# Patient Record
Sex: FEMALE | Race: WHITE | NOT HISPANIC OR LATINO | Employment: OTHER | URBAN - METROPOLITAN AREA
[De-identification: names, ages, dates, MRNs, and addresses within clinical notes are randomized per-mention and may not be internally consistent; named-entity substitution may affect disease eponyms.]

---

## 2017-01-12 DIAGNOSIS — L40.50 PSORIATIC ARTHRITIS: Primary | ICD-10-CM

## 2017-04-12 ENCOUNTER — PATIENT MESSAGE (OUTPATIENT)
Dept: RHEUMATOLOGY | Facility: CLINIC | Age: 47
End: 2017-04-12

## 2017-04-21 RX ORDER — AZITHROMYCIN 250 MG/1
TABLET, FILM COATED ORAL
Qty: 6 TABLET | Refills: 0 | Status: SHIPPED | OUTPATIENT
Start: 2017-04-21 | End: 2017-06-05 | Stop reason: ALTCHOICE

## 2017-04-21 RX ORDER — OLOPATADINE HYDROCHLORIDE 665 UG/1
1 SPRAY NASAL 2 TIMES DAILY
Qty: 30.5 G | Refills: 4 | Status: SHIPPED | OUTPATIENT
Start: 2017-04-21 | End: 2018-01-24 | Stop reason: SDUPTHER

## 2017-04-21 RX ORDER — MONTELUKAST SODIUM 10 MG/1
10 TABLET ORAL NIGHTLY
Qty: 30 TABLET | Refills: 0 | Status: SHIPPED | OUTPATIENT
Start: 2017-04-21 | End: 2017-05-15 | Stop reason: SDUPTHER

## 2017-04-28 ENCOUNTER — TELEPHONE (OUTPATIENT)
Dept: PHARMACY | Facility: CLINIC | Age: 47
End: 2017-04-28

## 2017-04-28 ENCOUNTER — TELEPHONE (OUTPATIENT)
Dept: RHEUMATOLOGY | Facility: CLINIC | Age: 47
End: 2017-04-28

## 2017-04-28 ENCOUNTER — OFFICE VISIT (OUTPATIENT)
Dept: RHEUMATOLOGY | Facility: CLINIC | Age: 47
End: 2017-04-28
Payer: COMMERCIAL

## 2017-04-28 VITALS
HEIGHT: 70 IN | WEIGHT: 186.88 LBS | HEART RATE: 75 BPM | DIASTOLIC BLOOD PRESSURE: 84 MMHG | BODY MASS INDEX: 26.75 KG/M2 | SYSTOLIC BLOOD PRESSURE: 144 MMHG

## 2017-04-28 DIAGNOSIS — E55.9 VITAMIN D DEFICIENCY DISEASE: ICD-10-CM

## 2017-04-28 DIAGNOSIS — I73.00 RAYNAUD'S DISEASE WITHOUT GANGRENE: ICD-10-CM

## 2017-04-28 DIAGNOSIS — F41.9 ANXIETY: ICD-10-CM

## 2017-04-28 DIAGNOSIS — E53.1 VITAMIN B6 DEFICIENCY: ICD-10-CM

## 2017-04-28 DIAGNOSIS — L40.50 PSORIATIC ARTHRITIS: Primary | ICD-10-CM

## 2017-04-28 DIAGNOSIS — F31.9 BIPOLAR AFFECTIVE DISORDER, REMISSION STATUS UNSPECIFIED: ICD-10-CM

## 2017-04-28 PROCEDURE — 99999 PR PBB SHADOW E&M-EST. PATIENT-LVL III: CPT | Mod: PBBFAC,,, | Performed by: INTERNAL MEDICINE

## 2017-04-28 PROCEDURE — 99214 OFFICE O/P EST MOD 30 MIN: CPT | Mod: 25,S$GLB,, | Performed by: INTERNAL MEDICINE

## 2017-04-28 PROCEDURE — 96372 THER/PROPH/DIAG INJ SC/IM: CPT | Mod: S$GLB,,, | Performed by: INTERNAL MEDICINE

## 2017-04-28 PROCEDURE — 1160F RVW MEDS BY RX/DR IN RCRD: CPT | Mod: S$GLB,,, | Performed by: INTERNAL MEDICINE

## 2017-04-28 RX ORDER — CYANOCOBALAMIN 1000 UG/ML
1000 INJECTION, SOLUTION INTRAMUSCULAR; SUBCUTANEOUS
Status: COMPLETED | OUTPATIENT
Start: 2017-04-28 | End: 2017-04-28

## 2017-04-28 RX ORDER — METHYLPREDNISOLONE ACETATE 80 MG/ML
160 INJECTION, SUSPENSION INTRA-ARTICULAR; INTRALESIONAL; INTRAMUSCULAR; SOFT TISSUE
Status: COMPLETED | OUTPATIENT
Start: 2017-04-28 | End: 2017-04-28

## 2017-04-28 RX ORDER — LANOLIN ALCOHOL/MO/W.PET/CERES
100 CREAM (GRAM) TOPICAL DAILY
COMMUNITY
End: 2017-10-26

## 2017-04-28 RX ORDER — BUPRENORPHINE AND NALOXONE 12; 3 MG/1; MG/1
FILM, SOLUBLE BUCCAL; SUBLINGUAL DAILY
COMMUNITY
End: 2017-04-28

## 2017-04-28 RX ORDER — PROMETHAZINE HYDROCHLORIDE AND DEXTROMETHORPHAN HYDROBROMIDE 6.25; 15 MG/5ML; MG/5ML
SYRUP ORAL
COMMUNITY
Start: 2017-04-26 | End: 2017-10-26

## 2017-04-28 RX ORDER — KETOROLAC TROMETHAMINE 30 MG/ML
60 INJECTION, SOLUTION INTRAMUSCULAR; INTRAVENOUS
Status: COMPLETED | OUTPATIENT
Start: 2017-04-28 | End: 2017-04-28

## 2017-04-28 RX ORDER — CHOLECALCIFEROL (VITAMIN D3) 25 MCG
1000 TABLET ORAL DAILY
COMMUNITY
End: 2017-10-26

## 2017-04-28 RX ADMIN — CYANOCOBALAMIN 1000 MCG: 1000 INJECTION, SOLUTION INTRAMUSCULAR; SUBCUTANEOUS at 01:04

## 2017-04-28 RX ADMIN — METHYLPREDNISOLONE ACETATE 160 MG: 80 INJECTION, SUSPENSION INTRA-ARTICULAR; INTRALESIONAL; INTRAMUSCULAR; SOFT TISSUE at 01:04

## 2017-04-28 RX ADMIN — KETOROLAC TROMETHAMINE 60 MG: 30 INJECTION, SOLUTION INTRAMUSCULAR; INTRAVENOUS at 01:04

## 2017-04-28 NOTE — PROGRESS NOTES
Administered 60mg (2cc) of Ketorolac 30mg/ml to left upper outer quadrant of the gluteus cassandra. Patient tolerated well. No acute reaction noted to site. Instructed to report S/S. Patient verbalized understanding.      Administered 160mg (2cc) of 80/ml of Depo Medrol and 1cc CYANOCOBALAMIN 1000mcg to right upper outer quadrant of the gluteus cassandra. Patient tolerated well. No acute reaction noted. Instructed patient to report S/S. Patient verbalized understanding.

## 2017-04-28 NOTE — MR AVS SNAPSHOT
[place] - Rheumatology  [address] [facility] [address]  [facility] [address]  Phone: [phone]  Fax: [phone]                  Boom Blanco   2017 10:30 AM   Office Visit    Description:  Female : 1970   Provider:  Maximino Molina MD   Department:  [place] - Rheumatology           Reason for Visit     Disease Management           Diagnoses this Visit        Comments    Psoriatic arthritis    -  Primary     Anxiety         Vitamin D deficiency disease         Vitamin B6 deficiency         Raynaud's disease without gangrene         Bipolar affective disorder, remission status unspecified                To Do List           Goals (5 Years of Data)     None      Follow-Up and Disposition     Return in about 4 months (around 2017).      [facility]     [facility] -  Assistance  Unless otherwise directed by your provider, please contact [facility], our nurse care line that is available for  assistance.     Registered nurses in the [facility] provide: appointment scheduling, clinical advisement, health education, and other advisory services.  Call: [phone] (toll free)               Medications           Message regarding Medications     Verify the changes and/or additions to your medication regime listed below are the same as discussed with your clinician today.  If any of these changes or additions are incorrect, please notify your healthcare provider.        These medications were administered today        Dose Freq    methylPREDNISolone acetate injection 160 mg 160 mg Clinic/HOD 1 time    Sig: Inject 2 mLs (160 mg total) into the muscle one time.    Class: Normal    Route: Intramuscular    ketorolac injection 60 mg 60 mg Clinic/HOD 1 time    Sig: Inject 2 mLs (60 mg total) into the muscle one time.    Class: Normal    Route: Intramuscular    cyanocobalamin injection 1,000 mcg 1,000 mcg Clinic/HOD 1 time    Sig: Inject 1 mL (1,000  mcg total) into the muscle one time.    Class: Normal    Route: Intramuscular      STOP taking these medications     paroxetine (PAXIL-CR) 12.5 MG 24 hr tablet TAKE ONE TABLET BY MOUTH EVERY MORNING    vit D3-folic acid-B2-B6-B12 2,000-800-0.32 unit-mcg-mg Tab Take by mouth.    buprenorphine-naloxone 12-3 mg Film Place under the tongue once daily.    LINZESS 145 mcg Cap capsule            Verify that the below list of medications is an accurate representation of the medications you are currently taking.  If none reported, the list may be blank. If incorrect, please contact your healthcare provider. Carry this list with you in case of emergency.           Current Medications     alprazolam (XANAX) 1 MG tablet Take 1 mg by mouth 2 (two) times daily.    azithromycin (Z-YANG) 250 MG tablet Take 2 tablets by mouth on day 1; Take 1 tablet by mouth on days 2-5    cariprazine (VRAYLAR) 4.5 mg Cap Take 4.5 mg by mouth once daily.    chlorzoxazone (PARAFON FORTE) 500 mg Tab 1,000 mg every evening.     cyanocobalamin (VITAMIN B-12) 1000 MCG tablet Take 100 mcg by mouth once daily.    gabapentin (NEURONTIN) 300 MG capsule 300 mg once daily.     lactulose (CHRONULAC) 10 gram/15 mL solution     lamotrigine (LAMICTAL) 100 MG tablet TK 2 TS PO QAM    levocetirizine (XYZAL) 5 MG tablet TAKE ONE TABLET BY MOUTH EVERY EVENING    levothyroxine (SYNTHROID) 50 MCG tablet TAKE ONE TABLET BY MOUTH EVERY MORNING    lovastatin (MEVACOR) 20 MG tablet     montelukast (SINGULAIR) 10 mg tablet Take 1 tablet (10 mg total) by mouth every evening.    olopatadine (PATANASE) 0.6 % nasal spray 1 spray by Each Nare route 2 (two) times daily.    oxycodone (ROXICODONE) 15 MG Tab 15 mg every 8 (eight) hours as needed.     promethazine-dextromethorphan (PROMETHAZINE-DM) 6.25-15 mg/5 mL Syrp     temazepam (RESTORIL) 15 mg Cap every evening.     vitamin D 1000 units Tab Take 1,000 Units by mouth once daily.    golimumab (SIMPONI) 50 mg/0.5 mL Syrg Inject 1  "Syringe into the skin every 30 days.    SIMPONI 50 mg/0.5 mL PnIj            Clinical Reference Information           Your Vitals Were     BP Pulse Height Weight BMI    144/84 75 5' 9.5" (1.765 m) 84.8 kg (186 lb 14.4 oz) 27.2 kg/m2      Blood Pressure          Most Recent Value    BP  (!)  144/84      Allergies as of 4/28/2017     Remeron [Mirtazapine]      Immunizations Administered on Date of Encounter - 4/28/2017     None      Orders Placed During Today's Visit     Future Labs/Procedures Expected by Expires    Folate  4/28/2017 6/27/2018    Vitamin B12  4/28/2017 6/27/2018    Vitamin B6  4/28/2017 6/27/2018    Vitamin D  4/28/2017 6/27/2018      Smoking Cessation     If you would like to quit smoking:   You may be eligible for free services if you are a [place] resident and started smoking cigarettes before September 1, 1988.  Call the Smoking Cessation [employer] ([facility]) toll free at [phone] or [phone].   Call 1-800-QUIT-NOW if you do not meet the above criteria.   Contact us via email: [email or web address]   View our website for more information: [email or web address]        Language Assistance Services     ATTENTION: Language assistance services are available, free of charge. Please call [phone].      ATENCIÓN: Si habla español, tiene a maya disposición servicios gratuitos de asistencia lingüística. Llame al [phone].     [facility] Ý: N?u b?n nói Ti?ng Vi?t, có các d?ch v? h? tr? ngôn ng? mi?n phí dành cho b?n. G?i s? [phone].         [facility] complies with applicable Federal civil rights laws and does not discriminate on the basis of race, color, national origin, age, disability, or sex.        "

## 2017-04-28 NOTE — PROGRESS NOTES
Subjective:       Patient ID: Boom Blanco is a 47 y.o. female.    Chief Complaint: Disease Management    HPI Comments: Follow up: psa flares she is off simponi since jan 2017. mtp pain  Weight gain, constipation. Patient complains of arthralgias and myalgias for which has been present for a few years. Pain is located in multiple joints, both shoulder(s), both elbow(s), both wrist(s), both MCP(s): 1st, 2nd, 3rd, 4th and 5th, both PIP(s): 1st, 2nd, 3rd, 4th and 5th, both DIP(s): 1st and 2nd, both hip(s), both knee(s) and both MTP(s): 1st, 2nd, 3rd, 4th and 5th, is described as aching, pulsating, shooting and throbbing, and is constant, moderate .  Associated symptoms include: crepitation, decreased range of motion, edema, effusion, tenderness and warmth.  B ankles swelling R>L  And knees B, vision changes            Associated symptoms include fatigue.         Review of Systems   Constitutional: Positive for activity change and fatigue. Negative for appetite change, chills, diaphoresis and unexpected weight change.   HENT: Negative for congestion, dental problem, ear discharge, ear pain, facial swelling, mouth sores, nosebleeds, postnasal drip, rhinorrhea, sinus pressure, sneezing, sore throat, tinnitus and voice change.    Eyes: Negative for photophobia, pain, discharge, redness and itching.   Respiratory: Negative for apnea, cough, chest tightness, shortness of breath and wheezing.    Cardiovascular: Positive for leg swelling. Negative for chest pain and palpitations.   Gastrointestinal: Negative for abdominal distention, abdominal pain, constipation, diarrhea, nausea and vomiting.   Endocrine: Negative for cold intolerance, heat intolerance, polydipsia and polyuria.   Genitourinary: Negative for decreased urine volume, difficulty urinating, flank pain, frequency, hematuria and urgency.   Musculoskeletal: Positive for arthralgias, neck pain and neck stiffness. Negative for back pain and gait problem.  "  Skin: Negative for pallor, rash and wound.   Allergic/Immunologic: Negative for immunocompromised state.   Neurological: Negative for dizziness, tremors, weakness and numbness.   Hematological: Negative for adenopathy. Does not bruise/bleed easily.   Psychiatric/Behavioral: Negative for sleep disturbance. The patient is not nervous/anxious.          Objective:     BP (!) 144/84  Pulse 75  Ht 5' 9.5" (1.765 m)  Wt 84.8 kg (186 lb 14.4 oz)  BMI 27.2 kg/m2     Physical Exam   Nursing note and vitals reviewed.  Constitutional: She is oriented to person, place, and time. She appears distressed.   HENT:   Head: Normocephalic and atraumatic.   Mouth/Throat: Oropharynx is clear and moist.   Eyes: EOM are normal. Pupils are equal, round, and reactive to light.   Neck: Neck supple. No thyromegaly present.   Cardiovascular: Normal rate, regular rhythm and normal heart sounds.  Exam reveals no gallop and no friction rub.    No murmur heard.  Pulmonary/Chest: She has wheezes. She has no rales. She exhibits no tenderness.   Abdominal: There is tenderness. There is no rebound and no guarding.       Right Side Rheumatological Exam     Examination finds the elbow normal.    The patient is tender to palpation of the shoulder, wrist, knee, 1st PIP, 1st MCP, 2nd PIP, 2nd MCP, 3rd PIP, 3rd MCP, 4th PIP, 4th MCP, 5th PIP and 5th MCP    She has swelling of the 1st PIP, 1st MCP, 2nd PIP, 2nd MCP, 3rd PIP, 3rd MCP, 4th PIP, 4th MCP, 5th PIP and 5th MCP    Shoulder Exam   Tenderness Location: no tenderness    Range of Motion   Active Abduction: abnormal   Adduction: abnormal  Sensation: normal    Knee Exam   Patellofemoral Crepitus: positive  Effusion: negative  Sensation: normal    Hip Exam   Tenderness Location: posterior  Sensation: normal    Elbow/Wrist Exam   Tenderness Location: no tenderness  Sensation: normal    Left Side Rheumatological Exam     Examination finds the elbow normal.    The patient is tender to palpation of the " shoulder, wrist, knee, 1st PIP, 1st MCP, 2nd PIP, 2nd MCP, 3rd PIP, 3rd MCP, 4th PIP, 4th MCP, 5th PIP and 5th MCP.    She has swelling of the 1st PIP, 1st MCP, 2nd PIP, 2nd MCP, 3rd PIP, 3rd MCP, 4th PIP, 4th MCP, 5th PIP and 5th MCP    Shoulder Exam   Tenderness Location: no tenderness    Range of Motion   Active Abduction: abnormal   Sensation: normal    Knee Exam     Patellofemoral Crepitus: positive  Effusion: negative  Sensation: normal    Hip Exam   Tenderness Location: posterior  Sensation: normal    Elbow/Wrist Exam   Sensation: normal      Back/Neck Exam   General Inspection   Gait: normal         Lymphadenopathy:     She has no cervical adenopathy.   Neurological: She is alert and oriented to person, place, and time. Gait normal.   Skin: Rash noted. No erythema. No pallor.      Raised lesion on back, legs arms spares face KP   Psychiatric: Mood and affect normal.   Musculoskeletal: She exhibits edema, tenderness and deformity.   psa changes           Results for orders placed or performed in visit on 01/18/17   Comprehensive metabolic panel   Result Value Ref Range    Sodium 143 136 - 145 mmol/L    Potassium 4.0 3.5 - 5.1 mmol/L    Chloride 104 95 - 110 mmol/L    CO2 30 22 - 31 mmol/L    Glucose 100 70 - 110 mg/dL    BUN, Bld 9 7 - 18 mg/dL    Creatinine 0.74 0.50 - 1.40 mg/dL    Calcium 8.9 8.4 - 10.2 mg/dL    Total Protein 6.7 6.0 - 8.4 g/dL    Albumin 3.7 3.5 - 5.2 g/dL    Total Bilirubin 0.5 0.2 - 1.3 mg/dL    Alkaline Phosphatase 49 38 - 145 U/L    AST 23 14 - 36 U/L    ALT 25 10 - 44 U/L    Anion Gap 9 8 - 16 mmol/L    eGFR if African American >60 >60 mL/min/1.73 m^2    eGFR if non African American >60 >60 mL/min/1.73 m^2   CBC auto differential   Result Value Ref Range    WBC 6.58 3.90 - 12.70 K/uL    RBC 4.48 4.00 - 5.40 M/uL    Hemoglobin 13.5 12.0 - 16.0 g/dL    Hematocrit 42.7 37.0 - 48.5 %    MCV 95 82 - 98 fL    MCH 30.1 27.0 - 31.0 pg    MCHC 31.6 (L) 32.0 - 36.0 %    RDW 13.8 11.5 - 14.5 %     Platelets 171 150 - 350 K/uL    MPV 11.1 9.2 - 12.9 fL    Gran # 3.7 1.8 - 7.7 K/uL    Lymph # 2.2 1.0 - 4.8 K/uL    Mono # 0.5 0.3 - 1.0 K/uL    Eos # 0.1 0.0 - 0.5 K/uL    Baso # 0.06 0.00 - 0.20 K/uL    nRBC 0 0 /100 WBC    Gran% 56.0 38.0 - 73.0 %    Lymph% 33.7 18.0 - 48.0 %    Mono% 7.4 4.0 - 15.0 %    Eosinophil% 2.0 0.0 - 8.0 %    Basophil% 0.9 0.0 - 1.9 %    Differential Method Automated    TSH   Result Value Ref Range    TSH 2.110 0.400 - 4.000 uIU/mL   T4, free   Result Value Ref Range    Free T4 1.27 0.78 - 2.19 ng/dL   T3, free   Result Value Ref Range    T3, Free 3.69 2.77 - 5.27 pg/mL   Vitamin D   Result Value Ref Range    Vit D, 25-Hydroxy 27 (L) 30 - 96 ng/mL   Vitamin B6   Result Value Ref Range    Vitamin B6 19.8 (L) 20.0 - 125.0 nmol/L   LIPID PANEL   Result Value Ref Range    Cholesterol 177 120 - 199 mg/dL    Triglycerides 67 30 - 150 mg/dL    HDL 53 40 - 75 mg/dL    LDL Cholesterol 110.6 63.0 - 159.0 mg/dL    HDL/Chol Ratio 29.9 20.0 - 50.0 %    Total Cholesterol/HDL Ratio 3.3 2.0 - 5.0    Non-HDL Cholesterol 124 mg/dL       Assessment:       Encounter Diagnoses   Name Primary?    Psoriatic arthritis Yes    Anxiety     Vitamin D deficiency disease     Vitamin B6 deficiency     Raynaud's disease without gangrene     Bipolar affective disorder, remission status unspecified      \    Plan:     Boom was seen today for disease management.    Diagnoses and all orders for this visit:    Psoriatic arthritis  -     Vitamin D; Future  -     Folate; Future  -     Vitamin B6; Future  -     Vitamin B12; Future  -     methylPREDNISolone acetate injection 160 mg; Inject 2 mLs (160 mg total) into the muscle one time.  -     ketorolac injection 60 mg; Inject 2 mLs (60 mg total) into the muscle one time.  -     cyanocobalamin injection 1,000 mcg; Inject 1 mL (1,000 mcg total) into the muscle one time.  -     golimumab 100 mg/mL Syrg; Inject 50 mg into the skin every 30 days.    Anxiety  -      Vitamin D; Future  -     Folate; Future  -     Vitamin B6; Future  -     Vitamin B12; Future  -     methylPREDNISolone acetate injection 160 mg; Inject 2 mLs (160 mg total) into the muscle one time.  -     ketorolac injection 60 mg; Inject 2 mLs (60 mg total) into the muscle one time.  -     cyanocobalamin injection 1,000 mcg; Inject 1 mL (1,000 mcg total) into the muscle one time.  -     golimumab 100 mg/mL Syrg; Inject 50 mg into the skin every 30 days.    Vitamin D deficiency disease  -     Vitamin D; Future  -     Folate; Future  -     Vitamin B6; Future  -     Vitamin B12; Future  -     methylPREDNISolone acetate injection 160 mg; Inject 2 mLs (160 mg total) into the muscle one time.  -     ketorolac injection 60 mg; Inject 2 mLs (60 mg total) into the muscle one time.  -     cyanocobalamin injection 1,000 mcg; Inject 1 mL (1,000 mcg total) into the muscle one time.  -     golimumab 100 mg/mL Syrg; Inject 50 mg into the skin every 30 days.    Vitamin B6 deficiency  -     Vitamin D; Future  -     Folate; Future  -     Vitamin B6; Future  -     Vitamin B12; Future  -     methylPREDNISolone acetate injection 160 mg; Inject 2 mLs (160 mg total) into the muscle one time.  -     ketorolac injection 60 mg; Inject 2 mLs (60 mg total) into the muscle one time.  -     cyanocobalamin injection 1,000 mcg; Inject 1 mL (1,000 mcg total) into the muscle one time.  -     golimumab 100 mg/mL Syrg; Inject 50 mg into the skin every 30 days.    Raynaud's disease without gangrene  -     Vitamin D; Future  -     Folate; Future  -     Vitamin B6; Future  -     Vitamin B12; Future  -     methylPREDNISolone acetate injection 160 mg; Inject 2 mLs (160 mg total) into the muscle one time.  -     ketorolac injection 60 mg; Inject 2 mLs (60 mg total) into the muscle one time.  -     cyanocobalamin injection 1,000 mcg; Inject 1 mL (1,000 mcg total) into the muscle one time.  -     golimumab 100 mg/mL Syrg; Inject 50 mg into the skin every 30  days.    Bipolar affective disorder, remission status unspecified  -     Vitamin D; Future  -     Folate; Future  -     Vitamin B6; Future  -     Vitamin B12; Future  -     methylPREDNISolone acetate injection 160 mg; Inject 2 mLs (160 mg total) into the muscle one time.  -     ketorolac injection 60 mg; Inject 2 mLs (60 mg total) into the muscle one time.  -     cyanocobalamin injection 1,000 mcg; Inject 1 mL (1,000 mcg total) into the muscle one time.  -     golimumab 100 mg/mL Syrg; Inject 50 mg into the skin every 30 days.       Due to the severity of her disease state and the quality of her live as well as the inability to control the symptoms of her disease state I recommend applying for long term disability

## 2017-04-28 NOTE — TELEPHONE ENCOUNTER
Spoke to Brant at [facility], clarified that Simponi Autoinjector is 50mg. No further questions.

## 2017-04-28 NOTE — TELEPHONE ENCOUNTER
----- Message from Celena Andrew sent at 4/28/2017  1:59 PM CDT -----  [facility] Simponi for the above patient. There are requesting clarification on dosage, prescription is for 100 mg but directions stated to inject 50mg, contact pharmacy at [phone].  Thanks.

## 2017-05-05 ENCOUNTER — LAB VISIT (OUTPATIENT)
Dept: LAB | Facility: HOSPITAL | Age: 47
End: 2017-05-05
Attending: INTERNAL MEDICINE
Payer: COMMERCIAL

## 2017-05-05 DIAGNOSIS — I73.00 RAYNAUD'S DISEASE WITHOUT GANGRENE: ICD-10-CM

## 2017-05-05 DIAGNOSIS — F31.9 BIPOLAR AFFECTIVE DISORDER, REMISSION STATUS UNSPECIFIED: ICD-10-CM

## 2017-05-05 DIAGNOSIS — E53.1 VITAMIN B6 DEFICIENCY: ICD-10-CM

## 2017-05-05 DIAGNOSIS — L40.50 PSORIATIC ARTHRITIS: ICD-10-CM

## 2017-05-05 DIAGNOSIS — F41.9 ANXIETY: ICD-10-CM

## 2017-05-05 DIAGNOSIS — E55.9 VITAMIN D DEFICIENCY DISEASE: ICD-10-CM

## 2017-05-05 LAB
25(OH)D3+25(OH)D2 SERPL-MCNC: 54 NG/ML
FOLATE SERPL-MCNC: 12.4 NG/ML
VIT B12 SERPL-MCNC: 606 PG/ML

## 2017-05-05 PROCEDURE — 36415 COLL VENOUS BLD VENIPUNCTURE: CPT | Mod: PO

## 2017-05-05 PROCEDURE — 82306 VITAMIN D 25 HYDROXY: CPT

## 2017-05-05 PROCEDURE — 82746 ASSAY OF FOLIC ACID SERUM: CPT

## 2017-05-05 PROCEDURE — 84207 ASSAY OF VITAMIN B-6: CPT

## 2017-05-05 PROCEDURE — 82607 VITAMIN B-12: CPT

## 2017-05-08 ENCOUNTER — TELEPHONE (OUTPATIENT)
Dept: PHARMACY | Facility: CLINIC | Age: 47
End: 2017-05-08

## 2017-05-08 NOTE — TELEPHONE ENCOUNTER
patient contacted to offer consultation and setup shipment. She verified she has not started any new medication. She has not developed any new drug allergies or medical conditions. She scheduled her ship date for 5-9-17 for a 5-10-17 delivery. She verified her address on file is correct and would like her medication to go there. She is new to pharmacy and new to drug and has declined consultation with a clinical pharmacist. She verified understanding of the refill process and has no additional questions at the time_ 5-8-17.

## 2017-05-09 LAB — PYRIDOXAL SERPL-MCNC: 30 UG/L (ref 5–50)

## 2017-05-15 RX ORDER — MONTELUKAST SODIUM 10 MG/1
TABLET ORAL
Qty: 30 TABLET | Refills: 3 | Status: SHIPPED | OUTPATIENT
Start: 2017-05-15 | End: 2017-10-26

## 2017-06-05 ENCOUNTER — TELEPHONE (OUTPATIENT)
Dept: PHARMACY | Facility: CLINIC | Age: 47
End: 2017-06-05

## 2017-06-05 NOTE — TELEPHONE ENCOUNTER
Neel f/u complete. Name/ confirmed. Medication list reviewed and updated. Consultation included: indication; goals of treatment; administration; storage and handling; side effects; how to handle side effects; the importance of compliance; how to handle missed doses; the importance of laboratory monitoring; the importance of keeping all follow up appointments. Patient understands to report any medication changes to [facility] and provider. All questions answered and addressed to patients satisfaction. Patient understands goals of treatment and medication regimen. She reports her pain to be 6/10. She reports some pain in shoulders and hands. She has no questions regarding self injection process and that was deferred. She reports only mild injection site reactions after injecting and thats all. Ms rene was afforded the opportunity to ask questions and had none. F/U in 6mos appropriate.

## 2017-07-06 ENCOUNTER — TELEPHONE (OUTPATIENT)
Dept: PHARMACY | Facility: CLINIC | Age: 47
End: 2017-07-06

## 2017-07-10 ENCOUNTER — PATIENT MESSAGE (OUTPATIENT)
Dept: ADMINISTRATIVE | Facility: OTHER | Age: 47
End: 2017-07-10

## 2017-07-11 NOTE — TELEPHONE ENCOUNTER
patient confirmed need of refill for simponi via portal , did not state dose count and did not state any updates to medical history. patient confirmed shipping address , rx will ship 7/13 to arrive 7/14 with consent.    Katie Hondroulis [facility]- Refill Technician  Phone: [phone]

## 2017-07-12 DIAGNOSIS — I73.00 RAYNAUD'S DISEASE: ICD-10-CM

## 2017-07-12 DIAGNOSIS — G47.00 INSOMNIA: ICD-10-CM

## 2017-07-12 DIAGNOSIS — E03.9 HYPOTHYROIDISM: ICD-10-CM

## 2017-07-12 DIAGNOSIS — L40.50 PSORIATIC ARTHRITIS: ICD-10-CM

## 2017-07-12 DIAGNOSIS — R05.9 COUGH: ICD-10-CM

## 2017-07-12 RX ORDER — LEVOTHYROXINE SODIUM 50 UG/1
TABLET ORAL
Qty: 30 TABLET | Refills: 8 | Status: SHIPPED | OUTPATIENT
Start: 2017-07-12 | End: 2018-07-26 | Stop reason: SDUPTHER

## 2017-08-04 ENCOUNTER — TELEPHONE (OUTPATIENT)
Dept: PHARMACY | Facility: CLINIC | Age: 47
End: 2017-08-04

## 2017-08-07 DIAGNOSIS — G47.00 INSOMNIA: ICD-10-CM

## 2017-08-07 DIAGNOSIS — R05.9 COUGH: ICD-10-CM

## 2017-08-07 DIAGNOSIS — L40.50 PSORIATIC ARTHRITIS: ICD-10-CM

## 2017-08-07 DIAGNOSIS — I73.00 RAYNAUD'S DISEASE: ICD-10-CM

## 2017-08-07 DIAGNOSIS — E03.9 HYPOTHYROIDISM: ICD-10-CM

## 2017-08-07 RX ORDER — LEVOTHYROXINE SODIUM 50 UG/1
TABLET ORAL
Qty: 30 TABLET | Refills: 3 | Status: SHIPPED | OUTPATIENT
Start: 2017-08-07 | End: 2017-10-26 | Stop reason: SDUPTHER

## 2017-08-08 DIAGNOSIS — G47.00 INSOMNIA: ICD-10-CM

## 2017-08-08 DIAGNOSIS — E03.9 HYPOTHYROIDISM: ICD-10-CM

## 2017-08-08 DIAGNOSIS — R05.9 COUGH: ICD-10-CM

## 2017-08-08 DIAGNOSIS — I73.00 RAYNAUD'S DISEASE: ICD-10-CM

## 2017-08-08 DIAGNOSIS — L40.50 PSORIATIC ARTHRITIS: ICD-10-CM

## 2017-08-08 RX ORDER — LEVOTHYROXINE SODIUM 50 UG/1
TABLET ORAL
Qty: 30 TABLET | Refills: 0 | Status: SHIPPED | OUTPATIENT
Start: 2017-08-08 | End: 2017-10-26 | Stop reason: SDUPTHER

## 2017-08-27 ENCOUNTER — PATIENT MESSAGE (OUTPATIENT)
Dept: RHEUMATOLOGY | Facility: CLINIC | Age: 47
End: 2017-08-27

## 2017-08-31 ENCOUNTER — TELEPHONE (OUTPATIENT)
Dept: PHARMACY | Facility: CLINIC | Age: 47
End: 2017-08-31

## 2017-09-06 ENCOUNTER — PATIENT MESSAGE (OUTPATIENT)
Dept: ADMINISTRATIVE | Facility: OTHER | Age: 47
End: 2017-09-06

## 2017-10-17 ENCOUNTER — TELEPHONE (OUTPATIENT)
Dept: PHARMACY | Facility: CLINIC | Age: 47
End: 2017-10-17

## 2017-10-26 ENCOUNTER — OFFICE VISIT (OUTPATIENT)
Dept: RHEUMATOLOGY | Facility: CLINIC | Age: 47
End: 2017-10-26
Payer: COMMERCIAL

## 2017-10-26 ENCOUNTER — LAB VISIT (OUTPATIENT)
Dept: LAB | Facility: HOSPITAL | Age: 47
End: 2017-10-26
Attending: INTERNAL MEDICINE
Payer: COMMERCIAL

## 2017-10-26 VITALS
DIASTOLIC BLOOD PRESSURE: 78 MMHG | WEIGHT: 150.38 LBS | HEART RATE: 80 BPM | SYSTOLIC BLOOD PRESSURE: 123 MMHG | BODY MASS INDEX: 21.89 KG/M2

## 2017-10-26 DIAGNOSIS — I51.9 MYXEDEMA HEART DISEASE: ICD-10-CM

## 2017-10-26 DIAGNOSIS — G89.4 CHRONIC PAIN SYNDROME: ICD-10-CM

## 2017-10-26 DIAGNOSIS — R05.9 COUGH: ICD-10-CM

## 2017-10-26 DIAGNOSIS — E03.9 MYXEDEMA HEART DISEASE: ICD-10-CM

## 2017-10-26 DIAGNOSIS — E55.9 VITAMIN D DEFICIENCY DISEASE: ICD-10-CM

## 2017-10-26 DIAGNOSIS — E53.1 VITAMIN B6 DEFICIENCY: ICD-10-CM

## 2017-10-26 DIAGNOSIS — I73.00 RAYNAUD'S DISEASE WITHOUT GANGRENE: Primary | ICD-10-CM

## 2017-10-26 DIAGNOSIS — L40.50 PSORIATIC ARTHRITIS: ICD-10-CM

## 2017-10-26 DIAGNOSIS — I73.00 RAYNAUD'S DISEASE WITHOUT GANGRENE: ICD-10-CM

## 2017-10-26 DIAGNOSIS — E78.5 HYPERLIPEMIA: Primary | ICD-10-CM

## 2017-10-26 DIAGNOSIS — F31.9 BIPOLAR AFFECTIVE DISORDER, REMISSION STATUS UNSPECIFIED: ICD-10-CM

## 2017-10-26 DIAGNOSIS — F01.53 VASCULAR DEMENTIA WITH DEPRESSED MOOD: ICD-10-CM

## 2017-10-26 DIAGNOSIS — L93.0 LUPUS ERYTHEMATOSUS: ICD-10-CM

## 2017-10-26 DIAGNOSIS — F41.9 ANXIETY: ICD-10-CM

## 2017-10-26 LAB
ALBUMIN SERPL BCP-MCNC: 3.7 G/DL
ALP SERPL-CCNC: 48 U/L
ALT SERPL W/O P-5'-P-CCNC: 10 U/L
ANION GAP SERPL CALC-SCNC: 6 MMOL/L
AST SERPL-CCNC: 16 U/L
BASOPHILS # BLD AUTO: 0.06 K/UL
BASOPHILS NFR BLD: 0.9 %
BILIRUB SERPL-MCNC: 0.3 MG/DL
BUN SERPL-MCNC: 8 MG/DL
CALCIUM SERPL-MCNC: 9.4 MG/DL
CHLORIDE SERPL-SCNC: 105 MMOL/L
CHOLEST SERPL-MCNC: 155 MG/DL
CHOLEST/HDLC SERPL: 3.6 {RATIO}
CO2 SERPL-SCNC: 30 MMOL/L
CREAT SERPL-MCNC: 0.9 MG/DL
CRP SERPL-MCNC: 0.9 MG/L
DIFFERENTIAL METHOD: NORMAL
EOSINOPHIL # BLD AUTO: 0.1 K/UL
EOSINOPHIL NFR BLD: 0.7 %
ERYTHROCYTE [DISTWIDTH] IN BLOOD BY AUTOMATED COUNT: 13.3 %
ERYTHROCYTE [SEDIMENTATION RATE] IN BLOOD BY WESTERGREN METHOD: 3 MM/HR
EST. GFR  (AFRICAN AMERICAN): >60 ML/MIN/1.73 M^2
EST. GFR  (NON AFRICAN AMERICAN): >60 ML/MIN/1.73 M^2
GLUCOSE SERPL-MCNC: 99 MG/DL
HCT VFR BLD AUTO: 43.4 %
HDLC SERPL-MCNC: 43 MG/DL
HDLC SERPL: 27.7 %
HGB BLD-MCNC: 14 G/DL
IMM GRANULOCYTES # BLD AUTO: 0.01 K/UL
IMM GRANULOCYTES NFR BLD AUTO: 0.1 %
LDLC SERPL CALC-MCNC: 91 MG/DL
LYMPHOCYTES # BLD AUTO: 2.5 K/UL
LYMPHOCYTES NFR BLD: 36.6 %
MCH RBC QN AUTO: 29.9 PG
MCHC RBC AUTO-ENTMCNC: 32.3 G/DL
MCV RBC AUTO: 93 FL
MONOCYTES # BLD AUTO: 0.5 K/UL
MONOCYTES NFR BLD: 6.6 %
NEUTROPHILS # BLD AUTO: 3.7 K/UL
NEUTROPHILS NFR BLD: 55.1 %
NONHDLC SERPL-MCNC: 112 MG/DL
NRBC BLD-RTO: 0 /100 WBC
PLATELET # BLD AUTO: 178 K/UL
PMV BLD AUTO: 11.5 FL
POTASSIUM SERPL-SCNC: 4.7 MMOL/L
PROT SERPL-MCNC: 7.1 G/DL
RBC # BLD AUTO: 4.69 M/UL
SODIUM SERPL-SCNC: 141 MMOL/L
T4 FREE SERPL-MCNC: 1.21 NG/DL
TRIGL SERPL-MCNC: 105 MG/DL
TSH SERPL DL<=0.005 MIU/L-ACNC: 1.72 UIU/ML
WBC # BLD AUTO: 6.77 K/UL

## 2017-10-26 PROCEDURE — 99214 OFFICE O/P EST MOD 30 MIN: CPT | Mod: 25,S$GLB,, | Performed by: INTERNAL MEDICINE

## 2017-10-26 PROCEDURE — 85025 COMPLETE CBC W/AUTO DIFF WBC: CPT

## 2017-10-26 PROCEDURE — 36415 COLL VENOUS BLD VENIPUNCTURE: CPT | Mod: PO

## 2017-10-26 PROCEDURE — 99999 PR PBB SHADOW E&M-EST. PATIENT-LVL III: CPT | Mod: PBBFAC,,, | Performed by: INTERNAL MEDICINE

## 2017-10-26 PROCEDURE — 86140 C-REACTIVE PROTEIN: CPT

## 2017-10-26 PROCEDURE — 80053 COMPREHEN METABOLIC PANEL: CPT

## 2017-10-26 PROCEDURE — 84439 ASSAY OF FREE THYROXINE: CPT

## 2017-10-26 PROCEDURE — 80061 LIPID PANEL: CPT

## 2017-10-26 PROCEDURE — 85651 RBC SED RATE NONAUTOMATED: CPT | Mod: PO

## 2017-10-26 PROCEDURE — 84443 ASSAY THYROID STIM HORMONE: CPT

## 2017-10-26 PROCEDURE — 96372 THER/PROPH/DIAG INJ SC/IM: CPT | Mod: S$GLB,,, | Performed by: INTERNAL MEDICINE

## 2017-10-26 RX ORDER — BENZONATATE 200 MG/1
200 CAPSULE ORAL 3 TIMES DAILY PRN
Qty: 45 CAPSULE | Refills: 3 | Status: SHIPPED | OUTPATIENT
Start: 2017-10-26 | End: 2018-01-10 | Stop reason: SDUPTHER

## 2017-10-26 RX ORDER — METHYLPREDNISOLONE ACETATE 80 MG/ML
160 INJECTION, SUSPENSION INTRA-ARTICULAR; INTRALESIONAL; INTRAMUSCULAR; SOFT TISSUE
Status: COMPLETED | OUTPATIENT
Start: 2017-10-26 | End: 2017-10-26

## 2017-10-26 RX ORDER — OXYCODONE AND ACETAMINOPHEN 10; 325 MG/1; MG/1
1 TABLET ORAL 3 TIMES DAILY PRN
COMMUNITY
Start: 2017-10-06 | End: 2022-11-14 | Stop reason: SDUPTHER

## 2017-10-26 RX ORDER — PROMETHAZINE HYDROCHLORIDE AND DEXTROMETHORPHAN HYDROBROMIDE 6.25; 15 MG/5ML; MG/5ML
5 SYRUP ORAL 3 TIMES DAILY
Qty: 225 ML | Refills: 1 | Status: SHIPPED | OUTPATIENT
Start: 2017-10-26 | End: 2017-11-10

## 2017-10-26 RX ORDER — LEVOCETIRIZINE DIHYDROCHLORIDE 5 MG/1
5 TABLET, FILM COATED ORAL NIGHTLY
Qty: 30 TABLET | Refills: 11 | Status: SHIPPED | OUTPATIENT
Start: 2017-10-26 | End: 2018-08-06

## 2017-10-26 RX ORDER — CYANOCOBALAMIN 1000 UG/ML
1000 INJECTION, SOLUTION INTRAMUSCULAR; SUBCUTANEOUS
Status: COMPLETED | OUTPATIENT
Start: 2017-10-26 | End: 2017-10-26

## 2017-10-26 RX ORDER — PYRIDOXINE HCL (VITAMIN B6) 25 MG
25 TABLET ORAL DAILY
Qty: 30 TABLET | Refills: 0 | Status: SHIPPED | OUTPATIENT
Start: 2017-10-26 | End: 2018-08-06

## 2017-10-26 RX ORDER — KETOROLAC TROMETHAMINE 30 MG/ML
60 INJECTION, SOLUTION INTRAMUSCULAR; INTRAVENOUS
Status: COMPLETED | OUTPATIENT
Start: 2017-10-26 | End: 2017-10-26

## 2017-10-26 RX ORDER — BUPROPION HYDROCHLORIDE 150 MG/1
150 TABLET ORAL NIGHTLY
COMMUNITY
Start: 2017-09-29 | End: 2021-02-09

## 2017-10-26 RX ADMIN — METHYLPREDNISOLONE ACETATE 160 MG: 80 INJECTION, SUSPENSION INTRA-ARTICULAR; INTRALESIONAL; INTRAMUSCULAR; SOFT TISSUE at 10:10

## 2017-10-26 RX ADMIN — KETOROLAC TROMETHAMINE 60 MG: 30 INJECTION, SOLUTION INTRAMUSCULAR; INTRAVENOUS at 10:10

## 2017-10-26 RX ADMIN — CYANOCOBALAMIN 1000 MCG: 1000 INJECTION, SOLUTION INTRAMUSCULAR; SUBCUTANEOUS at 10:10

## 2017-10-26 ASSESSMENT — ROUTINE ASSESSMENT OF PATIENT INDEX DATA (RAPID3)
PSYCHOLOGICAL DISTRESS SCORE: 4.4
MDHAQ FUNCTION SCORE: 1.5
WHEN YOU AWAKENED IN THE MORNING OVER THE LAST WEEK, PLEASE INDICATE THE AMOUNT OF TIME IT TAKES UNTIL YOU ARE AS LIMBER AS YOU WILL BE FOR THE DAY: 30 MINUTES AFTER WAKING
TOTAL RAPID3 SCORE: 5.33
PATIENT GLOBAL ASSESSMENT SCORE: 3
AM STIFFNESS SCORE: 1, YES
PAIN SCORE: 8
FATIGUE SCORE: 5

## 2017-10-26 NOTE — PROGRESS NOTES
Administered 1 cc ( 1000 mcg/ml ) of b12 to the right upper outer gluteal. Informed of s/s to report verbalized understanding. No adverse reactions noted.    Lot # [id number]  Expiration apr 19    Administered 2 cc ( 80 mg/ml ) of depomedrol to the right upper outer gluteal. Informed of s/s to report verbalized understanding. No adverse reactions noted.    Lot # [id number]  Expiration 10/2018    Administered 2 cc ( 30 mg/ml ) of toradol to the left upper outer gluteal. Informed of s/s to report verbalized understanding. No adverse reactions noted.    Lot # [id number]  Expiration 05/19

## 2017-10-26 NOTE — PROGRESS NOTES
Subjective:       Patient ID: Boom Blanco is a 47 y.o. female.    Chief Complaint: Follow-up    Follow up: psa on simponi monthly. Patient complains of arthralgias and myalgias for which has been present for a few years. Pain is located in multiple joints, both shoulder(s), both elbow(s), both wrist(s), both MCP(s): 1st, 2nd, 3rd, 4th and 5th, both PIP(s): 1st, 2nd, 3rd, 4th and 5th, both DIP(s): 1st and 2nd, both hip(s), both knee(s) and both MTP(s): 1st, 2nd, 3rd, 4th and 5th, is described as aching, pulsating, shooting and throbbing, and is constant, moderate .  Associated symptoms include: crepitation, decreased range of motion, edema, effusion, tenderness and warmth.  B ankles swelling R>L  And knees B, vision changes      Review of Systems   Constitutional: Positive for activity change. Negative for appetite change, chills, diaphoresis and unexpected weight change.   HENT: Negative for congestion, dental problem, ear discharge, ear pain, facial swelling, mouth sores, nosebleeds, postnasal drip, rhinorrhea, sinus pressure, sneezing, sore throat, tinnitus and voice change.    Eyes: Negative for photophobia, pain, discharge, redness and itching.   Respiratory: Negative for apnea, cough, chest tightness, shortness of breath and wheezing.    Cardiovascular: Positive for leg swelling. Negative for chest pain and palpitations.   Gastrointestinal: Negative for abdominal distention, abdominal pain, constipation, diarrhea, nausea and vomiting.   Endocrine: Negative for cold intolerance, heat intolerance, polydipsia and polyuria.   Genitourinary: Negative for decreased urine volume, difficulty urinating, flank pain, frequency, hematuria and urgency.   Musculoskeletal: Positive for arthralgias, neck pain and neck stiffness. Negative for back pain and gait problem.   Skin: Negative for pallor, rash and wound.   Allergic/Immunologic: Negative for immunocompromised state.   Neurological: Negative for dizziness,  tremors, weakness and numbness.   Hematological: Negative for adenopathy. Does not bruise/bleed easily.   Psychiatric/Behavioral: Negative for sleep disturbance. The patient is not nervous/anxious.          Objective:     /78 (BP Location: Left arm, Patient Position: Sitting, BP Method: Medium (Automatic))   Pulse 80   Wt 68.2 kg (150 lb 5.7 oz)   BMI 21.89 kg/m²      Physical Exam   Nursing note and vitals reviewed.  Constitutional: She is oriented to person, place, and time. She appears distressed.   HENT:   Head: Normocephalic and atraumatic.   Mouth/Throat: Oropharynx is clear and moist.   Eyes: EOM are normal. Pupils are equal, round, and reactive to light.   Neck: Neck supple. No thyromegaly present.   Cardiovascular: Normal rate, regular rhythm and normal heart sounds.  Exam reveals no gallop and no friction rub.    No murmur heard.  Pulmonary/Chest: She has no wheezes. She has no rales. She exhibits no tenderness.   Abdominal: There is tenderness. There is no rebound and no guarding.       Right Side Rheumatological Exam     Examination finds the elbow normal.    The patient is tender to palpation of the shoulder, wrist, knee, 1st PIP, 1st MCP, 2nd PIP, 2nd MCP, 3rd PIP, 3rd MCP, 4th PIP, 4th MCP, 5th PIP and 5th MCP    She has swelling of the 1st PIP, 1st MCP, 2nd PIP, 2nd MCP, 3rd PIP, 3rd MCP, 4th PIP, 4th MCP, 5th PIP and 5th MCP    Shoulder Exam   Tenderness Location: no tenderness    Range of Motion   Active Abduction: abnormal   Adduction: abnormal  Sensation: normal    Knee Exam   Patellofemoral Crepitus: positive  Effusion: negative  Sensation: normal    Hip Exam   Tenderness Location: posterior  Sensation: normal    Elbow/Wrist Exam   Tenderness Location: no tenderness  Sensation: normal    Left Side Rheumatological Exam     Examination finds the elbow normal.    The patient is tender to palpation of the shoulder, wrist, knee, 1st PIP, 1st MCP, 2nd PIP, 2nd MCP, 3rd PIP, 3rd MCP, 4th PIP,  4th MCP, 5th PIP and 5th MCP.    She has swelling of the 1st PIP, 1st MCP, 2nd PIP, 2nd MCP, 3rd PIP, 3rd MCP, 4th PIP, 4th MCP, 5th PIP and 5th MCP    Shoulder Exam   Tenderness Location: no tenderness    Range of Motion   Active Abduction: abnormal   Sensation: normal    Knee Exam     Patellofemoral Crepitus: positive  Effusion: negative  Sensation: normal    Hip Exam   Tenderness Location: posterior  Sensation: normal    Elbow/Wrist Exam   Sensation: normal      Back/Neck Exam   General Inspection   Gait: normal         Lymphadenopathy:     She has no cervical adenopathy.   Neurological: She is alert and oriented to person, place, and time. Gait normal.   Skin: Rash noted. No erythema. No pallor.      Raised lesion on back, legs arms spares face KP   Psychiatric: Mood and affect normal.   Musculoskeletal: She exhibits tenderness and deformity. She exhibits no edema.   psa changes           Results for orders placed or performed in visit on 05/05/17   Vitamin D   Result Value Ref Range    Vit D, 25-Hydroxy 54 30 - 96 ng/mL   Folate   Result Value Ref Range    Folate 12.4 4.0 - 24.0 ng/mL   Vitamin B6   Result Value Ref Range    Vitamin B6 30 5 - 50 ug/L   Vitamin B12   Result Value Ref Range    Vitamin B-12 606 210 - 950 pg/mL       Assessment:       Encounter Diagnoses   Name Primary?    PSA (psoriatic arthritis) Yes    Raynaud's disease without gangrene     Chronic pain syndrome        Plan:     Boom was seen today for follow-up.    Diagnoses and all orders for this visit:    Raynaud's disease without gangrene  -     levocetirizine (XYZAL) 5 MG tablet; Take 1 tablet (5 mg total) by mouth every evening.  -     benzonatate (TESSALON) 200 MG capsule; Take 1 capsule (200 mg total) by mouth 3 (three) times daily as needed for Cough.  -     promethazine-dextromethorphan (PROMETHAZINE-DM) 6.25-15 mg/5 mL Syrp; Take 5 mLs by mouth 3 (three) times daily.  -     cyanocobalamin injection 1,000 mcg; Inject 1 mL (1,000  mcg total) into the muscle one time.  -     ketorolac injection 60 mg; Inject 2 mLs (60 mg total) into the muscle one time.  -     methylPREDNISolone acetate injection 160 mg; Inject 2 mLs (160 mg total) into the muscle one time.  -     Sedimentation rate, manual; Future  -     C-reactive protein; Future    Chronic pain syndrome  -     levocetirizine (XYZAL) 5 MG tablet; Take 1 tablet (5 mg total) by mouth every evening.  -     benzonatate (TESSALON) 200 MG capsule; Take 1 capsule (200 mg total) by mouth 3 (three) times daily as needed for Cough.  -     promethazine-dextromethorphan (PROMETHAZINE-DM) 6.25-15 mg/5 mL Syrp; Take 5 mLs by mouth 3 (three) times daily.  -     cyanocobalamin injection 1,000 mcg; Inject 1 mL (1,000 mcg total) into the muscle one time.  -     ketorolac injection 60 mg; Inject 2 mLs (60 mg total) into the muscle one time.  -     methylPREDNISolone acetate injection 160 mg; Inject 2 mLs (160 mg total) into the muscle one time.  -     Sedimentation rate, manual; Future  -     C-reactive protein; Future    Cough  -     levocetirizine (XYZAL) 5 MG tablet; Take 1 tablet (5 mg total) by mouth every evening.  -     benzonatate (TESSALON) 200 MG capsule; Take 1 capsule (200 mg total) by mouth 3 (three) times daily as needed for Cough.  -     promethazine-dextromethorphan (PROMETHAZINE-DM) 6.25-15 mg/5 mL Syrp; Take 5 mLs by mouth 3 (three) times daily.  -     cyanocobalamin injection 1,000 mcg; Inject 1 mL (1,000 mcg total) into the muscle one time.  -     ketorolac injection 60 mg; Inject 2 mLs (60 mg total) into the muscle one time.  -     methylPREDNISolone acetate injection 160 mg; Inject 2 mLs (160 mg total) into the muscle one time.  -     Sedimentation rate, manual; Future  -     C-reactive protein; Future    Psoriatic arthritis    Anxiety  -     cyanocobalamin injection 1,000 mcg; Inject 1 mL (1,000 mcg total) into the muscle one time.  -     ketorolac injection 60 mg; Inject 2 mLs (60 mg  total) into the muscle one time.  -     methylPREDNISolone acetate injection 160 mg; Inject 2 mLs (160 mg total) into the muscle one time.    Vitamin D deficiency disease    Vitamin B6 deficiency    Bipolar affective disorder, remission status unspecified    Other orders  -     pyridoxine, vitamin B6, (B-6) 25 MG Tab; Take 1 tablet (25 mg total) by mouth once daily.         Due to the severity of her disease state and the quality of her live as well as the inability to control the symptoms of her disease state I recommend applying for long term disability

## 2017-10-27 ENCOUNTER — PATIENT MESSAGE (OUTPATIENT)
Dept: RHEUMATOLOGY | Facility: CLINIC | Age: 47
End: 2017-10-27

## 2017-11-05 ENCOUNTER — PATIENT MESSAGE (OUTPATIENT)
Dept: RHEUMATOLOGY | Facility: CLINIC | Age: 47
End: 2017-11-05

## 2017-11-07 ENCOUNTER — PATIENT MESSAGE (OUTPATIENT)
Dept: RHEUMATOLOGY | Facility: CLINIC | Age: 47
End: 2017-11-07

## 2017-11-13 ENCOUNTER — TELEPHONE (OUTPATIENT)
Dept: PHARMACY | Facility: CLINIC | Age: 47
End: 2017-11-13

## 2017-11-22 DIAGNOSIS — I73.00 RAYNAUD'S DISEASE WITHOUT GANGRENE: ICD-10-CM

## 2017-11-22 DIAGNOSIS — G89.4 CHRONIC PAIN SYNDROME: ICD-10-CM

## 2017-11-22 DIAGNOSIS — R05.9 COUGH: ICD-10-CM

## 2017-11-23 RX ORDER — PROMETHAZINE HYDROCHLORIDE AND DEXTROMETHORPHAN HYDROBROMIDE 6.25; 15 MG/5ML; MG/5ML
SYRUP ORAL
Qty: 225 ML | Refills: 1 | OUTPATIENT
Start: 2017-11-23

## 2017-11-27 DIAGNOSIS — E03.9 HYPOTHYROIDISM: ICD-10-CM

## 2017-11-27 DIAGNOSIS — I73.00 RAYNAUD'S DISEASE: ICD-10-CM

## 2017-11-27 DIAGNOSIS — R05.9 COUGH: ICD-10-CM

## 2017-11-27 DIAGNOSIS — L40.50 PSORIATIC ARTHRITIS: ICD-10-CM

## 2017-11-27 DIAGNOSIS — G47.00 INSOMNIA: ICD-10-CM

## 2017-11-27 RX ORDER — LEVOTHYROXINE SODIUM 50 UG/1
TABLET ORAL
Qty: 30 TABLET | Refills: 3 | Status: SHIPPED | OUTPATIENT
Start: 2017-11-27 | End: 2018-07-26 | Stop reason: SDUPTHER

## 2017-11-29 DIAGNOSIS — G47.00 INSOMNIA: ICD-10-CM

## 2017-11-29 DIAGNOSIS — I73.00 RAYNAUD'S DISEASE: ICD-10-CM

## 2017-11-29 DIAGNOSIS — R05.9 COUGH: ICD-10-CM

## 2017-11-29 DIAGNOSIS — E03.9 HYPOTHYROIDISM: ICD-10-CM

## 2017-11-29 DIAGNOSIS — L40.50 PSORIATIC ARTHRITIS: ICD-10-CM

## 2017-11-30 RX ORDER — LEVOTHYROXINE SODIUM 50 UG/1
TABLET ORAL
Qty: 30 TABLET | Refills: 3 | Status: SHIPPED | OUTPATIENT
Start: 2017-11-30 | End: 2018-07-26 | Stop reason: SDUPTHER

## 2017-12-08 ENCOUNTER — TELEPHONE (OUTPATIENT)
Dept: PHARMACY | Facility: CLINIC | Age: 47
End: 2017-12-08

## 2018-01-05 ENCOUNTER — TELEPHONE (OUTPATIENT)
Dept: PHARMACY | Facility: CLINIC | Age: 48
End: 2018-01-05

## 2018-01-10 DIAGNOSIS — G89.4 CHRONIC PAIN SYNDROME: ICD-10-CM

## 2018-01-10 DIAGNOSIS — R05.9 COUGH: ICD-10-CM

## 2018-01-10 DIAGNOSIS — I73.00 RAYNAUD'S DISEASE WITHOUT GANGRENE: ICD-10-CM

## 2018-01-12 RX ORDER — BENZONATATE 200 MG/1
CAPSULE ORAL
Qty: 45 CAPSULE | Refills: 3 | Status: SHIPPED | OUTPATIENT
Start: 2018-01-12 | End: 2018-08-06

## 2018-01-15 ENCOUNTER — TELEPHONE (OUTPATIENT)
Dept: RHEUMATOLOGY | Facility: CLINIC | Age: 48
End: 2018-01-15

## 2018-01-15 ENCOUNTER — PATIENT MESSAGE (OUTPATIENT)
Dept: RHEUMATOLOGY | Facility: CLINIC | Age: 48
End: 2018-01-15

## 2018-01-15 NOTE — TELEPHONE ENCOUNTER
----- Message from Nancy Cottrell sent at 1/15/2018 11:20 AM CST -----  Contact: Elias with [employer]   Gladice with [employer] [facility]  States the prior authoration for samponie injection was denied for patient

## 2018-01-18 ENCOUNTER — PATIENT MESSAGE (OUTPATIENT)
Dept: RHEUMATOLOGY | Facility: CLINIC | Age: 48
End: 2018-01-18

## 2018-01-18 DIAGNOSIS — Z91.89 COMPLIANCE WITH MEDICATION REGIMEN: Primary | ICD-10-CM

## 2018-01-19 ENCOUNTER — PATIENT MESSAGE (OUTPATIENT)
Dept: RHEUMATOLOGY | Facility: CLINIC | Age: 48
End: 2018-01-19

## 2018-01-24 ENCOUNTER — TELEPHONE (OUTPATIENT)
Dept: RHEUMATOLOGY | Facility: CLINIC | Age: 48
End: 2018-01-24

## 2018-01-24 NOTE — TELEPHONE ENCOUNTER
Notified Dr. Molina. She advises pt will always test positive. She has already been through necessary treatments. Spoke to pt, she advises she had treatment approx 2 years ago with [facility] and will have them fax over what treatment received and length of time. Pt also had CXR 1/21/16 which was clear.

## 2018-01-24 NOTE — TELEPHONE ENCOUNTER
----- Message from Nikole Alba sent at 1/24/2018  1:46 PM CST -----  Contact: Hortensia from [facility] [phone]  Call placed to pod Hortensia Called from [facility] to give a critical results

## 2018-01-24 NOTE — TELEPHONE ENCOUNTER
Spoke with Carlos at [facility] advises Critical lab on pt for her TB Gold. TB Gold positive, TB antigen 3.38, Tb NIL 0.08, TB Mitogen 9.03, TB antigen - NIL 3.30. Advised nurse will notify physician.

## 2018-01-25 RX ORDER — FLUTICASONE PROPIONATE 50 MCG
SPRAY, SUSPENSION (ML) NASAL
Qty: 16 G | Refills: 4 | Status: SHIPPED | OUTPATIENT
Start: 2018-01-25 | End: 2019-03-04

## 2018-01-30 ENCOUNTER — PATIENT MESSAGE (OUTPATIENT)
Dept: RHEUMATOLOGY | Facility: CLINIC | Age: 48
End: 2018-01-30

## 2018-02-02 ENCOUNTER — TELEPHONE (OUTPATIENT)
Dept: RHEUMATOLOGY | Facility: CLINIC | Age: 48
End: 2018-02-02

## 2018-02-02 NOTE — TELEPHONE ENCOUNTER
----- Message from Jaycee Mathews sent at 2/2/2018 12:27 PM CST -----  Contact: [facility] representative - Chanda [phone]  The representative states the form doesn't have a code for the procedure.Thanks!

## 2018-02-07 ENCOUNTER — TELEPHONE (OUTPATIENT)
Dept: PHARMACY | Facility: CLINIC | Age: 48
End: 2018-02-07

## 2018-02-12 ENCOUNTER — PATIENT MESSAGE (OUTPATIENT)
Dept: RHEUMATOLOGY | Facility: CLINIC | Age: 48
End: 2018-02-12

## 2018-03-01 ENCOUNTER — TELEPHONE (OUTPATIENT)
Dept: PHARMACY | Facility: CLINIC | Age: 48
End: 2018-03-01

## 2018-03-15 ENCOUNTER — TELEPHONE (OUTPATIENT)
Dept: PHARMACY | Facility: CLINIC | Age: 48
End: 2018-03-15

## 2018-03-20 ENCOUNTER — PATIENT MESSAGE (OUTPATIENT)
Dept: RHEUMATOLOGY | Facility: CLINIC | Age: 48
End: 2018-03-20

## 2018-03-30 ENCOUNTER — PATIENT MESSAGE (OUTPATIENT)
Dept: RHEUMATOLOGY | Facility: CLINIC | Age: 48
End: 2018-03-30

## 2018-04-02 ENCOUNTER — PATIENT MESSAGE (OUTPATIENT)
Dept: RHEUMATOLOGY | Facility: CLINIC | Age: 48
End: 2018-04-02

## 2018-04-11 ENCOUNTER — PATIENT MESSAGE (OUTPATIENT)
Dept: RHEUMATOLOGY | Facility: CLINIC | Age: 48
End: 2018-04-11

## 2018-04-13 ENCOUNTER — PATIENT MESSAGE (OUTPATIENT)
Dept: RHEUMATOLOGY | Facility: CLINIC | Age: 48
End: 2018-04-13

## 2018-04-16 ENCOUNTER — TELEPHONE (OUTPATIENT)
Dept: PHARMACY | Facility: CLINIC | Age: 48
End: 2018-04-16

## 2018-05-10 DIAGNOSIS — E53.1 VITAMIN B6 DEFICIENCY: ICD-10-CM

## 2018-05-10 DIAGNOSIS — L40.50 PSORIATIC ARTHRITIS: ICD-10-CM

## 2018-05-10 DIAGNOSIS — I73.00 RAYNAUD'S DISEASE WITHOUT GANGRENE: ICD-10-CM

## 2018-05-10 DIAGNOSIS — F31.9 BIPOLAR AFFECTIVE DISORDER, REMISSION STATUS UNSPECIFIED: ICD-10-CM

## 2018-05-10 DIAGNOSIS — F41.9 ANXIETY: ICD-10-CM

## 2018-05-10 DIAGNOSIS — E55.9 VITAMIN D DEFICIENCY DISEASE: ICD-10-CM

## 2018-05-14 RX ORDER — LEVOTHYROXINE SODIUM 50 UG/1
TABLET ORAL
Qty: 90 TABLET | Refills: 3 | Status: SHIPPED | OUTPATIENT
Start: 2018-05-14 | End: 2018-07-26 | Stop reason: SDUPTHER

## 2018-05-15 ENCOUNTER — TELEPHONE (OUTPATIENT)
Dept: PHARMACY | Facility: CLINIC | Age: 48
End: 2018-05-15

## 2018-06-07 ENCOUNTER — TELEPHONE (OUTPATIENT)
Dept: PHARMACY | Facility: CLINIC | Age: 48
End: 2018-06-07

## 2018-06-07 DIAGNOSIS — T14.8XXA BRUISING: Primary | ICD-10-CM

## 2018-06-07 NOTE — TELEPHONE ENCOUNTER
Patient contacted via [employer] for refill readiness and follow up.     Chance Gaviria, HAKAN.Ph.  Clinical Pharmacist  [facility]  Phone: [phone]

## 2018-06-12 NOTE — TELEPHONE ENCOUNTER
Simponi refill. $0 (004). Shipping out 6/13/18. Address confirmed.   Dose due 6/15/18     Since starting Simponi (> 1 year ago) she has noticed that she gets little bruises all over her left arm. They are not painful, but constantly noticeable. On no type of blood thinner at this time. Is injecting only in her thighs rotating sites. Not an injection site reaction. Blood work completed 10/2017 looks good. Stressed she should make an appointment in the near future to get labs completed again and bring to Dr. Molina's attention, she acknowledged.

## 2018-06-14 ENCOUNTER — PATIENT MESSAGE (OUTPATIENT)
Dept: RHEUMATOLOGY | Facility: CLINIC | Age: 48
End: 2018-06-14

## 2018-06-14 ENCOUNTER — LAB VISIT (OUTPATIENT)
Dept: LAB | Facility: HOSPITAL | Age: 48
End: 2018-06-14
Attending: INTERNAL MEDICINE
Payer: COMMERCIAL

## 2018-06-14 DIAGNOSIS — T14.8XXA BRUISING: ICD-10-CM

## 2018-06-14 LAB
ALBUMIN SERPL BCP-MCNC: 4.5 G/DL
ALP SERPL-CCNC: 36 U/L
ALT SERPL W/O P-5'-P-CCNC: 13 U/L
ANION GAP SERPL CALC-SCNC: 9 MMOL/L
APTT BLDCRRT: 25.9 SEC
AST SERPL-CCNC: 17 U/L
BASOPHILS # BLD AUTO: 0.05 K/UL
BASOPHILS NFR BLD: 0.8 %
BILIRUB SERPL-MCNC: 0.3 MG/DL
BUN SERPL-MCNC: 13 MG/DL
CALCIUM SERPL-MCNC: 9.3 MG/DL
CHLORIDE SERPL-SCNC: 106 MMOL/L
CO2 SERPL-SCNC: 27 MMOL/L
CREAT SERPL-MCNC: 0.8 MG/DL
CRP SERPL-MCNC: 0.25 MG/DL
DIFFERENTIAL METHOD: ABNORMAL
EOSINOPHIL # BLD AUTO: 0.1 K/UL
EOSINOPHIL NFR BLD: 1.5 %
ERYTHROCYTE [DISTWIDTH] IN BLOOD BY AUTOMATED COUNT: 13 %
ERYTHROCYTE [SEDIMENTATION RATE] IN BLOOD BY WESTERGREN METHOD: 2 MM/HR
EST. GFR  (AFRICAN AMERICAN): >60 ML/MIN/1.73 M^2
EST. GFR  (NON AFRICAN AMERICAN): >60 ML/MIN/1.73 M^2
GLUCOSE SERPL-MCNC: 106 MG/DL
HCT VFR BLD AUTO: 42.7 %
HGB BLD-MCNC: 14.4 G/DL
IMM GRANULOCYTES # BLD AUTO: 0.01 K/UL
IMM GRANULOCYTES NFR BLD AUTO: 0.2 %
INR PPP: 1
LYMPHOCYTES # BLD AUTO: 2.2 K/UL
LYMPHOCYTES NFR BLD: 34 %
MCH RBC QN AUTO: 31.2 PG
MCHC RBC AUTO-ENTMCNC: 33.7 G/DL
MCV RBC AUTO: 92 FL
MONOCYTES # BLD AUTO: 0.5 K/UL
MONOCYTES NFR BLD: 7.7 %
NEUTROPHILS # BLD AUTO: 3.6 K/UL
NEUTROPHILS NFR BLD: 55.8 %
NRBC BLD-RTO: 0 /100 WBC
PLATELET # BLD AUTO: 166 K/UL
PMV BLD AUTO: 10.9 FL
POTASSIUM SERPL-SCNC: 3.7 MMOL/L
PROT SERPL-MCNC: 7.7 G/DL
PROTHROMBIN TIME: 11.5 SEC
RBC # BLD AUTO: 4.62 M/UL
SODIUM SERPL-SCNC: 142 MMOL/L
WBC # BLD AUTO: 6.52 K/UL

## 2018-06-14 PROCEDURE — 85730 THROMBOPLASTIN TIME PARTIAL: CPT

## 2018-06-14 PROCEDURE — 80053 COMPREHEN METABOLIC PANEL: CPT

## 2018-06-14 PROCEDURE — 85610 PROTHROMBIN TIME: CPT

## 2018-06-14 PROCEDURE — 85651 RBC SED RATE NONAUTOMATED: CPT

## 2018-06-14 PROCEDURE — 36415 COLL VENOUS BLD VENIPUNCTURE: CPT

## 2018-06-14 PROCEDURE — 85025 COMPLETE CBC W/AUTO DIFF WBC: CPT

## 2018-06-14 PROCEDURE — 86140 C-REACTIVE PROTEIN: CPT

## 2018-07-06 ENCOUNTER — TELEPHONE (OUTPATIENT)
Dept: PHARMACY | Facility: CLINIC | Age: 48
End: 2018-07-06

## 2018-07-26 ENCOUNTER — LAB VISIT (OUTPATIENT)
Dept: LAB | Facility: HOSPITAL | Age: 48
End: 2018-07-26
Attending: FAMILY MEDICINE
Payer: COMMERCIAL

## 2018-07-26 ENCOUNTER — OFFICE VISIT (OUTPATIENT)
Dept: FAMILY MEDICINE | Facility: CLINIC | Age: 48
End: 2018-07-26
Payer: COMMERCIAL

## 2018-07-26 ENCOUNTER — TELEPHONE (OUTPATIENT)
Dept: FAMILY MEDICINE | Facility: CLINIC | Age: 48
End: 2018-07-26

## 2018-07-26 VITALS
BODY MASS INDEX: 22.66 KG/M2 | HEIGHT: 69 IN | DIASTOLIC BLOOD PRESSURE: 72 MMHG | HEART RATE: 79 BPM | TEMPERATURE: 98 F | WEIGHT: 153 LBS | SYSTOLIC BLOOD PRESSURE: 131 MMHG

## 2018-07-26 DIAGNOSIS — E78.49 OTHER HYPERLIPIDEMIA: ICD-10-CM

## 2018-07-26 DIAGNOSIS — R10.13 EPIGASTRIC PAIN: Primary | ICD-10-CM

## 2018-07-26 DIAGNOSIS — R10.13 EPIGASTRIC PAIN: ICD-10-CM

## 2018-07-26 DIAGNOSIS — E03.8 OTHER SPECIFIED HYPOTHYROIDISM: Chronic | ICD-10-CM

## 2018-07-26 LAB
CHOLEST SERPL-MCNC: 157 MG/DL
CHOLEST/HDLC SERPL: 3.3 {RATIO}
HDLC SERPL-MCNC: 48 MG/DL
HDLC SERPL: 30.6 %
LDLC SERPL CALC-MCNC: 94.2 MG/DL
NONHDLC SERPL-MCNC: 109 MG/DL
TRIGL SERPL-MCNC: 74 MG/DL
TSH SERPL DL<=0.005 MIU/L-ACNC: 1.72 UIU/ML

## 2018-07-26 PROCEDURE — 80061 LIPID PANEL: CPT

## 2018-07-26 PROCEDURE — 84443 ASSAY THYROID STIM HORMONE: CPT

## 2018-07-26 PROCEDURE — 36415 COLL VENOUS BLD VENIPUNCTURE: CPT

## 2018-07-26 PROCEDURE — 86677 HELICOBACTER PYLORI ANTIBODY: CPT

## 2018-07-26 PROCEDURE — 99214 OFFICE O/P EST MOD 30 MIN: CPT | Mod: S$GLB,,, | Performed by: FAMILY MEDICINE

## 2018-07-26 RX ORDER — LISINOPRIL 20 MG/1
20 TABLET ORAL DAILY
COMMUNITY
End: 2018-08-22 | Stop reason: SDUPTHER

## 2018-07-26 NOTE — PROGRESS NOTES
Subjective:       Patient ID: Boom Blanco is a 48 y.o. female.    Chief Complaint: Follow-up (upper GI pain )    Abdominal Pain   This is a chronic problem. The current episode started more than 1 month ago (8-10 months). The problem occurs daily. The pain is located in the epigastric region. The abdominal pain radiates to the back. Pertinent negatives include no fever, nausea, vomiting or weight loss. The pain is aggravated by eating. The pain is relieved by nothing. She has tried nothing for the symptoms.   Hyperlipidemia   This is a chronic problem. The problem is controlled. Pertinent negatives include no chest pain or shortness of breath. Current antihyperlipidemic treatment includes statins. There are no compliance problems.      Patient also with hypothyroidism, taking levothyroxine 50 mcg daily. Due for TSH check, was normal in 10/2017.    Review of Systems   Constitutional: Negative for chills, fatigue, fever, unexpected weight change and weight loss.   Respiratory: Negative for cough, chest tightness, shortness of breath and wheezing.    Cardiovascular: Negative for chest pain, palpitations and leg swelling.   Gastrointestinal: Positive for abdominal pain. Negative for blood in stool, nausea and vomiting.       Past Medical History:   Diagnosis Date    Anxiety     Anxiety disorder     Arthritis     Bipolar disorder     Chronic pain     Hyperlipidemia     Hypertension     Psoriatic arthritis     Raynaud's disease      Past Surgical History:   Procedure Laterality Date    BACK SURGERY      spinal stimulator implanted and then removed    COLONOSCOPY  2016    repeat in 5-10 years    HYSTERECTOMY  1997    SALPINGOOPHORECTOMY  1997     Social History     Social History    Marital status:      Spouse name: N/A    Number of children: N/A    Years of education: N/A     Occupational History    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    Objective:      /72   Pulse 79   Temp 97.6 °F (36.4 °C) (Oral)   Ht 5' 9" (1.753 m)   Wt 69.4 kg (153 lb)   BMI 22.59 kg/m²   Physical Exam   Constitutional: She is oriented to person, place, and time. She appears well-developed and well-nourished. No distress.   Eyes: Conjunctivae and EOM are normal. Pupils are equal, round, and reactive to light. No scleral icterus.   Cardiovascular: Normal rate, regular rhythm and normal heart sounds.    No murmur heard.  Pulmonary/Chest: Effort normal and breath sounds normal. No respiratory distress. She has no wheezes.   Neurological: She is alert and oriented to person, place, and time.   Skin: Skin is warm and dry. No rash noted. She is not diaphoretic.   Psychiatric: She has a normal mood and affect. Her behavior is normal. Judgment and thought content normal.   Vitals reviewed.      Assessment:       1. Epigastric pain    2. Other hyperlipidemia    3. Other specified hypothyroidism        Plan:       Epigastric pain  -     H. PYLORI ANTIBODY, IGG; Future; Expected date: 07/26/2018  -     Release results when available; treat with PPI for 2 months vs triple therapy    Other hyperlipidemia  -     Due for bloodwork  -     Lipid panel; Future; Expected date: 07/26/2018    Other specified hypothyroidism  -     Due for bloodwork  -     TSH; Future; Expected date: 07/26/2018            Risks, benefits, and side effects were discussed with the patient. All questions were answered to the fullest satisfaction of the patient, and pt verbalized understanding and agreement to treatment plan. Pt was to call with any new or worsening symptoms, or present to the ER.    "

## 2018-07-26 NOTE — TELEPHONE ENCOUNTER
Pt called and stated that she does not want to take Prilosec because of the TV commercials about this drug.  States that she will manage with what she is already taking.      ----- Message from Karime Cao sent at 7/26/2018  2:10 PM CDT -----  Contact: pt  Type: Needs Medical Advice    Who Called:  Boom   Symptoms (please be specific):  n/a  How long has patient had these symptoms:  n/a  Pharmacy name and phone #:    [id number], [place] - [address]  [address]  [place] [address]  Phone: [phone] Fax: [phone]  Best Call Back Number:  [phone]  Additional Information:  Pt is calling to speak with nurse regarding the prescription the doctor had discussed about calling it in for her today. Pt would like to go over other options bc she does not want to take the prescription doctor recommended due to past recalls. Please call back and advise.

## 2018-07-27 ENCOUNTER — TELEPHONE (OUTPATIENT)
Dept: FAMILY MEDICINE | Facility: CLINIC | Age: 48
End: 2018-07-27

## 2018-07-27 NOTE — TELEPHONE ENCOUNTER
Spoke with pt and told her that not all of the lab results are back.      ----- Message from Aissatou Godinez sent at 7/27/2018 11:41 AM CDT -----  Contact: pt  Pt is requesting to speak with nurse to get her rest results for her labs,  please call pt to advise  Call back    Thanks

## 2018-07-30 LAB — H PYLORI IGG SERPL QL IA: POSITIVE

## 2018-07-31 DIAGNOSIS — A04.8 H. PYLORI INFECTION: Primary | ICD-10-CM

## 2018-07-31 RX ORDER — AMOXICILLIN 500 MG/1
1000 CAPSULE ORAL EVERY 12 HOURS
Qty: 56 CAPSULE | Refills: 0 | Status: SHIPPED | OUTPATIENT
Start: 2018-07-31 | End: 2018-08-14

## 2018-07-31 RX ORDER — CLARITHROMYCIN 500 MG/1
500 TABLET, FILM COATED ORAL EVERY 12 HOURS
Qty: 28 TABLET | Refills: 0 | Status: SHIPPED | OUTPATIENT
Start: 2018-07-31 | End: 2018-08-14

## 2018-07-31 RX ORDER — PANTOPRAZOLE SODIUM 40 MG/1
40 TABLET, DELAYED RELEASE ORAL 2 TIMES DAILY
Qty: 28 TABLET | Refills: 0 | Status: SHIPPED | OUTPATIENT
Start: 2018-07-31 | End: 2018-08-14

## 2018-08-06 ENCOUNTER — OFFICE VISIT (OUTPATIENT)
Dept: SURGERY | Facility: CLINIC | Age: 48
End: 2018-08-06
Payer: COMMERCIAL

## 2018-08-06 VITALS
SYSTOLIC BLOOD PRESSURE: 147 MMHG | OXYGEN SATURATION: 96 % | HEIGHT: 69 IN | WEIGHT: 156 LBS | BODY MASS INDEX: 23.11 KG/M2 | HEART RATE: 81 BPM | DIASTOLIC BLOOD PRESSURE: 80 MMHG | TEMPERATURE: 97 F

## 2018-08-06 DIAGNOSIS — R10.13 EPIGASTRIC PAIN: Primary | ICD-10-CM

## 2018-08-06 PROCEDURE — 99205 OFFICE O/P NEW HI 60 MIN: CPT | Mod: S$GLB,,, | Performed by: SURGERY

## 2018-08-06 RX ORDER — LIDOCAINE HYDROCHLORIDE 10 MG/ML
1 INJECTION, SOLUTION EPIDURAL; INFILTRATION; INTRACAUDAL; PERINEURAL ONCE
Status: CANCELLED | OUTPATIENT
Start: 2018-08-06 | End: 2018-08-06

## 2018-08-06 RX ORDER — LEVOTHYROXINE SODIUM 50 UG/1
TABLET ORAL
COMMUNITY
End: 2019-05-22 | Stop reason: SDUPTHER

## 2018-08-06 RX ORDER — PANTOPRAZOLE SODIUM 40 MG/1
40 TABLET, DELAYED RELEASE ORAL DAILY
Qty: 30 TABLET | Refills: 11 | Status: SHIPPED | OUTPATIENT
Start: 2018-08-06 | End: 2018-11-12

## 2018-08-06 RX ORDER — TIZANIDINE 4 MG/1
4 TABLET ORAL NIGHTLY
COMMUNITY
Start: 2018-08-02 | End: 2019-11-04

## 2018-08-06 RX ORDER — IBUPROFEN 800 MG/1
800 TABLET ORAL DAILY
COMMUNITY
Start: 2018-08-02 | End: 2021-10-26

## 2018-08-06 RX ORDER — SODIUM CHLORIDE, SODIUM LACTATE, POTASSIUM CHLORIDE, CALCIUM CHLORIDE 600; 310; 30; 20 MG/100ML; MG/100ML; MG/100ML; MG/100ML
INJECTION, SOLUTION INTRAVENOUS CONTINUOUS
Status: CANCELLED | OUTPATIENT
Start: 2018-08-06

## 2018-08-06 NOTE — PROGRESS NOTES
[facility] Subjective:       Patient ID: Boom Blanco is a 48 y.o. female.    Chief Complaint: Consult (epigastric pain )      HPI:  Ms. Blanco presents today as a consult from Dr. Gallagher for evaluation of epigastric abdominal pain.  She has been experiencing burning, moderate, nonradiating epigastric abdominal pain for the past 9+ months.  No nausea or vomiting.  No heartburn.  No regurgitation.  No melena, hematochezia, hematemesis.  No dysphagia or odynophagia.  No other associated symptoms.  No aggravating factors.  No alleviating factors.  Recently tested positive for Helicobacter and therapy has just now been instituted.        Allergies & Meds:  Review of patient's allergies indicates:   Allergen Reactions    Remeron [mirtazapine] Other (See Comments)     Weight gain       Current Outpatient Prescriptions   Medication Sig Dispense Refill    alprazolam (XANAX) 1 MG tablet Take 1 mg by mouth 2 (two) times daily.      amoxicillin (AMOXIL) 500 MG capsule Take 2 capsules (1,000 mg total) by mouth every 12 (twelve) hours. for 14 days 56 capsule 0    buPROPion (WELLBUTRIN XL) 150 MG TB24 tablet Take 150 mg by mouth every evening.      cariprazine (VRAYLAR) 3 mg Cap Take 3 mg by mouth once daily.       clarithromycin (BIAXIN) 500 MG tablet Take 1 tablet (500 mg total) by mouth every 12 (twelve) hours. for 14 days 28 tablet 0    fluticasone (FLONASE) 50 mcg/actuation nasal spray USE ONE SPRAY IN EACH NOSTRIL ONCE DAILY 16 g 4    golimumab 50 mg/0.5 mL PnIj Inject 50 mg into the skin every 30 days. 0.5 mL 11    ibuprofen (ADVIL,MOTRIN) 800 MG tablet       levothyroxine (SYNTHROID) 50 MCG tablet levothyroxine 50 mcg tablet   TK 1 T PO  QAM      lisinopril (PRINIVIL,ZESTRIL) 20 MG tablet Take 20 mg by mouth once daily.      lovastatin (MEVACOR) 20 MG tablet Take 20 mg by mouth every evening.       oxyCODONE-acetaminophen (PERCOCET)  mg per tablet Take 1 tablet by mouth 2 (two) times daily.       pantoprazole (PROTONIX) 40 MG tablet Take 1 tablet (40 mg total) by mouth 2 (two) times daily. for 14 days 28 tablet 0    tiZANidine (ZANAFLEX) 4 MG tablet        No current facility-administered medications for this visit.        PMFSHx:  Past Medical History:   Diagnosis Date    Anxiety     Anxiety disorder     Arthritis     Bipolar disorder     Chronic pain     Hyperlipidemia     Hypertension     Psoriatic arthritis     Raynaud's disease        Past Surgical History:   Procedure Laterality Date    BACK SURGERY      spinal stimulator implanted and then removed    COLONOSCOPY  2016    repeat in 5-10 years    HYSTERECTOMY  1997    SALPINGOOPHORECTOMY  1997       History reviewed. No pertinent family history.    Social History   Substance Use Topics    Smoking status: Current Every Day Smoker     Types: Cigarettes    Smokeless tobacco: Never Used    Alcohol use Not on file       Review of Systems   Constitutional: Negative for appetite change, chills, fatigue, fever and unexpected weight change.   HENT: Negative for congestion, dental problem, ear pain, mouth sores, postnasal drip, rhinorrhea, sore throat, tinnitus, trouble swallowing and voice change.    Eyes: Negative for photophobia, pain, discharge and visual disturbance.   Respiratory: Negative for cough, chest tightness, shortness of breath and wheezing.    Cardiovascular: Negative for chest pain, palpitations and leg swelling.   Gastrointestinal: Negative for blood in stool, constipation, diarrhea, nausea and vomiting.   Endocrine: Negative for cold intolerance, heat intolerance, polydipsia, polyphagia and polyuria.   Genitourinary: Negative for difficulty urinating, dysuria, flank pain, frequency, hematuria and urgency.   Musculoskeletal: Negative for arthralgias, joint swelling and myalgias.   Skin: Negative for color change and rash.   Allergic/Immunologic: Negative for immunocompromised state.   Neurological: Negative for dizziness,  tremors, seizures, syncope, speech difficulty, weakness, numbness and headaches.   Hematological: Negative for adenopathy. Does not bruise/bleed easily.   Psychiatric/Behavioral: Negative for agitation, confusion, hallucinations, self-injury and suicidal ideas. The patient is not nervous/anxious.        Objective:      Physical Exam   Constitutional: She is oriented to person, place, and time. She appears well-developed and well-nourished. She is active.  Non-toxic appearance. No distress.   HENT:   Head: Normocephalic and atraumatic.   Right Ear: Hearing and external ear normal. No drainage or tenderness.   Left Ear: Hearing and external ear normal. No drainage or tenderness.   Nose: Nose normal. No rhinorrhea. No epistaxis.   Mouth/Throat: Uvula is midline, oropharynx is clear and moist and mucous membranes are normal. Mucous membranes are not pale, not dry and not cyanotic. No oropharyngeal exudate.   Eyes: Conjunctivae and lids are normal. Pupils are equal, round, and reactive to light. Right eye exhibits no discharge and no exudate. Left eye exhibits no discharge and no exudate. Right conjunctiva is not injected. Right eye exhibits no nystagmus. Left eye exhibits no nystagmus.   Neck: Trachea normal, full passive range of motion without pain and phonation normal. No JVD present. Carotid bruit is not present. No tracheal deviation present. No thyroid mass and no thyromegaly present.   Cardiovascular: Normal rate, regular rhythm, S1 normal, S2 normal, normal heart sounds and intact distal pulses.  PMI is not displaced.  Exam reveals no gallop and no friction rub.    No murmur heard.  Pulmonary/Chest: Effort normal and breath sounds normal. No accessory muscle usage. No respiratory distress. She exhibits no mass, no tenderness and no crepitus. Right breast exhibits no inverted nipple, no mass, no nipple discharge, no skin change and no tenderness. Left breast exhibits no inverted nipple, no mass, no nipple  discharge, no skin change and no tenderness. Breasts are symmetrical.   Abdominal: Soft. Normal appearance and bowel sounds are normal. She exhibits no distension, no fluid wave, no abdominal bruit and no mass. There is no hepatosplenomegaly. There is no tenderness. There is no rebound, no guarding and negative Gerard's sign. No hernia. Hernia confirmed negative in the right inguinal area and confirmed negative in the left inguinal area.   Genitourinary: Rectum normal and vagina normal. There is no rash or lesion on the right labia. There is no rash or lesion on the left labia. Right adnexum displays no mass. Left adnexum displays no mass. No vaginal discharge found.   Musculoskeletal:        Cervical back: Normal.        Thoracic back: Normal.        Lumbar back: Normal.        Right upper arm: Normal.        Left upper arm: Normal.        Right forearm: Normal.        Left forearm: Normal.        Right hand: Normal.        Left hand: Normal.        Right upper leg: Normal.        Left upper leg: Normal.        Right lower leg: Normal.        Left lower leg: Normal.        Right foot: Normal.        Left foot: Normal.   Lymphadenopathy:        Head (right side): No submental, no submandibular and no posterior auricular adenopathy present.        Head (left side): No submental, no submandibular and no posterior auricular adenopathy present.     She has no cervical adenopathy.     She has no axillary adenopathy.        Right: No inguinal and no supraclavicular adenopathy present.        Left: No inguinal and no supraclavicular adenopathy present.   Neurological: She is alert and oriented to person, place, and time. She has normal strength. No cranial nerve deficit or sensory deficit. Coordination and gait normal. GCS eye subscore is 4. GCS verbal subscore is 5. GCS motor subscore is 6.   Skin: Skin is warm, dry and intact. No rash noted. No cyanosis. Nails show no clubbing.   Psychiatric: She has a normal mood and  affect. Her speech is normal. Judgment and thought content normal. Her mood appears not anxious. Her affect is not inappropriate. She is not actively hallucinating. She does not exhibit a depressed mood.         Test Results:  Lab results reviewed from 7/26/2018 and 6/14/2018.  CBC shows no evidence of leukocytosis, anemia, or thrombocytopenia.  Electrolytes are all in the range of normal. BUN and creatinine shows no evidence of renal dysfunction.  Glucose shows no suspicion diabetes.  Liver profile shows no evidence of hepatocellular disease or biliary obstruction. C reactive protein is in the range of normal. ESR is in the range of normal. PT/PTT/INR show no suggestion of a coagulopathy or anticoagulation therapy.  H pylori IgG titers positive.  Lipid profile and TSH unremarkable.    Assessment:       Epigastric abdominal pain. Differential diagnosis includes is not limited to esophagitis, hiatal hernia, gastritis, gastric ulcer, duodenitis, biliary disease, esophageal neoplasm, gastric neoplasm, etc. Options include but are not limited to endoscopy, 2nd opinion, laparoscopy, radiologic studies, etc.    1. Epigastric pain        Plan:   Epigastric pain  -     Case Request Operating Room: ESOPHAGOGASTRODUODENOSCOPY (EGD)  -     Place in Outpatient; Standing  -     Vital signs; Standing  -     Insert peripheral IV; Standing  -     Verify beta-blocker dose taken within 24 hours if patient is prescribed beta-blocker; Standing  -     Height and weight; Standing  -     Verify discontinuation of antithrombotics; Standing  -     POCT glucose; Standing  -     Verify blood consent; Standing  -     Verify consent; Standing  -     Diet NPO; Standing  -     Pulse Oximetry Q4H; Standing  -     EKG 12-lead; Future    Other orders  -     lidocaine (PF) 10 mg/ml (1%) injection 10 mg; Inject 1 mL (10 mg total) into the skin once.  -     lactated ringers infusion; Inject into the vein continuous.  -     IP VTE LOW RISK PATIENT;  Standing        Follow-up in about 1 month (around 9/6/2018) for routine post-endosocpy visit.    Counseling/Medical Decision Making:  Boom Blanco was counseled on the results of the evaluation thus far and the differential diagnosis as well as the diagnostic and therapeutic options.  Risks, benefits, possible complications, details of, and indications for each option were also discussed.  Diagnoses discussed included but were not limited to: esophagitis, gastritis, ulcer disease, neoplastic disease, GERD, hiatal hernia, angiodysplasias, etc.  Options discussed included but were not limited to: radiologic studies, empiric medical management, observation, second opinion, PillCam, EGD.  Possible complications of EGD discussed included but were not limited to: bleeding, injury to internal organs, infections, endocarditis, incomplete exam, failure to diagnose a cancer or other serious conditions, need for emergency surgery, need for additional operations or procedures.  Entire conversation held in laymans terms.  All unfamiliar terms defined.  Questions were solicited and answered.  I read the entire consent form to her verbatim.  A copy of the consent form was provided for her review outside the office / hospital prior to the procedure.  [employer] publications pertaining to endoscopy, GERD, and ulcer disease were provided.  At the conclusion of the conversation she voiced complete understanding of all that we discussed, satisfaction that all questions were fully answered, and that she felt fully informed and completely capable of making an informed decision.  She requests and desires to proceed with an EGD.    Total face-to-face encounter time was 60 minutes, 40 minutes spent counseling as outlined/summarized above.

## 2018-08-15 ENCOUNTER — TELEPHONE (OUTPATIENT)
Dept: PHARMACY | Facility: CLINIC | Age: 48
End: 2018-08-15

## 2018-08-21 RX ORDER — LISINOPRIL 20 MG/1
TABLET ORAL
Qty: 30 TABLET | Refills: 3 | OUTPATIENT
Start: 2018-08-21

## 2018-08-22 RX ORDER — LISINOPRIL 20 MG/1
20 TABLET ORAL DAILY
Qty: 90 TABLET | Refills: 0 | Status: SHIPPED | OUTPATIENT
Start: 2018-08-22 | End: 2018-11-24 | Stop reason: SDUPTHER

## 2018-08-22 NOTE — TELEPHONE ENCOUNTER
Notified pt that rx was sent to pharm.      Refill(s) request.  Please advise.  Thank you.      ----- Message from Rodrigo Peña sent at 8/22/2018  4:00 PM CDT -----  Contact: same  Type:  RX Refill Request    Who Called:  patient  Refill or New Rx:  refill  RX Name and Strength:  lisinopril (PRINIVIL,ZESTRIL) 20 MG tablet  How is the patient currently taking it? (ex. 1XDay): Take 20 mg by mouth once daily   Is this a 30 day or 90 day RX:  30 tablets last filled on 7/26/18  Preferred Pharmacy with phone number:    [id number] [facility] [place], [place] - [address]  [address]  [facility] [place] [address]  Phone: [phone] Fax: [phone]    Ordering Provider:  Elliott Jimenez Call Back Number:  [phone]  Additional Information:  n/a

## 2018-08-24 ENCOUNTER — HOSPITAL ENCOUNTER (OUTPATIENT)
Dept: CARDIOLOGY | Facility: HOSPITAL | Age: 48
Discharge: HOME | End: 2018-08-24
Attending: SURGERY
Payer: COMMERCIAL

## 2018-08-24 DIAGNOSIS — R10.13 EPIGASTRIC PAIN: ICD-10-CM

## 2018-08-24 PROCEDURE — 93005 ELECTROCARDIOGRAM TRACING: CPT

## 2018-08-27 ENCOUNTER — PATIENT MESSAGE (OUTPATIENT)
Dept: RHEUMATOLOGY | Facility: CLINIC | Age: 48
End: 2018-08-27

## 2018-08-28 ENCOUNTER — PATIENT MESSAGE (OUTPATIENT)
Dept: RHEUMATOLOGY | Facility: CLINIC | Age: 48
End: 2018-08-28

## 2018-08-29 ENCOUNTER — HOSPITAL ENCOUNTER (OUTPATIENT)
Facility: HOSPITAL | Age: 48
Discharge: HOME | End: 2018-08-29
Attending: SURGERY | Admitting: SURGERY
Payer: COMMERCIAL

## 2018-08-29 ENCOUNTER — ANESTHESIA (OUTPATIENT)
Dept: SURGERY | Facility: HOSPITAL | Age: 48
End: 2018-08-29
Payer: COMMERCIAL

## 2018-08-29 ENCOUNTER — ANESTHESIA EVENT (OUTPATIENT)
Dept: SURGERY | Facility: HOSPITAL | Age: 48
End: 2018-08-29
Payer: COMMERCIAL

## 2018-08-29 VITALS
DIASTOLIC BLOOD PRESSURE: 73 MMHG | HEART RATE: 70 BPM | RESPIRATION RATE: 18 BRPM | OXYGEN SATURATION: 99 % | TEMPERATURE: 98 F | SYSTOLIC BLOOD PRESSURE: 125 MMHG

## 2018-08-29 DIAGNOSIS — R10.13 EPIGASTRIC PAIN: ICD-10-CM

## 2018-08-29 PROCEDURE — 88305 TISSUE EXAM BY PATHOLOGIST: CPT | Mod: 26,,, | Performed by: PATHOLOGY

## 2018-08-29 PROCEDURE — S0028 INJECTION, FAMOTIDINE, 20 MG: HCPCS | Performed by: ANESTHESIOLOGY

## 2018-08-29 PROCEDURE — 27201012 HC FORCEPS, HOT/COLD, DISP: Performed by: SURGERY

## 2018-08-29 PROCEDURE — 37000009 HC ANESTHESIA EA ADD 15 MINS: Performed by: SURGERY

## 2018-08-29 PROCEDURE — 43239 EGD BIOPSY SINGLE/MULTIPLE: CPT | Performed by: SURGERY

## 2018-08-29 PROCEDURE — 88305 TISSUE EXAM BY PATHOLOGIST: CPT | Performed by: PATHOLOGY

## 2018-08-29 PROCEDURE — 25000003 PHARM REV CODE 250: Performed by: ANESTHESIOLOGY

## 2018-08-29 PROCEDURE — 88341 IMHCHEM/IMCYTCHM EA ADD ANTB: CPT | Performed by: PATHOLOGY

## 2018-08-29 PROCEDURE — 88342 IMHCHEM/IMCYTCHM 1ST ANTB: CPT | Mod: 26,,, | Performed by: PATHOLOGY

## 2018-08-29 PROCEDURE — 43239 EGD BIOPSY SINGLE/MULTIPLE: CPT | Mod: ,,, | Performed by: SURGERY

## 2018-08-29 PROCEDURE — D9220A PRA ANESTHESIA: Mod: ,,, | Performed by: ANESTHESIOLOGY

## 2018-08-29 PROCEDURE — 63600175 PHARM REV CODE 636 W HCPCS: Performed by: NURSE ANESTHETIST, CERTIFIED REGISTERED

## 2018-08-29 PROCEDURE — 25000003 PHARM REV CODE 250: Performed by: SURGERY

## 2018-08-29 PROCEDURE — 37000008 HC ANESTHESIA 1ST 15 MINUTES: Performed by: SURGERY

## 2018-08-29 RX ORDER — MIDAZOLAM HYDROCHLORIDE 1 MG/ML
INJECTION, SOLUTION INTRAMUSCULAR; INTRAVENOUS
Status: DISCONTINUED | OUTPATIENT
Start: 2018-08-29 | End: 2018-08-29

## 2018-08-29 RX ORDER — ONDANSETRON 2 MG/ML
4 INJECTION INTRAMUSCULAR; INTRAVENOUS DAILY PRN
Status: DISCONTINUED | OUTPATIENT
Start: 2018-08-29 | End: 2018-08-29 | Stop reason: HOSPADM

## 2018-08-29 RX ORDER — LOVASTATIN 20 MG/1
TABLET ORAL
Qty: 30 TABLET | Refills: 3 | OUTPATIENT
Start: 2018-08-29

## 2018-08-29 RX ORDER — LIDOCAINE HYDROCHLORIDE 10 MG/ML
1 INJECTION, SOLUTION EPIDURAL; INFILTRATION; INTRACAUDAL; PERINEURAL ONCE
Status: DISCONTINUED | OUTPATIENT
Start: 2018-08-29 | End: 2018-08-29 | Stop reason: HOSPADM

## 2018-08-29 RX ORDER — DIAZEPAM 5 MG/1
5 TABLET ORAL
COMMUNITY
End: 2018-11-12

## 2018-08-29 RX ORDER — DIPHENHYDRAMINE HYDROCHLORIDE 50 MG/ML
12.5 INJECTION INTRAMUSCULAR; INTRAVENOUS
Status: DISCONTINUED | OUTPATIENT
Start: 2018-08-29 | End: 2018-08-29 | Stop reason: HOSPADM

## 2018-08-29 RX ORDER — FAMOTIDINE 10 MG/ML
INJECTION INTRAVENOUS
Status: DISCONTINUED
Start: 2018-08-29 | End: 2018-08-29 | Stop reason: HOSPADM

## 2018-08-29 RX ORDER — PROPOFOL 10 MG/ML
VIAL (ML) INTRAVENOUS
Status: DISCONTINUED | OUTPATIENT
Start: 2018-08-29 | End: 2018-08-29

## 2018-08-29 RX ORDER — SODIUM CHLORIDE, SODIUM LACTATE, POTASSIUM CHLORIDE, CALCIUM CHLORIDE 600; 310; 30; 20 MG/100ML; MG/100ML; MG/100ML; MG/100ML
INJECTION, SOLUTION INTRAVENOUS CONTINUOUS
Status: DISCONTINUED | OUTPATIENT
Start: 2018-08-29 | End: 2018-08-29 | Stop reason: HOSPADM

## 2018-08-29 RX ORDER — FAMOTIDINE 10 MG/ML
20 INJECTION INTRAVENOUS ONCE
Status: COMPLETED | OUTPATIENT
Start: 2018-08-29 | End: 2018-08-29

## 2018-08-29 RX ORDER — SODIUM CHLORIDE, SODIUM LACTATE, POTASSIUM CHLORIDE, CALCIUM CHLORIDE 600; 310; 30; 20 MG/100ML; MG/100ML; MG/100ML; MG/100ML
125 INJECTION, SOLUTION INTRAVENOUS CONTINUOUS
Status: DISCONTINUED | OUTPATIENT
Start: 2018-08-29 | End: 2018-08-29 | Stop reason: HOSPADM

## 2018-08-29 RX ADMIN — PROPOFOL 20 MG: 10 INJECTION, EMULSION INTRAVENOUS at 07:08

## 2018-08-29 RX ADMIN — SODIUM CHLORIDE, POTASSIUM CHLORIDE, SODIUM LACTATE AND CALCIUM CHLORIDE: 600; 310; 30; 20 INJECTION, SOLUTION INTRAVENOUS at 07:08

## 2018-08-29 RX ADMIN — FAMOTIDINE 20 MG: 10 INJECTION INTRAVENOUS at 07:08

## 2018-08-29 RX ADMIN — PROPOFOL 60 MG: 10 INJECTION, EMULSION INTRAVENOUS at 07:08

## 2018-08-29 RX ADMIN — MIDAZOLAM HYDROCHLORIDE 2 MG: 1 INJECTION, SOLUTION INTRAMUSCULAR; INTRAVENOUS at 07:08

## 2018-08-29 NOTE — PLAN OF CARE
Discharge instructions complete. Patient stating she is ready to leave. Dr Hugo notified that patient is up and ready.

## 2018-08-29 NOTE — DISCHARGE SUMMARY
[facility]  Discharge Note  Short Stay    Admit Date: 8/29/2018    Discharge Date and Time: No discharge date for patient encounter.     Attending Physician: Dany Hugo MD     Discharge Provider: Dany Hugo    Diagnoses:  Active Hospital Problems    Diagnosis  POA    *Epigastric pain [R10.13]  Yes      Resolved Hospital Problems   No resolved problems to display.       Discharged Condition: good    Hospital Course: Patient was admitted for an outpatient procedure and tolerated the procedure well with no complications.    Final Diagnoses: Same as principal problem.    Disposition: Home or Self Care    Follow up/Patient Instructions:    Medications:  Reconciled Home Medications:      Medication List      CONTINUE taking these medications    buPROPion 150 MG TB24 tablet  Commonly known as:  WELLBUTRIN XL  Take 150 mg by mouth every evening.     diazePAM 5 MG tablet  Commonly known as:  VALIUM  Take 5 mg by mouth as needed for Anxiety.     fluticasone 50 mcg/actuation nasal spray  Commonly known as:  FLONASE  USE ONE SPRAY IN EACH NOSTRIL ONCE DAILY     ibuprofen 800 MG tablet  Commonly known as:  ADVIL,MOTRIN     levothyroxine 50 MCG tablet  Commonly known as:  SYNTHROID  levothyroxine 50 mcg tablet   TK 1 T PO  QAM     lisinopril 20 MG tablet  Commonly known as:  PRINIVIL,ZESTRIL  Take 1 tablet (20 mg total) by mouth once daily.     lovastatin 20 MG tablet  Commonly known as:  MEVACOR  Take 20 mg by mouth every evening.     oxyCODONE-acetaminophen  mg per tablet  Commonly known as:  PERCOCET  Take 1 tablet by mouth 2 (two) times daily.     pantoprazole 40 MG tablet  Commonly known as:  PROTONIX  Take 1 tablet (40 mg total) by mouth once daily. Take in the morning before breakfast.  Wait 30 minutes before eating or drinking anything     SIMPONI 50 mg/0.5 mL Pnij  Generic drug:  golimumab  Inject 50 mg into the skin every 30 days.     tiZANidine 4 MG tablet  Commonly known as:  ZANAFLEX      VRAYLAR 3 mg Cap  Generic drug:  cariprazine  Take 3 mg by mouth once daily.          Discharge Procedure Orders   Diet Adult Regular     No driving until:     Order Specific Question Answer Comments   Date: 8/30/2018      Follow-up Information     Dany Hugo MD In 2 weeks.    Specialty:  General Surgery  Why:  Routine Post Endoscopy Visit  Contact information:  [address]  [facility] [address] [phone]                   Discharge Procedure Orders (must include Diet, Follow-up, Activity):   Discharge Procedure Orders (must include Diet, Follow-up, Activity)   Diet Adult Regular     No driving until:     Order Specific Question Answer Comments   Date: 8/30/2018

## 2018-08-29 NOTE — TRANSFER OF CARE
Anesthesia Transfer of Care Note    Patient: Boom Blanco    Procedure(s) Performed: Procedure(s) (LRB):  ESOPHAGOGASTRODUODENOSCOPY (EGD) (Left)    Patient location: PACU    Anesthesia Type: general    Transport from OR: Transported from OR on room air with adequate spontaneous ventilation    Post pain: adequate analgesia    Post assessment: no apparent anesthetic complications and tolerated procedure well    Post vital signs: stable    Level of consciousness: awake, alert and oriented    Nausea/Vomiting: no nausea/vomiting    Complications: none    Transfer of care protocol was followed      Last vitals:   Visit Vitals  Pulse 75   Temp 36.6 °C (97.9 °F) (Oral)   Resp 18   SpO2 97%   Breastfeeding? No

## 2018-08-29 NOTE — ANESTHESIA PREPROCEDURE EVALUATION
08/29/2018  Boom Blanco is a 48 y.o., female.    Anesthesia Evaluation    I have reviewed the Patient Summary Reports.    I have reviewed the Nursing Notes.   I have reviewed the Medications.     Review of Systems  Social:  Smoker    Musculoskeletal:   Arthritis (RA)     Endocrine:   Hypothyroidism    Psych:   Psychiatric History          Physical Exam  General:  Well nourished    Airway/Jaw/Neck:  Airway Findings: Mouth Opening: Normal Tongue: Normal  Mallampati: II  TM Distance: Normal, at least 6 cm  Jaw/Neck Findings:  Neck ROM: Normal ROM       Chest/Lungs:  Chest/Lungs Findings: Clear to auscultation     Heart/Vascular:  Heart Findings: Rate: Normal  Rhythm: Regular Rhythm        Mental Status:  Mental Status Findings:  Alert and Oriented         Anesthesia Plan  Type of Anesthesia, risks & benefits discussed:  Anesthesia Type:  general  Patient's Preference:   Intra-op Monitoring Plan: standard ASA monitors  Intra-op Monitoring Plan Comments:   Post Op Pain Control Plan: IV/PO Opioids PRN  Post Op Pain Control Plan Comments:   Induction:   IV  Beta Blocker:         Informed Consent: Patient understands risks and agrees with Anesthesia plan.  Questions answered. Anesthesia consent signed with patient.  ASA Score: 3     Day of Surgery Review of History & Physical:            Ready For Surgery From Anesthesia Perspective.

## 2018-08-29 NOTE — ANESTHESIA POSTPROCEDURE EVALUATION
Anesthesia Post Evaluation    Patient: Boom Blanco    Procedure(s) Performed: Procedure(s) (LRB):  ESOPHAGOGASTRODUODENOSCOPY (EGD) (Left)    Final Anesthesia Type: general  Patient location during evaluation: PACU  Patient participation: Yes- Able to Participate  Level of consciousness: awake and alert  Post-procedure vital signs: reviewed and stable  Pain management: adequate  Airway patency: patent  PONV status at discharge: No PONV  Anesthetic complications: no      Cardiovascular status: blood pressure returned to baseline  Respiratory status: unassisted  Hydration status: euvolemic  Follow-up not needed.        Visit Vitals  /73   Pulse 70   Temp 36.6 °C (97.8 °F) (Oral)   Resp 18   SpO2 99%   Breastfeeding? No       Pain/Yoanna Score: Pain Assessment Performed: Yes (8/29/2018  6:50 AM)  Presence of Pain: denies (8/29/2018  8:10 AM)  Yoanna Score: 10 (8/29/2018  8:45 AM)

## 2018-08-29 NOTE — DISCHARGE INSTRUCTIONS
Upper GI Endoscopy     During endoscopy, a long, flexible tube is used to view the inside of your upper GI tract.      Upper GI endoscopy allows your healthcare provider to look directly into the beginning of your gastrointestinal (GI) tract. The esophagus, stomach, and duodenum (the first part of the small intestine) make up the upper GI tract.   Before the exam  Follow these and any other instructions you are given before your endoscopy. If you dont follow the healthcare providers instructions carefully, the test may need to be canceled or done over:  · Don't eat or drink anything after midnight the night before your exam. If your exam is in the afternoon, drink only clear liquids in the morning. Don't eat or drink anything for 8 hours before the exam. In some cases, you may be able to take medicines with sips of water until 2 hours before the procedure. Speak with your healthcare provider about this.   · Bring your X-rays and any other test results you have.  · Because you will be sedated, arrange for an adult to drive you home after the exam.  · Tell your healthcare provider before the exam if you are taking any medicines or have any medical problems.  The procedure  Here is what to expect:  · You will lie on the endoscopy table. Usually patients lie on the left side.  · You will be monitored and given oxygen.  · Your throat may be numbed with a spray or gargle. You are given medicine through an intravenous (IV) line that will help you relax and remain comfortable. You may be awake or asleep during the procedure.  · The healthcare provider will put the endoscope in your mouth and down your esophagus. It is thinner than most pieces of food that you swallow. It will not affect your breathing. The medicine helps keep you from gagging.  · Air is put into your GI tract to expand it. It can make you burp.  · During the procedure, the healthcare provider can take biopsies (tissue samples), remove abnormalities,  such as polyps, or treat abnormalities through a variety of devices placed through the endoscope. You will not feel this.   · The endoscope carries images of your upper GI tract to a video screen. If you are awake, you may be able to look at the images.  · After the procedure is done, you will rest for a time. An adult must drive you home.  When to call your healthcare provider  Contact your healthcare provider if you have:  · Black or tarry stools, or blood in your stool  · Fever  · Pain in your belly that does not go away  · Nausea and vomiting, or vomiting blood   Date Last Reviewed: 7/1/2016  © 3345-2606 [employer]. [address] [place] [address], [place] [address]. All rights reserved. This information is not intended as a substitute for professional medical care. Always follow your healthcare professional's instructions.        Discharge Instructions: After Your Surgery  Youve just had surgery. During surgery, you were given medicine called anesthesia to keep you relaxed and free of pain. After surgery, you may have some pain or nausea. This is common. Here are some tips for feeling better and getting well after surgery.     Stay on schedule with your medicine.   Going home  Your healthcare provider will show you how to take care of yourself when you go home. He or she will also answer your questions. Have an adult family member or friend drive you home. For the first 24 hours after your surgery:  · Do not drive or use heavy equipment.  · Do not make important decisions or sign legal papers.  · Do not drink alcohol.  · Have someone stay with you, if needed. He or she can watch for problems and help keep you safe.  Be sure to go to all follow-up visits with your healthcare provider. And rest after your surgery for as long as your healthcare provider tells you to.  Coping with pain  If you have pain after surgery, pain medicine will help you feel better. Take it as told, before pain becomes severe. Also,  ask your healthcare provider or pharmacist about other ways to control pain. This might be with heat, ice, or relaxation. And follow any other instructions your surgeon or nurse gives you.  Tips for taking pain medicine  To get the best relief possible, remember these points:  · Pain medicines can upset your stomach. Taking them with a little food may help.  · Most pain relievers taken by mouth need at least 20 to 30 minutes to start to work.  · Taking medicine on a schedule can help you remember to take it. Try to time your medicine so that you can take it before starting an activity. This might be before you get dressed, go for a walk, or sit down for dinner.  · Constipation is a common side effect of pain medicines. Call your healthcare provider before taking any medicines such as laxatives or stool softeners to help ease constipation. Also ask if you should skip any foods. Drinking lots of fluids and eating foods such as fruits and vegetables that are high in fiber can also help. Remember, do not take laxatives unless your surgeon has prescribed them.  · Drinking alcohol and taking pain medicine can cause dizziness and slow your breathing. It can even be deadly. Do not drink alcohol while taking pain medicine.  · Pain medicine can make you react more slowly to things. Do not drive or run machinery while taking pain medicine.  Your healthcare provider may tell you to take acetaminophen to help ease your pain. Ask him or her how much you are supposed to take each day. Acetaminophen or other pain relievers may interact with your prescription medicines or other over-the-counter (OTC) medicines. Some prescription medicines have acetaminophen and other ingredients. Using both prescription and OTC acetaminophen for pain can cause you to overdose. Read the labels on your OTC medicines with care. This will help you to clearly know the list of ingredients, how much to take, and any warnings. It may also help you not take  too much acetaminophen. If you have questions or do not understand the information, ask your pharmacist or healthcare provider to explain it to you before you take the OTC medicine.  Managing nausea  Some people have an upset stomach after surgery. This is often because of anesthesia, pain, or pain medicine, or the stress of surgery. These tips will help you handle nausea and eat healthy foods as you get better. If you were on a special food plan before surgery, ask your healthcare provider if you should follow it while you get better. These tips may help:  · Do not push yourself to eat. Your body will tell you when to eat and how much.  · Start off with clear liquids and soup. They are easier to digest.  · Next try semi-solid foods, such as mashed potatoes, applesauce, and gelatin, as you feel ready.  · Slowly move to solid foods. Dont eat fatty, rich, or spicy foods at first.  · Do not force yourself to have 3 large meals a day. Instead eat smaller amounts more often.  · Take pain medicines with a small amount of solid food, such as crackers or toast, to avoid nausea.     Call your surgeon if  · You still have pain an hour after taking medicine. The medicine may not be strong enough.  · You feel too sleepy, dizzy, or groggy. The medicine may be too strong.  · You have side effects like nausea, vomiting, or skin changes, such as rash, itching, or hives.       If you have obstructive sleep apnea  You were given anesthesia medicine during surgery to keep you comfortable and free of pain. After surgery, you may have more apnea spells because of this medicine and other medicines you were given. The spells may last longer than usual.   At home:  · Keep using the continuous positive airway pressure (CPAP) device when you sleep. Unless your healthcare provider tells you not to, use it when you sleep, day or night. CPAP is a common device used to treat obstructive sleep apnea.  · Talk with your provider before taking any  pain medicine, muscle relaxants, or sedatives. Your provider will tell you about the possible dangers of taking these medicines.  Date Last Reviewed: 12/1/2016  © 8078-6464 [employer]. [address] [place] [address], [place] [address]. All rights reserved. This information is not intended as a substitute for professional medical care. Always follow your healthcare professional's instructions.

## 2018-08-29 NOTE — [employer] PATIENT INSTRUCTIONS
Discharge Summary/Instructions after an Endoscopic Procedure  Patient Name: Boom Blanco  Patient MRN: [id number]  Patient YOB: 1970 Wednesday, August 29, 2018  Dany Hugo MD  RESTRICTIONS:  During your procedure today, you received medications for sedation.  These   medications may affect your judgment, balance and coordination.  Therefore,   for 24 hours, you have the following restrictions:   - DO NOT drive a car, operate machinery, make legal/financial decisions,   sign important papers or drink alcohol.    ACTIVITY:  Today: no heavy lifting, straining or running due to procedural   sedation/anesthesia.  The following day: return to full activity including work.  DIET:  Eat and drink normally unless instructed otherwise.     TREATMENT FOR COMMON SIDE EFFECTS:  - Mild abdominal pain, nausea, belching, bloating or excessive gas:  rest,   eat lightly and use a heating pad.  - Sore Throat: treat with throat lozenges and/or gargle with warm salt   water.  - Because air was used during the procedure, expelling large amounts of air   from your rectum or belching is normal.  - If a bowel prep was taken, you may not have a bowel movement for 1-3 days.    This is normal.  SYMPTOMS TO WATCH FOR AND REPORT TO YOUR PHYSICIAN:  1. Abdominal pain or bloating, other than gas cramps.  2. Chest pain.  3. Back pain.  4. Signs of infection such as: chills or fever occurring within 24 hours   after the procedure.  5. Rectal bleeding, which would show as bright red, maroon, or black stools.   (A tablespoon of blood from the rectum is not serious, especially if   hemorrhoids are present.)  6. Vomiting.  7. Weakness or dizziness.  GO DIRECTLY TO THE NEAREST EMERGENCY ROOM IF YOU HAVE ANY OF THE FOLLOWING:      Difficulty breathing              Chills and/or fever over 101 F   Persistent vomiting and/or vomiting blood   Severe abdominal pain   Severe chest pain   Black, tarry stools   Bleeding- more than one  tablespoon   Any other symptom or condition that you feel may need urgent attention  Your doctor recommends these additional instructions:  If any biopsies were taken, your doctors clinic will contact you in 1 to 2   weeks with any results.  - Discharge patient to home.   - Resume previous diet.   - Continue present medications.   - Await pathology results.   - Return to my office in 2 weeks.  For questions, problems or results please call your physician - Dany Hugo MD at Work:  [phone].  [facility] EMERGENCY ROOM PHONE NUMBER: [phone]  IF A COMPLICATION OR EMERGENCY SITUATION ARISES AND YOU ARE UNABLE TO REACH   YOUR PHYSICIAN - GO DIRECTLY TO THE EMERGENCY ROOM.  MD Dany Benedict MD  8/29/2018 8:05:57 AM  This report has been verified and signed electronically.  [employer]

## 2018-08-29 NOTE — H&P
[facility]  General Surgery  H&P      Patient Name: Boom Blanco  MRN: [id number]     Chief Complaint:  I am here to be scoped      HPI:  Ms. Blanco presents today as a consult from Dr. Gallagher for evaluation of epigastric abdominal pain.  She has been experiencing burning, moderate, nonradiating epigastric abdominal pain for the past 9+ months.  No nausea or vomiting.  No heartburn.  No regurgitation.  No melena, hematochezia, hematemesis.  No dysphagia or odynophagia.  No other associated symptoms.  No aggravating factors.  No alleviating factors.  Recently tested positive for Helicobacter and therapy was completed approximately 10 days ago.           Allergies & Meds:        Review of patient's allergies indicates:   Allergen Reactions    Remeron [mirtazapine] Other (See Comments)       Weight gain         Current Medications          Current Outpatient Prescriptions   Medication Sig Dispense Refill    alprazolam (XANAX) 1 MG tablet Take 1 mg by mouth 2 (two) times daily.        amoxicillin (AMOXIL) 500 MG capsule Take 2 capsules (1,000 mg total) by mouth every 12 (twelve) hours. for 14 days 56 capsule 0    buPROPion (WELLBUTRIN XL) 150 MG TB24 tablet Take 150 mg by mouth every evening.        cariprazine (VRAYLAR) 3 mg Cap Take 3 mg by mouth once daily.         clarithromycin (BIAXIN) 500 MG tablet Take 1 tablet (500 mg total) by mouth every 12 (twelve) hours. for 14 days 28 tablet 0    fluticasone (FLONASE) 50 mcg/actuation nasal spray USE ONE SPRAY IN EACH NOSTRIL ONCE DAILY 16 g 4    golimumab 50 mg/0.5 mL PnIj Inject 50 mg into the skin every 30 days. 0.5 mL 11    ibuprofen (ADVIL,MOTRIN) 800 MG tablet          levothyroxine (SYNTHROID) 50 MCG tablet levothyroxine 50 mcg tablet   TK 1 T PO  QAM        lisinopril (PRINIVIL,ZESTRIL) 20 MG tablet Take 20 mg by mouth once daily.        lovastatin (MEVACOR) 20 MG tablet Take 20 mg by mouth every  evening.         oxyCODONE-acetaminophen (PERCOCET)  mg per tablet Take 1 tablet by mouth 2 (two) times daily.        pantoprazole (PROTONIX) 40 MG tablet Take 1 tablet (40 mg total) by mouth 2 (two) times daily. for 14 days 28 tablet 0    tiZANidine (ZANAFLEX) 4 MG tablet            No current facility-administered medications for this visit.             PMFSHx:       Past Medical History:   Diagnosis Date    Anxiety      Anxiety disorder      Arthritis      Bipolar disorder      Chronic pain      Hyperlipidemia      Hypertension      Psoriatic arthritis      Raynaud's disease                 Past Surgical History:   Procedure Laterality Date    BACK SURGERY         spinal stimulator implanted and then removed    COLONOSCOPY   2016     repeat in 5-10 years    HYSTERECTOMY   1997    SALPINGOOPHORECTOMY   1997         History reviewed. No pertinent family history.           Social History   Substance Use Topics    Smoking status: Current Every Day Smoker       Types: Cigarettes    Smokeless tobacco: Never Used    Alcohol use Not on file         Review of Systems   Constitutional: Negative for appetite change, chills, fatigue, fever and unexpected weight change.   HENT: Negative for congestion, dental problem, ear pain, mouth sores, postnasal drip, rhinorrhea, sore throat, tinnitus, trouble swallowing and voice change.    Eyes: Negative for photophobia, pain, discharge and visual disturbance.   Respiratory: Negative for cough, chest tightness, shortness of breath and wheezing.    Cardiovascular: Negative for chest pain, palpitations and leg swelling.   Gastrointestinal: Negative for blood in stool, constipation, diarrhea, nausea and vomiting.   Endocrine: Negative for cold intolerance, heat intolerance, polydipsia, polyphagia and polyuria.   Genitourinary: Negative for difficulty urinating, dysuria, flank pain, frequency, hematuria and urgency.   Musculoskeletal: Negative for arthralgias,  joint swelling and myalgias.   Skin: Negative for color change and rash.   Allergic/Immunologic: Negative for immunocompromised state.   Neurological: Negative for dizziness, tremors, seizures, syncope, speech difficulty, weakness, numbness and headaches.   Hematological: Negative for adenopathy. Does not bruise/bleed easily.   Psychiatric/Behavioral: Negative for agitation, confusion, hallucinations, self-injury and suicidal ideas. The patient is not nervous/anxious.        Objective:   Physical Exam   Constitutional: She is oriented to person, place, and time. She appears well-developed and well-nourished. She is active.  Non-toxic appearance. No distress.   HENT:   Head: Normocephalic and atraumatic.   Right Ear: Hearing and external ear normal. No drainage or tenderness.   Left Ear: Hearing and external ear normal. No drainage or tenderness.   Nose: Nose normal. No rhinorrhea. No epistaxis.   Mouth/Throat: Uvula is midline, oropharynx is clear and moist and mucous membranes are normal. Mucous membranes are not pale, not dry and not cyanotic. No oropharyngeal exudate.   Eyes: Conjunctivae and lids are normal. Pupils are equal, round, and reactive to light. Right eye exhibits no discharge and no exudate. Left eye exhibits no discharge and no exudate. Right conjunctiva is not injected. Right eye exhibits no nystagmus. Left eye exhibits no nystagmus.   Neck: Trachea normal, full passive range of motion without pain and phonation normal. No JVD present. Carotid bruit is not present. No tracheal deviation present. No thyroid mass and no thyromegaly present.   Cardiovascular: Normal rate, regular rhythm, S1 normal, S2 normal, normal heart sounds and intact distal pulses.  PMI is not displaced.  Exam reveals no gallop and no friction rub.    No murmur heard.  Pulmonary/Chest: Effort normal and breath sounds normal. No accessory muscle usage. No respiratory distress. She exhibits no mass, no tenderness and no crepitus.  Right breast exhibits no inverted nipple, no mass, no nipple discharge, no skin change and no tenderness. Left breast exhibits no inverted nipple, no mass, no nipple discharge, no skin change and no tenderness. Breasts are symmetrical.   Abdominal: Soft. Normal appearance and bowel sounds are normal. She exhibits no distension, no fluid wave, no abdominal bruit and no mass. There is no hepatosplenomegaly. There is no tenderness. There is no rebound, no guarding and negative Gerard's sign. No hernia. Hernia confirmed negative in the right inguinal area and confirmed negative in the left inguinal area.   Genitourinary: Rectum normal and vagina normal. There is no rash or lesion on the right labia. There is no rash or lesion on the left labia. Right adnexum displays no mass. Left adnexum displays no mass. No vaginal discharge found.   Musculoskeletal:        Cervical back: Normal.        Thoracic back: Normal.        Lumbar back: Normal.        Right upper arm: Normal.        Left upper arm: Normal.        Right forearm: Normal.        Left forearm: Normal.        Right hand: Normal.        Left hand: Normal.        Right upper leg: Normal.        Left upper leg: Normal.        Right lower leg: Normal.        Left lower leg: Normal.        Right foot: Normal.        Left foot: Normal.   Lymphadenopathy:        Head (right side): No submental, no submandibular and no posterior auricular adenopathy present.        Head (left side): No submental, no submandibular and no posterior auricular adenopathy present.     She has no cervical adenopathy.     She has no axillary adenopathy.        Right: No inguinal and no supraclavicular adenopathy present.        Left: No inguinal and no supraclavicular adenopathy present.   Neurological: She is alert and oriented to person, place, and time. She has normal strength. No cranial nerve deficit or sensory deficit. Coordination and gait normal. GCS eye subscore is 4. GCS verbal  subscore is 5. GCS motor subscore is 6.   Skin: Skin is warm, dry and intact. No rash noted. No cyanosis. Nails show no clubbing.   Psychiatric: She has a normal mood and affect. Her speech is normal. Judgment and thought content normal. Her mood appears not anxious. Her affect is not inappropriate. She is not actively hallucinating. She does not exhibit a depressed mood.          Test Results:  Lab results reviewed from 7/26/2018 and 6/14/2018.  CBC shows no evidence of leukocytosis, anemia, or thrombocytopenia.  Electrolytes are all in the range of normal. BUN and creatinine shows no evidence of renal dysfunction.  Glucose shows no suspicion diabetes.  Liver profile shows no evidence of hepatocellular disease or biliary obstruction. C reactive protein is in the range of normal. ESR is in the range of normal. PT/PTT/INR show no suggestion of a coagulopathy or anticoagulation therapy.  H pylori IgG titers positive.  Lipid profile and TSH unremarkable.     EKG reviewed from 8/24/2018.  Tracing shows a normal sinus rhythm with no ischemic changes.    Assessment:       Epigastric abdominal pain. Differential diagnosis includes is not limited to esophagitis, hiatal hernia, gastritis, gastric ulcer, duodenitis, biliary disease, esophageal neoplasm, gastric neoplasm, etc. Options include but are not limited to endoscopy, 2nd opinion, laparoscopy, radiologic studies, etc.     1. Epigastric pain        Plan:   Epigastric pain  -     Case Request Operating Room: ESOPHAGOGASTRODUODENOSCOPY (EGD)  -     Place in Outpatient; Standing  -     Vital signs; Standing  -     Insert peripheral IV; Standing  -     Verify beta-blocker dose taken within 24 hours if patient is prescribed beta-blocker; Standing  -     Height and weight; Standing  -     Verify discontinuation of antithrombotics; Standing  -     POCT glucose; Standing  -     Verify blood consent; Standing  -     Verify consent; Standing  -     Diet NPO; Standing  -     Pulse  Oximetry Q4H; Standing  -     EKG 12-lead; Future     Other orders  -     lidocaine (PF) 10 mg/ml (1%) injection 10 mg; Inject 1 mL (10 mg total) into the skin once.  -     lactated ringers infusion; Inject into the vein continuous.  -     IP VTE LOW RISK PATIENT; Standing           Follow-up in about 1 month (around 9/6/2018) for routine post-endosocpy visit.     Counseling/Medical Decision Making:  Boom Blanco was counseled on the results of the evaluation thus far and the differential diagnosis as well as the diagnostic and therapeutic options.  Risks, benefits, possible complications, details of, and indications for each option were also discussed.  Diagnoses discussed included but were not limited to: esophagitis, gastritis, ulcer disease, neoplastic disease, GERD, hiatal hernia, angiodysplasias, etc.  Options discussed included but were not limited to: radiologic studies, empiric medical management, observation, second opinion, PillCam, EGD.  Possible complications of EGD discussed included but were not limited to: bleeding, injury to internal organs, infections, endocarditis, incomplete exam, failure to diagnose a cancer or other serious conditions, need for emergency surgery, need for additional operations or procedures.  Entire conversation held in laymans terms.  All unfamiliar terms defined.  Questions were solicited and answered.  I read the entire consent form to her verbatim.  A copy of the consent form was provided for her review outside the office / hospital prior to the procedure.  [employer] publications pertaining to endoscopy, GERD, and ulcer disease were provided.  At the conclusion of the conversation she voiced complete understanding of all that we discussed, satisfaction that all questions were fully answered, and that she felt fully informed and completely capable of making an informed decision.  She requests and desires to proceed with an EGD.

## 2018-08-30 RX ORDER — LOVASTATIN 20 MG/1
TABLET ORAL
Qty: 30 TABLET | Refills: 3 | OUTPATIENT
Start: 2018-08-30

## 2018-08-31 RX ORDER — LOVASTATIN 20 MG/1
20 TABLET ORAL NIGHTLY
Qty: 30 TABLET | Refills: 3 | Status: SHIPPED | OUTPATIENT
Start: 2018-08-31 | End: 2018-12-21 | Stop reason: SDUPTHER

## 2018-08-31 RX ORDER — LOVASTATIN 20 MG/1
TABLET ORAL
Qty: 30 TABLET | Refills: 3 | OUTPATIENT
Start: 2018-08-31

## 2018-08-31 NOTE — TELEPHONE ENCOUNTER
Rx sent to pharm.  Pt notified.      Refill request.  Please advise.  Thanks.      ----- Message from Narendra Washburn sent at 8/31/2018  9:56 AM CDT -----  Type:  RX Refill Request    Who Called:  Patient  Refill or New Rx:  Refill  RX Name and Strength:  lovastatin (MEVACOR) 20 MG tablet       How is the patient currently taking it? (ex. 1XDay):  1XDay  Is this a 30 day or 90 day RX:  30  Preferred Pharmacy with phone number:    [facility] [facility] [place] - [address]  [place] [address]  Phone: [phone] Fax: [phone]      Local or Mail Order:  Local  Ordering Provider:  Elliott Jimenez Call Back Number:  [phone]

## 2018-09-06 ENCOUNTER — PATIENT MESSAGE (OUTPATIENT)
Dept: RHEUMATOLOGY | Facility: CLINIC | Age: 48
End: 2018-09-06

## 2018-09-07 ENCOUNTER — PATIENT MESSAGE (OUTPATIENT)
Dept: RHEUMATOLOGY | Facility: CLINIC | Age: 48
End: 2018-09-07

## 2018-09-13 ENCOUNTER — PATIENT MESSAGE (OUTPATIENT)
Dept: RHEUMATOLOGY | Facility: CLINIC | Age: 48
End: 2018-09-13

## 2018-09-13 ENCOUNTER — OFFICE VISIT (OUTPATIENT)
Dept: SURGERY | Facility: CLINIC | Age: 48
End: 2018-09-13
Payer: COMMERCIAL

## 2018-09-13 VITALS
WEIGHT: 157 LBS | DIASTOLIC BLOOD PRESSURE: 69 MMHG | RESPIRATION RATE: 18 BRPM | HEIGHT: 69 IN | HEART RATE: 82 BPM | TEMPERATURE: 98 F | SYSTOLIC BLOOD PRESSURE: 129 MMHG | OXYGEN SATURATION: 97 % | BODY MASS INDEX: 23.25 KG/M2

## 2018-09-13 DIAGNOSIS — K29.50 CHRONIC GASTRITIS WITHOUT BLEEDING, UNSPECIFIED GASTRITIS TYPE: Primary | ICD-10-CM

## 2018-09-13 PROBLEM — R10.13 EPIGASTRIC PAIN: Status: RESOLVED | Noted: 2018-08-29 | Resolved: 2018-09-13

## 2018-09-13 PROCEDURE — 99213 OFFICE O/P EST LOW 20 MIN: CPT | Mod: S$GLB,,, | Performed by: SURGERY

## 2018-09-13 RX ORDER — SUCRALFATE 1 G/1
1 TABLET ORAL
Qty: 120 TABLET | Refills: 1 | Status: SHIPPED | OUTPATIENT
Start: 2018-09-13 | End: 2018-11-12

## 2018-09-13 NOTE — PROGRESS NOTES
Subjective:       Patient ID: Boom Blanco is a 48 y.o. female.    Chief Complaint: Follow-up (EGD 8-29-18)      HPI:  Ms. Blanco returns today following a recent EGD.  The EGD was performed for epigastric abdominal pain. Protonix was initiated prior to the EGD.  Her symptoms have improved slightly with the Protonix.  EGD revealed chronic gastritis.  She has not experienced any nausea or vomiting.  No melena, hematochezia, hematemesis, etc.  No weight loss.  Appetite good.  No aggravating or alleviating factors.  No food intolerance is.  No other associated symptoms.        Allergies & Meds:  Review of patient's allergies indicates:   Allergen Reactions    Remeron [mirtazapine] Other (See Comments)     Weight gain       Current Outpatient Medications   Medication Sig Dispense Refill    buPROPion (WELLBUTRIN XL) 150 MG TB24 tablet Take 150 mg by mouth every evening.      cariprazine (VRAYLAR) 3 mg Cap Take 3 mg by mouth once daily.       diazePAM (VALIUM) 5 MG tablet Take 5 mg by mouth as needed for Anxiety.      fluticasone (FLONASE) 50 mcg/actuation nasal spray USE ONE SPRAY IN EACH NOSTRIL ONCE DAILY 16 g 4    golimumab (SIMPONI) 50 mg/0.5 mL PnIj Inject 50 mg into the skin every 30 days. 0.5 mL 11    ibuprofen (ADVIL,MOTRIN) 800 MG tablet       levothyroxine (SYNTHROID) 50 MCG tablet levothyroxine 50 mcg tablet   TK 1 T PO  QAM      lisinopril (PRINIVIL,ZESTRIL) 20 MG tablet Take 1 tablet (20 mg total) by mouth once daily. 90 tablet 0    lovastatin (MEVACOR) 20 MG tablet Take 1 tablet (20 mg total) by mouth every evening. 30 tablet 3    oxyCODONE-acetaminophen (PERCOCET)  mg per tablet Take 1 tablet by mouth 2 (two) times daily.      pantoprazole (PROTONIX) 40 MG tablet Take 1 tablet (40 mg total) by mouth once daily. Take in the morning before breakfast.  Wait 30 minutes before eating or drinking anything 30 tablet 11    tiZANidine (ZANAFLEX) 4 MG tablet       sucralfate  (CARAFATE) 1 gram tablet Take 1 tablet (1 g total) by mouth 4 (four) times daily before meals and nightly. 120 tablet 1     No current facility-administered medications for this visit.        PMFSHx:  Past medical history, surgical history, family history, social history reviewed and no changes noted.        Review of Systems   Constitutional: Negative for appetite change, chills, fatigue, fever and unexpected weight change.   HENT: Negative for congestion, dental problem, ear pain, mouth sores, postnasal drip, rhinorrhea, sore throat, tinnitus, trouble swallowing and voice change.    Eyes: Negative for photophobia, pain, discharge and visual disturbance.   Respiratory: Negative for cough, chest tightness, shortness of breath and wheezing.    Cardiovascular: Negative for chest pain, palpitations and leg swelling.   Gastrointestinal: Negative for abdominal pain, blood in stool, constipation, diarrhea, nausea and vomiting.   Endocrine: Negative for cold intolerance, heat intolerance, polydipsia, polyphagia and polyuria.   Genitourinary: Negative for difficulty urinating, dysuria, flank pain, frequency, hematuria and urgency.   Musculoskeletal: Negative for arthralgias, joint swelling and myalgias.   Skin: Negative for color change and rash.   Allergic/Immunologic: Negative for immunocompromised state.   Neurological: Negative for dizziness, tremors, seizures, syncope, speech difficulty, weakness, numbness and headaches.   Hematological: Negative for adenopathy. Does not bruise/bleed easily.   Psychiatric/Behavioral: Negative for agitation, confusion, hallucinations, self-injury and suicidal ideas. The patient is not nervous/anxious.        Objective:      Physical Exam   Constitutional: She appears well-developed and well-nourished.  Non-toxic appearance. She does not appear ill. No distress.   Cardiovascular: Normal rate, regular rhythm and normal heart sounds. PMI is not displaced. Exam reveals no gallop.   No  murmur heard.  Pulmonary/Chest: Effort normal and breath sounds normal. No accessory muscle usage. No tachypnea. No respiratory distress.   Abdominal: Soft. Normal appearance and bowel sounds are normal. There is no tenderness. No hernia.   Skin: Skin is warm, dry and intact. No rash noted.         Test Results:  EGD and pathology reports reviewed from 8/29/2018.  Chronic gastritis, Helicobacter negative was confirmed.  Mild evidence of reflux esophagitis also noted.      Assessment:       Chronic gastritis, slightly improved with Protonix.  Persistent symptoms.  Trial of Carafate warranted.    1. Chronic gastritis without bleeding, unspecified gastritis type        Plan:   Chronic gastritis without bleeding, unspecified gastritis type    Other orders  -     sucralfate (CARAFATE) 1 gram tablet; Take 1 tablet (1 g total) by mouth 4 (four) times daily before meals and nightly.  Dispense: 120 tablet; Refill: 1     Continue Protonix.  Begin Carafate.  RTC 1 month, sooner if needed.  Further management will depend upon clinical course.    Follow-up in about 1 month (around 10/13/2018).

## 2018-09-14 NOTE — TELEPHONE ENCOUNTER
Dr. Olivia is requesting a letter of severity of patient's condition in order for pt to be treated by his office.

## 2018-09-17 ENCOUNTER — PATIENT MESSAGE (OUTPATIENT)
Dept: RHEUMATOLOGY | Facility: CLINIC | Age: 48
End: 2018-09-17

## 2018-09-19 ENCOUNTER — PATIENT MESSAGE (OUTPATIENT)
Dept: RHEUMATOLOGY | Facility: CLINIC | Age: 48
End: 2018-09-19

## 2018-09-24 ENCOUNTER — PATIENT MESSAGE (OUTPATIENT)
Dept: RHEUMATOLOGY | Facility: CLINIC | Age: 48
End: 2018-09-24

## 2018-10-01 ENCOUNTER — TELEPHONE (OUTPATIENT)
Dept: PHARMACY | Facility: CLINIC | Age: 48
End: 2018-10-01

## 2018-11-07 ENCOUNTER — TELEPHONE (OUTPATIENT)
Dept: PHARMACY | Facility: CLINIC | Age: 48
End: 2018-11-07

## 2018-11-12 ENCOUNTER — OFFICE VISIT (OUTPATIENT)
Dept: SURGERY | Facility: CLINIC | Age: 48
End: 2018-11-12
Payer: COMMERCIAL

## 2018-11-12 VITALS
DIASTOLIC BLOOD PRESSURE: 82 MMHG | SYSTOLIC BLOOD PRESSURE: 159 MMHG | WEIGHT: 158 LBS | HEIGHT: 69 IN | BODY MASS INDEX: 23.4 KG/M2 | OXYGEN SATURATION: 95 % | TEMPERATURE: 97 F | HEART RATE: 89 BPM

## 2018-11-12 DIAGNOSIS — K29.50 CHRONIC GASTRITIS WITHOUT BLEEDING, UNSPECIFIED GASTRITIS TYPE: Primary | ICD-10-CM

## 2018-11-12 PROCEDURE — 99213 OFFICE O/P EST LOW 20 MIN: CPT | Mod: S$GLB,,, | Performed by: SURGERY

## 2018-11-12 RX ORDER — PANTOPRAZOLE SODIUM 40 MG/1
40 TABLET, DELAYED RELEASE ORAL DAILY
Qty: 30 TABLET | Refills: 11 | Status: SHIPPED | OUTPATIENT
Start: 2018-11-12 | End: 2019-11-04

## 2018-11-12 NOTE — PROGRESS NOTES
Subjective:       Patient ID: Boom Blanco is a 48 y.o. female.    Chief Complaint: Follow-up      HPI:  Ms. Blanco returns today with no new complaints.  Therapy was instituted several months ago for chronic gastritis with Protonix.  Last visit she had persistent symptoms and Carafate was initiated.  She has completed the course of Carafate.  She inadvertently discontinued the Protonix when she completed the course of Carafate.  Symptoms are very minimal at this time.  She has occasional burning pain in the epigastrium, nonradiating.  No heartburn, reflux, regurgitation, etc.  No dysphagia.  No nausea or vomiting.  No diarrhea or constipation.  Appetite is excellent.  Weight is stable.  Activity tolerance is good.  She has not noted any relapse of symptoms after discontinuing the Carafate.        Allergies & Meds:  Review of patient's allergies indicates:   Allergen Reactions    Remeron [mirtazapine] Other (See Comments)     Weight gain       Current Outpatient Medications   Medication Sig Dispense Refill    buPROPion (WELLBUTRIN XL) 150 MG TB24 tablet Take 150 mg by mouth every evening.      cariprazine (VRAYLAR) 3 mg Cap Take 3 mg by mouth once daily.       fluticasone (FLONASE) 50 mcg/actuation nasal spray USE ONE SPRAY IN EACH NOSTRIL ONCE DAILY 16 g 4    golimumab (SIMPONI) 50 mg/0.5 mL PnIj Inject 50 mg into the skin every 30 days. 0.5 mL 11    ibuprofen (ADVIL,MOTRIN) 800 MG tablet       levothyroxine (SYNTHROID) 50 MCG tablet levothyroxine 50 mcg tablet   TK 1 T PO  QAM      lisinopril (PRINIVIL,ZESTRIL) 20 MG tablet Take 1 tablet (20 mg total) by mouth once daily. 90 tablet 0    lovastatin (MEVACOR) 20 MG tablet Take 1 tablet (20 mg total) by mouth every evening. 30 tablet 3    oxyCODONE-acetaminophen (PERCOCET)  mg per tablet Take 1 tablet by mouth 2 (two) times daily.      tiZANidine (ZANAFLEX) 4 MG tablet       pantoprazole (PROTONIX) 40 MG tablet Take 1 tablet (40 mg  total) by mouth once daily. Take in the morning before breakfast.  Wait 30 minutes before eating or drinking anything 30 tablet 11     No current facility-administered medications for this visit.        PMFSHx:  Past medical history, surgical history, family history, social history reviewed and no changes noted.        Review of Systems   Constitutional: Negative for appetite change, chills, fatigue, fever and unexpected weight change.   HENT: Negative for congestion, dental problem, ear pain, mouth sores, postnasal drip, rhinorrhea, sore throat, tinnitus, trouble swallowing and voice change.    Eyes: Negative for photophobia, pain, discharge and visual disturbance.   Respiratory: Negative for cough, chest tightness, shortness of breath and wheezing.    Cardiovascular: Negative for chest pain, palpitations and leg swelling.   Gastrointestinal: Negative for blood in stool, constipation, diarrhea, nausea and vomiting.   Endocrine: Negative for cold intolerance, heat intolerance, polydipsia, polyphagia and polyuria.   Genitourinary: Negative for difficulty urinating, dysuria, flank pain, frequency, hematuria and urgency.   Musculoskeletal: Negative for arthralgias, joint swelling and myalgias.   Skin: Negative for color change and rash.   Allergic/Immunologic: Negative for immunocompromised state.   Neurological: Negative for dizziness, tremors, seizures, syncope, speech difficulty, weakness, numbness and headaches.   Hematological: Negative for adenopathy. Does not bruise/bleed easily.   Psychiatric/Behavioral: Negative for agitation, confusion, hallucinations, self-injury and suicidal ideas. The patient is not nervous/anxious.        Objective:      Physical Exam   Constitutional: She appears well-developed and well-nourished.  Non-toxic appearance. She does not appear ill. No distress.   Cardiovascular: Normal rate, regular rhythm and normal heart sounds. PMI is not displaced. Exam reveals no gallop.   No murmur  heard.  Pulmonary/Chest: Effort normal and breath sounds normal. No accessory muscle usage. No tachypnea. No respiratory distress.   Abdominal: Soft. Normal appearance and bowel sounds are normal. There is no tenderness. No hernia.   Skin: Skin is warm, dry and intact. No rash noted.           Assessment:       1. Chronic gastritis without bleeding, unspecified gastritis type        Plan:   Chronic gastritis without bleeding, unspecified gastritis type    Other orders  -     pantoprazole (PROTONIX) 40 MG tablet; Take 1 tablet (40 mg total) by mouth once daily. Take in the morning before breakfast.  Wait 30 minutes before eating or drinking anything  Dispense: 30 tablet; Refill: 11     Resume Protonix.  You Zantac 75 over-the-counter for breakthrough discomfort.  RTC 3 months, sooner if needed.  Persistent symptoms at that time will precipitate recommendations for repeat EGD.    Follow-up in about 3 months (around 2/12/2019).

## 2018-11-26 ENCOUNTER — TELEPHONE (OUTPATIENT)
Dept: PULMONOLOGY | Facility: CLINIC | Age: 48
End: 2018-11-26

## 2018-11-26 RX ORDER — LISINOPRIL 20 MG/1
TABLET ORAL
Qty: 90 TABLET | Refills: 0 | Status: SHIPPED | OUTPATIENT
Start: 2018-11-26 | End: 2018-11-26 | Stop reason: SDUPTHER

## 2018-11-26 RX ORDER — LISINOPRIL 20 MG/1
20 TABLET ORAL DAILY
Qty: 90 TABLET | Refills: 0 | Status: SHIPPED | OUTPATIENT
Start: 2018-11-26 | End: 2019-05-23 | Stop reason: SDUPTHER

## 2018-11-26 NOTE — TELEPHONE ENCOUNTER
++Notified Rx was being sent to pharmacy. Verbalized an understanding.       +Refill request(s). Please advise. Thank you.       ----- Message from Lit Gan sent at 11/26/2018  8:11 AM CST -----  Type:  RX Refill Request     Who Called:  Patient   Refill or New Rx:  Refill   RX Name and Strength:  lisinopril (PRINIVIL,ZESTRIL) 20 MG tablet   Preferred Pharmacy with phone number:     [facility] [address] [facility], [place] - [address]   [place] [address]   Phone: [phone] Fax: [phone]   Local or Mail Order:  Local   Ordering Provider:  Same   Best Call Back Number:  [phone] (home)

## 2018-11-26 NOTE — TELEPHONE ENCOUNTER
Message sent to wrong provider.     ----- Message from Lit Gan sent at 11/26/2018  8:04 AM CST -----  Type:  RX Refill Request    Who Called:  Patient  Refill or New Rx:  Refill  RX Name and Strength:  lisinopril (PRINIVIL,ZESTRIL) 20 MG tablet   Preferred Pharmacy with phone number:    [facility] [address] [facility], [place] - [address]  [address]  [facility] [place] [address]  Phone: [phone] Fax: [phone]  Local or Mail Order:  Local  Ordering Provider:  Same  Best Call Back Number:  [phone] (home)

## 2018-11-29 ENCOUNTER — TELEPHONE (OUTPATIENT)
Dept: ORTHOPEDICS | Facility: CLINIC | Age: 48
End: 2018-11-29

## 2018-11-29 ENCOUNTER — OFFICE VISIT (OUTPATIENT)
Dept: FAMILY MEDICINE | Facility: CLINIC | Age: 48
End: 2018-11-29
Payer: COMMERCIAL

## 2018-11-29 ENCOUNTER — TELEPHONE (OUTPATIENT)
Dept: PHARMACY | Facility: CLINIC | Age: 48
End: 2018-11-29

## 2018-11-29 VITALS
OXYGEN SATURATION: 100 % | TEMPERATURE: 98 F | DIASTOLIC BLOOD PRESSURE: 80 MMHG | WEIGHT: 165.38 LBS | RESPIRATION RATE: 18 BRPM | HEIGHT: 69 IN | BODY MASS INDEX: 24.5 KG/M2 | SYSTOLIC BLOOD PRESSURE: 146 MMHG | HEART RATE: 74 BPM

## 2018-11-29 DIAGNOSIS — M25.562 CHRONIC PAIN OF BOTH KNEES: ICD-10-CM

## 2018-11-29 DIAGNOSIS — M25.562 PAIN IN BOTH KNEES, UNSPECIFIED CHRONICITY: Primary | ICD-10-CM

## 2018-11-29 DIAGNOSIS — M25.561 PAIN IN BOTH KNEES, UNSPECIFIED CHRONICITY: Primary | ICD-10-CM

## 2018-11-29 DIAGNOSIS — M25.561 CHRONIC PAIN OF BOTH KNEES: ICD-10-CM

## 2018-11-29 DIAGNOSIS — G89.29 CHRONIC PAIN OF BOTH KNEES: ICD-10-CM

## 2018-11-29 DIAGNOSIS — K29.50 CHRONIC GASTRITIS WITHOUT BLEEDING, UNSPECIFIED GASTRITIS TYPE: Primary | ICD-10-CM

## 2018-11-29 DIAGNOSIS — Z12.39 SCREENING FOR MALIGNANT NEOPLASM OF BREAST: ICD-10-CM

## 2018-11-29 PROCEDURE — 99214 OFFICE O/P EST MOD 30 MIN: CPT | Mod: S$GLB,,, | Performed by: FAMILY MEDICINE

## 2018-11-29 NOTE — PROGRESS NOTES
Subjective:       Patient ID: Boom Blanco is a 48 y.o. female.    Chief Complaint: Follow-up    Knee Pain    The incident occurred more than 1 week ago. There was no injury mechanism. The pain is present in the left knee and right knee. The quality of the pain is described as aching. The pain is moderate. The pain has been fluctuating since onset. She reports no foreign bodies present. The symptoms are aggravated by movement and weight bearing. She has tried NSAIDs (percocet (sees pain mgt)) for the symptoms.     Patient reports her epigastric pain is somewhat better since seeing Dr. Hugo and starting pantoprazole. Admits to drinking 2 L of diet coke a day. She also smokes cigarettes.    She is due for a mammogram; last one was approx 2 years ago.    Review of Systems   Constitutional: Negative for chills, fatigue, fever and unexpected weight change.   Respiratory: Negative for cough, chest tightness, shortness of breath and wheezing.    Cardiovascular: Negative for chest pain, palpitations and leg swelling.   Gastrointestinal: Positive for abdominal pain. Negative for constipation, diarrhea, nausea and vomiting.   Musculoskeletal: Positive for arthralgias and joint swelling. Negative for back pain and myalgias.       Past Medical History:   Diagnosis Date    Anxiety     Anxiety disorder     Arthritis     Bipolar disorder     Chronic pain     Hyperlipidemia     Hypertension     Psoriatic arthritis     Raynaud's disease      Past Surgical History:   Procedure Laterality Date    BACK SURGERY      spinal stimulator implanted and then removed    COLONOSCOPY  2016    repeat in 5-10 years    ESOPHAGOGASTRODUODENOSCOPY Left 8/29/2018    Procedure: ESOPHAGOGASTRODUODENOSCOPY (EGD);  Surgeon: Dany Hugo MD;  Location: [facility] ENDO;  Service: General;  Laterality: Left;    ESOPHAGOGASTRODUODENOSCOPY (EGD) Left 8/29/2018    Performed by Dany Hugo MD at [facility] ENDO    HYSTERECTOMY  1997     "SALPINGOOPHORECTOMY  1997     Social History     Socioeconomic History    Marital status:      Spouse name: Not on file    Number of children: Not on file    Years of education: Not on file    Highest education level: Not on file   Social Needs    Financial resource strain: Not on file    Food insecurity - worry: Not on file    Food insecurity - inability: Not on file    Transportation needs - medical: Not on file    Transportation needs - non-medical: Not on file   Occupational History    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   Tobacco Use    Smoking status: Current Every Day Smoker     Types: Cigarettes    Smokeless tobacco: Never Used    Tobacco comment: vape   Substance and Sexual Activity    Alcohol use: No     Frequency: Never    Drug use: No    Sexual activity: Not on file   Other Topics Concern    Not on file   Social History Narrative    Not on file     Family History   Family history unknown: Yes       Objective:      BP (!) 146/80 (BP Location: Right arm, Patient Position: Sitting, BP Method: Medium (Manual))   Pulse 74   Temp 97.6 °F (36.4 °C) (Oral)   Resp 18   Ht 5' 9" (1.753 m)   Wt 75 kg (165 lb 6.4 oz)   LMP  (LMP Unknown)   SpO2 100%   BMI 24.43 kg/m²   Physical Exam   Constitutional: She is oriented to person, place, and time. She appears well-developed and well-nourished. No distress.   Cardiovascular: Normal rate, regular rhythm and normal heart sounds.   No murmur heard.  Pulmonary/Chest: Effort normal and breath sounds normal. No stridor. No respiratory distress.   Neurological: She is alert and oriented to person, place, and time.   Skin: She is not diaphoretic.   Psychiatric: She has a normal mood and affect. Her behavior is normal. Judgment and thought content normal.   Vitals reviewed.      Assessment:       1. Chronic gastritis without bleeding, unspecified gastritis type    2. Chronic pain of both knees    3. Screening for malignant neoplasm of breast  "       Plan:       Chronic gastritis without bleeding, unspecified gastritis type  - continue protonix prescribed by surgery  - patient advised to cut back on her intake of diet coke    Chronic pain of both knees  -     Ambulatory referral to Orthopedics    Screening for malignant neoplasm of breast  -     Mammo Digital Screening Bilateral With CAD; Future; Expected date: 11/29/2018            Risks, benefits, and side effects were discussed with the patient. All questions were answered to the fullest satisfaction of the patient, and pt verbalized understanding and agreement to treatment plan. Pt was to call with any new or worsening symptoms, or present to the ER.

## 2018-11-29 NOTE — TELEPHONE ENCOUNTER
Called patient to schedule referred appointment from Dr. Gallagher with Dr. Gorman for treatment of bilateral knee pain. Appointment made and patient voiced understanding of appointment date, time, and location.

## 2018-11-30 ENCOUNTER — TELEPHONE (OUTPATIENT)
Dept: FAMILY MEDICINE | Facility: CLINIC | Age: 48
End: 2018-11-30

## 2018-12-03 ENCOUNTER — TELEPHONE (OUTPATIENT)
Dept: FAMILY MEDICINE | Facility: CLINIC | Age: 48
End: 2018-12-03

## 2018-12-03 ENCOUNTER — OFFICE VISIT (OUTPATIENT)
Dept: ORTHOPEDICS | Facility: CLINIC | Age: 48
End: 2018-12-03
Payer: COMMERCIAL

## 2018-12-03 ENCOUNTER — HOSPITAL ENCOUNTER (OUTPATIENT)
Dept: RADIOLOGY | Facility: HOSPITAL | Age: 48
Discharge: HOME | End: 2018-12-03
Attending: ORTHOPAEDIC SURGERY
Payer: COMMERCIAL

## 2018-12-03 VITALS
WEIGHT: 165.38 LBS | BODY MASS INDEX: 24.5 KG/M2 | DIASTOLIC BLOOD PRESSURE: 77 MMHG | HEIGHT: 69 IN | SYSTOLIC BLOOD PRESSURE: 164 MMHG | HEART RATE: 76 BPM

## 2018-12-03 DIAGNOSIS — M23.205 DEGENERATIVE TEAR OF MEDIAL MENISCUS, UNSPECIFIED LATERALITY: Primary | ICD-10-CM

## 2018-12-03 DIAGNOSIS — L40.54: ICD-10-CM

## 2018-12-03 DIAGNOSIS — M25.562 PAIN IN BOTH KNEES, UNSPECIFIED CHRONICITY: ICD-10-CM

## 2018-12-03 DIAGNOSIS — M25.561 PAIN IN BOTH KNEES, UNSPECIFIED CHRONICITY: ICD-10-CM

## 2018-12-03 DIAGNOSIS — M71.20 BAKER CYST, UNSPECIFIED LATERALITY: ICD-10-CM

## 2018-12-03 PROCEDURE — 99999 PR PBB SHADOW E&M-EST. PATIENT-LVL III: CPT | Mod: PBBFAC,,, | Performed by: ORTHOPAEDIC SURGERY

## 2018-12-03 PROCEDURE — 73562 X-RAY EXAM OF KNEE 3: CPT | Mod: 26,50,, | Performed by: RADIOLOGY

## 2018-12-03 PROCEDURE — 20610 DRAIN/INJ JOINT/BURSA W/O US: CPT | Mod: 50,S$GLB,, | Performed by: ORTHOPAEDIC SURGERY

## 2018-12-03 PROCEDURE — 99204 OFFICE O/P NEW MOD 45 MIN: CPT | Mod: 25,S$GLB,, | Performed by: ORTHOPAEDIC SURGERY

## 2018-12-03 PROCEDURE — 73562 X-RAY EXAM OF KNEE 3: CPT | Mod: 50,TC,FY

## 2018-12-03 PROCEDURE — 99213 OFFICE O/P EST LOW 20 MIN: CPT | Mod: PBBFAC,25 | Performed by: ORTHOPAEDIC SURGERY

## 2018-12-03 RX ORDER — TRIAMCINOLONE ACETONIDE 40 MG/ML
40 INJECTION, SUSPENSION INTRA-ARTICULAR; INTRAMUSCULAR
Status: DISCONTINUED | OUTPATIENT
Start: 2018-12-03 | End: 2018-12-03 | Stop reason: HOSPADM

## 2018-12-03 RX ADMIN — TRIAMCINOLONE ACETONIDE 40 MG: 40 INJECTION, SUSPENSION INTRA-ARTICULAR; INTRAMUSCULAR at 09:12

## 2018-12-03 NOTE — PROGRESS NOTES
Subjective:      Patient ID: Boom Blanco is a 48 y.o. female.    Chief Complaint: Pain of the Left Knee and Pain of the Right Knee    Referring Provider: Sofi Gallagher Do  [address] [place] [address], [place] [address]    HPI:  Ms. Blanco is a 48-year-old female who presented today for evaluation of 2-3 years of bilateral knee pain increasing intensity over the last month with her right knee worse than her left.  Her pain is of an insidious onset she denied trauma.  She stated that she has psoriatic arthritis and was seeing a rheumatologist in [place] but has not seen the rheumatologist in over a year because of her current insurance does not cross state lines.  Walking and leaving her knee straight for appeared of time increase her symptoms while heat occasionally improves it. She gets swelling and giving way with occasional locking.  She has taken NSAIDs with some help she has not done physical therapy, worn a brace, nor had injections.    Past Medical History:   Diagnosis Date    Anxiety     Anxiety disorder     Arthritis     Bipolar disorder     Chronic pain     Hyperlipidemia     Hypertension     Psoriatic arthritis      *  *  *  *  * Raynaud's disease  Psoriasis  Seasonal allergies  Depression  Lupus  Chronic pain management      Past Surgical History:   Procedure Laterality Date    BACK SURGERY      spinal stimulator implanted and then removed    COLONOSCOPY  2016    repeat in 5-10 years    ESOPHAGOGASTRODUODENOSCOPY Left 8/29/2018    Procedure: ESOPHAGOGASTRODUODENOSCOPY (EGD);  Surgeon: Dany Hguo MD;  Location: [facility] ENDO;  Service: General;  Laterality: Left;    ESOPHAGOGASTRODUODENOSCOPY (EGD) Left 8/29/2018    Performed by Dany Hugo MD at [facility] ENDO    HYSTERECTOMY  1997    SALPINGOOPHORECTOMY  1997       Review of patient's allergies indicates:   Allergen Reactions    Remeron [mirtazapine] Other (See Comments)     Weight gain       Social History      Occupational History    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    ROS:   Review of Systems   Constitution: Negative for chills and fever.   Eyes: Negative for blurred vision.   Cardiovascular: Negative for chest pain.   Respiratory: Negative for cough.    Endocrine: Negative for polydipsia.   Hematologic/Lymphatic: Does not bruise/bleed easily.   Skin: Negative for itching.   Musculoskeletal: Positive for back pain.   Gastrointestinal: Negative for heartburn.   Genitourinary: Negative for nocturia.   Neurological: Negative for headaches and seizures.   Psychiatric/Behavioral: Negative for substance abuse.   Allergic/Immunologic: Positive for environmental allergies.           Objective:      Physical Exam:   General: AAOx3.  No acute distress  HEENT: Normocephalic, PEARLA EOMI, Good Dentition  Neck: Supple, No JVD  Chest: Symetric, equal excursion on inspiration  Abdomen: Soft NTND  Vascular:  Pulses intact and equal bilaterally.  Capillary refill less than 3 seconds and equal bilaterally  Neurologic:  Pinprick and soft touch intact and equal bilaterally  Integment:  No ecchymosis, no errythema.  Tattoo left ankle  Extremity:  Knee:  Extension/flexion equal bilaterally 0/128 degrees. Mild effusion both knees.  Mild crepitus with motion both knees.  Baker cyst both knees.  Negative patellar load/compression both knees.   Negative patella apprehension/relocation both knees.  Varus/valgus stressing equal bilaterally with endpoint.  Lachman's/drawer equal bilaterally with endpoint.  Positive joint line tenderness both knees.  Penny mildly positive left knee.  [place] positive both knees.  Mild tenderness with palpation anserine insertion both knees.  No swelling at the anserine insertion both knees.  Radiography:  Personally reviewed x-rays of both knees completed on 12/03/2018 which showed mild medial compartmental narrowing with the right greater than the left and and medial femoral condyle osteophyte with mild patellofemoral arthritic changes.      Assessment:       Impression:      1. Degenerative tear of medial meniscus, bilateral knees   2. Baker cyst, unspecified bilateral knee   3. Psoriatic arthritis bilateral knees         Plan:       1.  Discussed physical examination and radiographic findings with the patient. Boom understands that she has psoriatic arthritis in her knee which is causing degenerative tears of her menisci.  She can either trial some conservative management or consider surgical intervention but should see has not completed conservative management recommend she trial conservative care before proceeding with surgery. Also discussed with the patient that since she cannot follow up with her rheumatologist it is possible that she may be able to have a rheumatologist in [place] assume her care.  2.  Offered a steroid injection of both knees, she elected to proceed.  3.  Hinged braces both knees, these were applied to the patient.  4.  Referred to Rheumatology a referral was given.  5.  Patient should discuss with her PCM taking NSAIDs but recommend she start an NSAID to help with some of her pain.  6.  Home exercises to include quadriceps and hamstring strengthening were discussed.  7.  Will hold off on referral to physical therapy as she may need it for postoperative use if she proceeds with surgery in  the future.  8.  Follow up p.r.n.

## 2018-12-03 NOTE — TELEPHONE ENCOUNTER
Dr. Gorman does not take [employer]. Please fax referral and most recent clinic note to [facility]. Thanks.

## 2018-12-03 NOTE — LETTER
December 3, 2018      Sofi Gallagher DO  [address] [facility] [place] [address]           [facility]  [address] [place] [address]  Phone: [phone]  Fax: [phone]          Patient: Boom Blanco   MR Number: [id number]   YOB: 1970   Date of Visit: 12/3/2018       Dear Dr. Sofi Gallagher:    Thank you for referring Boom Blanco to me for evaluation. Attached you will find relevant portions of my assessment and plan of care.    If you have questions, please do not hesitate to call me. I look forward to following Boom Blanco along with you.    Sincerely,    Sylvain Gorman,     Enclosure  CC:  No Recipients    If you would like to receive this communication electronically, please contact [email or web address] or [phone] to request more information on [employer] access.    For providers and/or their staff who would like to refer a patient to [facility], please contact us through our one-stop-shop provider referral line, [facility], at [phone].    If you feel you have received this communication in error or would no longer like to receive these types of communications, please e-mail [email or web address]

## 2018-12-03 NOTE — PROCEDURES
Large Joint Aspiration/Injection: R knee, L knee  Date/Time: 12/3/2018 9:40 AM  Performed by: Sylvain Gorman DO  Authorized by: Sylvain Gorman, DO     Consent Done?:  Yes (Verbal)  Indications:  Pain, joint swelling and diagnostic evaluation  Procedure site marked: Yes    Timeout: Prior to procedure the correct patient, procedure, and site was verified      Location:  Knee  Site:  R knee and L knee  Prep: Patient was prepped and draped in usual sterile fashion    Ultrasonic Guidance for needle placement: No  Needle size:  22 G  Approach:  Anterolateral  Medications:  40 mg triamcinolone acetonide 40 mg/mL; 40 mg triamcinolone acetonide 40 mg/mL  Patient tolerance:  Patient tolerated the procedure well with no immediate complications

## 2018-12-04 ENCOUNTER — HOSPITAL ENCOUNTER (OUTPATIENT)
Dept: RADIOLOGY | Facility: HOSPITAL | Age: 48
Discharge: HOME | End: 2018-12-04
Attending: FAMILY MEDICINE
Payer: COMMERCIAL

## 2018-12-04 VITALS — HEIGHT: 69 IN | BODY MASS INDEX: 23.7 KG/M2 | WEIGHT: 160 LBS

## 2018-12-04 DIAGNOSIS — Z12.39 SCREENING FOR MALIGNANT NEOPLASM OF BREAST: ICD-10-CM

## 2018-12-04 PROCEDURE — 77067 SCR MAMMO BI INCL CAD: CPT | Mod: 26,,, | Performed by: RADIOLOGY

## 2018-12-04 PROCEDURE — 77067 SCR MAMMO BI INCL CAD: CPT | Mod: TC

## 2018-12-10 ENCOUNTER — HOSPITAL ENCOUNTER (OUTPATIENT)
Dept: RADIOLOGY | Facility: HOSPITAL | Age: 48
Discharge: HOME | End: 2018-12-10
Attending: FAMILY MEDICINE
Payer: COMMERCIAL

## 2018-12-10 ENCOUNTER — TELEPHONE (OUTPATIENT)
Dept: ORTHOPEDICS | Facility: CLINIC | Age: 48
End: 2018-12-10

## 2018-12-10 ENCOUNTER — TELEPHONE (OUTPATIENT)
Dept: FAMILY MEDICINE | Facility: CLINIC | Age: 48
End: 2018-12-10

## 2018-12-10 DIAGNOSIS — R92.8 ABNORMAL MAMMOGRAM OF BOTH BREASTS: ICD-10-CM

## 2018-12-10 DIAGNOSIS — R92.8 ABNORMAL MAMMOGRAM OF BOTH BREASTS: Primary | ICD-10-CM

## 2018-12-10 PROCEDURE — 77066 DX MAMMO INCL CAD BI: CPT | Mod: 26,,, | Performed by: RADIOLOGY

## 2018-12-10 PROCEDURE — 77066 DX MAMMO INCL CAD BI: CPT | Mod: TC

## 2018-12-10 NOTE — TELEPHONE ENCOUNTER
----- Message from Yue Melendez sent at 12/10/2018  8:35 AM CST -----  Contact: patient  Type: Needs Medical Advice    Who Called:  Patient  Symptoms (please be specific):    How long has patient had these symptoms:    Pharmacy name and phone #:    Best Call Back Number: [phone]  Additional Information: requesting a call back,stated that she is still having pain in both knees

## 2018-12-10 NOTE — TELEPHONE ENCOUNTER
Pt scheduled for additional views today.        ----- Message from Jayesh Rene sent at 12/10/2018  8:27 AM CST -----  Contact: self   Patient need to speak with a nurse to discuss mammogram results, please call back at [phone] (home)

## 2018-12-10 NOTE — TELEPHONE ENCOUNTER
Returned patient's call. Informed patient that per Dr. Gorman patient is to wait 4 weeks then if patient is still having pain in her knees then she is to follow up for re-evaluation. Patient voiced understanding.

## 2018-12-13 ENCOUNTER — PATIENT MESSAGE (OUTPATIENT)
Dept: FAMILY MEDICINE | Facility: CLINIC | Age: 48
End: 2018-12-13

## 2018-12-24 RX ORDER — LOVASTATIN 20 MG/1
TABLET ORAL
Qty: 90 TABLET | Refills: 1 | Status: SHIPPED | OUTPATIENT
Start: 2018-12-24 | End: 2018-12-24 | Stop reason: SDUPTHER

## 2018-12-24 RX ORDER — LOVASTATIN 20 MG/1
20 TABLET ORAL NIGHTLY
Qty: 90 TABLET | Refills: 1 | Status: SHIPPED | OUTPATIENT
Start: 2018-12-24 | End: 2019-11-04 | Stop reason: SDUPTHER

## 2018-12-24 NOTE — TELEPHONE ENCOUNTER
Any otc cough/cold medicine should be safe with this medication. I usually recommend mucinex DM, though. Approving lovastatin refill. Thanks

## 2018-12-24 NOTE — TELEPHONE ENCOUNTER
Patient needs refill of lovastatin. She has a persistent cough at night and wants to know what cough or cold medication is safe to take with lovastatin

## 2018-12-28 ENCOUNTER — TELEPHONE (OUTPATIENT)
Dept: PHARMACY | Facility: CLINIC | Age: 48
End: 2018-12-28

## 2019-01-07 ENCOUNTER — OFFICE VISIT (OUTPATIENT)
Dept: ORTHOPEDICS | Facility: CLINIC | Age: 49
End: 2019-01-07
Payer: COMMERCIAL

## 2019-01-07 VITALS
SYSTOLIC BLOOD PRESSURE: 158 MMHG | HEIGHT: 69 IN | WEIGHT: 160.06 LBS | BODY MASS INDEX: 23.71 KG/M2 | HEART RATE: 72 BPM | DIASTOLIC BLOOD PRESSURE: 74 MMHG

## 2019-01-07 DIAGNOSIS — M17.12 PRIMARY OSTEOARTHRITIS OF LEFT KNEE: ICD-10-CM

## 2019-01-07 DIAGNOSIS — Z01.818 PRE-OP EXAM: ICD-10-CM

## 2019-01-07 DIAGNOSIS — L40.50 PSORIATIC ARTHRITIS: ICD-10-CM

## 2019-01-07 DIAGNOSIS — S83.242A TEAR OF MEDIAL MENISCUS OF LEFT KNEE, CURRENT, UNSPECIFIED TEAR TYPE, INITIAL ENCOUNTER: Primary | ICD-10-CM

## 2019-01-07 DIAGNOSIS — S83.249A MEDIAL MENISCUS TEAR: ICD-10-CM

## 2019-01-07 DIAGNOSIS — Z01.818 PRE-OP EXAM: Primary | ICD-10-CM

## 2019-01-07 DIAGNOSIS — M23.307 DEGENERATIVE TEAR OF MENISCUS, LEFT: Primary | ICD-10-CM

## 2019-01-07 PROCEDURE — 99213 PR OFFICE/OUTPT VISIT, EST, LEVL III, 20-29 MIN: ICD-10-PCS | Mod: S$GLB,,, | Performed by: ORTHOPAEDIC SURGERY

## 2019-01-07 PROCEDURE — 99999 PR PBB SHADOW E&M-EST. PATIENT-LVL III: ICD-10-PCS | Mod: PBBFAC,,, | Performed by: ORTHOPAEDIC SURGERY

## 2019-01-07 PROCEDURE — 99999 PR PBB SHADOW E&M-EST. PATIENT-LVL III: CPT | Mod: PBBFAC,,, | Performed by: ORTHOPAEDIC SURGERY

## 2019-01-07 PROCEDURE — 99213 OFFICE O/P EST LOW 20 MIN: CPT | Mod: S$GLB,,, | Performed by: ORTHOPAEDIC SURGERY

## 2019-01-07 RX ORDER — SODIUM CHLORIDE 9 MG/ML
INJECTION, SOLUTION INTRAVENOUS CONTINUOUS
Status: CANCELLED | OUTPATIENT
Start: 2019-01-07

## 2019-01-07 RX ORDER — MUPIROCIN 20 MG/G
OINTMENT TOPICAL
Status: CANCELLED | OUTPATIENT
Start: 2019-01-07

## 2019-01-07 NOTE — H&P (VIEW-ONLY)
Chief Complaint: Pain of the Left Knee and Pain of the Right Knee      HPI:  Ms. Blanco is a 48-year-old female who returns today for re-evaluation of both of her knees.  At her last visit on 12/03/2018 she was given steroid injections which gave her some relief but they have worn off.  Her left knee is worse than her right.  She now is having significant pain in both her knees.  At her initial visit she was evaluated for 2-3 years of bilateral knee pain increasing intensity.  She has psoriatic arthritis and was referred to a local rheumatologist at her last visit, she has not heard from their office yet for an appointment.  Walking and extending her knee for extended time increases her symptoms while heat occasionally improves it. She gets swelling and giving way with occasional locking.  She has taken NSAIDs .  She intermittently wears a brace which helps. She has done home exercises/physical therapy with minimal help.           Past Medical History:   Diagnosis Date    Anxiety      Anxiety disorder      Arthritis      Bipolar disorder      Chronic pain      Hyperlipidemia      Hypertension      Psoriatic arthritis       *  *  *  *  * Raynaud's disease  Psoriasis  Seasonal allergies  Depression  Lupus  Chronic pain management              Past Surgical History:   Procedure Laterality Date    BACK SURGERY         spinal stimulator implanted and then removed    COLONOSCOPY   2016     repeat in 5-10 years    ESOPHAGOGASTRODUODENOSCOPY Left 8/29/2018     Procedure: ESOPHAGOGASTRODUODENOSCOPY (EGD);  Surgeon: Dany Hugo MD;  Location: [facility] ENDO;  Service: General;  Laterality: Left;    ESOPHAGOGASTRODUODENOSCOPY (EGD) Left 8/29/2018     Performed by Dany Hugo MD at [facility] ENDO    HYSTERECTOMY   1997    SALPINGOOPHORECTOMY   1997               Review of patient's allergies indicates:   Allergen Reactions    Remeron [mirtazapine] Other (See Comments)       Weight gain         Social History             Occupational History    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   Tobacco Use    Smoking status: Current Every Day Smoker       Types: Cigarettes    Smokeless tobacco: Never Used    Tobacco comment: vape   Substance and Sexual Activity    Alcohol use: No       Frequency: Never    Drug use: No    Sexual activity: Not Currently            Family History   Problem Relation Age of Onset    Arthritis Mother      Pacemaker/defibrilator Mother      Lung disease Father      Heart disease Father      Arthritis Sister      Arthritis Brother      Arthritis Sister      Arthritis Sister           Previous Hospitalizations:  Childbirth, anemia requiring blood transfusion.     ROS:   Review of Systems   Constitution: Negative for chills and fever.   Eyes: Negative for blurred vision.   Cardiovascular: Negative for chest pain.   Respiratory: Negative for cough.    Endocrine: Negative for polydipsia.   Hematologic/Lymphatic: Does not bruise/bleed easily.   Skin: Negative for itching.   Musculoskeletal: Positive for back pain.   Gastrointestinal: Negative for heartburn.   Genitourinary: Negative for nocturia.   Neurological: Negative for headaches and seizures.   Psychiatric/Behavioral: Negative for substance abuse.   Allergic/Immunologic: Positive for environmental allergies.             Objective:   Physical Exam:   General: AAOx3.  No acute distress  HEENT: Normocephalic, PEARLA EOMI, Good Dentition  Neck: Supple, No JVD  Chest: Symetric, equal excursion on inspiration  Abdomen: Soft NTND  Vascular:  Pulses intact and equal bilaterally.  Capillary refill less than 3 seconds and equal bilaterally  Neurologic:  Pinprick and soft touch intact and equal bilaterally  Integment:  No ecchymosis, no errythema.  Tattoo left ankle  Extremity:  Knee:  Extension/flexion equal bilaterally 0/128 degrees. Mild effusion both knees.  Mild crepitus with motion both knees.  Baker cyst both knees.  Negative patellar  load/compression both knees.  Negative patella apprehension/relocation both knees.  Varus/valgus stressing equal bilaterally with endpoint.  Lachman's/drawer equal bilaterally with endpoint.  Positive joint line tenderness both knees.  Penny mildly positive left knee.  [place] positive both knees.  Mild tenderness with palpation anserine insertion both knees.  No swelling at the anserine insertion both knees.  Radiography:   previous x-rays of both knees completed on 12/03/2018 which showed mild medial compartmental narrowing with the right greater than the left and and medial femoral condyle osteophyte with mild patellofemoral arthritic changes.      Assessment:       Impression:       1. Degenerative tear of medial meniscus, bilateral knees   2. Baker cyst, unspecified bilateral knee   3. Psoriatic arthritis bilateral knees          Plan:       1.  Discussed physical examination with the patient and unfortunately since it is only been a month since her last injection she would need to wait a little bit longer before having another steroid injection. She also understands she could consider viscosupplementation but he needs to be preauthorized before can be given. Another option could be arthroscopy which she may improve.  She stated she would like to proceed with arthroscopy.  Since her left knee is most symptomatic she would like to proceed with that 1st.  She also understands that if a repair of her meniscus or chondral drilling is completed she may need to be nonweightbearing on her operative extremity for over 6 weeks.  2.  Possible complications of surgery to include bleeding, infection, scarring, nerve/blood vessel/tendon damage, need for further surgery, failed surgery, failure to improve, possible persistent pain, possible arthritis, possible arthrofibrosis, and possible recurrence were discussed with the patient.  The patient was permitted to ask questions and all concerns were addressed to her  satisfaction.  3.  Consent for arthroscopy of her left knee.  4.  Tentatively schedule surgery for 01/22/2019.  5.  All pain meds for postop per the patient's pain management physician.  6.  Make a new referral to Rheumatology, the patient also understands she must call Rheumatology on a regular basis to ensure that they give her an appointment.  7.  Continue with home exercises.  8.  Continue with NSAIDs as tolerated and allowed by PCM.  9.  Follow up approximately 10-12 days postoperatively.

## 2019-01-07 NOTE — H&P
Chief Complaint: Pain of the Left Knee and Pain of the Right Knee      HPI:  Ms. Blanco is a 48-year-old female who returns today for re-evaluation of both of her knees.  At her last visit on 12/03/2018 she was given steroid injections which gave her some relief but they have worn off.  Her left knee is worse than her right.  She now is having significant pain in both her knees.  At her initial visit she was evaluated for 2-3 years of bilateral knee pain increasing intensity.  She has psoriatic arthritis and was referred to a local rheumatologist at her last visit, she has not heard from their office yet for an appointment.  Walking and extending her knee for extended time increases her symptoms while heat occasionally improves it. She gets swelling and giving way with occasional locking.  She has taken NSAIDs .  She intermittently wears a brace which helps. She has done home exercises/physical therapy with minimal help.           Past Medical History:   Diagnosis Date    Anxiety      Anxiety disorder      Arthritis      Bipolar disorder      Chronic pain      Hyperlipidemia      Hypertension      Psoriatic arthritis       *  *  *  *  * Raynaud's disease  Psoriasis  Seasonal allergies  Depression  Lupus  Chronic pain management              Past Surgical History:   Procedure Laterality Date    BACK SURGERY         spinal stimulator implanted and then removed    COLONOSCOPY   2016     repeat in 5-10 years    ESOPHAGOGASTRODUODENOSCOPY Left 8/29/2018     Procedure: ESOPHAGOGASTRODUODENOSCOPY (EGD);  Surgeon: Dany Hugo MD;  Location: [facility] ENDO;  Service: General;  Laterality: Left;    ESOPHAGOGASTRODUODENOSCOPY (EGD) Left 8/29/2018     Performed by Dany Hugo MD at [facility] ENDO    HYSTERECTOMY   1997    SALPINGOOPHORECTOMY   1997               Review of patient's allergies indicates:   Allergen Reactions    Remeron [mirtazapine] Other (See Comments)       Weight gain         Social History             Occupational History    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   Tobacco Use    Smoking status: Current Every Day Smoker       Types: Cigarettes    Smokeless tobacco: Never Used    Tobacco comment: vape   Substance and Sexual Activity    Alcohol use: No       Frequency: Never    Drug use: No    Sexual activity: Not Currently            Family History   Problem Relation Age of Onset    Arthritis Mother      Pacemaker/defibrilator Mother      Lung disease Father      Heart disease Father      Arthritis Sister      Arthritis Brother      Arthritis Sister      Arthritis Sister           Previous Hospitalizations:  Childbirth, anemia requiring blood transfusion.     ROS:   Review of Systems   Constitution: Negative for chills and fever.   Eyes: Negative for blurred vision.   Cardiovascular: Negative for chest pain.   Respiratory: Negative for cough.    Endocrine: Negative for polydipsia.   Hematologic/Lymphatic: Does not bruise/bleed easily.   Skin: Negative for itching.   Musculoskeletal: Positive for back pain.   Gastrointestinal: Negative for heartburn.   Genitourinary: Negative for nocturia.   Neurological: Negative for headaches and seizures.   Psychiatric/Behavioral: Negative for substance abuse.   Allergic/Immunologic: Positive for environmental allergies.             Objective:   Physical Exam:   General: AAOx3.  No acute distress  HEENT: Normocephalic, PEARLA EOMI, Good Dentition  Neck: Supple, No JVD  Chest: Symetric, equal excursion on inspiration  Abdomen: Soft NTND  Vascular:  Pulses intact and equal bilaterally.  Capillary refill less than 3 seconds and equal bilaterally  Neurologic:  Pinprick and soft touch intact and equal bilaterally  Integment:  No ecchymosis, no errythema.  Tattoo left ankle  Extremity:  Knee:  Extension/flexion equal bilaterally 0/128 degrees. Mild effusion both knees.  Mild crepitus with motion both knees.  Baker cyst both knees.  Negative patellar  load/compression both knees.  Negative patella apprehension/relocation both knees.  Varus/valgus stressing equal bilaterally with endpoint.  Lachman's/drawer equal bilaterally with endpoint.  Positive joint line tenderness both knees.  Penny mildly positive left knee.  [place] positive both knees.  Mild tenderness with palpation anserine insertion both knees.  No swelling at the anserine insertion both knees.  Radiography:   previous x-rays of both knees completed on 12/03/2018 which showed mild medial compartmental narrowing with the right greater than the left and and medial femoral condyle osteophyte with mild patellofemoral arthritic changes.      Assessment:       Impression:       1. Degenerative tear of medial meniscus, bilateral knees   2. Baker cyst, unspecified bilateral knee   3. Psoriatic arthritis bilateral knees          Plan:       1.  Discussed physical examination with the patient and unfortunately since it is only been a month since her last injection she would need to wait a little bit longer before having another steroid injection. She also understands she could consider viscosupplementation but he needs to be preauthorized before can be given. Another option could be arthroscopy which she may improve.  She stated she would like to proceed with arthroscopy.  Since her left knee is most symptomatic she would like to proceed with that 1st.  She also understands that if a repair of her meniscus or chondral drilling is completed she may need to be nonweightbearing on her operative extremity for over 6 weeks.  2.  Possible complications of surgery to include bleeding, infection, scarring, nerve/blood vessel/tendon damage, need for further surgery, failed surgery, failure to improve, possible persistent pain, possible arthritis, possible arthrofibrosis, and possible recurrence were discussed with the patient.  The patient was permitted to ask questions and all concerns were addressed to her  satisfaction.  3.  Consent for arthroscopy of her left knee.  4.  Tentatively schedule surgery for 01/22/2019.  5.  All pain meds for postop per the patient's pain management physician.  6.  Make a new referral to Rheumatology, the patient also understands she must call Rheumatology on a regular basis to ensure that they give her an appointment.  7.  Continue with home exercises.  8.  Continue with NSAIDs as tolerated and allowed by PCM.  9.  Follow up approximately 10-12 days postoperatively.

## 2019-01-07 NOTE — PROGRESS NOTES
Chief Complaint: Pain of the Left Knee and Pain of the Right Knee     Referring Provider: Sofi Gallagher Do  [address] [place] [address]     HPI:  Ms. Blanco is a 48-year-old female who returns today for re-evaluation of both of her knees.  At her last visit on 12/03/2018 she was given steroid injections which gave her some relief but they have worn off.  Her left knee is worse than her right.  She now is having significant pain in both her knees.  At her initial visit she was evaluated for 2-3 years of bilateral knee pain increasing intensity.  She has psoriatic arthritis and was referred to a local rheumatologist at her last visit, she has not heard from their office yet for an appointment.  Walking and extending her knee for extended time increases her symptoms while heat occasionally improves it. She gets swelling and giving way with occasional locking.  She has taken NSAIDs .  She intermittently wears a brace which helps. She has done home exercises/physical therapy with minimal help.           Past Medical History:   Diagnosis Date    Anxiety      Anxiety disorder      Arthritis      Bipolar disorder      Chronic pain      Hyperlipidemia      Hypertension      Psoriatic arthritis       *  *  *  *  * Raynaud's disease  Psoriasis  Seasonal allergies  Depression  Lupus  Chronic pain management              Past Surgical History:   Procedure Laterality Date    BACK SURGERY         spinal stimulator implanted and then removed    COLONOSCOPY   2016     repeat in 5-10 years    ESOPHAGOGASTRODUODENOSCOPY Left 8/29/2018     Procedure: ESOPHAGOGASTRODUODENOSCOPY (EGD);  Surgeon: Dany Hugo MD;  Location: [facility] ENDO;  Service: General;  Laterality: Left;    ESOPHAGOGASTRODUODENOSCOPY (EGD) Left 8/29/2018     Performed by Dany Hugo MD at [facility] ENDO    HYSTERECTOMY   1997    SALPINGOOPHORECTOMY   1997               Review of patient's allergies indicates:   Allergen Reactions     Remeron [mirtazapine] Other (See Comments)       Weight gain         Social History            Occupational History    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   Tobacco Use    Smoking status: Current Every Day Smoker       Types: Cigarettes    Smokeless tobacco: Never Used    Tobacco comment: vape   Substance and Sexual Activity    Alcohol use: No       Frequency: Never    Drug use: No    Sexual activity: Not Currently            Family History   Problem Relation Age of Onset    Arthritis Mother      Pacemaker/defibrilator Mother      Lung disease Father      Heart disease Father      Arthritis Sister      Arthritis Brother      Arthritis Sister      Arthritis Sister           Previous Hospitalizations:  Childbirth, anemia requiring blood transfusion.     ROS:   Review of Systems   Constitution: Negative for chills and fever.   Eyes: Negative for blurred vision.   Cardiovascular: Negative for chest pain.   Respiratory: Negative for cough.    Endocrine: Negative for polydipsia.   Hematologic/Lymphatic: Does not bruise/bleed easily.   Skin: Negative for itching.   Musculoskeletal: Positive for back pain.   Gastrointestinal: Negative for heartburn.   Genitourinary: Negative for nocturia.   Neurological: Negative for headaches and seizures.   Psychiatric/Behavioral: Negative for substance abuse.   Allergic/Immunologic: Positive for environmental allergies.             Objective:   Physical Exam:   General: AAOx3.  No acute distress  HEENT: Normocephalic, PEARLA EOMI, Good Dentition  Neck: Supple, No JVD  Chest: Symetric, equal excursion on inspiration  Abdomen: Soft NTND  Vascular:  Pulses intact and equal bilaterally.  Capillary refill less than 3 seconds and equal bilaterally  Neurologic:  Pinprick and soft touch intact and equal bilaterally  Integment:  No ecchymosis, no errythema.  Tattoo left ankle  Extremity:  Knee:  Extension/flexion equal bilaterally 0/128 degrees. Mild effusion both knees.   Mild crepitus with motion both knees.  Baker cyst both knees.  Negative patellar load/compression both knees.  Negative patella apprehension/relocation both knees.  Varus/valgus stressing equal bilaterally with endpoint.  Lachman's/drawer equal bilaterally with endpoint.  Positive joint line tenderness both knees.  [place] mildly positive left knee.  [place] positive both knees.  Mild tenderness with palpation anserine insertion both knees.  No swelling at the anserine insertion both knees.  Radiography:   previous x-rays of both knees completed on 12/03/2018 which showed mild medial compartmental narrowing with the right greater than the left and and medial femoral condyle osteophyte with mild patellofemoral arthritic changes.      Assessment:       Impression:       1. Degenerative tear of medial meniscus, bilateral knees   2. Baker cyst, unspecified bilateral knee   3. Psoriatic arthritis bilateral knees          Plan:       1.  Discussed physical examination with the patient and unfortunately since it is only been a month since her last injection she would need to wait a little bit longer before having another steroid injection. She also understands she could consider viscosupplementation but he needs to be preauthorized before can be given. Another option could be arthroscopy which she may improve.  She stated she would like to proceed with arthroscopy.  Since her left knee is most symptomatic she would like to proceed with that 1st.  She also understands that if a repair of her meniscus or chondral drilling is completed she may need to be nonweightbearing on her operative extremity for over 6 weeks.  2.  Possible complications of surgery to include bleeding, infection, scarring, nerve/blood vessel/tendon damage, need for further surgery, failed surgery, failure to improve, possible persistent pain, possible arthritis, possible arthrofibrosis, and possible recurrence were discussed with the patient.  The  patient was permitted to ask questions and all concerns were addressed to her satisfaction.  3.  Consent for arthroscopy of her left knee.  4.  Tentatively schedule surgery for 01/22/2019.  5.  All pain meds for postop per the patient's pain management physician.  6.  Make a new referral to Rheumatology, the patient also understands she must call Rheumatology on a regular basis to ensure that they give her an appointment.  7.  Continue with home exercises.  8.  Continue with NSAIDs as tolerated and allowed by PCM.  9.  Follow up approximately 10-12 days postoperatively.

## 2019-01-07 NOTE — H&P (VIEW-ONLY)
Chief Complaint: Pain of the Left Knee and Pain of the Right Knee      HPI:  Ms. Blanco is a 48-year-old female who returns today for re-evaluation of both of her knees.  At her last visit on 12/03/2018 she was given steroid injections which gave her some relief but they have worn off.  Her left knee is worse than her right.  She now is having significant pain in both her knees.  At her initial visit she was evaluated for 2-3 years of bilateral knee pain increasing intensity.  She has psoriatic arthritis and was referred to a local rheumatologist at her last visit, she has not heard from their office yet for an appointment.  Walking and extending her knee for extended time increases her symptoms while heat occasionally improves it. She gets swelling and giving way with occasional locking.  She has taken NSAIDs .  She intermittently wears a brace which helps. She has done home exercises/physical therapy with minimal help.           Past Medical History:   Diagnosis Date    Anxiety      Anxiety disorder      Arthritis      Bipolar disorder      Chronic pain      Hyperlipidemia      Hypertension      Psoriatic arthritis       *  *  *  *  * Raynaud's disease  Psoriasis  Seasonal allergies  Depression  Lupus  Chronic pain management              Past Surgical History:   Procedure Laterality Date    BACK SURGERY         spinal stimulator implanted and then removed    COLONOSCOPY   2016     repeat in 5-10 years    ESOPHAGOGASTRODUODENOSCOPY Left 8/29/2018     Procedure: ESOPHAGOGASTRODUODENOSCOPY (EGD);  Surgeon: Dany Hugo MD;  Location: [facility] ENDO;  Service: General;  Laterality: Left;    ESOPHAGOGASTRODUODENOSCOPY (EGD) Left 8/29/2018     Performed by Dany Hugo MD at [facility] ENDO    HYSTERECTOMY   1997    SALPINGOOPHORECTOMY   1997               Review of patient's allergies indicates:   Allergen Reactions    Remeron [mirtazapine] Other (See Comments)       Weight gain         Social History             Occupational History    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   Tobacco Use    Smoking status: Current Every Day Smoker       Types: Cigarettes    Smokeless tobacco: Never Used    Tobacco comment: vape   Substance and Sexual Activity    Alcohol use: No       Frequency: Never    Drug use: No    Sexual activity: Not Currently            Family History   Problem Relation Age of Onset    Arthritis Mother      Pacemaker/defibrilator Mother      Lung disease Father      Heart disease Father      Arthritis Sister      Arthritis Brother      Arthritis Sister      Arthritis Sister           Previous Hospitalizations:  Childbirth, anemia requiring blood transfusion.     ROS:   Review of Systems   Constitution: Negative for chills and fever.   Eyes: Negative for blurred vision.   Cardiovascular: Negative for chest pain.   Respiratory: Negative for cough.    Endocrine: Negative for polydipsia.   Hematologic/Lymphatic: Does not bruise/bleed easily.   Skin: Negative for itching.   Musculoskeletal: Positive for back pain.   Gastrointestinal: Negative for heartburn.   Genitourinary: Negative for nocturia.   Neurological: Negative for headaches and seizures.   Psychiatric/Behavioral: Negative for substance abuse.   Allergic/Immunologic: Positive for environmental allergies.             Objective:   Physical Exam:   General: AAOx3.  No acute distress  HEENT: Normocephalic, PEARLA EOMI, Good Dentition  Neck: Supple, No JVD  Chest: Symetric, equal excursion on inspiration  Abdomen: Soft NTND  Vascular:  Pulses intact and equal bilaterally.  Capillary refill less than 3 seconds and equal bilaterally  Neurologic:  Pinprick and soft touch intact and equal bilaterally  Integment:  No ecchymosis, no errythema.  Tattoo left ankle  Extremity:  Knee:  Extension/flexion equal bilaterally 0/128 degrees. Mild effusion both knees.  Mild crepitus with motion both knees.  Baker cyst both knees.  Negative patellar  load/compression both knees.  Negative patella apprehension/relocation both knees.  Varus/valgus stressing equal bilaterally with endpoint.  Lachman's/drawer equal bilaterally with endpoint.  Positive joint line tenderness both knees.  Penny mildly positive left knee.  Iraan positive both knees.  Mild tenderness with palpation anserine insertion both knees.  No swelling at the anserine insertion both knees.  Radiography:   previous x-rays of both knees completed on 12/03/2018 which showed mild medial compartmental narrowing with the right greater than the left and and medial femoral condyle osteophyte with mild patellofemoral arthritic changes.      Assessment:       Impression:       1. Degenerative tear of medial meniscus, bilateral knees   2. Baker cyst, unspecified bilateral knee   3. Psoriatic arthritis bilateral knees          Plan:       1.  Discussed physical examination with the patient and unfortunately since it is only been a month since her last injection she would need to wait a little bit longer before having another steroid injection. She also understands she could consider viscosupplementation but he needs to be preauthorized before can be given. Another option could be arthroscopy which she may improve.  She stated she would like to proceed with arthroscopy.  Since her left knee is most symptomatic she would like to proceed with that 1st.  She also understands that if a repair of her meniscus or chondral drilling is completed she may need to be nonweightbearing on her operative extremity for over 6 weeks.  2.  Possible complications of surgery to include bleeding, infection, scarring, nerve/blood vessel/tendon damage, need for further surgery, failed surgery, failure to improve, possible persistent pain, possible arthritis, possible arthrofibrosis, and possible recurrence were discussed with the patient.  The patient was permitted to ask questions and all concerns were addressed to her  satisfaction.  3.  Consent for arthroscopy of her left knee.  4.  Tentatively schedule surgery for 01/22/2019.  5.  All pain meds for postop per the patient's pain management physician.  6.  Make a new referral to Rheumatology, the patient also understands she must call Rheumatology on a regular basis to ensure that they give her an appointment.  7.  Continue with home exercises.  8.  Continue with NSAIDs as tolerated and allowed by PCM.  9.  Follow up approximately 10-12 days postoperatively.

## 2019-01-17 ENCOUNTER — TELEPHONE (OUTPATIENT)
Dept: ORTHOPEDICS | Facility: CLINIC | Age: 49
End: 2019-01-17

## 2019-01-17 NOTE — TELEPHONE ENCOUNTER
----- Message from Melina Lua sent at 1/17/2019  8:27 AM CST -----  Type: Needs Medical Advice    Who Called:  Patient    Best Call Back Number: [phone] (home)     Additional Information: Wanted to discuss her upcoming procedure on 1/22/19

## 2019-01-17 NOTE — TELEPHONE ENCOUNTER
Returned patient's call. All questions answered. No further questions. Encouraged patient to call office if she has any questions or concerns. Patient voiced understanding.

## 2019-01-21 ENCOUNTER — HOSPITAL ENCOUNTER (OUTPATIENT)
Dept: PREADMISSION TESTING | Facility: HOSPITAL | Age: 49
Discharge: HOME | End: 2019-01-21
Attending: ORTHOPAEDIC SURGERY
Payer: COMMERCIAL

## 2019-01-21 VITALS — HEIGHT: 69 IN | WEIGHT: 164 LBS | BODY MASS INDEX: 24.29 KG/M2

## 2019-01-21 PROCEDURE — 99900103 DSU ONLY-NO CHARGE-INITIAL HR (STAT)

## 2019-01-22 ENCOUNTER — HOSPITAL ENCOUNTER (OUTPATIENT)
Facility: HOSPITAL | Age: 49
Discharge: HOME | End: 2019-01-22
Attending: ORTHOPAEDIC SURGERY | Admitting: ORTHOPAEDIC SURGERY
Payer: COMMERCIAL

## 2019-01-22 ENCOUNTER — ANESTHESIA EVENT (OUTPATIENT)
Dept: SURGERY | Facility: HOSPITAL | Age: 49
End: 2019-01-22
Payer: COMMERCIAL

## 2019-01-22 ENCOUNTER — ANESTHESIA (OUTPATIENT)
Dept: SURGERY | Facility: HOSPITAL | Age: 49
End: 2019-01-22
Payer: COMMERCIAL

## 2019-01-22 ENCOUNTER — TELEPHONE (OUTPATIENT)
Dept: ORTHOPEDICS | Facility: CLINIC | Age: 49
End: 2019-01-22

## 2019-01-22 DIAGNOSIS — Z01.818 PRE-OP EXAM: ICD-10-CM

## 2019-01-22 DIAGNOSIS — M17.12 PRIMARY OSTEOARTHRITIS OF LEFT KNEE: ICD-10-CM

## 2019-01-22 DIAGNOSIS — S83.249A MEDIAL MENISCUS TEAR: Primary | ICD-10-CM

## 2019-01-22 DIAGNOSIS — S83.242A TEAR OF MEDIAL MENISCUS OF LEFT KNEE, CURRENT, UNSPECIFIED TEAR TYPE, INITIAL ENCOUNTER: ICD-10-CM

## 2019-01-22 DIAGNOSIS — M06.9 RHEUMATOID ARTHRITIS INVOLVING LEFT KNEE, UNSPECIFIED RHEUMATOID FACTOR PRESENCE: ICD-10-CM

## 2019-01-22 PROCEDURE — 36000710: Performed by: ORTHOPAEDIC SURGERY

## 2019-01-22 PROCEDURE — 63600175 PHARM REV CODE 636 W HCPCS: Performed by: ORTHOPAEDIC SURGERY

## 2019-01-22 PROCEDURE — D9220A PRA ANESTHESIA: Mod: ,,, | Performed by: ANESTHESIOLOGY

## 2019-01-22 PROCEDURE — 29880 ARTHRS KNE SRG MNISECTMY M&L: CPT | Mod: LT,,, | Performed by: ORTHOPAEDIC SURGERY

## 2019-01-22 PROCEDURE — 36000711: Performed by: ORTHOPAEDIC SURGERY

## 2019-01-22 PROCEDURE — 71000015 HC POSTOP RECOV 1ST HR: Performed by: ORTHOPAEDIC SURGERY

## 2019-01-22 PROCEDURE — 63600175 PHARM REV CODE 636 W HCPCS: Performed by: NURSE ANESTHETIST, CERTIFIED REGISTERED

## 2019-01-22 PROCEDURE — 01400 ANES OPN/ARTHRS KNEE JT NOS: CPT | Performed by: ORTHOPAEDIC SURGERY

## 2019-01-22 PROCEDURE — 37000008 HC ANESTHESIA 1ST 15 MINUTES: Performed by: ORTHOPAEDIC SURGERY

## 2019-01-22 PROCEDURE — 25000003 PHARM REV CODE 250: Performed by: ORTHOPAEDIC SURGERY

## 2019-01-22 PROCEDURE — 63600175 PHARM REV CODE 636 W HCPCS: Performed by: ANESTHESIOLOGY

## 2019-01-22 PROCEDURE — 37000009 HC ANESTHESIA EA ADD 15 MINS: Performed by: ORTHOPAEDIC SURGERY

## 2019-01-22 PROCEDURE — D9220A PRA ANESTHESIA: ICD-10-PCS | Mod: ,,, | Performed by: ANESTHESIOLOGY

## 2019-01-22 PROCEDURE — 29880 PR KNEE SCOPE MED/LAT MENISCECTOMY: ICD-10-PCS | Mod: LT,,, | Performed by: ORTHOPAEDIC SURGERY

## 2019-01-22 PROCEDURE — 27201423 OPTIME MED/SURG SUP & DEVICES STERILE SUPPLY: Performed by: ORTHOPAEDIC SURGERY

## 2019-01-22 PROCEDURE — 25000003 PHARM REV CODE 250: Performed by: NURSE ANESTHETIST, CERTIFIED REGISTERED

## 2019-01-22 PROCEDURE — 25000003 PHARM REV CODE 250: Performed by: ANESTHESIOLOGY

## 2019-01-22 PROCEDURE — 71000033 HC RECOVERY, INTIAL HOUR: Performed by: ORTHOPAEDIC SURGERY

## 2019-01-22 RX ORDER — ONDANSETRON 2 MG/ML
4 INJECTION INTRAMUSCULAR; INTRAVENOUS DAILY PRN
Status: DISCONTINUED | OUTPATIENT
Start: 2019-01-22 | End: 2019-01-22 | Stop reason: HOSPADM

## 2019-01-22 RX ORDER — SODIUM CHLORIDE, SODIUM LACTATE, POTASSIUM CHLORIDE, CALCIUM CHLORIDE 600; 310; 30; 20 MG/100ML; MG/100ML; MG/100ML; MG/100ML
125 INJECTION, SOLUTION INTRAVENOUS CONTINUOUS
Status: DISCONTINUED | OUTPATIENT
Start: 2019-01-22 | End: 2019-01-22 | Stop reason: HOSPADM

## 2019-01-22 RX ORDER — SODIUM CHLORIDE, SODIUM LACTATE, POTASSIUM CHLORIDE, CALCIUM CHLORIDE 600; 310; 30; 20 MG/100ML; MG/100ML; MG/100ML; MG/100ML
INJECTION, SOLUTION INTRAVENOUS CONTINUOUS
Status: DISCONTINUED | OUTPATIENT
Start: 2019-01-22 | End: 2019-01-22 | Stop reason: HOSPADM

## 2019-01-22 RX ORDER — PROPOFOL 10 MG/ML
VIAL (ML) INTRAVENOUS
Status: DISCONTINUED | OUTPATIENT
Start: 2019-01-22 | End: 2019-01-22

## 2019-01-22 RX ORDER — SODIUM CHLORIDE 9 MG/ML
INJECTION, SOLUTION INTRAVENOUS CONTINUOUS
Status: DISCONTINUED | OUTPATIENT
Start: 2019-01-22 | End: 2019-01-22 | Stop reason: HOSPADM

## 2019-01-22 RX ORDER — CEFAZOLIN SODIUM 2 G/50ML
2 SOLUTION INTRAVENOUS
Status: COMPLETED | OUTPATIENT
Start: 2019-01-22 | End: 2019-01-22

## 2019-01-22 RX ORDER — MIDAZOLAM HYDROCHLORIDE 1 MG/ML
INJECTION, SOLUTION INTRAMUSCULAR; INTRAVENOUS
Status: DISCONTINUED | OUTPATIENT
Start: 2019-01-22 | End: 2019-01-22

## 2019-01-22 RX ORDER — MUPIROCIN 20 MG/G
OINTMENT TOPICAL
Status: DISCONTINUED | OUTPATIENT
Start: 2019-01-22 | End: 2019-01-22 | Stop reason: HOSPADM

## 2019-01-22 RX ORDER — BUPIVACAINE HYDROCHLORIDE AND EPINEPHRINE 2.5; 5 MG/ML; UG/ML
INJECTION, SOLUTION EPIDURAL; INFILTRATION; INTRACAUDAL; PERINEURAL
Status: DISCONTINUED | OUTPATIENT
Start: 2019-01-22 | End: 2019-01-22 | Stop reason: HOSPADM

## 2019-01-22 RX ORDER — KETOROLAC TROMETHAMINE 30 MG/ML
15 INJECTION, SOLUTION INTRAMUSCULAR; INTRAVENOUS ONCE
Status: COMPLETED | OUTPATIENT
Start: 2019-01-22 | End: 2019-01-22

## 2019-01-22 RX ORDER — LIDOCAINE HYDROCHLORIDE 10 MG/ML
1 INJECTION, SOLUTION EPIDURAL; INFILTRATION; INTRACAUDAL; PERINEURAL ONCE
Status: DISCONTINUED | OUTPATIENT
Start: 2019-01-22 | End: 2019-01-22 | Stop reason: HOSPADM

## 2019-01-22 RX ORDER — EPHEDRINE SULFATE 50 MG/ML
INJECTION, SOLUTION INTRAVENOUS
Status: DISCONTINUED | OUTPATIENT
Start: 2019-01-22 | End: 2019-01-22

## 2019-01-22 RX ORDER — MEPERIDINE HYDROCHLORIDE 50 MG/ML
INJECTION INTRAMUSCULAR; INTRAVENOUS; SUBCUTANEOUS
Status: DISCONTINUED | OUTPATIENT
Start: 2019-01-22 | End: 2019-01-22

## 2019-01-22 RX ORDER — MORPHINE SULFATE 2 MG/ML
2 INJECTION, SOLUTION INTRAMUSCULAR; INTRAVENOUS EVERY 5 MIN PRN
Status: COMPLETED | OUTPATIENT
Start: 2019-01-22 | End: 2019-01-22

## 2019-01-22 RX ADMIN — PROPOFOL 200 MG: 10 INJECTION, EMULSION INTRAVENOUS at 07:01

## 2019-01-22 RX ADMIN — EPHEDRINE SULFATE 10 MG: 50 INJECTION INTRAMUSCULAR; INTRAVENOUS; SUBCUTANEOUS at 08:01

## 2019-01-22 RX ADMIN — MORPHINE SULFATE 2 MG: 2 INJECTION, SOLUTION INTRAMUSCULAR; INTRAVENOUS at 09:01

## 2019-01-22 RX ADMIN — SODIUM CHLORIDE: 0.9 INJECTION, SOLUTION INTRAVENOUS at 07:01

## 2019-01-22 RX ADMIN — MUPIROCIN: 20 OINTMENT TOPICAL at 07:01

## 2019-01-22 RX ADMIN — KETOROLAC TROMETHAMINE 15 MG: 30 INJECTION, SOLUTION INTRAMUSCULAR at 09:01

## 2019-01-22 RX ADMIN — SODIUM CHLORIDE, POTASSIUM CHLORIDE, SODIUM LACTATE AND CALCIUM CHLORIDE: 600; 310; 30; 20 INJECTION, SOLUTION INTRAVENOUS at 08:01

## 2019-01-22 RX ADMIN — MEPERIDINE HYDROCHLORIDE 50 MG: 50 INJECTION INTRAMUSCULAR; INTRAVENOUS; SUBCUTANEOUS at 07:01

## 2019-01-22 RX ADMIN — EPHEDRINE SULFATE 10 MG: 50 INJECTION INTRAMUSCULAR; INTRAVENOUS; SUBCUTANEOUS at 07:01

## 2019-01-22 RX ADMIN — MIDAZOLAM HYDROCHLORIDE 2 MG: 1 INJECTION, SOLUTION INTRAMUSCULAR; INTRAVENOUS at 07:01

## 2019-01-22 RX ADMIN — CEFAZOLIN SODIUM 2 G: 2 SOLUTION INTRAVENOUS at 07:01

## 2019-01-22 NOTE — TELEPHONE ENCOUNTER
Called patient to see how she doing after surgery. Patient states that she is doing well. Post op appointment made and patient voiced understanding of appointment date, time, and location. Encouraged patient to call office if she has any questions or concerns.

## 2019-01-22 NOTE — ANESTHESIA PREPROCEDURE EVALUATION
01/22/2019  Boom Blanco is a 48 y.o., female.    Pre-op Assessment    I have reviewed the Patient Summary Reports.    I have reviewed the Nursing Notes.   I have reviewed the Medications.     Review of Systems  Anesthesia Hx:  No problems with previous Anesthesia  Neg history of prior surgery. Denies Family Hx of Anesthesia complications.   Denies Personal Hx of Anesthesia complications.   Social:  Non-Smoker    Hematology/Oncology:  Hematology Normal   Oncology Normal     EENT/Dental:EENT/Dental Normal   Cardiovascular:   Hypertension    Pulmonary:  Pulmonary Normal    Renal/:  Renal/ Normal     Hepatic/GI:  Hepatic/GI Normal    Musculoskeletal:  Musculoskeletal Normal    Neurological:  Neurology Normal    Endocrine:   Hypothyroidism    Dermatological:  Skin Normal    Psych:   depression          Physical Exam  General:  Well nourished    Airway/Jaw/Neck:  AIRWAY FINDINGS: Normal      Eyes/Ears/Nose:  EYES/EARS/NOSE FINDINGS: Normal   Dental:  DENTAL FINDINGS: Normal   Chest/Lungs:  Chest/Lungs Clear    Heart/Vascular:  Heart Findings: Normal Heart murmur: negative Vascular Findings: Normal    Abdomen:  Abdomen Findings: Normal    Musculoskeletal:  Musculoskeletal Findings: Normal   Skin:  Skin Findings: Normal         Anesthesia Plan  Type of Anesthesia, risks & benefits discussed:  Anesthesia Type:  general  Patient's Preference:   Intra-op Monitoring Plan: standard ASA monitors  Intra-op Monitoring Plan Comments:   Post Op Pain Control Plan:   Post Op Pain Control Plan Comments:   Induction:   IV  Beta Blocker:  Patient is not currently on a Beta-Blocker (No further documentation required).       Informed Consent: Patient understands risks and agrees with Anesthesia plan.  Questions answered. Anesthesia consent signed with patient.  ASA Score: 2     Day of Surgery Review of History &  Physical:    H&P update referred to the provider.

## 2019-01-22 NOTE — OP NOTE
[facility]  Orthopedic Surgery    1/22/2019    Boom Blanco  [id number]      PREOPERATIVE DIAGNOSIS:   1.  Tear of medial meniscus of left knee, current, unspecified tear type, initial encounter [S83.242A]  2.  Primary osteoarthritis of left knee [M17.12]    POSTOPERATIVE DIAGNOSIS:    1.  Anterior horn tear medial meniscus, left knee.  2.  Grade 3 chondromalacia patellofemoral joint, left knee.  3.  Grade 3 chondromalacia medial compartment, left knee.  4.  Plica, left knee.  5.  Degenerative fraying lateral meniscus, left knee.  6.  Grade 2 chondromalacia lateral compartment, left knee.    PROCEDURE:  1.  Arthroscopic partial medial meniscectomy, left knee.  2.  Arthroscopic chondroplasty patellofemoral joint, left knee.  3.  Arthroscopic chondroplasty medial compartment, left knee.  4.  Arthroscopic plica excision, left knee.  5.  Arthroscopic debridement degenerative fraying, lateral meniscus, left knee.  6.  Arthroscopic extensive debridement, left knee.    SURGEON: Sylvain Gorman D.O.    ASSISTANT: N/A.    ANESTHESIA:  General.    BLOOD LOSS:  Less than 10 cc.    TOURNIQUET: N/A.    DRAINS:  N/A.    PATHOLOGY:  Shavings.    COMPLICATION:  None.    INDICATIONS FOR PROCEDURE:   Ms. Blanco is a 48-year-old female who has had 2-3 years of bilateral knee pain increasing intensity.  She has psoriatic arthritis.  Walking and extending her knee for extended time increases her symptoms while heat occasionally improves it. She gets swelling and giving way with occasional locking.  She has taken NSAIDs .  She intermittently wears a brace which helps. She has done home exercises/physical therapy with minimal help.   She elected to proceed with surgery after she failed conservative management and complications to include bleeding, infection, scarring, nerve/blood vessel/tendon damage, need for further surgery, failed surgery, failure to improve, stiffness, skin slough, and possible amputation  were discussed.  She   signed a consent.    PROCEDURE IN DETAIL:  The patient was brought to the operating room and was transferred to the operating room bed where all bony prominences were well padded. General anesthesia was administered by the Anesthesiology Department.  After general anesthesia was administered a tourniquet was applied to the upper part of the patient's operative extremity, this was not inflated throughout the procedure. Bony and soft tissue landmarks were then palpated and incision sites were drawn on the patient's knee. The incision sites were then anesthetized with a 50/50 mixture of lidocaine and Marcaine. The patient's knee was then insufflated with irrigant solution.       Sharp incision was then made with a #11 blade at the inferolateral portal site and then a blunt trocar with cannula was introduced into the patient's knee. The trocar was removed and the camera was placed with inflow and outflow.       The superior pouch was inspected and the patient had a large plica within the superior pouch.       The patellofemoral joint was then inspected and the patient had grade 3 chondromalacia of her patella and femoral groove.  Patella tracking was inspected and the patient tracked midline. The medial shoulder was inspected and no major abnormalities were noted.  The medial gutter was inspected and no major abnormalities were noted.       The medial compartment was then entered and a medial portal site was established in the normal fashion with localization with a spinal needle, sharp incision with a #11 blade and expansion with a blunt trocar.  A shaver was then placed in the patient's knee and extensive debridement was completed of the synovial tissue gaining visualization of the medial compartment.  The medial compartment was then inspected.  The medial meniscus was inspected and probed and the patient had a split tear of the anterior horn of the medial meniscus.  This was unstable so was  debrided to stable meniscus with a full radius shaver.  The posterior horn of the meniscus was then probed and was stable with probing.  The chondral surface were then inspected and probed and the patient had grade 3 chondromalacia of the femoral tibial articulation. A chondroplasty was completed on the medial compartment with a full-radius shaver.       The intracondylar notch was then entered and the ACL and PCL were inspected and probed.  There was a semi empty sulcus sign of the ACL and the ACL and PCL were loose with probing.       The lateral compartment was then entered and meniscus was inspected and probed and was found to be stable. There was some degenerative fraying of the lateral meniscus this was debrided to stable meniscus with a full radius shaver.  The chondral surfaces were inspected and the patient had grade 2 chondromalacia.       The lateral gutter was then entered and the distal femur had an osteophyte.  There was also indentation where the plica crossed the proximal femoral articulation at the lateral shoulder.  The lateral shoulder was inspected and some chondromalacia was present.       A supra lateral portal site was then established in the normal fashion with localization with a spinal needle, sharp incision with a #11 blade and expansion with a blunt trocar.  The plica was excised with a full-radius shaver.  A chondroplasty was then completed on the patellofemoral joint with a full-radius shaver.  After extensive debridement the patient's knee was then copiously irrigated with irrigant solution and then evacuated with suction.       The camera and cannula were then removed from the patient's knee and the incision sites were closed with nylon suture in a simple interrupted fashion. The incision sites were then dressed with Adaptic, sterile gauze, and an Ace wrap.       The patient was then awakened by anesthesia and transferred from the operating room to the recovery room in stable  condition.  The patient tolerated the procedure well without complications.

## 2019-01-22 NOTE — ANESTHESIA POSTPROCEDURE EVALUATION
"Anesthesia Post Evaluation    Patient: Boom Blanco    Procedure(s) Performed: Procedure(s) (LRB):  ARTHROSCOPY, KNEE (Left)    Final Anesthesia Type: general  Patient location during evaluation: PACU  Patient participation: Yes- Able to Participate  Level of consciousness: awake and awake and alert  Post-procedure vital signs: reviewed and stable  Pain management: adequate  Airway patency: patent  PONV status at discharge: No PONV  Anesthetic complications: no      Cardiovascular status: blood pressure returned to baseline  Respiratory status: unassisted and spontaneous ventilation  Hydration status: euvolemic  Follow-up not needed.        Visit Vitals  /70   Pulse 80   Temp 36.6 °C (97.8 °F)   Resp 18   Ht 5' 9" (1.753 m)   Wt 74.4 kg (164 lb)   LMP  (LMP Unknown)   SpO2 97%   Breastfeeding? No   BMI 24.22 kg/m²       Pain/Yoanna Score: Pain Rating Prior to Med Admin: 6 (1/22/2019  9:56 AM)  Yoanna Score: 10 (1/22/2019  9:30 AM)  Modified Yoanna Score: 20 (1/22/2019 10:00 AM)        "

## 2019-01-22 NOTE — DISCHARGE SUMMARY
The patient was admitted through same-day surgery and brought to the operating room where an arthroscopy of her left knee was completed.  For a full account of surgery please see the op report.  Postoperatively she was brought to recovery room and when alert awake and oriented she was discharged home with instructions to follow up in 10-12 days.

## 2019-01-22 NOTE — TRANSFER OF CARE
"Anesthesia Transfer of Care Note    Patient: Boom Blanco    Procedure(s) Performed: Procedure(s) (LRB):  ARTHROSCOPY, KNEE (Left)    Patient location: PACU    Anesthesia Type: general    Transport from OR: Transported from OR on room air with adequate spontaneous ventilation    Post pain: adequate analgesia    Post assessment: no apparent anesthetic complications and tolerated procedure well    Post vital signs: stable    Level of consciousness: awake, alert and oriented    Nausea/Vomiting: no nausea/vomiting    Complications: none    Transfer of care protocol was followed      Last vitals:   Visit Vitals  /70 (BP Location: Left arm, Patient Position: Lying)   Pulse 90   Temp 36.7 °C (98 °F) (Oral)   Resp 20   Ht 5' 9" (1.753 m)   Wt 74.4 kg (164 lb)   LMP  (LMP Unknown)   SpO2 95%   Breastfeeding? No   BMI 24.22 kg/m²     "

## 2019-01-25 VITALS
HEART RATE: 80 BPM | RESPIRATION RATE: 18 BRPM | TEMPERATURE: 98 F | WEIGHT: 164 LBS | SYSTOLIC BLOOD PRESSURE: 121 MMHG | HEIGHT: 69 IN | BODY MASS INDEX: 24.29 KG/M2 | DIASTOLIC BLOOD PRESSURE: 70 MMHG | OXYGEN SATURATION: 97 %

## 2019-02-04 ENCOUNTER — HOSPITAL ENCOUNTER (OUTPATIENT)
Dept: PREADMISSION TESTING | Facility: HOSPITAL | Age: 49
Discharge: HOME | End: 2019-02-04
Attending: ORTHOPAEDIC SURGERY
Payer: COMMERCIAL

## 2019-02-04 ENCOUNTER — ANESTHESIA EVENT (OUTPATIENT)
Dept: SURGERY | Facility: HOSPITAL | Age: 49
End: 2019-02-04
Payer: COMMERCIAL

## 2019-02-04 ENCOUNTER — OFFICE VISIT (OUTPATIENT)
Dept: ORTHOPEDICS | Facility: CLINIC | Age: 49
End: 2019-02-04
Payer: COMMERCIAL

## 2019-02-04 VITALS — BODY MASS INDEX: 24.29 KG/M2 | WEIGHT: 164 LBS | HEIGHT: 69 IN

## 2019-02-04 VITALS — WEIGHT: 164.44 LBS | HEIGHT: 69 IN | RESPIRATION RATE: 18 BRPM | BODY MASS INDEX: 24.36 KG/M2

## 2019-02-04 DIAGNOSIS — M71.20 BAKER CYST, UNSPECIFIED LATERALITY: ICD-10-CM

## 2019-02-04 DIAGNOSIS — S83.249A MEDIAL MENISCUS TEAR: ICD-10-CM

## 2019-02-04 DIAGNOSIS — L40.50 PSORIATIC ARTHRITIS: ICD-10-CM

## 2019-02-04 DIAGNOSIS — Z01.818 PRE-OP EXAM: ICD-10-CM

## 2019-02-04 DIAGNOSIS — M23.203 DEGENERATIVE TEAR OF MEDIAL MENISCUS, RIGHT: Primary | ICD-10-CM

## 2019-02-04 DIAGNOSIS — S83.241A TEAR OF MEDIAL MENISCUS OF RIGHT KNEE, CURRENT, UNSPECIFIED TEAR TYPE, INITIAL ENCOUNTER: Primary | ICD-10-CM

## 2019-02-04 DIAGNOSIS — Z51.89 AFTER CARE: ICD-10-CM

## 2019-02-04 PROCEDURE — 99024 PR POST-OP FOLLOW-UP VISIT: ICD-10-PCS | Mod: S$GLB,,, | Performed by: ORTHOPAEDIC SURGERY

## 2019-02-04 PROCEDURE — 99999 PR PBB SHADOW E&M-EST. PATIENT-LVL II: ICD-10-PCS | Mod: PBBFAC,,, | Performed by: ORTHOPAEDIC SURGERY

## 2019-02-04 PROCEDURE — 99999 PR PBB SHADOW E&M-EST. PATIENT-LVL II: CPT | Mod: PBBFAC,,, | Performed by: ORTHOPAEDIC SURGERY

## 2019-02-04 PROCEDURE — 99024 POSTOP FOLLOW-UP VISIT: CPT | Mod: S$GLB,,, | Performed by: ORTHOPAEDIC SURGERY

## 2019-02-04 RX ORDER — SODIUM CHLORIDE 9 MG/ML
INJECTION, SOLUTION INTRAVENOUS CONTINUOUS
Status: CANCELLED | OUTPATIENT
Start: 2019-02-04

## 2019-02-04 RX ORDER — MUPIROCIN 20 MG/G
OINTMENT TOPICAL
Status: CANCELLED | OUTPATIENT
Start: 2019-02-04

## 2019-02-04 NOTE — PROGRESS NOTES
Chief Complaint: Pain of the Left Knee and Pain of the Right Knee      HPI:  Ms. Blanco is a 48-year-old female who returned today for her 1st postop visit on arthroscopy with partial medial meniscectomy and chondroplasty with plica excision of her left knee which was completed on 01/22/2019.  She stated her left knee is doing great she can walk without pain. She stated that her knee is doing so well she would like to proceed with arthroscopy on her right knee as soon as possible.  Walking and extending her right knee for extended time still increases her symptoms while heat occasionally improves it. She gets swelling and giving way with occasional locking in her right knee.  She has taken NSAIDs .  She intermittently wears a brace which helps. She has done home exercises/physical therapy with minimal help for her right knee.              Past Medical History:   Diagnosis Date    Anxiety      Anxiety disorder      Arthritis      Bipolar disorder      Chronic pain      Hyperlipidemia      Hypertension      Psoriatic arthritis       *  *  *  *  * Raynaud's disease  Psoriasis  Seasonal allergies  Depression  Lupus  Chronic pain management                  Past Surgical History:   Procedure Laterality Date    BACK SURGERY         spinal stimulator implanted and then removed    COLONOSCOPY   2016     repeat in 5-10 years    ESOPHAGOGASTRODUODENOSCOPY Left 8/29/2018     Procedure: ESOPHAGOGASTRODUODENOSCOPY (EGD);  Surgeon: Dany Hugo MD;  Location: [facility] ENDO;  Service: General;  Laterality: Left;    ESOPHAGOGASTRODUODENOSCOPY (EGD) Left 8/29/2018     Performed by Dany Hugo MD at [facility] ENDO    HYSTERECTOMY   1997     * SALPINGOOPHORECTOMY  ARTHROSCOPY LEFT KNEE   1997                   Review of patient's allergies indicates:   Allergen Reactions    Remeron [mirtazapine] Other (See Comments)       Weight gain         Social History                Occupational History    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   Tobacco Use    Smoking status: Current Every Day Smoker       Types: Cigarettes    Smokeless tobacco: Never Used    Tobacco comment: vape   Substance and Sexual Activity    Alcohol use: No       Frequency: Never    Drug use: No    Sexual activity: Not Currently                Family History   Problem Relation Age of Onset    Arthritis Mother      Pacemaker/defibrilator Mother      Lung disease Father      Heart disease Father      Arthritis Sister      Arthritis Brother      Arthritis Sister      Arthritis Sister           Previous Hospitalizations:  Childbirth, anemia requiring blood transfusion.     ROS:  New diagnosis/surgery/prescriptions since last office visit on 01/07/2019:  Arthroscopic partial medial meniscectomy with chondroplasty and plica excision, left knee.  Review of Systems   Constitution: Negative for chills and fever.   Eyes: Negative for blurred vision.   Cardiovascular: Negative for chest pain.   Respiratory: Negative for cough.    Endocrine: Negative for polydipsia.   Hematologic/Lymphatic: Does not bruise/bleed easily.   Skin: Negative for itching.   Musculoskeletal: Positive for back pain.   Gastrointestinal: Negative for heartburn.   Genitourinary: Negative for nocturia.   Neurological: Negative for headaches and seizures.   Psychiatric/Behavioral: Negative for substance abuse.   Allergic/Immunologic: Positive for environmental allergies.            Objective:   Physical Exam:   General: AAOx3.  No acute distress  HEENT: Normocephalic, PEARLA EOMI, Good Dentition  Neck: Supple, No JVD  Chest: Symetric, equal excursion on inspiration  Abdomen: Soft NTND  Vascular:  Pulses intact and equal bilaterally.  Capillary refill less than 3 seconds and equal bilaterally  Neurologic:  Pinprick and soft touch intact and equal bilaterally  Integment:  No ecchymosis, no errythema.  Tattoo left ankle  Extremity:  Knee:  Extension/flexion equal bilaterally 0/128 degrees.  Mild effusion right knee.  Mild crepitus with motion both knees.  Baker cyst both knees.  Negative patellar load/compression both knees.  Negative patella apprehension/relocation both knees.  Varus/valgus stressing equal bilaterally with endpoint.  Lachman's/drawer equal bilaterally with endpoint.  Positive joint line tenderness right knee.  Penny mildly positive right knee.  Greg positive right knee.  Nontender with palpation anserine insertion both knees.  No swelling at the anserine insertion both knees.  Calves soft.  Homans negative bilaterally.  Radiography:   Previous x-rays of both knees completed on 12/03/2018 showed mild medial compartmental narrowing with the right greater than the left and and medial femoral condyle osteophyte with mild patellofemoral arthritic changes.     Assessment:      Impression:       1. Degenerative tear of medial meniscus, right knee   2. Baker cyst, unspecified bilateral knee   3.  4. Psoriatic arthritis bilateral knees  Arthroscopic partial medial & lateral meniscectomy, chondroplasty, and plica excision, left knee.         Plan:      1.  Discussed physical examination with the patient .  Since she has done well with surgery on her left knee is doing well she can return to full activities with respect to her left knee.  She stated that since she had such a good result with her left knee she would like to schedule arthroscopy on her right knee as soon as possible. She understands she could continue with conservative management, she stated she would prefer to proceed with surgery.  2.  Possible complications of surgery to include bleeding, infection, scarring, nerve/blood vessel/tendon damage, need for further surgery, failed surgery, failure to improve, possible persistent pain, possible arthritis, possible arthrofibrosis, and possible recurrence were discussed with the patient.  The patient was permitted to ask questions and all concerns were addressed to her  satisfaction.  3.  Consent for arthroscopy of her right knee.  4.  Tentatively schedule surgery for 0 2/05/2019.  5.  All pain meds for postop per the patient's pain management physician.  6.   Remove sutures and place Steri-Strips  7.  Continue with home exercises.  8.  Continue with NSAIDs as tolerated and allowed by PCM.  9.  [facility] portal use was discussed in detail with the patient and information was given. The patient was encouraged to use the [facility] portal for all future encounters.  10. Follow up approximately 10-12 days postoperatively.

## 2019-02-04 NOTE — ANESTHESIA PREPROCEDURE EVALUATION
02/04/2019  Boom Blanco is a 48 y.o., female.    Pre-op Assessment    I have reviewed the Patient Summary Reports.    I have reviewed the Nursing Notes.   I have reviewed the Medications.     Review of Systems  Anesthesia Hx:  No problems with previous Anesthesia  Neg history of prior surgery. Denies Family Hx of Anesthesia complications.   Denies Personal Hx of Anesthesia complications.   Social:  Non-Smoker    Hematology/Oncology:  Hematology Normal   Oncology Normal     EENT/Dental:EENT/Dental Normal   Cardiovascular:   Hypertension, well controlled    Pulmonary:  Pulmonary Normal    Renal/:  Renal/ Normal     Hepatic/GI:  Hepatic/GI Normal    Musculoskeletal:  Musculoskeletal Normal    Neurological:  Neurology Normal    Endocrine:   Hypothyroidism    Dermatological:  Skin Normal    Psych:   Psychiatric History          Physical Exam  General:  Well nourished    Airway/Jaw/Neck:  AIRWAY FINDINGS: Normal      Eyes/Ears/Nose:  EYES/EARS/NOSE FINDINGS: Normal   Dental:  DENTAL FINDINGS: Normal   Chest/Lungs:  Chest/Lungs Clear    Heart/Vascular:  Heart Findings: Normal Heart murmur: negative Vascular Findings: Normal    Abdomen:  Abdomen Findings: Normal    Musculoskeletal:  Musculoskeletal Findings: Normal   Skin:  Skin Findings: Normal         Anesthesia Plan  Type of Anesthesia, risks & benefits discussed:  Anesthesia Type:  general  Patient's Preference:   Intra-op Monitoring Plan: standard ASA monitors  Intra-op Monitoring Plan Comments:   Post Op Pain Control Plan:   Post Op Pain Control Plan Comments:   Induction:   IV  Beta Blocker:  Patient is not currently on a Beta-Blocker (No further documentation required).       Informed Consent: Patient understands risks and agrees with Anesthesia plan.  Questions answered. Anesthesia consent signed with patient.  ASA Score: 2     Day of  Surgery Review of History & Physical:    H&P update referred to the provider.

## 2019-02-05 ENCOUNTER — ANESTHESIA (OUTPATIENT)
Dept: SURGERY | Facility: HOSPITAL | Age: 49
End: 2019-02-05
Payer: COMMERCIAL

## 2019-02-05 ENCOUNTER — HOSPITAL ENCOUNTER (OUTPATIENT)
Facility: HOSPITAL | Age: 49
Discharge: HOME | End: 2019-02-05
Attending: ORTHOPAEDIC SURGERY | Admitting: ORTHOPAEDIC SURGERY
Payer: COMMERCIAL

## 2019-02-05 VITALS
WEIGHT: 164 LBS | OXYGEN SATURATION: 97 % | HEART RATE: 86 BPM | BODY MASS INDEX: 24.29 KG/M2 | TEMPERATURE: 98 F | HEIGHT: 69 IN | RESPIRATION RATE: 14 BRPM | DIASTOLIC BLOOD PRESSURE: 86 MMHG | SYSTOLIC BLOOD PRESSURE: 158 MMHG

## 2019-02-05 DIAGNOSIS — Z01.818 PRE-OP EXAM: ICD-10-CM

## 2019-02-05 DIAGNOSIS — S83.241A TEAR OF MEDIAL MENISCUS OF RIGHT KNEE, CURRENT, UNSPECIFIED TEAR TYPE, INITIAL ENCOUNTER: ICD-10-CM

## 2019-02-05 DIAGNOSIS — S83.249A MEDIAL MENISCUS TEAR: Primary | ICD-10-CM

## 2019-02-05 PROCEDURE — 63600175 PHARM REV CODE 636 W HCPCS: Performed by: ANESTHESIOLOGY

## 2019-02-05 PROCEDURE — 25000003 PHARM REV CODE 250: Performed by: NURSE ANESTHETIST, CERTIFIED REGISTERED

## 2019-02-05 PROCEDURE — 27201423 OPTIME MED/SURG SUP & DEVICES STERILE SUPPLY: Performed by: ORTHOPAEDIC SURGERY

## 2019-02-05 PROCEDURE — 71000033 HC RECOVERY, INTIAL HOUR: Performed by: ORTHOPAEDIC SURGERY

## 2019-02-05 PROCEDURE — 37000008 HC ANESTHESIA 1ST 15 MINUTES: Performed by: ORTHOPAEDIC SURGERY

## 2019-02-05 PROCEDURE — 29881 PR KNEE SCOPE SINGLE MENISECECTOMY: ICD-10-PCS | Mod: 79,RT,, | Performed by: ORTHOPAEDIC SURGERY

## 2019-02-05 PROCEDURE — 01400 ANES OPN/ARTHRS KNEE JT NOS: CPT | Performed by: ORTHOPAEDIC SURGERY

## 2019-02-05 PROCEDURE — 71000015 HC POSTOP RECOV 1ST HR: Performed by: ORTHOPAEDIC SURGERY

## 2019-02-05 PROCEDURE — D9220A PRA ANESTHESIA: Mod: ,,, | Performed by: ANESTHESIOLOGY

## 2019-02-05 PROCEDURE — 63600175 PHARM REV CODE 636 W HCPCS: Performed by: NURSE ANESTHETIST, CERTIFIED REGISTERED

## 2019-02-05 PROCEDURE — 29881 ARTHRS KNE SRG MNISECTMY M/L: CPT | Mod: 79,RT,, | Performed by: ORTHOPAEDIC SURGERY

## 2019-02-05 PROCEDURE — 36000710: Performed by: ORTHOPAEDIC SURGERY

## 2019-02-05 PROCEDURE — 36000711: Performed by: ORTHOPAEDIC SURGERY

## 2019-02-05 PROCEDURE — 25000003 PHARM REV CODE 250: Performed by: ANESTHESIOLOGY

## 2019-02-05 PROCEDURE — D9220A PRA ANESTHESIA: ICD-10-PCS | Mod: ,,, | Performed by: ANESTHESIOLOGY

## 2019-02-05 PROCEDURE — 25000003 PHARM REV CODE 250: Performed by: ORTHOPAEDIC SURGERY

## 2019-02-05 PROCEDURE — 37000009 HC ANESTHESIA EA ADD 15 MINS: Performed by: ORTHOPAEDIC SURGERY

## 2019-02-05 PROCEDURE — 63600175 PHARM REV CODE 636 W HCPCS

## 2019-02-05 RX ORDER — ONDANSETRON 2 MG/ML
INJECTION INTRAMUSCULAR; INTRAVENOUS
Status: DISCONTINUED | OUTPATIENT
Start: 2019-02-05 | End: 2019-02-05

## 2019-02-05 RX ORDER — MEPERIDINE HYDROCHLORIDE 50 MG/ML
50 INJECTION INTRAMUSCULAR; INTRAVENOUS; SUBCUTANEOUS ONCE
Status: COMPLETED | OUTPATIENT
Start: 2019-02-05 | End: 2019-02-05

## 2019-02-05 RX ORDER — MORPHINE SULFATE 2 MG/ML
2 INJECTION, SOLUTION INTRAMUSCULAR; INTRAVENOUS EVERY 5 MIN PRN
Status: DISCONTINUED | OUTPATIENT
Start: 2019-02-05 | End: 2019-02-05 | Stop reason: HOSPADM

## 2019-02-05 RX ORDER — MORPHINE SULFATE 2 MG/ML
2 INJECTION, SOLUTION INTRAMUSCULAR; INTRAVENOUS EVERY 5 MIN PRN
Status: DISCONTINUED | OUTPATIENT
Start: 2019-02-05 | End: 2021-02-09

## 2019-02-05 RX ORDER — SODIUM CHLORIDE, SODIUM LACTATE, POTASSIUM CHLORIDE, CALCIUM CHLORIDE 600; 310; 30; 20 MG/100ML; MG/100ML; MG/100ML; MG/100ML
INJECTION, SOLUTION INTRAVENOUS CONTINUOUS
Status: DISCONTINUED | OUTPATIENT
Start: 2019-02-05 | End: 2021-02-09

## 2019-02-05 RX ORDER — LIDOCAINE HYDROCHLORIDE 10 MG/ML
1 INJECTION, SOLUTION EPIDURAL; INFILTRATION; INTRACAUDAL; PERINEURAL ONCE
Status: DISCONTINUED | OUTPATIENT
Start: 2019-02-05 | End: 2019-02-05 | Stop reason: HOSPADM

## 2019-02-05 RX ORDER — ONDANSETRON 2 MG/ML
4 INJECTION INTRAMUSCULAR; INTRAVENOUS DAILY PRN
Status: DISCONTINUED | OUTPATIENT
Start: 2019-02-05 | End: 2019-02-05 | Stop reason: HOSPADM

## 2019-02-05 RX ORDER — MUPIROCIN 20 MG/G
OINTMENT TOPICAL
Status: DISCONTINUED | OUTPATIENT
Start: 2019-02-05 | End: 2019-02-05 | Stop reason: HOSPADM

## 2019-02-05 RX ORDER — LIDOCAINE HYDROCHLORIDE AND EPINEPHRINE 10; 10 MG/ML; UG/ML
INJECTION, SOLUTION INFILTRATION; PERINEURAL
Status: DISCONTINUED | OUTPATIENT
Start: 2019-02-05 | End: 2019-02-05 | Stop reason: HOSPADM

## 2019-02-05 RX ORDER — KETOROLAC TROMETHAMINE 30 MG/ML
15 INJECTION, SOLUTION INTRAMUSCULAR; INTRAVENOUS ONCE
Status: DISCONTINUED | OUTPATIENT
Start: 2019-02-05 | End: 2019-02-05 | Stop reason: HOSPADM

## 2019-02-05 RX ORDER — SODIUM CHLORIDE, SODIUM LACTATE, POTASSIUM CHLORIDE, CALCIUM CHLORIDE 600; 310; 30; 20 MG/100ML; MG/100ML; MG/100ML; MG/100ML
INJECTION, SOLUTION INTRAVENOUS CONTINUOUS
Status: DISCONTINUED | OUTPATIENT
Start: 2019-02-05 | End: 2019-02-05 | Stop reason: HOSPADM

## 2019-02-05 RX ORDER — SODIUM CHLORIDE, SODIUM LACTATE, POTASSIUM CHLORIDE, CALCIUM CHLORIDE 600; 310; 30; 20 MG/100ML; MG/100ML; MG/100ML; MG/100ML
125 INJECTION, SOLUTION INTRAVENOUS CONTINUOUS
Status: DISCONTINUED | OUTPATIENT
Start: 2019-02-05 | End: 2021-02-09

## 2019-02-05 RX ORDER — MORPHINE SULFATE 2 MG/ML
INJECTION, SOLUTION INTRAMUSCULAR; INTRAVENOUS
Status: COMPLETED
Start: 2019-02-05 | End: 2019-02-05

## 2019-02-05 RX ORDER — LIDOCAINE HYDROCHLORIDE 10 MG/ML
1 INJECTION, SOLUTION EPIDURAL; INFILTRATION; INTRACAUDAL; PERINEURAL ONCE
Status: DISCONTINUED | OUTPATIENT
Start: 2019-02-05 | End: 2021-02-09

## 2019-02-05 RX ORDER — ONDANSETRON 2 MG/ML
4 INJECTION INTRAMUSCULAR; INTRAVENOUS DAILY PRN
Status: DISCONTINUED | OUTPATIENT
Start: 2019-02-05 | End: 2019-10-03

## 2019-02-05 RX ORDER — GLYCOPYRROLATE 0.2 MG/ML
INJECTION INTRAMUSCULAR; INTRAVENOUS
Status: DISCONTINUED | OUTPATIENT
Start: 2019-02-05 | End: 2019-02-05

## 2019-02-05 RX ORDER — BUPIVACAINE HYDROCHLORIDE AND EPINEPHRINE 2.5; 5 MG/ML; UG/ML
INJECTION, SOLUTION EPIDURAL; INFILTRATION; INTRACAUDAL; PERINEURAL
Status: DISCONTINUED | OUTPATIENT
Start: 2019-02-05 | End: 2019-02-05 | Stop reason: HOSPADM

## 2019-02-05 RX ORDER — SODIUM CHLORIDE, SODIUM LACTATE, POTASSIUM CHLORIDE, CALCIUM CHLORIDE 600; 310; 30; 20 MG/100ML; MG/100ML; MG/100ML; MG/100ML
125 INJECTION, SOLUTION INTRAVENOUS CONTINUOUS
Status: DISCONTINUED | OUTPATIENT
Start: 2019-02-05 | End: 2019-02-05 | Stop reason: HOSPADM

## 2019-02-05 RX ORDER — SODIUM CHLORIDE 9 MG/ML
INJECTION, SOLUTION INTRAVENOUS CONTINUOUS
Status: DISCONTINUED | OUTPATIENT
Start: 2019-02-05 | End: 2019-02-05 | Stop reason: HOSPADM

## 2019-02-05 RX ORDER — PROPOFOL 10 MG/ML
VIAL (ML) INTRAVENOUS
Status: DISCONTINUED | OUTPATIENT
Start: 2019-02-05 | End: 2019-02-05

## 2019-02-05 RX ORDER — MEPERIDINE HYDROCHLORIDE 50 MG/ML
INJECTION INTRAMUSCULAR; INTRAVENOUS; SUBCUTANEOUS
Status: DISCONTINUED | OUTPATIENT
Start: 2019-02-05 | End: 2019-02-05

## 2019-02-05 RX ORDER — MIDAZOLAM HYDROCHLORIDE 1 MG/ML
INJECTION, SOLUTION INTRAMUSCULAR; INTRAVENOUS
Status: DISCONTINUED | OUTPATIENT
Start: 2019-02-05 | End: 2019-02-05

## 2019-02-05 RX ADMIN — ONDANSETRON 4 MG: 2 INJECTION INTRAMUSCULAR; INTRAVENOUS at 10:02

## 2019-02-05 RX ADMIN — SODIUM CHLORIDE, POTASSIUM CHLORIDE, SODIUM LACTATE AND CALCIUM CHLORIDE: 600; 310; 30; 20 INJECTION, SOLUTION INTRAVENOUS at 08:02

## 2019-02-05 RX ADMIN — GLYCOPYRROLATE 0.2 MG: 0.2 INJECTION INTRAMUSCULAR; INTRAVENOUS at 09:02

## 2019-02-05 RX ADMIN — MORPHINE SULFATE 2 MG: 2 INJECTION, SOLUTION INTRAMUSCULAR; INTRAVENOUS at 11:02

## 2019-02-05 RX ADMIN — MEPERIDINE HYDROCHLORIDE 12.5 MG: 50 INJECTION INTRAMUSCULAR; INTRAVENOUS; SUBCUTANEOUS at 09:02

## 2019-02-05 RX ADMIN — MEPERIDINE HYDROCHLORIDE 15 MG: 50 INJECTION INTRAMUSCULAR; INTRAVENOUS; SUBCUTANEOUS at 09:02

## 2019-02-05 RX ADMIN — MORPHINE SULFATE 2 MG: 2 INJECTION, SOLUTION INTRAMUSCULAR; INTRAVENOUS at 10:02

## 2019-02-05 RX ADMIN — PROPOFOL 50 MG: 10 INJECTION, EMULSION INTRAVENOUS at 09:02

## 2019-02-05 RX ADMIN — MEPERIDINE HYDROCHLORIDE 10 MG: 50 INJECTION INTRAMUSCULAR; INTRAVENOUS; SUBCUTANEOUS at 09:02

## 2019-02-05 RX ADMIN — MEPERIDINE HYDROCHLORIDE 50 MG: 50 INJECTION INTRAMUSCULAR; INTRAVENOUS; SUBCUTANEOUS at 08:02

## 2019-02-05 RX ADMIN — PROPOFOL 150 MG: 10 INJECTION, EMULSION INTRAVENOUS at 09:02

## 2019-02-05 RX ADMIN — MEPERIDINE HYDROCHLORIDE 12.5 MG: 50 INJECTION INTRAMUSCULAR; INTRAVENOUS; SUBCUTANEOUS at 10:02

## 2019-02-05 RX ADMIN — MIDAZOLAM HYDROCHLORIDE 2 MG: 1 INJECTION, SOLUTION INTRAMUSCULAR; INTRAVENOUS at 09:02

## 2019-02-05 NOTE — ANESTHESIA POSTPROCEDURE EVALUATION
"Anesthesia Post Evaluation    Patient: Boom Blanco    Procedure(s) Performed: Procedure(s) (LRB):  ARTHROSCOPY, KNEE (Right)    Final Anesthesia Type: general  Patient location during evaluation: PACU  Patient participation: Yes- Able to Participate  Level of consciousness: awake and awake and alert  Post-procedure vital signs: reviewed and stable  Pain management: adequate  Airway patency: patent  PONV status at discharge: No PONV  Anesthetic complications: no      Cardiovascular status: blood pressure returned to baseline  Respiratory status: unassisted and spontaneous ventilation  Hydration status: euvolemic  Follow-up not needed.        Visit Vitals  BP (!) 158/86   Pulse 86   Temp 36.5 °C (97.7 °F) (Oral)   Resp 14   Ht 5' 9" (1.753 m)   Wt 74.4 kg (164 lb)   LMP  (LMP Unknown)   SpO2 97%   Breastfeeding? No   BMI 24.22 kg/m²       Pain/Yoanna Score: Pain Rating Prior to Med Admin: 9 (2/5/2019 11:01 AM)  Yoanna Score: 10 (2/5/2019 11:20 AM)        "

## 2019-02-05 NOTE — DISCHARGE SUMMARY
The patient was admitted through same-day surgery and brought to the operating room where an arthroscopy of her right knee was completed.  For a full account of surgery please see the operative report.  Postoperatively the patient was transferred from the operating room to the recovery room and when alert awake and oriented was discharged home in stable condition with instructions to follow up in approximately 10-12 days.

## 2019-02-05 NOTE — PLAN OF CARE
Patient arrives to PACU awake and alert and able  to follow commands. SDCs in place , ace wrap to right knee clean and dry. Ice pack applied PIV  infusing intact. HOB elevated, pain 10/10, meds given per MD orders, will continue to monitor

## 2019-02-05 NOTE — OP NOTE
[facility]  Orthopedic Surgery    2/5/2019    Boom Blanco  [id number]      PREOPERATIVE DIAGNOSIS: Tear of medial meniscus of right knee, current, unspecified tear type, initial encounter [D77.033R]    POSTOPERATIVE DIAGNOSIS:   1.  Macerated tear medial meniscus, right knee.  2.  Grade 4 chondromalacia patella femoral joint, right knee.  3. Grade 4 chondromalacia medial compartment, right knee.  4.  Grade 4 chondromalacia lateral compartment, right knee.  5.  Plica, right knee.  6.  ACL attenuation, right knee.    PROCEDURE:  1.  Arthroscopic partial medial meniscectomy, right knee.  2.  Arthroscopic chondroplasty medial compartment, right knee.  3.  Arthroscopic chondroplasty lateral compartment, right knee.  4.  Arthroscopic chondroplasty patellofemoral joint, right knee.  5.  Arthroscopic plica excision right knee.  6.  Arthroscopic extensive debridement, right knee.    SURGEON: Sylvain Gorman D.O.    ASSISTANT:  None.    ANESTHESIA:  General.    BLOOD LOSS:  Less than 10 cc.    TOURNIQUET:  None.    DRAINS:  None.    PATHOLOGY:  Shavings.    COMPLICATION:  None.    INDICATIONS FOR PROCEDURE:   Ms. Blanco is a 48-year-old female who has had long history of right knee pain secondary to psoriatic arthritis. Walking and extending her right knee for extended time still increases her symptoms while heat occasionally improves it. She gets swelling and giving way with occasional locking in her right knee.  She has taken NSAIDs .  She intermittently wears a brace which helps. She has done home exercises/physical therapy with minimal help for her right knee. She had an arthroscopy of her left knee and had a good result and since she had a good results for similar symptoms she elects to proceed with surgery after complications to include bleeding, infection, scarring, nerve/blood vessel/tendon damage, need for further surgery, failed surgery, failure to improve, stiffness, skin slough, and  possible amputation were discussed.  She   signed a consent.    PROCEDURE IN DETAIL:  The patient was brought to the operating room was transferred the operating room bed where all bony prominences were well padded. General anesthesia was administered by the Anesthesiology Department.  After general anesthesia was administered a tourniquet was applied to the upper part of the patient's right lower extremity this was not inflated throughout the procedure. The patient's right lower extremity was then prepped with chlorhexidine solution and draped in the normal sterile fashion.  After prepping and draping bony and soft tissue landmarks were palpated and incision sites were drawn on the patient's knee. The incision sites were then anesthetized with a 50/50 mixture of lidocaine and Marcaine. The patient's knee was then insufflated with irrigant fluid.       Sharp incision was then made at the inferolateral portal site with a #11 blade and then a blunt trocar with cannula was introduced into the patient's knee.  The camera was placed with inflow and outflow and the patient's knee was then inspected and probed in the normal fashion. The superior pouch was inspected and the quadriceps tendon appeared to be normal. There was also a large plica in the superior pouch. The patellofemoral joint was then inspected and the patient had grade 4 chondromalacia of the patellofemoral joint. The patient's knee was then flexed and patella tracking was inspected the patient appeared to be tracking midline without tilt.  The medial shoulder was inspected and there was arthritic changes to the medial shoulder.  There was also osteophytes at the medial shoulder.  The medial gutter was then entered and extensive synovitis was present.       The medial compartment was then entered and a medial portal was established in normal fashion with localization with an 18 gauge spinal needle, sharp incision with a #11 Blade and then expansion with a  blunt trocar.  The medial compartment was then inspected and probed and the patient was found to have a macerated tear of her medial meniscus and grade 4 chondromalacia of the medial compartment.  A partial medial meniscectomy was then completed with biters and a full-radius shaver to stable meniscus.  A chondroplasty was then completed on the medial compartment to stable cartilage.       The intracondylar notch was then entered and there was fraying of the PCL.  The ACL was probed and found to be loose.       The lateral compartment was then entered and the lateral meniscus was probed it was found to be stable. The joint surfaces had grade 4 chondromalacia.  A chondroplasty was then completed.  The lateral gutter was then entered and no major abnormalities were noted.  The lateral shoulder was inspected and no major abnormalities were noted.       A superolateral portal was then established in normal fashion with localization with an 18 gauge spinal needle, sharp incision with a #11 blade followed by expansion with a blunt trocar.  A full-radius shaver was then placed in the superolateral portal and the plica was excised.  After plica excision a chondroplasty was completed on the patellofemoral joint. The patient's knee was then extensively irrigated and evacuated with suction.  The camera and cannula were then removed from the patient's knee and the incisions were closed with nylon suture in a simple interrupted fashion       The patient's knee was then dressed with Adaptic, sterile gauze, and an Ace wrap.  She was then awakened by anesthesia and transferred from the operating room to the recovery room in stable condition.  The patient tolerated the procedure well without complications.

## 2019-02-05 NOTE — TRANSFER OF CARE
"Anesthesia Transfer of Care Note    Patient: Boom Blanco    Procedure(s) Performed: Procedure(s) (LRB):  ARTHROSCOPY, KNEE (Right)    Patient location: PACU    Anesthesia Type: general    Transport from OR: Transported from OR on room air with adequate spontaneous ventilation    Post pain: adequate analgesia    Post assessment: no apparent anesthetic complications and tolerated procedure well    Post vital signs: stable    Level of consciousness: awake, alert and oriented    Nausea/Vomiting: no nausea/vomiting    Complications: none    Transfer of care protocol was followed      Last vitals:   Visit Vitals  /85   Pulse 86   Temp 36.8 °C (98.3 °F) (Oral)   Resp 18   Ht 5' 9" (1.753 m)   Wt 74.4 kg (164 lb)   LMP  (LMP Unknown)   SpO2 95%   Breastfeeding? No   BMI 24.22 kg/m²     "

## 2019-02-05 NOTE — INTERVAL H&P NOTE
The patient has been examined and the H&P has been reviewed:    I concur with the findings and no changes have occurred since H&P was written.    Anesthesia/Surgery risks, benefits and alternative options discussed and understood by patient/family.          Active Hospital Problems    Diagnosis  POA    Medial meniscus tear [S83.262A]  Yes      Resolved Hospital Problems   No resolved problems to display.

## 2019-02-06 ENCOUNTER — TELEPHONE (OUTPATIENT)
Dept: ORTHOPEDICS | Facility: CLINIC | Age: 49
End: 2019-02-06

## 2019-02-06 NOTE — TELEPHONE ENCOUNTER
Pt called for post op check and post op appt. Pt reports pain was terrible yesterday but is okay today. Post op appt made for Feb 18th @ [facility]. Pt verbalizes understanding of date, time and place

## 2019-02-10 ENCOUNTER — NURSE TRIAGE (OUTPATIENT)
Dept: ADMINISTRATIVE | Facility: CLINIC | Age: 49
End: 2019-02-10

## 2019-02-10 NOTE — TELEPHONE ENCOUNTER
S/w Dr. Gorman, he advised this is not uncommon, just swelling, she should elevate the extremity, apply ice and an ace wrap for compression, come in to clinic tomorrow if concerned, also states if she wants to talk to him, I may call him again and connect the call.  Called to inform patient of the above, she is not convinced it's normal, about to go to the ER because it's getting large, warm transfer to Dr. Chandra's cell.

## 2019-02-10 NOTE — TELEPHONE ENCOUNTER
"Had arthroscope on knee on Thursday, incision above the knee has a knot the size of a golf ball.  Paged Dr. Gorman, awaiting response.    Reason for Disposition   Caller has URGENT question and triager unable to answer question    Answer Assessment - Initial Assessment Questions  1. SYMPTOM: "What's the main symptom you're concerned about?" (e.g., redness, pain, drainage)      goolf ball sized knot underneath the incision proximal to the knee  2. ONSET: "When did ________  start?"      Friday  3. SURGERY: "What surgery was performed?"      Knee arthroscope for meniscal tear  4. DATE of SURGERY: "When was surgery performed?"       Thursday  5. INCISION SITE: "Where is the incision located?"       Proximal to the knee  6. REDNESS: "Is there any redness at the incision site?" If yes, ask: "How wide across is the redness?" (Inches, centimeters)       No, just bruising  7. PAIN: "Is there any pain?" If so, ask: "How bad is it?"  (Scale 1-10; or mild, moderate, severe)      Yes, but no more than the rest of the knee  8. BLEEDING: "Is there any bleeding?" If so, ask: "How much?" and "Where?"      none  9. DRAINAGE: "Is there any drainage from the incision site?" If yes, ask: "What color and how much?" (e.g., red, cloudy, pus; drops, teaspoon)      none  10. FEVER: "Do you have a fever?" If so, ask: "What is your temperature, how was it measured, and when did it start?"        none  11. OTHER SYMPTOMS: "Do you have any other symptoms?" (e.g., shaking chills, weakness, rash elsewhere on body)        none    Protocols used: ST POST-OP INCISION SYMPTOMS-A-AH      "

## 2019-02-12 ENCOUNTER — OFFICE VISIT (OUTPATIENT)
Dept: SURGERY | Facility: CLINIC | Age: 49
End: 2019-02-12
Payer: COMMERCIAL

## 2019-02-12 VITALS
DIASTOLIC BLOOD PRESSURE: 60 MMHG | TEMPERATURE: 98 F | SYSTOLIC BLOOD PRESSURE: 117 MMHG | BODY MASS INDEX: 24.14 KG/M2 | HEIGHT: 69 IN | WEIGHT: 163 LBS | OXYGEN SATURATION: 96 % | HEART RATE: 98 BPM | RESPIRATION RATE: 16 BRPM

## 2019-02-12 DIAGNOSIS — R10.11 RIGHT UPPER QUADRANT ABDOMINAL PAIN: ICD-10-CM

## 2019-02-12 DIAGNOSIS — R10.13 EPIGASTRIC PAIN: Primary | ICD-10-CM

## 2019-02-12 PROCEDURE — 99214 PR OFFICE/OUTPT VISIT, EST, LEVL IV, 30-39 MIN: ICD-10-PCS | Mod: S$GLB,,, | Performed by: SURGERY

## 2019-02-12 PROCEDURE — 99214 OFFICE O/P EST MOD 30 MIN: CPT | Mod: S$GLB,,, | Performed by: SURGERY

## 2019-02-12 NOTE — PROGRESS NOTES
Subjective:       Patient ID: Boom Blanco is a 48 y.o. female.    Chief Complaint: Follow-up      HPI:  Ms. Blanco returns today with persistent complaints of epigastric burning pain. Symptoms are improved compared with last August prior to the initiation of therapy with Protonix.  However the symptoms have not completely resolved.  She is also now experiencing a intermittent postprandial sharp stabbing right upper quadrant nonradiating pain. No nausea or vomiting.  No fever or chills.  No jaundice, biliuria, acholia, diarrhea, constipation, melena, hematochezia, hematemesis, weight loss, change in bowel habits, change in stool characteristics, etc.  Colonoscopy in November 2016 revealed a hepatic flexure polyp and possible slow transit constipation or colonic hypomotility.  Pathology report pertaining to the polyp confirmed that the polyp was a tubular adenoma.  There was no evidence of diverticulosis found on colonoscopy.  EGD 8/29/2018 showed evidence of chronic gastritis.  Biopsies confirmed chronic gastritis with no evidence of Helicobacter.  There was also evidence of mild reflux esophagitis confirmed by esophageal biopsies.  She recently had bilateral knee arthroscopy and is still healing from the recent right knee arthroscopy.        Allergies & Meds:  Review of patient's allergies indicates:   Allergen Reactions    Remeron [mirtazapine] Other (See Comments)     Weight gain       Current Outpatient Medications   Medication Sig Dispense Refill    buPROPion (WELLBUTRIN XL) 150 MG TB24 tablet Take 150 mg by mouth every evening.      cariprazine (VRAYLAR) 1.5 mg Cap Take 1.5 mg by mouth every evening.      fluticasone (FLONASE) 50 mcg/actuation nasal spray USE ONE SPRAY IN EACH NOSTRIL ONCE DAILY (Patient taking differently: USE ONE SPRAY IN EACH NOSTRIL ONCE DAILY PRN) 16 g 4    golimumab (SIMPONI) 50 mg/0.5 mL PnIj Inject 50 mg into the skin every 30 days. 0.5 mL 11    ibuprofen  (ADVIL,MOTRIN) 800 MG tablet Take 800 mg by mouth once daily.       levothyroxine (SYNTHROID) 50 MCG tablet levothyroxine 50 mcg tablet   TK 1 T PO  QAM      lisinopril (PRINIVIL,ZESTRIL) 20 MG tablet Take 1 tablet (20 mg total) by mouth once daily. 90 tablet 0    lovastatin (MEVACOR) 20 MG tablet Take 1 tablet (20 mg total) by mouth every evening. 90 tablet 1    oxyCODONE-acetaminophen (PERCOCET)  mg per tablet Take 1 tablet by mouth 2 (two) times daily.      pantoprazole (PROTONIX) 40 MG tablet Take 1 tablet (40 mg total) by mouth once daily. Take in the morning before breakfast.  Wait 30 minutes before eating or drinking anything 30 tablet 11    tiZANidine (ZANAFLEX) 4 MG tablet Take 4 mg by mouth every evening.        No current facility-administered medications for this visit.      Facility-Administered Medications Ordered in Other Visits   Medication Dose Route Frequency Provider Last Rate Last Dose    lactated ringers infusion   Intravenous Continuous Jacob Bolanos MD        lactated ringers infusion  125 mL/hr Intravenous Continuous Jacob Bolanos MD        lidocaine (PF) 10 mg/ml (1%) injection 10 mg  1 mL Intradermal Once Jacob Bolanos MD        morphine injection 2 mg  2 mg Intravenous Q5 Min PRN Jacob Bolanos MD        ondansetron injection 4 mg  4 mg Intravenous Daily PRN Jacob Bolanos MD        promethazine (PHENERGAN) 6.25 mg in dextrose 5 % 50 mL IVPB  6.25 mg Intravenous Q10 Min PRN Jacob Bolanos MD           PMFSHx:  Past Medical History:   Diagnosis Date    Anxiety     Anxiety disorder     Arthritis     Bipolar disorder     Chronic pain     Hyperlipidemia     Hypertension     Psoriatic arthritis     Raynaud's disease        Past Surgical History:   Procedure Laterality Date    ARTHROSCOPY, KNEE Right 2/5/2019    [facility]    ARTHROSCOPY, KNEE Left 1/22/2019    [facility]    ARTHROSCOPY,  KNEE, WITH CHONDROPLASTY Left 1/22/2019    [facility]    ARTHROSCOPY, KNEE, WITH DEBRIDEMENT Left 1/22/2019    [facility] OR    ARTHROSCOPY, KNEE, WITH MENISCECTOMY Left 1/22/2019    [facility] OR    BACK SURGERY      spinal stimulator implanted and then removed    BREAST BIOPSY      CHONDROPLASTY, KNEE Right 2/5/2019    [facility]    COLONOSCOPY  2016    repeat in 5-10 years    ESOPHAGOGASTRODUODENOSCOPY (EGD) Left 8/29/2018    Performed by Dany Hugo MD at [facility] ENDO    EXCISION, PLICA, KNEE, ARTHROSCOPIC Left 1/22/2019    [facility] OR    HYSTERECTOMY  1997    MENISCECTOMY, KNEE, MEDIAL Right 2/5/2019    [facility] OR    SALPINGOOPHORECTOMY  1997       Family History   Problem Relation Age of Onset    Arthritis Mother     Pacemaker/defibrilator Mother     Lung disease Father     Heart disease Father     Arthritis Sister     Arthritis Brother     Arthritis Sister     Arthritis Sister     Breast cancer Neg Hx        Social History     Tobacco Use    Smoking status: Former Smoker     Years: 5.00     Types: Vaping w/o nicotine, Cigarettes    Smokeless tobacco: Never Used    Tobacco comment: vape   Substance Use Topics    Alcohol use: No     Frequency: Never    Drug use: No       Review of Systems   Constitutional: Negative for appetite change, chills, fatigue, fever and unexpected weight change.   HENT: Negative for congestion, dental problem, ear pain, mouth sores, postnasal drip, rhinorrhea, sore throat, tinnitus, trouble swallowing and voice change.    Eyes: Negative for photophobia, pain, discharge and visual disturbance.   Respiratory: Negative for cough, chest tightness, shortness of breath and wheezing.    Cardiovascular: Negative for chest pain, palpitations and leg swelling.    Gastrointestinal: Negative for blood in stool, constipation, diarrhea, nausea and vomiting.   Endocrine: Negative for cold intolerance, heat intolerance, polydipsia, polyphagia and polyuria.   Genitourinary: Negative for difficulty urinating, dysuria, flank pain, frequency, hematuria and urgency.   Musculoskeletal: Negative for arthralgias, joint swelling and myalgias.   Skin: Negative for color change and rash.   Allergic/Immunologic: Negative for immunocompromised state.   Neurological: Negative for dizziness, tremors, seizures, syncope, speech difficulty, weakness, numbness and headaches.   Hematological: Negative for adenopathy. Does not bruise/bleed easily.   Psychiatric/Behavioral: Negative for agitation, confusion, hallucinations, self-injury and suicidal ideas. The patient is not nervous/anxious.        Objective:      Physical Exam   Constitutional: She appears well-developed and well-nourished.  Non-toxic appearance. She does not appear ill. No distress.   Cardiovascular: Normal rate, regular rhythm and normal heart sounds. PMI is not displaced. Exam reveals no gallop.   No murmur heard.  Pulmonary/Chest: Effort normal and breath sounds normal. No accessory muscle usage. No tachypnea. No respiratory distress.   Abdominal: Soft. Normal appearance and bowel sounds are normal. There is no tenderness. No hernia.   Skin: Skin is warm, dry and intact. No rash noted.         Test Results:  EGD and colonoscopy reports were reviewed along with pathology reports as referenced above with those findings noted above.    Lab results reviewed from 1/21/2019.  CBC showed no evidence of leukocytosis, anemia, or thrombocytopenia.  Electrolytes were all in the range of normal.  BUN and creatinine showed no evidence of renal dysfunction.  Glucose showed no suspicion of diabetes.  PT/INR showed no evidence of clotting factor deficit or coagulopathy.    Assessment:       Persistent epigastric burning pain, repeat EGD and  biopsy warranted.  However invasive procedure should be deferred until completely healed from arthroscopy.  Right upper quadrant abdominal pain. Differential diagnosis includes but is not limited to reflux disease, esophagitis, ulcer disease, gastritis, ampullary dysfunction, biliary tract disease, gastrointestinal neoplasm, etc.  Options at this time include but not limited to endoscopy, second opinion, radiologic studies, laparoscopy, empiric therapy, symptomatic therapy, etc.  Gallbladder ultrasound warranted.  CT scan of the abdomen pelvis may be needed as well. Further evaluation of possible colonic motility should be considered if ultrasound and CT scan are unremarkable.    1. Epigastric pain    2. Right upper quadrant abdominal pain        Plan:   Epigastric pain    Right upper quadrant abdominal pain  -     US Abdomen Limited; Future; Expected date: 02/12/2019        Follow-up in about 2 weeks (around 2/26/2019) for results.

## 2019-02-13 ENCOUNTER — TELEPHONE (OUTPATIENT)
Dept: ORTHOPEDICS | Facility: CLINIC | Age: 49
End: 2019-02-13

## 2019-02-13 NOTE — TELEPHONE ENCOUNTER
Returned patient's call. All questions answered. No further questions. Encouraged patient to call office if she has any questions or concerns.

## 2019-02-13 NOTE — TELEPHONE ENCOUNTER
----- Message from Omari Nagy sent at 2/13/2019  8:37 AM CST -----  Contact: pt  Type: Needs Medical Advice    Who Called:  pt  Symptoms (please be specific):  Swelling and pain  How long has patient had these symptoms:  Since procedure  Pharmacy name and phone #:    [facility] [place], [place] - [address]  [address]  [place] [address]  Phone: [phone] Fax: [phone]    Best Call Back Number: [phone]  Additional Information: pt was advised to use cold compress. This has not improved conditions and pt is requesting a call back to discuss.

## 2019-02-15 ENCOUNTER — HOSPITAL ENCOUNTER (OUTPATIENT)
Dept: RADIOLOGY | Facility: HOSPITAL | Age: 49
Discharge: HOME | End: 2019-02-15
Attending: SURGERY
Payer: COMMERCIAL

## 2019-02-15 DIAGNOSIS — R10.11 RIGHT UPPER QUADRANT ABDOMINAL PAIN: ICD-10-CM

## 2019-02-15 PROCEDURE — 76705 US ABDOMEN LIMITED: ICD-10-PCS | Mod: 26,,, | Performed by: RADIOLOGY

## 2019-02-15 PROCEDURE — 76705 ECHO EXAM OF ABDOMEN: CPT | Mod: 26,,, | Performed by: RADIOLOGY

## 2019-02-15 PROCEDURE — 76705 ECHO EXAM OF ABDOMEN: CPT | Mod: TC

## 2019-02-18 ENCOUNTER — OFFICE VISIT (OUTPATIENT)
Dept: ORTHOPEDICS | Facility: CLINIC | Age: 49
End: 2019-02-18
Payer: COMMERCIAL

## 2019-02-18 VITALS — WEIGHT: 162.94 LBS | BODY MASS INDEX: 24.13 KG/M2 | HEIGHT: 69 IN

## 2019-02-18 DIAGNOSIS — Z51.89 AFTER CARE: Primary | ICD-10-CM

## 2019-02-18 PROCEDURE — 20610 DRAIN/INJ JOINT/BURSA W/O US: CPT | Mod: 58,RT,S$GLB, | Performed by: ORTHOPAEDIC SURGERY

## 2019-02-18 PROCEDURE — 20610 LARGE JOINT ASPIRATION/INJECTION: R KNEE: ICD-10-PCS | Mod: 58,RT,S$GLB, | Performed by: ORTHOPAEDIC SURGERY

## 2019-02-18 PROCEDURE — 99024 PR POST-OP FOLLOW-UP VISIT: ICD-10-PCS | Mod: S$GLB,,, | Performed by: ORTHOPAEDIC SURGERY

## 2019-02-18 PROCEDURE — 99999 PR PBB SHADOW E&M-EST. PATIENT-LVL II: CPT | Mod: PBBFAC,,, | Performed by: ORTHOPAEDIC SURGERY

## 2019-02-18 PROCEDURE — 99999 PR PBB SHADOW E&M-EST. PATIENT-LVL II: ICD-10-PCS | Mod: PBBFAC,,, | Performed by: ORTHOPAEDIC SURGERY

## 2019-02-18 PROCEDURE — 99024 POSTOP FOLLOW-UP VISIT: CPT | Mod: S$GLB,,, | Performed by: ORTHOPAEDIC SURGERY

## 2019-02-18 RX ORDER — TRIAMCINOLONE ACETONIDE 40 MG/ML
80 INJECTION, SUSPENSION INTRA-ARTICULAR; INTRAMUSCULAR
Status: DISCONTINUED | OUTPATIENT
Start: 2019-02-18 | End: 2019-02-18 | Stop reason: HOSPADM

## 2019-02-18 RX ADMIN — TRIAMCINOLONE ACETONIDE 80 MG: 40 INJECTION, SUSPENSION INTRA-ARTICULAR; INTRAMUSCULAR at 11:02

## 2019-02-18 NOTE — PROGRESS NOTES
Subjective:      Patient ID: Boom Blanco is a 49 y.o. female.    Chief Complaint: Post-op Evaluation (Right Knee Arthroscopy 02/05/2019)    HPI: Ms. Jones returns today for 1st postop visit on arthroscopy of her right knee. She stated that this knee hurts more than her left which had arthroscopy completed on approximately 1 month ago.  She noticed swelling at the superior lateral portal site which concerned her, but after a phone call it was explained to her that it is natural swelling postoperatively.  She has been ambulating with crutches.  She denied fever or chills.  She denied calf pain.    ROS:  New diagnosis/surgery/prescriptions since last office visit on 02/04/2019:  Arthroscopy right knee.      Objective:      Physical Exam:   General: AAOx3.  No acute distress  Vascular:  Pulses intact and equal bilaterally.  Capillary refill less than 3 seconds and equal bilaterally  Neurologic:  Pinprick and soft touch intact and equal bilaterally  Integment:  No ecchymosis, no errythema.  Incision sites well approximated with sutures in place  Extremity:  Knee:  Extension/flexion equal bilaterally 0/128 degrees. Mild effusion right knee. Mild tenderness with palpation joint line right knee. Varus/valgus stressing equal bilaterally with endpoint.  Lachman's/drawer equal bilaterally with endpoint.  Crepitus with motion both knees.  Radiography:  No x-rays done today.      Assessment:       Impression:   1.  Arthroscopic partial medial meniscectomy with chondroplasty, right knee.  2.  Arthroscopy, left knee.      Plan:       1.  Discussed physical examination with the patient. She understands she had arthroscopy of her right knee. 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 Discussed with the patient if she does not improve she may want to consider a total knee arthroplasty for her right knee as it could resolve her pain.  2.  The patient's  arthroscopy pictures were shown discussed with her.  She has a copy for home.  3.  Since she has swelling and persistent pain in her knee offered a steroid injection to her need to help quiet it down she elected to proceed.  4.  May ambulate with weight-bearing at tolerance.  5.  Discussed use of brace with the patient.  6.  Home exercises were shown discussed.  7.  Offered referred to physical therapy she declined for now.  8.  Take NSAIDs as tolerated allowed by PCM.  9.  [facility] portal use was discussed with the patient and information was given.  The patient was encouraged to use the [facility] portal for all future encounters.  10.  Follow up in approximately 1 month for re-evaluation.

## 2019-02-18 NOTE — PROCEDURES
Large Joint Aspiration/Injection: R knee  Date/Time: 2/18/2019 11:30 AM  Performed by: Sylvain Gorman DO  Authorized by: Sylvain Gorman, DO     Consent Done?:  Yes (Verbal)  Indications:  Pain, joint swelling and diagnostic evaluation  Procedure site marked: Yes    Timeout: Prior to procedure the correct patient, procedure, and site was verified      Location:  Knee  Site:  R knee  Prep: Patient was prepped and draped in usual sterile fashion    Ultrasonic Guidance for needle placement: No  Needle size:  22 G  Approach:  Anterolateral  Medications:  80 mg triamcinolone acetonide 40 mg/mL  Patient tolerance:  Patient tolerated the procedure well with no immediate complications

## 2019-02-19 ENCOUNTER — OFFICE VISIT (OUTPATIENT)
Dept: SURGERY | Facility: CLINIC | Age: 49
End: 2019-02-19
Payer: COMMERCIAL

## 2019-02-19 VITALS
DIASTOLIC BLOOD PRESSURE: 79 MMHG | SYSTOLIC BLOOD PRESSURE: 152 MMHG | RESPIRATION RATE: 16 BRPM | TEMPERATURE: 98 F | HEART RATE: 93 BPM | HEIGHT: 69 IN | BODY MASS INDEX: 24.14 KG/M2 | OXYGEN SATURATION: 96 % | WEIGHT: 163 LBS

## 2019-02-19 DIAGNOSIS — R10.84 GENERALIZED ABDOMINAL PAIN: Primary | ICD-10-CM

## 2019-02-19 DIAGNOSIS — K59.01 SLOW TRANSIT CONSTIPATION: ICD-10-CM

## 2019-02-19 PROCEDURE — 99213 OFFICE O/P EST LOW 20 MIN: CPT | Mod: S$GLB,,, | Performed by: SURGERY

## 2019-02-19 PROCEDURE — 99213 PR OFFICE/OUTPT VISIT, EST, LEVL III, 20-29 MIN: ICD-10-PCS | Mod: S$GLB,,, | Performed by: SURGERY

## 2019-02-19 RX ORDER — LACTULOSE 10 G/15ML
20 SOLUTION ORAL 2 TIMES DAILY
Qty: 1800 ML | Refills: 11 | Status: SHIPPED | OUTPATIENT
Start: 2019-02-19 | End: 2019-03-04

## 2019-02-19 NOTE — PROGRESS NOTES
Subjective:       Patient ID: Boom Blanco is a 49 y.o. female.    Chief Complaint: Follow-up      HPI:  Ms. Blanco returns today with no new complaints.  Abdominal discomfort has improved drastically since last visit.  She recently had an outpatient abdominal ultrasound.  Following the ultrasound she had numerous large formed bowel movements, this improved her abdominal discomfort.  Recent colonoscopy showed evidence of colonic hypomotility with large volume retained stool.  This was likely exacerbated recently by opioid use following orthopedic procedures.  At this time she is experiencing minimal intermittent crampy abdominal pain.  She is not experiencing any nausea, vomiting, diarrhea, melena, hematochezia, hematemesis, weight loss, etc.  She is having a hard firm stool every 3 or 4 days.        Allergies & Meds:  Review of patient's allergies indicates:   Allergen Reactions    Remeron [mirtazapine] Other (See Comments)     Weight gain       Current Outpatient Medications   Medication Sig Dispense Refill    buPROPion (WELLBUTRIN XL) 150 MG TB24 tablet Take 150 mg by mouth every evening.      cariprazine (VRAYLAR) 1.5 mg Cap Take 1.5 mg by mouth every evening.      fluticasone (FLONASE) 50 mcg/actuation nasal spray USE ONE SPRAY IN EACH NOSTRIL ONCE DAILY (Patient taking differently: USE ONE SPRAY IN EACH NOSTRIL ONCE DAILY PRN) 16 g 4    golimumab (SIMPONI) 50 mg/0.5 mL PnIj Inject 50 mg into the skin every 30 days. 0.5 mL 11    ibuprofen (ADVIL,MOTRIN) 800 MG tablet Take 800 mg by mouth once daily.       levothyroxine (SYNTHROID) 50 MCG tablet levothyroxine 50 mcg tablet   TK 1 T PO  QAM      lisinopril (PRINIVIL,ZESTRIL) 20 MG tablet Take 1 tablet (20 mg total) by mouth once daily. 90 tablet 0    lovastatin (MEVACOR) 20 MG tablet Take 1 tablet (20 mg total) by mouth every evening. 90 tablet 1    oxyCODONE-acetaminophen (PERCOCET)  mg per tablet Take 1 tablet by mouth 2 (two)  times daily.      pantoprazole (PROTONIX) 40 MG tablet Take 1 tablet (40 mg total) by mouth once daily. Take in the morning before breakfast.  Wait 30 minutes before eating or drinking anything 30 tablet 11    tiZANidine (ZANAFLEX) 4 MG tablet Take 4 mg by mouth every evening.       lactulose (CHRONULAC) 20 gram/30 mL Soln Take 30 mLs (20 g total) by mouth 2 (two) times daily. 1800 mL 11     No current facility-administered medications for this visit.      Facility-Administered Medications Ordered in Other Visits   Medication Dose Route Frequency Provider Last Rate Last Dose    lactated ringers infusion   Intravenous Continuous Jacob Bolanos MD        lactated ringers infusion  125 mL/hr Intravenous Continuous Jacob Bolanos MD        lidocaine (PF) 10 mg/ml (1%) injection 10 mg  1 mL Intradermal Once Jacob Bolanos MD        morphine injection 2 mg  2 mg Intravenous Q5 Min PRN Jacob Bolanos MD        ondansetron injection 4 mg  4 mg Intravenous Daily PRN Jacob Bolanos MD        promethazine (PHENERGAN) 6.25 mg in dextrose 5 % 50 mL IVPB  6.25 mg Intravenous Q10 Min PRN Jacob Bolanos MD           PMFSHx:  Past medical history, surgical history, family history, social history reviewed and no changes noted.        Review of Systems   Constitutional: Negative for appetite change, chills, fatigue, fever and unexpected weight change.   HENT: Negative for congestion, dental problem, ear pain, mouth sores, postnasal drip, rhinorrhea, sore throat, tinnitus, trouble swallowing and voice change.    Eyes: Negative for photophobia, pain, discharge and visual disturbance.   Respiratory: Negative for cough, chest tightness, shortness of breath and wheezing.    Cardiovascular: Negative for chest pain, palpitations and leg swelling.   Gastrointestinal: Negative for abdominal pain, blood in stool, constipation, diarrhea, nausea and vomiting.   Endocrine: Negative for cold intolerance, heat intolerance, polydipsia,  polyphagia and polyuria.   Genitourinary: Negative for difficulty urinating, dysuria, flank pain, frequency, hematuria and urgency.   Musculoskeletal: Negative for arthralgias, joint swelling and myalgias.   Skin: Negative for color change and rash.   Allergic/Immunologic: Negative for immunocompromised state.   Neurological: Negative for dizziness, tremors, seizures, syncope, speech difficulty, weakness, numbness and headaches.   Hematological: Negative for adenopathy. Does not bruise/bleed easily.   Psychiatric/Behavioral: Negative for agitation, confusion, hallucinations, self-injury and suicidal ideas. The patient is not nervous/anxious.        Objective:      Physical Exam   Constitutional: She appears well-developed and well-nourished.  Non-toxic appearance. She does not appear ill. No distress.   Cardiovascular: Normal rate, regular rhythm and normal heart sounds. PMI is not displaced. Exam reveals no gallop.   No murmur heard.  Pulmonary/Chest: Effort normal and breath sounds normal. No accessory muscle usage. No tachypnea. No respiratory distress.   Abdominal: Soft. Normal appearance and bowel sounds are normal. There is no tenderness. No hernia.   Genitourinary: Rectum normal.   Skin: Skin is warm, dry and intact. No rash noted.         Test Results:  Abdominal ultrasound films and report were reviewed from 2/15/19 with no significant abnormalities detected  Assessment:       Generalized abdominal pain. Probable slow transit constipation exacerbated or superimposed with opioid induced constipation.  Improved symptoms following several bowel movements.  Trial of lactulose warranted.    1. Generalized abdominal pain    2. Slow transit constipation        Plan:   Generalized abdominal pain    Slow transit constipation    Other orders  -     lactulose (CHRONULAC) 20 gram/30 mL Soln; Take 30 mLs (20 g total) by mouth 2 (two) times daily.  Dispense: 1800 mL; Refill: 11        Follow-up in about 2 weeks (around  3/5/2019).  Further evaluation and management will depend upon clinical course. May require repeat endoscopy, more aggressive therapy for colonic hypomotility, additional imaging studies, etc.

## 2019-02-20 ENCOUNTER — TELEPHONE (OUTPATIENT)
Dept: PHARMACY | Facility: CLINIC | Age: 49
End: 2019-02-20

## 2019-02-26 ENCOUNTER — TELEPHONE (OUTPATIENT)
Dept: ORTHOPEDICS | Facility: CLINIC | Age: 49
End: 2019-02-26

## 2019-02-26 NOTE — TELEPHONE ENCOUNTER
----- Message from Aissatou Godinez sent at 2/26/2019  8:26 AM CST -----  Contact: Pt  Pt called to inform Ale that she would like to move forward with getting a knee replacement  Please call pt  to advise  Call back   Thanks

## 2019-02-26 NOTE — TELEPHONE ENCOUNTER
Pt called, reports she ready to go forward with total knee replacement. Appointment made for March 11th @ 0815 at [facility]. Pt verbalizes understanding of date, time, and place

## 2019-03-04 ENCOUNTER — OFFICE VISIT (OUTPATIENT)
Dept: FAMILY MEDICINE | Facility: CLINIC | Age: 49
End: 2019-03-04
Payer: COMMERCIAL

## 2019-03-04 ENCOUNTER — OFFICE VISIT (OUTPATIENT)
Dept: SURGERY | Facility: CLINIC | Age: 49
End: 2019-03-04
Payer: COMMERCIAL

## 2019-03-04 VITALS
WEIGHT: 165 LBS | DIASTOLIC BLOOD PRESSURE: 75 MMHG | HEIGHT: 69 IN | TEMPERATURE: 98 F | BODY MASS INDEX: 24.44 KG/M2 | OXYGEN SATURATION: 98 % | HEART RATE: 88 BPM | RESPIRATION RATE: 16 BRPM | SYSTOLIC BLOOD PRESSURE: 135 MMHG

## 2019-03-04 VITALS
HEART RATE: 83 BPM | SYSTOLIC BLOOD PRESSURE: 123 MMHG | HEIGHT: 69 IN | OXYGEN SATURATION: 99 % | BODY MASS INDEX: 24.44 KG/M2 | DIASTOLIC BLOOD PRESSURE: 72 MMHG | WEIGHT: 165 LBS | RESPIRATION RATE: 18 BRPM

## 2019-03-04 DIAGNOSIS — K59.01 SLOW TRANSIT CONSTIPATION: Primary | ICD-10-CM

## 2019-03-04 DIAGNOSIS — J30.2 SEASONAL ALLERGIES: Primary | ICD-10-CM

## 2019-03-04 PROCEDURE — 99213 PR OFFICE/OUTPT VISIT, EST, LEVL III, 20-29 MIN: ICD-10-PCS | Mod: S$GLB,,, | Performed by: SURGERY

## 2019-03-04 PROCEDURE — 99213 PR OFFICE/OUTPT VISIT, EST, LEVL III, 20-29 MIN: ICD-10-PCS | Mod: S$GLB,,, | Performed by: NURSE PRACTITIONER

## 2019-03-04 PROCEDURE — 99213 OFFICE O/P EST LOW 20 MIN: CPT | Mod: S$GLB,,, | Performed by: SURGERY

## 2019-03-04 PROCEDURE — 99213 OFFICE O/P EST LOW 20 MIN: CPT | Mod: S$GLB,,, | Performed by: NURSE PRACTITIONER

## 2019-03-04 RX ORDER — LACTULOSE 10 G/15ML
SOLUTION ORAL; RECTAL
COMMUNITY
Start: 2019-02-19 | End: 2019-03-04 | Stop reason: SDUPTHER

## 2019-03-04 RX ORDER — DOCUSATE SODIUM 100 MG/1
100 CAPSULE, LIQUID FILLED ORAL 2 TIMES DAILY
Refills: 0 | COMMUNITY
Start: 2019-03-04 | End: 2021-02-09

## 2019-03-04 RX ORDER — FLUTICASONE PROPIONATE 50 MCG
1 SPRAY, SUSPENSION (ML) NASAL 2 TIMES DAILY PRN
Qty: 1 BOTTLE | Refills: 3 | Status: SHIPPED | OUTPATIENT
Start: 2019-03-04 | End: 2021-02-09 | Stop reason: SDUPTHER

## 2019-03-04 RX ORDER — FLUTICASONE PROPIONATE 50 MCG
SPRAY, SUSPENSION (ML) NASAL
COMMUNITY
End: 2019-03-04

## 2019-03-04 NOTE — PROGRESS NOTES
Subjective:       Patient ID: Boom Blanco is a 49 y.o. female.    Chief Complaint: Follow-up      HPI:  Ms. Blanco returns today with no new complaints.  Abdominal pain has improved further since last visit with the lactulose.  However lactulose produced excessive gas and occasional colic.  She discontinued this medication several days ago.  No nausea or vomiting.  No melena, hematochezia, hematemesis, or diarrhea.  Constipation improved with lactulose.  She is scheduled for a total knee arthroplasty later this month.        Allergies & Meds:  Review of patient's allergies indicates:   Allergen Reactions    Remeron [mirtazapine] Other (See Comments)     Weight gain       Current Outpatient Medications   Medication Sig Dispense Refill    buPROPion (WELLBUTRIN XL) 150 MG TB24 tablet Take 150 mg by mouth every evening.      cariprazine (VRAYLAR) 1.5 mg Cap Take 1.5 mg by mouth every evening.      fluticasone (FLONASE) 50 mcg/actuation nasal spray 1 spray (50 mcg total) by Each Nare route 2 (two) times daily as needed for Rhinitis. 1 Bottle 3    golimumab (SIMPONI) 50 mg/0.5 mL PnIj Inject 50 mg into the skin every 30 days. 0.5 mL 11    ibuprofen (ADVIL,MOTRIN) 800 MG tablet Take 800 mg by mouth once daily.       levothyroxine (SYNTHROID) 50 MCG tablet levothyroxine 50 mcg tablet   TK 1 T PO  QAM      lisinopril (PRINIVIL,ZESTRIL) 20 MG tablet Take 1 tablet (20 mg total) by mouth once daily. 90 tablet 0    lovastatin (MEVACOR) 20 MG tablet Take 1 tablet (20 mg total) by mouth every evening. 90 tablet 1    oxyCODONE-acetaminophen (PERCOCET)  mg per tablet Take 1 tablet by mouth 2 (two) times daily.      pantoprazole (PROTONIX) 40 MG tablet Take 1 tablet (40 mg total) by mouth once daily. Take in the morning before breakfast.  Wait 30 minutes before eating or drinking anything 30 tablet 11    tiZANidine (ZANAFLEX) 4 MG tablet Take 4 mg by mouth every evening.       docusate sodium  (COLACE) 100 MG capsule Take 1 capsule (100 mg total) by mouth 2 (two) times daily. Drink a 12 oz glass of water with each capsule.  0     No current facility-administered medications for this visit.      Facility-Administered Medications Ordered in Other Visits   Medication Dose Route Frequency Provider Last Rate Last Dose    lactated ringers infusion   Intravenous Continuous Jacob Bolanos MD        lactated ringers infusion  125 mL/hr Intravenous Continuous Jacob Bolanos MD        lidocaine (PF) 10 mg/ml (1%) injection 10 mg  1 mL Intradermal Once Jacob Bolanos MD        morphine injection 2 mg  2 mg Intravenous Q5 Min PRN Jacob Bolanos MD        ondansetron injection 4 mg  4 mg Intravenous Daily PRN Jacob Bolanos MD        promethazine (PHENERGAN) 6.25 mg in dextrose 5 % 50 mL IVPB  6.25 mg Intravenous Q10 Min PRN Jacob Bolanos MD           PMFSHx:  Past medical history, surgical history, family history, social history reviewed and no changes noted.        Review of Systems   Constitutional: Negative for appetite change, chills, fatigue, fever and unexpected weight change.   HENT: Negative for congestion, dental problem, ear pain, mouth sores, postnasal drip, rhinorrhea, sore throat, tinnitus, trouble swallowing and voice change.    Eyes: Negative for photophobia, pain, discharge and visual disturbance.   Respiratory: Negative for cough, chest tightness, shortness of breath and wheezing.    Cardiovascular: Negative for chest pain, palpitations and leg swelling.   Gastrointestinal: Negative for abdominal pain, blood in stool, constipation, diarrhea, nausea and vomiting.   Endocrine: Negative for cold intolerance, heat intolerance, polydipsia, polyphagia and polyuria.   Genitourinary: Negative for difficulty urinating, dysuria, flank pain, frequency, hematuria and urgency.   Musculoskeletal: Negative for arthralgias, joint swelling and myalgias.   Skin: Negative for color change and rash.    Allergic/Immunologic: Negative for immunocompromised state.   Neurological: Negative for dizziness, tremors, seizures, syncope, speech difficulty, weakness, numbness and headaches.   Hematological: Negative for adenopathy. Does not bruise/bleed easily.   Psychiatric/Behavioral: Negative for agitation, confusion, hallucinations, self-injury and suicidal ideas. The patient is not nervous/anxious.        Objective:      Physical Exam   Constitutional: She appears well-developed and well-nourished.  Non-toxic appearance. She does not appear ill. No distress.   Cardiovascular: Normal rate, regular rhythm and normal heart sounds. PMI is not displaced. Exam reveals no gallop.   No murmur heard.  Pulmonary/Chest: Effort normal and breath sounds normal. No accessory muscle usage. No tachypnea. No respiratory distress.   Abdominal: Soft. Normal appearance and bowel sounds are normal. There is no tenderness. No hernia.   Skin: Skin is warm, dry and intact. No rash noted.         Assessment:       Slow transit constipation, responding to medical management.    1. Slow transit constipation        Plan:   Slow transit constipation    Other orders  -     docusate sodium (COLACE) 100 MG capsule; Take 1 capsule (100 mg total) by mouth 2 (two) times daily. Drink a 12 oz glass of water with each capsule.; Refill: 0     RTC 1 month.  Anticipate colonoscopy after recovery from total knee arthroplasty.  Discontinue lactulose.  Begin Colace.    Follow-up in about 1 month (around 4/4/2019).

## 2019-03-04 NOTE — PROGRESS NOTES
Chief Complaint  Chief Complaint   Patient presents with    Annual Exam       HPI:  Boom Blanco is a 49 y.o. female with medical diagnoses as listed and reviewed within the medical history and problem list that presents for a routine follow up. She denies any complaints. She is to have a right knee replacement this month. She had recent labs done by  for pre-op. She is seeing specialties for all of her chronic problems. She is not in any need for refills except Flonase. She denies any other complaints except for generalized bilateral knee pain. She has no chest pain or SOB. She tolerates her medications without any side effects. She is compliant with her medications. She has an appointment with  today as well.     PAST MEDICAL HISTORY:  Past Medical History:   Diagnosis Date    Anxiety     Anxiety disorder     Arthritis     Bipolar disorder     Chronic pain     Hyperlipidemia     Hypertension     Psoriatic arthritis     Raynaud's disease        PAST SURGICAL HISTORY:  Past Surgical History:   Procedure Laterality Date    ARTHROSCOPY, KNEE Right 2/5/2019    Performed by Sylvain Gorman [facility] at [facility]    ARTHROSCOPY, KNEE Left 1/22/2019    Performed by Syl[facility]ain [facility]orman DO at [facility]    ARTHROSCOPY, KNEE, WITH CHONDROPLASTY Left 1/22/2019    Performed by [facility]ain [facility]orman DO at [facility]    ARTHROSCOPY, KNEE, WITH DEBRIDEMENT Left 1/22/2019    Performed by Syl[facility]ain Gorman DO at [facility]    ARTHROSCOPY, KNEE, WITH MENISCECTOMY Left 1/22/2019    Performed by [facility]ain [facility]orman DO at [facility]    BACK SURGERY      spinal stimulator implanted and then removed    BREAST BIOPSY      CHONDROPLASTY, KNEE Right 2/5/2019    Performed by [facility]ain Gorman DO at [facility]    COLONOSCOPY  2016    repeat in 5-10 years    ESOPHAGOGASTRODUODENOSCOPY (EGD) Left 8/29/2018    Performed by Dany Hugo MD at [facility] ENDO    EXCISION, PLICA, KNEE, ARTHROSCOPIC Left 1/22/2019    Performed  by [facility] at [facility]    HYSTERECTOMY  1997    MENISCECTOMY, KNEE, MEDIAL Right 2/5/2019    Performed by [facility] DO at [facility] OR    SALPINGOOPHORECTOMY  1997       SOCIAL HISTORY:  Social History     Socioeconomic History    Marital status:      Spouse name: Not on file    Number of children: Not on file    Years of education: Not on file    Highest education level: Not on file   Social Needs    Financial resource strain: Not on file    Food insecurity - worry: Not on file    Food insecurity - inability: Not on file    Transportation needs - medical: Not on file    Transportation needs - non-medical: Not on file   Occupational History    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   Tobacco Use    Smoking status: Former Smoker     Years: 5.00     Types: Vaping w/o nicotine, Cigarettes    Smokeless tobacco: Never Used    Tobacco comment: vape   Substance and Sexual Activity    Alcohol use: No     Frequency: Never    Drug use: No    Sexual activity: Not Currently   Other Topics Concern    Not on file   Social History Narrative    Not on file       FAMILY HISTORY:  Family History   Problem Relation Age of Onset    Arthritis Mother     Pacemaker/defibrilator Mother     Lung disease Father     Heart disease Father     Arthritis Sister     Arthritis Brother     Arthritis Sister     Arthritis Sister     Breast cancer Neg Hx        ALLERGIES AND MEDICATIONS: updated and reviewed.  Review of patient's allergies indicates:   Allergen Reactions    Remeron [mirtazapine] Other (See Comments)     Weight gain     Current Outpatient Medications   Medication Sig Dispense Refill    buPROPion (WELLBUTRIN XL) 150 MG TB24 tablet Take 150 mg by mouth every evening.      cariprazine (VRAYLAR) 1.5 mg Cap Take 1.5 mg by mouth every evening.      fluticasone (FLONASE) 50 mcg/actuation nasal spray 1 spray (50 mcg total) by Each Nare route 2 (two) times daily as needed for Rhinitis. 1  Bottle 3    golimumab (SIMPONI) 50 mg/0.5 mL PnIj Inject 50 mg into the skin every 30 days. 0.5 mL 11    ibuprofen (ADVIL,MOTRIN) 800 MG tablet Take 800 mg by mouth once daily.       lactulose (CHRONULAC) 10 gram/15 mL solution       lactulose (CHRONULAC) 20 gram/30 mL Soln Take 30 mLs (20 g total) by mouth 2 (two) times daily. 1800 mL 11    levothyroxine (SYNTHROID) 50 MCG tablet levothyroxine 50 mcg tablet   TK 1 T PO  QAM      lisinopril (PRINIVIL,ZESTRIL) 20 MG tablet Take 1 tablet (20 mg total) by mouth once daily. 90 tablet 0    lovastatin (MEVACOR) 20 MG tablet Take 1 tablet (20 mg total) by mouth every evening. 90 tablet 1    oxyCODONE-acetaminophen (PERCOCET)  mg per tablet Take 1 tablet by mouth 2 (two) times daily.      pantoprazole (PROTONIX) 40 MG tablet Take 1 tablet (40 mg total) by mouth once daily. Take in the morning before breakfast.  Wait 30 minutes before eating or drinking anything 30 tablet 11    tiZANidine (ZANAFLEX) 4 MG tablet Take 4 mg by mouth every evening.        No current facility-administered medications for this visit.      Facility-Administered Medications Ordered in Other Visits   Medication Dose Route Frequency Provider Last Rate Last Dose    lactated ringers infusion   Intravenous Continuous Jacob Bolanos MD        lactated ringers infusion  125 mL/hr Intravenous Continuous Jacob Bolanos MD        lidocaine (PF) 10 mg/ml (1%) injection 10 mg  1 mL Intradermal Once Jacob Bolanos MD        morphine injection 2 mg  2 mg Intravenous Q5 Min PRN Jacob Bolanos MD        ondansetron injection 4 mg  4 mg Intravenous Daily PRN Jacob Bolanos MD        promethazine (PHENERGAN) 6.25 mg in dextrose 5 % 50 mL IVPB  6.25 mg Intravenous Q10 Min PRN Jacob Bolanos MD             ROS  Review of Systems   Constitutional: Negative for appetite change, chills, fatigue and fever.   HENT: Negative for congestion, postnasal drip, rhinorrhea and sore throat.   "  Respiratory: Negative for cough, chest tightness, shortness of breath and wheezing.    Cardiovascular: Negative for chest pain and palpitations.   Gastrointestinal: Negative for abdominal distention, abdominal pain, constipation, diarrhea, nausea and vomiting.   Genitourinary: Negative for dysuria.   Musculoskeletal: Positive for arthralgias, gait problem and joint swelling. Negative for myalgias.   Skin: Negative for color change and rash.   Neurological: Negative for dizziness, weakness and headaches.   Psychiatric/Behavioral: Negative for confusion and sleep disturbance. The patient is not nervous/anxious.            PHYSICAL EXAM  Vitals:    03/04/19 1053   BP: 123/72   BP Location: Right arm   Patient Position: Sitting   Pulse: 83   Resp: 18   SpO2: 99%   Weight: 74.8 kg (165 lb)   Height: 5' 9" (1.753 m)    Body mass index is 24.37 kg/m².  Weight: 74.8 kg (165 lb)   Height: 5' 9" (175.3 cm)       Physical Exam   Constitutional: She is oriented to person, place, and time. She appears well-developed and well-nourished.   HENT:   Head: Normocephalic.   Eyes: Pupils are equal, round, and reactive to light.   Neck: Normal range of motion. Neck supple.   Cardiovascular: Normal rate, regular rhythm, S1 normal and S2 normal.   No murmur heard.  Pulmonary/Chest: Effort normal and breath sounds normal. She has no wheezes.   Abdominal: Soft. Normal appearance and bowel sounds are normal. She exhibits no distension. There is no tenderness.   Musculoskeletal: Normal range of motion.   Neurological: She is alert and oriented to person, place, and time.   Skin: Skin is warm and dry. Capillary refill takes less than 2 seconds.   Psychiatric: She has a normal mood and affect. Her speech is normal and behavior is normal. Thought content normal. Cognition and memory are normal.   Vitals reviewed.        Health Maintenance       Date Due Completion Date    TETANUS VACCINE 02/16/1986 ---    Pneumococcal Vaccine (Highest Risk) (1 " of 3 - PCV13) 02/16/1987 ---    Mammogram 12/10/2020 12/10/2018    Lipid Panel 07/26/2023 7/26/2018    Colonoscopy 11/25/2026 11/25/2016               Assessment & Plan    Boom was seen today for annual exam.    Diagnoses and all orders for this visit:    Seasonal allergies  -     fluticasone (FLONASE) 50 mcg/actuation nasal spray; 1 spray (50 mcg total) by Each Nare route 2 (two) times daily as needed for Rhinitis.        Follow-up: Follow-up in about 6 months (around 9/4/2019).      Risks, benefits, and side effects were discussed with the patient. All questions were answered to the fullest satisfaction of the patient, and pt verbalized understanding and agreement to treatment plan. Pt was to call with any new or worsening symptoms, or present to the ER.

## 2019-03-11 ENCOUNTER — OFFICE VISIT (OUTPATIENT)
Dept: ORTHOPEDICS | Facility: CLINIC | Age: 49
End: 2019-03-11
Payer: COMMERCIAL

## 2019-03-11 ENCOUNTER — TELEPHONE (OUTPATIENT)
Dept: ORTHOPEDICS | Facility: CLINIC | Age: 49
End: 2019-03-11

## 2019-03-11 VITALS — WEIGHT: 164.88 LBS | BODY MASS INDEX: 24.42 KG/M2 | HEIGHT: 69 IN

## 2019-03-11 DIAGNOSIS — Z01.818 PRE-OP EXAM: Primary | ICD-10-CM

## 2019-03-11 DIAGNOSIS — M17.11 TRICOMPARTMENT OSTEOARTHRITIS OF RIGHT KNEE: ICD-10-CM

## 2019-03-11 DIAGNOSIS — Z51.89 AFTER CARE: Primary | ICD-10-CM

## 2019-03-11 PROCEDURE — 99024 POSTOP FOLLOW-UP VISIT: CPT | Mod: S$GLB,,, | Performed by: ORTHOPAEDIC SURGERY

## 2019-03-11 PROCEDURE — 99024 PR POST-OP FOLLOW-UP VISIT: ICD-10-PCS | Mod: S$GLB,,, | Performed by: ORTHOPAEDIC SURGERY

## 2019-03-11 PROCEDURE — 99999 PR PBB SHADOW E&M-EST. PATIENT-LVL II: ICD-10-PCS | Mod: PBBFAC,,, | Performed by: ORTHOPAEDIC SURGERY

## 2019-03-11 PROCEDURE — 99999 PR PBB SHADOW E&M-EST. PATIENT-LVL II: CPT | Mod: PBBFAC,,, | Performed by: ORTHOPAEDIC SURGERY

## 2019-03-11 RX ORDER — SODIUM CHLORIDE 0.9 % (FLUSH) 0.9 %
3 SYRINGE (ML) INJECTION
Status: CANCELLED | OUTPATIENT
Start: 2019-03-11

## 2019-03-11 RX ORDER — MUPIROCIN 20 MG/G
OINTMENT TOPICAL
Status: CANCELLED | OUTPATIENT
Start: 2019-03-11

## 2019-03-11 NOTE — TELEPHONE ENCOUNTER
----- Message from Jacquelyn Cao sent at 3/11/2019  3:25 PM CDT -----  Contact: patient  Patient calling to see if her letter was ready. Please give call back at [phone]

## 2019-03-11 NOTE — PROGRESS NOTES
Chief Complaint: Pain of the Left Knee and Pain of the Right Knee      HPI:  Ms. Blanco is a 48-year-old female who returneds today for follow-up on arthroscopy of her right knee. She states she is still having significant pain in her knee. Her date of surgery was 02/05/2019.  Since she had tricompartmental grade 4 chondromalacia of her knee and she is still symptomatic she would like to discuss total knee arthroplasty. She originally had 2-3 years of bilateral knee pain increasing intensity.  She has psoriatic arthritis. She had an arthroscopy of her left knee of which she did very well but her right knee has not improved.  Walking and extending her knee for extended time increases her symptoms while rest occasionally improves it. She still has swelling and giving, but denied locking..  She has taken NSAIDs without help .  She intermittently wears a brace which helps. She has done home exercises/physical therapy with minimal help.  She can walk approximately 50 yd and climb 2-3 stairs.  She does not use an ambulatory assistive device for              Past Medical History:   Diagnosis Date    Anxiety      Anxiety disorder      Arthritis      Bipolar disorder      Chronic pain      Hyperlipidemia      Hypertension      Psoriatic arthritis       *  *  *  *  * Raynaud's disease  Psoriasis  Seasonal allergies  Depression  Lupus  Chronic pain management                  Past Surgical History:   Procedure Laterality Date    BACK SURGERY         spinal stimulator implanted and then removed    COLONOSCOPY   2016     repeat in 5-10 years    ESOPHAGOGASTRODUODENOSCOPY Left 8/29/2018     Procedure: ESOPHAGOGASTRODUODENOSCOPY (EGD);  Surgeon: Dany Hugo MD;  Location: [facility] ENDO;  Service: General;  Laterality: Left;    ESOPHAGOGASTRODUODENOSCOPY (EGD) Left 8/29/2018     Performed by Dany Hugo MD at [facility]    HYSTERECTOMY   1997     *  * SALPINGOOPHORECTOMY  ARTHROSCOPY, LEFT  KNEE.  ARTHROSCOPY, RIGHT KNEE.   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   Tobacco Use    Smoking status: Current Every Day Smoker       Types: Cigarettes    Smokeless tobacco: Never Used    Tobacco comment: vape   Substance and Sexual Activity    Alcohol use: No       Frequency: Never    Drug use: No    Sexual activity: Not Currently                Family History   Problem Relation Age of Onset    Arthritis Mother      Pacemaker/defibrilator Mother      Lung disease Father      Heart disease Father      Arthritis Sister      Arthritis Brother      Arthritis Sister      Arthritis Sister           Previous Hospitalizations:  Childbirth, anemia requiring blood transfusion.     ROS:  No new diagnosis/surgery/prescriptions since last office visit on 02/18/2019.  Constitution: Negative for chills and fever.   Eyes: Negative for blurred vision.   Cardiovascular: Negative for chest pain.   Respiratory: Negative for cough.    Endocrine: Negative for polydipsia.   Hematologic/Lymphatic: Does not bruise/bleed easily.   Skin: Negative for itching.   Musculoskeletal: Positive for back pain.   Gastrointestinal: Negative for heartburn.   Genitourinary: Negative for nocturia.   Neurological: Negative for headaches and seizures.   Psychiatric/Behavioral: Negative for substance abuse.   Allergic/Immunologic: Positive for environmental allergies.       Objective:   Physical Exam:   General: AAOx3.  No acute distress  HEENT: Normocephalic, PEARLA EOMI, Good Dentition  Neck: Supple, No JVD  Chest: Symetric, equal excursion on inspiration  Abdomen: Soft NTND  Vascular:  Pulses intact and equal bilaterally.  Capillary refill less than 3 seconds and equal bilaterally  Neurologic:  Pinprick and soft touch intact  and equal bilaterally  Integment:  No ecchymosis, no errythema.  Tattoo left ankle  Extremity:  Knee:  Extension/flexion equal bilaterally 0/128 degrees. Mild effusion right knee.  Mild crepitus with motion both knees.  Baker cyst both knees.  Positive patellar load/compression right knee.  Negative patella apprehension/relocation both knees.  Varus/valgus stressing equal bilaterally with endpoint.  Lachman's/drawer equal bilaterally with endpoint.  Positive joint line tenderness right knee.  Penny mildly negative both knees.  Greg positive right knee.  Nontender with palpation anserine insertion both knees.  No swelling at the anserine insertion both knees.  Radiography:   Previous x-rays of both knees completed on 12/03/2018 showed medial compartmental narrowing with the right greater than the left and and medial femoral condyle osteophyte with  patellofemoral arthritic changes. Intraoperative arthroscopy pictures of the right knee from 02/05/2019 showed grade 4 tricompartmental chondromalacia.     Assessment:      Impression:       1. Psoriatic arthritis, right knee.   2. Baker cyst, bilateral knee   3. Grade 4 tricompartmental chondromalacia, right knee.         Plan:      1.  Discussed physical examination with the patient. She stated the steroid injection given at the last visit did not improve her symptoms and at this point her pain is approximately a 7 to 8/10 which is not improved.  Discussed with the patient possibly continue with physical therapy or proceeding with total knee arthroplasty. She stated she would prefer to proceed with total knee arthroplasty. Total knee arthroplasty was discussed in detail with the patient.  2.  Possible complications of surgery to include bleeding, infection, scarring, nerve/blood vessel/tendon damage, need for further surgery, failed surgery, failure to improve, possible persistent pain, possible leg-length discrepancy, possible arthrofibrosis, possible  malrotation, possible fracture and possible amputation were discussed with the patient.  The patient was permitted to ask questions and all concerns were addressed to her satisfaction.  3.  Consent for right total knee arthroplasty.  4.  Tentatively schedule surgery for 04/04/2019.  5.  All pain meds for postop per the patient's pain management physician.  A letter will be forwarded to the patient's pain management physician notifying them of her pending surgery so they can adjust her medications appropriately  6.   continue to ambulate with weight-bearing at tolerance.  7.  Continue with home exercises.  8.  Continue with NSAIDs as tolerated and allowed by PCM.  9.  [facility] portal was discussed with the patient and information was given.  The patient was encouraged to use the [facility] portal for future encounters.  9.  Follow up postoperatively.

## 2019-03-11 NOTE — H&P
Chief Complaint: Pain of the Left Knee and Pain of the Right Knee      HPI:  Ms. Blanco is a 48-year-old female who returneds today for follow-up on arthroscopy of her right knee. She states she is still having significant pain in her knee. Her date of surgery was 02/05/2019.  Since she had tricompartmental grade 4 chondromalacia of her knee and she is still symptomatic she would like to discuss total knee arthroplasty. She originally had 2-3 years of bilateral knee pain increasing intensity.  She has psoriatic arthritis. She had an arthroscopy of her left knee of which she did very well but her right knee has not improved.  Walking and extending her knee for extended time increases her symptoms while rest occasionally improves it. She still has swelling and giving, but denied locking..  She has taken NSAIDs without help .  She intermittently wears a brace which helps. She has done home exercises/physical therapy with minimal help.  She can walk approximately 50 yd and climb 2-3 stairs.  She does not use an ambulatory assistive device for              Past Medical History:   Diagnosis Date    Anxiety      Anxiety disorder      Arthritis      Bipolar disorder      Chronic pain      Hyperlipidemia      Hypertension      Psoriatic arthritis       *  *  *  *  * Raynaud's disease  Psoriasis  Seasonal allergies  Depression  Lupus  Chronic pain management                  Past Surgical History:   Procedure Laterality Date    BACK SURGERY         spinal stimulator implanted and then removed    COLONOSCOPY   2016     repeat in 5-10 years    ESOPHAGOGASTRODUODENOSCOPY Left 8/29/2018     Procedure: ESOPHAGOGASTRODUODENOSCOPY (EGD);  Surgeon: Dany Hugo MD;  Location[facility] ENDO;  Service: General;  Laterality: Left;    ESOPHAGOGASTRODUODENOSCOPY (EGD) Left 8/29/2018     Performed by Dany Hugo MD at [facility]    HYSTERECTOMY   1997     *  * SALPINGOOPHORECTOMY  ARTHROSCOPY, LEFT  KNEE.  ARTHROSCOPY, RIGHT KNEE.   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   Tobacco Use    Smoking status: Current Every Day Smoker       Types: Cigarettes    Smokeless tobacco: Never Used    Tobacco comment: vape   Substance and Sexual Activity    Alcohol use: No       Frequency: Never    Drug use: No    Sexual activity: Not Currently                Family History   Problem Relation Age of Onset    Arthritis Mother      Pacemaker/defibrilator Mother      Lung disease Father      Heart disease Father      Arthritis Sister      Arthritis Brother      Arthritis Sister      Arthritis Sister           Previous Hospitalizations:  Childbirth, anemia requiring blood transfusion.     ROS:  No new diagnosis/surgery/prescriptions since last office visit on 02/18/2019.  Constitution: Negative for chills and fever.   Eyes: Negative for blurred vision.   Cardiovascular: Negative for chest pain.   Respiratory: Negative for cough.    Endocrine: Negative for polydipsia.   Hematologic/Lymphatic: Does not bruise/bleed easily.   Skin: Negative for itching.   Musculoskeletal: Positive for back pain.   Gastrointestinal: Negative for heartburn.   Genitourinary: Negative for nocturia.   Neurological: Negative for headaches and seizures.   Psychiatric/Behavioral: Negative for substance abuse.   Allergic/Immunologic: Positive for environmental allergies.       Objective:   Physical Exam:   General: AAOx3.  No acute distress  HEENT: Normocephalic, PEARLA EOMI, Good Dentition  Neck: Supple, No JVD  Chest: Symetric, equal excursion on inspiration  Abdomen: Soft NTND  Vascular:  Pulses intact and equal bilaterally.  Capillary refill less than 3 seconds and equal bilaterally  Neurologic:  Pinprick and soft touch intact  and equal bilaterally  Integment:  No ecchymosis, no errythema.  Tattoo left ankle  Extremity:  Knee:  Extension/flexion equal bilaterally 0/128 degrees. Mild effusion right knee.  Mild crepitus with motion both knees.  Baker cyst both knees.  Positive patellar load/compression right knee.  Negative patella apprehension/relocation both knees.  Varus/valgus stressing equal bilaterally with endpoint.  Lachman's/drawer equal bilaterally with endpoint.  Positive joint line tenderness right knee.  Penny mildly negative both knees.  Greg positive right knee.  Nontender with palpation anserine insertion both knees.  No swelling at the anserine insertion both knees.  Radiography:   Previous x-rays of both knees completed on 12/03/2018 showed medial compartmental narrowing with the right greater than the left and and medial femoral condyle osteophyte with  patellofemoral arthritic changes. Intraoperative arthroscopy pictures of the right knee from 02/05/2019 showed grade 4 tricompartmental chondromalacia.     Assessment:      Impression:       1. Psoriatic arthritis, right knee.   2. Baker cyst, bilateral knee   3. Grade 4 tricompartmental chondromalacia, right knee.         Plan:      1.  Discussed physical examination with the patient. She stated the steroid injection given at the last visit did not improve her symptoms and at this point her pain is approximately a 7 to 8/10 which is not improved.  Discussed with the patient possibly continue with physical therapy or proceeding with total knee arthroplasty. She stated she would prefer to proceed with total knee arthroplasty. Total knee arthroplasty was discussed in detail with the patient.  2.  Possible complications of surgery to include bleeding, infection, scarring, nerve/blood vessel/tendon damage, need for further surgery, failed surgery, failure to improve, possible persistent pain, possible leg-length discrepancy, possible arthrofibrosis, possible  malrotation, possible fracture and possible amputation were discussed with the patient.  The patient was permitted to ask questions and all concerns were addressed to her satisfaction.  3.  Consent for right total knee arthroplasty.  4.  Tentatively schedule surgery for 04/04/2019.  5.  All pain meds for postop per the patient's pain management physician.  A letter will be forwarded to the patient's pain management physician notifying them of her pending surgery so they can adjust her medications appropriately  6.   continue to ambulate with weight-bearing at tolerance.  7.  Continue with home exercises.  8.  Continue with NSAIDs as tolerated and allowed by PCM.  9.  [facility] portal was discussed with the patient and information was given.  The patient was encouraged to use the [facility] portal for future encounters.  9.  Follow up postoperatively.

## 2019-03-11 NOTE — TELEPHONE ENCOUNTER
Returned patient's call. Informed patient that letter and appointment encounter is ready for  at the [facility]. Patient states that she will  letter tomorrow.

## 2019-03-15 ENCOUNTER — TELEPHONE (OUTPATIENT)
Dept: ORTHOPEDICS | Facility: CLINIC | Age: 49
End: 2019-03-15

## 2019-03-15 NOTE — TELEPHONE ENCOUNTER
Called patient to move total joint from April 4, 2019 to April 18, 2019 as requested by Dr. Gorman. Patient agreed to move total joint.

## 2019-03-20 ENCOUNTER — TELEPHONE (OUTPATIENT)
Dept: PHARMACY | Facility: CLINIC | Age: 49
End: 2019-03-20

## 2019-03-26 ENCOUNTER — PATIENT MESSAGE (OUTPATIENT)
Dept: RHEUMATOLOGY | Facility: CLINIC | Age: 49
End: 2019-03-26

## 2019-03-26 DIAGNOSIS — E55.9 VITAMIN D DEFICIENCY DISEASE: ICD-10-CM

## 2019-03-26 DIAGNOSIS — I73.00 RAYNAUD'S DISEASE WITHOUT GANGRENE: ICD-10-CM

## 2019-03-26 DIAGNOSIS — E53.1 VITAMIN B6 DEFICIENCY: ICD-10-CM

## 2019-03-26 DIAGNOSIS — F31.9 BIPOLAR AFFECTIVE DISORDER, REMISSION STATUS UNSPECIFIED: ICD-10-CM

## 2019-03-26 DIAGNOSIS — L40.50 PSORIATIC ARTHRITIS: ICD-10-CM

## 2019-03-26 DIAGNOSIS — F41.9 ANXIETY: ICD-10-CM

## 2019-04-05 DIAGNOSIS — M17.10 TRICOMPARTMENT OSTEOARTHRITIS OF KNEE, UNSPECIFIED LATERALITY: ICD-10-CM

## 2019-04-05 DIAGNOSIS — Z01.818 PRE-OP EXAM: Primary | ICD-10-CM

## 2019-04-09 ENCOUNTER — HOSPITAL ENCOUNTER (OUTPATIENT)
Dept: PREADMISSION TESTING | Facility: HOSPITAL | Age: 49
Discharge: HOME | End: 2019-04-09
Attending: ORTHOPAEDIC SURGERY
Payer: COMMERCIAL

## 2019-04-09 ENCOUNTER — ANESTHESIA EVENT (OUTPATIENT)
Dept: SURGERY | Facility: HOSPITAL | Age: 49
DRG: 470 | End: 2019-04-09
Payer: COMMERCIAL

## 2019-04-09 VITALS — HEIGHT: 69 IN | BODY MASS INDEX: 24.44 KG/M2 | WEIGHT: 165 LBS

## 2019-04-09 DIAGNOSIS — M17.11 TRICOMPARTMENT OSTEOARTHRITIS OF RIGHT KNEE: ICD-10-CM

## 2019-04-09 DIAGNOSIS — Z96.651 S/P TOTAL KNEE ARTHROPLASTY, RIGHT: Primary | ICD-10-CM

## 2019-04-09 DIAGNOSIS — Z91.81 AT MAXIMUM RISK FOR FALL: ICD-10-CM

## 2019-04-09 DIAGNOSIS — Z01.818 PRE-OP EXAM: ICD-10-CM

## 2019-04-09 DIAGNOSIS — Z96.651 STATUS POST TOTAL RIGHT KNEE REPLACEMENT: Primary | ICD-10-CM

## 2019-04-09 RX ORDER — ACETAMINOPHEN 500 MG
5 TABLET ORAL
COMMUNITY
End: 2021-04-05

## 2019-04-09 NOTE — ANESTHESIA PREPROCEDURE EVALUATION
04/09/2019  Boom Blanco is a 49 y.o., female.    Anesthesia Evaluation    I have reviewed the Patient Summary Reports.    I have reviewed the Nursing Notes.   I have reviewed the Medications.     Review of Systems  Social:  Former Smoker    Hematology/Oncology:  Hematology Normal   Oncology Normal     EENT/Dental:EENT/Dental Normal   Cardiovascular:   Hypertension    Pulmonary:  Pulmonary Normal    Renal/:  Renal/ Normal     Musculoskeletal:   Arthritis   Rheumatic Disease [place]     Neurological:  Neurology Normal    Endocrine:   Hypothyroidism    Psych:   Psychiatric History (Bipolar)          Physical Exam  General:  Well nourished    Airway/Jaw/Neck:  Airway Findings: Mouth Opening: Normal Tongue: Normal  General Airway Assessment: Adult  Mallampati: III  Improves to II with phonation.  TM Distance: Normal, at least 6 cm       Chest/Lungs:  Chest/Lungs Findings: Clear to auscultation, Decreased Breathe Sounds Left     Heart/Vascular:  Heart Findings: Rate: Normal  Rhythm: Regular Rhythm        Mental Status:  Mental Status Findings:  Cooperative, Alert and Oriented         Anesthesia Plan  Type of Anesthesia, risks & benefits discussed:  Anesthesia Type:  general, regional  Patient's Preference:   Intra-op Monitoring Plan: standard ASA monitors  Intra-op Monitoring Plan Comments:   Post Op Pain Control Plan: IV/PO Opioids PRN  Post Op Pain Control Plan Comments:   Induction:   IV  Beta Blocker:  Patient is not currently on a Beta-Blocker (No further documentation required).       Informed Consent: Patient understands risks and agrees with Anesthesia plan.  Questions answered. Anesthesia consent signed with patient.  ASA Score: 3     Day of Surgery Review of History & Physical: I have interviewed and examined the patient. I have reviewed the patient's H&P dated:            Ready For  Surgery From Anesthesia Perspective.

## 2019-04-09 NOTE — PRE ADMISSION SCREENING
Patient Name: Boom Blanco  YOB: 1970   MRN: [id number]     [facility]   Basic Mobility Inpatient Short Form 6 Clicks         How much difficulty does the patient currently have  Unable  A Lot  A Little  None      1. Turning over in bed (including adjusting bedclothes, sheets and blankets)?     1 []    2 [x]    3 []    4 []        2. Sitting down on and standing up from a chair with arms (e.g., wheelchair, bedside commode, etc.)     1 []  2 []  3 [x]     4 []      3. Moving from lying on back to sitting on the side of the bed?     1 []  2 [x]  3 []    4 []    How much help from another person does the patient currently need  Total  A Lot  A Little  None      4. Moving to and from a bed to a chair (including a wheelchair)?    1 []  2 []  3 []    4 [x]      5. Need to walk in hospital room?    1 []  2 []  3 []    4 [x]      6. Climbing 3-5 steps with a railing?    1 []  2 []  3 []    4 [x]       Raw Score:     19             CMS 0-100% Score:   41.77         %   Standardized Score:    45.44           CMS Modifier:       CK                                   [facility]   Basic Mobility Inpatient Short Form 6 Clicks Score Conversion Table*         *Use this form to convert -PAC Basic Mobility Inpatient Raw Scores.   -[facility] Inpatient Basic Mobility Short Form Scoring Example   1. Add the number values associated with the response to each item. For example, items totals yield a Raw Score of 21.   2. Match the raw score to the t-Scale scores (t-Scale score = 50.25, SE = 4.69).   3. Find the associated CMS % (CMS % = 28.97%).   4. Locate the correct CMS Functional Modifier Code, or G Code (G code = CJ)     NOTE: Each -PAC Short Form has a separate conversion table. Make sure that you use the correct conversion table.       Instruction Manual - page 45 contains conversion table

## 2019-04-09 NOTE — PRE ADMISSION SCREENING
"               [facility] Risk Assessment Scale    Patient Name: Boom Blanco  YOB: 1970  MRN: [id number]            RIsk Factor Measure Recommendation Patient Data Scale/Score   BMI >40 Reconsider surgery, weight loss   Estimated body mass index is 24.37 kg/m² as calculated from the following:    Height as of this encounter: 5' 9" (1.753 m).    Weight as of this encounter: 74.8 kg (165 lb).   [x] 0 = 1 - 24.9  [] 1 = 25-29.9  [] 2 = 30-34.9  [] 3 = 35-39.9  [] 4 = 40-44.9  [] 5 = 45-99.9   Hemoglobin AIC (if applicable) >9 Delay surgery until DM under control  Refer for:  · Nutrition Therapy  · Exercise   · Medication    No results found for: LABA1C, HGBA1C    Lab Results   Component Value Date    GLU 99 01/21/2019      [] 0 = 4.0-5.6  [] 1 = 5.7-6.4  [] 2 = 6.5-6.9  [] 3 = 7.0-7.9  [] 4 = 8.0-8.9  [] 5 = 9.0-12   Hemoglobin (Anemia) <9 Delay surgery   Correct anemia Lab Results   Component Value Date    HGB 15.4 01/21/2019    [] 20 - <9.0                    Albumin <3 Delay surgery &Workup Lab Results   Component Value Date    ALBUMIN 4.5 06/14/2018    [] 20 - <3.0   Smoking Cessation >4 Weeks Delay Surgery  Refer to OP Cessation Class    Former Smoker [] 20 - current smoker                                _____ PPD                    Hx of MI, PE, Arrhythmia, CVA, DVT <30 Days Delay Surgery    N/A [] 20      Infection Variable Delay surgery and re-evaluate   N/A [] 20 - recent/current infection     Depression (PHQ) >10 out of 27 Delay Surgery and re-evaluate  Medication  Counseling              [x] 0     []1     []2     []3      []4      [] 5                    (1-4)      (5-9)  (10-14)  (15-19)   (20-27)     Memory Impairment & Memory loss (Mini-Cog Screening Tool) Advanced dementia and/or Parkinson's Reconsider surgery     [x] 0     []1     []2     []3     []4     [] 5     Physical Conditioning (Modified AM-PAC Per Physical Therapy at [facility]) Unable to ambulate on day of surgery Delay " surgery and re-evaluate  Pre-Rehabilitation   (PT evaluation)       []  0   []4       [x]8     []12        []16     []20       (<20%)   (<40%)   (<60%)   (<80% )    (>80%)     Home Environment/Caregiver support  (Per /Navigator Interview)    Availability of basic services and/or approprate assistance during post-operative period Delay surgery and re-evaluate  Safe home environment  Health   1 week post-surgery  Transportation  availability  Ability to obtain DME/Medications post-op    [x] 0     []1     []2     []3     []4     [] 5  [x] 0     []1     []2     []3     []4     [] 5  [x] 0     []1     []2     []3     []4     [] 5  [x] 0     []1     []2     []3     []4     [] 5         MD Contact: Dr. Gorman Comments:  Total Score:  8

## 2019-04-09 NOTE — DISCHARGE INSTRUCTIONS
HIBICLENS INSTRUCTIONS/INFORMATION           Chlorhexidine (HIBICLENS) topical antiseptic    What is this medicine?  CHLORHEXIDINE (klor HEX i jessica) is used as a skin wound cleanser and a general skin cleanser. This medicine is also used as a surgical hand scrub and to cleanse the skin before surgery to help prevent infections.    How should I use this medicine?  This medicine is for external use only. Do not take by mouth. Follow the directions on the label or those given to you by your doctor or health care professional. Keep out of eyes, ears and mouth. This medicine should not be used as a preoperative skin preparation of the face or head.  Talk to your pediatrician regarding the use of this medicine in children. While this medicine may be used for children for selected conditions, precautions do apply.    What side effects may I notice from receiving this medicine?  Side effects that you should report to your doctor or other health care professional as soon as possible:  · allergic reactions like skin rash, itching or hives, swelling of the face, lips, or tongue  · breathing problems  · cough  Side effects that usually do not require medical attention (report to your doctor or other health care professional if they continue or are bothersome):  · increased sensitivity to the sun  · skin irritation  ·   What may interact with this medicine?  Interactions are not expected.    What if I miss a dose?  This does not apply; this medicine is not for regular use.    Where should I keep my medicine?  Keep out of the reach of children.  Store at room temperature between 15 and 30 degrees C (59 and 86 degrees F). Store away from direct light and heat. Do not freeze. Throw away any unused medicine after the expiration date.    What should I tell my health care provider before I take this medicine?  They need to know if you have any of the following conditions:  · any skin rashes or problems  · an unusual or allergic  reaction to chlorhexidine, other medicines, foods, dyes, or preservatives  · pregnant or trying to get pregnantbreast-feeding.    What should I watch for while using this medicine?  This medicine may cause severe allergic reactions. Notify a health care professional right away if you think you are having an allergic reaction.  Do not take this medicine by mouth. Avoid contact with your ears and eyes. If contact with the eyes occur, rinse the eyes well with plenty of cool tap water.  NOTE:This sheet is a summary. It may not cover all possible information. If you have questions about this medicine, talk to your doctor, pharmacist, or health care provider. Copyright© 2017 [employer]          HIBICLENS INSTRUCTIONS     You will take two showers with this soap. The first shower should be taken the night before your surgery/procedure and the second shower the morning of surgery/procedure.   Do not shave the area of your body where your surgery will be performed. Any new cut, abrasion or rash on your surgical site will need to be evaluated and may cause a delay in your procedure.   Turn water off before applying the hibiclens soap to prevent rinsing it off too soon. Apply the soap to your entire body from the jaw down, using a clean washcloth or your hands. Do not use hibiclens near your eyes, ears, nose or mouth. Wash thoroughly for three minutes, paying special attention to the area where your surgery will be performed. Do not scrub your skin too hard. Do not wash with your regular soap after using the hibiclens. Turn the water back on and rinse your body well.   Pat yourself dry with a fresh, clean, soft towel after each shower. Put on clean clothes or pajamas. Use freshly laundered bed linens for the first night.   Do not apply any lotions, perfumes or powders after use.

## 2019-04-09 NOTE — PRE ADMISSION SCREENING
JOINT CAMP ASSESSMENT    Name Boom Blanco   MRN [id number]    Age/Sex 49 y.o. female    Surgeon Dr. Sylvain Gorman   Joint Camp Date 4/9/2019   Surgery Date 4/18/2019   Procedure Right Knee Arthroplasty   Insurance Payor: Iagnosis / Plan: Iagnosis / Product Type: Commercial /    Care Team Patient Care Team:  Sofi Gallagher DO as PCP - General (Family Medicine)  Sylvain Gorman DO as Consulting Physician (Orthopedic Surgery)    Pharmacy   [facility]  [address] [place]  [facility] [address]  Phone: [phone] Fax: [phone]    [facility] - [place], [place] - [address]  [address]  [place] [address]  Phone: [phone] Fax: [phone]     AM-PAC Score   19   Risk Assessment Score 8     Past Medical History:   Diagnosis Date    Anxiety     Anxiety disorder     Arthritis     Bipolar disorder     Chronic pain     Hyperlipidemia     Hypertension     Psoriatic arthritis     Raynaud's disease        Past Surgical History:   Procedure Laterality Date    ARTHROSCOPY, KNEE Right 2/5/2019    Performed [facility] at [facility] OR    ARTHROSCOPY, KNEE Left 1/22/2019    Performed by [facility] at [facility] OR    ARTHROSCOPY, KNEE, WITH CHONDROPLASTY Left 1/22/2019    Performed by [facility] at [facility] OR    ARTHROSCOPY, KNEE, WITH DEBRIDEMENT Left 1/22/2019    [facility]formed by [facility] at [facility] OR    ARTHROSCOPY, KNEE, WITH MENISCECTOMY Left 1/22/2019    Performed by [facility] DO at [facility] OR    BACK SURGERY      spinal stimulator implanted and then removed    BREAST BIOPSY      CHONDROPLASTY, KNEE Right 2/5/2019    Performed by [facility] at [facility] OR    COLONOSCOPY  2016    repeat in 5-10 years    ESOPHAGOGASTRODUODENOSCOPY (EGD) Left 8/29/2018    Performed by Dany Hugo MD at [facility] ENDO    EXCISION, PLICA, KNEE, ARTHROSCOPIC Left 1/22/2019    [facility] at  [facility] OR    HYSTERECTOMY  1997    MENISCECTOMY, KNEE, MEDIAL Right 2/5/2019    [facility] OR    SALPINGOOPHORECTOMY  1997         Home Enviroment     Living Arrangement: Lives with spouse  Home Environment: 2-story house, elevator, number of outside stairs: 18, tub-shower  Home Safety Concerns: Pets in the home: dogs (3).    DISCHARGE CAREGIVER/SUPPORT SYSTEM     Identified post-op caregiver: Patient has children / family / friends to help, spouse and mother.  Patient's caregiver(s) will be able to provide physical assistance. Patient will have someone to assist overnight.      Caregiver present at pre-op interview:  Yes      PRE-OPERATIVE FUNCTIONAL STATUS     Employment: Employed full time    Pre-op Functional Status: Patient is independent with mobility/ambulation, transfers, ADL's, IADL's.    Use of assistive device for ambulation: none  ADL: self care  ADL Limitations: difficulty with walking  Medical Restrictions: Decreased range of motions in extremities    POTENTIAL BARRIERS TO DISCHARGE/POTENTIAL POST-OP COMPLICATIONS     Patient with hx of HTN, chronic pain with long-term opioid use (Percocet 10mg). Patient followed by pain management in [place]. Consider pain management options. Patient has bipolar disorder as well as generalized anxiety.    DISCHARGE PLAN     Expected LOS of 2 days or less for joint replacement discussed with patient.     Patient in agreement with discharge plan: Yes    Discharge to: Outpatient PT     HH:  None due to insurance benefits     OP PT: [facility]     Home DME: shower chair    Needed DME at D/C: rolling walker and bedside commode. Dr. Gorman to order CPM for inpatient stay.     Rx: Per Dr. Gorman at discharge     Meds to Beds: N/A  Patient expected to discharge on Eliquis for DVT prophylaxis.

## 2019-04-11 ENCOUNTER — TELEPHONE (OUTPATIENT)
Dept: ORTHOPEDICS | Facility: CLINIC | Age: 49
End: 2019-04-11

## 2019-04-11 DIAGNOSIS — M17.10 TRICOMPARTMENT OSTEOARTHRITIS OF KNEE, UNSPECIFIED LATERALITY: Primary | ICD-10-CM

## 2019-04-11 NOTE — TELEPHONE ENCOUNTER
Returned call to patient. Patient had many questions about her total joint. Informed patient that Dr. Gorman is currently out of the office until April 15, 2019 but she can call her joint camp nurse for any joint questions. Patient voiced understanding and voiced that she will call the joint camp nurse.

## 2019-04-11 NOTE — TELEPHONE ENCOUNTER
----- Message from Jaycee Mathews sent at 4/11/2019  8:35 AM CDT -----  Type: Needs Medical Advice    Who Called: self   Symptoms (please be specific): How long has patient had these symptoms: Pharmacy name and phone #:  NA  Best Call Back Number: [phone] Additional Information: Patient have questions regarding surgery.

## 2019-04-15 ENCOUNTER — TELEPHONE (OUTPATIENT)
Dept: ORTHOPEDICS | Facility: CLINIC | Age: 49
End: 2019-04-15

## 2019-04-15 DIAGNOSIS — F31.9 BIPOLAR AFFECTIVE DISORDER, REMISSION STATUS UNSPECIFIED: ICD-10-CM

## 2019-04-15 DIAGNOSIS — E55.9 VITAMIN D DEFICIENCY DISEASE: ICD-10-CM

## 2019-04-15 DIAGNOSIS — L40.50 PSORIATIC ARTHRITIS: ICD-10-CM

## 2019-04-15 DIAGNOSIS — I73.00 RAYNAUD'S DISEASE WITHOUT GANGRENE: ICD-10-CM

## 2019-04-15 DIAGNOSIS — E53.1 VITAMIN B6 DEFICIENCY: ICD-10-CM

## 2019-04-15 DIAGNOSIS — F41.9 ANXIETY: ICD-10-CM

## 2019-04-15 NOTE — TELEPHONE ENCOUNTER
Pt called, order for walker 2 wheel and bedside commode received from Dr. Gorman and faxed to [facility] [phone] per pt request.

## 2019-04-15 NOTE — TELEPHONE ENCOUNTER
Spoke with patient to answer all questions. Patient given address and phone number to [facility] in [place] to  HME equipment prior to surgery.

## 2019-04-15 NOTE — TELEPHONE ENCOUNTER
----- Message from Kylie Baker sent at 4/15/2019 10:08 AM CDT -----  Contact: pt  Pt states that she is at [facility] and they are needing orders for walker and bedside toilet faxed over to them. [phone] is there fax number. Pt would like this done as soon as possible,please since she is there...[phone]

## 2019-04-16 ENCOUNTER — HOSPITAL ENCOUNTER (OUTPATIENT)
Dept: RADIOLOGY | Facility: HOSPITAL | Age: 49
Discharge: HOME | End: 2019-04-16
Attending: ORTHOPAEDIC SURGERY
Payer: COMMERCIAL

## 2019-04-16 DIAGNOSIS — Z01.818 PRE-OP EXAM: ICD-10-CM

## 2019-04-16 DIAGNOSIS — M17.10 TRICOMPARTMENT OSTEOARTHRITIS OF KNEE, UNSPECIFIED LATERALITY: ICD-10-CM

## 2019-04-16 DIAGNOSIS — M17.10 TRICOMPARTMENT OSTEOARTHRITIS OF KNEE, UNSPECIFIED LATERALITY: Primary | ICD-10-CM

## 2019-04-16 PROCEDURE — 71045 XR CHEST 1 VIEW PRE-OP: ICD-10-PCS | Mod: 26,,, | Performed by: RADIOLOGY

## 2019-04-16 PROCEDURE — 71045 X-RAY EXAM CHEST 1 VIEW: CPT | Mod: TC,FY

## 2019-04-16 PROCEDURE — 71045 X-RAY EXAM CHEST 1 VIEW: CPT | Mod: 26,,, | Performed by: RADIOLOGY

## 2019-04-17 ENCOUNTER — TELEPHONE (OUTPATIENT)
Dept: ORTHOPEDICS | Facility: CLINIC | Age: 49
End: 2019-04-17

## 2019-04-17 ENCOUNTER — PATIENT MESSAGE (OUTPATIENT)
Dept: RHEUMATOLOGY | Facility: CLINIC | Age: 49
End: 2019-04-17

## 2019-04-17 DIAGNOSIS — I73.00 RAYNAUD'S DISEASE WITHOUT GANGRENE: ICD-10-CM

## 2019-04-17 DIAGNOSIS — F31.9 BIPOLAR AFFECTIVE DISORDER, REMISSION STATUS UNSPECIFIED: ICD-10-CM

## 2019-04-17 DIAGNOSIS — F41.9 ANXIETY: ICD-10-CM

## 2019-04-17 DIAGNOSIS — E55.9 VITAMIN D DEFICIENCY DISEASE: ICD-10-CM

## 2019-04-17 DIAGNOSIS — E53.1 VITAMIN B6 DEFICIENCY: ICD-10-CM

## 2019-04-17 DIAGNOSIS — L40.50 PSORIATIC ARTHRITIS: ICD-10-CM

## 2019-04-17 NOTE — TELEPHONE ENCOUNTER
Pt called, was asking what time to report to hospital for procedure tomorrow. Explained to pt Ms Adams from the OR will call her with time between 1:00pm and 3:00pm today. Notified pt we are here till 5:00pm and to call us back if OR does not call. Pt verbalized understanding

## 2019-04-17 NOTE — TELEPHONE ENCOUNTER
----- Message from Celena Hadley sent at 4/17/2019 12:15 PM CDT -----  Type:  Pharmacy Calling for refill on RX    Name of Caller:  Adali  Pharmacy Name:  [facility]  Prescription Name:  golimumab (SIMPONI) 50 mg/0.5 mL PnIj  What do they need to clarify?: refill  Best Call Back Number:  [phone] option 1  Additional Information:

## 2019-04-17 NOTE — TELEPHONE ENCOUNTER
----- Message from Narendra Washburn sent at 4/17/2019  9:29 AM CDT -----  Type: Needs Medical Advice    Who Called:  Patient    Best Call Back Number: [phone]  Additional Information: Patient states that she would like a callback regarding questions about her procedure on 04/18

## 2019-04-18 ENCOUNTER — HOSPITAL ENCOUNTER (INPATIENT)
Facility: HOSPITAL | Age: 49
LOS: 2 days | Discharge: HOME | DRG: 470 | End: 2019-04-20
Attending: ORTHOPAEDIC SURGERY | Admitting: INTERNAL MEDICINE
Payer: COMMERCIAL

## 2019-04-18 ENCOUNTER — ANESTHESIA (OUTPATIENT)
Dept: SURGERY | Facility: HOSPITAL | Age: 49
DRG: 470 | End: 2019-04-18
Payer: COMMERCIAL

## 2019-04-18 DIAGNOSIS — Z96.651 PRESENCE OF RIGHT ARTIFICIAL KNEE JOINT: Primary | ICD-10-CM

## 2019-04-18 DIAGNOSIS — M17.11 TRICOMPARTMENT OSTEOARTHRITIS OF RIGHT KNEE: ICD-10-CM

## 2019-04-18 DIAGNOSIS — Z01.818 PRE-OP EXAM: ICD-10-CM

## 2019-04-18 LAB
HCT VFR BLD AUTO: 41.9 % (ref 37–48.5)
HGB BLD-MCNC: 13.7 G/DL (ref 12–16)

## 2019-04-18 PROCEDURE — 85018 HEMOGLOBIN: CPT

## 2019-04-18 PROCEDURE — 27447 PR TOTAL KNEE ARTHROPLASTY: ICD-10-PCS | Mod: 78,RT,, | Performed by: ORTHOPAEDIC SURGERY

## 2019-04-18 PROCEDURE — 37000008 HC ANESTHESIA 1ST 15 MINUTES: Performed by: ORTHOPAEDIC SURGERY

## 2019-04-18 PROCEDURE — 64447 NJX AA&/STRD FEMORAL NRV IMG: CPT | Mod: 59,RT,, | Performed by: ANESTHESIOLOGY

## 2019-04-18 PROCEDURE — 36000711: Performed by: ORTHOPAEDIC SURGERY

## 2019-04-18 PROCEDURE — 97116 GAIT TRAINING THERAPY: CPT

## 2019-04-18 PROCEDURE — 36000710: Performed by: ORTHOPAEDIC SURGERY

## 2019-04-18 PROCEDURE — 97161 PT EVAL LOW COMPLEX 20 MIN: CPT

## 2019-04-18 PROCEDURE — 37000009 HC ANESTHESIA EA ADD 15 MINS: Performed by: ORTHOPAEDIC SURGERY

## 2019-04-18 PROCEDURE — 71000033 HC RECOVERY, INTIAL HOUR: Performed by: ORTHOPAEDIC SURGERY

## 2019-04-18 PROCEDURE — 25000003 PHARM REV CODE 250: Performed by: ORTHOPAEDIC SURGERY

## 2019-04-18 PROCEDURE — D9220A PRA ANESTHESIA: ICD-10-PCS | Mod: ,,, | Performed by: ANESTHESIOLOGY

## 2019-04-18 PROCEDURE — 64447 PR NERVE BLOCK INJ, ANES/STEROID, FEMORAL, INCL IMAG GUIDANCE: ICD-10-PCS | Mod: 59,RT,, | Performed by: ANESTHESIOLOGY

## 2019-04-18 PROCEDURE — 25000003 PHARM REV CODE 250: Performed by: INTERNAL MEDICINE

## 2019-04-18 PROCEDURE — C1776 JOINT DEVICE (IMPLANTABLE): HCPCS | Performed by: ORTHOPAEDIC SURGERY

## 2019-04-18 PROCEDURE — 63600175 PHARM REV CODE 636 W HCPCS: Performed by: INTERNAL MEDICINE

## 2019-04-18 PROCEDURE — 27447 TOTAL KNEE ARTHROPLASTY: CPT | Mod: 78,RT,, | Performed by: ORTHOPAEDIC SURGERY

## 2019-04-18 PROCEDURE — 64447 NJX AA&/STRD FEMORAL NRV IMG: CPT | Performed by: ANESTHESIOLOGY

## 2019-04-18 PROCEDURE — 63600175 PHARM REV CODE 636 W HCPCS: Performed by: ORTHOPAEDIC SURGERY

## 2019-04-18 PROCEDURE — D9220A PRA ANESTHESIA: Mod: ,,, | Performed by: ANESTHESIOLOGY

## 2019-04-18 PROCEDURE — 25000003 PHARM REV CODE 250: Performed by: NURSE ANESTHETIST, CERTIFIED REGISTERED

## 2019-04-18 PROCEDURE — 85014 HEMATOCRIT: CPT

## 2019-04-18 PROCEDURE — 71000039 HC RECOVERY, EACH ADD'L HOUR: Performed by: ORTHOPAEDIC SURGERY

## 2019-04-18 PROCEDURE — C1713 ANCHOR/SCREW BN/BN,TIS/BN: HCPCS | Performed by: ORTHOPAEDIC SURGERY

## 2019-04-18 PROCEDURE — 27201423 OPTIME MED/SURG SUP & DEVICES STERILE SUPPLY: Performed by: ORTHOPAEDIC SURGERY

## 2019-04-18 PROCEDURE — 63600175 PHARM REV CODE 636 W HCPCS: Performed by: NURSE ANESTHETIST, CERTIFIED REGISTERED

## 2019-04-18 PROCEDURE — 63600175 PHARM REV CODE 636 W HCPCS: Performed by: ANESTHESIOLOGY

## 2019-04-18 PROCEDURE — 36415 COLL VENOUS BLD VENIPUNCTURE: CPT

## 2019-04-18 PROCEDURE — 11000001 HC ACUTE MED/SURG PRIVATE ROOM

## 2019-04-18 DEVICE — CEMENT BONE CMW2 20G: Type: IMPLANTABLE DEVICE | Site: KNEE | Status: FUNCTIONAL

## 2019-04-18 DEVICE — TRAY TIBIAL CEMENT SZ 3 COBAL: Type: IMPLANTABLE DEVICE | Site: KNEE | Status: FUNCTIONAL

## 2019-04-18 DEVICE — PATELLA OVAL 38MM: Type: IMPLANTABLE DEVICE | Site: KNEE | Status: FUNCTIONAL

## 2019-04-18 DEVICE — INSERT TIBIAL PFC SZ 3 10MM: Type: IMPLANTABLE DEVICE | Site: KNEE | Status: FUNCTIONAL

## 2019-04-18 DEVICE — IMPLANTABLE DEVICE: Type: IMPLANTABLE DEVICE | Site: KNEE | Status: FUNCTIONAL

## 2019-04-18 RX ORDER — IBUPROFEN 400 MG/1
800 TABLET ORAL DAILY
Status: DISCONTINUED | OUTPATIENT
Start: 2019-04-18 | End: 2019-04-20 | Stop reason: HOSPADM

## 2019-04-18 RX ORDER — CEFAZOLIN SODIUM 2 G/50ML
SOLUTION INTRAVENOUS
Status: COMPLETED
Start: 2019-04-18 | End: 2019-04-18

## 2019-04-18 RX ORDER — TRANEXAMIC ACID 100 MG/ML
1000 INJECTION, SOLUTION INTRAVENOUS ONCE
Status: DISCONTINUED | OUTPATIENT
Start: 2019-04-18 | End: 2019-04-20 | Stop reason: HOSPADM

## 2019-04-18 RX ORDER — BUPROPION HYDROCHLORIDE 150 MG/1
150 TABLET ORAL NIGHTLY
Status: DISCONTINUED | OUTPATIENT
Start: 2019-04-18 | End: 2019-04-20 | Stop reason: HOSPADM

## 2019-04-18 RX ORDER — TIZANIDINE 4 MG/1
4 TABLET ORAL NIGHTLY
Status: DISCONTINUED | OUTPATIENT
Start: 2019-04-18 | End: 2019-04-20 | Stop reason: HOSPADM

## 2019-04-18 RX ORDER — DOCUSATE SODIUM 100 MG/1
100 CAPSULE, LIQUID FILLED ORAL 2 TIMES DAILY
Status: DISCONTINUED | OUTPATIENT
Start: 2019-04-18 | End: 2019-04-20 | Stop reason: HOSPADM

## 2019-04-18 RX ORDER — ACETAMINOPHEN 325 MG/1
650 TABLET ORAL EVERY 6 HOURS PRN
Status: DISCONTINUED | OUTPATIENT
Start: 2019-04-18 | End: 2019-04-20 | Stop reason: HOSPADM

## 2019-04-18 RX ORDER — ONDANSETRON 2 MG/ML
4 INJECTION INTRAMUSCULAR; INTRAVENOUS EVERY 6 HOURS PRN
Status: DISCONTINUED | OUTPATIENT
Start: 2019-04-18 | End: 2019-04-20 | Stop reason: HOSPADM

## 2019-04-18 RX ORDER — LISINOPRIL 10 MG/1
20 TABLET ORAL DAILY
Status: DISCONTINUED | OUTPATIENT
Start: 2019-04-18 | End: 2019-04-20 | Stop reason: HOSPADM

## 2019-04-18 RX ORDER — CEFAZOLIN SODIUM 1 G/50ML
1 SOLUTION INTRAVENOUS
Status: COMPLETED | OUTPATIENT
Start: 2019-04-18 | End: 2019-04-19

## 2019-04-18 RX ORDER — PROPOFOL 10 MG/ML
VIAL (ML) INTRAVENOUS
Status: DISCONTINUED | OUTPATIENT
Start: 2019-04-18 | End: 2019-04-18

## 2019-04-18 RX ORDER — ACETAMINOPHEN 500 MG
5 TABLET ORAL
Status: DISCONTINUED | OUTPATIENT
Start: 2019-04-18 | End: 2019-04-18

## 2019-04-18 RX ORDER — PANTOPRAZOLE SODIUM 40 MG/1
40 TABLET, DELAYED RELEASE ORAL DAILY
Status: DISCONTINUED | OUTPATIENT
Start: 2019-04-18 | End: 2019-04-20 | Stop reason: HOSPADM

## 2019-04-18 RX ORDER — CEFAZOLIN SODIUM 2 G/50ML
2 SOLUTION INTRAVENOUS
Status: COMPLETED | OUTPATIENT
Start: 2019-04-18 | End: 2019-04-18

## 2019-04-18 RX ORDER — MORPHINE SULFATE 8 MG/ML
6 INJECTION INTRAMUSCULAR; INTRAVENOUS; SUBCUTANEOUS
Status: DISCONTINUED | OUTPATIENT
Start: 2019-04-18 | End: 2019-04-20 | Stop reason: HOSPADM

## 2019-04-18 RX ORDER — ONDANSETRON 2 MG/ML
INJECTION INTRAMUSCULAR; INTRAVENOUS
Status: DISCONTINUED | OUTPATIENT
Start: 2019-04-18 | End: 2019-04-18

## 2019-04-18 RX ORDER — MEPERIDINE HYDROCHLORIDE 50 MG/ML
INJECTION INTRAMUSCULAR; INTRAVENOUS; SUBCUTANEOUS
Status: DISCONTINUED | OUTPATIENT
Start: 2019-04-18 | End: 2019-04-18

## 2019-04-18 RX ORDER — LIDOCAINE HYDROCHLORIDE 10 MG/ML
INJECTION INFILTRATION; PERINEURAL
Status: DISPENSED
Start: 2019-04-18 | End: 2019-04-18

## 2019-04-18 RX ORDER — MUPIROCIN 20 MG/G
OINTMENT TOPICAL
Status: DISCONTINUED | OUTPATIENT
Start: 2019-04-18 | End: 2019-04-18 | Stop reason: HOSPADM

## 2019-04-18 RX ORDER — MIDAZOLAM HYDROCHLORIDE 1 MG/ML
INJECTION, SOLUTION INTRAMUSCULAR; INTRAVENOUS
Status: DISCONTINUED | OUTPATIENT
Start: 2019-04-18 | End: 2019-04-18

## 2019-04-18 RX ORDER — MORPHINE SULFATE 2 MG/ML
6 INJECTION, SOLUTION INTRAMUSCULAR; INTRAVENOUS
Status: DISCONTINUED | OUTPATIENT
Start: 2019-04-18 | End: 2019-04-18

## 2019-04-18 RX ORDER — SODIUM CHLORIDE 0.9 % (FLUSH) 0.9 %
3 SYRINGE (ML) INJECTION
Status: DISCONTINUED | OUTPATIENT
Start: 2019-04-18 | End: 2019-04-20 | Stop reason: HOSPADM

## 2019-04-18 RX ORDER — FLUTICASONE PROPIONATE 50 MCG
1 SPRAY, SUSPENSION (ML) NASAL 2 TIMES DAILY PRN
Status: DISCONTINUED | OUTPATIENT
Start: 2019-04-18 | End: 2019-04-20 | Stop reason: HOSPADM

## 2019-04-18 RX ORDER — SODIUM CHLORIDE, SODIUM LACTATE, POTASSIUM CHLORIDE, CALCIUM CHLORIDE 600; 310; 30; 20 MG/100ML; MG/100ML; MG/100ML; MG/100ML
INJECTION, SOLUTION INTRAVENOUS CONTINUOUS PRN
Status: DISCONTINUED | OUTPATIENT
Start: 2019-04-18 | End: 2019-04-18

## 2019-04-18 RX ORDER — LEVOTHYROXINE SODIUM 25 UG/1
50 TABLET ORAL
Status: DISCONTINUED | OUTPATIENT
Start: 2019-04-19 | End: 2019-04-20 | Stop reason: HOSPADM

## 2019-04-18 RX ORDER — MEPERIDINE HYDROCHLORIDE 50 MG/ML
50 INJECTION INTRAMUSCULAR; INTRAVENOUS; SUBCUTANEOUS ONCE
Status: COMPLETED | OUTPATIENT
Start: 2019-04-18 | End: 2019-04-18

## 2019-04-18 RX ORDER — OXYCODONE AND ACETAMINOPHEN 10; 325 MG/1; MG/1
1 TABLET ORAL EVERY 4 HOURS PRN
Status: DISCONTINUED | OUTPATIENT
Start: 2019-04-18 | End: 2019-04-20 | Stop reason: HOSPADM

## 2019-04-18 RX ORDER — LOVASTATIN 20 MG/1
20 TABLET ORAL NIGHTLY
Status: DISCONTINUED | OUTPATIENT
Start: 2019-04-18 | End: 2019-04-18

## 2019-04-18 RX ADMIN — PROPOFOL 50 MG: 10 INJECTION, EMULSION INTRAVENOUS at 08:04

## 2019-04-18 RX ADMIN — CEFAZOLIN SODIUM 2 G: 2 SOLUTION INTRAVENOUS at 07:04

## 2019-04-18 RX ADMIN — TIZANIDINE 4 MG: 4 TABLET ORAL at 09:04

## 2019-04-18 RX ADMIN — MORPHINE SULFATE 6 MG: 8 INJECTION, SOLUTION INTRAMUSCULAR; INTRAVENOUS at 12:04

## 2019-04-18 RX ADMIN — SODIUM CHLORIDE, POTASSIUM CHLORIDE, SODIUM LACTATE AND CALCIUM CHLORIDE: 600; 310; 30; 20 INJECTION, SOLUTION INTRAVENOUS at 07:04

## 2019-04-18 RX ADMIN — OXYCODONE HYDROCHLORIDE AND ACETAMINOPHEN 1 TABLET: 10; 325 TABLET ORAL at 05:04

## 2019-04-18 RX ADMIN — ONDANSETRON 4 MG: 2 INJECTION INTRAMUSCULAR; INTRAVENOUS at 08:04

## 2019-04-18 RX ADMIN — CEFAZOLIN SODIUM 1 G: 1 SOLUTION INTRAVENOUS at 11:04

## 2019-04-18 RX ADMIN — LISINOPRIL 20 MG: 10 TABLET ORAL at 12:04

## 2019-04-18 RX ADMIN — OXYCODONE HYDROCHLORIDE AND ACETAMINOPHEN 1 TABLET: 10; 325 TABLET ORAL at 09:04

## 2019-04-18 RX ADMIN — CEFAZOLIN SODIUM 1 G: 1 SOLUTION INTRAVENOUS at 02:04

## 2019-04-18 RX ADMIN — MEPERIDINE HYDROCHLORIDE 10 MG: 50 INJECTION INTRAMUSCULAR; INTRAVENOUS; SUBCUTANEOUS at 10:04

## 2019-04-18 RX ADMIN — MORPHINE SULFATE 6 MG: 8 INJECTION, SOLUTION INTRAMUSCULAR; INTRAVENOUS at 11:04

## 2019-04-18 RX ADMIN — PROPOFOL 100 MG: 10 INJECTION, EMULSION INTRAVENOUS at 07:04

## 2019-04-18 RX ADMIN — MEPERIDINE HYDROCHLORIDE 20 MG: 50 INJECTION INTRAMUSCULAR; INTRAVENOUS; SUBCUTANEOUS at 10:04

## 2019-04-18 RX ADMIN — DOCUSATE SODIUM 100 MG: 100 CAPSULE, LIQUID FILLED ORAL at 09:04

## 2019-04-18 RX ADMIN — MEPERIDINE HYDROCHLORIDE 25 MG: 50 INJECTION INTRAMUSCULAR; INTRAVENOUS; SUBCUTANEOUS at 07:04

## 2019-04-18 RX ADMIN — PROPOFOL 50 MG: 10 INJECTION, EMULSION INTRAVENOUS at 07:04

## 2019-04-18 RX ADMIN — MEPERIDINE HYDROCHLORIDE 10 MG: 50 INJECTION INTRAMUSCULAR; INTRAVENOUS; SUBCUTANEOUS at 09:04

## 2019-04-18 RX ADMIN — PANTOPRAZOLE SODIUM 40 MG: 40 TABLET, DELAYED RELEASE ORAL at 12:04

## 2019-04-18 RX ADMIN — MIDAZOLAM HYDROCHLORIDE 2 MG: 1 INJECTION, SOLUTION INTRAMUSCULAR; INTRAVENOUS at 07:04

## 2019-04-18 RX ADMIN — OXYCODONE HYDROCHLORIDE AND ACETAMINOPHEN 1 TABLET: 10; 325 TABLET ORAL at 11:04

## 2019-04-18 RX ADMIN — MEPERIDINE HYDROCHLORIDE 25 MG: 50 INJECTION INTRAMUSCULAR; INTRAVENOUS; SUBCUTANEOUS at 08:04

## 2019-04-18 RX ADMIN — BUPROPION HYDROCHLORIDE 150 MG: 150 TABLET, FILM COATED, EXTENDED RELEASE ORAL at 09:04

## 2019-04-18 RX ADMIN — SODIUM CHLORIDE, POTASSIUM CHLORIDE, SODIUM LACTATE AND CALCIUM CHLORIDE: 600; 310; 30; 20 INJECTION, SOLUTION INTRAVENOUS at 08:04

## 2019-04-18 RX ADMIN — PROPOFOL 50 MG: 10 INJECTION, EMULSION INTRAVENOUS at 09:04

## 2019-04-18 RX ADMIN — MORPHINE SULFATE 6 MG: 8 INJECTION, SOLUTION INTRAMUSCULAR; INTRAVENOUS at 05:04

## 2019-04-18 RX ADMIN — MUPIROCIN: 20 OINTMENT TOPICAL at 07:04

## 2019-04-18 RX ADMIN — DOCUSATE SODIUM 100 MG: 100 CAPSULE, LIQUID FILLED ORAL at 12:04

## 2019-04-18 RX ADMIN — RIVAROXABAN 10 MG: 10 TABLET, FILM COATED ORAL at 09:04

## 2019-04-18 RX ADMIN — MORPHINE SULFATE 6 MG: 8 INJECTION, SOLUTION INTRAMUSCULAR; INTRAVENOUS at 02:04

## 2019-04-18 RX ADMIN — IBUPROFEN 800 MG: 400 TABLET ORAL at 12:04

## 2019-04-18 NOTE — SUBJECTIVE & OBJECTIVE
Past Medical History:   Diagnosis Date    Anxiety disorder     Arthritis     Bipolar disorder     Chronic pain     Hyperlipidemia     Hypertension     Psoriatic arthritis     Raynaud's disease        Past Surgical History:   Procedure Laterality Date    ARTHROSCOPY, KNEE Right 2/5/2019    [facility]    ARTHROSCOPY, KNEE Left 1/22/2019    [facility]    ARTHROSCOPY, KNEE, WITH CHONDROPLASTY Left 1/22/2019    [facility]    ARTHROSCOPY, KNEE, WITH DEBRIDEMENT Left 1/22/2019    [facility]    ARTHROSCOPY, KNEE, WITH MENISCECTOMY Left 1/22/2019    [facility]    BACK SURGERY      spinal stimulator implanted and then removed    BREAST BIOPSY      CHONDROPLASTY, KNEE Right 2/5/2019    [facility]    COLONOSCOPY  2016    repeat in 5-10 years    ESOPHAGOGASTRODUODENOSCOPY (EGD) Left 8/29/2018    [facility] END[facility]    EXCISION, PLICA, KNEE, ARTHROSCOPIC Left 1/22/2019    [facility] at [facility]    HYSTERECTOMY  1997    MENISCECTOMY, KNEE, MEDIAL Right 2/5/2019    [facility]    SALPINGOOPHORECTOMY  1997       Review of patient's allergies indicates:   Allergen Reactions    Remeron [mirtazapine] Other (See Comments)     Weight gain       Current Facility-Administered Medications on File Prior to Encounter   Medication    lactated ringers infusion    lactated ringers infusion    lidocaine (PF) 10 mg/ml (1%) injection 10 mg    morphine injection 2 mg    ondansetron injection 4 mg    promethazine (PHENERGAN) 6.25 mg in dextrose 5 % 50 mL IVPB     Current Outpatient Medications on File Prior to Encounter   Medication Sig    cariprazine (VRAYLAR) 1.5 mg Cap Take 1.5 mg by mouth every evening.    docusate sodium (COLACE) 100  MG capsule Take 1 capsule (100 mg total) by mouth 2 (two) times daily. Drink a 12 oz glass of water with each capsule.    fluticasone (FLONASE) 50 mcg/actuation nasal spray 1 spray (50 mcg total) by Each Nare route 2 (two) times daily as needed for Rhinitis.    golimumab (SIMPONI) 50 mg/0.5 mL PnIj Inject 50 mg into the skin every 30 days.    ibuprofen (ADVIL,MOTRIN) 800 MG tablet Take 800 mg by mouth once daily.     levothyroxine (SYNTHROID) 50 MCG tablet levothyroxine 50 mcg tablet   TK 1 T PO  QAM    lisinopril (PRINIVIL,ZESTRIL) 20 MG tablet Take 1 tablet (20 mg total) by mouth once daily.    lovastatin (MEVACOR) 20 MG tablet Take 1 tablet (20 mg total) by mouth every evening.    melatonin 5 mg Tab Take 5 mg by mouth.    oxyCODONE-acetaminophen (PERCOCET)  mg per tablet Take 1 tablet by mouth 2 (two) times daily.    pantoprazole (PROTONIX) 40 MG tablet Take 1 tablet (40 mg total) by mouth once daily. Take in the morning before breakfast.  Wait 30 minutes before eating or drinking anything    tiZANidine (ZANAFLEX) 4 MG tablet Take 4 mg by mouth every evening.     buPROPion (WELLBUTRIN XL) 150 MG TB24 tablet Take 150 mg by mouth every evening.     Family History     Problem Relation (Age of Onset)    Arthritis Mother, Sister, Brother, Sister, Sister    Heart disease Father    Lung disease Father    Pacemaker/defibrilator Mother        Tobacco Use    Smoking status: Former Smoker     Years: 5.00     Types: Vaping w/o nicotine, Cigarettes    Smokeless tobacco: Never Used    Tobacco comment: vape   Substance and Sexual Activity    Alcohol use: No     Frequency: Never    Drug use: No    Sexual activity: Not Currently     Review of Systems   Constitutional: Negative for activity change, appetite change, fatigue and fever.   HENT: Negative for congestion, ear discharge, mouth sores, nosebleeds, rhinorrhea, sinus pressure, sinus pain and tinnitus.    Eyes: Negative.  Negative for pain, redness and  itching.   Respiratory: Negative for apnea, cough, choking, chest tightness, shortness of breath, wheezing and stridor.    Cardiovascular: Negative for chest pain, palpitations and leg swelling.   Gastrointestinal: Negative for abdominal distention, abdominal pain, anal bleeding, blood in stool, constipation and diarrhea.   Endocrine: Negative.    Genitourinary: Negative for difficulty urinating, flank pain, frequency and urgency.   Musculoskeletal: Positive for arthralgias, joint swelling and myalgias. Negative for back pain and gait problem.   Skin: Negative for color change and pallor.   Allergic/Immunologic: Negative.    Neurological: Positive for weakness. Negative for dizziness, facial asymmetry, light-headedness and headaches.   Hematological: Negative for adenopathy. Does not bruise/bleed easily.   Psychiatric/Behavioral: The patient is nervous/anxious.      Objective:     Vital Signs (Most Recent):  Temp: 96.5 °F (35.8 °C) (04/18/19 1103)  Pulse: 68 (04/18/19 1103)  Resp: 18 (04/18/19 1103)  BP: (!) 185/87 (04/18/19 1103)  SpO2: 98 % (04/18/19 1103) Vital Signs (24h Range):  Temp:  [96.5 °F (35.8 °C)-98.5 °F (36.9 °C)] 96.5 °F (35.8 °C)  Pulse:  [67-82] 68  Resp:  [9-20] 18  SpO2:  [96 %-99 %] 98 %  BP: (122-185)/(67-92) 185/87     Weight: 74.8 kg (165 lb)  Body mass index is 24.37 kg/m².    Physical Exam   Constitutional: She is oriented to person, place, and time. She appears well-developed and well-nourished.   HENT:   Head: Normocephalic and atraumatic.   Eyes: Pupils are equal, round, and reactive to light. EOM are normal.   Neck: Normal range of motion. Neck supple. No tracheal deviation present. No thyromegaly present.   Cardiovascular: Normal rate, regular rhythm and normal heart sounds.   Pulmonary/Chest: Breath sounds normal. No respiratory distress. She has no wheezes. She has no rales. She exhibits no tenderness.   Abdominal: Soft. Bowel sounds are normal. She exhibits no distension. There is no  tenderness. There is no rebound and no guarding.   Musculoskeletal: Normal range of motion.   Lymphadenopathy:     She has no cervical adenopathy.   Neurological: She is alert and oriented to person, place, and time.   Skin: Skin is warm and dry. Capillary refill takes less than 2 seconds.   Psychiatric: She has a normal mood and affect. Her behavior is normal. Judgment and thought content normal.   Nursing note and vitals reviewed.        CRANIAL NERVES     CN III, IV, VI   Pupils are equal, round, and reactive to light.  Extraocular motions are normal.        Significant Labs:   Recent Lab Results       04/18/19  1035        Hematocrit 41.9     Hemoglobin 13.7         All pertinent labs within the past 24 hours have been reviewed.    Significant Imaging: I have reviewed and interpreted all pertinent imaging results/findings within the past 24 hours.

## 2019-04-18 NOTE — PLAN OF CARE
04/18/19 1136   Discharge Assessment   Assessment Type Discharge Planning Assessment   Confirmed/corrected address and phone number on facesheet? Yes   Assessment information obtained from? Patient   Expected Length of Stay (days) 2   Communicated expected length of stay with patient/caregiver yes   Prior to hospitilization cognitive status: Alert/Oriented   Prior to hospitalization functional status: Independent   Current cognitive status: Alert/Oriented   Current Functional Status: Needs Assistance   Lives With spouse   Able to Return to Prior Arrangements yes   Is patient able to care for self after discharge? Yes  (with assistance)   Who are your caregiver(s) and their phone number(s)? Ivan Blanco spouse [phone]; Yobani Platt mother [phone]   Patient's perception of discharge disposition home or selfcare   Readmission Within the Last 30 Days no previous admission in last 30 days   Patient currently being followed by outpatient case management? No   Patient currently receives any other outside agency services? No   Equipment Currently Used at Home commode;walker, rolling   Do you have any problems affording any of your prescribed medications? No   Is the patient taking medications as prescribed? yes   Does the patient have transportation home? Yes   Transportation Anticipated family or friend will provide   Does the patient receive services at the [facility]? No   Discharge Plan A Home with family   DME Needed Upon Discharge  none   Patient/Family in Agreement with Plan yes   Patient lives at home with her spouse. Her mom will be also staying with them to assist with her care. She already has her walker which is in the car & her 3 in 1 commode chair. Inquiring about the ice machine she is suppose to have on. Spoke to Deep in OR & he will check on it. Patient signed the paper for not wanting the CPM machine while at home as would cost about $350 for 21 days. Instructed on her appointment to  see physical therapy here on Monday at 4:00pm. Denies any other discharge needs at this time.

## 2019-04-18 NOTE — H&P
Chief Complaint: Pain of the Left Knee and Pain of the Right Knee      HPI:  Ms. 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 She originally had 2-3 years of bilateral knee pain increasing intensity.  She has psoriatic arthritis. Walking and extending her knee for extended time increases her symptoms while rest occasionally improves it. She has swelling and giving, but denied locking..  She has taken NSAIDs without help .  She intermittently wears a brace which helps. She has done home exercises/physical therapy with minimal help.  She can walk approximately 50 yd and climb 2-3 stairs.  She does not use an ambulatory assistive device for              Past Medical History:   Diagnosis Date    Anxiety      Anxiety disorder      Arthritis      Bipolar disorder      Chronic pain      Hyperlipidemia      Hypertension      Psoriatic arthritis       *  *  *  *  * Raynaud's disease  Psoriasis  Seasonal allergies  Depression  Lupus  Chronic pain management                  Past Surgical History:   Procedure Laterality Date    BACK SURGERY         spinal stimulator implanted and then removed    COLONOSCOPY   2016     repeat in 5-10 years    ESOPHAGOGASTRODUODENOSCOPY Left 8/29/2018     Procedure: ESOPHAGOGASTRODUODENOSCOPY (EGD);  Surgeon: Dany Hugo MD;  Location: [facility] ENDO;  Service: General;  Laterality: Left;    ESOPHAGOGASTRODUODENOSCOPY (EGD) Left 8/29/2018     Performed by Dany Hugo MD at [facility] ENDO    HYSTERECTOMY   1997     *  * SALPINGOOPHORECTOMY  ARTHROSCOPY, LEFT KNEE.  ARTHROSCOPY, RIGHT KNEE.   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   Tobacco Use    Smoking  status: Current Every Day Smoker       Types: Cigarettes    Smokeless tobacco: Never Used    Tobacco comment: vape   Substance and Sexual Activity    Alcohol use: No       Frequency: Never    Drug use: No    Sexual activity: Not Currently                Family History   Problem Relation Age of Onset    Arthritis Mother      Pacemaker/defibrilator Mother      Lung disease Father      Heart disease Father      Arthritis Sister      Arthritis Brother      Arthritis Sister      Arthritis Sister           Previous Hospitalizations:  Childbirth, anemia requiring blood transfusion.     ROS:  No new diagnosis/surgery/prescriptions since last office visit on 02/18/2019.  Constitution: Negative for chills and fever.   Eyes: Negative for blurred vision.   Cardiovascular: Negative for chest pain.   Respiratory: Negative for cough.    Endocrine: Negative for polydipsia.   Hematologic/Lymphatic: Does not bruise/bleed easily.   Skin: Negative for itching.   Musculoskeletal: Positive for back pain.   Gastrointestinal: Negative for heartburn.   Genitourinary: Negative for nocturia.   Neurological: Negative for headaches and seizures.   Psychiatric/Behavioral: Negative for substance abuse.   Allergic/Immunologic: Positive for environmental allergies.       Objective:   Physical Exam:   General: AAOx3.  No acute distress  HEENT: Normocephalic, PEARLA EOMI, Good Dentition  Neck: Supple, No JVD  Chest: Symetric, equal excursion on inspiration  Abdomen: Soft NTND  Vascular:  Pulses intact and equal bilaterally.  Capillary refill less than 3 seconds and equal bilaterally  Neurologic:  Pinprick and soft touch intact and equal bilaterally  Integment:  No ecchymosis, no errythema.  Tattoo left ankle  Extremity:  Knee:  Extension/flexion equal bilaterally 0/128 degrees. Mild effusion right knee.  Mild crepitus with motion both knees.  Baker cyst both knees.  Positive patellar load/compression right knee.  Negative patella  apprehension/relocation both knees.  Varus/valgus stressing equal bilaterally with endpoint.  Lachman's/drawer equal bilaterally with endpoint.  Positive joint line tenderness right knee.  Penny mildly negative both knees.  Treutlen positive right knee.  Nontender with palpation anserine insertion both knees.  No swelling at the anserine insertion both knees.  Radiography:   Previous x-rays of both knees completed on 12/03/2018 showed medial compartmental narrowing with the right greater than the left and and medial femoral condyle osteophyte with  patellofemoral arthritic changes. Intraoperative arthroscopy pictures of the right knee from 02/05/2019 showed grade 4 tricompartmental chondromalacia.     Assessment:      Impression:       1. Psoriatic arthritis, right knee.   2. Baker cyst, bilateral knee   3. Grade 4 tricompartmental chondromalacia, right knee.         Plan:      1.  Discussed physical examination with the patient.  Rediscussed with the patient possibly continueing with physical therapy or proceeding with total knee arthroplasty. She stated she still wants to proceed with total knee arthroplasty. Total knee arthroplasty was discussed in detail with the patient.  2.  Possible complications of surgery to include bleeding, infection, scarring, nerve/blood vessel/tendon damage, need for further surgery, failed surgery, failure to improve, possible persistent pain, possible leg-length discrepancy, possible arthrofibrosis, possible malrotation, possible fracture and possible amputation were rediscussed with the patient.  The patient was permitted to ask questions and all concerns were addressed to her satisfaction.  3.  Reviewed consent for right total knee arthroplasty.  4.  Proceed with surgery today.  5.  All pain meds for postop per the patient's pain management physician.  A letter will be forwarded to the patient's pain management physician notifying them of her pending surgery so they can adjust  her medications appropriately  6.   continue to ambulate with weight-bearing at tolerance.  7.  Continue with home exercises.  8.  Continue with NSAIDs as tolerated and allowed by PCM.  9.  [facility] portal was discussed with the patient and information was given.  The patient was encouraged to use the [facility] portal for future encounters.  9.  Follow up postoperatively.

## 2019-04-18 NOTE — NURSING
TO FLOOR VIA BED FROM PACU. S/P RT. TOTAL KNEE. ACE WRAP FROM THIGH DOWN TO FOOT. MOVES TOES WELL WITH BRISK CAPILLARY REFILL. CPM IN PLACE @ 30. PT. TOLERATING WELL. ICE PACK IN PLACE. BED IN LOW AND LOCKED POSITION WITH TRAPEZE IN PLACE. ALSO EXPLAINED USAGE. VERBALIZED UNDERSTANDING. ORIENTED TO ROOM AND CALL LIGHT PLACED WITHIN REACH.

## 2019-04-18 NOTE — ASSESSMENT & PLAN NOTE
4/18/19:  1.  Manage post operatively for medical problem.  Monitor BP and Blood Glucose with pain control  2.  Repeat labs in the AM  3.  Continue current home meds  3 Follow with Dr. Negron as needed  Appreciate Dr. Negron allowing me to be involved in the care of this patient

## 2019-04-18 NOTE — OR NURSING
0850  PLACED 6 RAY TEKS SOAKED WITH THROMBIN 2 VIALS IN RIGHT KNEE.  0855 REMOVED 6 RAY TECKS AT THIS TIME

## 2019-04-18 NOTE — ANESTHESIA PROCEDURE NOTES
Spinal    Diagnosis: Right total knee  Patient location during procedure: OR  Start time: 4/18/2019 7:31 AM  Timeout: 4/18/2019 7:30 AM  End time: 4/18/2019 7:40 AM  Staffing  Anesthesiologist: Jacob Bolanos MD  Performed: anesthesiologist   Preanesthetic Checklist  Completed: patient identified, site marked, surgical consent, pre-op evaluation, timeout performed, IV checked, risks and benefits discussed and monitors and equipment checked  Spinal Block  Patient position: sitting  Prep: Betadine  Patient monitoring: continuous pulse ox and frequent blood pressure checks  Approach: midline  Location: L3-4  Injection technique: single shot  Needle  Needle type: Quincke   Needle gauge: 25 G  Needle length: 5 in  Additional Documentation: incremental injection and negative aspiration for heme  Needle localization: anatomical landmarks  Assessment  Sensory level: T8   Dermatomal levels determined by pinch or prick  Ease of block: easy  Patient's tolerance of the procedure: comfortable throughout block and no complaints  Additional Notes  Spinal dosed with spinal marcaine 1.8 ml

## 2019-04-18 NOTE — H&P
[facility] Medicine  History & Physical    Patient Name: Boom Blanco  MRN: [id number]  Admission Date: 4/18/2019  Attending Physician: Dallin Delarosa MD  Primary Care Provider: Sofi Gallagher DO         Patient information was obtained from patient and ER records.     Subjective:     Principal Problem:Tricompartment osteoarthritis of right knee    Chief Complaint: No chief complaint on file.       HPI: Patient is 49 year old that is just S/P Total Right knee replacement.  I have been asked by Dr. Negron to manage medically post operatively.  Patient underwent uncomplicated total knee replacement.  Now post op and doing well.  Patient with PMH for Anxiety, Osteoarthritis, Psoriatic arthritis, Hypertension, Hyperlipidemia and Reynauds phenomenon    Past Medical History:   Diagnosis Date    Anxiety disorder     Arthritis     Bipolar disorder     Chronic pain     Hyperlipidemia     Hypertension     Psoriatic arthritis     Raynaud's disease        Past Surgical History:   Procedure Laterality Date    ARTHROSCOPY, KNEE Right 2/5/2019    [facility]formed [facility] at [facility] OR    ARTHROSCOPY, KNEE Left 1/22/2019    Per[facility] by [facility] at [facility] OR    ARTHROSCOPY, KNEE, WITH CHONDROPLASTY Left 1/22/2019    Performed by [facility] at [facility] OR    ARTHROSCOPY, KNEE, WITH DEBRIDEMENT Left 1/22/2019    Performed by [facility] at [facility] OR    ARTHROSCOPY, KNEE, WITH MENISCECTOMY Left 1/22/2019    Performed by [facility] DO at [facility] OR    BACK SURGERY      spinal stimulator implanted and then removed    BREAST BIOPSY      CHONDROPLASTY, KNEE Right 2/5/2019    [facility] at [facility] OR    COLONOSCOPY  2016    repeat in 5-10 years    ESOPHAGOGASTRODUODENOSCOPY (EGD) Left 8/29/2018    Performed by Dany Hugo MD at [facility] ENDO    EXCISION, PLICA, KNEE, ARTHROSCOPIC Left 1/22/2019    [facility]    HYSTERECTOMY  1997    MENISCECTOMY, KNEE, MEDIAL Right 2/5/2019    Performed by [facility] at [facility] OR    SALPINGOOPHORECTOMY  1997       Review of patient's allergies indicates:   Allergen Reactions    Remeron [mirtazapine] Other (See Comments)     Weight gain       Current Facility-Administered Medications on File Prior to Encounter   Medication    lactated ringers infusion    lactated ringers infusion    lidocaine (PF) 10 mg/ml (1%) injection 10 mg    morphine injection 2 mg    ondansetron injection 4 mg    promethazine (PHENERGAN) 6.25 mg in dextrose 5 % 50 mL IVPB     Current Outpatient Medications on File Prior to Encounter   Medication Sig    cariprazine (VRAYLAR) 1.5 mg Cap Take 1.5 mg by mouth every evening.    docusate sodium (COLACE) 100 MG capsule Take 1 capsule (100 mg total) by mouth 2 (two) times daily. Drink a 12 oz glass of water with each capsule.    fluticasone (FLONASE) 50 mcg/actuation nasal spray 1 spray (50 mcg total) by Each Nare route 2 (two) times daily as needed for Rhinitis.    golimumab (SIMPONI) 50 mg/0.5 mL PnIj Inject 50 mg into the skin every 30 days.    ibuprofen (ADVIL,MOTRIN) 800 MG tablet Take 800 mg by mouth once daily.     levothyroxine (SYNTHROID) 50 MCG tablet levothyroxine 50 mcg tablet   TK 1 T PO  QAM    lisinopril (PRINIVIL,ZESTRIL) 20 MG tablet Take 1 tablet (20 mg total) by mouth once daily.    lovastatin (MEVACOR) 20 MG tablet Take 1 tablet (20 mg total) by mouth every evening.    melatonin 5 mg Tab Take 5 mg by mouth.    oxyCODONE-acetaminophen (PERCOCET)  mg per tablet Take 1 tablet by mouth 2 (two) times daily.    pantoprazole (PROTONIX) 40 MG tablet Take 1 tablet (40 mg total) by mouth once daily. Take in the morning before breakfast.  Wait 30 minutes before eating or drinking anything    tiZANidine (ZANAFLEX) 4 MG tablet Take 4 mg by mouth every evening.     buPROPion (WELLBUTRIN XL) 150 MG  TB24 tablet Take 150 mg by mouth every evening.     Family History     Problem Relation (Age of Onset)    Arthritis Mother, Sister, Brother, Sister, Sister    Heart disease Father    Lung disease Father    Pacemaker/defibrilator Mother        Tobacco Use    Smoking status: Former Smoker     Years: 5.00     Types: Vaping w/o nicotine, Cigarettes    Smokeless tobacco: Never Used    Tobacco comment: vape   Substance and Sexual Activity    Alcohol use: No     Frequency: Never    Drug use: No    Sexual activity: Not Currently     Review of Systems   Constitutional: Negative for activity change, appetite change, fatigue and fever.   HENT: Negative for congestion, ear discharge, mouth sores, nosebleeds, rhinorrhea, sinus pressure, sinus pain and tinnitus.    Eyes: Negative.  Negative for pain, redness and itching.   Respiratory: Negative for apnea, cough, choking, chest tightness, shortness of breath, wheezing and stridor.    Cardiovascular: Negative for chest pain, palpitations and leg swelling.   Gastrointestinal: Negative for abdominal distention, abdominal pain, anal bleeding, blood in stool, constipation and diarrhea.   Endocrine: Negative.    Genitourinary: Negative for difficulty urinating, flank pain, frequency and urgency.   Musculoskeletal: Positive for arthralgias, joint swelling and myalgias. Negative for back pain and gait problem.   Skin: Negative for color change and pallor.   Allergic/Immunologic: Negative.    Neurological: Positive for weakness. Negative for dizziness, facial asymmetry, light-headedness and headaches.   Hematological: Negative for adenopathy. Does not bruise/bleed easily.   Psychiatric/Behavioral: The patient is nervous/anxious.      Objective:     Vital Signs (Most Recent):  Temp: 96.5 °F (35.8 °C) (04/18/19 1103)  Pulse: 68 (04/18/19 1103)  Resp: 18 (04/18/19 1103)  BP: (!) 185/87 (04/18/19 1103)  SpO2: 98 % (04/18/19 1103) Vital Signs (24h Range):  Temp:  [96.5 °F (35.8 °C)-98.5  °F (36.9 °C)] 96.5 °F (35.8 °C)  Pulse:  [67-82] 68  Resp:  [9-20] 18  SpO2:  [96 %-99 %] 98 %  BP: (122-185)/(67-92) 185/87     Weight: 74.8 kg (165 lb)  Body mass index is 24.37 kg/m².    Physical Exam   Constitutional: She is oriented to person, place, and time. She appears well-developed and well-nourished.   HENT:   Head: Normocephalic and atraumatic.   Eyes: Pupils are equal, round, and reactive to light. EOM are normal.   Neck: Normal range of motion. Neck supple. No tracheal deviation present. No thyromegaly present.   Cardiovascular: Normal rate, regular rhythm and normal heart sounds.   Pulmonary/Chest: Breath sounds normal. No respiratory distress. She has no wheezes. She has no rales. She exhibits no tenderness.   Abdominal: Soft. Bowel sounds are normal. She exhibits no distension. There is no tenderness. There is no rebound and no guarding.   Musculoskeletal: Normal range of motion.   Lymphadenopathy:     She has no cervical adenopathy.   Neurological: She is alert and oriented to person, place, and time.   Skin: Skin is warm and dry. Capillary refill takes less than 2 seconds.   Psychiatric: She has a normal mood and affect. Her behavior is normal. Judgment and thought content normal.   Nursing note and vitals reviewed.        CRANIAL NERVES     CN III, IV, VI   Pupils are equal, round, and reactive to light.  Extraocular motions are normal.        Significant Labs:   Recent Lab Results       04/18/19  1035        Hematocrit 41.9     Hemoglobin 13.7         All pertinent labs within the past 24 hours have been reviewed.    Significant Imaging: I have reviewed and interpreted all pertinent imaging results/findings within the past 24 hours.    Assessment/Plan:     * Tricompartment osteoarthritis of right knee  4/18/19:  1.  Manage post operatively for medical problem.  Monitor BP and Blood Glucose with pain control  2.  Repeat labs in the AM  3.  Continue current home meds  3 Follow with Dr. Negron as  needed  Appreciate Dr. Negron allowing me to be involved in the care of this patient        VTE Risk Mitigation (From admission, onward)        Ordered     rivaroxaban tablet 10 mg  With dinner      04/18/19 1017     Place KAROLINA hose  Until discontinued      04/18/19 1012     Place sequential compression device  Until discontinued      04/18/19 1012             Dallin Delarosa MD  Department of Hospital Medicine   [facility]

## 2019-04-18 NOTE — PLAN OF CARE
optiflex machine fitted to patient and initiated. Initiated at 0-10. Increased to 0-15 5 min later. Patient tolerating well.

## 2019-04-18 NOTE — PT/OT/SLP EVAL
PhysicalTherapy   Evaluation    Boom Blanco   MRN: [id number]     PT Received On: 04/18/19  PT Start Time: 1310     PT Stop Time: 1350    PT Total Time (min): 40 min       Billable Minutes:  Evaluation 20 and Gait [id number]  Total Minutes: 40    Diagnosis: Tricompartment osteoarthritis of right knee  Past Medical History:   Diagnosis Date    Anxiety disorder     Arthritis     Bipolar disorder     Chronic pain     Hyperlipidemia     Hypertension     Psoriatic arthritis     Raynaud's disease       Past Surgical History:   Procedure Laterality Date    ARTHROSCOPY, KNEE Right 2/5/2019    Performed by [facility] at [facility] OR    ARTHROSCOPY, KNEE Left 1/22/2019    Per[facility] by [facility] at [facility] OR    ARTHROSCOPY, KNEE, WITH CHONDROPLASTY Left 1/22/2019    Performed by [facility] at [facility] OR    ARTHROSCOPY, KNEE, WITH DEBRIDEMENT Left 1/22/2019    Performed by [facility] at [facility] OR    ARTHROSCOPY, KNEE, WITH MENISCECTOMY Left 1/22/2019    Per[facility] at [facility] OR    BACK SURGERY      spinal stimulator implanted and then removed    BREAST BIOPSY      CHONDROPLASTY, KNEE Right 2/5/2019    [facility] at [facility] OR    COLONOSCOPY  2016    repeat in 5-10 years    ESOPHAGOGASTRODUODENOSCOPY (EGD) Left 8/29/2018    Performed by Dany Hugo MD at [facility] ENDO    EXCISION, PLICA, KNEE, ARTHROSCOPIC Left 1/22/2019    Performed by [facility] at [facility] OR    HYSTERECTOMY  1997    MENISCECTOMY, KNEE, MEDIAL Right 2/5/2019    Performed by [facility] DO at [facility] OR    SALPINGOOPHORECTOMY  1997       Referring physician: Mia Delarosa  Date referred to PT: 4/18/19    General Precautions: Standard,    Orthopedic Precautions: (WBAT R LE)   Braces:            Patient History:       DME owned (not currently used): rolling walker    Previous Level of Function:   Patient independent with ambulation, ADLs  Pt lives  in single level home with 18 steps to enter. Does have elevator access.     Subjective:  Communicated with RICARDO Mauro prior to session. RN states she just gave the patient morphine. Patient agreeable to treatment.     Chief Complaint: pain in right knee  Patient goals: to learn how to go up the stairs       Objective:  Patient found supine in bed, mother and  in room, with  RLE wrapped in ACE bandage from foot to thigh, CP surrounding knee and pillow at calf.     Cognitive Exam:  Oriented to: Person, Place, Time and Situation  Follows Commands/attention: Follows multistep  commands  Communication: clear/fluent  Safety awareness/insight to disability: intact    Physical Exam:  Postural examination/scapula alignment:    -       No postural abnormalities identified    Skin integrity: Visible skin intact  Edema: Mild in right foot and toes    Sensation:   Diminished sensation in RLE due to nerve block    Upper Extremity Range of Motion:  Refer to OT evaluation for UE ROM.    Upper Extremity Strength:  Refer to OT evaluation for UE strength.    Lower Extremity Range of Motion:  Right Lower Extremity: decreased ROM at knee  Left Lower Extremity: WNL    Lower Extremity Strength:  Right Lower Extremity: Deficits: Pt unable to perform SLR or LAQ due to nerve block. Pt with 4/5 DF, PF, 3-/5 knee flexion  Left Lower Extremity: WNL     Fine motor coordination:     -       Intact    Gross motor coordination: WFL    Functional Mobility:    Bed Mobility :   Supine to sit: Minimal Assistance for RLE management   Sit to supine: Minimal Assistance for RLE management   Scooting: Supervision or Set-up Assistance    Transfers:  Sit to stand:Contact Guard Assistance with Rolling Walker cuing for hand placement. Good initial standing balance with use of RW.     Gait:  Patient ambulated FWB/WBAT: bilateral upper and lower extremity 100  feet on level tile with Rolling Walker with Stand-by Assistance.  Pt with demonstarting a  step  to gait pattern initially, progressing to step through continuous pattern with decreased stride length, decreased weight bearing on RLE. Impairments contributing to gait deviations include pain and limited ROM in R knee.    Stairs:  To be assessed     Balance:   Static Sit: GOOD+: Takes MAXIMAL challenges from all directions.    Dynamic Sit:  GOOD+: Maintains balance through MAXIMAL excursions of active trunk motion  Static Stand: GOOD: Takes MODERATE challenges from all directions  Dynamic stand: GOOD: Needs SUPERVISION only during gait and able to self right with moderate LOB    Endurance deficit:  Patient with minimal fatigue following evaluation and treatment this date despite same day surgery.      Patient declined sitting in chair following ambulation, citing fatigue. Pt returned to bed with HOB elevated, ice packs positioned around knee and ankle bolstered on pillow to allow for extension.   Pt left in care of nurse.     Assessment:  Bomo Blanco is a 49 y.o. female with a medical diagnosis of Tricompartment osteoarthritis of right knee. She presents with strength and ROM deficits following R TKA affecting functional mobility. Pt requiring additional assistance with bed mobility and transfers due to nerve block affecting functional strength in RLE. Pt with decreased weight bearing in RLE due to pain. Pt doing well with ambulation, improving from discontinuous step to pattern with minimal weight on RLE to continuous step through pattern with approximately 50% weight on RLE with only minimal cuing. Patient to benefit from skilled physical therapy during hospital course to improve independence with transfers and ambulation. Pt to be educated on stair navigation when appropriate.    Rehab potential is excellent.    Activity tolerance: Good    Plan: continue with current plan    Discharge recommendations: home     Equipment recommendations:      GOALS:   Multidisciplinary Problems     Physical Therapy  Goals        Problem: Physical Therapy Goal    Goal Priority Disciplines Outcome Goal Variances Interventions   Physical Therapy Goal     PT, PT/OT      Description:  Goals to be met by: discharge     Patient will increase functional independence with mobility by performin. Supine to sit with Biggs  2. Bed to chair transfer with Modified Biggs using Rolling Walker  3. Gait  x 250 feet with Modified Biggs using Rolling Walker.   4. Ascend/descend 3 stair steps with left Handrails Stand-by Assistance leading with left leg while ascending, right leg while descending  4. Lower extremity exercise program x10 reps per handout, with independence                      PLAN:    Patient to be seen (BID M-F; once daily Sat and Sun) to address the above listed problems via gait training, therapeutic activities, therapeutic exercises  Plan of Care expires: (upon discharge)  Plan of Care reviewed with:  patient          Claudia Torres, PT 2019

## 2019-04-18 NOTE — OP NOTE
[facility]  Orthopedic Surgery    4/18/2019    Boom Blanco  [id number]      PREOPERATIVE DIAGNOSIS: Tricompartment osteoarthritis of right knee [M17.11]    POSTOPERATIVE DIAGNOSIS:  Tricompartmental psoriatic arthritis, right knee.    PROCEDURE:  Right total knee arthroplasty with Alexandr Sigma size 4 porous-coated femoral component, cemented size 3 tibial tray, 10 mm tibial poly, and 38 cemented patella button.    SURGEON: Sylvain Gorman D.O.    ASSISTANT: Orton Grinnell, CFA.    ANESTHESIA:  Spinal with sedation and femoral nerve block.    BLOOD LOSS:  Less than 20 cc.    TOURNIQUET:  46 min.    DRAINS:  None.    PATHOLOGY:  Bone cuts.    COMPLICATION:  None.    INDICATIONS FOR PROCEDURE:  Ms. Blanco is a 48-year-old female who has had 2-3 years of bilateral knee pain increasing intensity.  She has psoriatic arthritis. She had an arthroscopy of her left knee of which she did very well but her right knee has not improved.  Walking and extending her knee for extended time increases her symptoms while rest occasionally improves it. She has swelling and giving, but denied locking..  She has taken NSAIDs without help .  She intermittently wears a brace which helps. She has done home exercises/physical therapy with minimal help.  She can walk approximately 50 yd and climb 2-3 stairs.  She does not use an ambulatory assistive device for   She elected to proceed with surgery after complications to include bleeding, infection, scarring, nerve/blood vessel/tendon damage, need for further surgery, failed surgery, failure to improve, stiffness, skin slough, and possible amputation were discussed.  She   signed a consent.    PROCEDURE IN DETAIL:   A femoral nerve block was completed in the preop holding area by the Anesthesiology Department.  For a full account of the nerve block please see the anesthesiologist procedure report.  The patient was then brought to the operating room and was transferred  to the operating room bed where all bony prominences were well padded. A spinal anesthesia was then established by the anesthesiologist.  After spinal anesthesia had been established the patient was placed in the supine position and a tourniquet was applied to the upper part of her operative extremity.  The patient's right lower extremity was then prepped with chlorhexidine solution and draped in the normal fashion. After prepping and draping bony and soft tissue landmarks were palpated and an anterior incision site was drawn on the patient's knee. The patient's leg was then elevated, exsanguinated, and tourniquet was inflated.       Sharp incision was then made with a #10 blade followed by dissection to the extensor mechanism. A medial parapatellar incision was then marked and then made with a #10 blade. The patella was then inverted and the patient's knee was flexed presenting the distal femur. A Reamer was then placed at the distal femur just anterior to the intracondylar notch and a ream hole was made in the distal femur. A ismael with a distal femoral cutting guide was then placed on the distal femur and seated the distal femoral cutting guide was then pinned in place and a distal femoral osteotomy was completed.  The ismael and the cutting guide were then removed and a distal femoral sizing guide was then placed on the distal femur and the femur was found to be a size 4.  A distal femoral cutting guide with camphor cuts was then placed on the distal femur and pinned in place. An Pranay wing was utilized to ensure that no notching of the anterior cortex would insue.  An osteo item E was then made with an oscillating saw taking the anterior cut, posterior cut, posterior camphor, anterior CAM for.  The distal cutting guide was then removed from the distal femur and the bone cuts were removed the proximal tibia was then presented and an external cutting guide was placed on the proximal tibia removing 2 mm off the low  side. Was visualized in appropriate alignment it was pinned in place and a drop ismael was utilized to ensure that it was aligned correctly. An oscillating saw was then utilized to make a proximal tibia osteotomy.  The bone fragment was removed. Further soft tissue such as menisci were removed with the Bovie.  The posterior condyles of the femur were then inspected and an osteotome was utilized to remove posterior osteophytes. A sizing gauge was then placed in the patient's knee in flexion and extension the patient had good tensioning of the tissue.  A lollipop was then utilized to check the size of the proximal tibia and once an appropriate size lollipop was chosen it was pinned in place and then a tower Reamer was applied and the tibia was reamed.  A tibial wedge was then placed in the laterally pop and the tower Reamer was removed. A trial poly was then placed in the patient's Pedrito and the distal femur trial was placed on the distal femur and the patient's knee was inspected and put through motion.  Good tensioning of the tissues was noted. The patella was then presented and measured.  A patella osteotomy guide was then placed on the patella and a patella osteotomy was completed.  Lollipops were then chosen until appropriate size lollipop was found and lug holes were then drilled. A trial poly was then placed on the patella and the patient's knee was put through motion utilizing the no-touch technique. Good tracking the patella was noted. All trials were then removed from the patient's knee and the patient's knee was copiously irrigated with irrigant solution. The patient's knee was then presented and the proximal tibia was presented a final tibial tray was then cemented and placed followed by placement of a tibial poly and then a distal femur final component was seated in place. The patient's leg was extended and a patella button was then cemented in place.  Thrombin-soaked sponges were then placed in the patient's  knee and the cement was allowed to cure.  After the cement had cured the tourniquet was released. The thrombin-soaked sponges were then removed and full hemostasis was ensured.  The patient's knee was again copiously irrigated and then the extensor mechanism was closed with FiberWire suture followed by layered closure with Vicryl suture and final plastic closure with Monocryl suture.       The patient's knee was then dressed with Mastisol, Steri-Strips, and a silver impregnated dressing.       The patient was then awakened by anesthesia and was transferred from the operating room to the recovery room in stable condition.  The patient tolerated the procedure well without complication.

## 2019-04-18 NOTE — ANESTHESIA PROCEDURE NOTES
Peripheral Block    Patient location during procedure: holding area   Block not for primary anesthetic.  Reason for block: at surgeon's request and post-op pain management   Post-op Pain Location: Right knee  Start time: 4/18/2019 7:11 AM  Timeout: 4/18/2019 7:10 AM   End time: 4/18/2019 7:20 AM  Staffing  Anesthesiologist: Jacob Bolanos MD  Performed: anesthesiologist   Preanesthetic Checklist  Completed: patient identified, site marked, surgical consent, pre-op evaluation, timeout performed, IV checked, risks and benefits discussed and monitors and equipment checked  Peripheral Block  Patient position: supine  Prep: ChloraPrep  Patient monitoring: heart rate, cardiac monitor, continuous pulse ox, continuous capnometry and frequent blood pressure checks  Block type: adductor canal  Laterality: right  Injection technique: single shot  Needle  Needle type: Stimuplex   Needle gauge: 22 G  Needle length: 4 in  Needle localization: anatomical landmarks and ultrasound guidance     Assessment  Injection assessment: negative aspiration, negative parasthesia and local visualized surrounding nerve  Paresthesia pain: none  Heart rate change: no  Slow fractionated injection: yes  Additional Notes  VSS.  [facility] RN monitoring vitals throughout procedure.  Patient tolerated procedure well. Block injection with Exparil 10 ml and Marcaine 0.05% with epi 10 ml

## 2019-04-18 NOTE — PLAN OF CARE
Problem: Adult Inpatient Plan of Care  Goal: Readiness for Transition of Care    Intervention: Mutually Develop Transition Plan     04/18/19 1136 04/18/19 1143   OTHER   Communicated expected length of stay with patient/caregiver yes  --    Is patient able to care for self after discharge? Yes  (with assistance)  --    Who are your caregiver(s) and their phone number(s)? Ivan Blanco spouse [phone]; Yoabni Platt mother [phone]  --    Patient currently receives home health services?  --  No   (RETIRED) Discharge Needs Assessment   How many people do you have in your home that can help with your care after discharge?  --  2   Discharge Needs Assessment   Readmission Within the Last 30 Days no previous admission in last 30 days  --    Equipment Currently Used at Home commode;walker, rolling  --    Transportation Anticipated family or friend will provide  --    Social Work Plan   Patient/Family in Agreement with Plan yes  --    Living Environment   Able to Return to Prior Arrangements yes  --    (RETIRED) Current Health   Expected Length of Stay (days) 2  --    (RETIRED) Social Work Plan   Patient's perception of discharge disposition home or selfcare  --

## 2019-04-18 NOTE — PLAN OF CARE
"optiflex machine settings increased to 0-30. Patient states "its killing me" when asked if she needs to stop the machine she says "how long do I need to keep it on?" I told her the orders were to keep it on for 2-3 hours. Patient stated she wants to try to keep going.   "

## 2019-04-18 NOTE — ANESTHESIA POSTPROCEDURE EVALUATION
Anesthesia Post Evaluation    Patient: Boom Blanco    Procedure(s) Performed: Procedure(s) (LRB):  ARTHROPLASTY, KNEE, TOTAL (Right)    Final Anesthesia Type: spinal  Patient location during evaluation: PACU  Patient participation: Yes- Able to Participate  Level of consciousness: awake and awake and alert  Post-procedure vital signs: reviewed and stable  Pain management: adequate  Airway patency: patent  PONV status at discharge: No PONV  Anesthetic complications: no      Cardiovascular status: blood pressure returned to baseline  Respiratory status: unassisted and spontaneous ventilation  Hydration status: euvolemic  Follow-up not needed.          Vitals Value Taken Time   /79 4/18/2019  2:42 PM   Temp 36.5 °C (97.7 °F) 4/18/2019  2:42 PM   Pulse 80 4/18/2019  2:42 PM   Resp 18 4/18/2019  2:42 PM   SpO2 97 % 4/18/2019  2:42 PM         Event Time     Out of Recovery 11:00:00          Pain/Yoanna Score: Pain Rating Prior to Med Admin: 8 (4/18/2019  2:45 PM)  Pain Rating Post Med Admin: 6 (4/18/2019  3:00 PM)  Yoanna Score: 10 (4/18/2019 10:30 AM)

## 2019-04-18 NOTE — PLAN OF CARE
Patient transfer to floor via bed by RN. No signs of distress noted. Safety intact. RN to bedside upon patient arrival to unit.

## 2019-04-18 NOTE — HPI
Patient is 49 year old that is just S/P Total Right knee replacement.  I have been asked by Dr. Negron to manage medically post operatively.  Patient underwent uncomplicated total knee replacement.  Now post op and doing well.  Patient with PMH for Anxiety, Osteoarthritis, Psoriatic arthritis, Hypertension, Hyperlipidemia and Reynauds phenomenon

## 2019-04-18 NOTE — TRANSFER OF CARE
"Anesthesia Transfer of Care Note    Patient: Boom Blanco    Procedure(s) Performed: Procedure(s) (LRB):  ARTHROPLASTY, KNEE, TOTAL (Right)    Patient location: PACU    Anesthesia Type: MAC and spinal    Transport from OR: Transported from OR on room air with adequate spontaneous ventilation    Post pain: adequate analgesia    Post assessment: no apparent anesthetic complications and tolerated procedure well    Post vital signs: stable    Level of consciousness: awake, alert and oriented    Nausea/Vomiting: no nausea/vomiting    Complications: none    Transfer of care protocol was followed      Last vitals:   Visit Vitals  /72 (BP Location: Right arm, Patient Position: Lying)   Pulse 82   Temp 36.9 °C (98.5 °F) (Oral)   Resp 19   Ht 5' 9" (1.753 m)   Wt 74.8 kg (165 lb)   LMP  (LMP Unknown)   SpO2 97%   Breastfeeding? No   BMI 24.37 kg/m²     "

## 2019-04-19 LAB
HCT VFR BLD AUTO: 37.9 % (ref 37–48.5)
HGB BLD-MCNC: 12.7 G/DL (ref 12–16)

## 2019-04-19 PROCEDURE — 25000003 PHARM REV CODE 250: Performed by: INTERNAL MEDICINE

## 2019-04-19 PROCEDURE — 97110 THERAPEUTIC EXERCISES: CPT

## 2019-04-19 PROCEDURE — 11000001 HC ACUTE MED/SURG PRIVATE ROOM

## 2019-04-19 PROCEDURE — 85018 HEMOGLOBIN: CPT

## 2019-04-19 PROCEDURE — 97116 GAIT TRAINING THERAPY: CPT

## 2019-04-19 PROCEDURE — 63600175 PHARM REV CODE 636 W HCPCS: Performed by: INTERNAL MEDICINE

## 2019-04-19 PROCEDURE — 85014 HEMATOCRIT: CPT

## 2019-04-19 PROCEDURE — 36415 COLL VENOUS BLD VENIPUNCTURE: CPT

## 2019-04-19 PROCEDURE — 97530 THERAPEUTIC ACTIVITIES: CPT

## 2019-04-19 RX ADMIN — DOCUSATE SODIUM 100 MG: 100 CAPSULE, LIQUID FILLED ORAL at 09:04

## 2019-04-19 RX ADMIN — MORPHINE SULFATE 6 MG: 8 INJECTION, SOLUTION INTRAMUSCULAR; INTRAVENOUS at 03:04

## 2019-04-19 RX ADMIN — PANTOPRAZOLE SODIUM 40 MG: 40 TABLET, DELAYED RELEASE ORAL at 08:04

## 2019-04-19 RX ADMIN — IBUPROFEN 800 MG: 400 TABLET ORAL at 08:04

## 2019-04-19 RX ADMIN — LEVOTHYROXINE SODIUM 50 MCG: 25 TABLET ORAL at 06:04

## 2019-04-19 RX ADMIN — LISINOPRIL 20 MG: 10 TABLET ORAL at 08:04

## 2019-04-19 RX ADMIN — OXYCODONE HYDROCHLORIDE AND ACETAMINOPHEN 1 TABLET: 10; 325 TABLET ORAL at 09:04

## 2019-04-19 RX ADMIN — CEFAZOLIN SODIUM 1 G: 1 SOLUTION INTRAVENOUS at 06:04

## 2019-04-19 RX ADMIN — OXYCODONE HYDROCHLORIDE AND ACETAMINOPHEN 1 TABLET: 10; 325 TABLET ORAL at 05:04

## 2019-04-19 RX ADMIN — MORPHINE SULFATE 6 MG: 8 INJECTION, SOLUTION INTRAMUSCULAR; INTRAVENOUS at 08:04

## 2019-04-19 RX ADMIN — RIVAROXABAN 10 MG: 10 TABLET, FILM COATED ORAL at 05:04

## 2019-04-19 RX ADMIN — TIZANIDINE 4 MG: 4 TABLET ORAL at 09:04

## 2019-04-19 RX ADMIN — MORPHINE SULFATE 6 MG: 8 INJECTION, SOLUTION INTRAMUSCULAR; INTRAVENOUS at 01:04

## 2019-04-19 RX ADMIN — DOCUSATE SODIUM 100 MG: 100 CAPSULE, LIQUID FILLED ORAL at 08:04

## 2019-04-19 RX ADMIN — OXYCODONE HYDROCHLORIDE AND ACETAMINOPHEN 1 TABLET: 10; 325 TABLET ORAL at 03:04

## 2019-04-19 RX ADMIN — BUPROPION HYDROCHLORIDE 150 MG: 150 TABLET, FILM COATED, EXTENDED RELEASE ORAL at 09:04

## 2019-04-19 NOTE — PLAN OF CARE
Problem: Adult Inpatient Plan of Care  Goal: Plan of Care Review     04/19/19 0448   Plan of Care Review   Plan of Care Reviewed With patient       Problem: Infection  Goal: Infection Symptom Resolution  Outcome: Ongoing (interventions implemented as appropriate)  Intervention: Prevent or Manage Infection     04/19/19 0448   Prevent or Manage Infection   Fever Reduction/Comfort Measures lightweight bedding   Infection Management aseptic technique maintained   Manage Diarrhea   Isolation Precautions other (see comments)         Problem: Skin Injury Risk Increased  Goal: Skin Health and Integrity    Intervention: Optimize Skin Protection     04/19/19 0448   Prevent Additional Skin Injury   Head of Bed (HOB) HOB at 30 degrees   Pressure Reduction Devices elbow protectors utilized   Pressure Reduction Techniques frequent weight shift encouraged     Intervention: Promote and Optimize Oral Intake     04/19/19 0448   Monitor and Manage Anemia   Oral Nutrition Promotion safe use of adaptive equipment encouraged

## 2019-04-19 NOTE — PROGRESS NOTES
[facility] Medicine  Progress Note    Patient Name: Boom Blanco  MRN: [id number]  Patient Class: IP- Inpatient   Admission Date: 4/18/2019  Length of Stay: 1 days  Attending Physician: Sylvain Gorman DO  Primary Care Provider: Sofi Gallagher DO        Subjective:     Principal Problem:Tricompartment osteoarthritis of right knee    HPI:  Patient is 49 year old that is just S/P Total Right knee replacement.  I have been asked by Dr. Negron to manage medically post operatively.  Patient underwent uncomplicated total knee replacement.  Now post op and doing well.  Patient with PMH for Anxiety, Osteoarthritis, Psoriatic arthritis, Hypertension, Hyperlipidemia and Reynauds phenomenon    Hospital Course:  04/19/2019:  Patient is stable postop day 1 from right knee replacement.  Pain is well controlled other vital signs are normal patient is afebrile without other complaints.  Patient has worked with physical therapy twice a day and progressing well    Interval History:  Patient is afebrile no acute distress and stable postop day 1 from right knee replacement    Review of Systems   Constitutional: Negative for activity change, appetite change, fatigue and fever.   HENT: Negative for congestion, ear discharge, mouth sores, nosebleeds, rhinorrhea, sinus pressure, sinus pain and tinnitus.    Eyes: Negative.  Negative for pain, redness and itching.   Respiratory: Negative for apnea, cough, choking, chest tightness, shortness of breath, wheezing and stridor.    Cardiovascular: Negative for chest pain, palpitations and leg swelling.   Gastrointestinal: Negative for abdominal distention, abdominal pain, anal bleeding, blood in stool, constipation and diarrhea.   Endocrine: Negative.    Genitourinary: Negative for difficulty urinating, flank pain, frequency and urgency.   Musculoskeletal: Negative for arthralgias, back pain, gait problem and myalgias.   Skin: Negative for  color change and pallor.   Allergic/Immunologic: Negative.    Neurological: Negative for dizziness, facial asymmetry, weakness, light-headedness and headaches.   Hematological: Negative for adenopathy. Does not bruise/bleed easily.     Objective:     Vital Signs (Most Recent):  Temp: 98.6 °F (37 °C) (04/19/19 1207)  Pulse: 93 (04/19/19 1207)  Resp: 18 (04/19/19 1207)  BP: (!) 166/74 (04/19/19 1207)  SpO2: 95 % (04/19/19 1207) Vital Signs (24h Range):  Temp:  [98.6 °F (37 °C)-99 °F (37.2 °C)] 98.6 °F (37 °C)  Pulse:  [89-93] 93  Resp:  [18] 18  SpO2:  [92 %-95 %] 95 %  BP: (151-172)/(74-79) 166/74     Weight: 81.6 kg (180 lb)  Body mass index is 26.58 kg/m².    Intake/Output Summary (Last 24 hours) at 4/19/2019 1712  Last data filed at 4/19/2019 0600  Gross per 24 hour   Intake 240 ml   Output 2450 ml   Net -2210 ml      Physical Exam   Constitutional: She is oriented to person, place, and time. She appears well-developed and well-nourished.   HENT:   Head: Normocephalic and atraumatic.   Eyes: Pupils are equal, round, and reactive to light. EOM are normal.   Neck: Normal range of motion. Neck supple. No tracheal deviation present. No thyromegaly present.   Cardiovascular: Normal rate, regular rhythm and normal heart sounds.   Pulmonary/Chest: Effort normal and breath sounds normal.   Abdominal: Soft. Bowel sounds are normal. She exhibits no distension. There is no tenderness. There is no rebound and no guarding.   Musculoskeletal: Normal range of motion.   Lymphadenopathy:     She has no cervical adenopathy.   Neurological: She is alert and oriented to person, place, and time.   Skin: Skin is warm and dry. Capillary refill takes less than 2 seconds.   Psychiatric: She has a normal mood and affect. Her behavior is normal. Judgment and thought content normal.       Significant Labs:   Recent Lab Results       04/19/19  0930        Hematocrit 37.9     Hemoglobin 12.7         All pertinent labs within the past 24 hours  have been reviewed.    Significant Imaging: I have reviewed and interpreted all pertinent imaging results/findings within the past 24 hours.    Assessment/Plan:      * Tricompartment osteoarthritis of right knee  4/18/19:  1.  Manage post operatively for medical problem.  Monitor BP and Blood Glucose with pain control  2.  Repeat labs in the AM  3.  Continue current home meds  3 Follow with Dr. Negron as needed  Appreciate Dr. Negron allowing me to be involved in the care of this patient    04/19/2019:  1.  Monitor blood pressure and treat as needed.  2.  Repeat CBC and BMP in the a.m.  3.  Continue other care as set out by         VTE Risk Mitigation (From admission, onward)        Ordered     rivaroxaban tablet 10 mg  With dinner      04/18/19 1017     Place KAROLINA hose  Until discontinued      04/18/19 1012     Place sequential compression device  Until discontinued      04/18/19 1012              Dallin Delarosa MD  Department of Hospital Medicine   [facility]

## 2019-04-19 NOTE — SUBJECTIVE & OBJECTIVE
Interval History:  Patient is afebrile no acute distress and stable postop day 1 from right knee replacement    Review of Systems   Constitutional: Negative for activity change, appetite change, fatigue and fever.   HENT: Negative for congestion, ear discharge, mouth sores, nosebleeds, rhinorrhea, sinus pressure, sinus pain and tinnitus.    Eyes: Negative.  Negative for pain, redness and itching.   Respiratory: Negative for apnea, cough, choking, chest tightness, shortness of breath, wheezing and stridor.    Cardiovascular: Negative for chest pain, palpitations and leg swelling.   Gastrointestinal: Negative for abdominal distention, abdominal pain, anal bleeding, blood in stool, constipation and diarrhea.   Endocrine: Negative.    Genitourinary: Negative for difficulty urinating, flank pain, frequency and urgency.   Musculoskeletal: Negative for arthralgias, back pain, gait problem and myalgias.   Skin: Negative for color change and pallor.   Allergic/Immunologic: Negative.    Neurological: Negative for dizziness, facial asymmetry, weakness, light-headedness and headaches.   Hematological: Negative for adenopathy. Does not bruise/bleed easily.     Objective:     Vital Signs (Most Recent):  Temp: 98.6 °F (37 °C) (04/19/19 1207)  Pulse: 93 (04/19/19 1207)  Resp: 18 (04/19/19 1207)  BP: (!) 166/74 (04/19/19 1207)  SpO2: 95 % (04/19/19 1207) Vital Signs (24h Range):  Temp:  [98.6 °F (37 °C)-99 °F (37.2 °C)] 98.6 °F (37 °C)  Pulse:  [89-93] 93  Resp:  [18] 18  SpO2:  [92 %-95 %] 95 %  BP: (151-172)/(74-79) 166/74     Weight: 81.6 kg (180 lb)  Body mass index is 26.58 kg/m².    Intake/Output Summary (Last 24 hours) at 4/19/2019 1712  Last data filed at 4/19/2019 0600  Gross per 24 hour   Intake 240 ml   Output 2450 ml   Net -2210 ml      Physical Exam   Constitutional: She is oriented to person, place, and time. She appears well-developed and well-nourished.   HENT:   Head: Normocephalic and atraumatic.   Eyes: Pupils are  equal, round, and reactive to light. EOM are normal.   Neck: Normal range of motion. Neck supple. No tracheal deviation present. No thyromegaly present.   Cardiovascular: Normal rate, regular rhythm and normal heart sounds.   Pulmonary/Chest: Effort normal and breath sounds normal.   Abdominal: Soft. Bowel sounds are normal. She exhibits no distension. There is no tenderness. There is no rebound and no guarding.   Musculoskeletal: Normal range of motion.   Lymphadenopathy:     She has no cervical adenopathy.   Neurological: She is alert and oriented to person, place, and time.   Skin: Skin is warm and dry. Capillary refill takes less than 2 seconds.   Psychiatric: She has a normal mood and affect. Her behavior is normal. Judgment and thought content normal.       Significant Labs:   Recent Lab Results       04/19/19  0930        Hematocrit 37.9     Hemoglobin 12.7         All pertinent labs within the past 24 hours have been reviewed.    Significant Imaging: I have reviewed and interpreted all pertinent imaging results/findings within the past 24 hours.

## 2019-04-19 NOTE — PT/OT/SLP PROGRESS
"Physical Therapy         Treatment        Boom Blanco 1970  MRN: [id number]     Date: 4/19/2019                          Treatment Diagnosis: S/P Right TKA    General Precautions: Standard,    Orthopedic Precautions: fall              Subjective:  Communicated with Patient's nurse prior to session. Patient doing well, currently up in chair   Patient with 6/10 pain in RLE with ambulation    Chief Complaint: "I still can't lift my leg up"          Treatment:    Bed Mobility :   Supine to sit: patient already sitting up in chair   Sit to supine: Contact Guard Assistance for RLE   Rolling: Independent   Scooting: Modified Independent    Transfers:  Patient performed sit >stand from chair with SBA for safety;   Able to perform stand >Sit onto mat in PT department with SBA for safety  Able to perform Stand >Sit onto bed with supervision        Gait:  Ambulated 500ft with RW and SBA; required frequent verbal cueing to increase weight bearing through her RLE as opposed to using UE to off-load her leg; Instructed patient in reciprocal gait pattern using her RW as well as incorporating more heel to toe movement of her foot on her stance to push-off phase of gait.    Patient was able to go up/down 3 steps using bilateral handrails x 3 without difficulty. Demonstrated good stability and balance with this activity.     Balance:   Static Sit: NORMAL: No deviations seen in posture held statically  Dynamic Sit:  NORMAL: No deviations seen in posture held dynamically  Static Stand: FAIR+: Takes MINIMAL challenges from all directions  Dynamic stand: FAIR: Needs CONTACT GUARD during gait    Assessment:    Cognitive Exam:  Oriented to: Person, Place, Time and Situation  Follows Commands/attention: Follows multistep  commands  Communication: clear/fluent  Safety awareness/insight to disability: intact    Skin integrity: Visible skin intact  Edema: None noted  ace bandage noted on RLE; No visible swelling observed  "     Sensation:   Diminished to light touch on her thigh secondary to block during surgery     Endurance deficit:  Patient demonstrated good endurance for activity today; She was able to ambulate 500ft with use of RW     Patient left in supine in bed with CPM and ICE placed on RLE; CPM flexion increaed to 60 degrees.  Patient able to tolerate this without any increase in symptoms. Patient's mother present in room; patient left with all needs within reach.     Rehab potential is excellent.    Activity tolerance: Excellent    Equipment recommendations:   Home with RW.     Education:   Educated Boom on seated Heel Slides using pillowcase on the floor to facilitate motion.  She was able to sit on mat and perform Indep. heel slide x 20 reps.  Instructed Boom in quad sets with knee in extension; still with poor ability to activate distal quad on a consistent basis.      Plan:  continue with current plan      Discharge recommendations:   Patient planning on discharge to home in AM.      Galina Roth, PT

## 2019-04-19 NOTE — PROGRESS NOTES
S:  Ms. Blanco was seen and examined.  She stated her pain is well controlled.  She was in a CPM this morning for 2 hr.  Her only complaint is she is a little weak with activation of her quadriceps.  She stated she feels good.    O:  Vital signs stable  Dressing clean dry and intact without blood tinging.  Neurovascularly intact  Patient able to actively flex and extend her knee.  Calves soft   Homans negative   Labs:  H&H pending    A:  Right total knee arthroplasty POD #1.    P:  1.  Will reorder H&H for today and tomorrow.  2.  Encourage straight leg raisings.  3.  Physical therapy to ambulate the patient with a walker and assistance with weight-bearing at tolerance.  4.  Smith discontinue this morning.  5.  Last dose of antibiotics should have been given this morning.  6.  Continue with SCDs and Jaxon hose.  7.  Continue with CPM.  8.   to arrange for walker and bedside commode for home use.  9.  If the patient continues to progress as she is expect she will be able to be discharged home tomorrow morning.

## 2019-04-19 NOTE — HOSPITAL COURSE
04/19/2019:  Patient is stable postop day 1 from right knee replacement.  Pain is well controlled other vital signs are normal patient is afebrile without other complaints.  Patient has worked with physical therapy twice a day and progressing well    04/20/2019:  Patient is up walking in the [place] with walker without difficulty.  Patient has been working with physical therapy very diligently and otherwise has been stable medically.  Patient is ready for discharge whenever  feels appropriate.  Patient will follow with her primary care physician within 2-3 days

## 2019-04-19 NOTE — NURSING
Patient tolerated 2hours CPM to right knee with minimal distress. Catheter dc'd. Polar Ice in use as ordered. Ice filled as needed.

## 2019-04-19 NOTE — ASSESSMENT & PLAN NOTE
4/18/19:  1.  Manage post operatively for medical problem.  Monitor BP and Blood Glucose with pain control  2.  Repeat labs in the AM  3.  Continue current home meds  3 Follow with Dr. Negron as needed  Appreciate Dr. Negron allowing me to be involved in the care of this patient    04/19/2019:  1.  Monitor blood pressure and treat as needed.  2.  Repeat CBC and BMP in the a.m.  3.  Continue other care as set out by

## 2019-04-19 NOTE — PT/OT/SLP PROGRESS
"Physical Therapy         Treatment        Boom Blanco 1970  MRN: [id number]     Date: 4/19/2019     In:  1:10 PM  Out: 1:45 PM                     Treatment Diagnosis: S/P Right TKA    General Precautions: Standard,    Orthopedic Precautions: fall              Subjective:  Communicated with Patient's nurse prior to session.   Patient in bed, leg on CPM upon entering; Stated that she had gotten herself up to the bathroom just a bit ago.    Patient with 6/10 pain in RLE with ambulation    Chief Complaint: "If I can walk, why can't I pick my leg up yet?"          Treatment:    Bed Mobility :   Supine to sit:  CGA to move RLE off CPM required;      Rolling: Independent   Scooting: Modified Independent    Transfers:  Patient performed sit >stand from bed Mod I with use of RW  Able to perform stand >Sit in chair Mod I with use of RW;  Good hand placement and controlled sitting noted.           Gait:  Ambulated 400ft with RW and SBA; required initial verbal cueing to increase weight bearing through her RLE as opposed to using UE to off-load her leg but after several steps, patient able to ambulate with improved reciprocal gait pattern, improved WB through her RLE.     Therapeutic Exercise:  Patient performed standing heel raises using RW for support x 15 reps  Standing - Marching x 10 reps  Seated:  Heel slides on RLE x 10 reps  Seated:  Knee in extension - quad sets with verbal/tactile cueing to activate mm needed; patient was able to perform quad mm contraction a couple of times;   Seated: assisted LAQ's - patient able to hold in extended position, but still cannot initiate the motion. Performed x 5 reps.     Balance:   Static Sit: NORMAL: No deviations seen in posture held statically  Dynamic Sit:  NORMAL: No deviations seen in posture held dynamically  Static Stand: FAIR+: Takes MINIMAL challenges from all directions  Dynamic stand: FAIR: Needs CONTACT GUARD during gait    Assessment:    Cognitive " Exam:  Oriented to: Person, Place, Time and Situation  Follows Commands/attention: Follows multistep  commands  Communication: clear/fluent  Safety awareness/insight to disability: intact    Skin integrity: Visible skin intact  Edema: None noted  ace bandage noted on RLE; No visible swelling observed      Sensation:   Diminished to light touch on her thigh secondary to block during surgery     Endurance deficit:  Patient demonstrated good endurance for activity today; She was able to ambulate 400ft with use of RW and perform TE in both sitting/standing     Patient left sitting up in chair, family members present in room.  All needs left within patient reach.     Rehab potential is excellent.    Activity tolerance: Excellent    Equipment recommendations:   Home with RW.     Education:   Educated Boom on seated Heel Slides using pillowcase on the floor to facilitate motion.    Instructed Boom in quad sets with knee in extension; still with improved ability to activate distal quad on a consistent basis.    Boom is aware that she has an OP Physical Therapy appointment on Monday April 22nd.     Plan:  continue with current plan      Discharge recommendations:   Patient planning on discharge to home in AM.      Galina Roth, PT

## 2019-04-20 VITALS
BODY MASS INDEX: 26.66 KG/M2 | HEART RATE: 102 BPM | SYSTOLIC BLOOD PRESSURE: 116 MMHG | RESPIRATION RATE: 17 BRPM | OXYGEN SATURATION: 95 % | DIASTOLIC BLOOD PRESSURE: 55 MMHG | HEIGHT: 69 IN | TEMPERATURE: 97 F | WEIGHT: 180 LBS

## 2019-04-20 LAB
HCT VFR BLD AUTO: 36.4 % (ref 37–48.5)
HGB BLD-MCNC: 12 G/DL (ref 12–16)

## 2019-04-20 PROCEDURE — 85018 HEMOGLOBIN: CPT

## 2019-04-20 PROCEDURE — 97116 GAIT TRAINING THERAPY: CPT

## 2019-04-20 PROCEDURE — 63600175 PHARM REV CODE 636 W HCPCS: Performed by: INTERNAL MEDICINE

## 2019-04-20 PROCEDURE — 85014 HEMATOCRIT: CPT

## 2019-04-20 PROCEDURE — 36415 COLL VENOUS BLD VENIPUNCTURE: CPT

## 2019-04-20 PROCEDURE — 25000003 PHARM REV CODE 250: Performed by: INTERNAL MEDICINE

## 2019-04-20 RX ADMIN — MORPHINE SULFATE 6 MG: 8 INJECTION, SOLUTION INTRAMUSCULAR; INTRAVENOUS at 12:04

## 2019-04-20 RX ADMIN — MORPHINE SULFATE 6 MG: 8 INJECTION, SOLUTION INTRAMUSCULAR; INTRAVENOUS at 09:04

## 2019-04-20 RX ADMIN — PANTOPRAZOLE SODIUM 40 MG: 40 TABLET, DELAYED RELEASE ORAL at 08:04

## 2019-04-20 RX ADMIN — IBUPROFEN 800 MG: 400 TABLET ORAL at 08:04

## 2019-04-20 RX ADMIN — OXYCODONE HYDROCHLORIDE AND ACETAMINOPHEN 1 TABLET: 10; 325 TABLET ORAL at 12:04

## 2019-04-20 RX ADMIN — ONDANSETRON 4 MG: 2 INJECTION INTRAMUSCULAR; INTRAVENOUS at 12:04

## 2019-04-20 RX ADMIN — LISINOPRIL 20 MG: 10 TABLET ORAL at 08:04

## 2019-04-20 RX ADMIN — DOCUSATE SODIUM 100 MG: 100 CAPSULE, LIQUID FILLED ORAL at 08:04

## 2019-04-20 RX ADMIN — OXYCODONE HYDROCHLORIDE AND ACETAMINOPHEN 1 TABLET: 10; 325 TABLET ORAL at 05:04

## 2019-04-20 RX ADMIN — LEVOTHYROXINE SODIUM 50 MCG: 25 TABLET ORAL at 05:04

## 2019-04-20 NOTE — HOSPITAL COURSE
The patient was admitted through same-day surgery and was taken to the operating room where a right total knee arthroplasty was completed.  For full account of surgery please see the operative report.  Postoperatively the patient was admitted to the hospitalist service and her H&H is were followed they remained stable.  The patient was also put on immediate IV antibiotic prophylaxis for 24 hr..  On her 1st postoperative day her Smith was discontinued.  She was placed on immediate postop DVT prophylaxis to include Jaxon hose, SCDs, early motion, early ambulation, and Xarelto.  Physical therapy was consulted and the patient was begun to ambulate with a walker and assistance with weight-bearing at tolerance.  She was also placed on a CPM which she tolerated.  On her 2nd postoperative day she was able ambulate with a walker greater than 100 ft and able to care for herself.  She was discharged in stable condition with instructions to follow up in 10-12 days.

## 2019-04-20 NOTE — PLAN OF CARE
Problem: Skin Injury Risk Increased  Goal: Skin Health and Integrity    Intervention: Optimize Skin Protection     04/20/19 0322   Prevent Additional Skin Injury   Head of Bed (HOB) HOB elevated   Pressure Reduction Devices positioning supports utilized   Monitor and Manage Hypervolemia   Skin Protection tubing/devices free from skin contact

## 2019-04-20 NOTE — PLAN OF CARE
Problem: Adult Inpatient Plan of Care  Goal: Plan of Care Review  Outcome: Ongoing (interventions implemented as appropriate)     04/20/19 1400   Plan of Care Review   Plan of Care Reviewed With patient   To be discharged today.

## 2019-04-20 NOTE — PT/OT/SLP PROGRESS
"Physical Therapy         Treatment        Boom Blanco 1970  MRN: [id number]     Date: 4/20/2019    PT Received On: 04/20/19  PT Start Time: 0840     PT Stop Time: 0900    PT Total Time (min): 20 min       Treatment Diagnosis: S/P Right TKA    General Precautions: Standard,    Orthopedic Precautions: fall     Subjective:  Communicated with patient and caregiver prior to session. Patient doing well, currently supine in bed on CPM. Patient states she was up all night every hour working on exercises, attempted to do all but could not do SLR secondary to quad muscles "not working so I couldn't lift it"  Patient with 7/10 pain in RLE with ambulation    Chief Complaint: "I still can't lift my leg up"          Treatment:    Bed Mobility :   Supine to sit: CGA   Sit to supine: Contact Guard Assistance for RLE   Rolling: Independent   Scooting: Modified Independent    Transfers:  Patient performed sit >stand from chair with SBA for safety;   Able to perform Stand >Sit onto bed with supervision        Gait:  Ambulated 400ft with RW and SBA; required frequent verbal cueing to increase weight bearing through her RLE as opposed to using UE to off-load her leg; Instructed patient in reciprocal gait pattern using her RW as well as incorporating more heel to toe movement of her foot on her stance to push-off phase of gait and to promote flexion in post op knee during R toe off gait phase.    Balance:   Static Sit: NORMAL: No deviations seen in posture held statically  Dynamic Sit:  NORMAL: No deviations seen in posture held dynamically  Static Stand: FAIR+: Takes MINIMAL challenges from all directions  Dynamic stand: FAIR: Needs CONTACT GUARD during gait    Assessment:    Cognitive Exam:  Oriented to: Person, Place, Time and Situation  Follows Commands/attention: Follows multistep  commands  Communication: clear/fluent  Safety awareness/insight to disability: intact    Skin integrity: Visible skin intact  Edema: None " noted  ace bandage noted on RLE; No visible swelling observed      Sensation:   Diminished to light touch on her thigh secondary to block during surgery     Endurance deficit:  Patient demonstrated good endurance for activity today; She was able to ambulate 500ft with use of RW     Patient left in supine in bed with CPM and ICE placed on RLE; CPM flexion maintained at 60 degrees.  Patient able to tolerate this without any increase in symptoms. Patient's family present in room; patient left with all needs within reach.     Rehab potential is excellent.    Activity tolerance: Excellent    Equipment recommendations:   Home with RW.     Education:   Educated Boom on HEP verbally and also issued printed out HEP to refer to after DC to continue with exercises to solidify progress made in PT thus far.    Plan:  continue with current plan      Discharge recommendations:   Patient planning on discharge to home today      Alf Handley, PTA

## 2019-04-20 NOTE — DISCHARGE SUMMARY
[facility] Hospital Medicine  Discharge Summary      Patient Name: Boom Blanco  MRN: [id number]  Admission Date: 4/18/2019  Hospital Length of Stay: 2 days  Discharge Date and Time:  04/20/2019 9:02 AM  Attending Physician: Sylvain Gorman DO   Discharging Provider: Dallin Munoz MD  Primary Care Provider: Sofi Gallagher DO      HPI:   Patient is 49 year old that is just S/P Total Right knee replacement.  I have been asked by Dr. Negron to manage medically post operatively.  Patient underwent uncomplicated total knee replacement.  Now post op and doing well.  Patient with PMH for Anxiety, Osteoarthritis, Psoriatic arthritis, Hypertension, Hyperlipidemia and Reynauds phenomenon    Procedure(s) (LRB):  ARTHROPLASTY, KNEE, TOTAL (Right)      Hospital Course:   04/19/2019:  Patient is stable postop day 1 from right knee replacement.  Pain is well controlled other vital signs are normal patient is afebrile without other complaints.  Patient has worked with physical therapy twice a day and progressing well    04/20/2019:  Patient is up walking in the [place] with walker without difficulty.  Patient has been working with physical therapy very diligently and otherwise has been stable medically.  Patient is ready for discharge whenever  feels appropriate.  Patient will follow with her primary care physician within 2-3 days     Consults:   Consults (From admission, onward)        Status Ordering Provider     Inpatient consult to   Once     Provider:  (Not yet assigned)    Acknowledged DALLIN MUNOZ          * Tricompartment osteoarthritis of right knee  4/18/19:  1.  Manage post operatively for medical problem.  Monitor BP and Blood Glucose with pain control  2.  Repeat labs in the AM  3.  Continue current home meds  3 Follow with Dr. Negron as needed  Appreciate Dr. Negron allowing me to be involved in the care of this patient    04/19/2019:  1.   Monitor blood pressure and treat as needed.  2.  Repeat CBC and BMP in the a.m.  3.  Continue other care as set out by     04/20/2019:  1.  Discharge to home when okay with Dr. Negron  2.  Continue home medications  3.  Continue physical therapy as prescribed by Orthopedic surgery        Final Active Diagnoses:    Diagnosis Date Noted POA    PRINCIPAL PROBLEM:  Tricompartment osteoarthritis of right knee [M17.11] 04/18/2019 Yes    Presence of right artificial knee joint [Z96.651] 04/18/2019 Not Applicable      Problems Resolved During this Admission:       Discharged Condition: good    Disposition:     Follow Up:  Follow-up Information     Sylvain Gorman DO. Go on 4/29/2019.    Specialty:  Orthopedic Surgery  Why:  appointment time: 9:30am for surgery follow up  Contact information:  [address] [facility] [address]  [phone]             [facility] - Physical Therapy. Go on 4/22/2019.    Why:  appointment time: 4:00pm for outpatient physical therapy  Contact information:  [address] [facility] [address]  [phone]                 Patient Instructions:   No discharge procedures on file.    Significant Diagnostic Studies: Labs: All labs within the past 24 hours have been reviewed    Pending Diagnostic Studies:     None         Medications:  Reconciled Home Medications:      Medication List      ASK your doctor about these medications    buPROPion 150 MG TB24 tablet  Commonly known as:  WELLBUTRIN XL  Take 150 mg by mouth every evening.     docusate sodium 100 MG capsule  Commonly known as:  COLACE  Take 1 capsule (100 mg total) by mouth 2 (two) times daily. Drink a 12 oz glass of water with each capsule.     fluticasone 50 mcg/actuation nasal spray  Commonly known as:  FLONASE  1 spray (50 mcg total) by Each Nare route 2 (two) times daily as needed for Rhinitis.     ibuprofen 800 MG tablet  Commonly known as:  ADVIL,MOTRIN  Take 800 mg by mouth once daily.      levothyroxine 50 MCG tablet  Commonly known as:  SYNTHROID  levothyroxine 50 mcg tablet   TK 1 T PO  QAM     lisinopril 20 MG tablet  Commonly known as:  PRINIVIL,ZESTRIL  Take 1 tablet (20 mg total) by mouth once daily.     lovastatin 20 MG tablet  Commonly known as:  MEVACOR  Take 1 tablet (20 mg total) by mouth every evening.     melatonin 5 mg Tab  Take 5 mg by mouth.     oxyCODONE-acetaminophen  mg per tablet  Commonly known as:  PERCOCET  Take 1 tablet by mouth 2 (two) times daily.     pantoprazole 40 MG tablet  Commonly known as:  PROTONIX  Take 1 tablet (40 mg total) by mouth once daily. Take in the morning before breakfast.  Wait 30 minutes before eating or drinking anything     SIMPONI 50 mg/0.5 mL Pnij  Generic drug:  golimumab  Inject 50 mg into the skin every 30 days.     tiZANidine 4 MG tablet  Commonly known as:  ZANAFLEX  Take 4 mg by mouth every evening.     VRAYLAR 1.5 mg Cap  Generic drug:  cariprazine  Take 1.5 mg by mouth every evening.            Indwelling Lines/Drains at time of discharge:   Lines/Drains/Airways          None          Time spent on the discharge of patient: 30 minutes  Patient was seen and examined on the date of discharge and determined to be suitable for discharge.         Dallin Delarosa MD  Department of Hospital Medicine  [facility]

## 2019-04-20 NOTE — NURSING
Discharge teaching started with the patient. Patient had a good understanding of care at home. Patient has a walker at home and patient will use ice pack at home. Patient informed nurse that she will shower at home but not bathe. Patient informed nurse she is going to [facility] to fill her prescriptions. Patient will begin outpatient Physical Therapy on Monday. Patient was provided with printed AVS and prescriptions. Patient verbalized understanding. No other needs at this time. Patient thanked nurse.

## 2019-04-20 NOTE — PROGRESS NOTES
S:  Ms. Blanco was seen examined.  She has been working on straight leg raises and stated that she is now starting to be able to lift her leg. Her pain is well controlled.  She states she is ready to go home.  She has been in the CPM and stated that she tolerates it well.    O:  Neurovascularly intact.  Dressing clean dry and intact without blood.  Mild medial knee ecchymosis.  Active motion intact but patient weak with extension.  Relatively nontender with knee motion.  Relatively nontender with palpation.    Labs:  Hemoglobin 12.0; hematocrit 36.4    A:  Right total knee arthroplasty POD#2.    P:  1.  Continue with current dressing.  2.  Continue with straight leg raises.  3.  Continue to ambulate with walker with weight-bearing at tolerance operative extremity.  4.  Since the patient is doing well and her labs are stable she is okay to be discharged home today.  5.  Discharge instructions for the patient continue with straight leg raises; continue with flexion/extension exercises.; ambulate with a walker with weight-bearing at tolerance operative extremity; ice right knee; do not place pillows under knee place 2 pillows under heel when seated or in bed.  6. All pain meds per Pain Management doctor  7.  Xarelto 10 mg 1 p.o. q.day dispense 12 refill 0.  8.  Phenergan 25 mg, 1 p.o. q.8 hours p.r.n. nausea vomiting, dispense 12, refill 0.  9.  May shower do not soak in a tub.  10.  Discharged today with instructions to follow up in approximately 10 days.

## 2019-04-20 NOTE — ASSESSMENT & PLAN NOTE
4/18/19:  1.  Manage post operatively for medical problem.  Monitor BP and Blood Glucose with pain control  2.  Repeat labs in the AM  3.  Continue current home meds  3 Follow with Dr. Negron as needed  Appreciate Dr. Negron allowing me to be involved in the care of this patient    04/19/2019:  1.  Monitor blood pressure and treat as needed.  2.  Repeat CBC and BMP in the a.m.  3.  Continue other care as set out by     04/20/2019:  1.  Discharge to home when okay with Dr. Negron  2.  Continue home medications  3.  Continue physical therapy as prescribed by Orthopedic surgery

## 2019-04-22 ENCOUNTER — CLINICAL SUPPORT (OUTPATIENT)
Dept: REHABILITATION | Facility: HOSPITAL | Age: 49
End: 2019-04-22
Attending: ORTHOPAEDIC SURGERY
Payer: COMMERCIAL

## 2019-04-22 DIAGNOSIS — M17.11 TRICOMPARTMENT OSTEOARTHRITIS OF RIGHT KNEE: ICD-10-CM

## 2019-04-22 DIAGNOSIS — Z96.651 PRESENCE OF RIGHT ARTIFICIAL KNEE JOINT: Primary | ICD-10-CM

## 2019-04-22 PROCEDURE — 97161 PT EVAL LOW COMPLEX 20 MIN: CPT

## 2019-04-22 PROCEDURE — 97110 THERAPEUTIC EXERCISES: CPT

## 2019-04-22 NOTE — PROGRESS NOTES
Physical Therapy Evaluation/Plan of Care    Name: Boom Blanco  Clinic Number: [id number]    Therapy Diagnosis:   Encounter Diagnoses   Name Primary?    Presence of right artificial knee joint Yes    Tricompartment osteoarthritis of right knee      Physician: Sylvain Gorman DO    Physician Orders: PT Eval and Treat R knee  Medical Diagnosis: s/p R TKA  Evaluation Date: 4/22/2019  Authorization period Expiration: 7/22/19  Plan of Care Certification Period: 7/22/19    Visit #: 1/ Visits authorized: 12  Time In:4:00 pm  Time Out: 4:55 pm  Total Billable Time: 55 minutes    Precautions: Standard      Subjective   Date of onset: chronic arthritis in right knee   Date of Surgery: 4/18/19       Past Medical History:   Diagnosis Date    Anxiety disorder     Arthritis     Bipolar disorder     Chronic pain     Hyperlipidemia     Hypertension     Psoriatic arthritis     Raynaud's disease      Boom Blanco  has a past surgical history that includes Hysterectomy (1997); Salpingoophorectomy (1997); Back surgery; Colonoscopy (2016); Esophagogastroduodenoscopy (Left, 8/29/2018); Breast biopsy; Arthroscopy of knee (Left, 1/22/2019); Knee arthroscopy w/ meniscectomy (Left, 1/22/2019); Arthroscopic chondroplasty of knee joint (Left, 1/22/2019); Knee arthroscopy w/ plica excision (Left, 1/22/2019); Knee arthroscopy w/ debridement (Left, 1/22/2019); Arthroscopy of knee (Right, 2/5/2019); and Excision of medial meniscus of knee (Right, 2/5/2019).    Boom has a current medication list which includes the following prescription(s): bupropion, cariprazine, docusate sodium, fluticasone, golimumab, ibuprofen, levothyroxine, lisinopril, lovastatin, melatonin, oxycodone-acetaminophen, pantoprazole, and tizanidine, and the following Facility-Administered Medications: lactated ringers, lactated ringers, lidocaine (pf) 10 mg/ml (1%), morphine, ondansetron, and promethazine (PHENERGAN) 6.25 mg in dextrose 5 %  50 mL IVPB.    Review of patient's allergies indicates:   Allergen Reactions    Remeron [mirtazapine] Other (See Comments)     Weight gain        Prior Therapy: none for current condition  Social History: Patient lives in a single level home with 18 steps to enter   Occupation: n/a  Prior Level of Function: knee pain with mobility, independent with ambulation  Current Level of Function: ambulates with RW, unable to perform many     Pain:  Current 6/10, worst 10/10, best 5/10   Location: knee  right  Description: Aching  Aggravating Factors: Walking and weight bearing  Easing Factors: ice    Primary concern/ Chief complaints:  History of current condition - Boom reports: 2+ year history of symptoms, including pain in right knee with ambulation and functional activities. Pt underwent ATS knee surgery on R knee about 6 weeks prior to TKA. Upon scoping the right knee ortho decided patient would do better with TKA. Pt underwent TKA on 4/18/19. She continues to have difficulty activating quad, performing LAQ, and SLR    Pts goals: be able to move the leg    Objective     Girth Measurement Joint line 5 cm below 10 cm above   Right 44.6 cm 39.8 cm 50.4 cm     Range of Motion:   Knee Left active Left Passive    3-81 1-95       Lower Extremity Strength  Right LE  Left LE    Knee extension: trace Knee extension: 5/5   Knee flexion: 3-/5 Knee flexion: 5/5   Hip flexion: 2/5 Hip flexion: 5/5   Hip extension:  3/5 Hip extension: 5/5   Hip abduction: 3-/5 Hip abduction: 5/5   Hip adduction: 3-/5 Hip adduction 5/5   Ankle dorsiflexion: 5/5 Ankle dorsiflexion: 5/5   Ankle plantarflexion: 5/5 Ankle plantarflexion: 5/5       Special Tests:   Left Right   Valgus Stress Test Negative Negative   Varus Stress test Negative    Negative        Joint Mobility: Patellar restricted, due to edema  Tibiofemoral restricted, due to edema    Palpation: moderate tenderness to palpation at right knee    Sensation: diminished at right  knee    Flexibility:    90/90 SLR = R moderate restriction, L minimal restriction   Ely's test: R moderate restriction, L no restriction     PT Evaluation Completed: Yes  Discussed Plan of Care with patient: Yes    TREATMENT   Boom received therapeutic exercises to develop strength for 15 minutes including:  SLR with PT Assist x10  SAQ with PT Assist x10  Supine hip abduction x15  Heel slides x 20  Quad sets x20    Gait training x 10 mins with RW for 500 feet, QC for 100 feet, and no AD for 200 feet with focus on increased weight bearing on RLE, step through, continuous gait pattern.       Home Exercises and Patient Education Provided    Education provided re:   - progress towards goals   - role of therapy in multi - disciplinary team, goals for therapy  Pt educated on condition, POC, and expectations in therapy.  No spiritual or educational barriers to learning provided    Home exercises:  Pt will be provided HEP during course of treatment with progressions as appropriate. Pt was advised to perform these exercises free of pain, and to stop performing them if pain occurs.   Boom demonstrated good understanding of the education provided.       Functional Limitations Reports - G Codes  Category: Mobility, Body position, Carrying, Self care, Other  Tool: LEFS        Assessment   Boom is a 49 y.o. female referred to outpatient physical therapy and presents to PT with pain, ROM deficits, strength deficits, and decreased weight bearing on RLE affecting functional mobility. Pt's strength deficits due in part to residual affects of nerve block in that patient is unable to perform SAQ, minimal quad contraction, and unable to perform SLR. Pt with decline in hip strength due to disuse as well as nerve block. Patient with ROM deficits in R knee due to capsular restrictions and edema. Patient with edema surrounding R knee, into lower leg and ankle. Pt educated on edema control including icing and elevating.     Patient  demonstrates limitations as described in the problem list. Pt will benefit from physcial therapy services in order to maximize pain free and/or functional use of right LE. The following goals were discussed with the patient and patient is in agreement with them as to be addressed in the treatment plan.   Pt prognosis is Excellent.   Pt will benefit from skilled outpatient Physical Therapy to address the deficits stated above and in the chart below, provide pt/family education, and to maximize pt's level of independence.     Plan of care discussed with patient: Yes  Pt's spiritual, cultural and educational needs considered and pt agreeable to plan of care and goals as stated below:     Anticipated Barriers for therapy: none identified at eval    Medical necessity is demonstrated by the following IMPAIRMENTS/PROBLEM LIST:    weakness, impaired functional mobility, gait instability, decreased lower extremity function, pain, decreased ROM, impaired joint extensibility and impaired muscle length    GOALS:     Long Term Goals: 6 weeks  Pain: Decrease pain to 0/10 to allow for improved weight bearing ability on RLE  Strength: Improve strength in right Hip-> knee to 5/5 for improved LE stability  ROM: Improve AROM to 0-125 in R knee  Functional scale: Improve score on LEFS to 24% impairment  Stairs: Ascend/descend flight of steps with reciprocal pattern, minimal use of handrails, and minimal compensation  Walking: Increase walking distance to 1 mile without AD with normalized gait pattern on various indoor and outdoor surfaces  Postures: Increase standing duration to 45 minutes   Transfers: Perform car and bed transfers without limitation  Exercise: demonstrate independence with home exercise program to maintain gains made in therapy.          Plan   Certification Period: 4/22/2019 to 7/22/19.    Outpatient physical therapy 2 times weekly to include: Gait Training, Manual Therapy, Moist Heat/ Ice, Neuromuscular Re-ed,  Therapeutic Activites and Therapeutic Exercise. Cont PT for 2 months.   Pt may be seen by PTA as part of the rehabilitation team.     I certify the need for these services furnished under this plan of treatment and while under my care.    Claudia Torres, PT

## 2019-04-22 NOTE — PLAN OF CARE
04/18/19 1600   Final Note   Assessment Type Final Discharge Note   Anticipated Discharge Disposition Home   What phone number can be called within the next 1-3 days to see how you are doing after discharge? [phone]   Hospital Follow Up  Appt(s) scheduled? Yes   Discharge plans and expectations educations in teach back method with documentation complete? Yes   Patient aware of follow up appointments. States she has put them in her phone. Denies any other discharge needs at this time.

## 2019-04-23 NOTE — PROGRESS NOTES
PT met face to face with Alf Handley PTA to discuss patient's treatment plan and progress towards established goals.  Treatment will be continued as described in initial report/eval and progress notes.  Patient will be seen by physical therapist every sixth visit and minimally once per month.    Additional information: n/a

## 2019-04-25 ENCOUNTER — CLINICAL SUPPORT (OUTPATIENT)
Dept: REHABILITATION | Facility: HOSPITAL | Age: 49
End: 2019-04-25
Attending: ORTHOPAEDIC SURGERY
Payer: COMMERCIAL

## 2019-04-25 DIAGNOSIS — M25.561 RIGHT KNEE PAIN, UNSPECIFIED CHRONICITY: ICD-10-CM

## 2019-04-25 DIAGNOSIS — M25.661 STIFFNESS OF RIGHT KNEE: ICD-10-CM

## 2019-04-25 PROCEDURE — 97140 MANUAL THERAPY 1/> REGIONS: CPT

## 2019-04-25 PROCEDURE — 97110 THERAPEUTIC EXERCISES: CPT

## 2019-04-25 NOTE — PROGRESS NOTES
"                                                    Physical Therapy Daily Note     Name: Boom Diamond [facility] Number: [id number]  Diagnosis:   Encounter Diagnoses   Name Primary?    Stiffness of right knee     Right knee pain, unspecified chronicity      Physician: Sylvain Gorman,   Precautions: WBAT   Visit #: 2 of 12  PTA Visit #: 0  Time In: 9:00  Time Out: 9:53    Subjective     Pt reports: she's been working on stairs at home. She says she was able to step over the side of the tub but it was extremely difficult.   Pain Scale: Boom rates pain on a scale of 0-10 to be 7 currently.    Objective     Boom received individual therapeutic exercises to develop strength, ROM and flexibility for 45 minutes including:      Date 4/25/19       Exercise Sets x Reps/  Resistance Sets x Reps/  Resistance Sets x Reps/  Resistance Sets x Reps/  Resistance Sets x Reps/  Resistance   Nustep 12 min       Std heel raise x20       Alt toe taps 1 min x 2       Heel slides x10 Ind  x10 with PT       Quad sets 5" hold/ 3 min       Supine hip abd 2x10       SLR 2x8       bridge x10       SAQ 2x10           Boom received the following manual therapy techniques for 8 minutes including:  Patellar mobs all planes  Posterior femur glides gr II  Overpressure in flexion and extension     Home Exercises Provided: heel slides, toe taps on step for weight bearing    Pt demo good understanding of the education provided. Boom demonstrated good return demonstration of activities.     Education provided re:  Boom verbalized good understanding of education provided.   No spiritual or educational barriers to learning provided    Assessment     Patient tolerated treatment well. Pt completing treatment without pain or complication.   Pt able to perform independent SLR and SAQ this date. Better activation of quad. Patient able to bear 100% of weight on RLE with good stability.       Plan     Continue with established Plan of Care " towards PT goals.    Therapist: Claudia Torres, PT  4/25/2019

## 2019-04-29 ENCOUNTER — CLINICAL SUPPORT (OUTPATIENT)
Dept: REHABILITATION | Facility: HOSPITAL | Age: 49
End: 2019-04-29
Attending: ORTHOPAEDIC SURGERY
Payer: COMMERCIAL

## 2019-04-29 ENCOUNTER — OFFICE VISIT (OUTPATIENT)
Dept: ORTHOPEDICS | Facility: CLINIC | Age: 49
End: 2019-04-29
Payer: COMMERCIAL

## 2019-04-29 VITALS
DIASTOLIC BLOOD PRESSURE: 58 MMHG | HEART RATE: 79 BPM | HEIGHT: 69 IN | SYSTOLIC BLOOD PRESSURE: 115 MMHG | WEIGHT: 179.88 LBS | BODY MASS INDEX: 26.64 KG/M2

## 2019-04-29 DIAGNOSIS — M25.561 RIGHT KNEE PAIN, UNSPECIFIED CHRONICITY: ICD-10-CM

## 2019-04-29 DIAGNOSIS — M25.661 STIFFNESS OF RIGHT KNEE: ICD-10-CM

## 2019-04-29 DIAGNOSIS — Z51.89 AFTER CARE: Primary | ICD-10-CM

## 2019-04-29 PROCEDURE — 99999 PR PBB SHADOW E&M-EST. PATIENT-LVL III: ICD-10-PCS | Mod: PBBFAC,,, | Performed by: ORTHOPAEDIC SURGERY

## 2019-04-29 PROCEDURE — 97110 THERAPEUTIC EXERCISES: CPT

## 2019-04-29 PROCEDURE — 99024 POSTOP FOLLOW-UP VISIT: CPT | Mod: S$GLB,,, | Performed by: ORTHOPAEDIC SURGERY

## 2019-04-29 PROCEDURE — 99999 PR PBB SHADOW E&M-EST. PATIENT-LVL III: CPT | Mod: PBBFAC,,, | Performed by: ORTHOPAEDIC SURGERY

## 2019-04-29 PROCEDURE — 99024 PR POST-OP FOLLOW-UP VISIT: ICD-10-PCS | Mod: S$GLB,,, | Performed by: ORTHOPAEDIC SURGERY

## 2019-04-29 RX ORDER — ZOLPIDEM TARTRATE 5 MG/1
5 TABLET ORAL NIGHTLY PRN
COMMUNITY
End: 2019-09-20 | Stop reason: CLARIF

## 2019-04-29 NOTE — PROGRESS NOTES
"                                                    Physical Therapy Daily Note     Name: Boom Diamond [facility] Number: [id number]  Diagnosis:   Encounter Diagnoses   Name Primary?    Stiffness of right knee     Right knee pain, unspecified chronicity      Physician: Sylvain Gorman,   Precautions: WBAT   Visit #: 3 of 12  PTA Visit #: 1  Time In: 9:00  Time Out: 9:53    Subjective     Pt reports: it's doing okay it just really bothers me at night time, it aches and stuff. Going to Dr. Omalley today.   Pain Scale: Boom rates pain on a scale of 0-10 to be 7 currently.    Objective     Boom received individual therapeutic exercises to develop strength, ROM and flexibility for 45 minutes including:      Date 4/25/19 4/29/19      Exercise Sets x Reps/  Resistance Sets x Reps/  Resistance Sets x Reps/  Resistance Sets x Reps/  Resistance Sets x Reps/  Resistance   Nustep 12 min  13 min      Std heel raise x20  x20      Alt toe taps 1 min x 2  1 min x 2      Heel slides x10 Ind  x10 with PT  2x 10 with strap      Quad sets 5" hold/ 3 min  5" hold/ 3 min      Supine hip abd 2x10 2x10       SLR 2x8 2x8      bridge x10 x10      SAQ 2x10 2x10          ROM: AAROM R knee: 102 degrees    Boom received the following manual therapy techniques for 8 minutes including: DNP  Patellar mobs all planes  Posterior femur glides gr II  Overpressure in flexion and extension     Home Exercises Provided: heel slides, toe taps on step for weight bearing    Pt demo good understanding of the education provided. Boom demonstrated good return demonstration of activities.     Education provided re:  Boom verbalized good understanding of education provided.   No spiritual or educational barriers to learning provided    Assessment     Patient tolerated treatment well. Pt completing treatment without pain or complication.   Pt very motivated and compliant with PT and HEP. Increase in ROM however continues to show extension lag " during SLR suggesting strength deficits as expected in quads. Continue to progress per pt tolerance.       Plan     Continue with established Plan of Care towards PT goals.    Therapist: Alf Handley, PTA  4/29/2019

## 2019-04-29 NOTE — PROGRESS NOTES
Subjective:      Patient ID: Boom Blanco is a 49 y.o. female.    Chief Complaint: Post-op Evaluation and Pain of the Right Knee      HPI: Ms. Blanco returns today for 1st postop visit on her right total knee arthroplasty. She stated she is doing well and her pain is well controlled.  She stated her only issue is that she is having trouble sleeping at night.  She has been going to physical therapy and working on her knee. She denied fever chills.  She denied calf pain.    ROS:  New diagnosis/surgery/prescriptions since last office visit on 03/11/2019:  Right total knee arthroplasty.      Objective:      Physical Exam:   General: AAOx3.  No acute distress  Vascular:  Pulses intact and equal bilaterally.  Capillary refill less than 3 seconds and equal bilaterally  Neurologic:  Pinprick and soft touch intact and equal bilaterally  Integment:  Incision well approximated with Steri-Strips in place.  Mild medial ecchymosis.  Extremity:  Knee:  Extension/flexion right knee 0/greater than 95°.  Relatively nontender with knee motion. Relatively nontender with knee palpation.  Temperature equal with palpation both knees.  Calves soft bilaterally.  Homans negative bilaterally.  Radiography:  No x-rays done today.        Assessment:       Impression:    1.  Right total knee arthroplasty  2.  Insomnia      Plan:       1.  Discussed physical examination withs with the patient. Boom understands that she had a right total knee arthroplasty and is progressing well.  2.  Continue with physical therapy.  3.  Home exercises to include knee extension exercises were shown discussed.  4.  Offered pain med she declined.  5.  Ambien 5 mg, 1 p.o. q.h.s., dispense 12, refill 0.  6.  [facility] portal was discussed with the patient and information was given.  The patient was encouraged to use the portal for future encounters.  7.  Follow up in 1 month with an x-ray of the right knee.

## 2019-05-02 ENCOUNTER — CLINICAL SUPPORT (OUTPATIENT)
Dept: REHABILITATION | Facility: HOSPITAL | Age: 49
End: 2019-05-02
Attending: ORTHOPAEDIC SURGERY
Payer: MEDICAID

## 2019-05-02 DIAGNOSIS — M25.661 STIFFNESS OF RIGHT KNEE: ICD-10-CM

## 2019-05-02 DIAGNOSIS — M25.561 RIGHT KNEE PAIN, UNSPECIFIED CHRONICITY: ICD-10-CM

## 2019-05-02 PROCEDURE — 97110 THERAPEUTIC EXERCISES: CPT

## 2019-05-02 NOTE — PROGRESS NOTES
"                                                    Physical Therapy Daily Note     Name: Boom Diamond [facility] Number: [id number]  Diagnosis:   Encounter Diagnoses   Name Primary?    Stiffness of right knee     Right knee pain, unspecified chronicity      Physician: Sylvain Gorman,   Precautions: WBAT   Visit #: 4 of 12  PTA Visit #: 2  Time In: 9:00  Time Out: 9:53    Subjective     Pt reports:  Said I need to get it straighter, i'm feeling more stable with gait maybe I can start with straight cane or something.   Pain Scale: Boom rates pain on a scale of 0-10 to be 7 currently.    Objective     Boom received individual therapeutic exercises to develop strength, ROM and flexibility for 45 minutes including:      Date 4/25/19 4/29/19 5/2/19     Exercise Sets x Reps/  Resistance Sets x Reps/  Resistance Sets x Reps/  Resistance Sets x Reps/  Resistance Sets x Reps/  Resistance   Nustep 12 min  13 min 13 min level 2     Std heel raise x20  x20 x20     Alt toe taps 1 min x 2  1 min x 2 1 min x 2     Heel slides x10 Ind  x10 with PT  2x 10 with strap 2x10 with strap     Quad sets 5" hold/ 3 min  5" hold/ 3 min 5" hold/ 3 min     Supine hip abd 2x10 2x10  2x10     SLR 2x8 2x8 2x8     bridge x10 x10 2x10     SAQ 2x10 2x10 2x10     Prone knee hang   X 3 min         ROM: AAROM R knee: 102 degrees    Boom participated in     Boom received the following manual therapy techniques for 8 minutes including: DNP  Patellar mobs all planes  Posterior femur glides gr II  Overpressure in flexion and extension     Home Exercises Provided: heel slides, toe taps on step for weight bearing    Pt demo good understanding of the education provided. Boom demonstrated good return demonstration of activities.     Education provided re:  Boom verbalized good understanding of education provided.   No spiritual or educational barriers to learning provided    Assessment     Patient tolerated treatment well. Pt " completing treatment without pain or complication. Added prone knee hangs to promote extension and educated on updated HEP.   Pt very motivated and compliant with PT and HEP. Increase in ROM however continues to show extension lag during SLR suggesting strength deficits as expected in quads. Continue to progress per pt tolerance.       Plan     Continue with established Plan of Care towards PT goals.    Therapist: Alf Handley, PTA  5/2/2019

## 2019-05-03 ENCOUNTER — TELEPHONE (OUTPATIENT)
Dept: ORTHOPEDICS | Facility: CLINIC | Age: 49
End: 2019-05-03

## 2019-05-03 NOTE — TELEPHONE ENCOUNTER
Returned call to patient. Instructed patient to not apply an cream or lotion to operative site until one month post op. Patient voiced understanding.

## 2019-05-03 NOTE — TELEPHONE ENCOUNTER
----- Message from Chriss Schofield sent at 5/3/2019  1:48 PM CDT -----  Contact: Patient  Type: Needs Medical Advice    Who Called:  Patient  Best Call Back Number: [phone]  Additional Information: Patient requesting to speak with office about how long until she can put lotions or creams on skin after surgery. Please advise

## 2019-05-06 ENCOUNTER — CLINICAL SUPPORT (OUTPATIENT)
Dept: REHABILITATION | Facility: HOSPITAL | Age: 49
End: 2019-05-06
Attending: ORTHOPAEDIC SURGERY
Payer: MEDICAID

## 2019-05-06 DIAGNOSIS — M25.561 RIGHT KNEE PAIN, UNSPECIFIED CHRONICITY: ICD-10-CM

## 2019-05-06 DIAGNOSIS — M25.661 STIFFNESS OF RIGHT KNEE: ICD-10-CM

## 2019-05-06 PROCEDURE — 97110 THERAPEUTIC EXERCISES: CPT

## 2019-05-06 PROCEDURE — 97140 MANUAL THERAPY 1/> REGIONS: CPT

## 2019-05-06 NOTE — PROGRESS NOTES
"                                                    Physical Therapy Daily Note     Name: Boom Diamond [facility] Number: [id number]  Diagnosis:   Encounter Diagnoses   Name Primary?    Stiffness of right knee     Right knee pain, unspecified chronicity      Physician: Sylvain Gorman,   Precautions: WBAT   Visit #: 5 of 12  PTA Visit #: 0  Time In: 9:00  Time Out: 9:45    Subjective     Pt reports:  Said I need to get it straighter, i'm feeling more stable with gait maybe I can start with straight cane or something.   Pain Scale: Boom rates pain on a scale of 0-10 to be 7 currently.    Objective     Boom received individual therapeutic exercises to develop strength, ROM and flexibility for 35 minutes including:      Date 4/25/19 4/29/19 5/2/19 5/6/19    Exercise Sets x Reps/  Resistance Sets x Reps/  Resistance Sets x Reps/  Resistance Sets x Reps/  Resistance Sets x Reps/  Resistance   Nustep 12 min  13 min 13 min level 2 8 min/ L4    Std heel raise x20  x20 x20 x30    Alt toe taps 1 min x 2  1 min x 2 1 min x 2 3 min    Heel slides x10 Ind  x10 with PT  2x 10 with strap 2x10 with strap 2x10    Quad sets 5" hold/ 3 min  5" hold/ 3 min 5" hold/ 3 min 6" hold/ 3 min    Supine hip abd 2x10 2x10  2x10 2x10/ YTB    SLR 2x8 2x8 2x8 3x10    bridge x10 x10 2x10 2x10    SAQ 2x10 2x10 2x10 3x10    Prone knee hang   X 3 min HEP    Fwd step ups    X10/ 4" step    Lat step ups    X10/ 4" step    Side steps    3 laps/ 0        ROM: AAROM R knee: 102 degrees    Boom participated in     Boom received the following manual therapy techniques for 8 minutes including: DNP  Patellar mobs all planes  Posterior femur glides gr II  Overpressure in flexion and extension     Home Exercises Provided: heel slides, toe taps on step for weight bearing    Pt demo good understanding of the education provided. Boom demonstrated good return demonstration of activities.     Education provided re:  Boom verbalized good " understanding of education provided.   No spiritual or educational barriers to learning provided    Assessment     Patient tolerated treatment well. Pt completing treatment without pain or complication. Add'l fwd and lateral step ups, add'l side steps. Patient continues with decreased distal quad contraction and significant extension lag. Patient with decreased terminal knee extension in stance. May begin NMES to quad to improve quad contraction.  Continue to progress per pt tolerance.       Plan     Continue with established Plan of Care towards PT goals.    Therapist: Claudia Torres, PT  5/6/2019

## 2019-05-09 ENCOUNTER — CLINICAL SUPPORT (OUTPATIENT)
Dept: REHABILITATION | Facility: HOSPITAL | Age: 49
End: 2019-05-09
Attending: ORTHOPAEDIC SURGERY
Payer: MEDICAID

## 2019-05-09 DIAGNOSIS — M25.661 STIFFNESS OF RIGHT KNEE: ICD-10-CM

## 2019-05-09 DIAGNOSIS — M25.561 RIGHT KNEE PAIN, UNSPECIFIED CHRONICITY: ICD-10-CM

## 2019-05-09 PROCEDURE — 97110 THERAPEUTIC EXERCISES: CPT

## 2019-05-09 NOTE — PROGRESS NOTES
"                                                    Physical Therapy Daily Note     Name: Boom Diamond [facility] Number: [id number]  Diagnosis:   Encounter Diagnoses   Name Primary?    Stiffness of right knee     Right knee pain, unspecified chronicity      Physician: Sylvain Gorman,   Precautions: WBAT   Visit #: 6 of 12  PTA Visit #: 1  Time In: 8:00  Time Out: 9:00    Subjective     Pt reports: still a little tight. No new complaints, it's getting better just takes time I understand.   Pain Scale: Boom rates pain on a scale of 0-10 to be 7 currently.    Objective     Boom received individual therapeutic exercises to develop strength, ROM and flexibility for 35 minutes including:      Date 4/25/19 4/29/19 5/2/19 5/6/19 5/9/19   Exercise Sets x Reps/  Resistance Sets x Reps/  Resistance Sets x Reps/  Resistance Sets x Reps/  Resistance Sets x Reps/  Resistance   Nustep 12 min  13 min 13 min level 2 8 min/ L4 REC bike 10 min   Std heel raise x20  x20 x20 x30 x30   Alt toe taps 1 min x 2  1 min x 2 1 min x 2 3 min 3 min   Heel slides x10 Ind  x10 with PT  2x 10 with strap 2x10 with strap 2x10 2x10   Quad sets 5" hold/ 3 min  5" hold/ 3 min 5" hold/ 3 min 6" hold/ 3 min 6" hold/ 3 min   Supine hip abd 2x10 2x10  2x10 2x10/ YTB 2x10/ YTB   SLR 2x8 2x8 2x8 3x10 3x10   bridge x10 x10 2x10 2x10 2x10   SAQ 2x10 2x10 2x10 3x10 3x10   Prone knee hang   X 3 min HEP    Fwd step ups    X10/ 4" step X10/4" step   Lat step ups    X10/ 4" step X10/ 4" step   Side steps    3 laps/ 0 3 laps       ROM: AAROM R knee: 102 degrees    Boom participated in     Boom received the following manual therapy techniques for 8 minutes including: DNP  Patellar mobs all planes  Posterior femur glides gr II  Overpressure in flexion and extension     Home Exercises Provided: heel slides, toe taps on step for weight bearing    Pt demo good understanding of the education provided. Boom demonstrated good return demonstration of " activities.     Education provided re:  Boom verbalized good understanding of education provided.   No spiritual or educational barriers to learning provided    Assessment     Patient tolerated treatment well. Pt completing treatment without pain or complication. Add'l fwd and lateral step ups, add'l side steps. Patient continues with decreased distal quad contraction and significant extension lag. Patient with decreased terminal knee extension in stance. OP with QS to promote terminal extension. Progressed with rec bike to increase ROM.       Plan     Continue with established Plan of Care towards PT goals.    Therapist: Alf Handley, KIMBERLY  5/9/2019

## 2019-05-14 ENCOUNTER — CLINICAL SUPPORT (OUTPATIENT)
Dept: REHABILITATION | Facility: HOSPITAL | Age: 49
End: 2019-05-14
Attending: ORTHOPAEDIC SURGERY
Payer: MEDICAID

## 2019-05-14 DIAGNOSIS — M25.561 RIGHT KNEE PAIN, UNSPECIFIED CHRONICITY: ICD-10-CM

## 2019-05-14 DIAGNOSIS — M25.661 STIFFNESS OF RIGHT KNEE: ICD-10-CM

## 2019-05-14 PROCEDURE — 97530 THERAPEUTIC ACTIVITIES: CPT

## 2019-05-14 PROCEDURE — 97110 THERAPEUTIC EXERCISES: CPT

## 2019-05-14 NOTE — PROGRESS NOTES
"                                                    Physical Therapy Daily Note     Name: Boom Diamond [facility] Number: [id number]  Diagnosis:   Encounter Diagnoses   Name Primary?    Stiffness of right knee     Right knee pain, unspecified chronicity      Physician: Sylvain Gorman,   Precautions: WBAT   Visit #: 7 of 12  PTA Visit #: 0  Time In: 9:00 am  Time Out: 10:00 am    Subjective     Pt reports: she still has trouble going up and down the stairs, saying she leans heavily on the handrails. Patient still uses QC in community but says at home she's walking around with no AD.   Pain Scale: Boom rates pain on a scale of 0-10 to be 7 currently.    Objective     Boom received individual therapeutic exercises to develop strength, ROM and flexibility for 35 minutes, therapeutic activities for functional mobility for 15 minutes including:      Date 5/14/19       Exercise Sets x Reps/  Resistance Sets x Reps/  Resistance Sets x Reps/  Resistance Sets x Reps/  Resistance Sets x Reps/  Resistance   Rec Bike 10 min       Std heel raise x30       Alt toe taps 3 min       Heel slides 2x10 with strap       Quad sets 10" hold/ 3 min       Supine hip abd 3x10/ RTB       SLR 3x10       bridge 2x10       SAQ 3x10/ 3 lb       Fwd step ups 2X10/ 6" step       Lat step ups X10/ 6" step       Side steps 3 laps/ RTB       Squats by mat x10       Additional therapeutic activities: ambulation on outdoor surfaces without AD including concrete, asphalt, and grass, navigating curbs.     Boom received the following manual therapy techniques for 8 minutes including: DNP  Patellar mobs all planes  Posterior femur glides gr II  Overpressure in flexion and extension     Home Exercises Provided: heel slides, toe taps on step for weight bearing    Pt demo good understanding of the education provided. Boom demonstrated good return demonstration of activities.     Education provided re:  Boom verbalized good understanding " of education provided.   No spiritual or educational barriers to learning provided    Assessment     Patient tolerated treatment well. Pt completing treatment without pain or complication. Patient demonstrating good stability in weightbearing activities and ambulation without AD on level and unlevel surfaces. Patient continues with extension lag due to quad weakness and decreased terminal knee extension in stance. Patient progressing steadily.      Plan     Continue with established Plan of Care towards PT goals.    Therapist: Claudia Torres, PT  5/14/2019

## 2019-05-14 NOTE — PROGRESS NOTES
Supervisory visit with Alf Handley PTA.  May proceed with goals and treatments as outlined in POC.

## 2019-05-16 ENCOUNTER — CLINICAL SUPPORT (OUTPATIENT)
Dept: REHABILITATION | Facility: HOSPITAL | Age: 49
End: 2019-05-16
Attending: ORTHOPAEDIC SURGERY
Payer: MEDICAID

## 2019-05-16 DIAGNOSIS — M25.561 RIGHT KNEE PAIN, UNSPECIFIED CHRONICITY: ICD-10-CM

## 2019-05-16 DIAGNOSIS — M25.661 STIFFNESS OF RIGHT KNEE: ICD-10-CM

## 2019-05-16 PROCEDURE — 97110 THERAPEUTIC EXERCISES: CPT

## 2019-05-16 NOTE — PROGRESS NOTES
"                                                    Physical Therapy Daily Note     Name: Boom Diamond [facility] Number: [id number]  Diagnosis:   Encounter Diagnoses   Name Primary?    Stiffness of right knee     Right knee pain, unspecified chronicity      Physician: Sylvain Gorman,   Precautions: WBAT   Visit #: 8 of 12  PTA Visit #: 1  Time In: 8:00 am  Time Out: 9:00 am    Subjective     Pt reports: I was going up the stairs on Tuesday and was carrying drinks and accidentally twisted my good knee.     Pain Scale: Boom rates pain on a scale of 0-10 to be 7 currently.    Objective     Boom received individual therapeutic exercises to develop strength, ROM and flexibility for 35 minutes, therapeutic activities for functional mobility for 15 minutes including:      Date 5/14/19 5/16/19      Exercise Sets x Reps/  Resistance Sets x Reps/  Resistance Sets x Reps/  Resistance Sets x Reps/  Resistance Sets x Reps/  Resistance   Rec Bike 10 min 15 min      Std heel raise x30 x30      Alt toe taps 3 min 2x1 min      Heel slides 2x10 with strap 2x10 with strap      Quad sets 10" hold/ 3 min 10" hold/ 3 min      Supine hip abd 3x10/ RTB 3x10/ RTB      SLR 3x10 3x10      bridge 2x10 2x10      SAQ 3x10/ 3 lb 3x10/ 3lb      Fwd step ups 2X10/ 6" step 2x10/ 6" step      Lat step ups X10/ 6" step X10/ 6" step      Side steps 3 laps/ RTB 3 laps/ RTB      Squats by mat x10 x10      Additional therapeutic activities: ambulation on outdoor surfaces without AD including concrete, asphalt, and grass, navigating curbs.     Boom received the following manual therapy techniques for 8 minutes including:   Patellar mobs all planes  Posterior femur glides gr II  Overpressure in flexion and extension     Home Exercises Provided: heel slides, toe taps on step for weight bearing    Pt demo good understanding of the education provided. Boom demonstrated good return demonstration of activities.     Education provided " re:  Boom verbalized good understanding of education provided.   No spiritual or educational barriers to learning provided    Assessment     Patient tolerated treatment well. Pt completing treatment without pain or complication. Patient demonstrating good stability in weightbearing activities and ambulation without AD on level and unlevel surfaces. Patient continues with extension lag due to quad weakness and decreased terminal knee extension in stance. Patient progressing steadily.      Plan     Continue with established Plan of Care towards PT goals.    Therapist: Alf Handley, KIMBERLY  5/16/2019

## 2019-05-20 ENCOUNTER — CLINICAL SUPPORT (OUTPATIENT)
Dept: REHABILITATION | Facility: HOSPITAL | Age: 49
End: 2019-05-20
Attending: ORTHOPAEDIC SURGERY
Payer: MEDICAID

## 2019-05-20 DIAGNOSIS — M25.561 RIGHT KNEE PAIN, UNSPECIFIED CHRONICITY: ICD-10-CM

## 2019-05-20 DIAGNOSIS — M25.661 STIFFNESS OF RIGHT KNEE: ICD-10-CM

## 2019-05-20 PROCEDURE — 97140 MANUAL THERAPY 1/> REGIONS: CPT

## 2019-05-20 PROCEDURE — 97110 THERAPEUTIC EXERCISES: CPT

## 2019-05-20 NOTE — PROGRESS NOTES
"                                                    Physical Therapy Daily Note     Name: Boom Diamond [facility] Number: [id number]  Diagnosis:   Encounter Diagnoses   Name Primary?    Stiffness of right knee     Right knee pain, unspecified chronicity      Physician: Sylvain Gorman,   Precautions: WBAT   Visit #: 10 of 12  PTA Visit #: 0  Time In: 9:00 am  Time Out: 10:00 am    Subjective     Pt reports: Pain is not getting any better, still takes pain meds.     Pain Scale: Boom rates pain on a scale of 0-10 to be 6 currently.    Objective     Boom received individual therapeutic exercises to develop strength, ROM and flexibility for 35 minutes, therapeutic activities for functional mobility for 15 minutes including:      Date 5/14/19 5/16/19 5/20/19     Exercise Sets x Reps/  Resistance Sets x Reps/  Resistance Sets x Reps/  Resistance Sets x Reps/  Resistance Sets x Reps/  Resistance   Rec Bike 10 min 15 min 15 min     Std heel raise x30 x30 Unilateral  2x10     Alt toe taps 3 min 2x1 min 3 min/ 6" step     Heel slides 2x10 with strap 2x10 with strap 2x10 with strap     Quad sets 10" hold/ 3 min 10" hold/ 3 min 10" hold / 3min     Supine hip abd 3x10/ RTB 3x10/ RTB sidelying  3x10     SLR 3x10 3x10 3x10     bridge 2x10 2x10 2x10     SAQ 3x10/ 3 lb 3x10/ 3lb 3x10/ 3lb     Fwd step ups 2X10/ 6" step 2x10/ 6" step 3x10/ 6" step     Lat step ups X10/ 6" step X10/ 6" step 3x10/ 6" step     Side steps 3 laps/ RTB 3 laps/ RTB 3 laps/ GTB     Squats by mat x10 x10 x15       Boom received the following manual therapy techniques for 8 minutes including:   Patellar mobs all planes  Posterior femur glides gr II  Overpressure in flexion and extension   AROM: 1-127; PROM 0-130    Home Exercises Provided: heel slides, toe taps on step for weight bearing    Pt demo good understanding of the education provided. Boom demonstrated good return demonstration of activities.     Education provided re:  Boom " verbalized good understanding of education provided.   No spiritual or educational barriers to learning provided    Assessment     Patient tolerated treatment well. Pt completing treatment without pain or complication. Patient improving strength, stability, and AROM. Continues with extension lag and quad weakness, but progressing.       Plan     Continue with established Plan of Care towards PT goals.    Therapist: Claudia Torres, PT  5/20/2019

## 2019-05-21 RX ORDER — LEVOTHYROXINE SODIUM 50 UG/1
TABLET ORAL
Qty: 90 TABLET | Refills: 3 | OUTPATIENT
Start: 2019-05-21

## 2019-05-21 NOTE — TELEPHONE ENCOUNTER
+Refill request(s). Please advise. Thank you.       ----- Message from Karen Phillip sent at 5/21/2019  4:47 PM CDT -----  Contact: patient  Type: Needs Medical Advice    Who Called:  Patient  Best Call Back Number: [phone] (home)   Additional Information: Patient said she needs a Rx sent to [facility] in [place] to have her Levorthyroxin refilled today please. Thanks

## 2019-05-22 DIAGNOSIS — E03.8 OTHER SPECIFIED HYPOTHYROIDISM: Primary | ICD-10-CM

## 2019-05-22 RX ORDER — LEVOTHYROXINE SODIUM 50 UG/1
TABLET ORAL
Qty: 90 TABLET | Refills: 3 | OUTPATIENT
Start: 2019-05-22

## 2019-05-22 RX ORDER — LEVOTHYROXINE SODIUM 50 UG/1
50 TABLET ORAL
Qty: 90 TABLET | Refills: 2 | Status: SHIPPED | OUTPATIENT
Start: 2019-05-22 | End: 2020-02-13

## 2019-05-23 ENCOUNTER — CLINICAL SUPPORT (OUTPATIENT)
Dept: REHABILITATION | Facility: HOSPITAL | Age: 49
End: 2019-05-23
Attending: ORTHOPAEDIC SURGERY
Payer: MEDICAID

## 2019-05-23 DIAGNOSIS — M25.661 STIFFNESS OF RIGHT KNEE: ICD-10-CM

## 2019-05-23 DIAGNOSIS — M25.561 RIGHT KNEE PAIN, UNSPECIFIED CHRONICITY: ICD-10-CM

## 2019-05-23 PROCEDURE — 97110 THERAPEUTIC EXERCISES: CPT

## 2019-05-23 RX ORDER — LISINOPRIL 20 MG/1
20 TABLET ORAL DAILY
Qty: 90 TABLET | Refills: 0 | Status: SHIPPED | OUTPATIENT
Start: 2019-05-23 | End: 2019-08-19 | Stop reason: SDUPTHER

## 2019-05-23 NOTE — PROGRESS NOTES
"                                                    Physical Therapy Daily Note     Name: Boom Diamond [facility] Number: [id number]  Diagnosis:   No diagnosis found.  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     Pain Scale: Boom rates pain on a scale of 0-10 to be 6 currently.    Objective     Boom received individual therapeutic exercises to develop strength, ROM and flexibility for 35 minutes, therapeutic activities for functional mobility for 15 minutes including:      Date 5/14/19 5/16/19 5/20/19 5/23/19    Exercise Sets x Reps/  Resistance Sets x Reps/  Resistance Sets x Reps/  Resistance Sets x Reps/  Resistance Sets x Reps/  Resistance   Rec Bike 10 min 15 min 15 min 15 min    Std heel raise x30 x30 Unilateral  2x10 Unilateral  2x10    Alt toe taps 3 min 2x1 min 3 min/ 6" step 3 min/ 6" step    Heel slides 2x10 with strap 2x10 with strap 2x10 with strap 2x10 with strap    Quad sets 10" hold/ 3 min 10" hold/ 3 min 10" hold / 3min 10" hold/ 3 min    Supine hip abd 3x10/ RTB 3x10/ RTB sidelying  3x10 sidelying  3x10    SLR 3x10 3x10 3x10 3x10 1#    bridge 2x10 2x10 2x10 2x10    SAQ 3x10/ 3 lb 3x10/ 3lb 3x10/ 3lb LAQ 3x10 3lb    Fwd step ups 2X10/ 6" step 2x10/ 6" step 3x10/ 6" step 3x10/ 4" step    Lat step ups X10/ 6" step X10/ 6" step 3x10/ 6" step 3x10/ 4" step    Side steps 3 laps/ RTB 3 laps/ RTB 3 laps/ GTB 3 laps/ GTB    Squats by mat x10 x10 x15 x15    Step downs    4" step 2x10      Boom received the following manual therapy techniques for 8 minutes including: DNP  Patellar mobs all planes  Posterior femur glides gr II  Overpressure in flexion and extension   AROM: 1-127; PROM 0-130    Home Exercises Provided: heel slides, toe taps on step " for weight bearing    Pt demo good understanding of the education provided. Boom demonstrated good return demonstration of activities.     Education provided re:  Boom verbalized good understanding of education provided.   No spiritual or educational barriers to learning provided    Assessment     Patient tolerated treatment well. Pt completing treatment without pain or complication. Patient improving strength, stability, and AROM. Continues with extension lag and quad weakness, but progressing. Able to progress with step downs. No increase in symptoms upon deparutre.       Plan     Continue with established Plan of Care towards PT goals.    Therapist: Alf Handley, PTA  5/23/2019

## 2019-05-28 ENCOUNTER — CLINICAL SUPPORT (OUTPATIENT)
Dept: REHABILITATION | Facility: HOSPITAL | Age: 49
End: 2019-05-28
Attending: ORTHOPAEDIC SURGERY
Payer: MEDICAID

## 2019-05-28 DIAGNOSIS — M25.561 RIGHT KNEE PAIN, UNSPECIFIED CHRONICITY: Primary | ICD-10-CM

## 2019-05-28 DIAGNOSIS — M25.661 STIFFNESS OF RIGHT KNEE: ICD-10-CM

## 2019-05-28 DIAGNOSIS — M25.561 RIGHT KNEE PAIN, UNSPECIFIED CHRONICITY: ICD-10-CM

## 2019-05-28 PROCEDURE — 97110 THERAPEUTIC EXERCISES: CPT

## 2019-05-28 PROCEDURE — 97116 GAIT TRAINING THERAPY: CPT

## 2019-05-28 NOTE — PROGRESS NOTES
"                        Physical Therapy Daily Note/ Progress Note/ Recertification     Name: Boom Diamond [facility] Number: [id number]  Diagnosis:   Encounter Diagnoses   Name Primary?    Stiffness of right knee     Right knee pain, unspecified chronicity      Physician: Sylvain Gorman, DO  Precautions: WBAT   Visit #: 10 of 12  PTA Visit #: 0  Time In: 9:00 am  Time Out: 10:00 am    Subjective     Pt reports: Doing better overall. Still walk with a limp  Pain Scale: Boom rates pain on a scale of 0-10 to be 6 currently.    Objective     Boom received individual therapeutic exercises to develop strength, ROM and flexibility for 35 minutes, therapeutic activities for functional mobility for 15 minutes including:      Date 5/14/19 5/16/19 5/20/19 5/28/19    Exercise Sets x Reps/  Resistance Sets x Reps/  Resistance Sets x Reps/  Resistance Sets x Reps/  Resistance Sets x Reps/  Resistance   Rec Bike 10 min 15 min 15 min 15 min    Heel slides 2x10 with strap 2x10 with strap 2x10 with strap     Quad sets 10" hold/ 3 min 10" hold/ 3 min 10" hold / 3min Std TKE    Supine hip abd 3x10/ RTB 3x10/ RTB sidelying  3x10 1#/ 3x10    SLR 3x10 3x10 3x10 1#/ 2x10    bridge 2x10 2x10 2x10     SAQ 3x10/ 3 lb 3x10/ 3lb 3x10/ 3lb     Fwd step ups 2X10/ 6" step 2x10/ 6" step 3x10/ 6" step 3x10/ 6" step    Standing hip flex/ext/abd (Bilat)    Bottom step  YTB x 15 ea     Squats by mat x10 x10 x15 x20    Ecc quad lat    4" step/ x10    Fwd step down    4" step/ x 10    Resisted hip IR, seated    YTB/ x20    S/l clams    x15      Gait training for 15 mins: patient ambulating outdoor surfaces on asphalt, concrete, and grass x 300 feet. Ascend/descending flight of stairs x 2 times with reciprocal pattern, cuing for knee mobility and hip/knee alignment, use of handrail for stability only    Range of Motion: Initial  Current  Knee Right active Right Active     3-81 1-125     Lower Extremity Strength  Knee extension: trace Knee " S-(situation): abdominal pain    B-(background): pain started yesterday and is getting worse    A-(assessment): 1020 pm started to have abdominal pain and vomiting at 3 am. No fever. Pain is located lower abdominal- midline. Didn't sleep well. Last BM was yesterday. Went 2-3 times yesterday. It was hard once yesterday then soft. He threw up two more times in the night. He won't let mom push on his stomach. He is laying on right side in fetal position. He can't get comfortable. He won't walk. Last time he urinated was in middle of the night. He took small amount of fluids this morning. Lips are dry. No fever. He did have his normal appetite yesterday.     R-(recommendations): advised to go to ED. Mom states she understand and agrees with plan     extension: 4/5   Knee flexion: 3-/5 Knee flexion: 5/5   Hip IR NT Hip IR 3/5   Hip flexion: 2/5 Hip flexion: 5/5   Hip extension:  3/5 Hip extension: 5/5   Hip abduction: 3-/5 Hip abduction: 5/5   Hip adduction: 3-/5 Hip adduction 5/5   Ankle dorsiflexion: 5/5 Ankle dorsiflexion: 5/5   Ankle plantarflexion: 5/5 Ankle plantarflexion: 5/5       Home Exercises Provided: heel slides, toe taps on step for weight bearing    Pt demo good understanding of the education provided. Boom demonstrated good return demonstration of activities.     Education provided re:  Boom verbalized good understanding of education provided.   No spiritual or educational barriers to learning provided    Assessment     Patient tolerated treatment well. Pt completing treatment without pain or complication. Patient demonstrating steady progress in functional mobility, yet continues to lack in gait quality, ability to ascend/descend stairs with only minimal compensation, unable to perform squat or transfers from the floor due to strength deficits. Patient to benefit from continued therapy to address residual deficits in order to reach stated goals.   All goals still appropriate.      Long Term Goals: 6 weeks  Pain: Decrease pain to 0/10 to allow for improved weight bearing ability on RLE--> 4/5 on average  Strength: Improve strength in right Hip-> knee to 5/5 for improved LE stability--> progressing  ROM: Improve AROM to 0-125 in R knee--> progressing, continues with extension lag  Functional scale: Improve score on LEFS to 24% impairment  Stairs: Ascend/descend flight of steps with reciprocal pattern, minimal use of handrails, and minimal compensation--> progressing, continues with more than minimal compensation  Walking: Increase walking distance to 1 mile without AD with normalized gait pattern on various indoor and outdoor surfaces--> progressing  Postures: Increase standing duration to 45 minutes --> progressing  Transfers: Perform car and  bed transfers without limitation--> goal met  Exercise: demonstrate independence with home exercise program to maintain gains made in therapy.      Plan     Continue with established Plan of Care towards PT goals. Continue skilled therapy 2x/week for 5 weeks    Therapist: Claudia Torres, PT  5/28/2019

## 2019-05-30 ENCOUNTER — CLINICAL SUPPORT (OUTPATIENT)
Dept: REHABILITATION | Facility: HOSPITAL | Age: 49
End: 2019-05-30
Attending: ORTHOPAEDIC SURGERY
Payer: MEDICAID

## 2019-05-30 DIAGNOSIS — M25.661 STIFFNESS OF RIGHT KNEE: ICD-10-CM

## 2019-05-30 DIAGNOSIS — M25.561 RIGHT KNEE PAIN, UNSPECIFIED CHRONICITY: ICD-10-CM

## 2019-05-30 PROCEDURE — 97110 THERAPEUTIC EXERCISES: CPT

## 2019-05-30 NOTE — PROGRESS NOTES
"                        Physical Therapy Daily Note/ Progress Note/ Recertification     Name: Boom Diamond [facility] Number: [id number]  Diagnosis:   Encounter Diagnoses   Name Primary?    Stiffness of right knee     Right knee pain, unspecified chronicity      Physician: Sylvain Gorman, DO  Precautions: WBAT   Visit #: 11 of 12  PTA Visit #: 1  Time In: 9:00 am  Time Out: 10:00 am    Subjective     Pt reports: nothing new, I feel like I am getting better with stairs I just feel unstable, still getting some swelling in my knee  Pain Scale: Boom rates pain on a scale of 0-10 to be 6 currently.    Objective     Boom received individual therapeutic exercises to develop strength, ROM and flexibility for 35 minutes, therapeutic activities for functional mobility for 15 minutes including:      Date 5/14/19 5/16/19 5/20/19 5/28/19 5/30/19   Exercise Sets x Reps/  Resistance Sets x Reps/  Resistance Sets x Reps/  Resistance Sets x Reps/  Resistance Sets x Reps/  Resistance   Rec Bike 10 min 15 min 15 min 15 min 15 min   Heel slides 2x10 with strap 2x10 with strap 2x10 with strap     Quad sets 10" hold/ 3 min 10" hold/ 3 min 10" hold / 3min Std TKE Std TKE green   Supine hip abd 3x10/ RTB 3x10/ RTB sidelying  3x10 1#/ 3x10 1# 3x10   SLR 3x10 3x10 3x10 1#/ 2x10 1# 2 x10   bridge 2x10 2x10 2x10  2x10   SAQ 3x10/ 3 lb 3x10/ 3lb 3x10/ 3lb  LAQ 2# 2x10   Fwd step ups 2X10/ 6" step 2x10/ 6" step 3x10/ 6" step 3x10/ 6" step 3x10/ 6" step   Standing hip flex/ext/abd (Bilat)    Bottom step  YTB x 15 ea  Bottom step YTB x 15 ea   Squats by mat x10 x10 x15 x20 x20   Ecc quad lat    4" step/ x10 4" step x 10   Fwd step down    4" step/ x 10 4" step x 10   Resisted hip IR, seated    YTB/ x20 YTB x 20   S/l clams    x15 x15         Home Exercises Provided: heel slides, toe taps on step for weight bearing    Pt demo good understanding of the education provided. Boom demonstrated good return demonstration of activities. "     Education provided re:  Boom verbalized good understanding of education provided.   No spiritual or educational barriers to learning provided    Assessment     Patient tolerated treatment well. Pt completing treatment without pain or complication. Patient demonstrating steady progress in functional mobility, yet continues to lack in gait quality, ability to ascend/descend stairs with only minimal compensation. Continues to present with warmth and swelling in joint advised to continue RICE at home.     Long Term Goals: 6 weeks  Pain: Decrease pain to 0/10 to allow for improved weight bearing ability on RLE--> 4/5 on average  Strength: Improve strength in right Hip-> knee to 5/5 for improved LE stability--> progressing  ROM: Improve AROM to 0-125 in R knee--> progressing, continues with extension lag  Functional scale: Improve score on LEFS to 24% impairment  Stairs: Ascend/descend flight of steps with reciprocal pattern, minimal use of handrails, and minimal compensation--> progressing, continues with more than minimal compensation  Walking: Increase walking distance to 1 mile without AD with normalized gait pattern on various indoor and outdoor surfaces--> progressing  Postures: Increase standing duration to 45 minutes --> progressing  Transfers: Perform car and bed transfers without limitation--> goal met  Exercise: demonstrate independence with home exercise program to maintain gains made in therapy.      Plan     Continue with established Plan of Care towards PT goals. Continue skilled therapy 2x/week for 5 weeks    Therapist: Alf Handley, KIMBERLY  5/30/2019

## 2019-06-03 ENCOUNTER — HOSPITAL ENCOUNTER (OUTPATIENT)
Dept: RADIOLOGY | Facility: HOSPITAL | Age: 49
Discharge: HOME | End: 2019-06-03
Attending: ORTHOPAEDIC SURGERY
Payer: MEDICAID

## 2019-06-03 ENCOUNTER — OFFICE VISIT (OUTPATIENT)
Dept: ORTHOPEDICS | Facility: CLINIC | Age: 49
End: 2019-06-03
Payer: MEDICAID

## 2019-06-03 VITALS — WEIGHT: 179.88 LBS | HEIGHT: 69 IN | BODY MASS INDEX: 26.64 KG/M2

## 2019-06-03 DIAGNOSIS — M25.561 RIGHT KNEE PAIN, UNSPECIFIED CHRONICITY: ICD-10-CM

## 2019-06-03 DIAGNOSIS — Z96.651 S/P TOTAL KNEE ARTHROPLASTY, RIGHT: Primary | ICD-10-CM

## 2019-06-03 DIAGNOSIS — Z51.89 AFTER CARE: Primary | ICD-10-CM

## 2019-06-03 DIAGNOSIS — M25.661 STIFFNESS OF RIGHT KNEE: ICD-10-CM

## 2019-06-03 PROCEDURE — 73562 XR KNEE 3 VIEW RIGHT: ICD-10-PCS | Mod: 26,RT,, | Performed by: RADIOLOGY

## 2019-06-03 PROCEDURE — 99024 POSTOP FOLLOW-UP VISIT: CPT | Mod: ,,, | Performed by: ORTHOPAEDIC SURGERY

## 2019-06-03 PROCEDURE — 73562 X-RAY EXAM OF KNEE 3: CPT | Mod: TC,FY,RT

## 2019-06-03 PROCEDURE — 99999 PR PBB SHADOW E&M-EST. PATIENT-LVL II: ICD-10-PCS | Mod: PBBFAC,,, | Performed by: ORTHOPAEDIC SURGERY

## 2019-06-03 PROCEDURE — 99212 OFFICE O/P EST SF 10 MIN: CPT | Mod: PBBFAC,25 | Performed by: ORTHOPAEDIC SURGERY

## 2019-06-03 PROCEDURE — 73562 X-RAY EXAM OF KNEE 3: CPT | Mod: 26,RT,, | Performed by: RADIOLOGY

## 2019-06-03 PROCEDURE — 99024 PR POST-OP FOLLOW-UP VISIT: ICD-10-PCS | Mod: ,,, | Performed by: ORTHOPAEDIC SURGERY

## 2019-06-03 PROCEDURE — 99999 PR PBB SHADOW E&M-EST. PATIENT-LVL II: CPT | Mod: PBBFAC,,, | Performed by: ORTHOPAEDIC SURGERY

## 2019-06-03 NOTE — PROGRESS NOTES
Subjective:      Patient ID: Boom Blanco is a 49 y.o. female.    Chief Complaint: Pain and Post-op Evaluation of the Right Knee and Post-op Evaluation      HPI: Ms. Blanco returned today for approximately 6 week follow-up on a right total knee arthroplasty. She states she is doing well and her pain has greatly decreased from preop.  She has been working aggressively in physical therapy and would like to continue going of possible so she can strengthen her quads to facilitate climbing stairs.  Her date of surgery 04/19/2019.    ROS:  No new diagnosis/surgery/prescriptions since last office visit on 04/29/2019.      Objective:      Physical Exam:   General: AAOx3.  No acute distress  Vascular:  Pulses intact and equal bilaterally.  Capillary refill less than 3 seconds and equal bilaterally  Neurologic:  Pinprick and soft touch intact and equal bilaterally  Integment:  Incision well approximated and well healed.  Extremity:  Knee:  Extension/flexion 0/128 degrees. No swelling.  Nontender with motion.  Nontender with palpation.  Stable with stressing.  Temperature equal with palpation both knees.  Calf soft.  Homans negative.  Radiography:  Personally reviewed x-rays of the right knee completed on 06/03/2019 showed a well-seated well-aligned total knee arthroplasty without signs of loosening.      Assessment:       Impression:  Right total knee arthroplasty      Plan:       1.  Discussed physical examination and radiographic findings with the patient. Boom understands that she is doing well and has gained full motion in her knee. If she would like to continue with more physical therapy it would be advantageous to gain more strength in her quads.  2.  Extent physical therapy for 1 more month.  3.  Continue with home exercises these were discussed with the patient to include quadriceps exercises.  4.  Any pain can be treated with over-the-counter medications dosed per box instructions.  5.  [facility]  portal was discussed with the patient and information was given.  The patient was encouraged to use the portal for future encounters.  6.  Follow up in 3 months with an x-ray of the right knee.

## 2019-06-04 ENCOUNTER — CLINICAL SUPPORT (OUTPATIENT)
Dept: REHABILITATION | Facility: HOSPITAL | Age: 49
End: 2019-06-04
Attending: ORTHOPAEDIC SURGERY
Payer: MEDICAID

## 2019-06-04 DIAGNOSIS — M25.661 STIFFNESS OF RIGHT KNEE: ICD-10-CM

## 2019-06-04 DIAGNOSIS — M25.561 RIGHT KNEE PAIN, UNSPECIFIED CHRONICITY: ICD-10-CM

## 2019-06-04 PROCEDURE — 97110 THERAPEUTIC EXERCISES: CPT

## 2019-06-04 NOTE — PROGRESS NOTES
"                        Physical Therapy Daily Note/ Progress Note/ Recertification     Name: Boom Diamond [facility] Number: [id number]  Diagnosis:   Encounter Diagnoses   Name Primary?    Stiffness of right knee     Right knee pain, unspecified chronicity      Physician: Sylvain Gorman, DO  Precautions: WBAT   Visit #: 1 of 12  PTA Visit #: 2  Time In: 9:00 am  Time Out: 10:00 am    Subjective     Pt reports: doctor said range looks great to keep working on stairs/squats and strengthening.   Pain Scale: Boom rates pain on a scale of 0-10 to be 6 currently.    Objective     Boom received individual therapeutic exercises to develop strength, ROM and flexibility for 35 minutes, therapeutic activities for functional mobility for 15 minutes including:      Date 6/4/19       Exercise Sets x Reps/  Resistance Sets x Reps/  Resistance Sets x Reps/  Resistance Sets x Reps/  Resistance Sets x Reps/  Resistance   Rec Bike 10 min       Leg press 70# 2x10       Quad sets 5 sec hold 2x10       Sidelying hip abd 1# 3x10       SLR 3x10 1#       bridge 2x10       SAQ 3x10/ 4 lb       Fwd step ups 2X10/ 6" step       Standing hip flex/ext/abd (Bilat) Bottom step YTB x 15 ea       Squats by mat 2x10       Ecc quad lat 4" step 2x10       Fwd step down 4" step x 10       Resisted hip IR, seated YTB x 20       S/l clams x15             Home Exercises Provided: heel slides, toe taps on step for weight bearing    Pt demo good understanding of the education provided. Boom demonstrated good return demonstration of activities.     Education provided re:  Boom verbalized good understanding of education provided.   No spiritual or educational barriers to learning provided    Assessment     Patient tolerated treatment well. Pt completing treatment without pain or complication. Patient demonstrating steady progress in functional mobility, yet continues to lack in gait quality, ability to ascend/descend stairs with only " minimal compensation. Continues to present with warmth and swelling in joint advised to continue RICE at home.     Long Term Goals: 6 weeks  Pain: Decrease pain to 0/10 to allow for improved weight bearing ability on RLE--> 4/5 on average  Strength: Improve strength in right Hip-> knee to 5/5 for improved LE stability--> progressing  ROM: Improve AROM to 0-125 in R knee--> progressing, continues with extension lag  Functional scale: Improve score on LEFS to 24% impairment  Stairs: Ascend/descend flight of steps with reciprocal pattern, minimal use of handrails, and minimal compensation--> progressing, continues with more than minimal compensation  Walking: Increase walking distance to 1 mile without AD with normalized gait pattern on various indoor and outdoor surfaces--> progressing  Postures: Increase standing duration to 45 minutes --> progressing  Transfers: Perform car and bed transfers without limitation--> goal met  Exercise: demonstrate independence with home exercise program to maintain gains made in therapy.      Plan     Continue with established Plan of Care towards PT goals. Continue skilled therapy 2x/week for 5 weeks    Therapist: Alf Handley, KIMBERLY  6/4/2019

## 2019-06-06 ENCOUNTER — CLINICAL SUPPORT (OUTPATIENT)
Dept: REHABILITATION | Facility: HOSPITAL | Age: 49
End: 2019-06-06
Attending: ORTHOPAEDIC SURGERY
Payer: MEDICAID

## 2019-06-06 DIAGNOSIS — M25.661 STIFFNESS OF RIGHT KNEE: ICD-10-CM

## 2019-06-06 DIAGNOSIS — M25.561 RIGHT KNEE PAIN, UNSPECIFIED CHRONICITY: ICD-10-CM

## 2019-06-06 PROCEDURE — 97110 THERAPEUTIC EXERCISES: CPT

## 2019-06-06 NOTE — PROGRESS NOTES
"                        Physical Therapy Daily Note/ Progress Note/ Recertification     Name: Boom Diamond [facility] Number: [id number]  Diagnosis:   Encounter Diagnoses   Name Primary?    Stiffness of right knee     Right knee pain, unspecified chronicity      Physician: Sylvain Gorman, DO  Precautions: WBAT   Visit #: 2 of 12  PTA Visit #: 3  Time In: 9:00 am  Time Out: 10:00 am    Subjective     Pt reports: having a good day today, no new complaints except i'm still having trouble with stairs especially going down them.   Pain Scale: Boom rates pain on a scale of 0-10 to be 4 currently.    Objective     Boom received individual therapeutic exercises to develop strength, ROM and flexibility for 35 minutes, therapeutic activities for functional mobility for 15 minutes including:      Date 6/4/19 6/6/19      Exercise Sets x Reps/  Resistance Sets x Reps/  Resistance Sets x Reps/  Resistance Sets x Reps/  Resistance Sets x Reps/  Resistance   Rec Bike 10 min 15 min      Leg press 70# 2x10 70# 2x10      Quad sets 5 sec hold 2x10 5 sec hold 2x10      Sidelying hip abd 1# 3x10 1# 3x10      SLR 3x10 1# 3x10 1#      bridge 2x10 2x10      SAQ 3x10/ 4 lb 3x10/ 4lb      Fwd step ups 2X10/ 6" step 2x10/ 6" step      Standing hip flex/ext/abd (Bilat) Bottom step YTB x 15 ea Bottom step YTB x 15 ea      Squats by mat 2x10 2x10      Ecc quad lat 6" step 2x10 6" step 2x10      Fwd step down 4" step x 10 4" step x 10      Resisted hip IR, seated YTB x 20 YTB x 20      S/l clams x15 x15            Home Exercises Provided: heel slides, toe taps on step for weight bearing    Pt demo good understanding of the education provided. Boom demonstrated good return demonstration of activities.     Education provided re:  Boom verbalized good understanding of education provided.   No spiritual or educational barriers to learning provided    Assessment     Patient tolerated treatment well. Pt completing treatment without " "pain or complication. Patient demonstrating steady progress in functional mobility, yet continues to lack ability to descend stairs without compensation. Did go to [facility] to practice stairs x 2 reps. Requires hand rail to descend properly. Increased discomfort "tightness" reported upon departure.      Long Term Goals: 6 weeks  Pain: Decrease pain to 0/10 to allow for improved weight bearing ability on RLE--> 4/5 on average  Strength: Improve strength in right Hip-> knee to 5/5 for improved LE stability--> progressing  ROM: Improve AROM to 0-125 in R knee--> progressing, continues with extension lag  Functional scale: Improve score on LEFS to 24% impairment  Stairs: Ascend/descend flight of steps with reciprocal pattern, minimal use of handrails, and minimal compensation--> progressing, continues with more than minimal compensation  Walking: Increase walking distance to 1 mile without AD with normalized gait pattern on various indoor and outdoor surfaces--> progressing  Postures: Increase standing duration to 45 minutes --> progressing  Transfers: Perform car and bed transfers without limitation--> goal met  Exercise: demonstrate independence with home exercise program to maintain gains made in therapy.      Plan     Continue with established Plan of Care towards PT goals. Continue skilled therapy 2x/week for 5 weeks    Therapist: Alf Handley, PTA  6/6/2019  "

## 2019-06-14 ENCOUNTER — CLINICAL SUPPORT (OUTPATIENT)
Dept: REHABILITATION | Facility: HOSPITAL | Age: 49
End: 2019-06-14
Attending: ORTHOPAEDIC SURGERY
Payer: MEDICAID

## 2019-06-14 DIAGNOSIS — M25.661 STIFFNESS OF RIGHT KNEE: ICD-10-CM

## 2019-06-14 DIAGNOSIS — M25.561 RIGHT KNEE PAIN, UNSPECIFIED CHRONICITY: ICD-10-CM

## 2019-06-14 PROCEDURE — 97110 THERAPEUTIC EXERCISES: CPT

## 2019-06-14 NOTE — PROGRESS NOTES
"                        Physical Therapy Daily Note/ Progress Note/ Recertification     Name: Boom Diamond [facility]  Clinic Number: [id number]  Diagnosis:   Encounter Diagnoses   Name Primary?    Stiffness of right knee     Right knee pain, unspecified chronicity      Physician: Sylvain Gorman, DO  Precautions: WBAT   Visit #: 3 of 12  PTA Visit #: 4  Time In: 9:00 am  Time Out: 10:00 am    Subjective     [place] much walking, not many of my exercises because we were busy but I am sore  Pain Scale: Boom rates pain on a scale of 0-10 to be 4 currently.    Objective     Boom received individual therapeutic exercises to develop strength, ROM and flexibility for 35 minutes, therapeutic activities for functional mobility for 15 minutes including:      Date 6/4/19 6/6/19 6/14/19     Exercise Sets x Reps/  Resistance Sets x Reps/  Resistance Sets x Reps/  Resistance Sets x Reps/  Resistance Sets x Reps/  Resistance   Rec Bike 10 min 15 min 15 min     Leg press 70# 2x10 70# 2x10 70# 2x10     Quad sets 5 sec hold 2x10 5 sec hold 2x10 5 sec hold 2x10     Sidelying hip abd 1# 3x10 1# 3x10 1# 2x10     SLR 3x10 1# 3x10 1# 3x10 1#     bridge 2x10 2x10 2x10     SAQ 3x10/ 4 lb 3x10/ 4lb 3x10/ 4lb LAQ     Fwd step ups 2X10/ 6" step 2x10/ 6" step 2x10/ 6" step     Standing hip flex/ext/abd (Bilat) Bottom step YTB x 15 ea Bottom step YTB x 15 ea Bottom step YTB     Squats by mat 2x10 2x10 2x10     Ecc quad lat 6" step 2x10 6" step 2x10 4" step 2x10     Fwd step down 4" step x 10 4" step x 10 4" step x 10     Resisted hip IR, seated YTB x 20 YTB x 20 YTB x 20     S/l clams x15 x15 x15           Home Exercises Provided: heel slides, toe taps on step for weight bearing    Pt demo good understanding of the education provided. Boom demonstrated good return demonstration of activities.     Education provided re:  Boom verbalized good understanding of education provided.   No " "spiritual or educational barriers to learning provided    Assessment     Patient tolerated treatment well. Pt completing treatment without pain or complication. Patient demonstrating steady progress in functional mobility, yet continues to lack ability to descend stairs without compensation.. Requires hand rail to descend properly during step downs. Increased discomfort "tightness" reported upon departure. To progress with reps and knee strengthening next week      Long Term Goals: 6 weeks  Pain: Decrease pain to 0/10 to allow for improved weight bearing ability on RLE--> 4/5 on average  Strength: Improve strength in right Hip-> knee to 5/5 for improved LE stability--> progressing  ROM: Improve AROM to 0-125 in R knee--> progressing, continues with extension lag  Functional scale: Improve score on LEFS to 24% impairment  Stairs: Ascend/descend flight of steps with reciprocal pattern, minimal use of handrails, and minimal compensation--> progressing, continues with more than minimal compensation  Walking: Increase walking distance to 1 mile without AD with normalized gait pattern on various indoor and outdoor surfaces--> progressing  Postures: Increase standing duration to 45 minutes --> progressing  Transfers: Perform car and bed transfers without limitation--> goal met  Exercise: demonstrate independence with home exercise program to maintain gains made in therapy.      Plan     Continue with established Plan of Care towards PT goals. Continue skilled therapy 2x/week for 5 weeks    Therapist: Alf Handley, PTA  6/14/2019  "

## 2019-06-17 ENCOUNTER — CLINICAL SUPPORT (OUTPATIENT)
Dept: REHABILITATION | Facility: HOSPITAL | Age: 49
End: 2019-06-17
Attending: ORTHOPAEDIC SURGERY
Payer: MEDICAID

## 2019-06-17 DIAGNOSIS — M25.561 RIGHT KNEE PAIN, UNSPECIFIED CHRONICITY: ICD-10-CM

## 2019-06-17 DIAGNOSIS — M25.661 STIFFNESS OF RIGHT KNEE: ICD-10-CM

## 2019-06-17 PROCEDURE — 97110 THERAPEUTIC EXERCISES: CPT

## 2019-06-17 NOTE — PROGRESS NOTES
"                        Physical Therapy Daily Note/ Progress Note/ Recertification     Name: Boom Diamond [facility] Number: [id number]  Diagnosis:   Encounter Diagnoses   Name Primary?    Stiffness of right knee     Right knee pain, unspecified chronicity      Physician: Sylvain Gorman, DO  Precautions: WBAT   Visit #: 4 of 12  PTA Visit #: 5  Time In: 9:00 am  Time Out: 10:00 am    Subjective     Pt reports: that casino trip still has me sore but my biggest problem is going down stairs still. Don't feel like i'll be able to do it.   Pain Scale: Boom rates pain on a scale of 0-10 to be 4 currently.    Objective     Boom received individual therapeutic exercises to develop strength, ROM and flexibility for 35 minutes, therapeutic activities for functional mobility for 15 minutes including:      Date 6/4/19 6/6/19 6/14/19 6/17/19    Exercise Sets x Reps/  Resistance Sets x Reps/  Resistance Sets x Reps/  Resistance Sets x Reps/  Resistance Sets x Reps/  Resistance   Rec Bike 10 min 15 min 15 min 15 min    Leg press 70# 2x10 70# 2x10 70# 2x10 70# 2x10    Quad sets 5 sec hold 2x10 5 sec hold 2x10 5 sec hold 2x10 5 sec hold 2x10    Sidelying hip abd 1# 3x10 1# 3x10 1# 2x10 1# 2x10    SLR 3x10 1# 3x10 1# 3x10 1# 3x10 1#    bridge 2x10 2x10 2x10 3x10    SAQ 3x10/ 4 lb 3x10/ 4lb 3x10/ 4lb LAQ 3x10/ 4lb LAQ    Fwd step ups 2X10/ 6" step 2x10/ 6" step 2x10/ 6" step 2x10/ 6" step    Standing hip flex/ext/abd (Bilat) Bottom step YTB x 15 ea Bottom step YTB x 15 ea Bottom step YTB Bottom step x15 YTB    Squats by mat 2x10 2x10 2x10 2x10    Ecc quad lat 6" step 2x10 6" step 2x10 4" step 2x10 4" step 2x10    Fwd step down 4" step x 10 4" step x 10 4" step x 10 4" step 2x10    Resisted hip IR, seated YTB x 20 YTB x 20 YTB x 20 YTB x20    S/l clams x15 x15 x15 x20          Home Exercises Provided: heel slides, toe taps on step for weight bearing    Pt demo good understanding of the education provided. Boom" "demonstrated good return demonstration of activities.     Education provided re:  Boom verbalized good understanding of education provided.   No spiritual or educational barriers to learning provided    Assessment     Patient tolerated treatment well. Pt completing treatment without pain or complication. Patient demonstrating steady progress in functional mobility, yet continues to lack ability to descend stairs without compensation.. Requires hand rail to descend properly during step downs. Increased discomfort "tightness" reported upon departure. No complications with treatment session.      Long Term Goals: 6 weeks  Pain: Decrease pain to 0/10 to allow for improved weight bearing ability on RLE--> 4/5 on average  Strength: Improve strength in right Hip-> knee to 5/5 for improved LE stability--> progressing  ROM: Improve AROM to 0-125 in R knee--> progressing, continues with extension lag  Functional scale: Improve score on LEFS to 24% impairment  Stairs: Ascend/descend flight of steps with reciprocal pattern, minimal use of handrails, and minimal compensation--> progressing, continues with more than minimal compensation  Walking: Increase walking distance to 1 mile without AD with normalized gait pattern on various indoor and outdoor surfaces--> progressing  Postures: Increase standing duration to 45 minutes --> progressing  Transfers: Perform car and bed transfers without limitation--> goal met  Exercise: demonstrate independence with home exercise program to maintain gains made in therapy.      Plan     Continue with established Plan of Care towards PT goals. Continue skilled therapy 2x/week for 5 weeks    Therapist: Alf Handley, PTA  6/17/2019  "

## 2019-06-20 ENCOUNTER — CLINICAL SUPPORT (OUTPATIENT)
Dept: REHABILITATION | Facility: HOSPITAL | Age: 49
End: 2019-06-20
Attending: ORTHOPAEDIC SURGERY
Payer: MEDICAID

## 2019-06-20 DIAGNOSIS — M25.561 RIGHT KNEE PAIN, UNSPECIFIED CHRONICITY: ICD-10-CM

## 2019-06-20 DIAGNOSIS — M25.661 STIFFNESS OF RIGHT KNEE: ICD-10-CM

## 2019-06-20 PROCEDURE — 97110 THERAPEUTIC EXERCISES: CPT

## 2019-06-20 NOTE — PROGRESS NOTES
"                        Physical Therapy Daily Note/ Progress Note/ Recertification     Name: Boom Diamond [facility]  Clinic Number: [id number]  Diagnosis:   Encounter Diagnoses   Name Primary?    Stiffness of right knee     Right knee pain, unspecified chronicity      Physician: Sylvain Gorman, DO  Precautions: WBAT   Visit #: 5 of 12  PTA Visit #: 5  Time In: 9:00 am  Time Out: 10:00 am    Subjective     Pt reports: still a little tight but getting better with stairs, it isn't pretty but can go up and down one leg at a time.  Pain Scale: Boom rates pain on a scale of 0-10 to be 4 currently.    Objective     Boom received individual therapeutic exercises to develop strength, ROM and flexibility for 35 minutes, therapeutic activities for functional mobility for 15 minutes including:      Date 6/4/19 6/6/19 6/14/19 6/17/19 6/20/19   Exercise Sets x Reps/  Resistance Sets x Reps/  Resistance Sets x Reps/  Resistance Sets x Reps/  Resistance Sets x Reps/  Resistance   Rec Bike 10 min 15 min 15 min 15 min 25 min level 5   Leg press 70# 2x10 70# 2x10 70# 2x10 70# 2x10 70# 2x10   Quad sets 5 sec hold 2x10 5 sec hold 2x10 5 sec hold 2x10 5 sec hold 2x10 5 sec hold 2x10 GTB   Sidelying hip abd 1# 3x10 1# 3x10 1# 2x10 1# 2x10 1# 3x10   SLR 3x10 1# 3x10 1# 3x10 1# 3x10 1# 3x10 1#   bridge 2x10 2x10 2x10 3x10 3x10   SAQ 3x10/ 4 lb 3x10/ 4lb 3x10/ 4lb LAQ 3x10/ 4lb LAQ 3x10/ 4lb LAQ   Fwd step ups 2X10/ 6" step 2x10/ 6" step 2x10/ 6" step 2x10/ 6" step 2x10/ 6" step   Standing hip flex/ext/abd (Bilat) Bottom step YTB x 15 ea Bottom step YTB x 15 ea Bottom step YTB Bottom step x15 YTB Bottom step 2x10 YTB   Squats by mat 2x10 2x10 2x10 2x10 2x10 chair in front of mirror   Ecc quad lat 6" step 2x10 6" step 2x10 4" step 2x10 4" step 2x10 4" step 2x10   Fwd step down 4" step x 10 4" step x 10 4" step x 10 4" step 2x10 4" step 2x10   Resisted hip IR, seated YTB x 20 YTB x 20 YTB x 20 YTB x20 YTB x 20   S/l clams x15 x15 x15 " "x20 Fwd step up 6" 2x10         Home Exercises Provided: heel slides, toe taps on step for weight bearing    Pt demo good understanding of the education provided. Boom demonstrated good return demonstration of activities.     Education provided re:  Boom verbalized good understanding of education provided.   No spiritual or educational barriers to learning provided    Assessment     Patient tolerated treatment well. Able to progress with reps in select there, verbally reviewed HEP to maximize promotion of quad strength and ROM at knee joint. VC's to promote correct technique and controlled eccentric of all exercises. Fatigue reported upon departure no increase in pain or symptoms.      Long Term Goals: 6 weeks  Pain: Decrease pain to 0/10 to allow for improved weight bearing ability on RLE--> 4/5 on average  Strength: Improve strength in right Hip-> knee to 5/5 for improved LE stability--> progressing  ROM: Improve AROM to 0-125 in R knee--> progressing, continues with extension lag  Functional scale: Improve score on LEFS to 24% impairment  Stairs: Ascend/descend flight of steps with reciprocal pattern, minimal use of handrails, and minimal compensation--> progressing, continues with more than minimal compensation  Walking: Increase walking distance to 1 mile without AD with normalized gait pattern on various indoor and outdoor surfaces--> progressing  Postures: Increase standing duration to 45 minutes --> progressing  Transfers: Perform car and bed transfers without limitation--> goal met  Exercise: demonstrate independence with home exercise program to maintain gains made in therapy.      Plan     Continue with established Plan of Care towards PT goals. Continue skilled therapy 2x/week for 5 weeks    Therapist: Alf Handley, PTA  6/20/2019  "

## 2019-06-24 ENCOUNTER — CLINICAL SUPPORT (OUTPATIENT)
Dept: REHABILITATION | Facility: HOSPITAL | Age: 49
End: 2019-06-24
Attending: ORTHOPAEDIC SURGERY
Payer: MEDICAID

## 2019-06-24 DIAGNOSIS — M25.661 STIFFNESS OF RIGHT KNEE: ICD-10-CM

## 2019-06-24 DIAGNOSIS — M25.561 RIGHT KNEE PAIN, UNSPECIFIED CHRONICITY: ICD-10-CM

## 2019-06-24 PROCEDURE — 97530 THERAPEUTIC ACTIVITIES: CPT

## 2019-06-24 PROCEDURE — 97110 THERAPEUTIC EXERCISES: CPT

## 2019-06-24 NOTE — PROGRESS NOTES
"                        Physical Therapy Daily Note/ Progress Note/ Recertification     Name: Boom Diamond [facility]  Clinic Number: [id number]  Diagnosis:   Encounter Diagnoses   Name Primary?    Stiffness of right knee     Right knee pain, unspecified chronicity      Physician: Sylvain Gorman, DO  Precautions: WBAT   Visit #: 6 of 12  PTA Visit #: 0  Time In: 9:00 am  Time Out: 10:00 am    Subjective     Pt reports: she's doing better overall. Says she think she can continue on her own. Wants Thursday to be her last therapy visit.  Pain Scale: Boom rates pain on a scale of 0-10 to be 4 currently.    Objective     Boom received individual therapeutic exercises to develop strength, ROM and flexibility for 45 minutes, therapeutic activities for functional mobility for 15 minutes including:      Date 6/24/19 6/6/19 6/14/19 6/17/19 6/20/19   Exercise Sets x Reps/  Resistance Sets x Reps/  Resistance Sets x Reps/  Resistance Sets x Reps/  Resistance Sets x Reps/  Resistance   Rec Bike 20 min/ L 7       Leg press 3x10/ 70#       Std TKE 2x10/ RTB       Sidelying hip abd RTB/ x30       SLR RTB/ x30       Modified lunge With bolster  2x10       LAQ RTB x30        Fwd step ups 3X10/ 6" step       Standing hip flex/ext/abd (Bilat) 2x10/ RTB       Squats by mat 2x10       Ecc quad lat 6" step 2x10       Fwd step down 4" step x 30       Resisted hip IR, seated X30/ RTB       S/l clams X30/ RTB            Range of Motion: Initial              5/28/19 6/24/19   Knee Right active Right Active Right Active     3-81 1-125 1-125      Lower Extremity Strength:  Initial    5/28/19 6/24/19  Knee extension: trace 4/5 4+/5   Knee flexion: 3-/5 5/5 5/5   Hip IR NT 3/5 4-/5   Hip flexion: 2/5 5/5 5/5   Hip extension:  3/5 5/5 5/5   Hip abduction: 3-/5 5/5 5/5   Hip adduction: 3-/5 5/5 5/5   Ankle dorsiflexion: 5/5 5/5 5/5   Ankle plantarflexion: 5/5 5/5 5/5          Home Exercises Provided: heel slides, toe taps on step for " weight bearing    Pt demo good understanding of the education provided. Boom demonstrated good return demonstration of activities.     Education provided re:  Boom verbalized good understanding of education provided.   No spiritual or educational barriers to learning provided    Assessment     Patient tolerated treatment well. Able to progress with reps in select there, verbally reviewed HEP to maximize promotion of quad strength and ROM at knee joint. VC's to promote correct technique and controlled eccentric of all exercises. Fatigue reported upon departure no increase in pain or symptoms.      Long Term Goals: 6 weeks  Pain: Decrease pain to 0/10 to allow for improved weight bearing ability on RLE--> 4/5 on average  Strength: Improve strength in right Hip-> knee to 5/5 for improved LE stability--> progressing  ROM: Improve AROM to 0-125 in R knee--> progressing, continues with extension lag  Functional scale: Improve score on LEFS to 24% impairment  Stairs: Ascend/descend flight of steps with reciprocal pattern, minimal use of handrails, and minimal compensation--> progressing, continues with more than minimal compensation  Walking: Increase walking distance to 1 mile without AD with normalized gait pattern on various indoor and outdoor surfaces--> progressing  Postures: Increase standing duration to 45 minutes --> progressing  Transfers: Perform car and bed transfers without limitation--> goal met  Exercise: demonstrate independence with home exercise program to maintain gains made in therapy.      Plan     Patient to be seen one more visit, then d/c onto HEP.    Therapist: Claudia Torres, PT  6/24/2019

## 2019-06-27 ENCOUNTER — CLINICAL SUPPORT (OUTPATIENT)
Dept: REHABILITATION | Facility: HOSPITAL | Age: 49
End: 2019-06-27
Attending: ORTHOPAEDIC SURGERY
Payer: MEDICAID

## 2019-06-27 DIAGNOSIS — M25.561 RIGHT KNEE PAIN, UNSPECIFIED CHRONICITY: ICD-10-CM

## 2019-06-27 DIAGNOSIS — M25.661 STIFFNESS OF RIGHT KNEE: ICD-10-CM

## 2019-06-27 PROCEDURE — 97110 THERAPEUTIC EXERCISES: CPT

## 2019-06-27 NOTE — PROGRESS NOTES
"                        Physical Therapy Daily Note/ Progress Note/ Recertification     Name: Boom Diamond [facility]  Clinic Number: [id number]  Diagnosis:   Encounter Diagnoses   Name Primary?    Stiffness of right knee     Right knee pain, unspecified chronicity      Physician: Sylvain Gorman, DO  Precautions: WBAT   Visit #: 8 of 12  PTA Visit #: 1  Time In: 9:00 am  Time Out: 10:00 am    Subjective     Pt reports: going up stairs fine now, still a little trouble going down stairs but feel comfortable being discharged and continuing with independent therapeutic ex.   Pain Scale: Boom rates pain on a scale of 0-10 to be 4 currently.    Objective     Boom received individual therapeutic exercises to develop strength, ROM and flexibility for 45 minutes, therapeutic activities for functional mobility for 15 minutes including:      Date 6/27/19       Exercise Sets x Reps/  Resistance Sets x Reps/  Resistance Sets x Reps/  Resistance Sets x Reps/  Resistance Sets x Reps/  Resistance   Rec Bike 20 min/ L 7       Leg press 3x10/ 70#       Std TKE 2x10/ RTB       Sidelying hip abd RTB/ x30       SLR RTB/ x30       Modified lunge With bolster  2x10       LAQ RTB x30        Fwd step ups 3X10/ 6" step       Standing hip flex/ext/abd (Bilat) 2x10/ RTB       Squats by mat 2x10       Ecc quad lat 6" step 2x10       Fwd step down 4" step x 30       Resisted hip IR, seated X30/ RTB       S/l clams X30/ RTB            Range of Motion: Initial              5/28/19 6/24/19   Knee Right active Right Active Right Active     3-81 1-125 1-125      Lower Extremity Strength:  Initial    5/28/19 6/24/19  Knee extension: trace 4/5 4+/5   Knee flexion: 3-/5 5/5 5/5   Hip IR NT 3/5 4-/5   Hip flexion: 2/5 5/5 5/5   Hip extension:  3/5 5/5 5/5   Hip abduction: 3-/5 5/5 5/5   Hip adduction: 3-/5 5/5 5/5   Ankle dorsiflexion: 5/5 5/5 5/5   Ankle plantarflexion: 5/5 5/5 5/5          Home Exercises Provided: heel slides, toe taps " on step for weight bearing    Pt demo good understanding of the education provided. Boom demonstrated good return demonstration of activities.     Education provided re:  Boom verbalized good understanding of education provided.   No spiritual or educational barriers to learning provided    Assessment     Patient tolerated treatment well and demonstrated adequate knowledge of exercise program to continue at home independently to continue to progress and solidify strength and stability made thus far. No pain upon departure. Patient verbalizes assurance of DC and independent exercise program.      Long Term Goals: 6 weeks  Pain: Decrease pain to 0/10 to allow for improved weight bearing ability on RLE--> 4/5 on average  Strength: Improve strength in right Hip-> knee to 5/5 for improved LE stability--> progressing  ROM: Improve AROM to 0-125 in R knee--> progressing, continues with extension lag  Functional scale: Improve score on LEFS to 24% impairment  Stairs: Ascend/descend flight of steps with reciprocal pattern, minimal use of handrails, and minimal compensation--> progressing, continues with more than minimal compensation  Walking: Increase walking distance to 1 mile without AD with normalized gait pattern on various indoor and outdoor surfaces--> progressing  Postures: Increase standing duration to 45 minutes --> progressing  Transfers: Perform car and bed transfers without limitation--> goal met  Exercise: demonstrate independence with home exercise program to maintain gains made in therapy.      Plan     D/C on this date    Therapist: Alf Handley, PTA  6/27/2019

## 2019-08-19 RX ORDER — LISINOPRIL 20 MG/1
TABLET ORAL
Qty: 90 TABLET | Refills: 0 | Status: SHIPPED | OUTPATIENT
Start: 2019-08-19 | End: 2019-11-04 | Stop reason: SDUPTHER

## 2019-08-20 ENCOUNTER — PATIENT OUTREACH (OUTPATIENT)
Dept: ADMINISTRATIVE | Facility: HOSPITAL | Age: 49
End: 2019-08-20

## 2019-08-20 ENCOUNTER — OFFICE VISIT (OUTPATIENT)
Dept: SURGERY | Facility: CLINIC | Age: 49
End: 2019-08-20
Payer: MEDICAID

## 2019-08-20 VITALS
OXYGEN SATURATION: 97 % | DIASTOLIC BLOOD PRESSURE: 73 MMHG | RESPIRATION RATE: 17 BRPM | HEART RATE: 82 BPM | HEIGHT: 69 IN | SYSTOLIC BLOOD PRESSURE: 132 MMHG | BODY MASS INDEX: 27.99 KG/M2 | WEIGHT: 189 LBS

## 2019-08-20 DIAGNOSIS — I10 BENIGN HYPERTENSION: ICD-10-CM

## 2019-08-20 DIAGNOSIS — E78.49 OTHER HYPERLIPIDEMIA: ICD-10-CM

## 2019-08-20 DIAGNOSIS — R19.5 HEME POSITIVE STOOL: Primary | ICD-10-CM

## 2019-08-20 DIAGNOSIS — G89.4 CHRONIC PAIN SYNDROME: ICD-10-CM

## 2019-08-20 DIAGNOSIS — E78.49 OTHER HYPERLIPIDEMIA: Primary | ICD-10-CM

## 2019-08-20 DIAGNOSIS — F41.9 ANXIETY DISORDER, UNSPECIFIED TYPE: ICD-10-CM

## 2019-08-20 DIAGNOSIS — R10.84 GENERALIZED ABDOMINAL PAIN: ICD-10-CM

## 2019-08-20 PROCEDURE — 99215 PR OFFICE/OUTPT VISIT, EST, LEVL V, 40-54 MIN: ICD-10-PCS | Mod: S$GLB,,, | Performed by: SURGERY

## 2019-08-20 PROCEDURE — 99215 OFFICE O/P EST HI 40 MIN: CPT | Mod: S$GLB,,, | Performed by: SURGERY

## 2019-08-20 RX ORDER — SODIUM, POTASSIUM,MAG SULFATES 17.5-3.13G
SOLUTION, RECONSTITUTED, ORAL ORAL
Qty: 2 BOTTLE | Refills: 0 | Status: ON HOLD | OUTPATIENT
Start: 2019-08-20 | End: 2019-09-06 | Stop reason: HOSPADM

## 2019-08-20 RX ORDER — LIDOCAINE HYDROCHLORIDE 10 MG/ML
1 INJECTION, SOLUTION EPIDURAL; INFILTRATION; INTRACAUDAL; PERINEURAL ONCE
Status: CANCELLED | OUTPATIENT
Start: 2019-08-20 | End: 2019-08-20

## 2019-08-20 RX ORDER — SODIUM CHLORIDE, SODIUM LACTATE, POTASSIUM CHLORIDE, CALCIUM CHLORIDE 600; 310; 30; 20 MG/100ML; MG/100ML; MG/100ML; MG/100ML
INJECTION, SOLUTION INTRAVENOUS CONTINUOUS
Status: CANCELLED | OUTPATIENT
Start: 2019-08-20

## 2019-08-20 NOTE — PROGRESS NOTES
Subjective:       Patient ID: Boom Blanco is a 49 y.o. female.    Chief Complaint: Follow-up (Abdominal Pain )      HPI:  Ms. Blanco returns today with continued right sided abdominal pain.  She continues to experience right-sided abdominal pain.  Her constipation is very well controlled with the Colace recommended last visit on 3/4/2019.  She is not experiencing any nausea or vomiting.  No diarrhea.  No melena, hematochezia, hematemesis.  No weight loss.  No change in bowel habits or stool characteristics.  No decreasing caliber of stool.  No vaginal bleeding or discharge.  No dysuria, urgency, frequency, hesitancy, nocturia, hematuria, pneumaturia, etc.  In the interval since her last visit she had a total knee arthroplasty and has recovered well from that procedure. Her orthopedic surgeon has released her to proceed with endoscopy.      Allergies & Meds:  Review of patient's allergies indicates:   Allergen Reactions    Remeron [mirtazapine] Other (See Comments)     Weight gain       Current Outpatient Medications   Medication Sig Dispense Refill    buPROPion (WELLBUTRIN XL) 150 MG TB24 tablet Take 150 mg by mouth every evening.      cariprazine (VRAYLAR) 1.5 mg Cap Take 1.5 mg by mouth every evening.      docusate sodium (COLACE) 100 MG capsule Take 1 capsule (100 mg total) by mouth 2 (two) times daily. Drink a 12 oz glass of water with each capsule.  0    fluticasone (FLONASE) 50 mcg/actuation nasal spray 1 spray (50 mcg total) by Each Nare route 2 (two) times daily as needed for Rhinitis. 1 Bottle 3    ibuprofen (ADVIL,MOTRIN) 800 MG tablet Take 800 mg by mouth once daily.       levothyroxine (SYNTHROID) 50 MCG tablet Take 1 tablet (50 mcg total) by mouth before breakfast. 90 tablet 2    lisinopril (PRINIVIL,ZESTRIL) 20 MG tablet TAKE 1 TABLET BY MOUTH ONCE DAILY 90 tablet 0    lovastatin (MEVACOR) 20 MG tablet Take 1 tablet (20 mg total) by mouth every evening. 90 tablet 1     melatonin 5 mg Tab Take 5 mg by mouth.      oxyCODONE-acetaminophen (PERCOCET)  mg per tablet Take 1 tablet by mouth 2 (two) times daily.      pantoprazole (PROTONIX) 40 MG tablet Take 1 tablet (40 mg total) by mouth once daily. Take in the morning before breakfast.  Wait 30 minutes before eating or drinking anything 30 tablet 11    rivaroxaban (XARELTO) 10 mg Tab Take 10 mg by mouth daily with dinner or evening meal.      tiZANidine (ZANAFLEX) 4 MG tablet Take 4 mg by mouth every evening.       zolpidem (AMBIEN) 5 MG Tab Take 5 mg by mouth nightly as needed.      sodium,potassium,mag sulfates (SUPREP BOWEL PREP KIT) 17.5-3.13-1.6 gram SolR Follow written instructions provided by Clinic 2 Bottle 0     No current facility-administered medications for this visit.      Facility-Administered Medications Ordered in Other Visits   Medication Dose Route Frequency Provider Last Rate Last Dose    lactated ringers infusion   Intravenous Continuous Jacob Bolanos MD        lactated ringers infusion  125 mL/hr Intravenous Continuous Jacob Bolanos MD        lidocaine (PF) 10 mg/ml (1%) injection 10 mg  1 mL Intradermal Once Jacob Bolanos MD        morphine injection 2 mg  2 mg Intravenous Q5 Min PRN Jacob Bolanos MD        ondansetron injection 4 mg  4 mg Intravenous Daily PRN Jacob Bolanos MD        promethazine (PHENERGAN) 6.25 mg in dextrose 5 % 50 mL IVPB  6.25 mg Intravenous Q10 Min PRN Jacob Bolanos MD           PMFSHx:  Past Medical History:   Diagnosis Date    Anxiety disorder     Arthritis     Bipolar disorder     Chronic pain     Hyperlipidemia     Hypertension     Psoriatic arthritis     Raynaud's disease        Past Surgical History:   Procedure Laterality Date    ARTHROPLASTY, KNEE, TOTAL Right 4/18/2019    [facility]    ARTHROSCOPY, KNEE Right 2/5/2019    [facility]    ARTHROSCOPY, KNEE Left 1/22/2019     [facility]    ARTHROSCOPY, KNEE, WITH CHONDROPLASTY Left 1/22/2019    [facility]    ARTHROSCOPY, KNEE, WITH DEBRIDEMENT Left 1/22/2019    [facility]    ARTHROSCOPY, KNEE, WITH MENISCECTOMY Left 1/22/2019    [facility]    BACK SURGERY      spinal stimulator implanted and then removed    BREAST BIOPSY      CHONDROPLASTY, KNEE Right 2/5/2019    [facility]    COLONOSCOPY  11/25/2016    repeat in 5-10 years    ESOPHAGOGASTRODUODENOSCOPY (EGD) Left 8/29/2018    Performed [facility] D[facility] Hugo [facility] ENDO    EXCISION, PLICA, KNEE, ARTHROSCOPIC Left 1/22/2019    [facility]    HYSTERECTOMY  1997    MENISCECTOMY, KNEE, MEDIAL Right 2/5/2019    [facility]    SALPINGOOPHORECTOMY  1997       Family History   Problem Relation Age of Onset    Arthritis Mother     Pacemaker/defibrilator Mother     Lung disease Father     Heart disease Father     Arthritis Sister     Arthritis Brother     Arthritis Sister     Arthritis Sister     Breast cancer Neg Hx        Social History     Tobacco Use    Smoking status: Former Smoker     Years: 5.00     Types: Cigarettes    Smokeless tobacco: Never Used   Substance Use Topics    Alcohol use: No     Frequency: Never    Drug use: No       Review of Systems   Constitutional: Negative for appetite change, chills, fatigue, fever and unexpected weight change.   HENT: Negative for congestion, dental problem, ear pain, mouth sores, postnasal drip, rhinorrhea, sore throat, tinnitus, trouble swallowing and voice change.    Eyes: Negative for photophobia, pain, discharge and visual disturbance.   Respiratory: Negative for cough, chest tightness, shortness of breath and wheezing.    Cardiovascular: Negative for chest pain, palpitations and leg  swelling.   Gastrointestinal: Negative for blood in stool, diarrhea, nausea and vomiting.   Endocrine: Negative for cold intolerance, heat intolerance, polydipsia, polyphagia and polyuria.   Genitourinary: Negative for difficulty urinating, dysuria, flank pain, frequency, hematuria and urgency.   Musculoskeletal: Negative for arthralgias, joint swelling and myalgias.   Skin: Negative for color change and rash.   Allergic/Immunologic: Negative for immunocompromised state.   Neurological: Negative for dizziness, tremors, seizures, syncope, speech difficulty, weakness, numbness and headaches.   Hematological: Negative for adenopathy. Does not bruise/bleed easily.   Psychiatric/Behavioral: Negative for agitation, confusion, hallucinations, self-injury and suicidal ideas. The patient is not nervous/anxious.        Objective:      Physical Exam   Constitutional: She is oriented to person, place, and time. She appears well-developed and well-nourished. She is active.  Non-toxic appearance. No distress.   Body mass index is 27.91 kg/m².     HENT:   Head: Normocephalic and atraumatic.   Right Ear: Hearing and external ear normal. No drainage or tenderness.   Left Ear: Hearing and external ear normal. No drainage or tenderness.   Nose: Nose normal. No rhinorrhea. No epistaxis.   Mouth/Throat: Uvula is midline, oropharynx is clear and moist and mucous membranes are normal. Mucous membranes are not pale, not dry and not cyanotic. No oropharyngeal exudate.   Eyes: Pupils are equal, round, and reactive to light. Conjunctivae and lids are normal. Right eye exhibits no discharge and no exudate. Left eye exhibits no discharge and no exudate. Right conjunctiva is not injected. Right eye exhibits no nystagmus. Left eye exhibits no nystagmus.   Neck: Trachea normal, full passive range of motion without pain and phonation normal. No JVD present. Carotid bruit is not present. No tracheal deviation present. No thyroid mass and no  thyromegaly present.   Cardiovascular: Normal rate, regular rhythm, S1 normal, S2 normal, normal heart sounds and intact distal pulses. PMI is not displaced. Exam reveals no gallop and no friction rub.   No murmur heard.  Pulmonary/Chest: Effort normal and breath sounds normal. No accessory muscle usage. No respiratory distress. She exhibits no mass, no tenderness and no crepitus. Right breast exhibits no inverted nipple, no mass, no nipple discharge, no skin change and no tenderness. Left breast exhibits no inverted nipple, no mass, no nipple discharge, no skin change and no tenderness. Breasts are symmetrical.   Abdominal: Soft. Normal appearance and bowel sounds are normal. She exhibits no distension, no fluid wave, no abdominal bruit and no mass. There is no hepatosplenomegaly. There is no tenderness. There is no rebound, no guarding and negative Gerard's sign. No hernia. Hernia confirmed negative in the right inguinal area and confirmed negative in the left inguinal area.   Genitourinary: Vagina normal. Rectal exam shows guaiac positive stool. Rectal exam shows no external hemorrhoid, no internal hemorrhoid, no fissure, no mass, no tenderness and anal tone normal. There is no rash or lesion on the right labia. There is no rash or lesion on the left labia. Right adnexum displays no mass. Left adnexum displays no mass. No vaginal discharge found.   Musculoskeletal:        Cervical back: Normal.        Thoracic back: Normal.        Lumbar back: Normal.        Right upper arm: Normal.        Left upper arm: Normal.        Right forearm: Normal.        Left forearm: Normal.        Right hand: Normal.        Left hand: Normal.        Right upper leg: Normal.        Left upper leg: Normal.        Right lower leg: Normal.        Left lower leg: Normal.        Right foot: Normal.        Left foot: Normal.   Lymphadenopathy:        Head (right side): No submental, no submandibular and no posterior auricular adenopathy  present.        Head (left side): No submental, no submandibular and no posterior auricular adenopathy present.     She has no cervical adenopathy.     She has no axillary adenopathy.        Right: No inguinal and no supraclavicular adenopathy present.        Left: No inguinal and no supraclavicular adenopathy present.   Neurological: She is alert and oriented to person, place, and time. She has normal strength. No cranial nerve deficit or sensory deficit. Coordination and gait normal. GCS eye subscore is 4. GCS verbal subscore is 5. GCS motor subscore is 6.   Skin: Skin is warm, dry and intact. No rash noted. No cyanosis. Nails show no clubbing.   Psychiatric: She has a normal mood and affect. Her speech is normal. Judgment and thought content normal. Her mood appears not anxious. Her affect is not inappropriate. She is not actively hallucinating. She does not exhibit a depressed mood.         Test Results:  Pending    Assessment:       Heme positive stool.  Constipation.  Generalized abdominal pain.  Differential diagnosis includes but is not limited to esophageal neoplasm, esophagitis, esophageal ulcer, gastroesophageal reflux disease, gastritis, gastric ulcer, gastric cancer, duodenitis, duodenal ulcer, inflammatory bowel disease, diverticulosis, hemorrhoids, gastrointestinal angiodysplasias, benign gastrointestinal neoplasm, colon cancer, etc.  Options at this time include but are not limited to endoscopy, second opinion, capsule endoscopy, radiologic studies, observation, etc.      1. Heme positive stool    2. Generalized abdominal pain    3. Benign hypertension    4. Anxiety disorder, unspecified type    5. Chronic pain syndrome    6. Other hyperlipidemia        Plan:   Heme positive stool  -     Case Request Operating Room: COLONOSCOPY  -     Comprehensive metabolic panel; Future; Expected date: 08/20/2019  -     CBC auto differential; Future; Expected date: 08/20/2019  -     EKG 12-lead; Future; Expected  date: 08/20/2019    Generalized abdominal pain    Benign hypertension    Anxiety disorder, unspecified type    Chronic pain syndrome    Other hyperlipidemia    Other orders  -     sodium,potassium,mag sulfates (SUPREP BOWEL PREP KIT) 17.5-3.13-1.6 gram SolR; Follow written instructions provided by Clinic  Dispense: 2 Bottle; Refill: 0        Follow up in about 1 month (around 9/20/2019) for routine post-endosocpy visit.    Counseling/Medical Decision Making:  Boom Dara Blanco was counseled regarding the results of the evaluation thus far and the differential diagnosis.  Diagnostic and therapeutic options were discussed in detail along with the risks, benefits, possible complications, details of, and indications for each option.  Diagnoses discussed included but were not limited to: GB disease, GERD, hiatal hernia, PUD, gastritis, duodenitis, Sphincter of Oddi dysfunction, hemorrhoids, diverticulosis, cancer, IBD, IBS, benign tumors, angiodysplasias, ischemic disease.  Options discussed included but were not limited to: EGD, colonoscopy, proctoscopy, anoscopy, sigmoidoscopy, radiologic studies, PillCam, empiric therapy, second opinion, etc. Possible complications of endoscopy discussed included but were not limited to: bleeding, infections, endocarditis, injury to internal organs, incomplete exam, failure to diagnose cancer or other serious condition, need for emergency surgery, need for a temporary colostomy, need for additional operations or procedures, etc.  Entire conversation was held in layman's terms.  All unfamiliar terms were defined.  Questions solicited and answered.  I read the consent form to her verbatim.  Krames booklets were provided on EGD, GERD, GB disease / surgery, colonoscopy, polyps, diverticulosis, hemorrhoids, cancer, etc.  A copy of the consent form was provided for her review outside the office / hospital prior to the procedures.  At the conclusion of the conversation she voiced  complete understanding of all we discussed and satisfaction that all of her questions had been answered to her full understanding.  She stated that she felt fully informed and completely capable of making an informed decision.  She requests and desires to proceed with EGD and colonoscopy.    Total face to face encounter time was 40 minutes, 30 minutes spent counseling as outlined/summarized above.

## 2019-08-23 NOTE — H&P
[facility] - General Surgery  General Surgery  H&P      Patient Name: Boom Blanco  MRN: [id number]     Chief Complaint:  I am getting scoped today      HPI:  Ms. Blanco returns today with continued right sided abdominal pain.  She continues to experience right-sided abdominal pain.  Her constipation is very well controlled with the Colace recommended last visit on 3/4/2019.  She is not experiencing any nausea or vomiting.  No diarrhea.  No melena, hematochezia, hematemesis.  No weight loss.  No change in bowel habits or stool characteristics.  No decreasing caliber of stool.  No vaginal bleeding or discharge.  No dysuria, urgency, frequency, hesitancy, nocturia, hematuria, pneumaturia, etc.  In the interval since her last visit she had a total knee arthroplasty and has recovered well from that procedure. Her orthopedic surgeon has released her to proceed with endoscopy.      Allergies & Meds:     Allergen Reactions    Remeron [mirtazapine] Other (See Comments)     Weight gain       Current Outpatient Medications   Medication Sig Dispense Refill    buPROPion (WELLBUTRIN XL) 150 MG TB24 tablet Take 150 mg by mouth every evening.      cariprazine (VRAYLAR) 1.5 mg Cap Take 1.5 mg by mouth every evening.      docusate sodium (COLACE) 100 MG capsule Take 1 capsule (100 mg total) by mouth 2 (two) times daily. Drink a 12 oz glass of water with each capsule.  0    fluticasone (FLONASE) 50 mcg/actuation nasal spray 1 spray (50 mcg total) by Each Nare route 2 (two) times daily as needed for Rhinitis. 1 Bottle 3    ibuprofen (ADVIL,MOTRIN) 800 MG tablet Take 800 mg by mouth once daily.       levothyroxine (SYNTHROID) 50 MCG tablet Take 1 tablet (50 mcg total) by mouth before breakfast. 90 tablet 2    lisinopril (PRINIVIL,ZESTRIL) 20 MG tablet TAKE 1 TABLET BY MOUTH ONCE DAILY 90 tablet 0    lovastatin (MEVACOR) 20 MG tablet Take 1 tablet (20 mg total) by mouth every evening.  90 tablet 1    melatonin 5 mg Tab Take 5 mg by mouth.      oxyCODONE-acetaminophen (PERCOCET)  mg per tablet Take 1 tablet by mouth 2 (two) times daily.      pantoprazole (PROTONIX) 40 MG tablet Take 1 tablet (40 mg total) by mouth once daily. Take in the morning before breakfast.  Wait 30 minutes before eating or drinking anything 30 tablet 11    rivaroxaban (XARELTO) 10 mg Tab Take 10 mg by mouth daily with dinner or evening meal.      tiZANidine (ZANAFLEX) 4 MG tablet Take 4 mg by mouth every evening.       zolpidem (AMBIEN) 5 MG Tab Take 5 mg by mouth nightly as needed.      sodium,potassium,mag sulfates (SUPREP BOWEL PREP KIT) 17.5-3.13-1.6 gram SolR Follow written instructions provided by Clinic 2 Bottle 0       Facility-Administered Medications Ordered in Other Visits   Medication Dose Route Frequency Provider Last Rate Last Dose    lactated ringers infusion   Intravenous Continuous Jacob Bolanos MD        lactated ringers infusion  125 mL/hr Intravenous Continuous Jacob Bolanos MD        lidocaine (PF) 10 mg/ml (1%) injection 10 mg  1 mL Intradermal Once Jacob Bolanos MD        morphine injection 2 mg  2 mg Intravenous Q5 Min PRN Jacob Bolanos MD        ondansetron injection 4 mg  4 mg Intravenous Daily PRN Jacob Bolanos MD        promethazine (PHENERGAN) 6.25 mg in dextrose 5 % 50 mL IVPB  6.25 mg Intravenous Q10 Min PRN Jacob Bolanos MD           PMFSHx:  Past Medical History:   Diagnosis Date    Anxiety disorder     Arthritis     Bipolar disorder     Chronic pain     Hyperlipidemia     Hypertension     Psoriatic arthritis     Raynaud's disease        Past Surgical History:   Procedure Laterality Date    ARTHROPLASTY, KNEE, TOTAL Right 4/18/2019    Performed [facility] at [facility] OR    ARTHROSCOPY, KNEE Right 2/5/2019    Per[facility] at [facility] OR    ARTHROSCOPY, KNEE Left 1/22/2019    Performed [facility] at [facility] OR     ARTHROSCOPY, KNEE, WITH CHONDROPLASTY Left 1/22/2019    Per[facility] OR    ARTHROSCOPY, KNEE, WITH DEBRIDEMENT Left 1/22/2019    Performed by [facility] at [facility] OR    ARTHROSCOPY, KNEE, WITH MENISCECTOMY Left 1/22/2019    Performed by [facility] OR    BACK SURGERY      spinal stimulator implanted and then removed    BREAST BIOPSY      CHONDROPLASTY, KNEE Right 2/5/2019    Performed [facility] OR    COLONOSCOPY  11/25/2016    repeat in 5-10 years    ESOPHAGOGASTRODUODENOSCOPY (EGD) Left 8/29/2018    Performed by Dany Hugo MD at [facility] ENDO    EXCISION, PLICA, KNEE, ARTHROSCOPIC Left 1/22/2019    Performed by [facility] OR    HYSTERECTOMY  1997    MENISCECTOMY, KNEE, MEDIAL Right 2/5/2019    Performed [facility] OR    SALPINGOOPHORECTOMY  1997       Family History   Problem Relation Age of Onset    Arthritis Mother     Pacemaker/defibrilator Mother     Lung disease Father     Heart disease Father     Arthritis Sister     Arthritis Brother     Arthritis Sister     Arthritis Sister     Breast cancer Neg Hx        Social History     Tobacco Use    Smoking status: Former Smoker     Years: 5.00     Types: Cigarettes    Smokeless tobacco: Never Used   Substance Use Topics    Alcohol use: No     Frequency: Never    Drug use: No       Review of Systems   Constitutional: Negative for appetite change, chills, fatigue, fever and unexpected weight change.   HENT: Negative for congestion, dental problem, ear pain, mouth sores, postnasal drip, rhinorrhea, sore throat, tinnitus, trouble swallowing and voice change.    Eyes: Negative for photophobia, pain, discharge and visual disturbance.   Respiratory: Negative for cough, chest tightness, shortness of breath and wheezing.    Cardiovascular: Negative for chest pain, palpitations and leg swelling.   Gastrointestinal: Negative for blood in  stool, diarrhea, nausea and vomiting.   Endocrine: Negative for cold intolerance, heat intolerance, polydipsia, polyphagia and polyuria.   Genitourinary: Negative for difficulty urinating, dysuria, flank pain, frequency, hematuria and urgency.   Musculoskeletal: Negative for arthralgias, joint swelling and myalgias.   Skin: Negative for color change and rash.   Allergic/Immunologic: Negative for immunocompromised state.   Neurological: Negative for dizziness, tremors, seizures, syncope, speech difficulty, weakness, numbness and headaches.   Hematological: Negative for adenopathy. Does not bruise/bleed easily.   Psychiatric/Behavioral: Negative for agitation, confusion, hallucinations, self-injury and suicidal ideas. The patient is not nervous/anxious.           Physical Exam   Constitutional: She is oriented to person, place, and time. She appears well-developed and well-nourished. She is active.  Non-toxic appearance. No distress. Body mass index is 27.91 kg/m².     HENT: Head: Normocephalic and atraumatic.   Right Ear: Hearing and external ear normal. No drainage or tenderness.   Left Ear: Hearing and external ear normal. No drainage or tenderness.   Nose: Nose normal. No rhinorrhea. No epistaxis.   Mouth/Throat: Uvula is midline, oropharynx is clear and moist and mucous membranes are normal. Mucous membranes are not pale, not dry and not cyanotic. No oropharyngeal exudate.   Eyes: Pupils are equal, round, and reactive to light. Conjunctivae and lids are normal. Right eye exhibits no discharge and no exudate. Left eye exhibits no discharge and no exudate. Right conjunctiva is not injected. Right eye exhibits no nystagmus. Left eye exhibits no nystagmus.   Neck: Trachea normal, full passive range of motion without pain and phonation normal. No JVD present. Carotid bruit is not present. No tracheal deviation present. No thyroid mass and no thyromegaly present.   Cardiovascular: Normal rate, regular rhythm, S1  normal, S2 normal, normal heart sounds and intact distal pulses. PMI is not displaced. Exam reveals no gallop and no friction rub.   No murmur heard.  Pulmonary/Chest: Effort normal and breath sounds normal. No accessory muscle usage. No respiratory distress. She exhibits no mass, no tenderness and no crepitus. Right breast exhibits no inverted nipple, no mass, no nipple discharge, no skin change and no tenderness. Left breast exhibits no inverted nipple, no mass, no nipple discharge, no skin change and no tenderness. Breasts are symmetrical.   Abdominal: Soft. Normal appearance and bowel sounds are normal. She exhibits no distension, no fluid wave, no abdominal bruit and no mass. There is no hepatosplenomegaly. There is no tenderness. There is no rebound, no guarding and negative Gerard's sign. No hernia. Hernia confirmed negative in the right inguinal area and confirmed negative in the left inguinal area.   Genitourinary: Vagina normal. Rectal exam shows guaiac positive stool. Rectal exam shows no external hemorrhoid, no internal hemorrhoid, no fissure, no mass, no tenderness and anal tone normal. There is no rash or lesion on the right labia. There is no rash or lesion on the left labia. Right adnexum displays no mass. Left adnexum displays no mass. No vaginal discharge found.   Musculoskeletal:        Cervical back: Normal.        Thoracic back: Normal.        Lumbar back: Normal.        Right upper arm: Normal.        Left upper arm: Normal.        Right forearm: Normal.        Left forearm: Normal.        Right hand: Normal.        Left hand: Normal.        Right upper leg: Normal.        Left upper leg: Normal.        Right lower leg: Normal.        Left lower leg: Normal.        Right foot: Normal.        Left foot: Normal.   Lymphadenopathy:        Head (right side): No submental, no submandibular and no posterior auricular adenopathy present.        Head (left side): No submental, no submandibular and no  posterior auricular adenopathy present.     She has no cervical adenopathy.     She has no axillary adenopathy.        Right: No inguinal and no supraclavicular adenopathy present.        Left: No inguinal and no supraclavicular adenopathy present.   Neurological: She is alert and oriented to person, place, and time. She has normal strength. No cranial nerve deficit or sensory deficit. Coordination and gait normal. GCS eye subscore is 4. GCS verbal subscore is 5. GCS motor subscore is 6.   Skin: Skin is warm, dry and intact. No rash noted. No cyanosis. Nails show no clubbing.   Psychiatric: She has a normal mood and affect. Her speech is normal. Judgment and thought content normal. Her mood appears not anxious. Her affect is not inappropriate. She is not actively hallucinating. She does not exhibit a depressed mood.         Test Results:  Pending    Assessment:       Heme positive stool.  Constipation.  Generalized abdominal pain.  Differential diagnosis includes but is not limited to esophageal neoplasm, esophagitis, esophageal ulcer, gastroesophageal reflux disease, gastritis, gastric ulcer, gastric cancer, duodenitis, duodenal ulcer, inflammatory bowel disease, diverticulosis, hemorrhoids, gastrointestinal angiodysplasias, benign gastrointestinal neoplasm, colon cancer, etc.  Options at this time include but are not limited to endoscopy, second opinion, capsule endoscopy, radiologic studies, observation, etc.      1. Heme positive stool    2. Generalized abdominal pain    3. Benign hypertension    4. Anxiety disorder, unspecified type    5. Chronic pain syndrome    6. Other hyperlipidemia        Plan:   Heme positive stool  -     Case Request Operating Room: COLONOSCOPY  -     Comprehensive metabolic panel; Future; Expected date: 08/20/2019  -     CBC auto differential; Future; Expected date: 08/20/2019  -     EKG 12-lead; Future; Expected date: 08/20/2019    Generalized abdominal pain    Benign  hypertension    Anxiety disorder, unspecified type    Chronic pain syndrome    Other hyperlipidemia    Other orders  -     sodium,potassium,mag sulfates (SUPREP BOWEL PREP KIT) 17.5-3.13-1.6 gram SolR; Follow written instructions provided by Clinic  Dispense: 2 Bottle; Refill: 0        Follow up around 9/20/2019 for routine post-endosocpy visit.    Counseling/Medical Decision Making:  Boom Blanco was counseled regarding the results of the evaluation thus far and the differential diagnosis.  Diagnostic and therapeutic options were discussed in detail along with the risks, benefits, possible complications, details of, and indications for each option.  Diagnoses discussed included but were not limited to: GB disease, GERD, hiatal hernia, PUD, gastritis, duodenitis, Sphincter of Oddi dysfunction, hemorrhoids, diverticulosis, cancer, IBD, IBS, benign tumors, angiodysplasias, ischemic disease.  Options discussed included but were not limited to: EGD, colonoscopy, proctoscopy, anoscopy, sigmoidoscopy, radiologic studies, PillCam, empiric therapy, second opinion, etc. Possible complications of endoscopy discussed included but were not limited to: bleeding, infections, endocarditis, injury to internal organs, incomplete exam, failure to diagnose cancer or other serious condition, need for emergency surgery, need for a temporary colostomy, need for additional operations or procedures, etc.  Entire conversation was held in layman's terms.  All unfamiliar terms were defined.  Questions solicited and answered.  I read the consent form to her verbatim.  Krames booklets were provided on EGD, GERD, GB disease / surgery, colonoscopy, polyps, diverticulosis, hemorrhoids, cancer, etc.  A copy of the consent form was provided for her review outside the office / hospital prior to the procedures.  At the conclusion of the conversation she voiced complete understanding of all we discussed and satisfaction that all of her  questions had been answered to her full understanding.  She stated that she felt fully informed and completely capable of making an informed decision.  She requests and desires to proceed with EGD and colonoscopy.

## 2019-08-23 NOTE — H&P (VIEW-ONLY)
[facility] - General Surgery  General Surgery  H&P      Patient Name: Boom Blanco  MRN: [id number]     Chief Complaint:  I am getting scoped today      HPI:  Ms. Blanco returns today with continued right sided abdominal pain.  She continues to experience right-sided abdominal pain.  Her constipation is very well controlled with the Colace recommended last visit on 3/4/2019.  She is not experiencing any nausea or vomiting.  No diarrhea.  No melena, hematochezia, hematemesis.  No weight loss.  No change in bowel habits or stool characteristics.  No decreasing caliber of stool.  No vaginal bleeding or discharge.  No dysuria, urgency, frequency, hesitancy, nocturia, hematuria, pneumaturia, etc.  In the interval since her last visit she had a total knee arthroplasty and has recovered well from that procedure. Her orthopedic surgeon has released her to proceed with endoscopy.      Allergies & Meds:     Allergen Reactions    Remeron [mirtazapine] Other (See Comments)     Weight gain       Current Outpatient Medications   Medication Sig Dispense Refill    buPROPion (WELLBUTRIN XL) 150 MG TB24 tablet Take 150 mg by mouth every evening.      cariprazine (VRAYLAR) 1.5 mg Cap Take 1.5 mg by mouth every evening.      docusate sodium (COLACE) 100 MG capsule Take 1 capsule (100 mg total) by mouth 2 (two) times daily. Drink a 12 oz glass of water with each capsule.  0    fluticasone (FLONASE) 50 mcg/actuation nasal spray 1 spray (50 mcg total) by Each Nare route 2 (two) times daily as needed for Rhinitis. 1 Bottle 3    ibuprofen (ADVIL,MOTRIN) 800 MG tablet Take 800 mg by mouth once daily.       levothyroxine (SYNTHROID) 50 MCG tablet Take 1 tablet (50 mcg total) by mouth before breakfast. 90 tablet 2    lisinopril (PRINIVIL,ZESTRIL) 20 MG tablet TAKE 1 TABLET BY MOUTH ONCE DAILY 90 tablet 0    lovastatin (MEVACOR) 20 MG tablet Take 1 tablet (20 mg total) by mouth every evening.  90 tablet 1    melatonin 5 mg Tab Take 5 mg by mouth.      oxyCODONE-acetaminophen (PERCOCET)  mg per tablet Take 1 tablet by mouth 2 (two) times daily.      pantoprazole (PROTONIX) 40 MG tablet Take 1 tablet (40 mg total) by mouth once daily. Take in the morning before breakfast.  Wait 30 minutes before eating or drinking anything 30 tablet 11    rivaroxaban (XARELTO) 10 mg Tab Take 10 mg by mouth daily with dinner or evening meal.      tiZANidine (ZANAFLEX) 4 MG tablet Take 4 mg by mouth every evening.       zolpidem (AMBIEN) 5 MG Tab Take 5 mg by mouth nightly as needed.      sodium,potassium,mag sulfates (SUPREP BOWEL PREP KIT) 17.5-3.13-1.6 gram SolR Follow written instructions provided by Clinic 2 Bottle 0       Facility-Administered Medications Ordered in Other Visits   Medication Dose Route Frequency Provider Last Rate Last Dose    lactated ringers infusion   Intravenous Continuous Jacob Bolanos MD        lactated ringers infusion  125 mL/hr Intravenous Continuous Jacob Bolanos MD        lidocaine (PF) 10 mg/ml (1%) injection 10 mg  1 mL Intradermal Once Jacob Bolanos MD        morphine injection 2 mg  2 mg Intravenous Q5 Min PRN Jacob Bolanos MD        ondansetron injection 4 mg  4 mg Intravenous Daily PRN Jacob Bolanos MD        promethazine (PHENERGAN) 6.25 mg in dextrose 5 % 50 mL IVPB  6.25 mg Intravenous Q10 Min PRN Jacob Bolanos MD           PMFSHx:  Past Medical History:   Diagnosis Date    Anxiety disorder     Arthritis     Bipolar disorder     Chronic pain     Hyperlipidemia     Hypertension     Psoriatic arthritis     Raynaud's disease        Past Surgical History:   Procedure Laterality Date    ARTHROPLASTY, KNEE, TOTAL Right 4/18/2019    [facility]    ARTHROSCOPY, KNEE Right 2/5/2019    [facility]    ARTHROSCOPY, KNEE Left 1/22/2019    [facility] at [facility] OR     ARTHROSCOPY, KNEE, WITH CHONDROPLASTY Left 1/22/2019    [facility] OR    ARTHROSCOPY, KNEE, WITH DEBRIDEMENT Left 1/22/2019    Per[facility] at [facility] OR    ARTHROSCOPY, KNEE, WITH MENISCECTOMY Left 1/22/2019    Per[facility] at [facility] OR    BACK SURGERY      spinal stimulator implanted and then removed    BREAST BIOPSY      CHONDROPLASTY, KNEE Right 2/5/2019    Performed by [facility] at [facility] OR    COLONOSCOPY  11/25/2016    repeat in 5-10 years    ESOPHAGOGASTRODUODENOSCOPY (EGD) Left 8/29/2018    Performed by Dany Hugo MD at [facility] ENDO    EXCISION, PLICA, KNEE, ARTHROSCOPIC Left 1/22/2019    Performed by [facility] DO at [facility] OR    HYSTERECTOMY  1997    MENISCECTOMY, KNEE, MEDIAL Right 2/5/2019    Performed [facility] at [facility] OR    SALPINGOOPHORECTOMY  1997       Family History   Problem Relation Age of Onset    Arthritis Mother     Pacemaker/defibrilator Mother     Lung disease Father     Heart disease Father     Arthritis Sister     Arthritis Brother     Arthritis Sister     Arthritis Sister     Breast cancer Neg Hx        Social History     Tobacco Use    Smoking status: Former Smoker     Years: 5.00     Types: Cigarettes    Smokeless tobacco: Never Used   Substance Use Topics    Alcohol use: No     Frequency: Never    Drug use: No       Review of Systems   Constitutional: Negative for appetite change, chills, fatigue, fever and unexpected weight change.   HENT: Negative for congestion, dental problem, ear pain, mouth sores, postnasal drip, rhinorrhea, sore throat, tinnitus, trouble swallowing and voice change.    Eyes: Negative for photophobia, pain, discharge and visual disturbance.   Respiratory: Negative for cough, chest tightness, shortness of breath and wheezing.    Cardiovascular: Negative for chest pain, palpitations and leg swelling.   Gastrointestinal: Negative for blood in  stool, diarrhea, nausea and vomiting.   Endocrine: Negative for cold intolerance, heat intolerance, polydipsia, polyphagia and polyuria.   Genitourinary: Negative for difficulty urinating, dysuria, flank pain, frequency, hematuria and urgency.   Musculoskeletal: Negative for arthralgias, joint swelling and myalgias.   Skin: Negative for color change and rash.   Allergic/Immunologic: Negative for immunocompromised state.   Neurological: Negative for dizziness, tremors, seizures, syncope, speech difficulty, weakness, numbness and headaches.   Hematological: Negative for adenopathy. Does not bruise/bleed easily.   Psychiatric/Behavioral: Negative for agitation, confusion, hallucinations, self-injury and suicidal ideas. The patient is not nervous/anxious.           Physical Exam   Constitutional: She is oriented to person, place, and time. She appears well-developed and well-nourished. She is active.  Non-toxic appearance. No distress. Body mass index is 27.91 kg/m².     HENT: Head: Normocephalic and atraumatic.   Right Ear: Hearing and external ear normal. No drainage or tenderness.   Left Ear: Hearing and external ear normal. No drainage or tenderness.   Nose: Nose normal. No rhinorrhea. No epistaxis.   Mouth/Throat: Uvula is midline, oropharynx is clear and moist and mucous membranes are normal. Mucous membranes are not pale, not dry and not cyanotic. No oropharyngeal exudate.   Eyes: Pupils are equal, round, and reactive to light. Conjunctivae and lids are normal. Right eye exhibits no discharge and no exudate. Left eye exhibits no discharge and no exudate. Right conjunctiva is not injected. Right eye exhibits no nystagmus. Left eye exhibits no nystagmus.   Neck: Trachea normal, full passive range of motion without pain and phonation normal. No JVD present. Carotid bruit is not present. No tracheal deviation present. No thyroid mass and no thyromegaly present.   Cardiovascular: Normal rate, regular rhythm, S1  normal, S2 normal, normal heart sounds and intact distal pulses. PMI is not displaced. Exam reveals no gallop and no friction rub.   No murmur heard.  Pulmonary/Chest: Effort normal and breath sounds normal. No accessory muscle usage. No respiratory distress. She exhibits no mass, no tenderness and no crepitus. Right breast exhibits no inverted nipple, no mass, no nipple discharge, no skin change and no tenderness. Left breast exhibits no inverted nipple, no mass, no nipple discharge, no skin change and no tenderness. Breasts are symmetrical.   Abdominal: Soft. Normal appearance and bowel sounds are normal. She exhibits no distension, no fluid wave, no abdominal bruit and no mass. There is no hepatosplenomegaly. There is no tenderness. There is no rebound, no guarding and negative Gerard's sign. No hernia. Hernia confirmed negative in the right inguinal area and confirmed negative in the left inguinal area.   Genitourinary: Vagina normal. Rectal exam shows guaiac positive stool. Rectal exam shows no external hemorrhoid, no internal hemorrhoid, no fissure, no mass, no tenderness and anal tone normal. There is no rash or lesion on the right labia. There is no rash or lesion on the left labia. Right adnexum displays no mass. Left adnexum displays no mass. No vaginal discharge found.   Musculoskeletal:        Cervical back: Normal.        Thoracic back: Normal.        Lumbar back: Normal.        Right upper arm: Normal.        Left upper arm: Normal.        Right forearm: Normal.        Left forearm: Normal.        Right hand: Normal.        Left hand: Normal.        Right upper leg: Normal.        Left upper leg: Normal.        Right lower leg: Normal.        Left lower leg: Normal.        Right foot: Normal.        Left foot: Normal.   Lymphadenopathy:        Head (right side): No submental, no submandibular and no posterior auricular adenopathy present.        Head (left side): No submental, no submandibular and no  posterior auricular adenopathy present.     She has no cervical adenopathy.     She has no axillary adenopathy.        Right: No inguinal and no supraclavicular adenopathy present.        Left: No inguinal and no supraclavicular adenopathy present.   Neurological: She is alert and oriented to person, place, and time. She has normal strength. No cranial nerve deficit or sensory deficit. Coordination and gait normal. GCS eye subscore is 4. GCS verbal subscore is 5. GCS motor subscore is 6.   Skin: Skin is warm, dry and intact. No rash noted. No cyanosis. Nails show no clubbing.   Psychiatric: She has a normal mood and affect. Her speech is normal. Judgment and thought content normal. Her mood appears not anxious. Her affect is not inappropriate. She is not actively hallucinating. She does not exhibit a depressed mood.         Test Results:  Pending    Assessment:       Heme positive stool.  Constipation.  Generalized abdominal pain.  Differential diagnosis includes but is not limited to esophageal neoplasm, esophagitis, esophageal ulcer, gastroesophageal reflux disease, gastritis, gastric ulcer, gastric cancer, duodenitis, duodenal ulcer, inflammatory bowel disease, diverticulosis, hemorrhoids, gastrointestinal angiodysplasias, benign gastrointestinal neoplasm, colon cancer, etc.  Options at this time include but are not limited to endoscopy, second opinion, capsule endoscopy, radiologic studies, observation, etc.      1. Heme positive stool    2. Generalized abdominal pain    3. Benign hypertension    4. Anxiety disorder, unspecified type    5. Chronic pain syndrome    6. Other hyperlipidemia        Plan:   Heme positive stool  -     Case Request Operating Room: COLONOSCOPY  -     Comprehensive metabolic panel; Future; Expected date: 08/20/2019  -     CBC auto differential; Future; Expected date: 08/20/2019  -     EKG 12-lead; Future; Expected date: 08/20/2019    Generalized abdominal pain    Benign  hypertension    Anxiety disorder, unspecified type    Chronic pain syndrome    Other hyperlipidemia    Other orders  -     sodium,potassium,mag sulfates (SUPREP BOWEL PREP KIT) 17.5-3.13-1.6 gram SolR; Follow written instructions provided by Clinic  Dispense: 2 Bottle; Refill: 0        Follow up around 9/20/2019 for routine post-endosocpy visit.    Counseling/Medical Decision Making:  Boom Blanco was counseled regarding the results of the evaluation thus far and the differential diagnosis.  Diagnostic and therapeutic options were discussed in detail along with the risks, benefits, possible complications, details of, and indications for each option.  Diagnoses discussed included but were not limited to: GB disease, GERD, hiatal hernia, PUD, gastritis, duodenitis, Sphincter of Oddi dysfunction, hemorrhoids, diverticulosis, cancer, IBD, IBS, benign tumors, angiodysplasias, ischemic disease.  Options discussed included but were not limited to: EGD, colonoscopy, proctoscopy, anoscopy, sigmoidoscopy, radiologic studies, PillCam, empiric therapy, second opinion, etc. Possible complications of endoscopy discussed included but were not limited to: bleeding, infections, endocarditis, injury to internal organs, incomplete exam, failure to diagnose cancer or other serious condition, need for emergency surgery, need for a temporary colostomy, need for additional operations or procedures, etc.  Entire conversation was held in layman's terms.  All unfamiliar terms were defined.  Questions solicited and answered.  I read the consent form to her verbatim.  Krames booklets were provided on EGD, GERD, GB disease / surgery, colonoscopy, polyps, diverticulosis, hemorrhoids, cancer, etc.  A copy of the consent form was provided for her review outside the office / hospital prior to the procedures.  At the conclusion of the conversation she voiced complete understanding of all we discussed and satisfaction that all of her  questions had been answered to her full understanding.  She stated that she felt fully informed and completely capable of making an informed decision.  She requests and desires to proceed with EGD and colonoscopy.

## 2019-09-03 ENCOUNTER — HOSPITAL ENCOUNTER (OUTPATIENT)
Dept: CARDIOLOGY | Facility: HOSPITAL | Age: 49
Discharge: HOME | End: 2019-09-03
Attending: SURGERY
Payer: MEDICAID

## 2019-09-03 ENCOUNTER — TELEPHONE (OUTPATIENT)
Dept: ORTHOPEDICS | Facility: CLINIC | Age: 49
End: 2019-09-03

## 2019-09-03 DIAGNOSIS — M25.562 LEFT KNEE PAIN, UNSPECIFIED CHRONICITY: Primary | ICD-10-CM

## 2019-09-03 DIAGNOSIS — M25.561 RIGHT KNEE PAIN, UNSPECIFIED CHRONICITY: Primary | ICD-10-CM

## 2019-09-03 DIAGNOSIS — R19.5 HEME POSITIVE STOOL: ICD-10-CM

## 2019-09-03 PROCEDURE — 93010 EKG 12-LEAD: ICD-10-PCS | Mod: ,,, | Performed by: INTERNAL MEDICINE

## 2019-09-03 PROCEDURE — 93010 ELECTROCARDIOGRAM REPORT: CPT | Mod: ,,, | Performed by: INTERNAL MEDICINE

## 2019-09-03 PROCEDURE — 93005 ELECTROCARDIOGRAM TRACING: CPT

## 2019-09-06 ENCOUNTER — HOSPITAL ENCOUNTER (OUTPATIENT)
Facility: HOSPITAL | Age: 49
Discharge: HOME | End: 2019-09-06
Attending: SURGERY | Admitting: SURGERY
Payer: MEDICAID

## 2019-09-06 ENCOUNTER — ANESTHESIA EVENT (OUTPATIENT)
Dept: SURGERY | Facility: HOSPITAL | Age: 49
End: 2019-09-06
Payer: MEDICAID

## 2019-09-06 ENCOUNTER — ANESTHESIA (OUTPATIENT)
Dept: SURGERY | Facility: HOSPITAL | Age: 49
End: 2019-09-06
Payer: MEDICAID

## 2019-09-06 DIAGNOSIS — K29.30 CHRONIC SUPERFICIAL GASTRITIS WITHOUT BLEEDING: Primary | ICD-10-CM

## 2019-09-06 DIAGNOSIS — R19.5 HEME POSITIVE STOOL: ICD-10-CM

## 2019-09-06 PROBLEM — K57.30 DIVERTICULOSIS OF COLON: Status: ACTIVE | Noted: 2019-09-06

## 2019-09-06 PROCEDURE — 43239 EGD BIOPSY SINGLE/MULTIPLE: CPT | Performed by: SURGERY

## 2019-09-06 PROCEDURE — 43239 EGD BIOPSY SINGLE/MULTIPLE: CPT | Mod: 51,,, | Performed by: SURGERY

## 2019-09-06 PROCEDURE — 63600175 PHARM REV CODE 636 W HCPCS: Performed by: SURGERY

## 2019-09-06 PROCEDURE — 37000009 HC ANESTHESIA EA ADD 15 MINS: Performed by: SURGERY

## 2019-09-06 PROCEDURE — G0121 COLON CA SCRN NOT HI RSK IND: HCPCS | Mod: ,,, | Performed by: SURGERY

## 2019-09-06 PROCEDURE — 45378 DIAGNOSTIC COLONOSCOPY: CPT | Performed by: SURGERY

## 2019-09-06 PROCEDURE — 37000008 HC ANESTHESIA 1ST 15 MINUTES: Performed by: SURGERY

## 2019-09-06 PROCEDURE — 88305 TISSUE SPECIMEN TO PATHOLOGY - SURGERY: ICD-10-PCS | Mod: 26,,, | Performed by: PATHOLOGY

## 2019-09-06 PROCEDURE — 88342 IMHCHEM/IMCYTCHM 1ST ANTB: CPT | Performed by: PATHOLOGY

## 2019-09-06 PROCEDURE — 88305 TISSUE EXAM BY PATHOLOGIST: CPT | Mod: 26,,, | Performed by: PATHOLOGY

## 2019-09-06 PROCEDURE — 00813 ANES UPR LWR GI NDSC PX: CPT | Performed by: SURGERY

## 2019-09-06 PROCEDURE — 88305 TISSUE EXAM BY PATHOLOGIST: CPT | Performed by: PATHOLOGY

## 2019-09-06 PROCEDURE — 63600175 PHARM REV CODE 636 W HCPCS

## 2019-09-06 PROCEDURE — D9220A PRA ANESTHESIA: ICD-10-PCS | Mod: ANES,,, | Performed by: ANESTHESIOLOGY

## 2019-09-06 PROCEDURE — 27201012 HC FORCEPS, HOT/COLD, DISP: Performed by: SURGERY

## 2019-09-06 PROCEDURE — G0121 COLON CA SCRN NOT HI RSK IND: HCPCS | Performed by: SURGERY

## 2019-09-06 PROCEDURE — D9220A PRA ANESTHESIA: ICD-10-PCS | Mod: CRNA,,, | Performed by: NURSE ANESTHETIST, CERTIFIED REGISTERED

## 2019-09-06 PROCEDURE — 43239 PR EGD, FLEX, W/BIOPSY, SGL/MULTI: ICD-10-PCS | Mod: 51,,, | Performed by: SURGERY

## 2019-09-06 PROCEDURE — 25000003 PHARM REV CODE 250: Performed by: NURSE ANESTHETIST, CERTIFIED REGISTERED

## 2019-09-06 PROCEDURE — 88342 IMHCHEM/IMCYTCHM 1ST ANTB: CPT | Mod: 26,,, | Performed by: PATHOLOGY

## 2019-09-06 PROCEDURE — D9220A PRA ANESTHESIA: Mod: ANES,,, | Performed by: ANESTHESIOLOGY

## 2019-09-06 PROCEDURE — G0121 COLON CA SCRN NOT HI RSK IND: ICD-10-PCS | Mod: ,,, | Performed by: SURGERY

## 2019-09-06 PROCEDURE — 88342 TISSUE SPECIMEN TO PATHOLOGY - SURGERY: ICD-10-PCS | Mod: 26,,, | Performed by: PATHOLOGY

## 2019-09-06 PROCEDURE — 63600175 PHARM REV CODE 636 W HCPCS: Performed by: NURSE ANESTHETIST, CERTIFIED REGISTERED

## 2019-09-06 PROCEDURE — D9220A PRA ANESTHESIA: Mod: CRNA,,, | Performed by: NURSE ANESTHETIST, CERTIFIED REGISTERED

## 2019-09-06 RX ORDER — ONDANSETRON 2 MG/ML
4 INJECTION INTRAMUSCULAR; INTRAVENOUS DAILY PRN
Status: DISCONTINUED | OUTPATIENT
Start: 2019-09-06 | End: 2019-09-06 | Stop reason: HOSPADM

## 2019-09-06 RX ORDER — ERTAPENEM 1 G/1
INJECTION, POWDER, LYOPHILIZED, FOR SOLUTION INTRAMUSCULAR; INTRAVENOUS
Status: COMPLETED
Start: 2019-09-06 | End: 2019-09-06

## 2019-09-06 RX ORDER — PROPOFOL 10 MG/ML
VIAL (ML) INTRAVENOUS
Status: DISCONTINUED | OUTPATIENT
Start: 2019-09-06 | End: 2019-09-06

## 2019-09-06 RX ORDER — SODIUM CHLORIDE, SODIUM LACTATE, POTASSIUM CHLORIDE, CALCIUM CHLORIDE 600; 310; 30; 20 MG/100ML; MG/100ML; MG/100ML; MG/100ML
INJECTION, SOLUTION INTRAVENOUS CONTINUOUS
Status: CANCELLED | OUTPATIENT
Start: 2019-09-06

## 2019-09-06 RX ORDER — LIDOCAINE HYDROCHLORIDE 10 MG/ML
1 INJECTION, SOLUTION EPIDURAL; INFILTRATION; INTRACAUDAL; PERINEURAL ONCE
Status: CANCELLED | OUTPATIENT
Start: 2019-09-06 | End: 2019-09-06

## 2019-09-06 RX ORDER — SODIUM CHLORIDE, SODIUM LACTATE, POTASSIUM CHLORIDE, CALCIUM CHLORIDE 600; 310; 30; 20 MG/100ML; MG/100ML; MG/100ML; MG/100ML
125 INJECTION, SOLUTION INTRAVENOUS CONTINUOUS
Status: DISCONTINUED | OUTPATIENT
Start: 2019-09-06 | End: 2019-09-06 | Stop reason: HOSPADM

## 2019-09-06 RX ORDER — EPHEDRINE SULFATE 50 MG/ML
INJECTION, SOLUTION INTRAVENOUS
Status: DISCONTINUED | OUTPATIENT
Start: 2019-09-06 | End: 2019-09-06

## 2019-09-06 RX ORDER — SODIUM CHLORIDE, SODIUM LACTATE, POTASSIUM CHLORIDE, CALCIUM CHLORIDE 600; 310; 30; 20 MG/100ML; MG/100ML; MG/100ML; MG/100ML
INJECTION, SOLUTION INTRAVENOUS CONTINUOUS
Status: DISCONTINUED | OUTPATIENT
Start: 2019-09-06 | End: 2019-09-06 | Stop reason: HOSPADM

## 2019-09-06 RX ORDER — LIDOCAINE HYDROCHLORIDE 10 MG/ML
1 INJECTION, SOLUTION EPIDURAL; INFILTRATION; INTRACAUDAL; PERINEURAL ONCE
Status: DISCONTINUED | OUTPATIENT
Start: 2019-09-06 | End: 2019-09-06 | Stop reason: HOSPADM

## 2019-09-06 RX ADMIN — PROPOFOL 100 MG: 10 INJECTION, EMULSION INTRAVENOUS at 10:09

## 2019-09-06 RX ADMIN — SODIUM CHLORIDE, POTASSIUM CHLORIDE, SODIUM LACTATE AND CALCIUM CHLORIDE: 600; 310; 30; 20 INJECTION, SOLUTION INTRAVENOUS at 10:09

## 2019-09-06 RX ADMIN — ERTAPENEM SODIUM 1 G: 1 INJECTION, POWDER, LYOPHILIZED, FOR SOLUTION INTRAMUSCULAR; INTRAVENOUS at 10:09

## 2019-09-06 RX ADMIN — EPHEDRINE SULFATE 25 MG: 50 INJECTION INTRAMUSCULAR; INTRAVENOUS; SUBCUTANEOUS at 10:09

## 2019-09-06 RX ADMIN — SODIUM CHLORIDE, POTASSIUM CHLORIDE, SODIUM LACTATE AND CALCIUM CHLORIDE: 600; 310; 30; 20 INJECTION, SOLUTION INTRAVENOUS at 09:09

## 2019-09-06 NOTE — TRANSFER OF CARE
"Anesthesia Transfer of Care Note    Patient: Boom Blanco    Procedure(s) Performed: Procedure(s) (LRB):  COLONOSCOPY (N/A)  ENDOSCOPY    Patient location: PACU    Anesthesia Type: general    Transport from OR: Transported from OR on room air with adequate spontaneous ventilation    Post pain: adequate analgesia    Post assessment: no apparent anesthetic complications and tolerated procedure well    Post vital signs: stable    Level of consciousness: sedated and responds to stimulation    Nausea/Vomiting: no nausea/vomiting    Complications: none    Transfer of care protocol was followed      Last vitals:   Visit Vitals  BP (!) 170/71 (BP Location: Right arm, Patient Position: Lying)   Pulse 75   Temp 36.9 °C (98.5 °F) (Oral)   Resp 13   Ht 5' 9" (1.753 m)   Wt 85.7 kg (189 lb)   LMP  (LMP Unknown)   SpO2 (!) 94%   Breastfeeding? No   BMI 27.91 kg/m²     "

## 2019-09-06 NOTE — PLAN OF CARE
at bedside discussing procedure and results with pt and pts mother, questions answered. Discharge instructions provided.

## 2019-09-06 NOTE — DISCHARGE INSTRUCTIONS
Upper GI Endoscopy     During endoscopy, a long, flexible tube is used to view the inside of your upper GI tract.      Upper GI endoscopy allows your healthcare provider to look directly into the beginning of your gastrointestinal (GI) tract. The esophagus, stomach, and duodenum (the first part of the small intestine) make up the upper GI tract.   Before the exam  Follow these and any other instructions you are given before your endoscopy. If you dont follow the healthcare providers instructions carefully, the test may need to be canceled or done over:  · Don't eat or drink anything after midnight the night before your exam. If your exam is in the afternoon, drink only clear liquids in the morning. Don't eat or drink anything for 8 hours before the exam. In some cases, you may be able to take medicines with sips of water until 2 hours before the procedure. Speak with your healthcare provider about this.   · Bring your X-rays and any other test results you have.  · Because you will be sedated, arrange for an adult to drive you home after the exam.  · Tell your healthcare provider before the exam if you are taking any medicines or have any medical problems.  The procedure  Here is what to expect:  · You will lie on the endoscopy table. Usually patients lie on the left side.  · You will be monitored and given oxygen.  · Your throat may be numbed with a spray or gargle. You are given medicine through an intravenous (IV) line that will help you relax and remain comfortable. You may be awake or asleep during the procedure.  · The healthcare provider will put the endoscope in your mouth and down your esophagus. It is thinner than most pieces of food that you swallow. It will not affect your breathing. The medicine helps keep you from gagging.  · Air is put into your GI tract to expand it. It can make you burp.  · During the procedure, the healthcare provider can take biopsies (tissue samples), remove abnormalities,  such as polyps, or treat abnormalities through a variety of devices placed through the endoscope. You will not feel this.   · The endoscope carries images of your upper GI tract to a video screen. If you are awake, you may be able to look at the images.  · After the procedure is done, you will rest for a time. An adult must drive you home.  When to call your healthcare provider  Contact your healthcare provider if you have:  · Black or tarry stools, or blood in your stool  · Fever  · Pain in your belly that does not go away  · Nausea and vomiting, or vomiting blood   Date Last Reviewed: 7/1/2016 © 2000-2017 [employer]. [address] [place] [address], [place] [address]. All rights reserved. This information is not intended as a substitute for professional medical care. Always follow your healthcare professional's instructions.        Colonoscopy     A camera attached to a flexible tube with a viewing lens is used to take video pictures.     Colonoscopy is a test to view the inside of your lower digestive tract (colon and rectum). Sometimes it can show the last part of the small intestine (ileum). During the test, small pieces of tissue may be removed for testing. This is called a biopsy. Small growths, such as polyps, may also be removed.   Why is colonoscopy done?  The test is done to help look for colon cancer. And it can help find the source of abdominal pain, bleeding, and changes in bowel habits. It may be needed once a year, depending on factors such as your:  · Age  · Health history  · Family health history  · Symptoms  · Results from any prior colonoscopy  Risks and possible complications  These include:  · Bleeding               · A puncture or tear in the colon   · Risks of anesthesia  · A cancer lesion not being seen  Getting ready   To prepare for the test:  · Talk with your healthcare provider about the risks of the test (see below). Also ask your healthcare provider about alternatives to the  test.  · Tell your healthcare provider about any medicines you take. Also tell him or her about any health conditions you may have.  · Make sure your rectum and colon are empty for the test. Follow the diet and bowel prep instructions exactly. If you dont, the test may need to be rescheduled.  · Plan for a friend or family member to drive you home after the test.     Colonoscopy provides an inside view of the entire colon.     You may discuss the results with your doctor right away or at a future visit.  During the test   The test is usually done in the hospital on an outpatient basis. This means you go home the same day. The procedure takes about 30 minutes. During that time:  · You are given relaxing (sedating) medicine through an IV line. You may be drowsy, or fully asleep.  · The healthcare provider will first give you a physical exam to check for anal and rectal problems.  · Then the anus is lubricated and the scope inserted.  · If you are awake, you may have a feeling similar to needing to have a bowel movement. You may also feel pressure as air is pumped into the colon. Its OK to pass gas during the procedure.  · Biopsy, polyp removal, or other treatments may be done during the test.  After the test   You may have gas right after the test. It can help to try to pass it to help prevent later bloating. Your healthcare provider may discuss the results with you right away. Or you may need to schedule a follow-up visit to talk about the results. After the test, you can go back to your normal eating and other activities. You may be tired from the sedation and need to rest for a few hours.  Date Last Reviewed: 11/1/2016 © 2000-2017 [employer]. [address] [place] [id number]. All rights reserved. This information is not intended as a substitute for professional medical care. Always follow your healthcare professional's instructions.

## 2019-09-06 NOTE — [employer] PATIENT INSTRUCTIONS
Discharge Summary/Instructions after an Endoscopic Procedure  Patient Name: Boom Blanco  Patient MRN: [id number]  Patient YOB: 1970 Friday, September 06, 2019  Dany Hugo MD  RESTRICTIONS:  During your procedure today, you received medications for sedation.  These   medications may affect your judgment, balance and coordination.  Therefore,   for 24 hours, you have the following restrictions:   - DO NOT drive a car, operate machinery, make legal/financial decisions,   sign important papers or drink alcohol.    ACTIVITY:  Today: no heavy lifting, straining or running due to procedural   sedation/anesthesia.  The following day: return to full activity including work.  DIET:  Eat and drink normally unless instructed otherwise.     TREATMENT FOR COMMON SIDE EFFECTS:  - Mild abdominal pain, nausea, belching, bloating or excessive gas:  rest,   eat lightly and use a heating pad.  - Sore Throat: treat with throat lozenges and/or gargle with warm salt   water.  - Because air was used during the procedure, expelling large amounts of air   from your rectum or belching is normal.  - If a bowel prep was taken, you may not have a bowel movement for 1-3 days.    This is normal.  SYMPTOMS TO WATCH FOR AND REPORT TO YOUR PHYSICIAN:  1. Abdominal pain or bloating, other than gas cramps.  2. Chest pain.  3. Back pain.  4. Signs of infection such as: chills or fever occurring within 24 hours   after the procedure.  5. Rectal bleeding, which would show as bright red, maroon, or black stools.   (A tablespoon of blood from the rectum is not serious, especially if   hemorrhoids are present.)  6. Vomiting.  7. Weakness or dizziness.  GO DIRECTLY TO THE NEAREST EMERGENCY ROOM IF YOU HAVE ANY OF THE FOLLOWING:      Difficulty breathing              Chills and/or fever over 101 F   Persistent vomiting and/or vomiting blood   Severe abdominal pain   Severe chest pain   Black, tarry stools   Bleeding- more than one  tablespoon   Any other symptom or condition that you feel may need urgent attention  Your doctor recommends these additional instructions:  If any biopsies were taken, your doctors clinic will contact you in 1 to 2   weeks with any results.  - Discharge patient to home.   - Resume previous diet.   - Continue present medications.   - See EGD Report  - Repeat colonoscopy in 10 years for screening purposes.   - Return to my office in 2 weeks.   - Further recommendations will depend on clinical course  - Diverticulosis and high-fiber diet educational information provided to   patient prior to discharge  For questions, problems or results please call your physician - Dany Hugo MD at Work:  [phone].  [facility] EMERGENCY ROOM PHONE NUMBER: [phone]  IF A COMPLICATION OR EMERGENCY SITUATION ARISES AND YOU ARE UNABLE TO REACH   YOUR PHYSICIAN - GO DIRECTLY TO THE EMERGENCY ROOM.  MD Dany Benedict MD  9/6/2019 11:05:53 AM  This report has been verified and signed electronically.  [employer]

## 2019-09-06 NOTE — [employer] PATIENT INSTRUCTIONS
Discharge Summary/Instructions after an Endoscopic Procedure  Patient Name: Boom Blanco  Patient MRN: [id number]  Patient YOB: 1970 Friday, September 06, 2019  Dany Hugo MD  RESTRICTIONS:  During your procedure today, you received medications for sedation.  These   medications may affect your judgment, balance and coordination.  Therefore,   for 24 hours, you have the following restrictions:   - DO NOT drive a car, operate machinery, make legal/financial decisions,   sign important papers or drink alcohol.    ACTIVITY:  Today: no heavy lifting, straining or running due to procedural   sedation/anesthesia.  The following day: return to full activity including work.  DIET:  Eat and drink normally unless instructed otherwise.     TREATMENT FOR COMMON SIDE EFFECTS:  - Mild abdominal pain, nausea, belching, bloating or excessive gas:  rest,   eat lightly and use a heating pad.  - Sore Throat: treat with throat lozenges and/or gargle with warm salt   water.  - Because air was used during the procedure, expelling large amounts of air   from your rectum or belching is normal.  - If a bowel prep was taken, you may not have a bowel movement for 1-3 days.    This is normal.  SYMPTOMS TO WATCH FOR AND REPORT TO YOUR PHYSICIAN:  1. Abdominal pain or bloating, other than gas cramps.  2. Chest pain.  3. Back pain.  4. Signs of infection such as: chills or fever occurring within 24 hours   after the procedure.  5. Rectal bleeding, which would show as bright red, maroon, or black stools.   (A tablespoon of blood from the rectum is not serious, especially if   hemorrhoids are present.)  6. Vomiting.  7. Weakness or dizziness.  GO DIRECTLY TO THE NEAREST EMERGENCY ROOM IF YOU HAVE ANY OF THE FOLLOWING:      Difficulty breathing              Chills and/or fever over 101 F   Persistent vomiting and/or vomiting blood   Severe abdominal pain   Severe chest pain   Black, tarry stools   Bleeding- more than one  tablespoon   Any other symptom or condition that you feel may need urgent attention  Your doctor recommends these additional instructions:  If any biopsies were taken, your doctors clinic will contact you in 1 to 2   weeks with any results.  - Discharge patient to home.   - Resume previous diet.   - Continue present medications.   - Continue Protonix  - Await pathology results.   - Treat Helicobacter if present  - Review Colonoscopy Report  - Return to my office in 2 weeks.   - Further recommendations will depend on clinical course  For questions, problems or results please call your physician - Dany Hugo MD at Work:  [phone].  [facility] EMERGENCY ROOM PHONE NUMBER: [phone]  IF A COMPLICATION OR EMERGENCY SITUATION ARISES AND YOU ARE UNABLE TO REACH   YOUR PHYSICIAN - GO DIRECTLY TO THE EMERGENCY ROOM.  MD Dany Benedict MD  9/6/2019 11:23:04 AM  This report has been verified and signed electronically.  [employer]

## 2019-09-06 NOTE — INTERVAL H&P NOTE
The patient has been examined and the H&P has been reviewed:    I concur with the findings and no changes have occurred since H&P was written.     Lab results reviewed from 9/3/2019.  CBC showed no evidence of leukocytosis or thrombocytopenia, mild normochromic normocytic anemia was present with a hemoglobin of 11.9 grams/deciliter.  Electrolytes were all in the range of normal. BUN and creatinine showed no evidence of renal dysfunction.  Glucose showed no suspicion diabetes.  Liver profile showed no evidence of hepatocellular disease or biliary obstruction. Lipid profile showed no evidence of hyperlipidemia.    EKG reviewed from 9/3/2019.  Twelve lead tracing showed normal sinus rhythm with no evidence of any ischemic changes or other abnormalities.    Surgery risks, benefits and alternative options discussed and understood by patient/family.    No evident contraindication to proceeding with planned endoscopy today.          There are no hospital problems to display for this patient.

## 2019-09-06 NOTE — ANESTHESIA PREPROCEDURE EVALUATION
09/06/2019  Boom Blanco is a 49 y.o., female.    Pre-op Assessment    I have reviewed the Patient Summary Reports.    I have reviewed the Nursing Notes.   I have reviewed the Medications.     Review of Systems  Anesthesia Hx:  No problems with previous Anesthesia  Neg history of prior surgery. Denies Family Hx of Anesthesia complications.   Denies Personal Hx of Anesthesia complications.   Social:  Former Smoker    Hematology/Oncology:  Hematology Normal   Oncology Normal     EENT/Dental:EENT/Dental Normal   Cardiovascular:   Hypertension, well controlled    Pulmonary:  Pulmonary Normal    Renal/:  Renal/ Normal     Hepatic/GI:  Hepatic/GI Normal    Musculoskeletal:   Arthritis     Neurological:  Neurology Normal    Endocrine:   Hypothyroidism    Dermatological:  Skin Normal    Psych:   depression          Physical Exam  General:  Well nourished    Airway/Jaw/Neck:  Airway Findings: Mallampati: II       Eyes/Ears/Nose:  EYES/EARS/NOSE FINDINGS: Normal   Dental:  DENTAL FINDINGS: Normal   Chest/Lungs:  Chest/Lungs Clear    Heart/Vascular:  Heart Findings: Normal Heart murmur: negative Vascular Findings: Normal    Abdomen:  Abdomen Findings: Normal    Musculoskeletal:  Musculoskeletal Findings: Normal   Skin:  Skin Findings: Normal         Anesthesia Plan  Type of Anesthesia, risks & benefits discussed:  Anesthesia Type:  general  Patient's Preference:   Intra-op Monitoring Plan: standard ASA monitors  Intra-op Monitoring Plan Comments:   Post Op Pain Control Plan:   Post Op Pain Control Plan Comments:   Induction:   IV  Beta Blocker:  Patient is not currently on a Beta-Blocker (No further documentation required).       Informed Consent: Patient understands risks and agrees with Anesthesia plan.  Questions answered. Anesthesia consent signed with patient.  ASA Score: 2     Day of Surgery Review  of History & Physical:    H&P update referred to the provider.

## 2019-09-06 NOTE — DISCHARGE SUMMARY
[facility]  Discharge Note  Short Stay    Admit Date: 9/6/2019    Discharge Date and Time: 9/6/2019 11:35 AM     Attending Physician: No att. providers found     Discharge Provider: Dany Hugo    Diagnoses:  Active Hospital Problems    Diagnosis  POA    *Heme positive stool [R19.5]  Yes    Diverticulosis of colon [K57.30]  Yes    Chronic superficial gastritis without bleeding [K29.30]  Yes      Resolved Hospital Problems   No resolved problems to display.       Discharged Condition: good    Hospital Course: Patient was admitted for an outpatient procedure and tolerated the procedure well with no complications.    Final Diagnoses: Same as principal problem.    Disposition: Home or Self Care    Follow up/Patient Instructions:    Medications:  Reconciled Home Medications:      Medication List      CONTINUE taking these medications    buPROPion 150 MG TB24 tablet  Commonly known as:  WELLBUTRIN XL  Take 150 mg by mouth every evening.     docusate sodium 100 MG capsule  Commonly known as:  COLACE  Take 1 capsule (100 mg total) by mouth 2 (two) times daily. Drink a 12 oz glass of water with each capsule.     fluticasone propionate 50 mcg/actuation nasal spray  Commonly known as:  FLONASE  1 spray (50 mcg total) by Each Nare route 2 (two) times daily as needed for Rhinitis.     ibuprofen 800 MG tablet  Commonly known as:  ADVIL,MOTRIN  Take 800 mg by mouth once daily.     levothyroxine 50 MCG tablet  Commonly known as:  SYNTHROID  Take 1 tablet (50 mcg total) by mouth before breakfast.     lisinopril 20 MG tablet  Commonly known as:  PRINIVIL,ZESTRIL  TAKE 1 TABLET BY MOUTH ONCE DAILY     lovastatin 20 MG tablet  Commonly known as:  MEVACOR  Take 1 tablet (20 mg total) by mouth every evening.     melatonin 5 mg Tab  Take 5 mg by mouth.     oxyCODONE-acetaminophen  mg per tablet  Commonly known as:  PERCOCET  Take 1 tablet by mouth 2 (two) times daily.     pantoprazole 40 MG tablet  Commonly known  as:  PROTONIX  Take 1 tablet (40 mg total) by mouth once daily. Take in the morning before breakfast.  Wait 30 minutes before eating or drinking anything     tiZANidine 4 MG tablet  Commonly known as:  ZANAFLEX  Take 4 mg by mouth every evening.     VRAYLAR 1.5 mg Cap  Generic drug:  cariprazine  Take 1.5 mg by mouth every evening.     XARELTO 10 mg Tab  Generic drug:  rivaroxaban  Take 10 mg by mouth daily with dinner or evening meal.     zolpidem 5 MG Tab  Commonly known as:  AMBIEN  Take 5 mg by mouth nightly as needed.        STOP taking these medications    sodium,potassium,mag sulfates 17.5-3.13-1.6 gram Solr  Commonly known as:  SUPREP BOWEL PREP KIT          Discharge Procedure Orders   Diet Adult Regular     No driving until:     Order Specific Question Answer Comments   Date: 9/7/2019      Follow-up Information     Dany Hugo MD. Schedule an appointment as soon as possible for a visit in 2 weeks.    Specialty:  General Surgery  Why:  Routine Post Endoscopy Visit  Contact information:  [address]  [facility] [address] [phone]                   Discharge Procedure Orders (must include Diet, Follow-up, Activity):   Discharge Procedure Orders (must include Diet, Follow-up, Activity)   Diet Adult Regular     No driving until:     Order Specific Question Answer Comments   Date: 9/7/2019

## 2019-09-06 NOTE — ANESTHESIA POSTPROCEDURE EVALUATION
Anesthesia Post Evaluation    Patient: Boom Blanco    Procedure(s) Performed: Procedure(s) (LRB):  COLONOSCOPY (N/A)  ENDOSCOPY    Final Anesthesia Type: general  Patient location during evaluation: PACU  Patient participation: Yes- Able to Participate  Level of consciousness: awake and awake and alert  Post-procedure vital signs: reviewed and stable  Pain management: adequate  Airway patency: patent  PONV status at discharge: No PONV  Anesthetic complications: no      Cardiovascular status: blood pressure returned to baseline  Respiratory status: unassisted and spontaneous ventilation  Hydration status: euvolemic  Follow-up not needed.          Vitals Value Taken Time   /82 9/6/2019 11:17 AM   Temp 36.9 °C (98.4 °F) 9/6/2019 11:00 AM   Pulse 84 9/6/2019 11:17 AM   Resp 11 9/6/2019 11:17 AM   SpO2 98 % 9/6/2019 11:17 AM   Vitals shown include unvalidated device data.      No case tracking events are documented in the log.      Pain/Yoanna Score: Yoanna Score: 10 (9/6/2019 11:25 AM)

## 2019-09-09 ENCOUNTER — OFFICE VISIT (OUTPATIENT)
Dept: ORTHOPEDICS | Facility: CLINIC | Age: 49
End: 2019-09-09
Payer: MEDICAID

## 2019-09-09 ENCOUNTER — HOSPITAL ENCOUNTER (OUTPATIENT)
Dept: RADIOLOGY | Facility: HOSPITAL | Age: 49
Discharge: HOME | End: 2019-09-09
Attending: ORTHOPAEDIC SURGERY
Payer: MEDICAID

## 2019-09-09 VITALS
BODY MASS INDEX: 27.99 KG/M2 | HEIGHT: 69 IN | SYSTOLIC BLOOD PRESSURE: 125 MMHG | OXYGEN SATURATION: 99 % | HEART RATE: 86 BPM | WEIGHT: 189 LBS | DIASTOLIC BLOOD PRESSURE: 82 MMHG | TEMPERATURE: 98 F | RESPIRATION RATE: 12 BRPM

## 2019-09-09 VITALS
BODY MASS INDEX: 27.98 KG/M2 | WEIGHT: 188.94 LBS | DIASTOLIC BLOOD PRESSURE: 65 MMHG | SYSTOLIC BLOOD PRESSURE: 144 MMHG | HEART RATE: 75 BPM | HEIGHT: 69 IN | RESPIRATION RATE: 19 BRPM

## 2019-09-09 DIAGNOSIS — Z01.818 PRE-OP EXAM: Primary | ICD-10-CM

## 2019-09-09 DIAGNOSIS — M70.50 PES ANSERINUS BURSITIS, UNSPECIFIED LATERALITY: ICD-10-CM

## 2019-09-09 DIAGNOSIS — M25.562 LEFT KNEE PAIN, UNSPECIFIED CHRONICITY: ICD-10-CM

## 2019-09-09 DIAGNOSIS — M70.51 PES ANSERINUS BURSITIS OF RIGHT KNEE: ICD-10-CM

## 2019-09-09 DIAGNOSIS — M71.22 BAKER CYST, LEFT: ICD-10-CM

## 2019-09-09 DIAGNOSIS — M17.12 ARTHRITIS OF LEFT KNEE: ICD-10-CM

## 2019-09-09 DIAGNOSIS — Z96.651 PRESENCE OF RIGHT ARTIFICIAL KNEE JOINT: ICD-10-CM

## 2019-09-09 DIAGNOSIS — M17.12 PRIMARY OSTEOARTHRITIS OF LEFT KNEE: ICD-10-CM

## 2019-09-09 DIAGNOSIS — M25.561 RIGHT KNEE PAIN, UNSPECIFIED CHRONICITY: ICD-10-CM

## 2019-09-09 DIAGNOSIS — L40.50 PSORIATIC ARTHRITIS: Primary | ICD-10-CM

## 2019-09-09 PROCEDURE — 73562 XR KNEE 3 VIEW RIGHT: ICD-10-PCS | Mod: 26,RT,, | Performed by: RADIOLOGY

## 2019-09-09 PROCEDURE — 73562 X-RAY EXAM OF KNEE 3: CPT | Mod: 26,RT,, | Performed by: RADIOLOGY

## 2019-09-09 PROCEDURE — 73562 X-RAY EXAM OF KNEE 3: CPT | Mod: TC,FY,LT

## 2019-09-09 PROCEDURE — 99999 PR PBB SHADOW E&M-EST. PATIENT-LVL III: CPT | Mod: PBBFAC,,, | Performed by: ORTHOPAEDIC SURGERY

## 2019-09-09 PROCEDURE — 20610 LARGE JOINT ASPIRATION/INJECTION: R KNEE: ICD-10-PCS | Mod: S$PBB,RT,, | Performed by: ORTHOPAEDIC SURGERY

## 2019-09-09 PROCEDURE — 73562 XR KNEE 3 VIEW LEFT: ICD-10-PCS | Mod: 26,LT,, | Performed by: RADIOLOGY

## 2019-09-09 PROCEDURE — 99214 OFFICE O/P EST MOD 30 MIN: CPT | Mod: 25,S$PBB,, | Performed by: ORTHOPAEDIC SURGERY

## 2019-09-09 PROCEDURE — 99214 PR OFFICE/OUTPT VISIT, EST, LEVL IV, 30-39 MIN: ICD-10-PCS | Mod: 25,S$PBB,, | Performed by: ORTHOPAEDIC SURGERY

## 2019-09-09 PROCEDURE — 73562 X-RAY EXAM OF KNEE 3: CPT | Mod: 26,LT,, | Performed by: RADIOLOGY

## 2019-09-09 PROCEDURE — 99999 PR PBB SHADOW E&M-EST. PATIENT-LVL III: ICD-10-PCS | Mod: PBBFAC,,, | Performed by: ORTHOPAEDIC SURGERY

## 2019-09-09 PROCEDURE — 20610 DRAIN/INJ JOINT/BURSA W/O US: CPT | Mod: PBBFAC | Performed by: ORTHOPAEDIC SURGERY

## 2019-09-09 PROCEDURE — 73562 X-RAY EXAM OF KNEE 3: CPT | Mod: TC,FY,RT

## 2019-09-09 PROCEDURE — 99213 OFFICE O/P EST LOW 20 MIN: CPT | Mod: PBBFAC,25 | Performed by: ORTHOPAEDIC SURGERY

## 2019-09-09 RX ORDER — MUPIROCIN 20 MG/G
OINTMENT TOPICAL
Status: CANCELLED | OUTPATIENT
Start: 2019-09-09

## 2019-09-09 RX ORDER — TRIAMCINOLONE ACETONIDE 40 MG/ML
40 INJECTION, SUSPENSION INTRA-ARTICULAR; INTRAMUSCULAR
Status: DISCONTINUED | OUTPATIENT
Start: 2019-09-09 | End: 2019-09-09 | Stop reason: HOSPADM

## 2019-09-09 RX ORDER — SODIUM CHLORIDE 9 MG/ML
INJECTION, SOLUTION INTRAVENOUS CONTINUOUS
Status: CANCELLED | OUTPATIENT
Start: 2019-09-09

## 2019-09-09 RX ADMIN — TRIAMCINOLONE ACETONIDE 40 MG: 40 INJECTION, SUSPENSION INTRA-ARTICULAR; INTRAMUSCULAR at 09:09

## 2019-09-09 NOTE — PROGRESS NOTES
Chief Complaint: Pain of the Left Knee and right total knee arthroplasty     HPI: Ms. Blanco is a 49-year-old female who returned today with complaints of recurrent pain in her left knee. She has arthroscopically proving grade 3 chondromalacia of her knee.  She stated approximately 2 months ago she began to have increased pain which is now inhibiting her ability to ambulate. She has psoriatic arthritis. Walking and extending her knee for extended time increases her symptoms while rest occasionally improves it. She has swelling and giving, but denied locking.. She has taken NSAIDs without help . She intermittently wears a brace which helps. She has done home exercises/physical therapy with minimal help. She can walk approximately 50 yd and climb 2-3 stairs.  She had a right total knee arthroplasty completed on 04/19/2019 which she has gotten good results.  She only has some minor pain at the pes insertion.  She is pleased with results of her right total knee.         Past Medical History:   Diagnosis Date    Anxiety     Anxiety disorder     Arthritis     Bipolar disorder     Chronic pain     Hyperlipidemia     Hypertension     Psoriatic arthritis       *   *   *   *   * Raynaud's disease   Psoriasis   Seasonal allergies   Depression   Lupus   Chronic pain management            Past Surgical History:   Procedure Laterality Date    BACK SURGERY      spinal stimulator implanted and then removed    COLONOSCOPY  2016    repeat in 5-10 years    ESOPHAGOGASTRODUODENOSCOPY Left 8/29/2018    Procedure: ESOPHAGOGASTRODUODENOSCOPY (EGD); Surgeon: Dany Hugo MD; Location: [facility] ENDO; Service: General; Laterality: Left;    ESOPHAGOGASTRODUODENOSCOPY (EGD) Left 8/29/2018    Performed by Dany Hugo MD at [facility] ENDO    HYSTERECTOMY  1997      *   *    * SALPINGOOPHORECTOMY   ARTHROSCOPY, LEFT KNEE.   ARTHROSCOPY, RIGHT KNEE.  RIGHT TOTAL KNEE ARTHROPLASTY.  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     ROS: No new diagnosis/surgery/prescriptions since last office visit on 06/03/2019.  Constitution: Negative for chills and fever.   Eyes: Negative for blurred vision.   Cardiovascular: Negative for chest pain.   Respiratory: Negative for cough.   Endocrine: Negative for polydipsia.   Hematologic/Lymphatic: Does not bruise/bleed easily.   Skin: Negative for itching.   Musculoskeletal: Positive for back pain.   Gastrointestinal: Negative for heartburn.   Genitourinary: Negative for nocturia.   Neurological: Negative for headaches and seizures.   Psychiatric/Behavioral: Negative for substance abuse.   Allergic/Immunologic: Positive for environmental allergies.     Objective   Physical Exam:   General: AAOx3. No acute distress   HEENT: Normocephalic, PEARLA EOMI, Good Dentition   Neck: Supple, No JVD   Chest: Symetric, equal excursion on inspiration   Abdomen: Soft NTND   Vascular: Pulses intact and equal bilaterally. Capillary refill less than 3 seconds and equal bilaterally   Neurologic: Pinprick and soft touch intact and equal bilaterally   Integment: No ecchymosis, no errythema. Tattoo left ankle   Extremity: Knee:  Extension/flexion both  knees equal 0/128 degrees. Mild crepitus with motion left knee.  Mild effusion left knee. Small Baker cyst, left knee Mildly positive patellar load/compression left knee. Negative patella apprehension/relocation both knees.  Varus/valgus stressing with good endpoint.  Lachman's/drawer of left knee with good endpoint.  Positive joint line tenderness left knee. Penny negative left knee. [place] positive left knee. Mild tenderness with palpation pes insertion right knee. Mild swelling pes insertion right knee.  Radiography:  Personally reviewed x-rays of both knees completed on 09/09/2019 which showed early tricompartmental arthritic changes of the left knee and a well-seated well-aligned right total knee arthroplasty without signs of loosening.    Assessment:   Impression:   1. Psoriatic arthritis, left knee   2. Baker cyst, left knee   3.  4. Pes anserine bursitis, right knee.  Right total knee arthroplasty.     Plan:   1. Discussed physical examination and x-ray results with the patient. She understands that she has accelerated psoriatic arthritis in her left knee which was documented with arthroscopy.  She could either continue with conservative management or consider total knee arthroplasty of her left knee.  She stated she is very pleased with the results of a total knee on her right and would prefer to proceed with left total knee arthroplasty.   2. Possible complications of surgery to include bleeding, infection, scarring, nerve/blood vessel/tendon damage, need for further surgery, failed surgery, failure to improve, possible persistent pain, possible leg-length discrepancy, possible arthrofibrosis, possible malrotation, possible fracture and possible amputation were rediscussed with the patient. The patient was permitted to ask questions and all concerns were addressed to her satisfaction.   3.  Consent for left total knee arthroplasty.   4.  Tentatively schedule surgery for 10/03/2019.  5. All pain meds  for postop per the patient's pain management physician.  6. continue to ambulate with weight-bearing at tolerance.   7. Continue with home exercises.   8. Continue with NSAIDs as tolerated and allowed by PCM.   9. Offered a steroid injection to the anserine insertion of the right knee, she elected to proceed.  9. [facility] portal was discussed with the patient and information was given. The patient was encouraged to use the [facility] portal for future encounters.   9. Follow up postoperatively.

## 2019-09-09 NOTE — H&P (VIEW-ONLY)
Chief Complaint: Pain of the Left Knee and right total knee arthroplasty     HPI: Ms. Blanco is a 49-year-old female who returned today with complaints of recurrent pain in her left knee. She has arthroscopically proving grade 3 chondromalacia of her knee.  She stated approximately 2 months ago she began to have increased pain which is now inhibiting her ability to ambulate. She has psoriatic arthritis. Walking and extending her knee for extended time increases her symptoms while rest occasionally improves it. She has swelling and giving, but denied locking.. She has taken NSAIDs without help . She intermittently wears a brace which helps. She has done home exercises/physical therapy with minimal help. She can walk approximately 50 yd and climb 2-3 stairs.  She had a right total knee arthroplasty completed on 04/19/2019 which she has gotten good results.  She only has some minor pain at the pes insertion.  She is pleased with results of her right total knee.         Past Medical History:   Diagnosis Date    Anxiety     Anxiety disorder     Arthritis     Bipolar disorder     Chronic pain     Hyperlipidemia     Hypertension     Psoriatic arthritis       *   *   *   *   * Raynaud's disease   Psoriasis   Seasonal allergies   Depression   Lupus   Chronic pain management            Past Surgical History:   Procedure Laterality Date    BACK SURGERY      spinal stimulator implanted and then removed    COLONOSCOPY  2016    repeat in 5-10 years    ESOPHAGOGASTRODUODENOSCOPY Left 8/29/2018    Procedure: ESOPHAGOGASTRODUODENOSCOPY (EGD); Surgeon: Dany Hugo MD; Location: [facility] ENDO; Service: General; Laterality: Left;    ESOPHAGOGASTRODUODENOSCOPY (EGD) Left 8/29/2018    Performed by Dany Hugo MD at [facility]    HYSTERECTOMY  1997      *   *    * SALPINGOOPHORECTOMY   ARTHROSCOPY, LEFT KNEE.   ARTHROSCOPY, RIGHT KNEE.  RIGHT TOTAL KNEE ARTHROPLASTY.  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     ROS: No new diagnosis/surgery/prescriptions since last office visit on 06/03/2019.  Constitution: Negative for chills and fever.   Eyes: Negative for blurred vision.   Cardiovascular: Negative for chest pain.   Respiratory: Negative for cough.   Endocrine: Negative for polydipsia.   Hematologic/Lymphatic: Does not bruise/bleed easily.   Skin: Negative for itching.   Musculoskeletal: Positive for back pain.   Gastrointestinal: Negative for heartburn.   Genitourinary: Negative for nocturia.   Neurological: Negative for headaches and seizures.   Psychiatric/Behavioral: Negative for substance abuse.   Allergic/Immunologic: Positive for environmental allergies.     Objective   Physical Exam:   General: AAOx3. No acute distress   HEENT: Normocephalic, PEARLA EOMI, Good Dentition   Neck: Supple, No JVD   Chest: Symetric, equal excursion on inspiration   Abdomen: Soft NTND   Vascular: Pulses intact and equal bilaterally. Capillary refill less than 3 seconds and equal bilaterally   Neurologic: Pinprick and soft touch intact and equal bilaterally   Integment: No ecchymosis, no errythema. Tattoo left ankle   Extremity: Knee:  Extension/flexion both  knees equal 0/128 degrees. Mild crepitus with motion left knee.  Mild effusion left knee. Small Baker cyst, left knee Mildly positive patellar load/compression left knee. Negative patella apprehension/relocation both knees.  Varus/valgus stressing with good endpoint.  Lachman's/drawer of left knee with good endpoint.  Positive joint line tenderness left knee. Penny negative left knee. [place] positive left knee. Mild tenderness with palpation pes insertion right knee. Mild swelling pes insertion right knee.  Radiography:  Personally reviewed x-rays of both knees completed on 09/09/2019 which showed early tricompartmental arthritic changes of the left knee and a well-seated well-aligned right total knee arthroplasty without signs of loosening.    Assessment:   Impression:   1. Psoriatic arthritis, left knee   2. Baker cyst, left knee   3.  4. Pes anserine bursitis, right knee.  Right total knee arthroplasty.     Plan:   1. Discussed physical examination and x-ray results with the patient. She understands that she has accelerated psoriatic arthritis in her left knee which was documented with arthroscopy.  She could either continue with conservative management or consider total knee arthroplasty of her left knee.  She stated she is very pleased with the results of a total knee on her right and would prefer to proceed with left total knee arthroplasty.   2. Possible complications of surgery to include bleeding, infection, scarring, nerve/blood vessel/tendon damage, need for further surgery, failed surgery, failure to improve, possible persistent pain, possible leg-length discrepancy, possible arthrofibrosis, possible malrotation, possible fracture and possible amputation were rediscussed with the patient. The patient was permitted to ask questions and all concerns were addressed to her satisfaction.   3.  Consent for left total knee arthroplasty.   4.  Tentatively schedule surgery for 10/03/2019.  5. All pain meds  for postop per the patient's pain management physician.  6. continue to ambulate with weight-bearing at tolerance.   7. Continue with home exercises.   8. Continue with NSAIDs as tolerated and allowed by PCM.   9. Offered a steroid injection to the anserine insertion of the right knee, she elected to proceed.  9. [facility] portal was discussed with the patient and information was given. The patient was encouraged to use the [facility] portal for future encounters.   9. Follow up postoperatively.

## 2019-09-09 NOTE — H&P
Chief Complaint: Pain of the Left Knee and right total knee arthroplasty     HPI: Ms. Blanco is a 49-year-old female who returned today with complaints of recurrent pain in her left knee. She has arthroscopically proving grade 3 chondromalacia of her knee.  She stated approximately 2 months ago she began to have increased pain which is now inhibiting her ability to ambulate. She has psoriatic arthritis. Walking and extending her knee for extended time increases her symptoms while rest occasionally improves it. She has swelling and giving, but denied locking.. She has taken NSAIDs without help . She intermittently wears a brace which helps. She has done home exercises/physical therapy with minimal help. She can walk approximately 50 yd and climb 2-3 stairs.  She had a right total knee arthroplasty completed on 04/19/2019 which she has gotten good results.  She only has some minor pain at the pes insertion.  She is pleased with results of her right total knee.         Past Medical History:   Diagnosis Date    Anxiety     Anxiety disorder     Arthritis     Bipolar disorder     Chronic pain     Hyperlipidemia     Hypertension     Psoriatic arthritis       *   *   *   *   * Raynaud's disease   Psoriasis   Seasonal allergies   Depression   Lupus   Chronic pain management            Past Surgical History:   Procedure Laterality Date    BACK SURGERY      spinal stimulator implanted and then removed    COLONOSCOPY  2016    repeat in 5-10 years    ESOPHAGOGASTRODUODENOSCOPY Left 8/29/2018    Procedure: ESOPHAGOGASTRODUODENOSCOPY (EGD); Surgeon: Dany Hugo MD; Location: [facility] ENDO; Service: General; Laterality: Left;    ESOPHAGOGASTRODUODENOSCOPY (EGD) Left 8/29/2018    Performed by Dany Hugo MD at [facility] ENDO    HYSTERECTOMY  1997      *   *    * SALPINGOOPHORECTOMY   ARTHROSCOPY, LEFT KNEE.   ARTHROSCOPY, RIGHT KNEE.  RIGHT TOTAL KNEE ARTHROPLASTY.  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     ROS: No new diagnosis/surgery/prescriptions since last office visit on 06/03/2019.  Constitution: Negative for chills and fever.   Eyes: Negative for blurred vision.   Cardiovascular: Negative for chest pain.   Respiratory: Negative for cough.   Endocrine: Negative for polydipsia.   Hematologic/Lymphatic: Does not bruise/bleed easily.   Skin: Negative for itching.   Musculoskeletal: Positive for back pain.   Gastrointestinal: Negative for heartburn.   Genitourinary: Negative for nocturia.   Neurological: Negative for headaches and seizures.   Psychiatric/Behavioral: Negative for substance abuse.   Allergic/Immunologic: Positive for environmental allergies.     Objective   Physical Exam:   General: AAOx3. No acute distress   HEENT: Normocephalic, PEARLA EOMI, Good Dentition   Neck: Supple, No JVD   Chest: Symetric, equal excursion on inspiration   Abdomen: Soft NTND   Vascular: Pulses intact and equal bilaterally. Capillary refill less than 3 seconds and equal bilaterally   Neurologic: Pinprick and soft touch intact and equal bilaterally   Integment: No ecchymosis, no errythema. Tattoo left ankle   Extremity: Knee:  Extension/flexion both  knees equal 0/128 degrees. Mild crepitus with motion left knee.  Mild effusion left knee. Small Baker cyst, left knee Mildly positive patellar load/compression left knee. Negative patella apprehension/relocation both knees.  Varus/valgus stressing with good endpoint.  Lachman's/drawer of left knee with good endpoint.  Positive joint line tenderness left knee. Penny negative left knee. Evant positive left knee. Mild tenderness with palpation pes insertion right knee. Mild swelling pes insertion right knee.  Radiography:  Personally reviewed x-rays of both knees completed on 09/09/2019 which showed early tricompartmental arthritic changes of the left knee and a well-seated well-aligned right total knee arthroplasty without signs of loosening.    Assessment:   Impression:   1. Psoriatic arthritis, left knee   2. Baker cyst, left knee   3.  4. Pes anserine bursitis, right knee.  Right total knee arthroplasty.     Plan:   1. Discussed physical examination and x-ray results with the patient. She understands that she has accelerated psoriatic arthritis in her left knee which was documented with arthroscopy.  She could either continue with conservative management or consider total knee arthroplasty of her left knee.  She stated she is very pleased with the results of a total knee on her right and would prefer to proceed with left total knee arthroplasty.   2. Possible complications of surgery to include bleeding, infection, scarring, nerve/blood vessel/tendon damage, need for further surgery, failed surgery, failure to improve, possible persistent pain, possible leg-length discrepancy, possible arthrofibrosis, possible malrotation, possible fracture and possible amputation were rediscussed with the patient. The patient was permitted to ask questions and all concerns were addressed to her satisfaction.   3.  Consent for left total knee arthroplasty.   4.  Tentatively schedule surgery for 10/03/2019.  5. All pain meds  for postop per the patient's pain management physician.  6. continue to ambulate with weight-bearing at tolerance.   7. Continue with home exercises.   8. Continue with NSAIDs as tolerated and allowed by PCM.   9. Offered a steroid injection to the anserine insertion of the right knee, she elected to proceed.  9. [facility] portal was discussed with the patient and information was given. The patient was encouraged to use the [facility] portal for future encounters.   9. Follow up postoperatively.

## 2019-09-09 NOTE — PROCEDURES
Large Joint Aspiration/Injection: R knee  Date/Time: 9/9/2019 9:11 AM  Performed by: Sylvain Gorman DO  Authorized by: Sylvain Gorman, DO     Consent Done?:  Yes (Verbal)  Indications:  Pain and diagnostic evaluation  Procedure site marked: Yes    Timeout: Prior to procedure the correct patient, procedure, and site was verified      Location:  Knee  Site:  R knee  Prep: Patient was prepped and draped in usual sterile fashion    Needle size:  22 G  Ultrasonic Guidance for needle placement: No  Approach:  Anteromedial  Medications:  40 mg triamcinolone acetonide 40 mg/mL  Patient tolerance:  Patient tolerated the procedure well with no immediate complications

## 2019-09-18 DIAGNOSIS — Z96.652 STATUS POST TOTAL LEFT KNEE REPLACEMENT: Primary | ICD-10-CM

## 2019-09-18 DIAGNOSIS — R26.81 GAIT INSTABILITY: ICD-10-CM

## 2019-09-18 DIAGNOSIS — Z91.81 AT MAXIMUM RISK FOR FALL: ICD-10-CM

## 2019-09-19 ENCOUNTER — OFFICE VISIT (OUTPATIENT)
Dept: SURGERY | Facility: CLINIC | Age: 49
End: 2019-09-19
Payer: MEDICAID

## 2019-09-19 VITALS
TEMPERATURE: 96 F | WEIGHT: 194.38 LBS | BODY MASS INDEX: 28.79 KG/M2 | HEIGHT: 69 IN | DIASTOLIC BLOOD PRESSURE: 75 MMHG | HEART RATE: 76 BPM | OXYGEN SATURATION: 95 % | SYSTOLIC BLOOD PRESSURE: 145 MMHG | RESPIRATION RATE: 16 BRPM

## 2019-09-19 DIAGNOSIS — K57.30 DIVERTICULOSIS OF COLON: ICD-10-CM

## 2019-09-19 DIAGNOSIS — K29.30 CHRONIC SUPERFICIAL GASTRITIS WITHOUT BLEEDING: ICD-10-CM

## 2019-09-19 DIAGNOSIS — K21.00 GASTROESOPHAGEAL REFLUX DISEASE WITH ESOPHAGITIS: Primary | ICD-10-CM

## 2019-09-19 PROCEDURE — 99213 OFFICE O/P EST LOW 20 MIN: CPT | Mod: S$GLB,,, | Performed by: SURGERY

## 2019-09-19 PROCEDURE — 99213 PR OFFICE/OUTPT VISIT, EST, LEVL III, 20-29 MIN: ICD-10-PCS | Mod: S$GLB,,, | Performed by: SURGERY

## 2019-09-19 NOTE — PROGRESS NOTES
"Subjective:       Patient ID: Boom Blanco is a 49 y.o. female.    Chief Complaint: Follow-up (Colonoscopy 9-6-19)      HPI:  Ms. Blanco presents today following a recent outpatient EGD and colonoscopy  She presents today with no complaints.  No nausea, vomiting, fevers, chills, abdominal pain, diarrhea, constipation, melena, hematochezia, hematemesis, etc.  Appetite is excellent.  Weight is stable.  Activity tolerance is normal.  Overall she feels "great".  EGD revealed evidence of gastritis and reflux esophagitis.  Colonoscopy revealed evidence of diverticulosis isolated sigmoid colon.  Biopsy showed no evidence of Helicobacter present. At the time of endoscopy she had stopped taking her previously prescribed Protonix.  She had stopped taking it approximately 1 month prior to the endoscopy.  She is subsequently resumed Protonix as previously prescribed.  Her symptoms have completely resolved.      Allergies & Meds:  Review of patient's allergies indicates:   Allergen Reactions    Remeron [mirtazapine] Other (See Comments)     Weight gain       Current Outpatient Medications   Medication Sig Dispense Refill    buPROPion (WELLBUTRIN XL) 150 MG TB24 tablet Take 150 mg by mouth every evening.      cariprazine (VRAYLAR) 1.5 mg Cap Take 1.5 mg by mouth every evening.      docusate sodium (COLACE) 100 MG capsule Take 1 capsule (100 mg total) by mouth 2 (two) times daily. Drink a 12 oz glass of water with each capsule.  0    fluticasone (FLONASE) 50 mcg/actuation nasal spray 1 spray (50 mcg total) by Each Nare route 2 (two) times daily as needed for Rhinitis. 1 Bottle 3    ibuprofen (ADVIL,MOTRIN) 800 MG tablet Take 800 mg by mouth once daily.       levothyroxine (SYNTHROID) 50 MCG tablet Take 1 tablet (50 mcg total) by mouth before breakfast. 90 tablet 2    lisinopril (PRINIVIL,ZESTRIL) 20 MG tablet TAKE 1 TABLET BY MOUTH ONCE DAILY 90 tablet 0    lovastatin (MEVACOR) 20 MG tablet Take 1 tablet " (20 mg total) by mouth every evening. 90 tablet 1    melatonin 5 mg Tab Take 5 mg by mouth.      oxyCODONE-acetaminophen (PERCOCET)  mg per tablet Take 1 tablet by mouth 2 (two) times daily.      pantoprazole (PROTONIX) 40 MG tablet Take 1 tablet (40 mg total) by mouth once daily. Take in the morning before breakfast.  Wait 30 minutes before eating or drinking anything 30 tablet 11    rivaroxaban (XARELTO) 10 mg Tab Take 10 mg by mouth daily with dinner or evening meal.      tiZANidine (ZANAFLEX) 4 MG tablet Take 4 mg by mouth every evening.       zolpidem (AMBIEN) 5 MG Tab Take 5 mg by mouth nightly as needed.       No current facility-administered medications for this visit.      Facility-Administered Medications Ordered in Other Visits   Medication Dose Route Frequency Provider Last Rate Last Dose    lactated ringers infusion   Intravenous Continuous Jacob Bolanos MD        lactated ringers infusion  125 mL/hr Intravenous Continuous Jacob Bolanos MD        lidocaine (PF) 10 mg/ml (1%) injection 10 mg  1 mL Intradermal Once Jacob Bolanos MD        morphine injection 2 mg  2 mg Intravenous Q5 Min PRN Jacob Bolanos MD        ondansetron injection 4 mg  4 mg Intravenous Daily PRN Jacob Bolanos MD        promethazine (PHENERGAN) 6.25 mg in dextrose 5 % 50 mL IVPB  6.25 mg Intravenous Q10 Min PRN Jacob Bolanos MD           PMFSHx:    Past Medical History:   Diagnosis Date    Anxiety disorder     Arthritis     Bipolar disorder     Chronic pain     Hyperlipidemia     Hypertension     Psoriatic arthritis     Raynaud's disease        Past Surgical History:   Procedure Laterality Date    ARTHROPLASTY, KNEE, TOTAL Right 4/18/2019    Performed [facility] at [facility]    ARTHROSCOPY, KNEE Right 2/5/2019    [facility] at [facility]    ARTHROSCOPY, KNEE Left 1/22/2019    [facility] at [facility]    ARTHROSCOPY, KNEE, WITH CHONDROPLASTY  Left 1/22/2019    [facility]    ARTHROSCOPY, KNEE, WITH DEBRIDEMENT Left 1/22/2019    [facility] at [facility]    ARTHROSCOPY, KNEE, WITH MENISCECTOMY Left 1/22/2019    [facility] at [facility]    BACK SURGERY      spinal stimulator implanted and then removed    BREAST BIOPSY      CHONDROPLASTY, KNEE Right 2/5/2019    [facility] at [facility]    COLONOSCOPY  11/25/2016    repeat in 5-10 years    COLONOSCOPY N/A 9/6/2019    [facility]formed by D[facility] Hugo [facility] at [facility] ENDO    ENDOSCOPY  9/6/2019    [facility] by [facility] at [facility] END[facility]    ESOPHAGOGASTRODUODENOSCOPY (EGD) Left 8/29/2018    [facility] at [facility] END[facility]    EXCISION, PLICA, KNEE, ARTHROSCOPIC Left 1/22/2019    [facility] at [facility]    HYSTERECTOMY  1997    MENISCECTOMY, KNEE, MEDIAL Right 2/5/2019    [facility] at [facility]    SALPINGOOPHORECTOMY  1997       Family History   Problem Relation Age of Onset    Arthritis Mother     Pacemaker/defibrilator Mother     Lung disease Father     Heart disease Father     Arthritis Sister     Arthritis Brother     Arthritis Sister     Arthritis Sister     Breast cancer Neg Hx        Social History     Tobacco Use    Smoking status: Former Smoker     Years: 5.00     Types: Cigarettes    Smokeless tobacco: Never Used   Substance Use Topics    Alcohol use: No     Frequency: Never    Drug use: No         Review of Systems   Constitutional: Negative for appetite change, chills, fatigue, fever and unexpected weight change.   HENT: Negative for congestion, dental problem, ear pain, mouth sores, postnasal drip, rhinorrhea, sore throat, tinnitus, trouble swallowing and voice change.    Eyes: Negative for photophobia, pain, discharge and visual disturbance.   Respiratory: Negative for cough, chest tightness, shortness of breath and wheezing.     Cardiovascular: Negative for chest pain, palpitations and leg swelling.   Gastrointestinal: Negative for abdominal pain, blood in stool, constipation, diarrhea, nausea and vomiting.   Endocrine: Negative for cold intolerance, heat intolerance, polydipsia, polyphagia and polyuria.   Genitourinary: Negative for difficulty urinating, dysuria, flank pain, frequency, hematuria and urgency.   Musculoskeletal: Negative for arthralgias, joint swelling and myalgias.   Skin: Negative for color change and rash.   Allergic/Immunologic: Negative for immunocompromised state.   Neurological: Negative for dizziness, tremors, seizures, syncope, speech difficulty, weakness, numbness and headaches.   Hematological: Negative for adenopathy. Does not bruise/bleed easily.   Psychiatric/Behavioral: Negative for agitation, confusion, hallucinations, self-injury and suicidal ideas. The patient is not nervous/anxious.        Objective:      Physical Exam   Constitutional: She appears well-developed and well-nourished.  Non-toxic appearance. She does not appear ill. No distress.   HENT:   Head: Normocephalic and atraumatic.   Right Ear: Hearing and external ear normal.   Left Ear: Hearing and external ear normal.   Nose: Nose normal.   Mouth/Throat: Uvula is midline, oropharynx is clear and moist and mucous membranes are normal.   Eyes: Conjunctivae, EOM and lids are normal. No scleral icterus.   Neck: Trachea normal, normal range of motion and phonation normal. Neck supple. No JVD present. Carotid bruit is not present. No thyroid mass and no thyromegaly present.   Cardiovascular: Normal rate, regular rhythm and normal heart sounds. PMI is not displaced. Exam reveals no gallop.   No murmur heard.  Pulmonary/Chest: Effort normal and breath sounds normal. No accessory muscle usage. No tachypnea. No respiratory distress.   Abdominal: Soft. Normal appearance and bowel sounds are normal. There is no tenderness. No hernia.   Lymphadenopathy:      She has no cervical adenopathy.     She has no axillary adenopathy.        Right: No inguinal adenopathy present.        Left: No inguinal adenopathy present.   Skin: Skin is warm, dry and intact. No rash noted.   Psychiatric: She has a normal mood and affect. Her speech is normal and behavior is normal. Thought content normal. She is attentive.           Medical Records Review:  Colonoscopy report reviewed from 9/6/2019.  Colonoscopy revealed diverticulosis was noted in the sigmoid colon, otherwise the exam was without abnormality.  EGD report reviewed from 9/6/2019.  EGD revealed LA grade B reflux esophagitis, acute and chronic gastritis, otherwise unremarkable.  Pathology report reviewed from 9/6/2019.  Duodenal biopsies were unremarkable.  Gastric biopsy showed evidence of marked chronic gastritis, no evidence of Helicobacter present. Esophageal biopsy showed evidence of reflux esophagitis with superficial ulceration and squamous atypia.      Assessment:       Diverticulosis of colon.  Gastroesophageal reflux disease with reflux esophagitis and chronic gastritis.  Symptoms well controlled with Protonix.  Surveillance EGD in light of squamous dysplasia warranted in 1 year.  Repeat screening colonoscopy warranted in 10 years.    1. Gastroesophageal reflux disease with esophagitis    2. Chronic superficial gastritis without bleeding    3. Diverticulosis of colon        Plan:   Gastroesophageal reflux disease with esophagitis    Chronic superficial gastritis without bleeding    Diverticulosis of colon        Follow up for any concerns or questions as needed, resume care with PCP.   Discharged from clinic    Counseling/Medical Decision Making:  Boom Blanco and I had a long conversation regarding the diagnosis of diverticulosis and diverticulitis. The EPS publication pertaining to the subject was provided to her as well. The complications of diverticulosis including diverticulitis and hemorrhage were  discussed in great detail. The signs and symptoms of these complications were explained explicitly including but not limited to: bleeding, abdominal pain, fever, nausea, vomiting, diarrhea, constipation, etc. She was instructed to seek immediate medical attention should any of the signs or symptoms develop. The importance of avoiding constipation was explained. The benefits of a 40 g high fiber diet, fiber supplements, stool softeners, and increased free water intake were also explained. Questions were solicited and answered. Entire conversation was held in layman's terms. At the conclusion of the conversation she voiced complete understanding of all we discussed and satisfaction that all questions were fully answered.    Recommendations for repeat EGD in 1 year and repeat colonoscopy in 10 years were expressed.    Total face-to-face encountered time was 15 minutes, 10 minutes spent counseling the patient as outlined/summarized above.

## 2019-09-20 ENCOUNTER — ANESTHESIA EVENT (OUTPATIENT)
Dept: SURGERY | Facility: HOSPITAL | Age: 49
End: 2019-09-20
Payer: MEDICAID

## 2019-09-20 ENCOUNTER — HOSPITAL ENCOUNTER (OUTPATIENT)
Dept: PREADMISSION TESTING | Facility: HOSPITAL | Age: 49
Discharge: HOME | End: 2019-09-20
Attending: ORTHOPAEDIC SURGERY
Payer: MEDICAID

## 2019-09-20 VITALS — WEIGHT: 194 LBS | BODY MASS INDEX: 28.73 KG/M2 | HEIGHT: 69 IN

## 2019-09-20 PROCEDURE — 99900103 DSU ONLY-NO CHARGE-INITIAL HR (STAT)

## 2019-09-20 NOTE — PLAN OF CARE
Pt states she has a half of bottle of hibiclens at home and does not need anymore. Pre-op labs completed. Type and screen to be completed on 10/2/19. Federico spoke with pt this am and advised her she does not need to repeat joint camp again as she just did this for her previous knee replacement. No questions voiced. Escorted pt to exit.

## 2019-09-20 NOTE — PRE ADMISSION SCREENING
Patient Name: Boom Blanco  YOB: 1970   MRN: [id number]     [facility]   Basic Mobility Inpatient Short Form 6 Clicks         How much difficulty does the patient currently have  Unable  A Lot  A Little  None      1. Turning over in bed (including adjusting bedclothes, sheets and blankets)?     1 []    2 [x]    3 []    4 []        2. Sitting down on and standing up from a chair with arms (e.g., wheelchair, bedside commode, etc.)     1 []  2 [x]  3 []     4 []      3. Moving from lying on back to sitting on the side of the bed?     1 []  2 [x]  3 []    4 []    How much help from another person does the patient currently need  Total  A Lot  A Little  None      4. Moving to and from a bed to a chair (including a wheelchair)?    1 []  2 []  3 []    4 [x]      5. Need to walk in hospital room?    1 []  2 []  3 []    4 [x]      6. Climbing 3-5 steps with a railing?    1 []  2 []  3 []    4 [x]       Raw Score:      18            CMS 0-100% Score:    46.58        %   Standardized Score:    43.63           CMS Modifier:      CK                                    [facility]   Basic Mobility Inpatient Short Form 6 Clicks Score Conversion Table*         *Use this form to convert -PAC Basic Mobility Inpatient Raw Scores.   -[facility] Inpatient Basic Mobility Short Form Scoring Example   1. Add the number values associated with the response to each item. For example, items totals yield a Raw Score of 21.   2. Match the raw score to the t-Scale scores (t-Scale score = 50.25, SE = 4.69).   3. Find the associated CMS % (CMS % = 28.97%).   4. Locate the correct CMS Functional Modifier Code, or G Code (G code = CJ)     NOTE: Each -PAC Short Form has a separate conversion table. Make sure that you use the correct conversion table.       Instruction Manual - page 45 contains conversion table

## 2019-09-20 NOTE — PRE ADMISSION SCREENING
"               [facility] Risk Assessment Scale    Patient Name: Boom Blanco  YOB: 1970  MRN: [id number]            RIsk Factor Measure Recommendation Patient Data Scale/Score   BMI >40 Reconsider surgery, weight loss   Estimated body mass index is 28.65 kg/m² as calculated from the following:    Height as of this encounter: 5' 9" (1.753 m).    Weight as of this encounter: 88 kg (194 lb).   [] 0 = 1 - 24.9  [x] 1 = 25-29.9  [] 2 = 30-34.9  [] 3 = 35-39.9  [] 4 = 40-44.9  [] 5 = 45-99.9   Hemoglobin AIC (if applicable) >9 Delay surgery until DM under control  Refer for:  · Nutrition Therapy  · Exercise   · Medication    No results found for: LABA1C, HGBA1C    Lab Results   Component Value Date     09/20/2019      [] 0 = 4.0-5.6  [] 1 = 5.7-6.4  [] 2 = 6.5-6.9  [] 3 = 7.0-7.9  [] 4 = 8.0-8.9  [] 5 = 9.0-12   Hemoglobin (Anemia) <9 Delay surgery   Correct anemia Lab Results   Component Value Date    HGB 13.0 09/20/2019    [] 20 - <9.0                    Albumin <3 Delay surgery &Workup Lab Results   Component Value Date    ALBUMIN 4.2 09/20/2019    [] 20 - <3.0   Smoking Cessation >4 Weeks Delay Surgery  Refer to OP Cessation Class    Former Smoker [] 20 - current smoker                                _____ PPD                    Hx of MI, PE, Arrhythmia, CVA, DVT <30 Days Delay Surgery    N/A [] 20      Infection Variable Delay surgery and re-evaluate   N/A [] 20 - recent/current infection     Depression (PHQ) >10 out of 27 Delay Surgery and re-evaluate  Medication  Counseling              [x] 0     []1     []2     []3      []4      [] 5                    (1-4)      (5-9)  (10-14)  (15-19)   (20-27)     Memory Impairment & Memory loss (Mini-Cog Screening Tool) Advanced dementia and/or Parkinson's Reconsider surgery     [x] 0     []1     []2     []3     []4     [] 5     Physical Conditioning (Modified AM-PAC Per Physical Therapy at [facility]) Unable to ambulate on day of surgery Delay surgery " and re-evaluate  Pre-Rehabilitation   (PT evaluation)       []  0   []4       [x]8     []12        []16     []20       (<20%)   (<40%)   (<60%)   (<80% )    (>80%)     Home Environment/Caregiver support  (Per /Navigator Interview)    Availability of basic services and/or approprate assistance during post-operative period Delay surgery and re-evaluate  Safe home environment  Health   1 week post-surgery  Transportation  availability  Ability to obtain DME/Medications post-op    [x] 0     []1     []2     []3     []4     [] 5  [x] 0     []1     []2     []3     []4     [] 5  [x] 0     []1     []2     []3     []4     [] 5  [x] 0     []1     []2     []3     []4     [] 5         MD Contact: Dr. Gorman Comments:  Total Score:  9

## 2019-09-20 NOTE — ANESTHESIA PREPROCEDURE EVALUATION
09/20/2019  Boom Blanco is a 49 y.o., female.    Pre-op Assessment    I have reviewed the Patient Summary Reports.    I have reviewed the Nursing Notes.   I have reviewed the Medications.     Review of Systems  Anesthesia Hx:  No problems with previous Anesthesia  Neg history of prior surgery. Denies Family Hx of Anesthesia complications.   Denies Personal Hx of Anesthesia complications.   Social:  Smoker, Non-Smoker    Hematology/Oncology:  Hematology Normal   Oncology Normal     EENT/Dental:EENT/Dental Normal   Cardiovascular:   Hypertension, well controlled    Pulmonary:  Pulmonary Normal    Renal/:  Renal/ Normal     Hepatic/GI:   GERD, well controlled    Musculoskeletal:  Musculoskeletal Normal    Neurological:  Neurology Normal    Endocrine:   Hypothyroidism    Dermatological:  Skin Normal    Psych:   Psychiatric History Bipolar 1         Physical Exam  General:  Well nourished    Airway/Jaw/Neck:  Airway Findings: Mallampati: II       Eyes/Ears/Nose:  EYES/EARS/NOSE FINDINGS: Normal   Dental:  DENTAL FINDINGS: Normal   Chest/Lungs:  Chest/Lungs Clear    Heart/Vascular:  Heart Findings: Normal Heart murmur: negative Vascular Findings: Normal    Abdomen:  Abdomen Findings: Normal    Musculoskeletal:  Musculoskeletal Findings: Normal   Skin:  Skin Findings: Normal         Anesthesia Plan  Type of Anesthesia, risks & benefits discussed:  Anesthesia Type:  general  Patient's Preference:   Intra-op Monitoring Plan: standard ASA monitors  Intra-op Monitoring Plan Comments:   Post Op Pain Control Plan:   Post Op Pain Control Plan Comments:   Induction:   IV  Beta Blocker:  Patient is not currently on a Beta-Blocker (No further documentation required).       Informed Consent: Patient understands risks and agrees with Anesthesia plan.  Questions answered. Anesthesia consent signed with  patient.  ASA Score: 2     Day of Surgery Review of History & Physical:    H&P update referred to the provider.

## 2019-09-20 NOTE — DISCHARGE INSTRUCTIONS
1. Arrive at the hospital at outpatient registration on the day of your surgery.  2. Someone will call you the day before your procedure with the time to arrive for your surgery.  3. Do not eat or drink anything after midnight the night before your surgery.  4. Hibiclens shower as instructed.  5. Bring minimal valuables to the hospital with you.  6. Bring an overnight bag to the hospital with you but leave this in your vehicle until you are transferred to your room.  7. Remove contact lenses, retainers, dentures, partials and ALL jewelry prior to procedure.  8. Surgery dept. phone number [phone].          HIBICLENS INSTRUCTIONS     You will take two showers with this soap. The first shower should be taken the night before your surgery/procedure and the second shower the morning of surgery/procedure.   Do not shave the area of your body where your surgery will be performed. Any new cut, abrasion or rash on your surgical site will need to be evaluated and may cause a delay in your procedure.   Turn water off before applying the hibiclens soap to prevent rinsing it off too soon. Apply the soap to your entire body from the jaw down, using a clean washcloth or your hands. Do not use hibiclens near your eyes, ears, nose or mouth.    Wash thoroughly for three minutes, paying special attention to the area where your surgery will be performed. Do not scrub your skin too hard. Do not wash with your regular soap after using the hibiclens. Turn the water back on and rinse your body well.   Pat yourself dry with a fresh, clean, soft towel after each shower. Put on clean clothes or pajamas. Use freshly laundered bed linens for the first night.   Do not apply any lotions, perfumes or powders after use.

## 2019-09-23 ENCOUNTER — PATIENT OUTREACH (OUTPATIENT)
Dept: ADMINISTRATIVE | Facility: HOSPITAL | Age: 49
End: 2019-09-23

## 2019-09-23 DIAGNOSIS — R26.81 GAIT INSTABILITY: Primary | ICD-10-CM

## 2019-10-02 ENCOUNTER — APPOINTMENT (OUTPATIENT)
Dept: LAB | Facility: HOSPITAL | Age: 49
End: 2019-10-02
Attending: ORTHOPAEDIC SURGERY
Payer: MEDICAID

## 2019-10-03 ENCOUNTER — ANESTHESIA (OUTPATIENT)
Dept: SURGERY | Facility: HOSPITAL | Age: 49
End: 2019-10-03
Payer: MEDICAID

## 2019-10-03 ENCOUNTER — HOSPITAL ENCOUNTER (OUTPATIENT)
Facility: HOSPITAL | Age: 49
Discharge: HOME | End: 2019-10-04
Attending: ORTHOPAEDIC SURGERY | Admitting: INTERNAL MEDICINE
Payer: MEDICAID

## 2019-10-03 DIAGNOSIS — Z96.659 TOTAL KNEE REPLACEMENT STATUS: Primary | ICD-10-CM

## 2019-10-03 DIAGNOSIS — Z01.818 PRE-OP EXAM: ICD-10-CM

## 2019-10-03 DIAGNOSIS — M17.12 ARTHRITIS OF LEFT KNEE: ICD-10-CM

## 2019-10-03 LAB
ALBUMIN SERPL BCP-MCNC: 4.2 G/DL (ref 3.5–5.2)
ALP SERPL-CCNC: 50 U/L (ref 55–135)
ALT SERPL W/O P-5'-P-CCNC: 27 U/L (ref 10–44)
ANION GAP SERPL CALC-SCNC: 12 MMOL/L (ref 8–16)
AST SERPL-CCNC: 25 U/L (ref 10–40)
BASOPHILS # BLD AUTO: 0.06 K/UL (ref 0–0.2)
BASOPHILS NFR BLD: 0.9 % (ref 0–1.9)
BILIRUB SERPL-MCNC: 0.6 MG/DL (ref 0.1–1)
BUN SERPL-MCNC: 15 MG/DL (ref 6–20)
CALCIUM SERPL-MCNC: 9.3 MG/DL (ref 8.7–10.5)
CHLORIDE SERPL-SCNC: 103 MMOL/L (ref 95–110)
CO2 SERPL-SCNC: 27 MMOL/L (ref 23–29)
CREAT SERPL-MCNC: 0.8 MG/DL (ref 0.5–1.4)
DIFFERENTIAL METHOD: NORMAL
EOSINOPHIL # BLD AUTO: 0.1 K/UL (ref 0–0.5)
EOSINOPHIL NFR BLD: 1.1 % (ref 0–8)
ERYTHROCYTE [DISTWIDTH] IN BLOOD BY AUTOMATED COUNT: 13 % (ref 11.5–14.5)
EST. GFR  (AFRICAN AMERICAN): >60 ML/MIN/1.73 M^2
EST. GFR  (NON AFRICAN AMERICAN): >60 ML/MIN/1.73 M^2
GLUCOSE SERPL-MCNC: 106 MG/DL (ref 70–110)
HCT VFR BLD AUTO: 39.8 % (ref 37–48.5)
HCT VFR BLD AUTO: 40.9 % (ref 37–48.5)
HGB BLD-MCNC: 12.9 G/DL (ref 12–16)
HGB BLD-MCNC: 13.5 G/DL (ref 12–16)
IMM GRANULOCYTES # BLD AUTO: 0.01 K/UL (ref 0–0.04)
IMM GRANULOCYTES NFR BLD AUTO: 0.1 % (ref 0–0.5)
INR PPP: 1 (ref 0.8–1.2)
LYMPHOCYTES # BLD AUTO: 2.2 K/UL (ref 1–4.8)
LYMPHOCYTES NFR BLD: 31.5 % (ref 18–48)
MCH RBC QN AUTO: 30.5 PG (ref 27–31)
MCHC RBC AUTO-ENTMCNC: 33 G/DL (ref 32–36)
MCV RBC AUTO: 92 FL (ref 82–98)
MONOCYTES # BLD AUTO: 0.7 K/UL (ref 0.3–1)
MONOCYTES NFR BLD: 9.6 % (ref 4–15)
NEUTROPHILS # BLD AUTO: 4 K/UL (ref 1.8–7.7)
NEUTROPHILS NFR BLD: 56.8 % (ref 38–73)
NRBC BLD-RTO: 0 /100 WBC
PLATELET # BLD AUTO: 177 K/UL (ref 150–350)
PMV BLD AUTO: 10.2 FL (ref 9.2–12.9)
POTASSIUM SERPL-SCNC: 4.1 MMOL/L (ref 3.5–5.1)
PROT SERPL-MCNC: 7.7 G/DL (ref 6–8.4)
PROTHROMBIN TIME: 11.2 SEC (ref 9–12.5)
RBC # BLD AUTO: 4.43 M/UL (ref 4–5.4)
SODIUM SERPL-SCNC: 142 MMOL/L (ref 136–145)
WBC # BLD AUTO: 6.99 K/UL (ref 3.9–12.7)

## 2019-10-03 PROCEDURE — 37000009 HC ANESTHESIA EA ADD 15 MINS: Performed by: ORTHOPAEDIC SURGERY

## 2019-10-03 PROCEDURE — 27447 TOTAL KNEE ARTHROPLASTY: CPT | Mod: LT,,, | Performed by: ORTHOPAEDIC SURGERY

## 2019-10-03 PROCEDURE — D9220A PRA ANESTHESIA: Mod: CRNA,,, | Performed by: NURSE ANESTHETIST, CERTIFIED REGISTERED

## 2019-10-03 PROCEDURE — 71000015 HC POSTOP RECOV 1ST HR: Performed by: ORTHOPAEDIC SURGERY

## 2019-10-03 PROCEDURE — 27447 PR TOTAL KNEE ARTHROPLASTY: ICD-10-PCS | Mod: LT,,, | Performed by: ORTHOPAEDIC SURGERY

## 2019-10-03 PROCEDURE — G0378 HOSPITAL OBSERVATION PER HR: HCPCS

## 2019-10-03 PROCEDURE — 97116 GAIT TRAINING THERAPY: CPT

## 2019-10-03 PROCEDURE — 99900035 HC TECH TIME PER 15 MIN (STAT)

## 2019-10-03 PROCEDURE — 01402 ANES OPN/ARTH TOT KNE ARTHRP: CPT | Performed by: ORTHOPAEDIC SURGERY

## 2019-10-03 PROCEDURE — 37000008 HC ANESTHESIA 1ST 15 MINUTES: Performed by: ORTHOPAEDIC SURGERY

## 2019-10-03 PROCEDURE — 85610 PROTHROMBIN TIME: CPT

## 2019-10-03 PROCEDURE — 25000003 PHARM REV CODE 250: Performed by: ORTHOPAEDIC SURGERY

## 2019-10-03 PROCEDURE — D9220A PRA ANESTHESIA: Mod: ANES,,, | Performed by: ANESTHESIOLOGY

## 2019-10-03 PROCEDURE — 85025 COMPLETE CBC W/AUTO DIFF WBC: CPT

## 2019-10-03 PROCEDURE — 63600175 PHARM REV CODE 636 W HCPCS: Performed by: ANESTHESIOLOGY

## 2019-10-03 PROCEDURE — 36000711: Performed by: ORTHOPAEDIC SURGERY

## 2019-10-03 PROCEDURE — C1776 JOINT DEVICE (IMPLANTABLE): HCPCS | Performed by: ORTHOPAEDIC SURGERY

## 2019-10-03 PROCEDURE — 85014 HEMATOCRIT: CPT

## 2019-10-03 PROCEDURE — 63600175 PHARM REV CODE 636 W HCPCS: Performed by: NURSE ANESTHETIST, CERTIFIED REGISTERED

## 2019-10-03 PROCEDURE — 71000033 HC RECOVERY, INTIAL HOUR: Performed by: ORTHOPAEDIC SURGERY

## 2019-10-03 PROCEDURE — 25000003 PHARM REV CODE 250: Performed by: NURSE ANESTHETIST, CERTIFIED REGISTERED

## 2019-10-03 PROCEDURE — D9220A PRA ANESTHESIA: ICD-10-PCS | Mod: CRNA,,, | Performed by: NURSE ANESTHETIST, CERTIFIED REGISTERED

## 2019-10-03 PROCEDURE — 97161 PT EVAL LOW COMPLEX 20 MIN: CPT

## 2019-10-03 PROCEDURE — 63600175 PHARM REV CODE 636 W HCPCS

## 2019-10-03 PROCEDURE — 76942 PR U/S GUIDANCE FOR NEEDLE GUIDANCE: ICD-10-PCS | Mod: 26,,, | Performed by: ANESTHESIOLOGY

## 2019-10-03 PROCEDURE — 64450 PR NERVE BLOCK INJ, ANES/STEROID, OTHER PERIPHERAL: ICD-10-PCS | Mod: 59,LT,, | Performed by: ANESTHESIOLOGY

## 2019-10-03 PROCEDURE — 36000710: Performed by: ORTHOPAEDIC SURGERY

## 2019-10-03 PROCEDURE — 63600175 PHARM REV CODE 636 W HCPCS: Performed by: ORTHOPAEDIC SURGERY

## 2019-10-03 PROCEDURE — 64450 NJX AA&/STRD OTHER PN/BRANCH: CPT | Mod: 59,LT,, | Performed by: ANESTHESIOLOGY

## 2019-10-03 PROCEDURE — 63600175 PHARM REV CODE 636 W HCPCS: Performed by: HOSPITALIST

## 2019-10-03 PROCEDURE — 27201423 OPTIME MED/SURG SUP & DEVICES STERILE SUPPLY: Performed by: ORTHOPAEDIC SURGERY

## 2019-10-03 PROCEDURE — 94799 UNLISTED PULMONARY SVC/PX: CPT

## 2019-10-03 PROCEDURE — 25000003 PHARM REV CODE 250: Performed by: INTERNAL MEDICINE

## 2019-10-03 PROCEDURE — C1713 ANCHOR/SCREW BN/BN,TIS/BN: HCPCS | Performed by: ORTHOPAEDIC SURGERY

## 2019-10-03 PROCEDURE — 36415 COLL VENOUS BLD VENIPUNCTURE: CPT

## 2019-10-03 PROCEDURE — 76942 ECHO GUIDE FOR BIOPSY: CPT | Mod: 26,,, | Performed by: ANESTHESIOLOGY

## 2019-10-03 PROCEDURE — 80053 COMPREHEN METABOLIC PANEL: CPT

## 2019-10-03 PROCEDURE — 63600175 PHARM REV CODE 636 W HCPCS: Performed by: INTERNAL MEDICINE

## 2019-10-03 PROCEDURE — 85018 HEMOGLOBIN: CPT

## 2019-10-03 PROCEDURE — 99219 PR INITIAL OBSERVATION CARE,LEVL II: ICD-10-PCS | Mod: ,,, | Performed by: INTERNAL MEDICINE

## 2019-10-03 PROCEDURE — 27800903 OPTIME MED/SURG SUP & DEVICES OTHER IMPLANTS: Performed by: ORTHOPAEDIC SURGERY

## 2019-10-03 PROCEDURE — 99219 PR INITIAL OBSERVATION CARE,LEVL II: CPT | Mod: ,,, | Performed by: INTERNAL MEDICINE

## 2019-10-03 PROCEDURE — D9220A PRA ANESTHESIA: ICD-10-PCS | Mod: ANES,,, | Performed by: ANESTHESIOLOGY

## 2019-10-03 DEVICE — DEPUY CMW 2 GENTAMICIN FAST SET BONE CEMENT 20G: Type: IMPLANTABLE DEVICE | Site: KNEE | Status: FUNCTIONAL

## 2019-10-03 DEVICE — P.F.C. SIGMA TIBIAL TRAY FIXED BEARING MODULAR COCR 3 CEMENTED
Type: IMPLANTABLE DEVICE | Site: KNEE | Status: FUNCTIONAL
Brand: P.F.C. SIGMA

## 2019-10-03 DEVICE — INSERT XLK CVD PLUS SZ3 8MM: Type: IMPLANTABLE DEVICE | Site: KNEE | Status: FUNCTIONAL

## 2019-10-03 DEVICE — P.F.C. SIGMA OVAL DOME PATELLA 3-PEG 35MM CEMENTED
Type: IMPLANTABLE DEVICE | Site: KNEE | Status: FUNCTIONAL
Brand: P.F.C. SIGMA

## 2019-10-03 RX ORDER — LIDOCAINE HYDROCHLORIDE 10 MG/ML
1 INJECTION, SOLUTION EPIDURAL; INFILTRATION; INTRACAUDAL; PERINEURAL ONCE
Status: DISCONTINUED | OUTPATIENT
Start: 2019-10-03 | End: 2019-10-03 | Stop reason: HOSPADM

## 2019-10-03 RX ORDER — ONDANSETRON 2 MG/ML
4 INJECTION INTRAMUSCULAR; INTRAVENOUS DAILY PRN
Status: DISCONTINUED | OUTPATIENT
Start: 2019-10-03 | End: 2019-10-03 | Stop reason: HOSPADM

## 2019-10-03 RX ORDER — DOCUSATE SODIUM 100 MG/1
100 CAPSULE, LIQUID FILLED ORAL 2 TIMES DAILY
Status: DISCONTINUED | OUTPATIENT
Start: 2019-10-03 | End: 2019-10-04 | Stop reason: HOSPADM

## 2019-10-03 RX ORDER — SUCCINYLCHOLINE CHLORIDE 20 MG/ML
INJECTION INTRAMUSCULAR; INTRAVENOUS
Status: DISCONTINUED | OUTPATIENT
Start: 2019-10-03 | End: 2019-10-03

## 2019-10-03 RX ORDER — MORPHINE SULFATE 4 MG/ML
2 INJECTION, SOLUTION INTRAMUSCULAR; INTRAVENOUS EVERY 5 MIN PRN
Status: DISCONTINUED | OUTPATIENT
Start: 2019-10-03 | End: 2019-10-03

## 2019-10-03 RX ORDER — OXYCODONE AND ACETAMINOPHEN 5; 325 MG/1; MG/1
1 TABLET ORAL
Status: DISCONTINUED | OUTPATIENT
Start: 2019-10-03 | End: 2019-10-03 | Stop reason: HOSPADM

## 2019-10-03 RX ORDER — TRANEXAMIC ACID 100 MG/ML
10 INJECTION, SOLUTION INTRAVENOUS ONCE
Status: COMPLETED | OUTPATIENT
Start: 2019-10-03 | End: 2019-10-03

## 2019-10-03 RX ORDER — PROPOFOL 10 MG/ML
VIAL (ML) INTRAVENOUS
Status: DISCONTINUED | OUTPATIENT
Start: 2019-10-03 | End: 2019-10-03

## 2019-10-03 RX ORDER — CEFAZOLIN SODIUM 1 G/50ML
1 SOLUTION INTRAVENOUS
Status: COMPLETED | OUTPATIENT
Start: 2019-10-03 | End: 2019-10-04

## 2019-10-03 RX ORDER — ONDANSETRON 2 MG/ML
INJECTION INTRAMUSCULAR; INTRAVENOUS
Status: DISCONTINUED | OUTPATIENT
Start: 2019-10-03 | End: 2019-10-03

## 2019-10-03 RX ORDER — MORPHINE SULFATE 4 MG/ML
5 INJECTION, SOLUTION INTRAMUSCULAR; INTRAVENOUS
Status: DISCONTINUED | OUTPATIENT
Start: 2019-10-03 | End: 2019-10-04 | Stop reason: HOSPADM

## 2019-10-03 RX ORDER — MIDAZOLAM HYDROCHLORIDE 1 MG/ML
INJECTION, SOLUTION INTRAMUSCULAR; INTRAVENOUS
Status: DISCONTINUED | OUTPATIENT
Start: 2019-10-03 | End: 2019-10-03

## 2019-10-03 RX ORDER — SODIUM CHLORIDE, SODIUM LACTATE, POTASSIUM CHLORIDE, CALCIUM CHLORIDE 600; 310; 30; 20 MG/100ML; MG/100ML; MG/100ML; MG/100ML
INJECTION, SOLUTION INTRAVENOUS CONTINUOUS
Status: DISCONTINUED | OUTPATIENT
Start: 2019-10-03 | End: 2019-10-04 | Stop reason: HOSPADM

## 2019-10-03 RX ORDER — SODIUM CHLORIDE, SODIUM LACTATE, POTASSIUM CHLORIDE, CALCIUM CHLORIDE 600; 310; 30; 20 MG/100ML; MG/100ML; MG/100ML; MG/100ML
125 INJECTION, SOLUTION INTRAVENOUS CONTINUOUS
Status: DISCONTINUED | OUTPATIENT
Start: 2019-10-03 | End: 2019-10-03

## 2019-10-03 RX ORDER — BUPROPION HYDROCHLORIDE 150 MG/1
150 TABLET ORAL NIGHTLY
Status: DISCONTINUED | OUTPATIENT
Start: 2019-10-03 | End: 2019-10-04 | Stop reason: HOSPADM

## 2019-10-03 RX ORDER — ACETAMINOPHEN 325 MG/1
650 TABLET ORAL EVERY 6 HOURS PRN
Status: DISCONTINUED | OUTPATIENT
Start: 2019-10-03 | End: 2019-10-04 | Stop reason: HOSPADM

## 2019-10-03 RX ORDER — KETOROLAC TROMETHAMINE 30 MG/ML
15 INJECTION, SOLUTION INTRAMUSCULAR; INTRAVENOUS ONCE
Status: DISCONTINUED | OUTPATIENT
Start: 2019-10-03 | End: 2019-10-03

## 2019-10-03 RX ORDER — PANTOPRAZOLE SODIUM 40 MG/1
40 TABLET, DELAYED RELEASE ORAL DAILY
Status: DISCONTINUED | OUTPATIENT
Start: 2019-10-03 | End: 2019-10-04 | Stop reason: HOSPADM

## 2019-10-03 RX ORDER — MUPIROCIN 20 MG/G
OINTMENT TOPICAL
Status: DISCONTINUED | OUTPATIENT
Start: 2019-10-03 | End: 2019-10-03 | Stop reason: HOSPADM

## 2019-10-03 RX ORDER — MEPERIDINE HYDROCHLORIDE 50 MG/ML
INJECTION INTRAMUSCULAR; INTRAVENOUS; SUBCUTANEOUS
Status: DISPENSED
Start: 2019-10-03 | End: 2019-10-03

## 2019-10-03 RX ORDER — LOVASTATIN 20 MG/1
20 TABLET ORAL NIGHTLY
Status: DISCONTINUED | OUTPATIENT
Start: 2019-10-03 | End: 2019-10-04 | Stop reason: HOSPADM

## 2019-10-03 RX ORDER — CEFAZOLIN SODIUM 2 G/50ML
2 SOLUTION INTRAVENOUS
Status: COMPLETED | OUTPATIENT
Start: 2019-10-03 | End: 2019-10-03

## 2019-10-03 RX ORDER — MEPERIDINE HYDROCHLORIDE 50 MG/ML
INJECTION INTRAMUSCULAR; INTRAVENOUS; SUBCUTANEOUS
Status: DISCONTINUED | OUTPATIENT
Start: 2019-10-03 | End: 2019-10-03

## 2019-10-03 RX ORDER — MORPHINE SULFATE 4 MG/ML
INJECTION, SOLUTION INTRAMUSCULAR; INTRAVENOUS
Status: COMPLETED
Start: 2019-10-03 | End: 2019-10-03

## 2019-10-03 RX ORDER — TIZANIDINE 4 MG/1
4 TABLET ORAL NIGHTLY
Status: DISCONTINUED | OUTPATIENT
Start: 2019-10-03 | End: 2019-10-04 | Stop reason: HOSPADM

## 2019-10-03 RX ORDER — FLUTICASONE PROPIONATE 50 MCG
1 SPRAY, SUSPENSION (ML) NASAL 2 TIMES DAILY PRN
Status: DISCONTINUED | OUTPATIENT
Start: 2019-10-03 | End: 2019-10-04 | Stop reason: HOSPADM

## 2019-10-03 RX ORDER — MEPERIDINE HYDROCHLORIDE 50 MG/ML
50 INJECTION INTRAMUSCULAR; INTRAVENOUS; SUBCUTANEOUS ONCE
Status: ACTIVE | OUTPATIENT
Start: 2019-10-03 | End: 2019-10-04

## 2019-10-03 RX ORDER — MORPHINE SULFATE 4 MG/ML
INJECTION, SOLUTION INTRAMUSCULAR; INTRAVENOUS
Status: DISPENSED
Start: 2019-10-03 | End: 2019-10-03

## 2019-10-03 RX ORDER — LEVOTHYROXINE SODIUM 25 UG/1
50 TABLET ORAL
Status: DISCONTINUED | OUTPATIENT
Start: 2019-10-04 | End: 2019-10-04 | Stop reason: HOSPADM

## 2019-10-03 RX ORDER — OXYCODONE AND ACETAMINOPHEN 10; 325 MG/1; MG/1
1 TABLET ORAL 2 TIMES DAILY
Status: DISCONTINUED | OUTPATIENT
Start: 2019-10-03 | End: 2019-10-04 | Stop reason: HOSPADM

## 2019-10-03 RX ORDER — ONDANSETRON 2 MG/ML
4 INJECTION INTRAMUSCULAR; INTRAVENOUS EVERY 6 HOURS PRN
Status: DISCONTINUED | OUTPATIENT
Start: 2019-10-03 | End: 2019-10-04 | Stop reason: HOSPADM

## 2019-10-03 RX ORDER — LISINOPRIL 10 MG/1
20 TABLET ORAL DAILY
Status: DISCONTINUED | OUTPATIENT
Start: 2019-10-03 | End: 2019-10-04 | Stop reason: HOSPADM

## 2019-10-03 RX ORDER — KETOROLAC TROMETHAMINE 30 MG/ML
15 INJECTION, SOLUTION INTRAMUSCULAR; INTRAVENOUS EVERY 6 HOURS
Status: DISCONTINUED | OUTPATIENT
Start: 2019-10-03 | End: 2019-10-04 | Stop reason: HOSPADM

## 2019-10-03 RX ORDER — ROCURONIUM BROMIDE 10 MG/ML
INJECTION, SOLUTION INTRAVENOUS
Status: DISCONTINUED | OUTPATIENT
Start: 2019-10-03 | End: 2019-10-03

## 2019-10-03 RX ORDER — ACETAMINOPHEN 500 MG
5 TABLET ORAL NIGHTLY
Status: DISCONTINUED | OUTPATIENT
Start: 2019-10-03 | End: 2019-10-04 | Stop reason: HOSPADM

## 2019-10-03 RX ORDER — SODIUM CHLORIDE 9 MG/ML
INJECTION, SOLUTION INTRAVENOUS CONTINUOUS
Status: DISCONTINUED | OUTPATIENT
Start: 2019-10-03 | End: 2019-10-03

## 2019-10-03 RX ADMIN — TRANEXAMIC ACID 1000 MG: 100 INJECTION, SOLUTION INTRAVENOUS at 07:10

## 2019-10-03 RX ADMIN — PROPOFOL 200 MG: 10 INJECTION, EMULSION INTRAVENOUS at 07:10

## 2019-10-03 RX ADMIN — KETOROLAC TROMETHAMINE 15 MG: 30 INJECTION, SOLUTION INTRAMUSCULAR at 09:10

## 2019-10-03 RX ADMIN — ROCURONIUM BROMIDE 30 MG: 10 INJECTION, SOLUTION INTRAVENOUS at 07:10

## 2019-10-03 RX ADMIN — ONDANSETRON 4 MG: 2 INJECTION INTRAMUSCULAR; INTRAVENOUS at 10:10

## 2019-10-03 RX ADMIN — TIZANIDINE 4 MG: 4 TABLET ORAL at 09:10

## 2019-10-03 RX ADMIN — SODIUM CHLORIDE, POTASSIUM CHLORIDE, SODIUM LACTATE AND CALCIUM CHLORIDE: 600; 310; 30; 20 INJECTION, SOLUTION INTRAVENOUS at 08:10

## 2019-10-03 RX ADMIN — CEFAZOLIN SODIUM 2 G: 2 SOLUTION INTRAVENOUS at 07:10

## 2019-10-03 RX ADMIN — MORPHINE SULFATE 5 MG: 4 INJECTION, SOLUTION INTRAMUSCULAR; INTRAVENOUS at 10:10

## 2019-10-03 RX ADMIN — MORPHINE SULFATE 5 MG: 4 INJECTION, SOLUTION INTRAMUSCULAR; INTRAVENOUS at 02:10

## 2019-10-03 RX ADMIN — SUGAMMADEX 200 MG: 100 INJECTION, SOLUTION INTRAVENOUS at 09:10

## 2019-10-03 RX ADMIN — MEPERIDINE HYDROCHLORIDE 50 MG: 50 INJECTION INTRAMUSCULAR; INTRAVENOUS; SUBCUTANEOUS at 07:10

## 2019-10-03 RX ADMIN — MORPHINE SULFATE 5 MG: 4 INJECTION, SOLUTION INTRAMUSCULAR; INTRAVENOUS at 06:10

## 2019-10-03 RX ADMIN — SUCCINYLCHOLINE CHLORIDE 150 MG: 20 INJECTION, SOLUTION INTRAMUSCULAR; INTRAVENOUS at 07:10

## 2019-10-03 RX ADMIN — MORPHINE SULFATE 5 MG: 4 INJECTION, SOLUTION INTRAMUSCULAR; INTRAVENOUS at 07:10

## 2019-10-03 RX ADMIN — ONDANSETRON 4 MG: 2 INJECTION INTRAMUSCULAR; INTRAVENOUS at 06:10

## 2019-10-03 RX ADMIN — PANTOPRAZOLE SODIUM 40 MG: 40 TABLET, DELAYED RELEASE ORAL at 11:10

## 2019-10-03 RX ADMIN — ONDANSETRON 4 MG: 2 INJECTION INTRAMUSCULAR; INTRAVENOUS at 07:10

## 2019-10-03 RX ADMIN — MORPHINE SULFATE 4 MG: 4 INJECTION INTRAVENOUS at 09:10

## 2019-10-03 RX ADMIN — MORPHINE SULFATE 5 MG: 4 INJECTION, SOLUTION INTRAMUSCULAR; INTRAVENOUS at 05:10

## 2019-10-03 RX ADMIN — LISINOPRIL 20 MG: 10 TABLET ORAL at 11:10

## 2019-10-03 RX ADMIN — CEFAZOLIN SODIUM 1 G: 1 SOLUTION INTRAVENOUS at 03:10

## 2019-10-03 RX ADMIN — DOCUSATE SODIUM 100 MG: 100 CAPSULE, LIQUID FILLED ORAL at 09:10

## 2019-10-03 RX ADMIN — OXYCODONE HYDROCHLORIDE AND ACETAMINOPHEN 1 TABLET: 10; 325 TABLET ORAL at 11:10

## 2019-10-03 RX ADMIN — MORPHINE SULFATE 5 MG: 4 INJECTION, SOLUTION INTRAMUSCULAR; INTRAVENOUS at 04:10

## 2019-10-03 RX ADMIN — DOCUSATE SODIUM 100 MG: 100 CAPSULE, LIQUID FILLED ORAL at 11:10

## 2019-10-03 RX ADMIN — RIVAROXABAN 10 MG: 10 TABLET, FILM COATED ORAL at 06:10

## 2019-10-03 RX ADMIN — MIDAZOLAM HYDROCHLORIDE 2 MG: 1 INJECTION, SOLUTION INTRAMUSCULAR; INTRAVENOUS at 07:10

## 2019-10-03 RX ADMIN — OXYCODONE HYDROCHLORIDE AND ACETAMINOPHEN 1 TABLET: 10; 325 TABLET ORAL at 09:10

## 2019-10-03 RX ADMIN — SODIUM CHLORIDE, POTASSIUM CHLORIDE, SODIUM LACTATE AND CALCIUM CHLORIDE: 600; 310; 30; 20 INJECTION, SOLUTION INTRAVENOUS at 07:10

## 2019-10-03 NOTE — ANESTHESIA POSTPROCEDURE EVALUATION
Anesthesia Post Evaluation    Patient: Boom Blanco    Procedure(s) Performed: Procedure(s) (LRB):  ARTHROPLASTY, KNEE, TOTAL (Left)    Final Anesthesia Type: general  Patient location during evaluation: PACU  Patient participation: Yes- Able to Participate  Level of consciousness: awake and awake and alert  Post-procedure vital signs: reviewed and stable  Pain management: adequate  Airway patency: patent  PONV status at discharge: No PONV  Anesthetic complications: no      Cardiovascular status: blood pressure returned to baseline  Respiratory status: unassisted and spontaneous ventilation  Hydration status: euvolemic  Follow-up not needed.          Vitals Value Taken Time   /74 10/3/2019 11:12 AM   Temp 35.8 °C (96.5 °F) 10/3/2019 11:12 AM   Pulse 60 10/3/2019  1:12 PM   Resp 9 10/3/2019  1:12 PM   SpO2 98 % 10/3/2019 11:12 AM   Vitals shown include unvalidated device data.      No case tracking events are documented in the log.      Pain/Yoanna Score: Pain Rating Prior to Med Admin: 8 (10/3/2019  2:47 PM)  Yoanna Score: 10 (10/3/2019 10:30 AM)

## 2019-10-03 NOTE — PLAN OF CARE
Pt brought to PACU. Monitors applied. VSS. Smith to gravity. IV site intact, dry clean. LR to gravity. Ice machine attached. Pt medicated as charted for pain. Pt states she can feel knee and that her block did not work. No drainage noted, dressing is dry and intact.

## 2019-10-03 NOTE — HOSPITAL COURSE
Patient underwent successful right knee replacement and will be placed on the medical surgical unit for postoperative care.

## 2019-10-03 NOTE — PLAN OF CARE
10/03/19 1229   Discharge Assessment   Assessment Type Discharge Planning Assessment   Assessment information obtained from? Patient   Expected Length of Stay (days) 1   Communicated expected length of stay with patient/caregiver yes   Prior to hospitilization cognitive status: Alert/Oriented   Prior to hospitalization functional status: Independent   Current cognitive status: Alert/Oriented   Current Functional Status: Assistive Equipment;Needs Assistance   Lives With spouse;parent(s)   Able to Return to Prior Arrangements yes   Is patient able to care for self after discharge? No  (with assistance)   Who are your caregiver(s) and their phone number(s)? Ivan Blanco spouse [phone]   Patient's perception of discharge disposition home or selfcare   Readmission Within the Last 30 Days no previous admission in last 30 days   Patient currently being followed by outpatient case management? No   Patient currently receives any other outside agency services? No   Equipment Currently Used at Home walker, rolling;crutches;shower chair;bedside commode   Do you have any problems affording any of your prescribed medications? No   Is the patient taking medications as prescribed? yes   Does the patient have transportation home? Yes   Transportation Anticipated family or friend will provide   Does the patient receive services at the [facility]? No   Discharge Plan A Home with family   DME Needed Upon Discharge  none   Patient/Family in Agreement with Plan yes   Patient lives at home with her  & her mother who are both at bedside. She has all the equipment she needs as she had the other knee done 6 months ago. She does have to get up 18 steps to get into her house. PT will work with her on that. She only has Medicaid & per home home they do not cover PT at home. Patient already aware of that & wants to come to hospital to get therapy. Left message for PT to set up outpatient appointment for Monday. Denies any  other discharge needs at this time.

## 2019-10-03 NOTE — NURSING
1055: Report received from RICARDO Martinez.    1106: Patient arrived to room via bed from PACU at this time.  Patient oriented to room and vital signs taken.  Left leg on CPM machine at this time along with ice machine.  Smith is intact draining clear yellow urine.  Respirations even and unlabored, bed in lowest and locked position with bed alarm on.  Family members at bedside.  NAD noted.

## 2019-10-03 NOTE — ANESTHESIA PROCEDURE NOTES
Peripheral Block    Patient location during procedure: OR   Block not for primary anesthetic.  Reason for block: at surgeon's request and post-op pain management   Post-op Pain Location: left knee  Start time: 10/3/2019 7:16 AM  Timeout: 10/3/2019 7:15 AM   End time: 10/3/2019 7:30 AM    Staffing  Authorizing Provider: Jacob Bolanos MD  Performing Provider: Jacob Bolanos MD    Preanesthetic Checklist  Completed: patient identified, site marked, surgical consent, pre-op evaluation, timeout performed, IV checked, risks and benefits discussed and monitors and equipment checked  Peripheral Block  Patient position: supine  Prep: ChloraPrep  Patient monitoring: heart rate, cardiac monitor, continuous pulse ox, continuous capnometry and frequent blood pressure checks  Block type: adductor canal  Laterality: left  Injection technique: single shot  Needle  Needle type: Stimuplex   Needle gauge: 22 G  Needle length: 2 in  Needle localization: anatomical landmarks and ultrasound guidance   -ultrasound image captured on disc.  Assessment  Injection assessment: negative aspiration, negative parasthesia and local visualized surrounding nerve  Paresthesia pain: none  Heart rate change: no  Slow fractionated injection: yes  Additional Notes  VSS.  [facility] RN monitoring vitals throughout procedure.  Patient tolerated procedure well.  Marcaine 0.05% ml and Exparil 10 ml

## 2019-10-03 NOTE — PT/OT/SLP EVAL
PhysicalTherapy   Evaluation    Boom Blanco   MRN: [id number]     PT Received On: 10/03/19  PT Start Time: 1325     PT Stop Time: 1400    PT Total Time (min): 35 min       Billable Minutes:  Evaluation 15 and Gait [id number]  Total Minutes: 35    Diagnosis: <principal problem not specified>s/p L TKA  Past Medical History:   Diagnosis Date    Anxiety disorder     Arthritis     Bipolar disorder     Chronic pain     Hyperlipidemia     Hypertension     Lupus 2006    Psoriatic arthritis     Raynaud's disease       Past Surgical History:   Procedure Laterality Date    ARTHROSCOPIC CHONDROPLASTY OF KNEE JOINT Left 1/22/2019    Procedure: ARTHROSCOPY, KNEE, WITH CHONDROPLASTY;  Surgeon: Sylvain Gorman DO;  Location: [facility] OR;  Service: Orthopedics;  Laterality: Left;    ARTHROSCOPY OF KNEE Left 1/22/2019    Procedure: ARTHROSCOPY, KNEE;  Surgeon: Sylvain Gorman DO;  Location: [facility];  Service: Orthopedics;  Laterality: Left;  Equipment: [place] Chondral Drill Set and [employer] Meniscus Repair Set Truespan  Vendor/Rep: [employer] [place] [employer]    ARTHROSCOPY OF KNEE Right 2/5/2019    Procedure: ARTHROSCOPY, KNEE;  Surgeon: Sylvain Gorman DO;  Location: [facility];  Service: Orthopedics;  Laterality: Right;  Equipment: [employer] Truspar; Scope  Venor/Rep: [employer]    BACK SURGERY      spinal stimulator implanted and then removed    BREAST BIOPSY      COLONOSCOPY  11/25/2016    repeat in 5-10 years    COLONOSCOPY N/A 9/6/2019    Procedure: COLONOSCOPY;  Surgeon: Dany Hugo MD;  Location: [facility];  Service: General;  Laterality: N/A;    ENDOSCOPY  9/6/2019    Procedure: ENDOSCOPY;  Surgeon: Dany Hugo MD;  Location: [facility];  Service: General;;  with multiple biopsy's    ESOPHAGOGASTRODUODENOSCOPY Left 8/29/2018    Procedure: ESOPHAGOGASTRODUODENOSCOPY (EGD);  Surgeon: Dany Hugo MD;  Location: [facility];  Service: General;  Laterality: Left;    EXCISION OF MEDIAL MENISCUS OF KNEE Right  2/5/2019    Procedure: MENISCECTOMY, KNEE, MEDIAL;  Surgeon: Sylvain Gorman DO;  Location: [facility] OR;  Service: Orthopedics;  Laterality: Right;    HYSTERECTOMY  1997    KNEE ARTHROSCOPY W/ DEBRIDEMENT Left 1/22/2019    Procedure: ARTHROSCOPY, KNEE, WITH DEBRIDEMENT;  Surgeon: Sylvain Gorman DO;  Location: [facility] OR;  Service: Orthopedics;  Laterality: Left;    KNEE ARTHROSCOPY W/ MENISCECTOMY Left 1/22/2019    Procedure: ARTHROSCOPY, KNEE, WITH MENISCECTOMY;  Surgeon: Sylvain Gorman DO;  Location: [facility] OR;  Service: Orthopedics;  Laterality: Left;    KNEE ARTHROSCOPY W/ PLICA EXCISION Left 1/22/2019    Procedure: EXCISION, PLICA, KNEE, ARTHROSCOPIC;  Surgeon: Sylvain Gorman DO;  Location: [facility] OR;  Service: Orthopedics;  Laterality: Left;    SALPINGOOPHORECTOMY  1997    TOTAL KNEE ARTHROPLASTY Right 4/18/2019    Procedure: ARTHROPLASTY, KNEE, TOTAL;  Surgeon: Sylvain Gorman DO;  Location: [facility] OR;  Service: Orthopedics;  Laterality: Right;  Equipment: [employer] Total Knee System; Guillen Leg Mcclellan  Vendor/Rep: [employer]  Table/Position: Supine  REQUIRES FIRST ASSISTANT EPHRAIM       Referring physician: Vita  Date referred to PT: 10/3/19    General Precautions: Standard,    Orthopedic Precautions: LLE weight bearing as tolerated   Braces:            Patient History:  Equipment Currently Used at Home: walker, rolling        Previous Level of Function:   Patient lives in a single story home with 18 steps to enter. Patient underwent R TKA in April 2019 for which she has made a full recovery. Patient states she still has all equipment from the first surgery. Patient lives with spouse    Subjective:  Communicated with RN and patient prior to session. Patient agreeable to PT eval  Pt reports she remembers all exercises from last surgery, and demonstrates SLR. She states her pain is worse this time around, but it is controlled currently     Chief Complaint: pain  Patient goals: to practice  stairs  Family goals: no family present         Objective:  Patient found supine in bed, alone in room. Patient found with: noel catheter, CPM, peripheral IV, SCD, bed alarm, polar pack on L knee    Cognitive Exam:  Oriented to: Person, Place, Time and Situation  Follows Commands/attention: Follows multistep  commands  Communication: clear/fluent  Safety awareness/insight to disability: intact    Physical Exam:    Upper Extremity Range of Motion:  WNL Bilat    Upper Extremity Strength:  WNL Bilat    Lower Extremity Range of Motion:  Right Lower Extremity: WNL  Left Lower Extremity: Knee: 6-50    Lower Extremity Strength:  Right Lower Extremity: WNL  Left Lower Extremity: Able to perform independent SLR, minimal quad set, independent hip abd/add, assisted LAQ     Fine motor coordination:     -       Intact    Gross motor coordination: WFL    Functional Mobility:    Bed Mobility :   Supine to sit: Independent   Sit to supine: Independent   Rolling: Independent   Scooting: Independent    Transfers:  Sit to stand:Independent with No Assistive Device decreased weigth bearing LLE    Gait:  Patient ambulated FWB/WBAT: left lower extremity 120  feet on level tile with Rolling Walker with Supervision or Set-up Assistance.  Pt with demonstarting a  step to gait pattern with decreased stride length, decreased swing-to-stance ratio and decreased toe-to-floor clearance.Impairments contributing to gait deviations include pain, decreased ROM and decreased strength    Stairs:  To be assessed     Balance:   Static Sit: NORMAL: No deviations seen in posture held statically  Dynamic Sit:  NORMAL: No deviations seen in posture held dynamically  Static Stand: GOOD: Takes MODERATE challenges from all directions  Dynamic stand: GOOD: Needs SUPERVISION only during gait and able to self right with moderate LOB    Endurance deficit:  Limited due to recent surgery    Additional Treatment:  PT reviewed sitting and supine exercises with  patient including:  SLR, supine hip abduction/adduction, quad sets, LAQ, ankle pumps  Reiterated focus on knee extension, no pillow under knee  Increased CPM to 30 deg flexion    Patient left supine with all lines intact, call button in reach, bed alarm on and CPM resumed.    Assessment:  Boom Blanco is a 49 y.o. female with a medical diagnosis of <principal problem not specified>s/p L TKA. She presents with decreased functional mobility following surgery. Patient doing exceptionally well, demonstrating independence with bed mobility and transfers. Patient with good stability in stance and ambulation. Patient will benefit from skilled therapy throughout hospital course to maximize functional mobility and independence. Patient is an excellent candidate for outpatient PT upon discharge.  Assess stairs tomorrow.    Rehab potential is excellent.    Activity tolerance: Good    Plan: continue with current plan    Discharge recommendations: outpatient PT     Equipment recommendations: none    GOALS:   Multidisciplinary Problems     Physical Therapy Goals     Not on file                PLAN:    Patient to be seen (BID M-F, QD Sat/Sun) to address the above listed problems via gait training, therapeutic activities, therapeutic exercises, neuromuscular re-education  Plan of Care expires: (discharge)  Plan of Care reviewed with: patient          Claudia DEE Brian, PT 10/3/2019

## 2019-10-03 NOTE — SUBJECTIVE & OBJECTIVE
Past Medical History:   Diagnosis Date    Anxiety disorder     Arthritis     Bipolar disorder     Chronic pain     Hyperlipidemia     Hypertension     Lupus 2006    Psoriatic arthritis     Raynaud's disease        Past Surgical History:   Procedure Laterality Date    ARTHROSCOPIC CHONDROPLASTY OF KNEE JOINT Left 1/22/2019    Procedure: ARTHROSCOPY, KNEE, WITH CHONDROPLASTY;  Surgeon: Sylvain Gorman DO;  Location: [facility];  Service: Orthopedics;  Laterality: Left;    ARTHROSCOPY OF KNEE Left 1/22/2019    Procedure: ARTHROSCOPY, KNEE;  Surgeon: Sylvain Gorman DO;  Location: [facility];  Service: Orthopedics;  Laterality: Left;  Equipment: [employer] Chondral Drill Set and [employer] Meniscus Repair Set Truespan  Vendor/Rep: [employer]    ARTHROSCOPY OF KNEE Right 2/5/2019    Procedure: ARTHROSCOPY, KNEE;  Surgeon: Sylvain Gorman DO;  Location: [facility];  Service: Orthopedics;  Laterality: Right;  Equipment: [employer]k Truspar; Scope  Venor/Rep: [employer]    BACK SURGERY      spinal stimulator implanted and then removed    BREAST BIOPSY      COLONOSCOPY  11/25/2016    repeat in 5-10 years    COLONOSCOPY N/A 9/6/2019    Procedure: COLONOSCOPY;  Surgeon: Dany Hugo MD;  Location: [facility];  Service: General;  Laterality: N/A;    ENDOSCOPY  9/6/2019    Procedure: ENDOSCOPY;  Surgeon: Dany Hugo MD;  Location: [facility];  Service: General;;  with multiple biopsy's    ESOPHAGOGASTRODUODENOSCOPY Left 8/29/2018    Procedure: ESOPHAGOGASTRODUODENOSCOPY (EGD);  Surgeon: Dany Hugo MD;  Location: [facility];  Service: General;  Laterality: Left;    EXCISION OF MEDIAL MENISCUS OF KNEE Right 2/5/2019    Procedure: MENISCECTOMY, KNEE, MEDIAL;  Surgeon: Sylvain Gorman DO;  Location: [facility];  Service: Orthopedics;  Laterality: Right;    HYSTERECTOMY  1997    KNEE ARTHROSCOPY W/ DEBRIDEMENT Left 1/22/2019    Procedure: ARTHROSCOPY, KNEE, WITH DEBRIDEMENT;  Surgeon: Sylvain Gorman DO;  Location:  [facility] OR;  Service: Orthopedics;  Laterality: Left;    KNEE ARTHROSCOPY W/ MENISCECTOMY Left 1/22/2019    Procedure: ARTHROSCOPY, KNEE, WITH MENISCECTOMY;  Surgeon: Sylvain Gorman DO;  Location: [facility] OR;  Service: Orthopedics;  Laterality: Left;    KNEE ARTHROSCOPY W/ PLICA EXCISION Left 1/22/2019    Procedure: EXCISION, PLICA, KNEE, ARTHROSCOPIC;  Surgeon: Sylvain Gorman DO;  Location: [facility] OR;  Service: Orthopedics;  Laterality: Left;    SALPINGOOPHORECTOMY  1997    TOTAL KNEE ARTHROPLASTY Right 4/18/2019    Procedure: ARTHROPLASTY, KNEE, TOTAL;  Surgeon: Sylvain Gorman DO;  Location: [facility] OR;  Service: Orthopedics;  Laterality: Right;  Equipment: [employer] Total Knee System; [employer]cclellan  Vendor/Rep: [employer]  Table/Position: Supine  REQUIRES FIRST ASSISTANT EPHRAIM       Review of patient's allergies indicates:   Allergen Reactions    Remeron [mirtazapine] Other (See Comments)     Weight gain       Current Facility-Administered Medications on File Prior to Encounter   Medication    lactated ringers infusion    lactated ringers infusion    lidocaine (PF) 10 mg/ml (1%) injection 10 mg    morphine injection 2 mg    promethazine (PHENERGAN) 6.25 mg in dextrose 5 % 50 mL IVPB    [DISCONTINUED] ondansetron injection 4 mg     Current Outpatient Medications on File Prior to Encounter   Medication Sig    buPROPion (WELLBUTRIN XL) 150 MG TB24 tablet Take 150 mg by mouth every evening.    cariprazine (VRAYLAR) 1.5 mg Cap Take 1.5 mg by mouth every evening.    docusate sodium (COLACE) 100 MG capsule Take 1 capsule (100 mg total) by mouth 2 (two) times daily. Drink a 12 oz glass of water with each capsule.    levothyroxine (SYNTHROID) 50 MCG tablet Take 1 tablet (50 mcg total) by mouth before breakfast.    lisinopril (PRINIVIL,ZESTRIL) 20 MG tablet TAKE 1 TABLET BY MOUTH ONCE DAILY    lovastatin (MEVACOR) 20 MG tablet Take 1 tablet (20 mg total) by mouth every evening.    melatonin 5 mg Tab Take  5 mg by mouth.    oxyCODONE-acetaminophen (PERCOCET)  mg per tablet Take 1 tablet by mouth 2 (two) times daily.    pantoprazole (PROTONIX) 40 MG tablet Take 1 tablet (40 mg total) by mouth once daily. Take in the morning before breakfast.  Wait 30 minutes before eating or drinking anything    tiZANidine (ZANAFLEX) 4 MG tablet Take 4 mg by mouth every evening.     fluticasone (FLONASE) 50 mcg/actuation nasal spray 1 spray (50 mcg total) by Each Nare route 2 (two) times daily as needed for Rhinitis.    ibuprofen (ADVIL,MOTRIN) 800 MG tablet Take 800 mg by mouth once daily.      Family History     Problem Relation (Age of Onset)    Arthritis Mother, Sister, Brother, Sister, Sister    Heart disease Father    Lung disease Father    Pacemaker/defibrilator Mother        Tobacco Use    Smoking status: Former Smoker     Packs/day: 1.00     Years: 33.00     Pack years: 33.00     Types: Cigarettes    Smokeless tobacco: Never Used   Substance and Sexual Activity    Alcohol use: No     Frequency: Never    Drug use: Yes     Types: Cocaine    Sexual activity: Not Currently     Review of Systems   Constitutional: Negative for activity change, appetite change, fatigue and fever.   HENT: Negative for congestion, ear discharge, mouth sores, nosebleeds, rhinorrhea, sinus pressure, sinus pain and tinnitus.    Eyes: Negative.  Negative for pain, redness and itching.   Respiratory: Negative for apnea, cough, choking, chest tightness, shortness of breath, wheezing and stridor.    Cardiovascular: Negative for chest pain, palpitations and leg swelling.   Gastrointestinal: Negative for abdominal distention, abdominal pain, anal bleeding, blood in stool, constipation and diarrhea.   Endocrine: Negative.    Genitourinary: Negative for difficulty urinating, flank pain, frequency and urgency.   Musculoskeletal: Positive for arthralgias, back pain and joint swelling. Negative for gait problem and myalgias.   Skin: Negative for  color change and pallor.   Allergic/Immunologic: Negative.    Neurological: Negative for dizziness, facial asymmetry, weakness, light-headedness and headaches.   Hematological: Negative for adenopathy. Does not bruise/bleed easily.   Psychiatric/Behavioral: The patient is nervous/anxious.      Objective:     Vital Signs (Most Recent):  Temp: 96.5 °F (35.8 °C) (10/03/19 1112)  Pulse: 76 (10/03/19 1112)  Resp: 18 (10/03/19 1112)  BP: (!) 178/74 (10/03/19 1112)  SpO2: 98 % (10/03/19 1112) Vital Signs (24h Range):  Temp:  [96.5 °F (35.8 °C)-98.6 °F (37 °C)] 96.5 °F (35.8 °C)  Pulse:  [72-86] 76  Resp:  [9-23] 18  SpO2:  [95 %-100 %] 98 %  BP: (137-178)/() 178/74     Weight: 86.2 kg (190 lb)  Body mass index is 28.06 kg/m².    Physical Exam   Constitutional: She is oriented to person, place, and time. She appears well-developed and well-nourished.   HENT:   Head: Normocephalic and atraumatic.   Eyes: Pupils are equal, round, and reactive to light. EOM are normal.   Neck: Normal range of motion. Neck supple. No tracheal deviation present. No thyromegaly present.   Cardiovascular: Normal rate, regular rhythm and normal heart sounds.   Pulmonary/Chest: Effort normal and breath sounds normal.   Abdominal: Soft. Bowel sounds are normal. She exhibits no distension. There is no tenderness. There is no rebound and no guarding.   Musculoskeletal: Normal range of motion.   Lymphadenopathy:     She has no cervical adenopathy.   Neurological: She is alert and oriented to person, place, and time.   Skin: Skin is warm and dry. Capillary refill takes less than 2 seconds.   Psychiatric: She has a normal mood and affect. Her behavior is normal. Judgment and thought content normal.   Nursing note and vitals reviewed.        CRANIAL NERVES     CN III, IV, VI   Pupils are equal, round, and reactive to light.  Extraocular motions are normal.        Significant Labs:   Recent Lab Results       10/03/19  1209   10/03/19  0650         Albumin   4.2     Alkaline Phosphatase   50     ALT   27     Anion Gap   12     AST   25     Baso #   0.06     Basophil%   0.9     BILIRUBIN TOTAL   0.6  Comment:  For infants and newborns, interpretation of results should be based  on gestational age, weight and in agreement with clinical  observations.  Premature Infant recommended reference ranges:  Up to 24 hours.............<8.0 mg/dL  Up to 48 hours............<12.0 mg/dL  3-5 days..................<15.0 mg/dL  6-29 days.................<15.0 mg/dL       BUN, Bld   15     Calcium   9.3     Chloride   103     CO2   27     Creatinine   0.8     Differential Method   Automated     eGFR if    >60.0     eGFR if non    >60.0  Comment:  Calculation used to obtain the estimated glomerular filtration  rate (eGFR) is the CKD-EPI equation.        Eos #   0.1     Eosinophil%   1.1     Glucose   106     Gran # (ANC)   4.0     Gran%   56.8     Hematocrit 39.8 40.9     Hemoglobin 12.9 13.5     Immature Grans (Abs)   0.01  Comment:  Mild elevation in immature granulocytes is non specific and   can be seen in a variety of conditions including stress response,   acute inflammation, trauma and pregnancy. Correlation with other   laboratory and clinical findings is essential.       Immature Granulocytes   0.1     Coumadin Monitoring INR   1.0  Comment:  Coumadin Therapy:  2.0 - 3.0 for INR for all indicators except mechanical heart valves  and antiphospholipid syndromes which should use 2.5 - 3.5.       Lymph #   2.2     Lymph%   31.5     MCH   30.5     MCHC   33.0     MCV   92     Mono #   0.7     Mono%   9.6     MPV   10.2     nRBC   0     Platelets   177     Potassium   4.1     PROTEIN TOTAL   7.7     Protime   11.2     RBC   4.43     RDW   13.0     Sodium   142     WBC   6.99         All pertinent labs within the past 24 hours have been reviewed.    Significant Imaging: I have reviewed and interpreted all pertinent imaging results/findings  within the past 24 hours.

## 2019-10-03 NOTE — OP NOTE
[facility]  Orthopedic Surgery    10/3/2019    Boom Blanco  [id number]      PREOPERATIVE DIAGNOSIS: Arthritis of left knee [M17.12]    POSTOPERATIVE DIAGNOSIS:  Tricompartmental arthritis, left knee.    PROCEDURE:  Left total knee arthroplasty with Depuy Sigma CR size 4 porous-coated femoral component, cemented size 3 tibial component, cemented 35 patella button, and 8 mm tibial poly.    SURGEON: Sylvain Gorman D.O.    ASSISTANT:  Orton Grinnell, CFA.    ANESTHESIA:  General.    BLOOD LOSS:  Less than 50 cc.    TOURNIQUET:  47 min.    DRAINS:  None.    PATHOLOGY:  Bone cuts and synovium.    COMPLICATION:  None.    INDICATIONS FOR PROCEDURE:   Ms. Blanco is a 49-year-old female who has had recurrent pain in her left knee. She has arthroscopically proving grade 3 chondromalacia of her knee.  She stated approximately 2 months ago she began to have increased pain which is now inhibiting her ability to ambulate. She has psoriatic arthritis. Walking and extending her knee for extended time increases her symptoms while rest occasionally improves it. She has swelling and giving, but denied locking.. She has taken NSAIDs without help . She intermittently wears a brace which helps. She has done home exercises/physical therapy with minimal help. She can walk approximately 50 yd and climb 2-3 stairs.  She had a right total knee arthroplasty completed on 04/19/2019 which she has gotten good results.  She elected to proceed with surgery after she failed conservative management and complications to include bleeding, infection, scarring, nerve/blood vessel/tendon damage, need for further surgery, failed surgery, failure to improve, stiffness, skin slough, and possible amputation were discussed.  She   signed a consent.    PROCEDURE IN DETAIL:  The patient was brought to the operating room and was transferred to the operating room bed where all bony prominences were well padded. General anesthesia was  then administered by the Anesthesiology Department.  After general anesthesia was administered a tourniquet was applied to the upper part of the patient's left lower extremity.  The patient's left leg was then prepped with chlorhexidine solution and draped in the normal sterile fashion.  After prepping and draping bony and soft tissue landmarks were palpated and an anterior incision site was drawn the patient's knee.  The patient's leg was then elevated, exsanguinated, and the tourniquet was inflated.       Sharp incision was then made with a #10 blade followed by dissection to the level of the extensor mechanism. A medial parapatellar incision was then marked and made with a #10 blade. The patella was then everted and the knee was flexed presenting the distal femur. A Reamer was placed at the distal femur and advanced proximally.  The Reamer was removed and then a distal femoral cutting guide on a intramedullary ismael was placed in the femur and aligned at the distal femur.  It was then pinned in place and an Pranay wing was utilized to ensure appropriate cut. The intramedullary ismael was removed.  After appropriate cut was ensured an oscillating saw was utilized to make a distal femoral cut. The distal femoral cutting guide was then removed.  Attention was then placed on the distal femur where a distal femoral sizing guide was placed and the patient's femur was found to be approximately 4.  Pins were placed in the distal femoral sizing guide and then the distal femoral sizing guide was removed and a distal femoral osteotomy guide was placed. An Pranay wing was utilized to ensure that no notching of the anterior cortex would ensue.  The distal femoral osteotomy guide was then pinned in place with screw pins and the sizing guide pins were removed. An anterior cut, posterior cut, posterior chamfer cut, and anterior chamfer cut was then made. The screw pins were removed and the distal femoral cutting guide was removed. All  bone cuts were removed from the patient's knee.       Attention was then placed on the proximal tibia where an external cutting guide was placed and aligned after presenting the proximal tibia.  Once appropriate alignment was ensured taking 2 mm off the lowest point the proximal tibia cutting guide was pinned in place. An Pranay wing was then utilized to ensure appropriate slope.  After appropriate slope was ensured the Pranay wing was removed and a proximal tibia osteotomy was completed with an oscillating saw. A proximal tibia osteotomy guide was removed and distal femoral gap checkers checking the patient's knee in flexion and extension.  Good tensioning of the tissues was noted.  The proximal tibia was then re-presented and a proximal tibia tray lollipop was placed on the proximal tibia until an appropriate size was chosen.  Once an appropriate size lollipop was chosen a drop ismael was utilized to ensure appropriate rotation.  Once this was in appropriate position pins were placed followed by placement of a tower reaming guide. The proximal tibia was then reamed followed by placement of a tibial wedge.  The reaming guide was removed and a trial poly was placed followed by placement of a distal femoral trial component. The patient's knee was extended and put through motion good tensioning of the tissues was noted.  Attention was then placed on the patella where a caliper was utilized to ensure the thickness of the patella and then a patella osteotomy guide was placed on the patella leaving 15 mm.  An oscillating saw was utilized to make a patella osteotomy.  Patella lollipops were then placed until an appropriate size lollipop was chosen and drilled. A patella trial poly was then placed and the patient's knee was put through motion utilizing the no-touch technique and good tracking of the patella was noted. The patella trial poly was removed and the patient's knee was flexed wear distal femoral lug holes were then  drilled. All trials were then removed and the patient's knee was extensively irrigated with pulse lavage.       The proximal tibia was then presented and a proximal tibia tray was cemented into place. After cementing a proximal tibia in place a trial poly was placed and a final femoral component was placed and the patient's knee was extended.  A final patella poly was cemented in place and held in place with a compression device.  Thrombin-soaked sponges were then placed in the patient's knee and the cement was permitted to cure.  After the cement had cured the tourniquet was released and this thrombin-soaked sponges were removed and full hemostasis was ensured.       Progressive polys were then placed on the tibia until an appropriate sized poly was chosen and seated on the tibia tray. The patient's knee was again copiously irrigated and then the incisions were closed in layers with closure of the extensor mechanism with FiberWire suture followed by a layered closure with Vicryl suture and final plastic closure.       The patient was then dressed with Mastisol, Steri-Strips, and Primapore dressing. The patient was then awakened by anesthesia and was transferred from the operating room to the recovery room in stable condition.  The patient tolerated the procedure well without complication.

## 2019-10-03 NOTE — HPI
Patient is a 49-year-old female that presented this morning for orthopedic surgery to have aleft t knee replacement.  Patient underwent right knee replacement which was uncomplicated by Dr. Negron.  I have been asked to place this patient on our service for postoperative medical management and care.  Patient has a past medical history of rheumatoid arthritis anxiety, depression, bipolar disorder, hyperlipidemia, systemic lupus erythematosus, and Raynaud's disease.

## 2019-10-03 NOTE — H&P
[facility] Medicine  History & Physical    Patient Name: Boom Blanco  MRN: [id number]  Admission Date: 10/3/2019  Attending Physician: Sylvain Gorman DO   Primary Care Provider: Sofi Gallagher DO         Patient information was obtained from patient and ER records.     Subjective:     Principal Problem:<principal problem not specified>    Chief Complaint: No chief complaint on file.       HPI: Patient is a 49-year-old female that presented this morning for orthopedic surgery to have a right knee replacement.  Patient underwent right knee replacement which was uncomplicated by Dr. Negron.  I have been asked to place this patient on our service for postoperative medical management and care.  Patient has a past medical history of rheumatoid arthritis anxiety, depression, bipolar disorder, hyperlipidemia, systemic lupus erythematosus, and Raynaud's disease.    Past Medical History:   Diagnosis Date    Anxiety disorder     Arthritis     Bipolar disorder     Chronic pain     Hyperlipidemia     Hypertension     Lupus 2006    Psoriatic arthritis     Raynaud's disease        Past Surgical History:   Procedure Laterality Date    ARTHROSCOPIC CHONDROPLASTY OF KNEE JOINT Left 1/22/2019    Procedure: ARTHROSCOPY, KNEE, WITH CHONDROPLASTY;  Surgeon: Sylvain Goramn DO;  Location: [facility] OR;  Service: Orthopedics;  Laterality: Left;    ARTHROSCOPY OF KNEE Left 1/22/2019    Procedure: ARTHROSCOPY, KNEE;  Surgeon: Sylvain Gorman DO;  Location: [facility] OR;  Service: Orthopedics;  Laterality: Left;  Equipment: Emerson Chondral Drill Set and Mitek Meniscus Repair Set Truespan  Vendor/Rep: [employer]    ARTHROSCOPY OF KNEE Right 2/5/2019    Procedure: ARTHROSCOPY, KNEE;  Surgeon: Sylvain Gorman DO;  Location: [facility] OR;  Service: Orthopedics;  Laterality: Right;  Equipment: Mitek Truspar; Scope  Venor/Rep: Mitek    BACK SURGERY      spinal stimulator  implanted and then removed    BREAST BIOPSY      COLONOSCOPY  11/25/2016    repeat in 5-10 years    COLONOSCOPY N/A 9/6/2019    Procedure: COLONOSCOPY;  Surgeon: Dany Hugo MD;  Location: [facility] ENDO;  Service: General;  Laterality: N/A;    ENDOSCOPY  9/6/2019    Procedure: ENDOSCOPY;  Surgeon: Dany Hugo MD;  Location: [facility] ENDO;  Service: General;;  with multiple biopsy's    ESOPHAGOGASTRODUODENOSCOPY Left 8/29/2018    Procedure: ESOPHAGOGASTRODUODENOSCOPY (EGD);  Surgeon: Dany Hugo MD;  Location: [facility] ENDO;  Service: General;  Laterality: Left;    EXCISION OF MEDIAL MENISCUS OF KNEE Right 2/5/2019    Procedure: MENISCECTOMY, KNEE, MEDIAL;  Surgeon: Sylvain Gorman DO;  Location: [facility] OR;  Service: Orthopedics;  Laterality: Right;    HYSTERECTOMY  1997    KNEE ARTHROSCOPY W/ DEBRIDEMENT Left 1/22/2019    Procedure: ARTHROSCOPY, KNEE, WITH DEBRIDEMENT;  Surgeon: Sylvain Gorman DO;  Location: [facility] OR;  Service: Orthopedics;  Laterality: Left;    KNEE ARTHROSCOPY W/ MENISCECTOMY Left 1/22/2019    Procedure: ARTHROSCOPY, KNEE, WITH MENISCECTOMY;  Surgeon: Sylvain Gorman DO;  Location: [facility] OR;  Service: Orthopedics;  Laterality: Left;    KNEE ARTHROSCOPY W/ PLICA EXCISION Left 1/22/2019    Procedure: EXCISION, PLICA, KNEE, ARTHROSCOPIC;  Surgeon: Sylvain Gorman DO;  Location: [facility] OR;  Service: Orthopedics;  Laterality: Left;    SALPINGOOPHORECTOMY  1997    TOTAL KNEE ARTHROPLASTY Right 4/18/2019    Procedure: ARTHROPLASTY, KNEE, TOTAL;  Surgeon: Sylvain Gorman DO;  Location: [facility] OR;  Service: Orthopedics;  Laterality: Right;  Equipment: [employer]ix Sigma Total Knee System; Guillen Leg Mcclellan  Vendor/Rep: [employer]  Table/Position: Supine  REQUIRES FIRST ASSISTANT EPHRAIM       Review of patient's allergies indicates:   Allergen Reactions    Remeron [mirtazapine] Other (See Comments)     Weight gain       Current Facility-Administered Medications on File Prior to Encounter    Medication    lactated ringers infusion    lactated ringers infusion    lidocaine (PF) 10 mg/ml (1%) injection 10 mg    morphine injection 2 mg    promethazine (PHENERGAN) 6.25 mg in dextrose 5 % 50 mL IVPB    [DISCONTINUED] ondansetron injection 4 mg     Current Outpatient Medications on File Prior to Encounter   Medication Sig    buPROPion (WELLBUTRIN XL) 150 MG TB24 tablet Take 150 mg by mouth every evening.    cariprazine (VRAYLAR) 1.5 mg Cap Take 1.5 mg by mouth every evening.    docusate sodium (COLACE) 100 MG capsule Take 1 capsule (100 mg total) by mouth 2 (two) times daily. Drink a 12 oz glass of water with each capsule.    levothyroxine (SYNTHROID) 50 MCG tablet Take 1 tablet (50 mcg total) by mouth before breakfast.    lisinopril (PRINIVIL,ZESTRIL) 20 MG tablet TAKE 1 TABLET BY MOUTH ONCE DAILY    lovastatin (MEVACOR) 20 MG tablet Take 1 tablet (20 mg total) by mouth every evening.    melatonin 5 mg Tab Take 5 mg by mouth.    oxyCODONE-acetaminophen (PERCOCET)  mg per tablet Take 1 tablet by mouth 2 (two) times daily.    pantoprazole (PROTONIX) 40 MG tablet Take 1 tablet (40 mg total) by mouth once daily. Take in the morning before breakfast.  Wait 30 minutes before eating or drinking anything    tiZANidine (ZANAFLEX) 4 MG tablet Take 4 mg by mouth every evening.     fluticasone (FLONASE) 50 mcg/actuation nasal spray 1 spray (50 mcg total) by Each Nare route 2 (two) times daily as needed for Rhinitis.    ibuprofen (ADVIL,MOTRIN) 800 MG tablet Take 800 mg by mouth once daily.      Family History     Problem Relation (Age of Onset)    Arthritis Mother, Sister, Brother, Sister, Sister    Heart disease Father    Lung disease Father    Pacemaker/defibrilator Mother        Tobacco Use    Smoking status: Former Smoker     Packs/day: 1.00     Years: 33.00     Pack years: 33.00     Types: Cigarettes    Smokeless tobacco: Never Used   Substance and Sexual Activity    Alcohol use: No      Frequency: Never    Drug use: Yes     Types: Cocaine    Sexual activity: Not Currently     Review of Systems   Constitutional: Negative for activity change, appetite change, fatigue and fever.   HENT: Negative for congestion, ear discharge, mouth sores, nosebleeds, rhinorrhea, sinus pressure, sinus pain and tinnitus.    Eyes: Negative.  Negative for pain, redness and itching.   Respiratory: Negative for apnea, cough, choking, chest tightness, shortness of breath, wheezing and stridor.    Cardiovascular: Negative for chest pain, palpitations and leg swelling.   Gastrointestinal: Negative for abdominal distention, abdominal pain, anal bleeding, blood in stool, constipation and diarrhea.   Endocrine: Negative.    Genitourinary: Negative for difficulty urinating, flank pain, frequency and urgency.   Musculoskeletal: Positive for arthralgias, back pain and joint swelling. Negative for gait problem and myalgias.   Skin: Negative for color change and pallor.   Allergic/Immunologic: Negative.    Neurological: Negative for dizziness, facial asymmetry, weakness, light-headedness and headaches.   Hematological: Negative for adenopathy. Does not bruise/bleed easily.   Psychiatric/Behavioral: The patient is nervous/anxious.      Objective:     Vital Signs (Most Recent):  Temp: 96.5 °F (35.8 °C) (10/03/19 1112)  Pulse: 76 (10/03/19 1112)  Resp: 18 (10/03/19 1112)  BP: (!) 178/74 (10/03/19 1112)  SpO2: 98 % (10/03/19 1112) Vital Signs (24h Range):  Temp:  [96.5 °F (35.8 °C)-98.6 °F (37 °C)] 96.5 °F (35.8 °C)  Pulse:  [72-86] 76  Resp:  [9-23] 18  SpO2:  [95 %-100 %] 98 %  BP: (137-178)/() 178/74     Weight: 86.2 kg (190 lb)  Body mass index is 28.06 kg/m².    Physical Exam   Constitutional: She is oriented to person, place, and time. She appears well-developed and well-nourished.   HENT:   Head: Normocephalic and atraumatic.   Eyes: Pupils are equal, round, and reactive to light. EOM are normal.   Neck: Normal range of  motion. Neck supple. No tracheal deviation present. No thyromegaly present.   Cardiovascular: Normal rate, regular rhythm and normal heart sounds.   Pulmonary/Chest: Effort normal and breath sounds normal.   Abdominal: Soft. Bowel sounds are normal. She exhibits no distension. There is no tenderness. There is no rebound and no guarding.   Musculoskeletal: Normal range of motion.   Lymphadenopathy:     She has no cervical adenopathy.   Neurological: She is alert and oriented to person, place, and time.   Skin: Skin is warm and dry. Capillary refill takes less than 2 seconds.   Psychiatric: She has a normal mood and affect. Her behavior is normal. Judgment and thought content normal.   Nursing note and vitals reviewed.        CRANIAL NERVES     CN III, IV, VI   Pupils are equal, round, and reactive to light.  Extraocular motions are normal.        Significant Labs:   Recent Lab Results       10/03/19  1209   10/03/19  0650        Albumin   4.2     Alkaline Phosphatase   50     ALT   27     Anion Gap   12     AST   25     Baso #   0.06     Basophil%   0.9     BILIRUBIN TOTAL   0.6  Comment:  For infants and newborns, interpretation of results should be based  on gestational age, weight and in agreement with clinical  observations.  Premature Infant recommended reference ranges:  Up to 24 hours.............<8.0 mg/dL  Up to 48 hours............<12.0 mg/dL  3-5 days..................<15.0 mg/dL  6-29 days.................<15.0 mg/dL       BUN, Bld   15     Calcium   9.3     Chloride   103     CO2   27     Creatinine   0.8     Differential Method   Automated     eGFR if    >60.0     eGFR if non    >60.0  Comment:  Calculation used to obtain the estimated glomerular filtration  rate (eGFR) is the CKD-EPI equation.        Eos #   0.1     Eosinophil%   1.1     Glucose   106     Gran # (ANC)   4.0     Gran%   56.8     Hematocrit 39.8 40.9     Hemoglobin 12.9 13.5     Immature Grans (Abs)    0.01  Comment:  Mild elevation in immature granulocytes is non specific and   can be seen in a variety of conditions including stress response,   acute inflammation, trauma and pregnancy. Correlation with other   laboratory and clinical findings is essential.       Immature Granulocytes   0.1     Coumadin Monitoring INR   1.0  Comment:  Coumadin Therapy:  2.0 - 3.0 for INR for all indicators except mechanical heart valves  and antiphospholipid syndromes which should use 2.5 - 3.5.       Lymph #   2.2     Lymph%   31.5     MCH   30.5     MCHC   33.0     MCV   92     Mono #   0.7     Mono%   9.6     MPV   10.2     nRBC   0     Platelets   177     Potassium   4.1     PROTEIN TOTAL   7.7     Protime   11.2     RBC   4.43     RDW   13.0     Sodium   142     WBC   6.99         All pertinent labs within the past 24 hours have been reviewed.    Significant Imaging: I have reviewed and interpreted all pertinent imaging results/findings within the past 24 hours.    Assessment/Plan:     No notes have been filed under this hospital service.  Service: Hospital Medicine    VTE Risk Mitigation (From admission, onward)         Ordered     rivaroxaban tablet 10 mg  With dinner      10/03/19 1107     Place KAROLINA hose  Until discontinued      10/03/19 1107     Place sequential compression device  Until discontinued      10/03/19 1107                   Dallin Delarosa MD  Department of Hospital Medicine   [facility]

## 2019-10-04 ENCOUNTER — TELEPHONE (OUTPATIENT)
Dept: ORTHOPEDICS | Facility: CLINIC | Age: 49
End: 2019-10-04

## 2019-10-04 VITALS
RESPIRATION RATE: 16 BRPM | DIASTOLIC BLOOD PRESSURE: 57 MMHG | HEIGHT: 69 IN | WEIGHT: 190 LBS | HEART RATE: 117 BPM | OXYGEN SATURATION: 94 % | SYSTOLIC BLOOD PRESSURE: 118 MMHG | BODY MASS INDEX: 28.14 KG/M2 | TEMPERATURE: 99 F

## 2019-10-04 PROCEDURE — 63600175 PHARM REV CODE 636 W HCPCS: Performed by: INTERNAL MEDICINE

## 2019-10-04 PROCEDURE — 25000003 PHARM REV CODE 250: Performed by: INTERNAL MEDICINE

## 2019-10-04 PROCEDURE — 97116 GAIT TRAINING THERAPY: CPT

## 2019-10-04 PROCEDURE — 63600175 PHARM REV CODE 636 W HCPCS: Performed by: HOSPITALIST

## 2019-10-04 PROCEDURE — 97110 THERAPEUTIC EXERCISES: CPT

## 2019-10-04 PROCEDURE — G0378 HOSPITAL OBSERVATION PER HR: HCPCS

## 2019-10-04 RX ADMIN — PANTOPRAZOLE SODIUM 40 MG: 40 TABLET, DELAYED RELEASE ORAL at 09:10

## 2019-10-04 RX ADMIN — MORPHINE SULFATE 5 MG: 4 INJECTION, SOLUTION INTRAMUSCULAR; INTRAVENOUS at 03:10

## 2019-10-04 RX ADMIN — KETOROLAC TROMETHAMINE 15 MG: 30 INJECTION, SOLUTION INTRAMUSCULAR at 05:10

## 2019-10-04 RX ADMIN — OXYCODONE HYDROCHLORIDE AND ACETAMINOPHEN 1 TABLET: 10; 325 TABLET ORAL at 09:10

## 2019-10-04 RX ADMIN — MORPHINE SULFATE 5 MG: 4 INJECTION, SOLUTION INTRAMUSCULAR; INTRAVENOUS at 12:10

## 2019-10-04 RX ADMIN — DOCUSATE SODIUM 100 MG: 100 CAPSULE, LIQUID FILLED ORAL at 09:10

## 2019-10-04 RX ADMIN — CEFAZOLIN SODIUM 1 G: 1 SOLUTION INTRAVENOUS at 12:10

## 2019-10-04 RX ADMIN — CEFAZOLIN SODIUM 1 G: 1 SOLUTION INTRAVENOUS at 08:10

## 2019-10-04 RX ADMIN — LEVOTHYROXINE SODIUM 50 MCG: 25 TABLET ORAL at 05:10

## 2019-10-04 RX ADMIN — LISINOPRIL 20 MG: 10 TABLET ORAL at 09:10

## 2019-10-04 RX ADMIN — MORPHINE SULFATE 5 MG: 4 INJECTION, SOLUTION INTRAMUSCULAR; INTRAVENOUS at 08:10

## 2019-10-04 RX ADMIN — MORPHINE SULFATE 5 MG: 4 INJECTION, SOLUTION INTRAMUSCULAR; INTRAVENOUS at 06:10

## 2019-10-04 RX ADMIN — ACETAMINOPHEN 650 MG: 325 TABLET, FILM COATED ORAL at 12:10

## 2019-10-04 NOTE — PLAN OF CARE
Problem: Pain Acute  Goal: Optimal Pain Control  Outcome: Ongoing, Progressing      Problem: Adult Inpatient Plan of Care  Goal: Optimal Comfort and Wellbeing  Outcome: Ongoing, Progressing     Problem: Pain Acute  Goal: Optimal Pain Control  Outcome: Ongoing, Progressing

## 2019-10-04 NOTE — TELEPHONE ENCOUNTER
Called [facility] to ask them to fax Prior Authorization information to the office so the PA can be completed. Pharmacist notified and will fax office the information.

## 2019-10-04 NOTE — NURSING
PT BEING D/C PER MD ORDER, IV SITE REMOVED WITH CATH INTACT, GAUZE AND TAPE APPLIED, PT NIRAV WELL. AWAITING FOLLOW UP PER CASE MANAGEMENT FOR PT D/C.

## 2019-10-04 NOTE — PROGRESS NOTES
S:  Ms. Blanco was seen and examined.  She stated she is doing well and her pain is well controlled.  She has been ambulating with full weight-bearing in the hallway.  She had a left total knee arthroplasty completed yesterday.    O:  Incision clean dry and intact.  Neurovascularly intact.  Good active motion of the patient's knees.  Calf soft.  Homans negative.    A:  Left total knee arthroplasty, postop day one.    P:  1.  Dressing changed today to silver alginate dressing.  2.  Discontinue Smith.  3.  Continue with CPM.  4.  Continue with physical therapy.  5.  Continue with knee extension exercises.  6.  Now that silver alginate dressing is in place the patient may shower.  She understands she should not soak in a tub.  7.  Orthopedically stable for discharge home, will discharge today.

## 2019-10-04 NOTE — PLAN OF CARE
10/04/19 1055   Final Note   Assessment Type Final Discharge Note   Anticipated Discharge Disposition Home   What phone number can be called within the next 1-3 days to see how you are doing after discharge? [phone]   Hospital Follow Up  Appt(s) scheduled? Yes   Discharge plans and expectations educations in teach back method with documentation complete? Yes   Verbal & written follow up appointments for Dr Gorman & PT provided to patient. States she will need a copy of her PT order; rehab will give them to her when she sees them on Tuesday. Demonstrated understanding by verbal feedback. Denies any other discharge needs at this time. Waiting for  to come get her.

## 2019-10-04 NOTE — DISCHARGE INSTRUCTIONS
Do not remove dressing.  May shower starting today. Do not soak in a tub.  Keep surgical extremity elevated  Ice to affected area  Ambulate with walker with weight-bearing at tolerance left lower extremity.   Notify your health care provider if you experience any of the following: temperature >100.4  Do not remove dressing      1.  Elevate and ice left knee.   2.  Do extension exercises as discussed.  Do not place pillows under left knee. Placed 2 pillows under left heel when seated or in bed.   3.  Use CPM, set 0-70 degrees, medium speed, increase by 10° each day to 100°, hold at 100°.  Use 2 to 3 times a day for 2-3 hours each session.   4.  Do not remove dressing.   5.  May shower do not soak in a tub.   6.  Ambulate with walker with weight-bearing at tolerance left knee.

## 2019-10-04 NOTE — TELEPHONE ENCOUNTER
----- Message from Alex Handley sent at 10/4/2019 12:46 PM CDT -----  Contact: self   Patient need a vanna authorization on xelralto please call back at [facility], any questions please call back at [phone] (home)     [facility] - [place], [place] - [address]  [address]  [facility] [place] [address]  Phone: [phone] Fax: [phone]

## 2019-10-04 NOTE — TELEPHONE ENCOUNTER
Called patient to see how she is doing after surgery with Dr. Gorman and to verify her post op appointment.     Patient voiced that she is doing well and has no questions at this time. Post op appointment verified. Encouraged patient to call office if she has any questions or concerns.

## 2019-10-04 NOTE — PT/OT/SLP PROGRESS
Physical Therapy         Treatment        Boom Blanco   MRN: [id number]     PT Received On: 10/04/19           Billable Minutes:  Gait [id number] and Therapeutic Exercise 8  Total Minutes: 23    Treatment Type: Treatment(evaluation)  PT/PTA: PT             General Precautions: Standard,    Orthopedic Precautions: Orthopedic Precautions : LLE weight bearing as tolerated   Braces:           Subjective:  Communicated with patient prior to session. Pt agreeable to PT treat. Patient states she's not been out of the bed at all. Says she thought she would at least get to sit in a chair yesterday, but didn't.          Objective:  Patient found supine in bed with CPM on, alone in room, with  noel, IV, and polar pack on L knee.     Functional Mobility:  Bed Mobility:   Supine to sit: Independent   Sit to supine: Independent   Rolling: Independent   Scooting: Independent    Balance:   Static Sit: NORMAL: No deviations seen in posture held statically  Dynamic Sit:  NORMAL: No deviations seen in posture held dynamically  Static Stand: GOOD: Takes MODERATE challenges from all directions  Dynamic stand: GOOD: Needs SUPERVISION only during gait and able to self right with moderate LOB    Transfer Training:  Sit to stand:Independent with No Assistive Device improved weight bearing LLE  Bed <> Chair:  Step Transfer with Supervision or Set-up Assistance with Rolling Walker no cuing needed, no loss of balance    Gait Training:  Patient gait trained FWB/WBAT: left lower extremity 150  feet on level tile with Rolling Walker with Supervision or Set-up Assistance.  Pt with demonstarting a  reciprocal, step through with decreased stride length, decreased swing-to-stance ratio and decreased toe-to-floor clearance.Impairments contributing to gait deviations include pain, decreased ROM and decreased strength    Stair Training:  Reviewed coordination of movements on stairs.      Additional Treatment:  Reviewed there-ex.    Activity  Tolerance:  Patient tolerated treatment well    Patient left up in chair with all lines intact, call button in reach and L LE elevated.    Assessment:  Boom Blanco is a 49 y.o. female with a medical diagnosis of Total knee replacement status. She presents with improving functional mobility and tolerance to activity. Patient continues to demonstrate decreased weight bearing on LLE. Patient with good stability in gait and with transfers.     Rehab potential is excellent.    Activity tolerance: Good    Discharge recommendations: Discharge Facility/Level of Care Needs: outpatient PT     Equipment recommendations: Equipment Needed After Discharge: none     GOALS:   Multidisciplinary Problems     Physical Therapy Goals        Problem: Physical Therapy Goal    Goal Priority Disciplines Outcome Goal Variances Interventions   Physical Therapy Goal     PT, PT/OT      Description:  Goals to be met by: discharge     Patient will increase functional independence with mobility by performin. Bed to chair transfer with Hamilton using Rolling Walker  2. Gait  x 150 feet with Supervision using Rolling Walker.                       PLAN:    Patient to be seen (BID M-F, QD Sat/Sun)  to address the above listed problems via gait training, therapeutic activities, therapeutic exercises, neuromuscular re-education  Plan of Care expires: (discharge)  Plan of Care reviewed with: patient         Claudia Torres, PT 10/4/2019

## 2019-10-04 NOTE — TELEPHONE ENCOUNTER
Faxed Prior Authorization approval for Xarelto 10mg to [facility].    Patient notified.     Prior Authorization #: [id number]  Medicaid ID #: [id number]  Date of Approval: 10/04/2019  Drug Name: Xarelto 10 mg Tablet  Tacking #: [id number]  Quantity/Days Supply Approved: 12.0/12

## 2019-10-04 NOTE — NURSING
PT D/C PER MD ORDER, PT W/C OUT TO POV WITH NURSE AT SIDE. PT TOOK ICE MACHINE HOME. CPM MACHINE LEFT IN ROOM.

## 2019-10-04 NOTE — PLAN OF CARE
Problem: Adult Inpatient Plan of Care  Goal: Plan of Care Review  Outcome: Ongoing, Progressing  Flowsheets (Taken 10/4/2019 0510)  Plan of Care Reviewed With: patient  Goal: Optimal Comfort and Wellbeing  Outcome: Ongoing, Progressing  Intervention: Provide Person-Centered Care  Flowsheets (Taken 10/4/2019 0510)  Trust Relationship/Rapport: care explained; choices provided; emotional support provided; empathic listening provided; questions answered; thoughts/feelings acknowledged; reassurance provided; questions encouraged

## 2019-10-04 NOTE — HOSPITAL COURSE
Ms. Blanco was admitted through same-day surgery on 10/03/2019 where she underwent a left total knee arthroplasty.  For full account of surgery please see the operative report.  Postoperatively she was placed on the floor and admitted to the hospitalist service where immediate postoperative DVT prophylaxis was instituted in the form of early motion, SCDs, Jaxon hose, and Xarelto therapy.  The patient was also placed in a CPM.  Physical therapy was consulted who ambulated with the patient with a walker with weight-bearing at tolerance.  Before discharge she was able to ambulate greater than 100 ft.  She was also instructed on straight leg raises and knee extension exercises.   was consulted to help arrange home health and home PT.  The H&H is were followed and the patient remained stable. She remained stable throughout her stay and was discharged on her 1st post admission day with instructions to follow up in 10-12 days.

## 2019-10-08 ENCOUNTER — CLINICAL SUPPORT (OUTPATIENT)
Dept: REHABILITATION | Facility: HOSPITAL | Age: 49
End: 2019-10-08
Attending: ORTHOPAEDIC SURGERY
Payer: MEDICAID

## 2019-10-08 DIAGNOSIS — M25.662 JOINT STIFFNESS OF KNEE, LEFT: ICD-10-CM

## 2019-10-08 DIAGNOSIS — Z96.652 STATUS POST TOTAL LEFT KNEE REPLACEMENT: Primary | ICD-10-CM

## 2019-10-08 DIAGNOSIS — R26.9 ABNORMALITY OF GAIT AND MOBILITY: ICD-10-CM

## 2019-10-08 PROCEDURE — 97110 THERAPEUTIC EXERCISES: CPT

## 2019-10-08 PROCEDURE — 97161 PT EVAL LOW COMPLEX 20 MIN: CPT

## 2019-10-08 PROCEDURE — 97140 MANUAL THERAPY 1/> REGIONS: CPT

## 2019-10-08 PROCEDURE — 97116 GAIT TRAINING THERAPY: CPT

## 2019-10-08 NOTE — PLAN OF CARE
Physical Therapy Evaluation/Plan of Care    Name: Boom Blanco 1970  Clinic Number: [id number]    Diagnosis:   Encounter Diagnoses   Name Primary?    Joint stiffness of knee, left     Abnormality of gait and mobility     Status post total left knee replacement Yes     Physician: Sylvain Gorman DO  Treatment Orders: PT Eval and Treat    Past Medical History:   Diagnosis Date    Anxiety disorder     Arthritis     Bipolar disorder     Chronic pain     Hyperlipidemia     Hypertension     Lupus 2006    Psoriatic arthritis     Raynaud's disease      Current Outpatient Medications   Medication Sig    buPROPion (WELLBUTRIN XL) 150 MG TB24 tablet Take 150 mg by mouth every evening.    cariprazine (VRAYLAR) 1.5 mg Cap Take 1.5 mg by mouth every evening.    docusate sodium (COLACE) 100 MG capsule Take 1 capsule (100 mg total) by mouth 2 (two) times daily. Drink a 12 oz glass of water with each capsule.    fluticasone (FLONASE) 50 mcg/actuation nasal spray 1 spray (50 mcg total) by Each Nare route 2 (two) times daily as needed for Rhinitis.    ibuprofen (ADVIL,MOTRIN) 800 MG tablet Take 800 mg by mouth once daily.     levothyroxine (SYNTHROID) 50 MCG tablet Take 1 tablet (50 mcg total) by mouth before breakfast.    lisinopril (PRINIVIL,ZESTRIL) 20 MG tablet TAKE 1 TABLET BY MOUTH ONCE DAILY    lovastatin (MEVACOR) 20 MG tablet Take 1 tablet (20 mg total) by mouth every evening.    melatonin 5 mg Tab Take 5 mg by mouth.    oxyCODONE-acetaminophen (PERCOCET)  mg per tablet Take 1 tablet by mouth 2 (two) times daily.    pantoprazole (PROTONIX) 40 MG tablet Take 1 tablet (40 mg total) by mouth once daily. Take in the morning before breakfast.  Wait 30 minutes before eating or drinking anything    ranitidine (ZANTAC) 150 MG tablet Take 150 mg by mouth 2 (two) times daily.    tiZANidine (ZANAFLEX) 4 MG tablet Take 4 mg by mouth every evening.      No current facility-administered  medications for this visit.      Facility-Administered Medications Ordered in Other Visits   Medication    lactated ringers infusion    lactated ringers infusion    lidocaine (PF) 10 mg/ml (1%) injection 10 mg    morphine injection 2 mg    promethazine (PHENERGAN) 6.25 mg in dextrose 5 % 50 mL IVPB     Review of patient's allergies indicates:   Allergen Reactions    Remeron [mirtazapine] Other (See Comments)     Weight gain     Precautions: per TKA protocol    Evaluation Date: 10/8/19  Time In: 10:00  Time Out: 11:00  Total Time: 54 min  Visit # authorized: TBD  Authorization period: TBD  Plan of care Expiration: 1/3/20    Subjective     Onset Date: 10/4/19  Prior Level of Function: independent with ADL's and IADL's, bilateral knee pain with mobility  Current level of Function: patient requires assistance for ADL's at this time and ambulates limited distances using RW, pain and swelling limiting functional mobility   Social History: patient lives with her spouse in a single level home with 18 steps with bilateral handrails to enter  Med History: right TKA 4/18/19, see additional medical history above  Occupation: n/a    History of Present Illness: Boom is a 49 y.o. female that presents to [facility] secondary to chronic OA in left knee. Boom had left TKA performed on 10/3/19 and presents to OPPT for post op rehab. Of note patient had her right knee replaced on 4/18/19 which was followed by an uncomplicated and successful course of OP physical therapy at this facility.     Imaging: X-ray taken and revealed 1. Status post total knee arthroplasty in normal alignment.  2. Small suprapatellar effusion..    Pain: current 9/10, worst 10/10, best 7/10, Aching, Burning and Grabbing, constant, left knee  Radicular symptoms: none  Aggravating factors: activity and WB  Easing factors: ice and pain medication    Pts goals: decrease pain and increase ROM and strength of LLE for return to independent  mobility without AD     Onset/LUCY: gradual and progressive  Primary concern/ Chief complaints: left knee pain and swelling    Objective     Observation: Patient is a well developed, well nourished female ambulating into clinic using RW and demonstrating antalgic gait pattern. There is moderate edema and bruising noted left knee posteriorly and medially.    Posture: standing posture with decreased WB through LLE, decreased active knee ROM, and forward flexed trunk, slouched sitting posture    Range of Motion:   Knee Left active Left Passive Right Active R passive   Flexion 80 degrees 90 degrees 115 degree 120 degrees   Extension -10 degrees -5 degrees 0 degrees 0 degrees     Lower Extremity Strength  Right LE  Left LE    Knee extension: 5/5 Knee extension: 2-/5   Knee flexion: 4+/5 Knee flexion: 2-/5   Hip flexion: 4+/5 Hip flexion: 2/5   Hip extension:  4/5 Hip extension: 3/5   Hip abduction: 4+/5 Hip abduction: 3-/5   Hip adduction: 5/5 Hip adduction 3/5   Ankle dorsiflexion: 5/5 Ankle dorsiflexion: 5/5   Ankle plantarflexion: 5/5 Ankle plantarflexion: 5/5     Special Tests: not performed secondary to surgical procedure/precautions    Joint Mobility: Patellar restricted, due to edema  Tibiofemoral restricted, due to edema    Palpation: global ttp left knee secondary to surgical procedure and edema     Sensation: gross sensation intact    Flexibility:  90/90 SLR = R minimal restriction, L moderate restriction     Edema:   Girth Measurement Joint line 5 cm below 10 cm above   Left 49 cm 43 cm 53 cm     Functional Limitations Reports -   Category: mobility  Tool: LEFS  Score: 97.5% LE impairment    TREATMENT:  Boom received therapeutic exercises to develop strength and endurance, flexibility for 24 minutes including:  Nustep Lv 0 for ROM and warm up x 5 min  Quad sets in sl flex x 10  Quad sets with towel roll under heel x 10  Assisted HS's on SB with strap x 10  Assisted SLR with focus on eccentric hold and descent  "3 x 5  TKE's x 10  Seated LAQ's x 5  Standing L hip abd x 10  Standing L hip ext x 10 (2" green foam disc under RLE)     Boom  received manual therapy x 8 min including: passive HS stretch, AAROM left knee into flex and ext,       Gait training x 8 mins with RW 2 x 150 feet, with focus on erect posture, increased weight bearing on LLE, step through, continuous gait pattern.    Home Exercises and Patient Education Provided    Education provided re: use of cryotherapy as needed for pain and swelling  - progress towards goals   - role of therapy in multi - disciplinary team, goals for therapy  Pt educated on condition, POC, and expectations in therapy.  No spiritual or educational barriers to learning provided    Home exercises: phase 1 TKA exercises  Pt will be provided HEP during course of treatment with progressions as appropriate. Pt was advised to perform these exercises free of pain, and to stop performing them if pain occurs.   Boom demonstrated good  understanding of the education provided.     PT Evaluation Completed: Yes  Discussed Plan of Care with patient: Yes    Assessment   This is a 49 y.o. female referred to outpatient physical therapy and presents with a medical diagnosis of s/p left TKA and demonstrates limitations as described in the problem list. Pt will benefit from physcial therapy services in order to maximize pain free and/or functional use of left knee. The following goals were discussed with the patient and patient is in agreement with them as to be addressed in the treatment plan.   Pt prognosis is Good.   Pt will benefit from skilled outpatient Physical Therapy to address the deficits stated above and in the chart below, provide pt/family education, and to maximize pt's level of independence.     Anticipated barriers to physical therapy: none identified    Medical necessity is demonstrated by the following IMPAIRMENTS/PROBLEM LIST:  weakness, impaired functional mobility, gait " instability, decreased lower extremity function, pain, decreased ROM, impaired joint extensibility and impaired muscle length     GOALS:      Long Term Goals: 6 weeks  Pain: Decrease pain to 2/10 to allow for improved weight bearing ability on LLE  Strength: Improve strength in left Hip-> knee to 5/5 for improved LE stability  ROM: Improve AROM to 0-120 in L knee  Functional scale: Improve score on LEFS to 24% impairment  Stairs: Ascend/descend flight of steps with reciprocal pattern, minimal use of handrails, and minimal compensation  Walking: Increase walking distance to 1 mile without AD with normalized gait pattern on various indoor and outdoor surfaces  Postures: Increase standing duration to 45 minutes   Transfers: Perform car and bed transfers without limitation  Exercise: demonstrate independence with home exercise program to maintain gains made in therapy.      Plan   Pt will be treated by physical therapy 2 times a week for 6-12 weeks for Pt Education, HEP, therapeutic exercises, neuromuscular re-education, joint mobilizations, modalities prn to achieve established goals. Boom may at times be seen by a PTA as part of the Rehab Team.     Cont PT for 12 weeks.     Deep Westbrook, PT    I certify the need for these services furnished under this plan of treatment and while under my care.______________________________ Physician/Referring Practitioner  Date of Signature

## 2019-10-08 NOTE — PROGRESS NOTES
See Initial Evaluation in the POC section            PT met face to face with Alf Handley PTA to discuss patient's treatment plan and progress towards established goals.  Treatment will be continued as described in initial report/eval and progress notes.  Patient will be seen by physical therapist every sixth visit and minimally once per month.    Additional information: R TKA in April 2019

## 2019-10-09 DIAGNOSIS — M25.562 LEFT KNEE PAIN, UNSPECIFIED CHRONICITY: Primary | ICD-10-CM

## 2019-10-10 ENCOUNTER — CLINICAL SUPPORT (OUTPATIENT)
Dept: REHABILITATION | Facility: HOSPITAL | Age: 49
End: 2019-10-10
Attending: ORTHOPAEDIC SURGERY
Payer: MEDICAID

## 2019-10-10 DIAGNOSIS — M25.662 JOINT STIFFNESS OF KNEE, LEFT: ICD-10-CM

## 2019-10-10 DIAGNOSIS — R26.9 ABNORMALITY OF GAIT AND MOBILITY: ICD-10-CM

## 2019-10-10 PROCEDURE — 97110 THERAPEUTIC EXERCISES: CPT

## 2019-10-10 PROCEDURE — 97140 MANUAL THERAPY 1/> REGIONS: CPT

## 2019-10-10 NOTE — PROGRESS NOTES
"                            Physical Therapy Daily Treatment Note   Name: Boom Blanco 1970  MRN: [id number]    Visit Date: 10/10/2019  Visit #: 2 / 24  Authorization period Expiration: 1/3/20   Plan of Care Expiration: 1/3/20  Precautions: standard per TKA protocol    Time In: 10:00  Time Out: 11:00  Total 1:1 Treatment Time: 50 min    Treatment Diagnosis:   Encounter Diagnoses   Name Primary?    Joint stiffness of knee, left     Abnormality of gait and mobility      Physician: Sylvain Gorman DO    Subjective   Pt reports: she took a "half of a half of a half of a pain pill" this morning prior to coming to PT     Pain Scale:  7/10 on VAS currently  Pain Location: left knee35    Objective   Boom received therapeutic exercises to develop strength, endurance, ROM, flexibility and posture for 38 minutes including:    Nustep Lv 0 for ROM and warm up x 10 min  Quad sets in sl flex with 5 sec hold 2 x 10  Quad sets with towel roll under heel with 5 sec hold 2 x 10  Assisted HS's on SB with strap  x 10  SLR with focus on eccentric hold and descent 2 x 10  TKE's  2 x 10  Seated LAQ's 2 x 10  Standing L hip flex/abd/ext x 10 (2" green foam disc under RLE)   UBE with 2 bars resistance 3/3 min    Boom received the following manual therapy techniques: Joint mobilizations, Manual traction and PROM into flex and ext were applied to the: left knee for 12  minutes.     Home Exercises and Education Provided     Education provided re: use of cryotherapy as needed for pain and swelling, importance of achieving terminal knee extension as soon as possible to prevent contractures and restore normal knee biomechanics during walking and standing  - progress towards goals   - role of therapy in multi - disciplinary team, goals for therapy  Pt educated on condition, POC, and expectations in therapy.  No spiritual or educational barriers to learning provided    Home exercises: phase 1 TKA exercises  Pt will be provided " HEP during course of treatment with progressions as appropriate. Pt was advised to perform these exercises free of pain, and to stop performing them if pain occurs.   Boom demonstrated good  understanding of the education provided.     Assessment   Boom completed treatment without complication with fatigue reported at completion of session. She was able to progress with exercise reps as noted above and required no PT assist to perform SLR today. Patient requires verbal cueing during gait for erect posture, increased left hip and knee flexion, and step through gait pattern.     Pt prognosis is Good. Pt will continue to benefit from skilled outpatient physical therapy to address the deficits listed in the problem list chart on initial evaluation, provide pt/family education and to maximize pt's level of independence in the home and community environment.     Medical necessity is demonstrated by the impairments and functional limitations listed on the Initial Evaluation.     Anticipated barriers to physical therapy: none identified  Pt's spiritual, cultural and educational needs considered and pt agreeable to plan of care and goals.    Plan   Continue with established Plan of Care towards Physical Therapy goals.   Discussed Plan of Care with patient: Yes    Deep Westbrook, PT  10/10/2019

## 2019-10-14 ENCOUNTER — HOSPITAL ENCOUNTER (OUTPATIENT)
Dept: RADIOLOGY | Facility: HOSPITAL | Age: 49
Discharge: HOME | End: 2019-10-14
Attending: ORTHOPAEDIC SURGERY
Payer: MEDICAID

## 2019-10-14 ENCOUNTER — OFFICE VISIT (OUTPATIENT)
Dept: ORTHOPEDICS | Facility: CLINIC | Age: 49
End: 2019-10-14
Payer: MEDICAID

## 2019-10-14 VITALS
DIASTOLIC BLOOD PRESSURE: 60 MMHG | HEIGHT: 69 IN | BODY MASS INDEX: 28.15 KG/M2 | SYSTOLIC BLOOD PRESSURE: 107 MMHG | RESPIRATION RATE: 18 BRPM | HEART RATE: 100 BPM | WEIGHT: 190.06 LBS

## 2019-10-14 DIAGNOSIS — M25.562 LEFT KNEE PAIN, UNSPECIFIED CHRONICITY: ICD-10-CM

## 2019-10-14 DIAGNOSIS — Z51.89 AFTER CARE: Primary | ICD-10-CM

## 2019-10-14 PROCEDURE — 73562 X-RAY EXAM OF KNEE 3: CPT | Mod: 26,LT,, | Performed by: RADIOLOGY

## 2019-10-14 PROCEDURE — 73562 X-RAY EXAM OF KNEE 3: CPT | Mod: TC,FY,LT

## 2019-10-14 PROCEDURE — 99024 POSTOP FOLLOW-UP VISIT: CPT | Mod: ,,, | Performed by: ORTHOPAEDIC SURGERY

## 2019-10-14 PROCEDURE — 99213 OFFICE O/P EST LOW 20 MIN: CPT | Mod: PBBFAC,25 | Performed by: ORTHOPAEDIC SURGERY

## 2019-10-14 PROCEDURE — 99999 PR PBB SHADOW E&M-EST. PATIENT-LVL III: ICD-10-PCS | Mod: PBBFAC,,, | Performed by: ORTHOPAEDIC SURGERY

## 2019-10-14 PROCEDURE — 99999 PR PBB SHADOW E&M-EST. PATIENT-LVL III: CPT | Mod: PBBFAC,,, | Performed by: ORTHOPAEDIC SURGERY

## 2019-10-14 PROCEDURE — 73562 XR KNEE 3 VIEW LEFT: ICD-10-PCS | Mod: 26,LT,, | Performed by: RADIOLOGY

## 2019-10-14 PROCEDURE — 99024 PR POST-OP FOLLOW-UP VISIT: ICD-10-PCS | Mod: ,,, | Performed by: ORTHOPAEDIC SURGERY

## 2019-10-14 RX ORDER — CEPHALEXIN 500 MG/1
CAPSULE ORAL
Refills: 0 | COMMUNITY
Start: 2019-10-04 | End: 2019-11-04

## 2019-10-14 NOTE — PROGRESS NOTES
Subjective:      Patient ID: Boom Blanco is a 49 y.o. female.    Chief Complaint: Post-op Evaluation of the Left Knee      HPI: Ms. Blanco returned today for 1st postop visit on a left total knee arthroplasty. She stated she is doing well and has attended 2 sessions of outpatient physical therapy.  She stated that it is a little harder to get around after this knee than the 1st one but she is doing well. She has been doing her home exercises to include knee extension exercises. Her date of surgery 10/03/2019.    ROS:  New diagnosis/surgery/prescriptions since last office visit on 09/09/2019:  Left total knee arthroplasty.      Objective:      Physical Exam:   General: AAOx3.  No acute distress  Vascular:  Pulses intact and equal bilaterally.  Capillary refill less than 3 seconds and equal bilaterally  Neurologic:  Pinprick and soft touch intact and equal bilaterally  Integment:  No ecchymosis, no errythema.  Incision well approximated.  No erythema.  Mild dependent ecchymosis.  Extremity:  Knee:  Extension/flexion near 0/95 degrees. Relatively no effusion. Relatively nontender with knee motion.  Good good stability with stressing.  Calves soft.  Homans negative.  Radiography:  Personally reviewed x-ray of the left knee completed on 10/14/2019 shows a well-seated well-aligned left total knee arthroplasty without signs of loosening.      Assessment:       Impression:  Left total knee arthroplasty.      Plan:       1.  Discussed physical examination and radiographic findings with the patient. Boom understands that she has a new left total knee arthroplasty which is well-seated well-aligned.  2.  Home exercises were stressed with the patient which include knee extension exercises.  3.  Continue with physical therapy.  4.  Any pain can be controlled with over-the-counter medications dosed per box instructions.  Offered the patient narcotic pain medication she declined for now.  5.  Continue with  straight leg raises.  6.  Continue to ambulate with weight-bearing at tolerance.  7.  [facility] portal was discussed with the patient and information was given.  The patient was encouraged to use the portal for future encounters.  8.  Follow up in 1 month with x-ray of the left knee.

## 2019-10-15 ENCOUNTER — CLINICAL SUPPORT (OUTPATIENT)
Dept: REHABILITATION | Facility: HOSPITAL | Age: 49
End: 2019-10-15
Attending: ORTHOPAEDIC SURGERY
Payer: MEDICAID

## 2019-10-15 DIAGNOSIS — M25.662 JOINT STIFFNESS OF KNEE, LEFT: ICD-10-CM

## 2019-10-15 DIAGNOSIS — R26.9 ABNORMALITY OF GAIT AND MOBILITY: ICD-10-CM

## 2019-10-15 PROCEDURE — 97116 GAIT TRAINING THERAPY: CPT

## 2019-10-15 PROCEDURE — 97110 THERAPEUTIC EXERCISES: CPT

## 2019-10-15 PROCEDURE — 97140 MANUAL THERAPY 1/> REGIONS: CPT

## 2019-10-15 NOTE — PROGRESS NOTES
"                            Physical Therapy Daily Treatment Note   Name: Boom Blanco 1970  MRN: [id number]    Visit Date: 10/15/2019  Visit #: 3 / 24  Authorization period Expiration: 1/3/20   Plan of Care Expiration: 1/3/20  Precautions: standard per TKA protocol    Time In: 9:55  Time Out: 11:00  Total 1:1 Treatment Time: 55 min    Treatment Diagnosis:   Encounter Diagnoses   Name Primary?    Joint stiffness of knee, left     Abnormality of gait and mobility      Physician: Sylvain Gorman,     Subjective   Pt reports: She took a "half of a half of a half of a pain pill" again this morning prior to coming to PT and was told by her pain management provider to take another half after PT. She also reports her first post op visit with Dr Gorman yesterday went well.     Pain Scale:  7/10 on VAS currently  Pain Location: left knee    Objective   Boom received therapeutic exercises to develop strength, endurance, ROM, flexibility and posture for 35 minutes including:    Nustep Lv 0 x 16 min  Quad sets in sl flex with 5 sec hold 2 x 10  Quad sets with towel roll under heel with 5 sec hold 2 x 10  Assisted HS's on SB with strap 5" hold 2 x 10  SLR with focus on eccentric hold and descent 2 x 10  TKE's with 5" hold 2 x 10  HS over bolster with 5" hold 2 x 10  Seated LAQ's 2 x 10  Standing L hip flex/abd/ext 1 x 10, 1 x 5 (4" step)   UBE with 2 bars resistance 3/3 min DNP    Boom received the following manual therapy techniques: Joint mobilizations, Manual traction, passive HS stretch, and PROM into flex and ext were applied to the left knee for 12 minutes.     Boom received Gait training x 8 min using std cane on right functional distances in clinic with focus on upright posture, increase left step height and length, and heel-toe gait pattern.     A/PROM:  -7/-4 degrees ext, 90/95 degrees flex    Home Exercises and Education Provided     Education provided re: use of cryotherapy as needed for " pain and swelling, importance of achieving terminal knee extension as soon as possible to prevent contractures and restore normal knee biomechanics during walking and standing  - progress towards goals   - role of therapy in multi - disciplinary team, goals for therapy  Pt educated on condition, POC, and expectations in therapy.  No spiritual or educational barriers to learning provided    Home exercises: phase 1 TKA exercises  Pt will be provided HEP during course of treatment with progressions as appropriate. Pt was advised to perform these exercises free of pain, and to stop performing them if pain occurs.   Boom demonstrated good  understanding of the education provided.     Assessment   Boom completed treatment without complication or increase in pain level reported at departure. She continues to report fatigue however is improving. Incision is healing well with min scabbing present and no drainage noted, post op bruising extending distally and is resolving. Quad strength improving as she is able to independently perform left SLR, ROM improving as noted above. She was advised to continue using RW in community environment and may start using std cane in home environment. Boom is progressing well towards her goals with no update to goals at this time.     Pt prognosis is Good. Pt will continue to benefit from skilled outpatient physical therapy to address the deficits listed in the problem list chart on initial evaluation, provide pt/family education and to maximize pt's level of independence in the home and community environment.     Medical necessity is demonstrated by the impairments and functional limitations listed on the Initial Evaluation.     Anticipated barriers to physical therapy: none identified  Pt's spiritual, cultural and educational needs considered and pt agreeable to plan of care and goals.    Plan   Continue with established Plan of Care towards Physical Therapy goals.   Discussed Plan of  Care with patient: Yes    Deep Westbrook, PT  10/15/2019

## 2019-10-17 ENCOUNTER — CLINICAL SUPPORT (OUTPATIENT)
Dept: REHABILITATION | Facility: HOSPITAL | Age: 49
End: 2019-10-17
Attending: ORTHOPAEDIC SURGERY
Payer: MEDICAID

## 2019-10-17 DIAGNOSIS — M25.662 JOINT STIFFNESS OF KNEE, LEFT: ICD-10-CM

## 2019-10-17 DIAGNOSIS — R26.9 ABNORMALITY OF GAIT AND MOBILITY: ICD-10-CM

## 2019-10-17 PROCEDURE — 97110 THERAPEUTIC EXERCISES: CPT

## 2019-10-17 PROCEDURE — 97140 MANUAL THERAPY 1/> REGIONS: CPT

## 2019-10-17 NOTE — PROGRESS NOTES
"                            Physical Therapy Daily Treatment Note   Name: Boom Blanco 1970  MRN: [id number]    Visit Date: 10/17/2019  Visit #: 4 / 24  Authorization period Expiration: 1/3/20   Plan of Care Expiration: 1/3/20  Precautions: standard per TKA protocol    Time In: 10:00 am  Time Out: 11:00 am  Total 1:1 Treatment Time: 55 min    Treatment Diagnosis:   No diagnosis found.  Physician: Sylvain Gorman DO    Subjective   Pt reports: She has been walking around her home without using walker or cane and feels stable.     Pain Scale:  6/10 on VAS currently  Pain Location: left knee    Objective   Boom received therapeutic exercises to develop strength, endurance, ROM, flexibility and posture for 40 minutes including:    Nustep Lv 2 x 21 min  Quad sets with towel roll under heel with 5 sec hold 3 x 10  Assisted HS's on SB with strap 5" hold 2 x 10  SLR  3 x 10  TKE's with 5" hold 3 x 10  Seated LAQ's 2 x 10  Standing L hip flex/abd/ext 2 x 10 (4" step)   LSU on 2" green disc 2 x 10  FSU on 4" step 2 x 10    HS over bolster with 5" hold 2 x 10 DNP  UBE with 2 bars resistance 3/3 min DNP     Boom received the following manual therapy techniques: Joint mobilizations, Manual long axis leg pull, passive HS stretch, IASTM to mid-distal quad, and PROM into flex and ext were applied to the left knee for 15 minutes.     Boom received Gait training x 5 min without AD functional distances in clinic with focus on upright posture, increase left step height and length, and heel-toe gait pattern.     A/PROM:  -7/-4 degrees ext, 90/100 degrees flex    Home Exercises and Education Provided     Education provided re: use of cryotherapy as needed for pain and swelling, importance of achieving terminal knee extension as soon as possible to prevent contractures and restore normal knee biomechanics during walking and standing  - progress towards goals   - role of therapy in multi - disciplinary team, goals " for therapy  Pt educated on condition, POC, and expectations in therapy.  No spiritual or educational barriers to learning provided    Home exercises: phase 1 TKA exercises  Pt will be provided HEP during course of treatment with progressions as appropriate. Pt was advised to perform these exercises free of pain, and to stop performing them if pain occurs.   Boom demonstrated good  understanding of the education provided.     Assessment   Boom completed treatment without complication or increase in pain progressing with exercises as noted above. Patient continues to demonstrate extension lag due to quad weakness and decreased terminal knee extension in stance however is progressing with gait transitioning from RW to std cane. Boom is progressing well towards her goals with no update to goals at this time.     Pt prognosis is Good. Pt will continue to benefit from skilled outpatient physical therapy to address the deficits listed in the problem list chart on initial evaluation, provide pt/family education and to maximize pt's level of independence in the home and community environment.     Medical necessity is demonstrated by the impairments and functional limitations listed on the Initial Evaluation.     Anticipated barriers to physical therapy: none identified  Pt's spiritual, cultural and educational needs considered and pt agreeable to plan of care and goals.    Plan   Continue with established Plan of Care towards Physical Therapy goals.   Discussed Plan of Care with patient: Yes    Deep Westbrook, PT  10/17/2019

## 2019-10-21 NOTE — ADDENDUM NOTE
Addendum  created 10/21/19 1408 by Jacob Bolanos MD    Intraprocedure Blocks edited, Sign clinical note

## 2019-10-22 ENCOUNTER — CLINICAL SUPPORT (OUTPATIENT)
Dept: REHABILITATION | Facility: HOSPITAL | Age: 49
End: 2019-10-22
Attending: ORTHOPAEDIC SURGERY
Payer: MEDICAID

## 2019-10-22 DIAGNOSIS — M25.662 JOINT STIFFNESS OF KNEE, LEFT: ICD-10-CM

## 2019-10-22 DIAGNOSIS — R26.9 ABNORMALITY OF GAIT AND MOBILITY: ICD-10-CM

## 2019-10-22 PROCEDURE — 97140 MANUAL THERAPY 1/> REGIONS: CPT

## 2019-10-22 PROCEDURE — 97110 THERAPEUTIC EXERCISES: CPT

## 2019-10-22 NOTE — PROGRESS NOTES
"                            Physical Therapy Daily Treatment Note   Name: Boom Blanco 1970  MRN: [id number]    Visit Date: 10/22/2019  Visit #: 5 / 24  Authorization period Expiration: 1/3/20   Plan of Care Expiration: 1/3/20  Precautions: standard per TKA protocol    Time In: 10:02 am  Time Out: 11:00 am  Total 1:1 Treatment Time: 54 min    Treatment Diagnosis:   Encounter Diagnoses   Name Primary?    Joint stiffness of knee, left     Abnormality of gait and mobility      Physician: Sylvain Gorman DO    Subjective   Pt reports: She has been walking without using walker or cane, pain level is 5/10 today and patient reports she forgot to take a pain pill this morning.     Pain Scale:  5/10 on VAS currently  Pain Location: left knee    Objective   Boom received therapeutic exercises to develop strength, endurance, ROM, flexibility and posture for 42 minutes including:    Nustep Lv 2 x 17 min  Quad sets with towel roll under heel with 5 sec hold 2 x 15  Assisted HS's on SB with strap 5" hold 2 x 10  SLR 2 x 15  TKE's with 5" hold 2 x 15  Seated LAQ's 2 x 10  Standing L hip flex/abd/ext 2 x 15 (4" step)   LSU on 4" step 2 x 15  FSU on 4" step 2 x 15  Standing QS with YTB 2 x 15      Boom received the following manual therapy techniques: Joint mobilizations, Manual long axis leg pull, passive HS stretch, IASTM to mid-distal quad, and PROM into flex and ext were applied to the left knee for 12 minutes.     Boom received Gait training without AD functional distances in clinic with focus on upright posture, increase left step height and length, and heel-toe gait pattern.     A/PROM:  -6/-3 degrees ext, 100/106 degrees flex    Home Exercises and Education Provided     Education provided re: use of cryotherapy as needed for pain and swelling, importance of achieving terminal knee extension as soon as possible to prevent contractures and restore normal knee biomechanics during walking and standing  - " progress towards goals   - role of therapy in multi - disciplinary team, goals for therapy  Pt educated on condition, POC, and expectations in therapy.  No spiritual or educational barriers to learning provided    Home exercises: phase 1 TKA exercises  Pt will be provided HEP during course of treatment with progressions as appropriate. Pt was advised to perform these exercises free of pain, and to stop performing them if pain occurs.   Boom demonstrated good  understanding of the education provided.     Assessment   Boom completed treatment without complication or increase in pain progressing with select exercises as noted above. Patient continues to demonstrate extension lag due to quad weakness and decreased terminal knee extension in stance however is progressing with gait and ROM. She is now ambulating without AD in home and community environment with no reported instability. Boom is progressing well towards her goals with no update to goals at this time.     Pt prognosis is Good. Pt will continue to benefit from skilled outpatient physical therapy to address the deficits listed in the problem list chart on initial evaluation, provide pt/family education and to maximize pt's level of independence in the home and community environment.     Medical necessity is demonstrated by the impairments and functional limitations listed on the Initial Evaluation.     Anticipated barriers to physical therapy: none identified  Pt's spiritual, cultural and educational needs considered and pt agreeable to plan of care and goals.    Plan   Continue with established Plan of Care towards Physical Therapy goals.   Discussed Plan of Care with patient: Yes    Deep Westbrook, PT  10/22/2019

## 2019-10-24 ENCOUNTER — CLINICAL SUPPORT (OUTPATIENT)
Dept: REHABILITATION | Facility: HOSPITAL | Age: 49
End: 2019-10-24
Attending: ORTHOPAEDIC SURGERY
Payer: MEDICAID

## 2019-10-24 DIAGNOSIS — R26.9 ABNORMALITY OF GAIT AND MOBILITY: ICD-10-CM

## 2019-10-24 DIAGNOSIS — M25.662 JOINT STIFFNESS OF KNEE, LEFT: ICD-10-CM

## 2019-10-24 PROCEDURE — 97140 MANUAL THERAPY 1/> REGIONS: CPT

## 2019-10-24 PROCEDURE — 97110 THERAPEUTIC EXERCISES: CPT

## 2019-10-24 NOTE — PROGRESS NOTES
"                            Physical Therapy Daily Treatment Note   Name: Boom Blanco 1970  MRN: [id number]    Visit Date: 10/24/2019  Visit #: 6 / 24  Authorization period Expiration: 1/3/20   Plan of Care Expiration: 1/3/20  Precautions: standard per TKA protocol    Time In: 10:00 am  Time Out: 11:00 am  Total 1:1 Treatment Time: 54 min    Treatment Diagnosis:   Encounter Diagnoses   Name Primary?    Joint stiffness of knee, left     Abnormality of gait and mobility      Physician: Sylvain Gorman,     Subjective   Pt reports: a little sore this morning but feel as if she is walking better with less limp now.      Pain Scale:  5/10 on VAS currently  Pain Location: left knee    Objective   Boom received therapeutic exercises to develop strength, endurance, ROM, flexibility and posture for 42 minutes including:    Nustep Lv 2 x 17 min  Quad sets with towel roll under heel with 5 sec hold 2 x 15  Assisted HS's on SB with strap 5" hold 2 x 10  SLR 2 x 15  TKE's with 5" hold 2 x 15  Seated LAQ's 2 x 10  Standing L hip flex/abd/ext 2 x 15 (4" step)   LSU on 4" step 2 x 15  FSU on 4" step 2 x 15  Standing QS with YTB 2 x 15      Boom received the following manual therapy techniques: Joint mobilizations, Manual long axis leg pull, passive HS stretch, IASTM to mid-distal quad, and PROM into flex and ext were applied to the left knee for 12 minutes.     Boom received Gait training without AD functional distances in clinic with focus on upright posture, increase left step height and length, and heel-toe gait pattern.     A/PROM:  -6/-3 degrees ext, 100/106 degrees flex    Home Exercises and Education Provided     Education provided re: use of cryotherapy as needed for pain and swelling, importance of achieving terminal knee extension as soon as possible to prevent contractures and restore normal knee biomechanics during walking and standing  - progress towards goals   - role of therapy in multi - " disciplinary team, goals for therapy  Pt educated on condition, POC, and expectations in therapy.  No spiritual or educational barriers to learning provided    Home exercises: phase 1 TKA exercises  Pt will be provided HEP during course of treatment with progressions as appropriate. Pt was advised to perform these exercises free of pain, and to stop performing them if pain occurs.   Boom demonstrated good  understanding of the education provided.     Assessment   Boom completed treatment without complication or increase in pain progressing with select exercises as noted above. Patient continues to demonstrate extension lag due to quad weakness and decreased terminal knee extension in stance however is progressing with gait and ROM. Reports feeling warmth, swelling, and restriction with ROM secondary to fluid. No complications with treatment session. Verbally reviewed HEP    Pt prognosis is Good. Pt will continue to benefit from skilled outpatient physical therapy to address the deficits listed in the problem list chart on initial evaluation, provide pt/family education and to maximize pt's level of independence in the home and community environment.     Medical necessity is demonstrated by the impairments and functional limitations listed on the Initial Evaluation.     Anticipated barriers to physical therapy: none identified  Pt's spiritual, cultural and educational needs considered and pt agreeable to plan of care and goals.    Plan   Continue with established Plan of Care towards Physical Therapy goals.   Discussed Plan of Care with patient: Yes    Alf Ender, PTA  10/24/2019

## 2019-10-29 ENCOUNTER — CLINICAL SUPPORT (OUTPATIENT)
Dept: REHABILITATION | Facility: HOSPITAL | Age: 49
End: 2019-10-29
Attending: ORTHOPAEDIC SURGERY
Payer: MEDICAID

## 2019-10-29 DIAGNOSIS — R26.9 ABNORMALITY OF GAIT AND MOBILITY: ICD-10-CM

## 2019-10-29 DIAGNOSIS — M25.662 JOINT STIFFNESS OF KNEE, LEFT: ICD-10-CM

## 2019-10-29 PROCEDURE — 97140 MANUAL THERAPY 1/> REGIONS: CPT

## 2019-10-29 PROCEDURE — 97110 THERAPEUTIC EXERCISES: CPT

## 2019-10-29 NOTE — PROGRESS NOTES
"                            Physical Therapy Daily Treatment Note   Name: Boom Blanco 1970  MRN: [id number]    Visit Date: 10/29/2019  Visit #: 7 / 24  Authorization period Expiration: 1/3/20   Plan of Care Expiration: 1/3/20  Precautions: standard per TKA protocol    Time In: 10:00 am  Time Out: 11:00 am  Total 1:1 Treatment Time: 54 min    Treatment Diagnosis:   Encounter Diagnoses   Name Primary?    Joint stiffness of knee, left     Abnormality of gait and mobility      Physician: Sylvain Gorman,     Subjective   Pt reports: trying to walk without limping. Still sore and swelling a little bit.      Pain Scale:  5/10 on VAS currently  Pain Location: left knee    Objective   Boom received therapeutic exercises to develop strength, endurance, ROM, flexibility and posture for 42 minutes including:    Nustep Lv 2 x 17 min  Quad sets with towel roll under heel with 5 sec hold 2 x 15  Assisted HS's on SB with strap 5" hold 2 x 10  SLR 2 x 15  TKE's with 5" hold 2 x 15  Seated LAQ's 2 x 10  Standing L hip flex/abd/ext 2 x 15 (4" step)   LSU on 4" step 2 x 15  FSU on 4" step 2 x 15  Standing QS with YTB 2 x 15  Prone knee hangs: 2# for 5 min      Boom received the following manual therapy techniques: Joint mobilizations, Manual long axis leg pull, passive HS stretch, and PROM into flex and ext were applied to the left knee for 12 minutes.     Boom received Gait training without AD functional distances in clinic with focus on upright posture, increase left step height and length, and heel-toe gait pattern.     A/PROM:  -6/-3 degrees ext, 106/112 degrees flex    Home Exercises and Education Provided     Education provided re: use of cryotherapy as needed for pain and swelling, importance of achieving terminal knee extension as soon as possible to prevent contractures and restore normal knee biomechanics during walking and standing  - progress towards goals   - role of therapy in multi - " disciplinary team, goals for therapy  Pt educated on condition, POC, and expectations in therapy.  No spiritual or educational barriers to learning provided    Home exercises: phase 1 TKA exercises  Pt will be provided HEP during course of treatment with progressions as appropriate. Pt was advised to perform these exercises free of pain, and to stop performing them if pain occurs.   Boom demonstrated good  understanding of the education provided.     Assessment   Boom completed treatment without complication or increase in pain progressing exercises to promote extension. Patient demonstrated progression with ROM as bolded above increasing flexion at knee. No complications with treatment session.     Pt prognosis is Good. Pt will continue to benefit from skilled outpatient physical therapy to address the deficits listed in the problem list chart on initial evaluation, provide pt/family education and to maximize pt's level of independence in the home and community environment.     Medical necessity is demonstrated by the impairments and functional limitations listed on the Initial Evaluation.     Anticipated barriers to physical therapy: none identified  Pt's spiritual, cultural and educational needs considered and pt agreeable to plan of care and goals.    Plan   Continue with established Plan of Care towards Physical Therapy goals.   Discussed Plan of Care with patient: Yes    Alf Handley, PTA  10/29/2019

## 2019-10-31 ENCOUNTER — CLINICAL SUPPORT (OUTPATIENT)
Dept: REHABILITATION | Facility: HOSPITAL | Age: 49
End: 2019-10-31
Attending: ORTHOPAEDIC SURGERY
Payer: MEDICAID

## 2019-10-31 DIAGNOSIS — M25.662 JOINT STIFFNESS OF KNEE, LEFT: ICD-10-CM

## 2019-10-31 DIAGNOSIS — R26.9 ABNORMALITY OF GAIT AND MOBILITY: ICD-10-CM

## 2019-10-31 PROCEDURE — 97110 THERAPEUTIC EXERCISES: CPT

## 2019-10-31 PROCEDURE — 97140 MANUAL THERAPY 1/> REGIONS: CPT

## 2019-10-31 NOTE — PROGRESS NOTES
"                            Physical Therapy Daily Treatment Note   Name: Boom Blanco 1970  MRN: [id number]    Visit Date: 10/31/2019  Visit #: 8 / 24  Authorization period Expiration: 1/3/20   Plan of Care Expiration: 1/3/20  Precautions: standard per TKA protocol    Time In: 10:00 am  Time Out: 11:00 am  Total 1:1 Treatment Time: 54 min    Treatment Diagnosis:   Encounter Diagnoses   Name Primary?    Joint stiffness of knee, left     Abnormality of gait and mobility      Physician: Sylvain Gorman,     Subjective   Pt reports: really been focusing on extension at home so we will see if it has helped.       Pain Scale:  6/10 on VAS currently  Pain Location: left knee    Objective   Boom received therapeutic exercises to develop strength, endurance, ROM, flexibility and posture for 42 minutes including:    Nustep Lv 2 x 17 min  Quad sets with towel roll under heel with 5 sec hold 2 x 15  Assisted HS's on SB with strap 5" hold 2 x 10  SLR 2 x 15  TKE's with 5" hold 2 x 15  Seated LAQ's 2 x 15  Standing L hip flex/abd/ext 2 x 15 (4" step)   LSU on 4" step 2 x 15  FSU on 4" step 2 x 15  Standing QS with YTB 2 x 15  Prone knee hangs: 2# for 5 min  Seated HS curls: YTB 2x10  Prone HS curls w opposite LE for assist: x 10      Boom received the following manual therapy techniques: Joint mobilizations, Manual long axis leg pull, passive HS stretch, and PROM into flex and ext were applied to the left knee for 12 minutes.     Boom received Gait training without AD functional distances in clinic with focus on upright posture, increase left step height and length, and heel-toe gait pattern.     A/PROM:  -5/-2 degrees ext, 106/112 degrees flex    Home Exercises and Education Provided     Education provided re: use of cryotherapy as needed for pain and swelling, importance of achieving terminal knee extension as soon as possible to prevent contractures and restore normal knee biomechanics during walking " and standing  - progress towards goals   - role of therapy in multi - disciplinary team, goals for therapy  Pt educated on condition, POC, and expectations in therapy.  No spiritual or educational barriers to learning provided    Home exercises: phase 1 TKA exercises  Pt will be provided HEP during course of treatment with progressions as appropriate. Pt was advised to perform these exercises free of pain, and to stop performing them if pain occurs.   Boom demonstrated good  understanding of the education provided.     Assessment   Boom completed treatment without complication or increase in pain progressing exercises to promote increase in ROM. Pain at end range both flex/ext. Overall gait pattern appears more fluid with better symmetry of weight shifting however continues to lack L ext at heel strike. Continue to progress as tolerated.     Pt prognosis is Good. Pt will continue to benefit from skilled outpatient physical therapy to address the deficits listed in the problem list chart on initial evaluation, provide pt/family education and to maximize pt's level of independence in the home and community environment.     Medical necessity is demonstrated by the impairments and functional limitations listed on the Initial Evaluation.     Anticipated barriers to physical therapy: none identified  Pt's spiritual, cultural and educational needs considered and pt agreeable to plan of care and goals.    Plan   Continue with established Plan of Care towards Physical Therapy goals.   Discussed Plan of Care with patient: Yes    Alf Ender, PTA  10/31/2019

## 2019-11-01 ENCOUNTER — TELEPHONE (OUTPATIENT)
Dept: ORTHOPEDICS | Facility: CLINIC | Age: 49
End: 2019-11-01

## 2019-11-01 NOTE — TELEPHONE ENCOUNTER
Called patient to inform her that Dr. Gorman will be out of the office on 11/11/2019 and her appointment must be rescheduled with Dr. Gorman. No answer at phone number [phone] and left voicemail for patient to call back office at phone number [phone] to reschedule her appointment with Dr. Gorman.       Letter mailed to patient today.

## 2019-11-04 ENCOUNTER — OFFICE VISIT (OUTPATIENT)
Dept: FAMILY MEDICINE | Facility: CLINIC | Age: 49
End: 2019-11-04
Payer: MEDICAID

## 2019-11-04 ENCOUNTER — LAB VISIT (OUTPATIENT)
Dept: LAB | Facility: HOSPITAL | Age: 49
End: 2019-11-04
Attending: NURSE PRACTITIONER
Payer: MEDICAID

## 2019-11-04 VITALS
SYSTOLIC BLOOD PRESSURE: 122 MMHG | HEART RATE: 106 BPM | WEIGHT: 191.38 LBS | DIASTOLIC BLOOD PRESSURE: 74 MMHG | RESPIRATION RATE: 18 BRPM | BODY MASS INDEX: 28.35 KG/M2 | OXYGEN SATURATION: 97 % | HEIGHT: 69 IN

## 2019-11-04 DIAGNOSIS — I10 BENIGN HYPERTENSION: ICD-10-CM

## 2019-11-04 DIAGNOSIS — E03.8 OTHER SPECIFIED HYPOTHYROIDISM: Chronic | ICD-10-CM

## 2019-11-04 DIAGNOSIS — E78.49 OTHER HYPERLIPIDEMIA: ICD-10-CM

## 2019-11-04 DIAGNOSIS — E78.49 OTHER HYPERLIPIDEMIA: Primary | ICD-10-CM

## 2019-11-04 LAB
ALBUMIN SERPL BCP-MCNC: 4 G/DL (ref 3.5–5.2)
ALP SERPL-CCNC: 52 U/L (ref 55–135)
ALT SERPL W/O P-5'-P-CCNC: 11 U/L (ref 10–44)
ANION GAP SERPL CALC-SCNC: 9 MMOL/L (ref 8–16)
AST SERPL-CCNC: 15 U/L (ref 10–40)
BASOPHILS # BLD AUTO: 0.05 K/UL (ref 0–0.2)
BASOPHILS NFR BLD: 0.9 % (ref 0–1.9)
BILIRUB SERPL-MCNC: 0.3 MG/DL (ref 0.1–1)
BUN SERPL-MCNC: 12 MG/DL (ref 6–20)
CALCIUM SERPL-MCNC: 8.9 MG/DL (ref 8.7–10.5)
CHLORIDE SERPL-SCNC: 98 MMOL/L (ref 95–110)
CHOLEST SERPL-MCNC: 175 MG/DL (ref 120–199)
CHOLEST/HDLC SERPL: 3.1 {RATIO} (ref 2–5)
CO2 SERPL-SCNC: 25 MMOL/L (ref 23–29)
CREAT SERPL-MCNC: 0.9 MG/DL (ref 0.5–1.4)
DIFFERENTIAL METHOD: ABNORMAL
EOSINOPHIL # BLD AUTO: 0.1 K/UL (ref 0–0.5)
EOSINOPHIL NFR BLD: 1.5 % (ref 0–8)
ERYTHROCYTE [DISTWIDTH] IN BLOOD BY AUTOMATED COUNT: 13.2 % (ref 11.5–14.5)
EST. GFR  (AFRICAN AMERICAN): >60 ML/MIN/1.73 M^2
EST. GFR  (NON AFRICAN AMERICAN): >60 ML/MIN/1.73 M^2
GLUCOSE SERPL-MCNC: 113 MG/DL (ref 70–110)
HCT VFR BLD AUTO: 38.9 % (ref 37–48.5)
HDLC SERPL-MCNC: 57 MG/DL (ref 40–75)
HDLC SERPL: 32.6 % (ref 20–50)
HGB BLD-MCNC: 11.9 G/DL (ref 12–16)
IMM GRANULOCYTES # BLD AUTO: 0.01 K/UL (ref 0–0.04)
IMM GRANULOCYTES NFR BLD AUTO: 0.2 % (ref 0–0.5)
LDLC SERPL CALC-MCNC: 95.2 MG/DL (ref 63–159)
LYMPHOCYTES # BLD AUTO: 1.6 K/UL (ref 1–4.8)
LYMPHOCYTES NFR BLD: 28.1 % (ref 18–48)
MCH RBC QN AUTO: 29.9 PG (ref 27–31)
MCHC RBC AUTO-ENTMCNC: 30.6 G/DL (ref 32–36)
MCV RBC AUTO: 98 FL (ref 82–98)
MONOCYTES # BLD AUTO: 0.4 K/UL (ref 0.3–1)
MONOCYTES NFR BLD: 6.4 % (ref 4–15)
NEUTROPHILS # BLD AUTO: 3.7 K/UL (ref 1.8–7.7)
NEUTROPHILS NFR BLD: 62.9 % (ref 38–73)
NONHDLC SERPL-MCNC: 118 MG/DL
NRBC BLD-RTO: 0 /100 WBC
PLATELET # BLD AUTO: 167 K/UL (ref 150–350)
PMV BLD AUTO: 10.1 FL (ref 9.2–12.9)
POTASSIUM SERPL-SCNC: 4.2 MMOL/L (ref 3.5–5.1)
PROT SERPL-MCNC: 7.8 G/DL (ref 6–8.4)
RBC # BLD AUTO: 3.98 M/UL (ref 4–5.4)
SODIUM SERPL-SCNC: 132 MMOL/L (ref 136–145)
TRIGL SERPL-MCNC: 114 MG/DL (ref 30–150)
TSH SERPL DL<=0.005 MIU/L-ACNC: 4.06 UIU/ML (ref 0.34–5.6)
WBC # BLD AUTO: 5.81 K/UL (ref 3.9–12.7)

## 2019-11-04 PROCEDURE — 36415 COLL VENOUS BLD VENIPUNCTURE: CPT

## 2019-11-04 PROCEDURE — 84443 ASSAY THYROID STIM HORMONE: CPT

## 2019-11-04 PROCEDURE — 80061 LIPID PANEL: CPT

## 2019-11-04 PROCEDURE — 80053 COMPREHEN METABOLIC PANEL: CPT

## 2019-11-04 PROCEDURE — 99214 PR OFFICE/OUTPT VISIT, EST, LEVL IV, 30-39 MIN: ICD-10-PCS | Mod: S$GLB,,, | Performed by: NURSE PRACTITIONER

## 2019-11-04 PROCEDURE — 85025 COMPLETE CBC W/AUTO DIFF WBC: CPT

## 2019-11-04 PROCEDURE — 99214 OFFICE O/P EST MOD 30 MIN: CPT | Mod: S$GLB,,, | Performed by: NURSE PRACTITIONER

## 2019-11-04 RX ORDER — LOVASTATIN 20 MG/1
20 TABLET ORAL NIGHTLY
Qty: 90 TABLET | Refills: 1 | Status: SHIPPED | OUTPATIENT
Start: 2019-11-04 | End: 2020-04-20 | Stop reason: SDUPTHER

## 2019-11-04 RX ORDER — LISINOPRIL 20 MG/1
20 TABLET ORAL DAILY
Qty: 90 TABLET | Refills: 1 | Status: SHIPPED | OUTPATIENT
Start: 2019-11-04 | End: 2020-04-20 | Stop reason: SDUPTHER

## 2019-11-04 RX ORDER — CYCLOBENZAPRINE HCL 10 MG
10 TABLET ORAL DAILY
Refills: 0 | COMMUNITY
Start: 2019-10-14 | End: 2021-07-27

## 2019-11-04 NOTE — PROGRESS NOTES
Chief Complaint  Chief Complaint   Patient presents with    Follow-up     6 months       HPI:  Boom Blanco is a 49 y.o. female with medical diagnoses as listed and reviewed within the medical history and problem list that presents for a 6 month follow up. She is due for labs. She has had her left and right knees replaced since her last visit. She is doing well and participating in PT. She currently sees  in [place] for pain management. She takes Percocet and Flexeril. She sees Psyche at [facility] and takes Wellbutrin and Vraylar. She reports that her mental health is stable. She does not feel anxious or depressed. She does not report recent episodes of ely. She takes synthroid for hypothyroid. She is due for labs. Hypertension is controlled on lisinopril. Denies chest pain or SOB.     PAST MEDICAL HISTORY:  Past Medical History:   Diagnosis Date    Anxiety disorder     Arthritis     Bipolar disorder     Chronic pain     Diverticulitis     Hyperlipidemia     Hypertension     Lupus 2006    Psoriatic arthritis     Raynaud's disease        PAST SURGICAL HISTORY:  Past Surgical History:   Procedure Laterality Date    ARTHROSCOPIC CHONDROPLASTY OF KNEE JOINT Left 1/22/2019    Procedure: ARTHROSCOPY, KNEE, WITH CHONDROPLASTY;  Surgeon: Sylvain Gorman DO;  Location: [facility] OR;  Service: Orthopedics;  Laterality: Left;    ARTHROSCOPY OF KNEE Left 1/22/2019    Procedure: ARTHROSCOPY, KNEE;  Surgeon: Sylvain Gorman DO;  Location: [facility] OR;  Service: Orthopedics;  Laterality: Left;  Equipment: Emerson Chondral Drill Set and Mitek Meniscus Repair Set Truespan  Vendor/Rep: [employer]    ARTHROSCOPY OF KNEE Right 2/5/2019    Procedure: ARTHROSCOPY, KNEE;  Surgeon: Sylvain Gorman DO;  Location: [facility] OR;  Service: Orthopedics;  Laterality: Right;  Equipment: Mitek Truspar; Scope  Venor/Rep: Mitek    BACK SURGERY      spinal stimulator implanted and then removed    BREAST BIOPSY       COLON SURGERY      COLONOSCOPY  11/25/2016    repeat in 5-10 years    COLONOSCOPY N/A 9/6/2019    Procedure: COLONOSCOPY;  Surgeon: Dany Hugo MD;  Location: [facility] ENDO;  Service: General;  Laterality: N/A;    ENDOSCOPY  9/6/2019    Procedure: ENDOSCOPY;  Surgeon: Dany Hugo MD;  Location: [facility] ENDO;  Service: General;;  with multiple biopsy's    ESOPHAGOGASTRODUODENOSCOPY Left 8/29/2018    Procedure: ESOPHAGOGASTRODUODENOSCOPY (EGD);  Surgeon: Dany Hugo MD;  Location: [facility] ENDO;  Service: General;  Laterality: Left;    EXCISION OF MEDIAL MENISCUS OF KNEE Right 2/5/2019    Procedure: MENISCECTOMY, KNEE, MEDIAL;  Surgeon: Sylvain Gorman DO;  Location: [facility] OR;  Service: Orthopedics;  Laterality: Right;    HYSTERECTOMY  1997    KNEE ARTHROSCOPY W/ DEBRIDEMENT Left 1/22/2019    Procedure: ARTHROSCOPY, KNEE, WITH DEBRIDEMENT;  Surgeon: Sylvain Gorman DO;  Location: [facility] OR;  Service: Orthopedics;  Laterality: Left;    KNEE ARTHROSCOPY W/ MENISCECTOMY Left 1/22/2019    Procedure: ARTHROSCOPY, KNEE, WITH MENISCECTOMY;  Surgeon: Sylvain Gorman DO;  Location: [facility] OR;  Service: Orthopedics;  Laterality: Left;    KNEE ARTHROSCOPY W/ PLICA EXCISION Left 1/22/2019    Procedure: EXCISION, PLICA, KNEE, ARTHROSCOPIC;  Surgeon: Sylvain Gorman DO;  Location: [facility] OR;  Service: Orthopedics;  Laterality: Left;    SALPINGOOPHORECTOMY  1997    TOTAL KNEE ARTHROPLASTY Right 4/18/2019    Procedure: ARTHROPLASTY, KNEE, TOTAL;  Surgeon: Sylvain Gorman DO;  Location: [facility] OR;  Service: Orthopedics;  Laterality: Right;  Equipment: Depuy Sigma Total Knee System; Guillen Leg Mcclellan  Vendor/Rep: Depuy  Table/Position: Supine  REQUIRES FIRST ASSISTANT EPHRAIM    TOTAL KNEE ARTHROPLASTY Left 10/3/2019    Procedure: ARTHROPLASTY, KNEE, TOTAL;  Surgeon: Sylvain Gorman DO;  Location: [facility] OR;  Service: Orthopedics;  Laterality: Left;  Equipment: Depuy Sigma Total Knee System  Vendor/Rep: Depuy  REQUIRES  FIRST ASSISTANT EPHRAIM       SOCIAL HISTORY:  Social History     Socioeconomic History    Marital status:      Spouse name: Not on file    Number of children: Not on file    Years of education: Not on file    Highest education level: Not on file   Occupational History    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   Social Needs    Financial resource strain: Not on file    Food insecurity:     Worry: Not on file     Inability: Not on file    Transportation needs:     Medical: Not on file     Non-medical: Not on file   Tobacco Use    Smoking status: Former Smoker     Packs/day: 1.00     Years: 33.00     Pack years: 33.00     Types: Cigarettes    Smokeless tobacco: Never Used   Substance and Sexual Activity    Alcohol use: No     Frequency: Never    Drug use: Not Currently     Types: Cocaine    Sexual activity: Not Currently   Lifestyle    Physical activity:     Days per week: Not on file     Minutes per session: Not on file    Stress: Not on file   Relationships    Social connections:     Talks on phone: Not on file     Gets together: Not on file     Attends [employer]ism service: Not on file     Active member of club or organization: Not on file     Attends meetings of clubs or organizations: Not on file     Relationship status: Not on file   Other Topics Concern    Not on file   Social History Narrative    Not on file       FAMILY HISTORY:  Family History   Problem Relation Age of Onset    Arthritis Mother     Pacemaker/defibrilator Mother     Lung disease Father     Heart disease Father     Arthritis Sister     Arthritis Brother     Arthritis Sister     Arthritis Sister     Breast cancer Neg Hx        ALLERGIES AND MEDICATIONS: updated and reviewed.  Review of patient's allergies indicates:   Allergen Reactions    Remeron [mirtazapine] Other (See Comments)     Weight gain     Current Outpatient Medications   Medication Sig Dispense Refill    AFLURIA QD 2019-20,3YR UP,,PF, 60 mcg (15 mcg  x 4)/0.5 mL Syrg ADM 0.5ML IM UTD  0    buPROPion (WELLBUTRIN XL) 150 MG TB24 tablet Take 150 mg by mouth every evening.      cariprazine (VRAYLAR) 1.5 mg Cap Take 1.5 mg by mouth every evening.      cyclobenzaprine (FLEXERIL) 10 MG tablet Take 10 mg by mouth once daily.  0    docusate sodium (COLACE) 100 MG capsule Take 1 capsule (100 mg total) by mouth 2 (two) times daily. Drink a 12 oz glass of water with each capsule.  0    fluticasone (FLONASE) 50 mcg/actuation nasal spray 1 spray (50 mcg total) by Each Nare route 2 (two) times daily as needed for Rhinitis. 1 Bottle 3    ibuprofen (ADVIL,MOTRIN) 800 MG tablet Take 800 mg by mouth once daily.       levothyroxine (SYNTHROID) 50 MCG tablet Take 1 tablet (50 mcg total) by mouth before breakfast. 90 tablet 2    lisinopril (PRINIVIL,ZESTRIL) 20 MG tablet Take 1 tablet (20 mg total) by mouth once daily. 90 tablet 1    lovastatin (MEVACOR) 20 MG tablet Take 1 tablet (20 mg total) by mouth every evening. 90 tablet 1    melatonin 5 mg Tab Take 5 mg by mouth.      oxyCODONE-acetaminophen (PERCOCET)  mg per tablet Take 1 tablet by mouth 2 (two) times daily.      ranitidine (ZANTAC) 150 MG tablet Take 150 mg by mouth 2 (two) times daily.       No current facility-administered medications for this visit.      Facility-Administered Medications Ordered in Other Visits   Medication Dose Route Frequency Provider Last Rate Last Dose    lactated ringers infusion   Intravenous Continuous Jacob Bolanos MD   Stopped at 10/03/19 0928    lactated ringers infusion  125 mL/hr Intravenous Continuous Jacob Bolanos MD        lidocaine (PF) 10 mg/ml (1%) injection 10 mg  1 mL Intradermal Once Jacob Bolanos MD        morphine injection 2 mg  2 mg Intravenous Q5 Min PRN Jacob Bolanos MD        promethazine (PHENERGAN) 6.25 mg in dextrose 5 % 50 mL IVPB  6.25 mg Intravenous Q10 Min PRN Jacob Bolanos MD             ROS  Review of Systems   Constitutional:  "Negative for appetite change, chills, fatigue and fever.   HENT: Negative for congestion, postnasal drip, rhinorrhea and sore throat.    Respiratory: Negative for cough, chest tightness, shortness of breath and wheezing.    Cardiovascular: Negative for chest pain and palpitations.   Gastrointestinal: Negative for abdominal distention, abdominal pain, constipation, diarrhea, nausea and vomiting.   Genitourinary: Negative for dysuria.   Musculoskeletal: Positive for arthralgias. Negative for gait problem, joint swelling and myalgias.   Skin: Negative for color change and rash.   Neurological: Negative for dizziness, weakness and headaches.   Psychiatric/Behavioral: Negative for confusion and sleep disturbance. The patient is not nervous/anxious.            PHYSICAL EXAM  Vitals:    11/04/19 0841   BP: 122/74   BP Location: Left arm   Patient Position: Sitting   Pulse: 106   Resp: 18   SpO2: 97%   Weight: 86.8 kg (191 lb 6.4 oz)   Height: 5' 9" (1.753 m)    Body mass index is 28.26 kg/m².  Weight: 86.8 kg (191 lb 6.4 oz)   Height: 5' 9" (175.3 cm)       Physical Exam   Constitutional: She is oriented to person, place, and time. She appears well-developed and well-nourished.   HENT:   Head: Normocephalic.   Eyes: Pupils are equal, round, and reactive to light.   Neck: Normal range of motion. Neck supple.   Cardiovascular: Normal rate, regular rhythm, S1 normal and S2 normal.   No murmur heard.  Pulmonary/Chest: Effort normal and breath sounds normal. She has no wheezes.   Abdominal: Soft. Normal appearance and bowel sounds are normal. She exhibits no distension. There is no tenderness.   Musculoskeletal: Normal range of motion.   Neurological: She is alert and oriented to person, place, and time.   Skin: Skin is warm and dry. Capillary refill takes less than 2 seconds.   Psychiatric: She has a normal mood and affect. Her speech is normal and behavior is normal. Thought content normal. Cognition and memory are normal. "   Vitals reviewed.        Health Maintenance       Date Due Completion Date    TETANUS VACCINE 02/16/1986 ---    Pneumococcal Vaccine (Highest Risk) (1 of 3 - PCV13) 02/16/1987 ---    Shingles Vaccine (1 of 2) 02/16/2018 ---    Influenza Vaccine (1) 09/01/2019 10/14/2018    Lipid Panel 09/03/2020 9/3/2019    Mammogram 12/10/2020 12/10/2018    Colonoscopy 09/06/2029 9/6/2019               Assessment & Plan    Boom was seen today for follow-up.    Diagnoses and all orders for this visit:    Other hyperlipidemia  -     lovastatin (MEVACOR) 20 MG tablet; Take 1 tablet (20 mg total) by mouth every evening.  -     CBC auto differential; Future  -     Comprehensive metabolic panel; Future  -     Lipid panel; Future    Benign hypertension  -     lisinopril (PRINIVIL,ZESTRIL) 20 MG tablet; Take 1 tablet (20 mg total) by mouth once daily.  -     CBC auto differential; Future  -     Comprehensive metabolic panel; Future  -     Lipid panel; Future    Other specified hypothyroidism  -     CBC auto differential; Future  -     Comprehensive metabolic panel; Future  -     Lipid panel; Future  -     TSH; Future    - Continue to monitor BP daily.   - Take medication only as prescribed  - Fasting labs today. Will call with results.     Follow-up: Follow up in about 6 months (around 5/4/2020).      Risks, benefits, and side effects were discussed with the patient. All questions were answered to the fullest satisfaction of the patient, and pt verbalized understanding and agreement to treatment plan. Pt was to call with any new or worsening symptoms, or present to the ER.

## 2019-11-05 ENCOUNTER — CLINICAL SUPPORT (OUTPATIENT)
Dept: REHABILITATION | Facility: HOSPITAL | Age: 49
End: 2019-11-05
Attending: ORTHOPAEDIC SURGERY
Payer: MEDICAID

## 2019-11-05 DIAGNOSIS — M25.662 JOINT STIFFNESS OF KNEE, LEFT: ICD-10-CM

## 2019-11-05 DIAGNOSIS — R26.9 ABNORMALITY OF GAIT AND MOBILITY: ICD-10-CM

## 2019-11-05 PROCEDURE — 97140 MANUAL THERAPY 1/> REGIONS: CPT

## 2019-11-05 PROCEDURE — 97110 THERAPEUTIC EXERCISES: CPT

## 2019-11-05 NOTE — PROGRESS NOTES
"                            Physical Therapy Daily Treatment Note   Name: Boom Blanco 1970  MRN: [id number]    Visit Date: 11/5/2019  Visit #: 9 / 24  Authorization period Expiration: 1/3/20   Plan of Care Expiration: 1/3/20  Precautions: standard per [facility]KA protocol    Time In: 10:00 am  Time Out: 11:00 am  Total 1:1 Treatment Time: 53 min    Treatment Diagnosis:   Encounter Diagnoses   Name Primary?    Joint stiffness of knee, left     Abnormality of gait and mobility      Physician: Sylvain Gorman,     Subjective   Pt reports: no new complaints today, has been doing some exercise at home but not putting a 20# bag of rice on her knee like the doctor told her to        Pain Scale:  5/10 on VAS currently  Pain Location: left knee    Objective   Boom received therapeutic exercises to develop strength, endurance, ROM, flexibility and posture for 45 minutes including:    Nustep Lv 2 x 15 min  Quad sets with towel roll under heel with 5 sec hold 2 x 15  Assisted HS's on SB with strap 5" hold 2 x 10  SLR 1# 2 x 15  Side lying hip abd 1# x 15  Prone hip ext 1# x 15  TKE's with 5" hold 2 x 15  Seated LAQ's 1# 2 x 15  Standing L hip flex/abd/ext 2 x 15 (4" step)   LSU on 4" step 2 x 15  FSU on 4" step 2 x 15  Standing QS with GTB 2 x 15  Prone knee hangs: 2# for 5 min DNP  Seated HS curls: RTB x15  Prone HS curls w strap for assist: x 10  Side stepping x 3 laps    Boom received the following manual therapy techniques: Joint mobilizations, Manual long axis leg pull, passive HS stretch, and PROM into flex and ext were applied to the left knee for 8 minutes.     Boom received Gait training without AD functional distances in clinic with focus on upright posture, increase left step height and length, and heel-toe gait pattern.     A/PROM:  -5/-2 degrees ext, 106/112 degrees flex    Home Exercises and Education Provided     Education provided re: use of cryotherapy as needed for pain and swelling, " importance of achieving terminal knee extension as soon as possible to prevent contractures and restore normal knee biomechanics during walking and standing  - progress towards goals   - role of therapy in multi - disciplinary team, goals for therapy  Pt educated on condition, POC, and expectations in therapy.  No spiritual or educational barriers to learning provided    Home exercises: phase   Pt will be provided HEP during course of treatment with progressions as appropriate. Pt was advised to perform these exercises free of pain, and to stop performing them if pain occurs.   Boom demonstrated good  understanding of the education provided.     Assessment   Boom completed treatment without complication or increase in pain reported progressing with exercise as noted above. Patient continues to have end range flex and ext deficits affecting gait and movement quality. PT reinforced importance of consistency with HEP including the ones she is having difficulty with and especially the hamstring stretching. She verbalized understanding of instructions provided and stated she would. Boom is progressing well towards her goals with no updates to goals at this time.     Pt prognosis is Good. Pt will continue to benefit from skilled outpatient physical therapy to address the deficits listed in the problem list chart on initial evaluation, provide pt/family education and to maximize pt's level of independence in the home and community environment.     Medical necessity is demonstrated by the impairments and functional limitations listed on the Initial Evaluation.     Anticipated barriers to physical therapy: none identified  Pt's spiritual, cultural and educational needs considered and pt agreeable to plan of care and goals.    Plan   Continue with established Plan of Care towards Physical Therapy goals.   Discussed Plan of Care with patient: Yes    Deep Westbrook, PT  11/5/2019

## 2019-11-07 ENCOUNTER — CLINICAL SUPPORT (OUTPATIENT)
Dept: REHABILITATION | Facility: HOSPITAL | Age: 49
End: 2019-11-07
Attending: ORTHOPAEDIC SURGERY
Payer: MEDICAID

## 2019-11-07 DIAGNOSIS — R26.9 ABNORMALITY OF GAIT AND MOBILITY: ICD-10-CM

## 2019-11-07 DIAGNOSIS — M25.662 JOINT STIFFNESS OF KNEE, LEFT: ICD-10-CM

## 2019-11-07 PROCEDURE — 97110 THERAPEUTIC EXERCISES: CPT

## 2019-11-07 PROCEDURE — 97140 MANUAL THERAPY 1/> REGIONS: CPT

## 2019-11-07 NOTE — PROGRESS NOTES
"                            Physical Therapy Daily Treatment Note   Name: Boom Blanco 1970  MRN: [id number]    Visit Date: 11/7/2019  Visit #: 10 / 24  Authorization period Expiration: 1/3/20   Plan of Care Expiration: 1/3/20  Precautions: standard per TKA protocol    Time In: 10:00 am  Time Out: 11:00 am  Total 1:1 Treatment Time: 53 min    Treatment Diagnosis:   Encounter Diagnoses   Name Primary?    Joint stiffness of knee, left     Abnormality of gait and mobility      Physician: Sylvain Gorman DO    Subjective   Pt reports: started doing prone knee hangs at home. gonna start working harder on knee extension.       Pain Scale:  5/10 on VAS currently  Pain Location: left knee    Objective   Boom received therapeutic exercises to develop strength, endurance, ROM, flexibility and posture for 45 minutes including:    Nustep Lv 2 x 15 min  Quad sets with towel roll under heel with 5 sec hold 2 x 15  Assisted HS's on SB with strap 5" hold 2 x 10  SLR 1# 2 x 15  Side lying hip abd 1# x 15  Prone hip ext 1# x 15  TKE's with 5" hold 2 x 15  Seated LAQ's 1# 2 x 15  Standing L hip flex/abd/ext 2 x 15 (4" step)   LSU on 4" step 2 x 15  FSU on 4" step 2 x 15  Standing QS with GTB 2 x 15  Prone knee hangs: 2# for 5 min DNP  Seated HS curls: RTB x15  Prone HS curls w strap for assist: x 10  Side stepping x 3 laps    Boom received the following manual therapy techniques: Joint mobilizations, Manual long axis leg pull, passive HS stretch, and PROM into flex and ext were applied to the left knee for 8 minutes.     Boom received Gait training without AD functional distances in clinic with focus on upright posture, increase left step height and length, and heel-toe gait pattern.     A/PROM:  -5/-2 degrees ext, 106/116 degrees flex    Home Exercises and Education Provided     Education provided re: use of cryotherapy as needed for pain and swelling, importance of achieving terminal knee extension as soon " as possible to prevent contractures and restore normal knee biomechanics during walking and standing  - progress towards goals   - role of therapy in multi - disciplinary team, goals for therapy  Pt educated on condition, POC, and expectations in therapy.  No spiritual or educational barriers to learning provided    Home exercises: phase   Pt will be provided HEP during course of treatment with progressions as appropriate. Pt was advised to perform these exercises free of pain, and to stop performing them if pain occurs.   Boom demonstrated good  understanding of the education provided.     Assessment   Boom completed treatment without complication or increase in pain reported progressing with exercise as noted above. Patient continues to have end range flex and ext deficits affecting gait and movement quality. PT reinforced importance of consistency with HEP. No new complaints. Discomfort at end range flex/ext.     Pt prognosis is Good. Pt will continue to benefit from skilled outpatient physical therapy to address the deficits listed in the problem list chart on initial evaluation, provide pt/family education and to maximize pt's level of independence in the home and community environment.     Medical necessity is demonstrated by the impairments and functional limitations listed on the Initial Evaluation.     Anticipated barriers to physical therapy: none identified  Pt's spiritual, cultural and educational needs considered and pt agreeable to plan of care and goals.    Plan   Continue with established Plan of Care towards Physical Therapy goals.   Discussed Plan of Care with patient: Yes    Alf Handley, PTA  11/7/2019

## 2019-11-09 ENCOUNTER — PATIENT MESSAGE (OUTPATIENT)
Dept: FAMILY MEDICINE | Facility: CLINIC | Age: 49
End: 2019-11-09

## 2019-11-12 ENCOUNTER — CLINICAL SUPPORT (OUTPATIENT)
Dept: REHABILITATION | Facility: HOSPITAL | Age: 49
End: 2019-11-12
Attending: ORTHOPAEDIC SURGERY
Payer: MEDICAID

## 2019-11-12 DIAGNOSIS — M25.662 JOINT STIFFNESS OF KNEE, LEFT: ICD-10-CM

## 2019-11-12 DIAGNOSIS — R26.9 ABNORMALITY OF GAIT AND MOBILITY: ICD-10-CM

## 2019-11-12 PROCEDURE — 97140 MANUAL THERAPY 1/> REGIONS: CPT

## 2019-11-12 PROCEDURE — 97110 THERAPEUTIC EXERCISES: CPT

## 2019-11-12 NOTE — PROGRESS NOTES
"                            Physical Therapy Daily Treatment Note   Name: Boom Blanco 1970  MRN: [id number]    Visit Date: 11/12/2019  Visit #: 11/ 24  Authorization period Expiration: 1/3/20   Plan of Care Expiration: 1/3/20  Precautions: standard per [facility]KA protocol    Time In: 10:00 am  Time Out: 11:00 am  Total 1:1 Treatment Time: 55 min    Treatment Diagnosis:   Encounter Diagnoses   Name Primary?    Joint stiffness of knee, left     Abnormality of gait and mobility      Physician: Sylvain Gorman DO    Subjective   Pt reports: no new complaints just need to get it straight. Hurting today though, could be cold weather.      Pain Scale:  6/10 on VAS currently  Pain Location: left knee    Objective   Boom received therapeutic exercises to develop strength, endurance, ROM, flexibility and posture for 45 minutes including:    Nustep Lv 2 x 15 min  Quad sets with towel roll under heel with 5 sec hold 2 x 15  Assisted HS's on SB with strap 5" hold 2 x 10  SLR 1# 2 x 15  Side lying hip abd 1# x 15  Prone hip ext 1# x 15  TKE's with 5" hold 2 x 15  Seated LAQ's 1# 2 x 15  Standing L hip flex/abd/ext 2 x 15 (4" step)  LSU on 4" step 2 x 15  FSU on 4" step 2 x 15  Standing QS with GTB 2 x 15  Prone knee hangs: 2# for 5 min DNP  Seated HS curls: RTB x15  Prone HS curls w strap for assist: x 10  Side stepping x 3 laps  Step downs: 2x10    Boom received the following manual therapy techniques: Joint mobilizations, Manual long axis leg pull, passive HS stretch, and PROM into flex and ext were applied to the left knee for 8 minutes.     Boom received Gait training without AD functional distances in clinic with focus on upright posture, increase left step height and length, and heel-toe gait pattern.     A/PROM:  -5/-2 degrees ext, 106/116 degrees flex    Home Exercises and Education Provided     Education provided re: use of cryotherapy as needed for pain and swelling, importance of achieving terminal " knee extension as soon as possible to prevent contractures and restore normal knee biomechanics during walking and standing  - progress towards goals   - role of therapy in multi - disciplinary team, goals for therapy  Pt educated on condition, POC, and expectations in therapy.  No spiritual or educational barriers to learning provided    Home exercises: phase   Pt will be provided HEP during course of treatment with progressions as appropriate. Pt was advised to perform these exercises free of pain, and to stop performing them if pain occurs.   Boom demonstrated good  understanding of the education provided.     Assessment   Boom completed treatment without complication or increase in pain reported progressing with exercise as noted above. Patient continues to have end range flex and ext deficits affecting gait and movement quality. Firm end feel with passive extension. No complications with treatment session progress as tolerated.     Pt prognosis is Good. Pt will continue to benefit from skilled outpatient physical therapy to address the deficits listed in the problem list chart on initial evaluation, provide pt/family education and to maximize pt's level of independence in the home and community environment.     Medical necessity is demonstrated by the impairments and functional limitations listed on the Initial Evaluation.     Anticipated barriers to physical therapy: none identified  Pt's spiritual, cultural and educational needs considered and pt agreeable to plan of care and goals.    Plan   Continue with established Plan of Care towards Physical Therapy goals.   Discussed Plan of Care with patient: Yes    Alf Handley, PTA  11/12/2019

## 2019-11-14 ENCOUNTER — CLINICAL SUPPORT (OUTPATIENT)
Dept: REHABILITATION | Facility: HOSPITAL | Age: 49
End: 2019-11-14
Attending: ORTHOPAEDIC SURGERY
Payer: MEDICAID

## 2019-11-14 DIAGNOSIS — M25.662 JOINT STIFFNESS OF KNEE, LEFT: ICD-10-CM

## 2019-11-14 DIAGNOSIS — R26.9 ABNORMALITY OF GAIT AND MOBILITY: ICD-10-CM

## 2019-11-14 PROCEDURE — 97110 THERAPEUTIC EXERCISES: CPT

## 2019-11-14 PROCEDURE — 97140 MANUAL THERAPY 1/> REGIONS: CPT

## 2019-11-14 NOTE — PROGRESS NOTES
"                            Physical Therapy Daily Treatment Note   Name: Boom Blanco 1970  MRN: [id number]    Visit Date: 11/14/2019  Visit #: 12/ 24  Authorization period Expiration: 1/3/20   Plan of Care Expiration: 1/3/20  Precautions: standard per TKA protocol    Time In: 10:00 am  Time Out: 11:00 am  Total 1:1 Treatment Time: 55 min    Treatment Diagnosis:   Encounter Diagnoses   Name Primary?    Joint stiffness of knee, left     Abnormality of gait and mobility      Physician: Sylvain Gorman DO    Subjective   Pt reports: feeling pretty good right now, nothing new.      Pain Scale:  6/10 on VAS currently  Pain Location: left knee    Objective   Boom received therapeutic exercises to develop strength, endurance, ROM, flexibility and posture for 45 minutes including:    Nustep Lv 2 x 15 min  Quad sets with towel roll under heel with 5 sec hold 2 x 15  Assisted HS's on SB with strap 5" hold 2 x 10  SLR 1# 2 x 15  Side lying hip abd 1# 2x10  Prone hip ext 1# 2x10  TKE's with 5" hold 2 x 15  Seated LAQ's 1# 2 x 15  Standing L hip flex/abd/ext 2 x 15 (4" step)  LSU on 4" step 2 x 15  FSU on 6" step 2 x 15  Standing QS with GTB 2 x 15  Prone knee hangs: 2# for 5 min   Seated HS curls: RTB x15  Prone HS curls w R LEfor assist: x 10  Side stepping x 3 laps  Step downs: 2x10  Leg press: 35# single 2x10      Boom received the following manual therapy techniques: Joint mobilizations, Manual long axis leg pull, passive HS stretch, and PROM into flex and ext were applied to the left knee for 8 minutes.     Boom received Gait training without AD functional distances in clinic with focus on upright posture, increase left step height and length, and heel-toe gait pattern.     A/PROM:  -5/-2 degrees ext, 106/116 degrees flex    Home Exercises and Education Provided     Education provided re: use of cryotherapy as needed for pain and swelling, importance of achieving terminal knee extension as soon as " possible to prevent contractures and restore normal knee biomechanics during walking and standing  - progress towards goals   - role of therapy in multi - disciplinary team, goals for therapy  Pt educated on condition, POC, and expectations in therapy.  No spiritual or educational barriers to learning provided    Home exercises: phase   Pt will be provided HEP during course of treatment with progressions as appropriate. Pt was advised to perform these exercises free of pain, and to stop performing them if pain occurs.   Boom demonstrated good  understanding of the education provided.     Assessment   Boom completed treatment without complication or increase in pain reported progressing with exercise as noted above including single leg press. ROM appears WFL however continues to present with slight extension lag. Continues to lacks active strength in HS, reviewed HEP. No complications with treatment session.     Pt prognosis is Good. Pt will continue to benefit from skilled outpatient physical therapy to address the deficits listed in the problem list chart on initial evaluation, provide pt/family education and to maximize pt's level of independence in the home and community environment.     Medical necessity is demonstrated by the impairments and functional limitations listed on the Initial Evaluation.     Anticipated barriers to physical therapy: none identified  Pt's spiritual, cultural and educational needs considered and pt agreeable to plan of care and goals.    Plan   Continue with established Plan of Care towards Physical Therapy goals.   Discussed Plan of Care with patient: Yes    Alf Handley, PTA  11/14/2019

## 2019-11-19 ENCOUNTER — CLINICAL SUPPORT (OUTPATIENT)
Dept: REHABILITATION | Facility: HOSPITAL | Age: 49
End: 2019-11-19
Attending: ORTHOPAEDIC SURGERY
Payer: MEDICAID

## 2019-11-19 DIAGNOSIS — M25.662 JOINT STIFFNESS OF KNEE, LEFT: ICD-10-CM

## 2019-11-19 DIAGNOSIS — M25.562 LEFT KNEE PAIN, UNSPECIFIED CHRONICITY: Primary | ICD-10-CM

## 2019-11-19 DIAGNOSIS — R26.9 ABNORMALITY OF GAIT AND MOBILITY: ICD-10-CM

## 2019-11-19 PROCEDURE — 97110 THERAPEUTIC EXERCISES: CPT

## 2019-11-19 PROCEDURE — 97140 MANUAL THERAPY 1/> REGIONS: CPT

## 2019-11-19 NOTE — PROGRESS NOTES
"                            Physical Therapy Daily Treatment Note   Name: Boom Blanco 1970  MRN: [id number]    Visit Date: 11/19/2019  Visit #: 13 / 24  Authorization period Expiration: 1/3/20   Plan of Care Expiration: 1/3/20  Precautions: standard per TKA protocol    Time In: 10:00 am  Time Out: 11:00 am  Total 1:1 Treatment Time: 55 min    Treatment Diagnosis:   Encounter Diagnoses   Name Primary?    Joint stiffness of knee, left     Abnormality of gait and mobility      Physician: Sylvain Gorman DO    Subjective   Pt reports: no new complaints     Pain Scale:  5/10 on VAS currently  Pain Location: left knee    Objective   Boom received therapeutic exercises to develop strength, endurance, ROM, flexibility and posture for 45 minutes including:    Nustep Lv 2 x 15 min  Quad sets with towel roll under heel with 5 sec hold 2 x 15  Assisted HS's on SB with strap 5" hold 2 x 10  SLR 1# 2 x 15  Side lying hip abd 1# 2x10  Prone hip ext 1# 2x10  TKE's with 5" hold 2 x 15  Seated LAQ's 1# 2 x 15  Standing L hip flex/abd/ext 2 x 15 (4" step)  LSU on 4" step 2 x 15  FSU on 6" step 2 x 15  Standing QS with GTB 2 x 15  Prone knee hangs: 2# for 5 min   Seated HS curls: RTB x15  Prone HS curls w R LEfor assist: x 10  Side stepping x 3 laps  Step downs: 2x10  Leg press: 35# single 2x10      Boom received the following manual therapy techniques: Joint mobilizations, Manual long axis leg pull, passive HS stretch, and PROM into flex and ext were applied to the left knee for 8 minutes.     Boom received Gait training without AD functional distances in clinic with focus on upright posture, increase left step height and length, and heel-toe gait pattern.  DNP    A/PROM:  -5/-2 degrees ext, 106/116 degrees flex    Home Exercises and Education Provided     Education provided re: use of cryotherapy as needed for pain and swelling, importance of achieving terminal knee extension as soon as possible to prevent " contractures and restore normal knee biomechanics during walking and standing  - progress towards goals   - role of therapy in multi - disciplinary team, goals for therapy  Pt educated on condition, POC, and expectations in therapy.  No spiritual or educational barriers to learning provided    Home exercises: phase   Pt will be provided HEP during course of treatment with progressions as appropriate. Pt was advised to perform these exercises free of pain, and to stop performing them if pain occurs.   Boom demonstrated good  understanding of the education provided.     Assessment   Boom completed treatment without complication or increase in pain reported progressing with exercise as noted above including single leg press. ROM appears WFL however continues to present with slight extension lag. Manual performed by PT without complications. Fatigue upon departure with slight antalgic gait. Progressing well towards established goals.     Pt prognosis is Good. Pt will continue to benefit from skilled outpatient physical therapy to address the deficits listed in the problem list chart on initial evaluation, provide pt/family education and to maximize pt's level of independence in the home and community environment.     Medical necessity is demonstrated by the impairments and functional limitations listed on the Initial Evaluation.     Anticipated barriers to physical therapy: none identified  Pt's spiritual, cultural and educational needs considered and pt agreeable to plan of care and goals.    Plan   Continue with established Plan of Care towards Physical Therapy goals.   Discussed Plan of Care with patient: Yes    Alf Handley, PTA  11/19/2019

## 2019-11-21 ENCOUNTER — CLINICAL SUPPORT (OUTPATIENT)
Dept: REHABILITATION | Facility: HOSPITAL | Age: 49
End: 2019-11-21
Attending: ORTHOPAEDIC SURGERY
Payer: MEDICAID

## 2019-11-21 DIAGNOSIS — M25.662 JOINT STIFFNESS OF KNEE, LEFT: ICD-10-CM

## 2019-11-21 DIAGNOSIS — R26.9 ABNORMALITY OF GAIT AND MOBILITY: ICD-10-CM

## 2019-11-21 PROCEDURE — 97110 THERAPEUTIC EXERCISES: CPT

## 2019-11-21 PROCEDURE — 97140 MANUAL THERAPY 1/> REGIONS: CPT

## 2019-11-21 NOTE — PROGRESS NOTES
"                            Physical Therapy Daily Treatment Note   Name: Boom Blanco 1970  MRN: [id number]    Visit Date: 11/21/2019  Visit #: 14 / 24  Authorization period Expiration: 1/3/20   Plan of Care Expiration: 1/3/20  Precautions: standard per TKA protocol    Time In: 10:00 am  Time Out: 11:00 am  Total 1:1 Treatment Time: 55 min    Treatment Diagnosis:   Encounter Diagnoses   Name Primary?    Joint stiffness of knee, left     Abnormality of gait and mobility      Physician: Sylvain Gorman DO    Subjective   Pt reports: no new complaints, doing better and have been working on extension a lot lately     Pain Scale:  5/10 on VAS currently  Pain Location: left knee    Objective   Boom received therapeutic exercises to develop strength, endurance, ROM, flexibility and posture for 45 minutes including:    Nustep Lv 2 x 15 min  Quad sets with towel roll under heel with 5 sec hold 2 x 15  Assisted HS's on SB with strap 5" hold 2 x 10  SLR 1.5# 2 x 15  Side lying hip abd 1# 2x10  Prone hip ext 1# 2x10  TKE's with 5" hold 2 x 15  Seated LAQ's 1# 2 x 15  Standing L hip flex/abd/ext 2 x 10  YTB  LSU on 4" step 2 x 15  FSU on 6" step 2 x 15  Standing QS with black tube 2 x 15  Prone knee hangs: 2# for 5 min   Seated HS curls: RTB x15  Prone HS curls w R LEfor assist: x 10  Side stepping x 3 laps YTB  Step downs: 2x10 4"  Leg press: 35# single 2x10  Sit to stands: 2x10  SAQ: 2.5# 2X15      Boom received the following manual therapy techniques: Joint mobilizations, Manual long axis leg pull, passive HS stretch, and PROM into flex and ext were applied to the left knee for 8 minutes.     Boom received Gait training without AD functional distances in clinic with focus on upright posture, increase left step height and length, and heel-toe gait pattern.  DNP    A/PROM:  -5/-2 degrees ext, 106/116 degrees flex    Home Exercises and Education Provided     Education provided re: use of cryotherapy as " needed for pain and swelling, importance of achieving terminal knee extension as soon as possible to prevent contractures and restore normal knee biomechanics during walking and standing  - progress towards goals   - role of therapy in multi - disciplinary team, goals for therapy  Pt educated on condition, POC, and expectations in therapy.  No spiritual or educational barriers to learning provided    Home exercises: phase   Pt will be provided HEP during course of treatment with progressions as appropriate. Pt was advised to perform these exercises free of pain, and to stop performing them if pain occurs.   Boom demonstrated good  understanding of the education provided.     Assessment   Boom completed treatment without complication or increase in pain reported progressing with exercise as noted above.. ROM appears WFL however continues to present with slight extension lag. Patient progressing well towards established goals. No complications with treatment session.     Pt prognosis is Good. Pt will continue to benefit from skilled outpatient physical therapy to address the deficits listed in the problem list chart on initial evaluation, provide pt/family education and to maximize pt's level of independence in the home and community environment.     Medical necessity is demonstrated by the impairments and functional limitations listed on the Initial Evaluation.     Anticipated barriers to physical therapy: none identified  Pt's spiritual, cultural and educational needs considered and pt agreeable to plan of care and goals.    Plan   Continue with established Plan of Care towards Physical Therapy goals.   Discussed Plan of Care with patient: Yes    Alf Handley, PTA  11/21/2019

## 2019-11-25 ENCOUNTER — HOSPITAL ENCOUNTER (OUTPATIENT)
Dept: RADIOLOGY | Facility: HOSPITAL | Age: 49
Discharge: HOME | End: 2019-11-25
Attending: ORTHOPAEDIC SURGERY
Payer: MEDICAID

## 2019-11-25 ENCOUNTER — OFFICE VISIT (OUTPATIENT)
Dept: ORTHOPEDICS | Facility: CLINIC | Age: 49
End: 2019-11-25
Payer: MEDICAID

## 2019-11-25 ENCOUNTER — CLINICAL SUPPORT (OUTPATIENT)
Dept: REHABILITATION | Facility: HOSPITAL | Age: 49
End: 2019-11-25
Attending: ORTHOPAEDIC SURGERY
Payer: MEDICAID

## 2019-11-25 VITALS — HEIGHT: 69 IN | BODY MASS INDEX: 28.29 KG/M2 | WEIGHT: 191 LBS | RESPIRATION RATE: 18 BRPM

## 2019-11-25 DIAGNOSIS — R26.9 ABNORMALITY OF GAIT AND MOBILITY: ICD-10-CM

## 2019-11-25 DIAGNOSIS — M25.562 LEFT KNEE PAIN, UNSPECIFIED CHRONICITY: ICD-10-CM

## 2019-11-25 DIAGNOSIS — M25.662 JOINT STIFFNESS OF KNEE, LEFT: ICD-10-CM

## 2019-11-25 DIAGNOSIS — Z96.652 STATUS POST TOTAL LEFT KNEE REPLACEMENT: Primary | ICD-10-CM

## 2019-11-25 DIAGNOSIS — Z96.651 STATUS POST TOTAL RIGHT KNEE REPLACEMENT: ICD-10-CM

## 2019-11-25 PROCEDURE — 73562 X-RAY EXAM OF KNEE 3: CPT | Mod: TC,FY,LT

## 2019-11-25 PROCEDURE — 99213 OFFICE O/P EST LOW 20 MIN: CPT | Mod: PBBFAC,25 | Performed by: ORTHOPAEDIC SURGERY

## 2019-11-25 PROCEDURE — 97140 MANUAL THERAPY 1/> REGIONS: CPT

## 2019-11-25 PROCEDURE — 99999 PR PBB SHADOW E&M-EST. PATIENT-LVL III: ICD-10-PCS | Mod: PBBFAC,,, | Performed by: ORTHOPAEDIC SURGERY

## 2019-11-25 PROCEDURE — 99024 POSTOP FOLLOW-UP VISIT: CPT | Mod: ,,, | Performed by: ORTHOPAEDIC SURGERY

## 2019-11-25 PROCEDURE — 97110 THERAPEUTIC EXERCISES: CPT

## 2019-11-25 PROCEDURE — 99024 PR POST-OP FOLLOW-UP VISIT: ICD-10-PCS | Mod: ,,, | Performed by: ORTHOPAEDIC SURGERY

## 2019-11-25 PROCEDURE — 99999 PR PBB SHADOW E&M-EST. PATIENT-LVL III: CPT | Mod: PBBFAC,,, | Performed by: ORTHOPAEDIC SURGERY

## 2019-11-25 PROCEDURE — 73562 X-RAY EXAM OF KNEE 3: CPT | Mod: 26,LT,, | Performed by: RADIOLOGY

## 2019-11-25 PROCEDURE — 73562 XR KNEE 3 VIEW LEFT: ICD-10-PCS | Mod: 26,LT,, | Performed by: RADIOLOGY

## 2019-11-25 NOTE — PROGRESS NOTES
"                            Physical Therapy Daily Treatment Note   Name: Boom Blanco 1970  MRN: [id number]    Visit Date: 11/25/2019  Visit #: 15 / 24  Authorization period Expiration: 1/3/20   Plan of Care Expiration: 1/3/20  Precautions: standard per TKA protocol    Time In: 10:00 am  Time Out: 10:55 am  Total 1:1 Treatment Time: 53 min    Treatment Diagnosis:   Encounter Diagnoses   Name Primary?    Joint stiffness of knee, left     Abnormality of gait and mobility      Physician: Sylvain Gorman DO    Subjective   Pt reports: she has a small lump at mid incision that came up since she was last here, no significant pain associated with it      Pain Scale:  4/10 on VAS currently  Pain Location: left knee    Objective   Boom received therapeutic exercises to develop strength, endurance, ROM, flexibility and posture for 45 minutes including:    Nustep Lv 2 x 15 min  Quad sets with towel roll under heel with 5 sec hold 2 x 15  Assisted HS's on SB with strap 5" hold 2 x 10  SLR 1.5# 2 x 15  Side lying hip abd 1# 2x10  Prone hip ext 1# 2x10  TKE's with 5" hold 2 x 15  Seated LAQ's 1# 2 x 15  Standing L hip flex/abd/ext 2 x 10  YTB  LSU on 4" step 2 x 15  FSU on 6" step 2 x 15  Standing QS with black tube 2 x 15  Prone knee hangs: 2# for 5 min   Seated HS curls: RTB x15  Prone HS curls w R LEfor assist: x 10  Side stepping x 3 laps RTB  Step downs: 2x10 4"  Leg press: 35# single 2x10  Sit to stands: 2x10  SAQ: 2.5# 2X15      Boom received the following manual therapy techniques: Joint mobilizations, scar tissue mob, Manual long axis leg pull, passive HS stretch, and PROM into flex and ext were applied to the left knee for 8 minutes.     Boom received Gait training without AD functional distances in clinic with focus on upright posture, increase left step height and length, and heel-toe gait pattern.      A/PROM:  -4/0 degrees ext, 118/120 degrees flex    Home Exercises and Education Provided "     Education provided re: use of cryotherapy as needed for pain and swelling, importance of achieving terminal knee extension as soon as possible to prevent contractures and restore normal knee biomechanics during walking and standing  - progress towards goals   - role of therapy in multi - disciplinary team, goals for therapy  Pt educated on condition, POC, and expectations in therapy.  No spiritual or educational barriers to learning provided    Home exercises: reviewed current HEP  Pt will be provided HEP during course of treatment with progressions as appropriate. Pt was advised to perform these exercises free of pain, and to stop performing them if pain occurs.   Boom demonstrated good  understanding of the education provided.     Assessment   Boom completed treatment without complication or increase in pain reported. Patient presenting with small scar tissue nodule at incision proximal patella, reinforced self STM at home. ROM improving as noted above however she continues to present with slight extension lag. Patient progressing well towards established goals. Patient has follow up appt with Dr seymour this morning.    Pt prognosis is Good. Pt will continue to benefit from skilled outpatient physical therapy to address the deficits listed in the problem list chart on initial evaluation, provide pt/family education and to maximize pt's level of independence in the home and community environment.     Medical necessity is demonstrated by the impairments and functional limitations listed on the Initial Evaluation.     Anticipated barriers to physical therapy: none identified  Pt's spiritual, cultural and educational needs considered and pt agreeable to plan of care and goals.    Plan   Continue with established Plan of Care towards Physical Therapy goals.   Discussed Plan of Care with patient: Yes    Deep Dariana, PT  11/25/2019

## 2019-11-25 NOTE — PROGRESS NOTES
Subjective:      Patient ID: Boom Blanco is a 49 y.o. female.    Chief Complaint: Post-op Evaluation (s/p left knee TKA 10/3/19)      HPI: Ms. Blanco returns today for follow-up on a left total knee arthroplasty. She states she is doing well and has been going to physical therapy.  She is relatively pain-free.  Her date of surgery was 10/03/2019.  She is now approximately 6 weeks postop on a left total knee arthroplasty. She also had a right total knee arthroplasty on 04/19/2019 of which she is now 7 months postop.  She stated her right knee feels great and she is doing well with it.    ROS:  No new diagnosis/surgery/prescriptions since last office visit on 10/14/2019.      Objective:      Physical Exam:   General: AAOx3.  No acute distress  Vascular:  Pulses intact and equal bilaterally.  Capillary refill less than 3 seconds and equal bilaterally  Neurologic:  Pinprick and soft touch intact and equal bilaterally  Integment:  No ecchymosis, no errythema.  Incisions well approximated and well healed.  Extremity:  Knee:  Extension/flexion both knees equal 0/greater than 95°.  No effusion/no swelling either knee. Varus/valgus stressing with good endpoint bilaterally. Nontender with motion.  Nontender with palpation both knees.  Calf soft.  Homans negative.  Radiography:  Personally reviewed x-rays of the left knee completed on 11/25/2019 showed a well-seated well-aligned left total knee arthroplasty without signs of loosening.      Assessment:       Impression:   1.  Left total knee arthroplasty.  2.  Right total knee arthroplasty.      Plan:       1.  Discussed physical examination and radiographic findings with the patient. Boom understands that she is doing well and is progressing well she is 6 weeks after her left total knee arthroplasty and appears to be progressing extremely well.  2.  Continue with physical therapy.  3.  Continue with home exercises previously shown.  4.  Any pain can be  treated with over-the-counter medications dosed per box instructions.  5.  [facility] portal was discussed with the patient and information was given.  The patient was encouraged to use the portal for future encounters.  6.  Follow up in approximately 6 weeks with x-rays of both knees.

## 2019-12-03 ENCOUNTER — CLINICAL SUPPORT (OUTPATIENT)
Dept: REHABILITATION | Facility: HOSPITAL | Age: 49
End: 2019-12-03
Attending: ORTHOPAEDIC SURGERY
Payer: MEDICAID

## 2019-12-03 DIAGNOSIS — R26.9 ABNORMALITY OF GAIT AND MOBILITY: ICD-10-CM

## 2019-12-03 DIAGNOSIS — M25.662 JOINT STIFFNESS OF KNEE, LEFT: ICD-10-CM

## 2019-12-03 PROCEDURE — 97110 THERAPEUTIC EXERCISES: CPT

## 2019-12-03 NOTE — PROGRESS NOTES
"                            Physical Therapy Daily Treatment Note   Name: Boom Blanco 1970  MRN: [id number]    Visit Date: 12/3/2019  Visit #: 16 / 24  Authorization period Expiration: 1/3/20   Plan of Care Expiration: 1/3/20  Precautions: standard per TKA protocol    Time In: 9;50 am  Time Out: 10:30 am  Total 1:1 Treatment Time: 40 min    Treatment Diagnosis:   Encounter Diagnoses   Name Primary?    Joint stiffness of knee, left     Abnormality of gait and mobility      Physician: Sylvain Gorman, DO    Subjective   Pt reports: went to see doctor the other day and he was pleased, said i've done well with both my knees and to continue with PT. Need to leave at 10:30 today      Pain Scale:  4/10 on VAS currently  Pain Location: left knee    Objective   Boom received therapeutic exercises to develop strength, endurance, ROM, flexibility and posture for 45 minutes including:    Nustep Lv 2 x 15 min  Quad sets with towel roll under heel with 5 sec hold 2 x 15  Assisted HS's on SB with strap 5" hold 2 x 10  SLR 1.5# 2 x 15  Side lying hip abd 1# 2x10  Prone hip ext 1# 2x10  TKE's with 5" hold 2 x 15  Seated LAQ's 1# 2 x 15  Standing L hip flex/abd/ext 2 x 10  YTB  LSU on 4" step 2 x 15  FSU on 6" step 2 x 15  Standing QS with black tube 2 x 15  Prone knee hangs: 2# for 5 min   Seated HS curls: RTB x15  Prone HS curls w R LEfor assist: x 10  Side stepping x 3 laps RTB  Step downs: 2x10 4"  Leg press: 35# single 2x10  Sit to stands: 2x10  SAQ: 2.5# 2X15      Boom received the following manual therapy techniques: Joint mobilizations, scar tissue mob, Manual long axis leg pull, passive HS stretch, and PROM into flex and ext were applied to the left knee for 8 minutes.     Boom received Gait training without AD functional distances in clinic with focus on upright posture, increase left step height and length, and heel-toe gait pattern.      A/PROM:  -4/0 degrees ext, 118/120 degrees flex    Home " "Exercises and Education Provided     Education provided re: use of cryotherapy as needed for pain and swelling, importance of achieving terminal knee extension as soon as possible to prevent contractures and restore normal knee biomechanics during walking and standing  - progress towards goals   - role of therapy in multi - disciplinary team, goals for therapy  Pt educated on condition, POC, and expectations in therapy.  No spiritual or educational barriers to learning provided    Home exercises: reviewed current HEP  Pt will be provided HEP during course of treatment with progressions as appropriate. Pt was advised to perform these exercises free of pain, and to stop performing them if pain occurs.   Boom demonstrated good  understanding of the education provided.     Assessment   Boom completed treatment without complication or increase in pain reported. Joint edema and warmth present. Continue to lack terminal extension actively. Discomfort reported during step ups secondary to "tightness." flexion ROM appears to be WFL.     Pt prognosis is Good. Pt will continue to benefit from skilled outpatient physical therapy to address the deficits listed in the problem list chart on initial evaluation, provide pt/family education and to maximize pt's level of independence in the home and community environment.     Medical necessity is demonstrated by the impairments and functional limitations listed on the Initial Evaluation.     Anticipated barriers to physical therapy: none identified  Pt's spiritual, cultural and educational needs considered and pt agreeable to plan of care and goals.    Plan   Continue with established Plan of Care towards Physical Therapy goals.   Discussed Plan of Care with patient: Yes    Alf Handley, PTA  12/3/2019  "

## 2019-12-05 ENCOUNTER — CLINICAL SUPPORT (OUTPATIENT)
Dept: REHABILITATION | Facility: HOSPITAL | Age: 49
End: 2019-12-05
Attending: ORTHOPAEDIC SURGERY
Payer: MEDICAID

## 2019-12-05 DIAGNOSIS — M25.662 JOINT STIFFNESS OF KNEE, LEFT: ICD-10-CM

## 2019-12-05 DIAGNOSIS — R26.9 ABNORMALITY OF GAIT AND MOBILITY: ICD-10-CM

## 2019-12-05 PROCEDURE — 97750 PHYSICAL PERFORMANCE TEST: CPT

## 2019-12-05 PROCEDURE — 97110 THERAPEUTIC EXERCISES: CPT

## 2019-12-05 NOTE — PROGRESS NOTES
"                            Physical Therapy Daily Treatment Note / Discharge note   Name: Boom Blanco 1970  MRN: [id number]    Visit Date: 12/5/2019  Visit #: 17   Precautions: standard per TKA protocol    Time In: 10:00 am  Time Out: 11:00 am  Total 1:1 Treatment Time: 48 min    Treatment Diagnosis:   Encounter Diagnoses   Name Primary?    Joint stiffness of knee, left     Abnormality of gait and mobility      Physician: Sylvain Gorman, DO    Subjective   Pt reports: she feels like she can continue with HEP and discontinue physical therapy now, she has a lot going on due to the holidays      Pain Scale:  4/10 on VAS currently  Pain Location: left knee    Objective   Boom participated in therapeutic exercises to develop strength, endurance, ROM, flexibility and posture for 30 minutes including:    Nustep Lv 2 x 15 min  Standing L hip flex/abd/ext 2 x 10  RTB  LSU on 6" step 2 x 15  FSU on 6" step 2 x 15  Standing QS with black tube 2 x 15  Seated HS curls: RTB x15  Side stepping x 3 laps RTB  Step downs: 2x10 4"  Sit to stands: 2x10    Boom received the following manual therapy techniques: Joint mobilizations, scar tissue mob, Manual long axis leg pull, passive HS stretch, and PROM into flex and ext were applied to the left knee for 8 minutes. DNP     Physical performance testing x 10 minutes with following written report. Discussed findings with patient to educate on gains made since IE and residual deficits remaining.    Boom received Gait training without AD on outside surfaces including curbs, ramps, unevenl surfaces, and ascending/descending 1 flight of stairs using 1 handrail for support and performing alternating gait pattern, mild compensation noted during descent however no LOB noted.     Lower Extremity Strength  Left LE  IE                   12/4/19   Knee extension: 2-/5                      5/5   Knee flexion: 2-/5                      5/5   Hip flexion: 2/5                    " "   5/5   Hip extension: 3/5                      4+/5   Hip abduction: 3-/5                     4+/5   Hip adduction 3/5                        5/5   Ankle dorsiflexion: 5/5                        5/5   Ankle plantarflexion: 5/5                        5/5     30 sec chair raises: 8 reps performed  Alt toe taps on 6" step without UE support: 8 reps completed with no LOB     A/PROM:  -3/0 degrees ext, 118/120 degrees flex    Home Exercises and Education Provided     Education provided re: importance of consistency with HEP to maintain gains made in therapy, discharge instructions    Home exercises: reviewed HEP for discharge  Boom demonstrated good  understanding of the education provided.     Assessment   Boom has met 5 out of 9 PT goals set at initial eval and has made significant gains towards the remaining 4 goals. She demonstrates functional strength and ROM in bilateral LE's and is ambulatory in home and community environments without AD demonstrating good balance and minimal compensation. Patient is appropriate for discharge onto [facility] at this time, instructions provided to call clinic with any questions she may have or if she notices any regression in progress made.    GOALS:      Long Term Goals: 6 weeks  Pain: Decrease pain to 2/10 to allow for improved weight bearing ability on LLE Goal not met 12/4/19, pain level 4/10 on average, 8/10 at max, and 3/10 at best  Strength: Improve strength in left Hip-> knee to 5/5 for improved LE stability Goal partially met 12/4/19  ROM: Improve AROM to 0-120 in L knee Goal partially met 12/4/19  Functional scale: Improve score on LEFS to 24% impairment Goal not met 12/4/19, current score 59% impairment  Stairs: Ascend/descend flight of steps with reciprocal pattern, minimal use of handrails, and minimal compensation  Goal met 12/4/19  Walking: Increase walking distance to 1 mile without AD with normalized gait pattern on various indoor and outdoor surfaces Goal met " 12/4/19  Postures: Increase standing duration to 45 minutes Goal met 12/4/19  Transfers: Perform car and bed transfers without limitation Goal met 12/4/19  Exercise: demonstrate independence with home exercise program to maintain gains made in therapy.  Goal met 12/4/19  Plan   Discharge onto [facility] with follow up with referring provider as scheduled.   Discharge discussed with and agreed upon with patient: Yes    Deep Westbrook, PT  12/5/2019

## 2019-12-17 DIAGNOSIS — M25.562 PAIN IN BOTH KNEES, UNSPECIFIED CHRONICITY: Primary | ICD-10-CM

## 2019-12-17 DIAGNOSIS — M25.561 PAIN IN BOTH KNEES, UNSPECIFIED CHRONICITY: Primary | ICD-10-CM

## 2019-12-23 ENCOUNTER — HOSPITAL ENCOUNTER (EMERGENCY)
Facility: HOSPITAL | Age: 49
Discharge: HOME | End: 2019-12-23
Attending: EMERGENCY MEDICINE
Payer: MEDICAID

## 2019-12-23 ENCOUNTER — TELEPHONE (OUTPATIENT)
Dept: ORTHOPEDICS | Facility: CLINIC | Age: 49
End: 2019-12-23

## 2019-12-23 VITALS
HEIGHT: 69 IN | RESPIRATION RATE: 16 BRPM | WEIGHT: 201 LBS | BODY MASS INDEX: 29.77 KG/M2 | SYSTOLIC BLOOD PRESSURE: 144 MMHG | OXYGEN SATURATION: 100 % | TEMPERATURE: 98 F | HEART RATE: 102 BPM | DIASTOLIC BLOOD PRESSURE: 62 MMHG

## 2019-12-23 DIAGNOSIS — R60.0 MILD PERIPHERAL EDEMA: Primary | ICD-10-CM

## 2019-12-23 DIAGNOSIS — M25.562 PAIN IN BOTH KNEES, UNSPECIFIED CHRONICITY: Primary | ICD-10-CM

## 2019-12-23 DIAGNOSIS — R60.9 SWELLING: ICD-10-CM

## 2019-12-23 DIAGNOSIS — M25.561 PAIN IN BOTH KNEES, UNSPECIFIED CHRONICITY: Primary | ICD-10-CM

## 2019-12-23 LAB
ALBUMIN SERPL BCP-MCNC: 4.1 G/DL (ref 3.5–5.2)
ALP SERPL-CCNC: 42 U/L (ref 55–135)
ALT SERPL W/O P-5'-P-CCNC: 15 U/L (ref 10–44)
ANION GAP SERPL CALC-SCNC: 6 MMOL/L (ref 8–16)
APTT BLDCRRT: 23.6 SEC (ref 21–32)
AST SERPL-CCNC: 18 U/L (ref 10–40)
BASOPHILS # BLD AUTO: 0.05 K/UL (ref 0–0.2)
BASOPHILS NFR BLD: 0.7 % (ref 0–1.9)
BILIRUB SERPL-MCNC: 0.4 MG/DL (ref 0.1–1)
BNP SERPL-MCNC: 39 PG/ML (ref 0–99)
BUN SERPL-MCNC: 11 MG/DL (ref 6–20)
CALCIUM SERPL-MCNC: 8.7 MG/DL (ref 8.7–10.5)
CHLORIDE SERPL-SCNC: 105 MMOL/L (ref 95–110)
CO2 SERPL-SCNC: 29 MMOL/L (ref 23–29)
CREAT SERPL-MCNC: 0.9 MG/DL (ref 0.5–1.4)
D DIMER PPP FEU-MCNC: 2490 NG/ML (ref 100–400)
DIFFERENTIAL METHOD: ABNORMAL
EOSINOPHIL # BLD AUTO: 0.1 K/UL (ref 0–0.5)
EOSINOPHIL NFR BLD: 1.2 % (ref 0–8)
ERYTHROCYTE [DISTWIDTH] IN BLOOD BY AUTOMATED COUNT: 13.2 % (ref 11.5–14.5)
EST. GFR  (AFRICAN AMERICAN): >60 ML/MIN/1.73 M^2
EST. GFR  (NON AFRICAN AMERICAN): >60 ML/MIN/1.73 M^2
GLUCOSE SERPL-MCNC: 80 MG/DL (ref 70–110)
HCT VFR BLD AUTO: 37.2 % (ref 37–48.5)
HGB BLD-MCNC: 11.6 G/DL (ref 12–16)
IMM GRANULOCYTES # BLD AUTO: 0.02 K/UL (ref 0–0.04)
IMM GRANULOCYTES NFR BLD AUTO: 0.3 % (ref 0–0.5)
INR PPP: 1 (ref 0.8–1.2)
LYMPHOCYTES # BLD AUTO: 2 K/UL (ref 1–4.8)
LYMPHOCYTES NFR BLD: 26.5 % (ref 18–48)
MCH RBC QN AUTO: 29.4 PG (ref 27–31)
MCHC RBC AUTO-ENTMCNC: 31.2 G/DL (ref 32–36)
MCV RBC AUTO: 94 FL (ref 82–98)
MONOCYTES # BLD AUTO: 0.6 K/UL (ref 0.3–1)
MONOCYTES NFR BLD: 8.5 % (ref 4–15)
NEUTROPHILS # BLD AUTO: 4.7 K/UL (ref 1.8–7.7)
NEUTROPHILS NFR BLD: 62.8 % (ref 38–73)
NRBC BLD-RTO: 0 /100 WBC
PLATELET # BLD AUTO: 200 K/UL (ref 150–350)
PMV BLD AUTO: 10.2 FL (ref 9.2–12.9)
POTASSIUM SERPL-SCNC: 4 MMOL/L (ref 3.5–5.1)
PROT SERPL-MCNC: 7.6 G/DL (ref 6–8.4)
PROTHROMBIN TIME: 11 SEC (ref 9–12.5)
RBC # BLD AUTO: 3.95 M/UL (ref 4–5.4)
SODIUM SERPL-SCNC: 140 MMOL/L (ref 136–145)
WBC # BLD AUTO: 7.43 K/UL (ref 3.9–12.7)

## 2019-12-23 PROCEDURE — 80053 COMPREHEN METABOLIC PANEL: CPT

## 2019-12-23 PROCEDURE — 93971 EXTREMITY STUDY: CPT | Mod: 26,LT,, | Performed by: RADIOLOGY

## 2019-12-23 PROCEDURE — 93971 EXTREMITY STUDY: CPT | Mod: TC,LT

## 2019-12-23 PROCEDURE — 99284 EMERGENCY DEPT VISIT MOD MDM: CPT | Mod: 25

## 2019-12-23 PROCEDURE — 85610 PROTHROMBIN TIME: CPT

## 2019-12-23 PROCEDURE — 85379 FIBRIN DEGRADATION QUANT: CPT

## 2019-12-23 PROCEDURE — 85025 COMPLETE CBC W/AUTO DIFF WBC: CPT

## 2019-12-23 PROCEDURE — 83880 ASSAY OF NATRIURETIC PEPTIDE: CPT

## 2019-12-23 PROCEDURE — 85730 THROMBOPLASTIN TIME PARTIAL: CPT

## 2019-12-23 PROCEDURE — 93971 US LOWER EXTREMITY VEINS LEFT: ICD-10-PCS | Mod: 26,LT,, | Performed by: RADIOLOGY

## 2019-12-23 RX ORDER — FUROSEMIDE 20 MG/1
20 TABLET ORAL DAILY
Qty: 5 TABLET | Refills: 0 | Status: SHIPPED | OUTPATIENT
Start: 2019-12-23 | End: 2021-02-09

## 2019-12-23 RX ORDER — FUROSEMIDE 20 MG/1
20 TABLET ORAL DAILY
Qty: 5 TABLET | Refills: 0 | Status: SHIPPED | OUTPATIENT
Start: 2019-12-23 | End: 2019-12-23 | Stop reason: SDUPTHER

## 2019-12-23 NOTE — ED TRIAGE NOTES
Pt had knee replacement Oct 3rd.  Four days ago began having left calf swelling and pt was concerned she had developed a DVT

## 2019-12-23 NOTE — TELEPHONE ENCOUNTER
----- Message from La Nena Marx MA sent at 12/23/2019  1:37 PM CST -----  Contact: pt   Wants call back   Knee down, swollen   Pain,   Call back

## 2019-12-23 NOTE — TELEPHONE ENCOUNTER
Returned call to patient. Discussed signs and symptoms of DVT again and patient informed me that she will be going to the emergency room just in case. Patient will be keeping her appointment with Dr. Gorman for tomorrow. Patient still reports full leg pain and swelling X 4 days that improves with elevation of the extremity. Denies shortness of breath or weakness. Patient will have her  take her to the ER today.

## 2019-12-23 NOTE — TELEPHONE ENCOUNTER
Returned call to patient. Appointment made for tomorrow and patient voiced understanding of appointment date, time, and location.     Patient reported full leg swelling and pain X 4 days. Discussed DVT signs and symptoms. Patient informed me that her swelling and pain go away when she elevates her leg.

## 2019-12-24 NOTE — ED PROVIDER NOTES
Encounter Date: 12/23/2019       History     Chief Complaint   Patient presents with    left calf swelling      50yo male with pmh anxiety, bipolar disorder, HLD, HTN, diverticulitis, SLE, and chronic pain syndrome presents to ED for emergent evaluation of left lower extremity swelling that she first noticed four days ago with associated posterior knee pain. Reports an uneventful total knee replacement on Oct 3rd, for which she was placed on Xarelto for one month. She is concerned she has developed a DVT in the interim. Denies long distance travel, prolonged immobilization, tobacco abuse. Denies trauma. Denies dyspnea.        Review of patient's allergies indicates:   Allergen Reactions    Remeron [mirtazapine] Other (See Comments)     Weight gain     Past Medical History:   Diagnosis Date    Anxiety disorder     Arthritis     Bipolar disorder     Chronic pain     Diverticulitis     Hyperlipidemia     Hypertension     Lupus 2006    Psoriatic arthritis     Raynaud's disease      Past Surgical History:   Procedure Laterality Date    ARTHROSCOPIC CHONDROPLASTY OF KNEE JOINT Left 1/22/2019    Procedure: ARTHROSCOPY, KNEE, WITH CHONDROPLASTY;  Surgeon: Sylvain Gorman DO;  Location: [facility] OR;  Service: Orthopedics;  Laterality: Left;    ARTHROSCOPY OF KNEE Left 1/22/2019    Procedure: ARTHROSCOPY, KNEE;  Surgeon: Sylvain Gorman DO;  Location: [facility] OR;  Service: Orthopedics;  Laterality: Left;  Equipment: Lacrosse Chondral Drill Set and Mitek Meniscus Repair Set [facility]  Vendor/Rep: [employer]    ARTHROSCOPY OF KNEE Right 2/5/2019    Procedure: ARTHROSCOPY, KNEE;  Surgeon: Sylvain Gorman DO;  Location: [facility] OR;  Service: Orthopedics;  Laterality: Right;  Equipment: Mitek Truspar; Scope  Venor/Rep: Mitek    BACK SURGERY      spinal stimulator implanted and then removed    BREAST BIOPSY      COLON SURGERY      COLONOSCOPY  11/25/2016    repeat in 5-10 years    COLONOSCOPY N/A 9/6/2019     Procedure: COLONOSCOPY;  Surgeon: Dany Hugo MD;  Location: [facility] ENDO;  Service: General;  Laterality: N/A;    ENDOSCOPY  9/6/2019    Procedure: ENDOSCOPY;  Surgeon: Dany Hugo MD;  Location: [facility] ENDO;  Service: General;;  with multiple biopsy's    ESOPHAGOGASTRODUODENOSCOPY Left 8/29/2018    Procedure: ESOPHAGOGASTRODUODENOSCOPY (EGD);  Surgeon: Dany Hugo MD;  Location: [facility] ENDO;  Service: General;  Laterality: Left;    EXCISION OF MEDIAL MENISCUS OF KNEE Right 2/5/2019    Procedure: MENISCECTOMY, KNEE, MEDIAL;  Surgeon: Sylvain Gorman DO;  Location: [facility] OR;  Service: Orthopedics;  Laterality: Right;    HYSTERECTOMY  1997    KNEE ARTHROSCOPY W/ DEBRIDEMENT Left 1/22/2019    Procedure: ARTHROSCOPY, KNEE, WITH DEBRIDEMENT;  Surgeon: Sylvain Gorman DO;  Location: [facility] OR;  Service: Orthopedics;  Laterality: Left;    KNEE ARTHROSCOPY W/ MENISCECTOMY Left 1/22/2019    Procedure: ARTHROSCOPY, KNEE, WITH MENISCECTOMY;  Surgeon: Sylvain Gorman DO;  Location: [facility] OR;  Service: Orthopedics;  Laterality: Left;    KNEE ARTHROSCOPY W/ PLICA EXCISION Left 1/22/2019    Procedure: EXCISION, PLICA, KNEE, ARTHROSCOPIC;  Surgeon: Sylvain Gorman DO;  Location: [facility] OR;  Service: Orthopedics;  Laterality: Left;    SALPINGOOPHORECTOMY  1997    TOTAL KNEE ARTHROPLASTY Right 4/18/2019    Procedure: ARTHROPLASTY, KNEE, TOTAL;  Surgeon: Sylvain Gorman DO;  Location: [facility] OR;  Service: Orthopedics;  Laterality: Right;  Equipment: Depuy Sigma Total Knee System; [employer] Leg Mcclellan  Vendor/Rep: [employer]  Table/Position: Supine  REQUIRES FIRST ASSISTANT EPHRAIM    TOTAL KNEE ARTHROPLASTY Left 10/3/2019    Procedure: ARTHROPLASTY, KNEE, TOTAL;  Surgeon: Sylvain Gorman DO;  Location: [facility] OR;  Service: Orthopedics;  Laterality: Left;  Equipment: Depuy Sigma Total Knee System  Vendor/Rep: [employer]  REQUIRES FIRST ASSISTANT EPHRAIM     Family History   Problem Relation Age of Onset    Arthritis Mother      Pacemaker/defibrilator Mother     Lung disease Father     Heart disease Father     Arthritis Sister     Arthritis Brother     Arthritis Sister     Arthritis Sister     Breast cancer Neg Hx      Social History     Tobacco Use    Smoking status: Former Smoker     Packs/day: 1.00     Years: 33.00     Pack years: 33.00     Types: Cigarettes    Smokeless tobacco: Never Used   Substance Use Topics    Alcohol use: No     Frequency: Never    Drug use: Not Currently     Types: Cocaine     Review of Systems   Constitutional: Negative for appetite change, chills, diaphoresis, fatigue and fever.   HENT: Negative for congestion, ear pain, rhinorrhea, sinus pressure, sinus pain, sore throat and tinnitus.    Eyes: Negative for photophobia and visual disturbance.   Respiratory: Negative for cough, chest tightness, shortness of breath and wheezing.    Cardiovascular: Positive for leg swelling. Negative for chest pain and palpitations.   Gastrointestinal: Negative for abdominal pain, constipation, diarrhea, nausea and vomiting.   Endocrine: Negative for cold intolerance, heat intolerance, polydipsia, polyphagia and polyuria.   Genitourinary: Negative for decreased urine volume, difficulty urinating, dysuria, flank pain, frequency, hematuria and urgency.   Musculoskeletal: Negative for arthralgias, back pain, gait problem, joint swelling, myalgias, neck pain and neck stiffness.   Skin: Negative for color change, pallor, rash and wound.   Allergic/Immunologic: Negative for immunocompromised state.   Neurological: Negative for dizziness, syncope, weakness, light-headedness, numbness and headaches.   Hematological: Negative for adenopathy. Does not bruise/bleed easily.   Psychiatric/Behavioral: Negative for decreased concentration, dysphoric mood and sleep disturbance. The patient is not nervous/anxious.    All other systems reviewed and are negative.      Physical Exam     Initial Vitals [12/23/19 1520]   BP Pulse Resp Temp  SpO2   (!) 142/79 94 16 98 °F (36.7 °C) 99 %      MAP       --         Physical Exam    Nursing note and vitals reviewed.  Constitutional: She appears well-developed and well-nourished. She is not diaphoretic. No distress.   HENT:   Head: Normocephalic and atraumatic.   Right Ear: External ear normal.   Left Ear: External ear normal.   Nose: Nose normal.   Mouth/Throat: Oropharynx is clear and moist.   Eyes: Conjunctivae are normal. Pupils are equal, round, and reactive to light. No scleral icterus.   Neck: Normal range of motion. Neck supple. No JVD present.   Cardiovascular: Regular rhythm, normal heart sounds and intact distal pulses. Tachycardia present.    Pulmonary/Chest: Breath sounds normal. No respiratory distress. She has no wheezes. She has no rhonchi. She has no rales. She exhibits no tenderness.   Abdominal: Soft. Bowel sounds are normal. She exhibits no distension. There is no tenderness. There is no rebound and no guarding.   Musculoskeletal: Normal range of motion. She exhibits edema and tenderness.   Lymphadenopathy:     She has no cervical adenopathy.   Neurological: She is alert and oriented to person, place, and time. GCS score is 15. GCS eye subscore is 4. GCS verbal subscore is 5. GCS motor subscore is 6.   Skin: Skin is warm and dry. Capillary refill takes less than 2 seconds. No rash noted. No erythema. No pallor.   Psychiatric: She has a normal mood and affect. Her behavior is normal. Judgment and thought content normal.         ED Course   Procedures  Labs Reviewed   COMPREHENSIVE METABOLIC PANEL - Abnormal; Notable for the following components:       Result Value    Alkaline Phosphatase 42 (*)     Anion Gap 6 (*)     All other components within normal limits   CBC W/ AUTO DIFFERENTIAL - Abnormal; Notable for the following components:    RBC 3.95 (*)     Hemoglobin 11.6 (*)     Mean Corpuscular Hemoglobin Conc 31.2 (*)     All other components within normal limits   D DIMER, QUANTITATIVE -  Abnormal; Notable for the following components:    D-Dimer 2490 (*)     All other components within normal limits    Narrative:     d dimer of 2490   critical result(s) called and verbal readback   obtained from Fabian Waite@1827 by angeles on 12/23/19 by JW1 12/23/2019   16:32   B-TYPE NATRIURETIC PEPTIDE   APTT   PROTIME-INR          Imaging Results          US Lower Extremity Veins Left (Final result)  Result time 12/23/19 16:10:48    Final result by Ishan Sommer MD (12/23/19 16:10:48)                 Impression:      No evidence of deep venous thrombosis in the left lower extremity.      Electronically signed by: Ishan Sommer  Date:    12/23/2019  Time:    16:10             Narrative:    EXAMINATION:  US LOWER EXTREMITY VEINS LEFT    CLINICAL HISTORY:  Edema, unspecified    TECHNIQUE:  Duplex and color flow Doppler evaluation and graded compression of the left lower extremity veins was performed.    COMPARISON:  None    FINDINGS:  Left thigh veins: The common femoral, femoral, popliteal, upper greater saphenous, and deep femoral veins are patent and free of thrombus. The veins are normally compressible and have normal phasic flow and augmentation response.    Left calf veins: The visualized calf veins are patent.    Contralateral CFV: The contralateral (right) common femoral vein is patent and free of thrombus.    Miscellaneous: None                                 Medical Decision Making:   Differential Diagnosis:   Acute deep vein thrombosis, peripheral edema, muscle cramping, electrolyte imbalance                                 Clinical Impression:       ICD-10-CM ICD-9-CM   1. Mild peripheral edema R60.9 782.3   2. Swelling R60.9 782.3         Disposition:   Disposition: Discharged  Condition: Stable                     Ale Flores MD  12/24/19 1250

## 2020-01-06 ENCOUNTER — HOSPITAL ENCOUNTER (OUTPATIENT)
Dept: RADIOLOGY | Facility: HOSPITAL | Age: 50
Discharge: HOME | End: 2020-01-06
Attending: ORTHOPAEDIC SURGERY
Payer: MEDICAID

## 2020-01-06 ENCOUNTER — OFFICE VISIT (OUTPATIENT)
Dept: ORTHOPEDICS | Facility: CLINIC | Age: 50
End: 2020-01-06
Payer: MEDICAID

## 2020-01-06 VITALS
HEIGHT: 69 IN | WEIGHT: 201 LBS | HEART RATE: 94 BPM | DIASTOLIC BLOOD PRESSURE: 58 MMHG | BODY MASS INDEX: 29.77 KG/M2 | SYSTOLIC BLOOD PRESSURE: 121 MMHG

## 2020-01-06 DIAGNOSIS — Z96.651 STATUS POST TOTAL RIGHT KNEE REPLACEMENT: Primary | ICD-10-CM

## 2020-01-06 DIAGNOSIS — Z96.652 STATUS POST TOTAL LEFT KNEE REPLACEMENT: ICD-10-CM

## 2020-01-06 DIAGNOSIS — M25.561 PAIN IN BOTH KNEES, UNSPECIFIED CHRONICITY: ICD-10-CM

## 2020-01-06 DIAGNOSIS — M25.562 PAIN IN BOTH KNEES, UNSPECIFIED CHRONICITY: ICD-10-CM

## 2020-01-06 PROCEDURE — 99213 OFFICE O/P EST LOW 20 MIN: CPT | Mod: PBBFAC,25 | Performed by: ORTHOPAEDIC SURGERY

## 2020-01-06 PROCEDURE — 99213 PR OFFICE/OUTPT VISIT, EST, LEVL III, 20-29 MIN: ICD-10-PCS | Mod: S$PBB,,, | Performed by: ORTHOPAEDIC SURGERY

## 2020-01-06 PROCEDURE — 99999 PR PBB SHADOW E&M-EST. PATIENT-LVL III: ICD-10-PCS | Mod: PBBFAC,,, | Performed by: ORTHOPAEDIC SURGERY

## 2020-01-06 PROCEDURE — 73562 XR KNEE 3 VIEW BILATERAL: ICD-10-PCS | Mod: 26,50,, | Performed by: RADIOLOGY

## 2020-01-06 PROCEDURE — 99999 PR PBB SHADOW E&M-EST. PATIENT-LVL III: CPT | Mod: PBBFAC,,, | Performed by: ORTHOPAEDIC SURGERY

## 2020-01-06 PROCEDURE — 73562 X-RAY EXAM OF KNEE 3: CPT | Mod: 26,50,, | Performed by: RADIOLOGY

## 2020-01-06 PROCEDURE — 73562 X-RAY EXAM OF KNEE 3: CPT | Mod: 50,TC,FY

## 2020-01-06 PROCEDURE — 99213 OFFICE O/P EST LOW 20 MIN: CPT | Mod: S$PBB,,, | Performed by: ORTHOPAEDIC SURGERY

## 2020-01-06 NOTE — PROGRESS NOTES
Subjective:      Patient ID: Boom Blanco is a 49 y.o. female.    Chief Complaint: Knee Pain (LEFT KNEE S/P TKA 10/3/19)      HPI: Ms. Blanco returns today for follow-up on bilateral total knee arthroplasties.  She had a left total knee arthroplasty completed on 10/03/2019 and a right on 04/19/2019.  She stated that on 12/23/2019 she had ankle swelling and became concerned that she may have a DVT so she went to the emergency room. She gets some intermittent swelling in her ankles when she exercises which caused her concern.  She is otherwise doing well and is relatively pain-free.  She is now 3 months postop from her left total knee arthroplasty. She is almost 9 months postop on her right total knee arthroplasty    ROS:  No new diagnosis/surgery/prescriptions since last office visit on 11/25/2019.  Constitution: Negative for chills and fever.   Eyes: Negative for blurred vision.   Cardiovascular: Negative for chest pain.   Respiratory: Negative for cough.   Endocrine: Negative for polydipsia.   Hematologic/Lymphatic: Does not bruise/bleed easily.   Skin: Negative for itching.   Musculoskeletal: Positive for back pain.   Gastrointestinal: Negative for heartburn.   Genitourinary: Negative for nocturia.   Neurological: Negative for headaches and seizures.   Psychiatric/Behavioral: Negative for substance abuse.   Allergic/Immunologic: Positive for environmental allergies.       Objective:      Physical Exam:   General: AAOx3.  No acute distress  Vascular:  Pulses intact and equal bilaterally.  Capillary refill less than 3 seconds and equal bilaterally  Neurologic:  Pinprick and soft touch intact and equal bilaterally  Integment:  No ecchymosis, no errythema  Extremity:  Knee:  Extension/flexion equal bilaterally 0/greater than 95°.  Nontender with motion.  Nontender with palpation.  Temperature equal bilaterally, cool, with palpation.  No effusion.  No swelling. Varus/valgus stressing equal bilaterally  with good endpoint.  Forward/backward stressing equal bilaterally with good endpoint.  No joint line tenderness.  Nontender at the anserine insertion.  Calf soft.  Homans negative bilaterally.  Radiography:  Personally reviewed x-rays of both knees completed on 01/06/2020 showed a well-seated well-aligned total knee arthroplasty in both knees without signs of loosening.  Doppler of bilateral lower extremities completed on 12/23/2019 showed no DVT.      Assessment:       Impression:  Bilateral total knee arthroplasty.      Plan:       1.  Discussed physical examination with the patient. Boom understands that she is doing well but occasionally she will notice some ankle swelling because it takes up to year for the venous return to completely resolved.  2.  Continue with home exercises.  3.  Any minor pain can be treated with over-the-counter medications dosed per box instructions.  4.  Reassurance given to the patient that her knees appear good and she is doing well.  5.  [facility] portal was discussed with the patient and information was given.  The patient was encouraged to use the portal for future encounters.  6.  Follow up in 3 months with an x-ray of both knees.

## 2020-01-31 NOTE — PRE ADMISSION SCREENING
----- Message from Moisés Taylor MD sent at 1/31/2020  3:32 PM CST -----  Mild OA    Left hip IA   Hold  Yefri  Aspercreme     ----- Message -----  From: Patel Cruz Results In  Sent: 1/31/2020   3:23 PM CST  To: [facility] Result Pool       JOINT CAMP ASSESSMENT    Name Boom Blanco   MRN [id number]    Age/Sex 49 y.o. female    Surgeon Dr. Sylvain Gorman   Joint Camp Date 9/20/2019   Surgery Date 10/03/2019   Procedure Left Knee Arthroplasty   Insurance [place]: [place] / Plan: [place] / Product Type: Government /    Care Team Patient Care Team:  Sofi Gallagher DO as PCP - General (Family Medicine)  Sylvain Gorman DO as Consulting Physician (Orthopedic Surgery)  Isa Stanley LPN as Care Coordinator (Family Medicine)    Pharmacy   [facility]  [address] [place]  [facility] [address]  Phone: [phone] Fax: [phone]    [facility] - [place], [place] - [address]  [address]  [place] [address]  Phone: [phone] Fax: [phone]     AM-PAC Score   18   Risk Assessment Score 9     Past Medical History:   Diagnosis Date    Anxiety disorder     Arthritis     Bipolar disorder     Chronic pain     Hyperlipidemia     Hypertension     Psoriatic arthritis     Raynaud's disease        Past Surgical History:   Procedure Laterality Date    ARTHROPLASTY, KNEE, TOTAL Right 4/18/2019    [facility]    ARTHROSCOPY, KNEE Right 2/5/2019    [facility] at [facility]    ARTHROSCOPY, KNEE Left 1/22/2019    [facility] at [facility] OR    ARTHROSCOPY, KNEE, WITH CHONDROPLASTY Left 1/22/2019    [facility] at [facility]    ARTHROSCOPY, KNEE, WITH DEBRIDEMENT Left 1/22/2019    [facility] at [facility]    ARTHROSCOPY, KNEE, WITH MENISCECTOMY Left 1/22/2019    [facility] DO at [facility] OR    BACK SURGERY      spinal stimulator implanted and then removed    BREAST BIOPSY      CHONDROPLASTY, KNEE Right 2/5/2019    [facility] at [facility] OR    COLONOSCOPY  11/25/2016    repeat in 5-10 years    COLONOSCOPY N/A 9/6/2019    Performed by Dany GARAY  MD [place] [facility]    ENDOSCOPY  9/6/2019    [facility] by Dany Hugo MD at [facility] END[facility]    ESOPHAGOGASTRODUODENOSCOPY (EGD) Left 8/29/2018    [facility] at [facility] END[facility]    EXCISION, PLICA, KNEE, ARTHROSCOPIC Left 1/22/2019    [facility] at [facility] OR    HYSTERECTOMY  1997    MENISCECTOMY, KNEE, MEDIAL Right 2/5/2019    [facility] at [facility]    SALPINGOOPHORECTOMY  1997         Home Enviroment     Living Arrangement: Lives with spouse  Home Environment: 2-story house, elevator, number of outside stairs: 18, tub-shower  Home Safety Concerns: Pets in the home: dogs (3).    DISCHARGE CAREGIVER/SUPPORT SYSTEM     Identified post-op caregiver: Patient has children / family / friends to help, spouse and mother.  Patient's caregiver(s) will be able to provide physical assistance. Patient will have someone to assist overnight.      Caregiver present at pre-op interview:  No      PRE-OPERATIVE FUNCTIONAL STATUS     Employment: Employed full time    Pre-op Functional Status: Patient is independent with mobility/ambulation, transfers, ADL's, IADL's.    Use of assistive device for ambulation: none  ADL: self care  ADL Limitations: difficulty with walking  Medical Restrictions: Decreased range of motions in extremities    POTENTIAL BARRIERS TO DISCHARGE/POTENTIAL POST-OP COMPLICATIONS     Patient with hx of HTN, chronic pain with long-term opioid use (Percocet 10 mg). Patient is followed by pain management in [place], [place]. Consider pain management options. Patient has bipolar disorder as well as anxiety, lupus. Patient needs to climb stairs prior to discharge due to having 18 steps to get into her home. Patient had right knee arthroplasty on 04/10/2019 without complication.    DISCHARGE PLAN     Expected LOS of 2 days or less for joint replacement discussed with patient.     Patient in agreement with discharge plan: Yes    Discharge to: Outpatient PT and  Home with 24 hour assistance     HH:  None per insurance.     OP PT: [facility]     Home DME: rolling walker, bedside commode and shower chair. CPM to be ordered by Dr. Gorman prior to surgery.    Needed DME at D/C: none     Rx: Per Dr. Gorman at discharge     Meds to Beds: N/A  Patient expected to discharge on Xarelto (Rivaroxaban) for DVT prophylaxis.

## 2020-02-13 DIAGNOSIS — E03.8 OTHER SPECIFIED HYPOTHYROIDISM: ICD-10-CM

## 2020-02-13 RX ORDER — LEVOTHYROXINE SODIUM 50 UG/1
TABLET ORAL
Qty: 30 TABLET | Refills: 11 | Status: SHIPPED | OUTPATIENT
Start: 2020-02-13 | End: 2020-04-20 | Stop reason: SDUPTHER

## 2020-02-19 ENCOUNTER — TELEPHONE (OUTPATIENT)
Dept: FAMILY MEDICINE | Facility: CLINIC | Age: 50
End: 2020-02-19

## 2020-02-19 NOTE — TELEPHONE ENCOUNTER
----- Message from  Dutta sent at 2/19/2020  1:27 PM CST -----  Contact: Boom pt  Type: Needs Medical Advice    Who Called:  Boom  Symptoms (please be specific):  Her feet are swelling  How long has patient had these symptoms:  Off and on  Pharmacy name and phone #:    [facility] - [place], [place] - [address]  [address]  [place] [address]  Phone: [phone] Fax: [phone]      Best Call Back Number: [phone]  Additional Information: Pls call pt regarding sending her something for the swelling

## 2020-02-19 NOTE — TELEPHONE ENCOUNTER
Offered patient appointment with one of the providers in office but she declined and said she would call back if necessary.

## 2020-04-02 ENCOUNTER — PATIENT MESSAGE (OUTPATIENT)
Dept: ORTHOPEDICS | Facility: CLINIC | Age: 50
End: 2020-04-02

## 2020-04-20 ENCOUNTER — PATIENT MESSAGE (OUTPATIENT)
Dept: FAMILY MEDICINE | Facility: CLINIC | Age: 50
End: 2020-04-20

## 2020-04-20 DIAGNOSIS — E78.49 OTHER HYPERLIPIDEMIA: ICD-10-CM

## 2020-04-20 DIAGNOSIS — E03.8 OTHER SPECIFIED HYPOTHYROIDISM: ICD-10-CM

## 2020-04-20 DIAGNOSIS — I10 BENIGN HYPERTENSION: ICD-10-CM

## 2020-04-20 RX ORDER — LISINOPRIL 20 MG/1
20 TABLET ORAL DAILY
Qty: 90 TABLET | Refills: 1 | Status: SHIPPED | OUTPATIENT
Start: 2020-04-20 | End: 2020-10-27

## 2020-04-20 RX ORDER — LOVASTATIN 20 MG/1
20 TABLET ORAL NIGHTLY
Qty: 90 TABLET | Refills: 1 | Status: SHIPPED | OUTPATIENT
Start: 2020-04-20 | End: 2020-10-27

## 2020-04-20 RX ORDER — LEVOTHYROXINE SODIUM 50 UG/1
50 TABLET ORAL
Qty: 30 TABLET | Refills: 11 | Status: SHIPPED | OUTPATIENT
Start: 2020-04-20 | End: 2021-03-15

## 2020-04-20 NOTE — TELEPHONE ENCOUNTER
04/20/2020 2:26PM-- Patient was told she will need  appt, offered virtual visit. Declined wants appt in July but out of medications. Has not been seen since 11/04/2019.  Please advise. Thanks

## 2020-05-05 ENCOUNTER — PATIENT MESSAGE (OUTPATIENT)
Dept: ADMINISTRATIVE | Facility: HOSPITAL | Age: 50
End: 2020-05-05

## 2020-05-20 ENCOUNTER — PATIENT MESSAGE (OUTPATIENT)
Dept: ADMINISTRATIVE | Facility: HOSPITAL | Age: 50
End: 2020-05-20

## 2020-06-02 NOTE — INTERVAL H&P NOTE
Subjective:   Patient ID:  Nelson GIBBONS Parent is a 82 y.o. male who presents for follow-up of CAD/ICM/MI    HPI: The patient is here for CAD.     The patient has no chest pain, SOB, TIA, palpitations, syncope or pre-syncope.Patient currently exercises several times per week.        Review of Systems   Constitution: Negative for chills, decreased appetite, diaphoresis, fever, malaise/fatigue, night sweats, weight gain and weight loss.   HENT: Negative for congestion, hoarse voice, nosebleeds, sore throat and tinnitus.    Eyes: Negative for blurred vision, double vision, vision loss in left eye, vision loss in right eye, visual disturbance and visual halos.   Cardiovascular: Negative for chest pain, claudication, cyanosis, dyspnea on exertion, irregular heartbeat, leg swelling, near-syncope, orthopnea, palpitations, paroxysmal nocturnal dyspnea and syncope.   Respiratory: Negative for cough, hemoptysis, shortness of breath, sleep disturbances due to breathing, snoring, sputum production and wheezing.    Endocrine: Negative for cold intolerance, heat intolerance, polydipsia, polyphagia and polyuria.   Hematologic/Lymphatic: Negative for adenopathy and bleeding problem. Does not bruise/bleed easily.   Skin: Negative for color change, dry skin, flushing, itching, nail changes, poor wound healing, rash, skin cancer, suspicious lesions and unusual hair distribution.   Musculoskeletal: Negative for arthritis, back pain, falls, gout, joint pain, joint swelling, muscle cramps, muscle weakness, myalgias and stiffness.   Gastrointestinal: Negative for abdominal pain, anorexia, change in bowel habit, constipation, diarrhea, dysphagia, heartburn, hematemesis, hematochezia, melena and vomiting.   Genitourinary: Negative for decreased libido, dysuria, hematuria, hesitancy and urgency.   Neurological: Negative for excessive daytime sleepiness, dizziness, focal weakness, headaches, light-headedness, loss of balance, numbness,  The patient has been examined and the H&P has been reviewed:  Operative extremity signed at 7:10 a.m..  H&P updated 7:14 a.m..  Patient rate to roll to operating room at 7:14 a.m..    I concur with the findings and no changes have occurred since H&P was written.    Anesthesia/Surgery risks, benefits and alternative options discussed and understood by patient/family.          Active Hospital Problems    Diagnosis  POA    Arthritis of left knee [M17.12]  Yes      Resolved Hospital Problems   No resolved problems to display.      paresthesias, seizures, sensory change, tremors, vertigo and weakness.   Psychiatric/Behavioral: Negative for altered mental status, depression, hallucinations, memory loss, substance abuse and suicidal ideas. The patient does not have insomnia and is not nervous/anxious.    Allergic/Immunologic: Negative for environmental allergies and hives.       Objective: BP (!) 153/75 (BP Location: Left arm, Patient Position: Sitting, BP Method: Medium (Automatic))   Pulse 62   Ht 6' (1.829 m)   Wt 78.1 kg (172 lb 2.9 oz)   BMI 23.35 kg/m²      Physical Exam   Constitutional: He is oriented to person, place, and time. He appears well-developed and well-nourished. No distress.   HENT:   Head: Normocephalic.   Eyes: Pupils are equal, round, and reactive to light. EOM are normal.   Neck: Normal range of motion. No thyromegaly present.   Cardiovascular: Normal rate, regular rhythm, normal heart sounds and intact distal pulses. Exam reveals no gallop and no friction rub.   No murmur heard.  Pulses:       Carotid pulses are 3+ on the right side, and 3+ on the left side.       Radial pulses are 3+ on the right side, and 3+ on the left side.        Femoral pulses are 3+ on the right side, and 3+ on the left side.       Popliteal pulses are 3+ on the right side, and 3+ on the left side.        Dorsalis pedis pulses are 3+ on the right side, and 3+ on the left side.        Posterior tibial pulses are 3+ on the right side, and 3+ on the left side.   Pulmonary/Chest: Effort normal and breath sounds normal. No respiratory distress. He has no wheezes. He has no rales. He exhibits no tenderness.   Abdominal: Soft. He exhibits no distension and no mass. There is no tenderness.   Musculoskeletal: Normal range of motion.   Lymphadenopathy:     He has no cervical adenopathy.   Neurological: He is alert and oriented to person, place, and time.   Skin: Skin is warm. He is not diaphoretic. No cyanosis. Nails show no clubbing.   Psychiatric: He  has a normal mood and affect. His speech is normal and behavior is normal. Judgment and thought content normal. Cognition and memory are normal.       Assessment:     1. Coronary artery disease involving native coronary artery of native heart without angina pectoris    2. Aortic atherosclerosis    3. AICD (automatic cardioverter/defibrillator) present    4. CKD (chronic kidney disease) stage 3, GFR 30-59 ml/min    5. Essential hypertension    6. Erectile dysfunction due to arterial insufficiency    7. Gastroesophageal reflux disease without esophagitis    8. History of TIA (transient ischemic attack)    9. History of ventricular tachycardia    10. Ischemic cardiomyopathy    11. Mixed hyperlipidemia    12. Cerebral infarction, remote, resolved        Plan:   Discussed diet , achieving and maintaining ideal body weight, and exercise.   We reviewed meds in detail.  Reassured-Discussed goals, options , plan  OK to try generic Viagra but no NTG for 24 hours before or after        Edward was seen today for coronary artery disease involving native coronary artery of .    Diagnoses and all orders for this visit:    Coronary artery disease involving native coronary artery of native heart without angina pectoris  -     Lipid Panel; Future; Expected date: 06/03/2021  -     Comprehensive metabolic panel; Future; Expected date: 06/03/2021  -     TSH; Future; Expected date: 06/03/2021  -     CBC auto differential; Future; Expected date: 06/03/2021  -     EKG 12-lead; Future; Expected date: 06/03/2021    Aortic atherosclerosis  -     Lipid Panel; Future; Expected date: 06/03/2021  -     Comprehensive metabolic panel; Future; Expected date: 06/03/2021  -     TSH; Future; Expected date: 06/03/2021    AICD (automatic cardioverter/defibrillator) present  -     EKG 12-lead; Future; Expected date: 06/03/2021    CKD (chronic kidney disease) stage 3, GFR 30-59 ml/min  -     Lipid Panel; Future; Expected date: 06/03/2021  -      Comprehensive metabolic panel; Future; Expected date: 06/03/2021  -     TSH; Future; Expected date: 06/03/2021    Essential hypertension  -     Comprehensive metabolic panel; Future; Expected date: 06/03/2021  -     TSH; Future; Expected date: 06/03/2021  -     CBC auto differential; Future; Expected date: 06/03/2021    Erectile dysfunction due to arterial insufficiency    Gastroesophageal reflux disease without esophagitis    History of TIA (transient ischemic attack)  -     CBC auto differential; Future; Expected date: 06/03/2021    History of ventricular tachycardia  -     EKG 12-lead; Future; Expected date: 06/03/2021    Ischemic cardiomyopathy  -     CBC auto differential; Future; Expected date: 06/03/2021  -     EKG 12-lead; Future; Expected date: 06/03/2021    Mixed hyperlipidemia  -     Comprehensive metabolic panel; Future; Expected date: 06/03/2021    Cerebral infarction, remote, resolved    Other orders  -     famotidine (PEPCID) 20 MG tablet; Take 20 mg by mouth 2 (two) times daily.  -     sildenafiL (VIAGRA) 100 MG tablet; Take 1 tablet (100 mg total) by mouth daily as needed for Erectile Dysfunction.            Follow up in about 1 year (around 6/3/2021) for with ECG and labs.

## 2020-10-27 DIAGNOSIS — I10 BENIGN HYPERTENSION: ICD-10-CM

## 2020-10-27 DIAGNOSIS — E78.49 OTHER HYPERLIPIDEMIA: ICD-10-CM

## 2020-10-27 RX ORDER — LOVASTATIN 20 MG/1
TABLET ORAL
Qty: 90 TABLET | Refills: 3 | Status: SHIPPED | OUTPATIENT
Start: 2020-10-27 | End: 2021-07-09

## 2020-10-27 RX ORDER — LISINOPRIL 20 MG/1
TABLET ORAL
Qty: 90 TABLET | Refills: 3 | Status: SHIPPED | OUTPATIENT
Start: 2020-10-27 | End: 2021-07-09

## 2020-10-27 NOTE — TELEPHONE ENCOUNTER
----- Message from Stephanie Hayes sent at 10/27/2020 12:02 PM CDT -----  Regarding: refill follow up  Contact: pt  Type:  Patient Returning Call    Who Called:  pt  Does the patient know what this is regarding?:  please follow up with pt refills as soon as possible lisinopriL (PRINIVIL,ZESTRIL) 20 MG tablet and  lovastatin (MEVACOR) 20 MG tablet   [facility] - [place], [place] - [facility] [address]  [facility] [address]  [place] [address]  Phone: [phone] Fax: [phone]  Best Call Back Number:  [phone]  Additional Information:  Thank you

## 2020-10-27 NOTE — TELEPHONE ENCOUNTER
----- Message from Stephanie Hayes sent at 10/27/2020 12:02 PM CDT -----  Regarding: refill follow up  Contact: pt  Type:  Patient Returning Call    Who Called:  pt  Does the patient know what this is regarding?:  please follow up with pt refills as soon as possible lisinopriL (PRINIVIL,ZESTRIL) 20 MG tablet and  lovastatin (MEVACOR) 20 MG tablet   [facility] - [place], [place] - [facility] [address].  [facility] [address]  [place] [address]  Phone: [phone] Fax: [phone]  Best Call Back Number:  [phone]  Additional Information:  Thank you

## 2021-02-03 DIAGNOSIS — Z96.652 STATUS POST TOTAL LEFT KNEE REPLACEMENT: ICD-10-CM

## 2021-02-03 DIAGNOSIS — Z96.651 STATUS POST TOTAL RIGHT KNEE REPLACEMENT: Primary | ICD-10-CM

## 2021-02-04 ENCOUNTER — PATIENT MESSAGE (OUTPATIENT)
Dept: RHEUMATOLOGY | Facility: CLINIC | Age: 51
End: 2021-02-04

## 2021-02-08 ENCOUNTER — PATIENT OUTREACH (OUTPATIENT)
Dept: ADMINISTRATIVE | Facility: OTHER | Age: 51
End: 2021-02-08

## 2021-02-08 DIAGNOSIS — Z12.31 ENCOUNTER FOR SCREENING MAMMOGRAM FOR MALIGNANT NEOPLASM OF BREAST: Primary | ICD-10-CM

## 2021-02-09 ENCOUNTER — TELEPHONE (OUTPATIENT)
Dept: FAMILY MEDICINE | Facility: CLINIC | Age: 51
End: 2021-02-09

## 2021-02-09 ENCOUNTER — OFFICE VISIT (OUTPATIENT)
Dept: FAMILY MEDICINE | Facility: CLINIC | Age: 51
End: 2021-02-09
Payer: MEDICARE

## 2021-02-09 ENCOUNTER — LAB VISIT (OUTPATIENT)
Dept: LAB | Facility: HOSPITAL | Age: 51
End: 2021-02-09
Attending: NURSE PRACTITIONER
Payer: MEDICARE

## 2021-02-09 VITALS
RESPIRATION RATE: 15 BRPM | BODY MASS INDEX: 31.58 KG/M2 | DIASTOLIC BLOOD PRESSURE: 72 MMHG | OXYGEN SATURATION: 98 % | HEART RATE: 85 BPM | HEIGHT: 69 IN | TEMPERATURE: 98 F | SYSTOLIC BLOOD PRESSURE: 120 MMHG | WEIGHT: 213.19 LBS

## 2021-02-09 DIAGNOSIS — Z11.4 ENCOUNTER FOR SCREENING FOR HUMAN IMMUNODEFICIENCY VIRUS (HIV): ICD-10-CM

## 2021-02-09 DIAGNOSIS — J30.2 SEASONAL ALLERGIES: ICD-10-CM

## 2021-02-09 DIAGNOSIS — E03.9 HYPOTHYROIDISM (ACQUIRED): ICD-10-CM

## 2021-02-09 DIAGNOSIS — Z12.31 BREAST CANCER SCREENING BY MAMMOGRAM: ICD-10-CM

## 2021-02-09 DIAGNOSIS — Z23 NEED FOR VACCINATION FOR H FLU TYPE B: ICD-10-CM

## 2021-02-09 DIAGNOSIS — Z23 NEED FOR TD VACCINE: ICD-10-CM

## 2021-02-09 DIAGNOSIS — E78.2 MIXED HYPERLIPIDEMIA: ICD-10-CM

## 2021-02-09 DIAGNOSIS — Z00.00 WELL ADULT EXAM: ICD-10-CM

## 2021-02-09 DIAGNOSIS — Z00.00 WELL ADULT EXAM: Primary | ICD-10-CM

## 2021-02-09 DIAGNOSIS — Z23 NEED FOR VACCINATION AGAINST STREPTOCOCCUS PNEUMONIAE: ICD-10-CM

## 2021-02-09 LAB
ALBUMIN SERPL BCP-MCNC: 4.3 G/DL (ref 3.5–5.2)
ALP SERPL-CCNC: 37 U/L (ref 55–135)
ALT SERPL W/O P-5'-P-CCNC: 18 U/L (ref 10–44)
ANION GAP SERPL CALC-SCNC: 8 MMOL/L (ref 8–16)
AST SERPL-CCNC: 19 U/L (ref 10–40)
BASOPHILS # BLD AUTO: 0.06 K/UL (ref 0–0.2)
BASOPHILS NFR BLD: 0.8 % (ref 0–1.9)
BILIRUB SERPL-MCNC: 0.5 MG/DL (ref 0.1–1)
BUN SERPL-MCNC: 12 MG/DL (ref 6–20)
CALCIUM SERPL-MCNC: 9.5 MG/DL (ref 8.7–10.5)
CHLORIDE SERPL-SCNC: 105 MMOL/L (ref 95–110)
CHOLEST SERPL-MCNC: 170 MG/DL (ref 120–199)
CHOLEST/HDLC SERPL: 2.9 {RATIO} (ref 2–5)
CO2 SERPL-SCNC: 27 MMOL/L (ref 23–29)
CREAT SERPL-MCNC: 0.9 MG/DL (ref 0.5–1.4)
DIFFERENTIAL METHOD: ABNORMAL
EOSINOPHIL # BLD AUTO: 0 K/UL (ref 0–0.5)
EOSINOPHIL NFR BLD: 0.4 % (ref 0–8)
ERYTHROCYTE [DISTWIDTH] IN BLOOD BY AUTOMATED COUNT: 13 % (ref 11.5–14.5)
EST. GFR  (AFRICAN AMERICAN): >60 ML/MIN/1.73 M^2
EST. GFR  (NON AFRICAN AMERICAN): >60 ML/MIN/1.73 M^2
GLUCOSE SERPL-MCNC: 105 MG/DL (ref 70–110)
HCT VFR BLD AUTO: 41.8 % (ref 37–48.5)
HDLC SERPL-MCNC: 59 MG/DL (ref 40–75)
HDLC SERPL: 34.7 % (ref 20–50)
HGB BLD-MCNC: 13.3 G/DL (ref 12–16)
IMM GRANULOCYTES # BLD AUTO: 0.01 K/UL (ref 0–0.04)
IMM GRANULOCYTES NFR BLD AUTO: 0.1 % (ref 0–0.5)
LDLC SERPL CALC-MCNC: 95.4 MG/DL (ref 63–159)
LYMPHOCYTES # BLD AUTO: 2.3 K/UL (ref 1–4.8)
LYMPHOCYTES NFR BLD: 30.2 % (ref 18–48)
MCH RBC QN AUTO: 29.2 PG (ref 27–31)
MCHC RBC AUTO-ENTMCNC: 31.8 G/DL (ref 32–36)
MCV RBC AUTO: 92 FL (ref 82–98)
MONOCYTES # BLD AUTO: 0.6 K/UL (ref 0.3–1)
MONOCYTES NFR BLD: 7.8 % (ref 4–15)
NEUTROPHILS # BLD AUTO: 4.6 K/UL (ref 1.8–7.7)
NEUTROPHILS NFR BLD: 60.7 % (ref 38–73)
NONHDLC SERPL-MCNC: 111 MG/DL
NRBC BLD-RTO: 0 /100 WBC
PLATELET # BLD AUTO: 208 K/UL (ref 150–350)
PMV BLD AUTO: 10.5 FL (ref 9.2–12.9)
POTASSIUM SERPL-SCNC: 4.3 MMOL/L (ref 3.5–5.1)
PROT SERPL-MCNC: 7.8 G/DL (ref 6–8.4)
RBC # BLD AUTO: 4.56 M/UL (ref 4–5.4)
SODIUM SERPL-SCNC: 140 MMOL/L (ref 136–145)
T4 FREE SERPL-MCNC: 1.09 NG/DL (ref 0.71–1.51)
TRIGL SERPL-MCNC: 78 MG/DL (ref 30–150)
TSH SERPL DL<=0.005 MIU/L-ACNC: 1.73 UIU/ML (ref 0.34–5.6)
WBC # BLD AUTO: 7.52 K/UL (ref 3.9–12.7)

## 2021-02-09 PROCEDURE — G0009 PNEUMOCOCCAL POLYSACCHARIDE VACCINE 23-VALENT =>2YO SQ IM: ICD-10-PCS | Mod: 59,S$GLB,, | Performed by: NURSE PRACTITIONER

## 2021-02-09 PROCEDURE — 90686 FLU VACCINE (QUAD) GREATER THAN OR EQUAL TO 3YO PRESERVATIVE FREE IM: ICD-10-PCS | Mod: S$GLB,,, | Performed by: NURSE PRACTITIONER

## 2021-02-09 PROCEDURE — 84443 ASSAY THYROID STIM HORMONE: CPT

## 2021-02-09 PROCEDURE — G0008 ADMIN INFLUENZA VIRUS VAC: HCPCS | Mod: 59,S$GLB,, | Performed by: NURSE PRACTITIONER

## 2021-02-09 PROCEDURE — G0008 FLU VACCINE (QUAD) GREATER THAN OR EQUAL TO 3YO PRESERVATIVE FREE IM: ICD-10-PCS | Mod: 59,S$GLB,, | Performed by: NURSE PRACTITIONER

## 2021-02-09 PROCEDURE — 90686 IIV4 VACC NO PRSV 0.5 ML IM: CPT | Mod: S$GLB,,, | Performed by: NURSE PRACTITIONER

## 2021-02-09 PROCEDURE — 3074F PR MOST RECENT SYSTOLIC BLOOD PRESSURE < 130 MM HG: ICD-10-PCS | Mod: CPTII,S$GLB,, | Performed by: NURSE PRACTITIONER

## 2021-02-09 PROCEDURE — 3074F SYST BP LT 130 MM HG: CPT | Mod: CPTII,S$GLB,, | Performed by: NURSE PRACTITIONER

## 2021-02-09 PROCEDURE — 90472 TD VACCINE GREATER THAN OR EQUAL TO 7YO WITH PRESERVATIVE IM: ICD-10-PCS | Mod: S$GLB,,, | Performed by: NURSE PRACTITIONER

## 2021-02-09 PROCEDURE — 80053 COMPREHEN METABOLIC PANEL: CPT

## 2021-02-09 PROCEDURE — 3078F PR MOST RECENT DIASTOLIC BLOOD PRESSURE < 80 MM HG: ICD-10-PCS | Mod: CPTII,S$GLB,, | Performed by: NURSE PRACTITIONER

## 2021-02-09 PROCEDURE — G0009 ADMIN PNEUMOCOCCAL VACCINE: HCPCS | Mod: 59,S$GLB,, | Performed by: NURSE PRACTITIONER

## 2021-02-09 PROCEDURE — 90732 PPSV23 VACC 2 YRS+ SUBQ/IM: CPT | Mod: S$GLB,,, | Performed by: NURSE PRACTITIONER

## 2021-02-09 PROCEDURE — 80061 LIPID PANEL: CPT

## 2021-02-09 PROCEDURE — 1125F AMNT PAIN NOTED PAIN PRSNT: CPT | Mod: S$GLB,,, | Performed by: NURSE PRACTITIONER

## 2021-02-09 PROCEDURE — 99396 PR PREVENTIVE VISIT,EST,40-64: ICD-10-PCS | Mod: 25,S$GLB,, | Performed by: NURSE PRACTITIONER

## 2021-02-09 PROCEDURE — 99396 PREV VISIT EST AGE 40-64: CPT | Mod: 25,S$GLB,, | Performed by: NURSE PRACTITIONER

## 2021-02-09 PROCEDURE — 36415 COLL VENOUS BLD VENIPUNCTURE: CPT

## 2021-02-09 PROCEDURE — 3008F PR BODY MASS INDEX (BMI) DOCUMENTED: ICD-10-PCS | Mod: CPTII,S$GLB,, | Performed by: NURSE PRACTITIONER

## 2021-02-09 PROCEDURE — 90472 IMMUNIZATION ADMIN EACH ADD: CPT | Mod: S$GLB,,, | Performed by: NURSE PRACTITIONER

## 2021-02-09 PROCEDURE — 85025 COMPLETE CBC W/AUTO DIFF WBC: CPT

## 2021-02-09 PROCEDURE — 84439 ASSAY OF FREE THYROXINE: CPT

## 2021-02-09 PROCEDURE — 90714 TD VACC NO PRESV 7 YRS+ IM: CPT | Mod: S$GLB,,, | Performed by: NURSE PRACTITIONER

## 2021-02-09 PROCEDURE — 3078F DIAST BP <80 MM HG: CPT | Mod: CPTII,S$GLB,, | Performed by: NURSE PRACTITIONER

## 2021-02-09 PROCEDURE — 90732 PNEUMOCOCCAL POLYSACCHARIDE VACCINE 23-VALENT =>2YO SQ IM: ICD-10-PCS | Mod: S$GLB,,, | Performed by: NURSE PRACTITIONER

## 2021-02-09 PROCEDURE — 86703 HIV-1/HIV-2 1 RESULT ANTBDY: CPT

## 2021-02-09 PROCEDURE — 90714 TD VACCINE GREATER THAN OR EQUAL TO 7YO WITH PRESERVATIVE IM: ICD-10-PCS | Mod: S$GLB,,, | Performed by: NURSE PRACTITIONER

## 2021-02-09 PROCEDURE — 1125F PR PAIN SEVERITY QUANTIFIED, PAIN PRESENT: ICD-10-PCS | Mod: S$GLB,,, | Performed by: NURSE PRACTITIONER

## 2021-02-09 PROCEDURE — 3008F BODY MASS INDEX DOCD: CPT | Mod: CPTII,S$GLB,, | Performed by: NURSE PRACTITIONER

## 2021-02-09 RX ORDER — TRAZODONE HYDROCHLORIDE 50 MG/1
TABLET ORAL
Qty: 180 TABLET | Refills: 1 | Status: SHIPPED | OUTPATIENT
Start: 2021-02-09 | End: 2021-04-12

## 2021-02-09 RX ORDER — FLUTICASONE PROPIONATE 50 MCG
1 SPRAY, SUSPENSION (ML) NASAL 2 TIMES DAILY PRN
Qty: 15.8 ML | Refills: 11 | Status: SHIPPED | OUTPATIENT
Start: 2021-02-09 | End: 2022-01-13 | Stop reason: SDUPTHER

## 2021-02-10 LAB — HIV 1+2 AB+HIV1 P24 AG SERPL QL IA: NEGATIVE

## 2021-02-12 ENCOUNTER — OFFICE VISIT (OUTPATIENT)
Dept: ORTHOPEDICS | Facility: CLINIC | Age: 51
End: 2021-02-12
Payer: MEDICARE

## 2021-02-12 ENCOUNTER — HOSPITAL ENCOUNTER (OUTPATIENT)
Dept: RADIOLOGY | Facility: HOSPITAL | Age: 51
Discharge: HOME | End: 2021-02-12
Attending: ORTHOPAEDIC SURGERY
Payer: MEDICARE

## 2021-02-12 VITALS — OXYGEN SATURATION: 98 % | HEIGHT: 69 IN | HEART RATE: 92 BPM | BODY MASS INDEX: 31.58 KG/M2 | WEIGHT: 213.19 LBS

## 2021-02-12 DIAGNOSIS — Z96.651 STATUS POST TOTAL RIGHT KNEE REPLACEMENT: ICD-10-CM

## 2021-02-12 DIAGNOSIS — M70.51 PES ANSERINUS BURSITIS OF BOTH KNEES: Primary | ICD-10-CM

## 2021-02-12 DIAGNOSIS — Z96.653 PRESENCE OF BOTH ARTIFICIAL KNEE JOINTS: ICD-10-CM

## 2021-02-12 DIAGNOSIS — M70.52 PES ANSERINUS BURSITIS OF BOTH KNEES: Primary | ICD-10-CM

## 2021-02-12 DIAGNOSIS — Z96.652 STATUS POST TOTAL LEFT KNEE REPLACEMENT: ICD-10-CM

## 2021-02-12 PROCEDURE — 99213 PR OFFICE/OUTPT VISIT, EST, LEVL III, 20-29 MIN: ICD-10-PCS | Mod: 25,S$GLB,, | Performed by: ORTHOPAEDIC SURGERY

## 2021-02-12 PROCEDURE — 99999 PR PBB SHADOW E&M-EST. PATIENT-LVL III: CPT | Mod: PBBFAC,,, | Performed by: ORTHOPAEDIC SURGERY

## 2021-02-12 PROCEDURE — 99999 PR PBB SHADOW E&M-EST. PATIENT-LVL III: ICD-10-PCS | Mod: PBBFAC,,, | Performed by: ORTHOPAEDIC SURGERY

## 2021-02-12 PROCEDURE — 73562 XR KNEE 3 VIEW BILATERAL: ICD-10-PCS | Mod: 26,50,, | Performed by: RADIOLOGY

## 2021-02-12 PROCEDURE — 3008F BODY MASS INDEX DOCD: CPT | Mod: CPTII,S$GLB,, | Performed by: ORTHOPAEDIC SURGERY

## 2021-02-12 PROCEDURE — 1125F PR PAIN SEVERITY QUANTIFIED, PAIN PRESENT: ICD-10-PCS | Mod: S$GLB,,, | Performed by: ORTHOPAEDIC SURGERY

## 2021-02-12 PROCEDURE — 3008F PR BODY MASS INDEX (BMI) DOCUMENTED: ICD-10-PCS | Mod: CPTII,S$GLB,, | Performed by: ORTHOPAEDIC SURGERY

## 2021-02-12 PROCEDURE — 20610 DRAIN/INJ JOINT/BURSA W/O US: CPT | Mod: 50,S$GLB,, | Performed by: ORTHOPAEDIC SURGERY

## 2021-02-12 PROCEDURE — 1125F AMNT PAIN NOTED PAIN PRSNT: CPT | Mod: S$GLB,,, | Performed by: ORTHOPAEDIC SURGERY

## 2021-02-12 PROCEDURE — 99213 OFFICE O/P EST LOW 20 MIN: CPT | Mod: 25,S$GLB,, | Performed by: ORTHOPAEDIC SURGERY

## 2021-02-12 PROCEDURE — 73562 X-RAY EXAM OF KNEE 3: CPT | Mod: 26,50,, | Performed by: RADIOLOGY

## 2021-02-12 PROCEDURE — 73562 X-RAY EXAM OF KNEE 3: CPT | Mod: TC,50,FY

## 2021-02-12 PROCEDURE — 20610 LARGE JOINT ASPIRATION/INJECTION: BILATERAL ANSERINE BURSA: ICD-10-PCS | Mod: 50,S$GLB,, | Performed by: ORTHOPAEDIC SURGERY

## 2021-02-12 RX ORDER — TRIAMCINOLONE ACETONIDE 40 MG/ML
40 INJECTION, SUSPENSION INTRA-ARTICULAR; INTRAMUSCULAR
Status: DISCONTINUED | OUTPATIENT
Start: 2021-02-12 | End: 2021-02-12 | Stop reason: HOSPADM

## 2021-02-12 RX ADMIN — TRIAMCINOLONE ACETONIDE 40 MG: 40 INJECTION, SUSPENSION INTRA-ARTICULAR; INTRAMUSCULAR at 09:02

## 2021-03-04 ENCOUNTER — PATIENT MESSAGE (OUTPATIENT)
Dept: FAMILY MEDICINE | Facility: CLINIC | Age: 51
End: 2021-03-04

## 2021-03-08 ENCOUNTER — IMMUNIZATION (OUTPATIENT)
Dept: FAMILY MEDICINE | Facility: CLINIC | Age: 51
End: 2021-03-08
Payer: MEDICARE

## 2021-03-08 DIAGNOSIS — Z23 NEED FOR VACCINATION: Primary | ICD-10-CM

## 2021-03-08 PROCEDURE — 0011A COVID-19, MRNA, LNP-S, PF, 100 MCG/0.5 ML DOSE VACCINE: ICD-10-PCS | Mod: CV19,S$GLB,, | Performed by: FAMILY MEDICINE

## 2021-03-08 PROCEDURE — 91301 COVID-19, MRNA, LNP-S, PF, 100 MCG/0.5 ML DOSE VACCINE: ICD-10-PCS | Mod: S$GLB,,, | Performed by: FAMILY MEDICINE

## 2021-03-08 PROCEDURE — 0011A COVID-19, MRNA, LNP-S, PF, 100 MCG/0.5 ML DOSE VACCINE: CPT | Mod: CV19,S$GLB,, | Performed by: FAMILY MEDICINE

## 2021-03-08 PROCEDURE — 91301 COVID-19, MRNA, LNP-S, PF, 100 MCG/0.5 ML DOSE VACCINE: CPT | Mod: S$GLB,,, | Performed by: FAMILY MEDICINE

## 2021-03-29 ENCOUNTER — PATIENT MESSAGE (OUTPATIENT)
Dept: FAMILY MEDICINE | Facility: CLINIC | Age: 51
End: 2021-03-29

## 2021-03-29 DIAGNOSIS — F51.01 PRIMARY INSOMNIA: Primary | ICD-10-CM

## 2021-03-29 RX ORDER — AMITRIPTYLINE HYDROCHLORIDE 50 MG/1
50 TABLET, FILM COATED ORAL NIGHTLY
Qty: 30 TABLET | Refills: 5 | Status: SHIPPED | OUTPATIENT
Start: 2021-03-29 | End: 2021-07-27

## 2021-04-02 ENCOUNTER — PATIENT MESSAGE (OUTPATIENT)
Dept: FAMILY MEDICINE | Facility: CLINIC | Age: 51
End: 2021-04-02

## 2021-04-02 DIAGNOSIS — F51.01 PRIMARY INSOMNIA: Primary | ICD-10-CM

## 2021-04-05 ENCOUNTER — IMMUNIZATION (OUTPATIENT)
Dept: FAMILY MEDICINE | Facility: CLINIC | Age: 51
End: 2021-04-05
Payer: MEDICARE

## 2021-04-05 ENCOUNTER — TELEPHONE (OUTPATIENT)
Dept: FAMILY MEDICINE | Facility: CLINIC | Age: 51
End: 2021-04-05

## 2021-04-05 ENCOUNTER — PATIENT MESSAGE (OUTPATIENT)
Dept: FAMILY MEDICINE | Facility: CLINIC | Age: 51
End: 2021-04-05

## 2021-04-05 DIAGNOSIS — Z23 NEED FOR VACCINATION: Primary | ICD-10-CM

## 2021-04-05 DIAGNOSIS — F51.01 PRIMARY INSOMNIA: Primary | ICD-10-CM

## 2021-04-05 PROCEDURE — 0012A COVID-19, MRNA, LNP-S, PF, 100 MCG/0.5 ML DOSE VACCINE: ICD-10-PCS | Mod: CV19,S$GLB,, | Performed by: FAMILY MEDICINE

## 2021-04-05 PROCEDURE — 91301 COVID-19, MRNA, LNP-S, PF, 100 MCG/0.5 ML DOSE VACCINE: ICD-10-PCS | Mod: S$GLB,,, | Performed by: FAMILY MEDICINE

## 2021-04-05 PROCEDURE — 91301 COVID-19, MRNA, LNP-S, PF, 100 MCG/0.5 ML DOSE VACCINE: CPT | Mod: S$GLB,,, | Performed by: FAMILY MEDICINE

## 2021-04-05 PROCEDURE — 0012A COVID-19, MRNA, LNP-S, PF, 100 MCG/0.5 ML DOSE VACCINE: CPT | Mod: CV19,S$GLB,, | Performed by: FAMILY MEDICINE

## 2021-04-05 RX ORDER — TEMAZEPAM 7.5 MG/1
CAPSULE ORAL
Qty: 60 CAPSULE | Refills: 1 | Status: SHIPPED | OUTPATIENT
Start: 2021-04-05 | End: 2021-04-05

## 2021-04-05 RX ORDER — TEMAZEPAM 15 MG/1
15 CAPSULE ORAL NIGHTLY PRN
Qty: 30 CAPSULE | Refills: 2 | Status: SHIPPED | OUTPATIENT
Start: 2021-04-05 | End: 2021-05-18

## 2021-04-07 ENCOUNTER — PATIENT MESSAGE (OUTPATIENT)
Dept: ADMINISTRATIVE | Facility: HOSPITAL | Age: 51
End: 2021-04-07

## 2021-05-04 ENCOUNTER — HOSPITAL ENCOUNTER (OUTPATIENT)
Dept: RADIOLOGY | Facility: HOSPITAL | Age: 51
Discharge: HOME | End: 2021-05-04
Attending: NURSE PRACTITIONER
Payer: MEDICARE

## 2021-05-04 ENCOUNTER — PATIENT MESSAGE (OUTPATIENT)
Dept: FAMILY MEDICINE | Facility: CLINIC | Age: 51
End: 2021-05-04

## 2021-05-04 VITALS — BODY MASS INDEX: 31.58 KG/M2 | HEIGHT: 69 IN | WEIGHT: 213.19 LBS

## 2021-05-04 PROCEDURE — 77063 BREAST TOMOSYNTHESIS BI: CPT | Mod: 26,,, | Performed by: RADIOLOGY

## 2021-05-04 PROCEDURE — 77067 SCR MAMMO BI INCL CAD: CPT | Mod: 26,,, | Performed by: RADIOLOGY

## 2021-05-04 PROCEDURE — 77067 SCR MAMMO BI INCL CAD: CPT | Mod: TC

## 2021-05-04 PROCEDURE — 77063 MAMMO DIGITAL SCREENING BILAT WITH TOMO: ICD-10-PCS | Mod: 26,,, | Performed by: RADIOLOGY

## 2021-05-04 PROCEDURE — 77067 MAMMO DIGITAL SCREENING BILAT WITH TOMO: ICD-10-PCS | Mod: 26,,, | Performed by: RADIOLOGY

## 2021-05-09 ENCOUNTER — PATIENT MESSAGE (OUTPATIENT)
Dept: URGENT CARE | Facility: CLINIC | Age: 51
End: 2021-05-09

## 2021-05-14 ENCOUNTER — HOSPITAL ENCOUNTER (OUTPATIENT)
Dept: RADIOLOGY | Facility: HOSPITAL | Age: 51
Discharge: HOME | End: 2021-05-14
Attending: NURSE PRACTITIONER
Payer: MEDICARE

## 2021-05-14 DIAGNOSIS — N63.0 BREAST MASS SEEN ON MAMMOGRAM: ICD-10-CM

## 2021-05-14 DIAGNOSIS — R92.8 ABNORMALITY OF BOTH BREASTS ON SCREENING MAMMOGRAM: ICD-10-CM

## 2021-05-14 PROCEDURE — 77066 MAMMO DIGITAL DIAGNOSTIC BILAT WITH TOMO: ICD-10-PCS | Mod: 26,,, | Performed by: RADIOLOGY

## 2021-05-14 PROCEDURE — 76641 ULTRASOUND BREAST COMPLETE: CPT | Mod: 26,LT,, | Performed by: RADIOLOGY

## 2021-05-14 PROCEDURE — 76641 ULTRASOUND BREAST COMPLETE: CPT | Mod: TC,50

## 2021-05-14 PROCEDURE — 77062 BREAST TOMOSYNTHESIS BI: CPT | Mod: 26,,, | Performed by: RADIOLOGY

## 2021-05-14 PROCEDURE — 76641 ULTRASOUND BREAST COMPLETE: CPT | Mod: 26,RT,, | Performed by: RADIOLOGY

## 2021-05-14 PROCEDURE — 77062 MAMMO DIGITAL DIAGNOSTIC BILAT WITH TOMO: ICD-10-PCS | Mod: 26,,, | Performed by: RADIOLOGY

## 2021-05-14 PROCEDURE — 77066 DX MAMMO INCL CAD BI: CPT | Mod: 26,,, | Performed by: RADIOLOGY

## 2021-05-14 PROCEDURE — 77066 DX MAMMO INCL CAD BI: CPT | Mod: TC

## 2021-05-14 PROCEDURE — 76641 PR US BREAST COMPLETE UNILAT: ICD-10-PCS | Mod: 26,LT,, | Performed by: RADIOLOGY

## 2021-05-18 ENCOUNTER — PATIENT MESSAGE (OUTPATIENT)
Dept: FAMILY MEDICINE | Facility: CLINIC | Age: 51
End: 2021-05-18

## 2021-05-18 DIAGNOSIS — F51.01 PRIMARY INSOMNIA: ICD-10-CM

## 2021-05-18 RX ORDER — TEMAZEPAM 15 MG/1
CAPSULE ORAL
Qty: 30 CAPSULE | Refills: 0 | Status: SHIPPED | OUTPATIENT
Start: 2021-05-18 | End: 2021-07-27

## 2021-07-27 ENCOUNTER — OFFICE VISIT (OUTPATIENT)
Dept: FAMILY MEDICINE | Facility: CLINIC | Age: 51
End: 2021-07-27
Payer: MEDICARE

## 2021-07-27 VITALS
TEMPERATURE: 98 F | DIASTOLIC BLOOD PRESSURE: 70 MMHG | OXYGEN SATURATION: 96 % | HEIGHT: 69 IN | BODY MASS INDEX: 32.18 KG/M2 | SYSTOLIC BLOOD PRESSURE: 114 MMHG | WEIGHT: 217.25 LBS | HEART RATE: 89 BPM | RESPIRATION RATE: 15 BRPM

## 2021-07-27 DIAGNOSIS — R11.0 NAUSEA: ICD-10-CM

## 2021-07-27 DIAGNOSIS — R68.83 CHILLS: ICD-10-CM

## 2021-07-27 DIAGNOSIS — J02.9 SORE THROAT: ICD-10-CM

## 2021-07-27 DIAGNOSIS — R05.9 COUGH: ICD-10-CM

## 2021-07-27 DIAGNOSIS — J02.0 PHARYNGITIS, STREPTOCOCCAL, ACUTE: Primary | ICD-10-CM

## 2021-07-27 DIAGNOSIS — R52 GENERALIZED BODY ACHES: ICD-10-CM

## 2021-07-27 LAB
CTP QC/QA: YES
S PYO RRNA THROAT QL PROBE: POSITIVE

## 2021-07-27 PROCEDURE — 1159F MED LIST DOCD IN RCRD: CPT | Mod: CPTII,S$GLB,, | Performed by: FAMILY MEDICINE

## 2021-07-27 PROCEDURE — 3008F PR BODY MASS INDEX (BMI) DOCUMENTED: ICD-10-PCS | Mod: CPTII,S$GLB,, | Performed by: FAMILY MEDICINE

## 2021-07-27 PROCEDURE — 3074F SYST BP LT 130 MM HG: CPT | Mod: CPTII,S$GLB,, | Performed by: FAMILY MEDICINE

## 2021-07-27 PROCEDURE — U0003 INFECTIOUS AGENT DETECTION BY NUCLEIC ACID (DNA OR RNA); SEVERE ACUTE RESPIRATORY SYNDROME CORONAVIRUS 2 (SARS-COV-2) (CORONAVIRUS DISEASE [COVID-19]), AMPLIFIED PROBE TECHNIQUE, MAKING USE OF HIGH THROUGHPUT TECHNOLOGIES AS DESCRIBED BY CMS-2020-01-R: HCPCS | Performed by: FAMILY MEDICINE

## 2021-07-27 PROCEDURE — 99214 PR OFFICE/OUTPT VISIT, EST, LEVL IV, 30-39 MIN: ICD-10-PCS | Mod: S$GLB,,, | Performed by: FAMILY MEDICINE

## 2021-07-27 PROCEDURE — U0005 INFEC AGEN DETEC AMPLI PROBE: HCPCS | Performed by: FAMILY MEDICINE

## 2021-07-27 PROCEDURE — 3078F PR MOST RECENT DIASTOLIC BLOOD PRESSURE < 80 MM HG: ICD-10-PCS | Mod: CPTII,S$GLB,, | Performed by: FAMILY MEDICINE

## 2021-07-27 PROCEDURE — 1125F AMNT PAIN NOTED PAIN PRSNT: CPT | Mod: CPTII,S$GLB,, | Performed by: FAMILY MEDICINE

## 2021-07-27 PROCEDURE — 3008F BODY MASS INDEX DOCD: CPT | Mod: CPTII,S$GLB,, | Performed by: FAMILY MEDICINE

## 2021-07-27 PROCEDURE — 1125F PR PAIN SEVERITY QUANTIFIED, PAIN PRESENT: ICD-10-PCS | Mod: CPTII,S$GLB,, | Performed by: FAMILY MEDICINE

## 2021-07-27 PROCEDURE — 87880 POCT RAPID STREP A: ICD-10-PCS | Mod: QW,,, | Performed by: FAMILY MEDICINE

## 2021-07-27 PROCEDURE — 3074F PR MOST RECENT SYSTOLIC BLOOD PRESSURE < 130 MM HG: ICD-10-PCS | Mod: CPTII,S$GLB,, | Performed by: FAMILY MEDICINE

## 2021-07-27 PROCEDURE — 3078F DIAST BP <80 MM HG: CPT | Mod: CPTII,S$GLB,, | Performed by: FAMILY MEDICINE

## 2021-07-27 PROCEDURE — 87880 STREP A ASSAY W/OPTIC: CPT | Mod: QW,,, | Performed by: FAMILY MEDICINE

## 2021-07-27 PROCEDURE — 1159F PR MEDICATION LIST DOCUMENTED IN MEDICAL RECORD: ICD-10-PCS | Mod: CPTII,S$GLB,, | Performed by: FAMILY MEDICINE

## 2021-07-27 PROCEDURE — 99214 OFFICE O/P EST MOD 30 MIN: CPT | Mod: S$GLB,,, | Performed by: FAMILY MEDICINE

## 2021-07-27 RX ORDER — BACLOFEN 10 MG/1
10 TABLET ORAL 2 TIMES DAILY
COMMUNITY
End: 2021-10-26

## 2021-07-27 RX ORDER — PENICILLIN V POTASSIUM 500 MG/1
500 TABLET, FILM COATED ORAL EVERY 8 HOURS
Qty: 30 TABLET | Refills: 0 | Status: SHIPPED | OUTPATIENT
Start: 2021-07-27 | End: 2021-08-06

## 2021-07-28 LAB
SARS-COV-2 RNA RESP QL NAA+PROBE: NOT DETECTED
SARS-COV-2- CYCLE NUMBER: -1

## 2021-07-29 ENCOUNTER — PATIENT MESSAGE (OUTPATIENT)
Dept: FAMILY MEDICINE | Facility: CLINIC | Age: 51
End: 2021-07-29

## 2021-08-02 ENCOUNTER — PATIENT MESSAGE (OUTPATIENT)
Dept: FAMILY MEDICINE | Facility: CLINIC | Age: 51
End: 2021-08-02

## 2021-08-02 ENCOUNTER — TELEPHONE (OUTPATIENT)
Dept: FAMILY MEDICINE | Facility: CLINIC | Age: 51
End: 2021-08-02

## 2021-08-02 RX ORDER — VALACYCLOVIR HYDROCHLORIDE 1 G/1
2000 TABLET, FILM COATED ORAL 2 TIMES DAILY
Qty: 4 TABLET | Refills: 2 | Status: SHIPPED | OUTPATIENT
Start: 2021-08-02 | End: 2021-10-26

## 2021-08-02 RX ORDER — ALBUTEROL SULFATE 90 UG/1
2 AEROSOL, METERED RESPIRATORY (INHALATION) EVERY 6 HOURS PRN
Qty: 18 G | Refills: 0 | Status: SHIPPED | OUTPATIENT
Start: 2021-08-02 | End: 2022-01-13 | Stop reason: SDUPTHER

## 2021-08-09 ENCOUNTER — OFFICE VISIT (OUTPATIENT)
Dept: FAMILY MEDICINE | Facility: CLINIC | Age: 51
End: 2021-08-09
Payer: MEDICARE

## 2021-08-09 VITALS
SYSTOLIC BLOOD PRESSURE: 132 MMHG | RESPIRATION RATE: 18 BRPM | WEIGHT: 216 LBS | OXYGEN SATURATION: 97 % | BODY MASS INDEX: 31.99 KG/M2 | DIASTOLIC BLOOD PRESSURE: 88 MMHG | TEMPERATURE: 98 F | HEART RATE: 71 BPM | HEIGHT: 69 IN

## 2021-08-09 DIAGNOSIS — M25.562 CHRONIC PAIN OF BOTH KNEES: ICD-10-CM

## 2021-08-09 DIAGNOSIS — G89.29 CHRONIC PAIN OF BOTH KNEES: ICD-10-CM

## 2021-08-09 DIAGNOSIS — J02.0 PHARYNGITIS, STREPTOCOCCAL, ACUTE: Primary | ICD-10-CM

## 2021-08-09 DIAGNOSIS — R60.0 LOCALIZED EDEMA: ICD-10-CM

## 2021-08-09 DIAGNOSIS — M25.561 CHRONIC PAIN OF BOTH KNEES: ICD-10-CM

## 2021-08-09 PROCEDURE — 1159F MED LIST DOCD IN RCRD: CPT | Mod: CPTII,S$GLB,, | Performed by: NURSE PRACTITIONER

## 2021-08-09 PROCEDURE — 1125F PR PAIN SEVERITY QUANTIFIED, PAIN PRESENT: ICD-10-PCS | Mod: CPTII,S$GLB,, | Performed by: NURSE PRACTITIONER

## 2021-08-09 PROCEDURE — 3008F BODY MASS INDEX DOCD: CPT | Mod: CPTII,S$GLB,, | Performed by: NURSE PRACTITIONER

## 2021-08-09 PROCEDURE — 3075F PR MOST RECENT SYSTOLIC BLOOD PRESS GE 130-139MM HG: ICD-10-PCS | Mod: CPTII,S$GLB,, | Performed by: NURSE PRACTITIONER

## 2021-08-09 PROCEDURE — 1125F AMNT PAIN NOTED PAIN PRSNT: CPT | Mod: CPTII,S$GLB,, | Performed by: NURSE PRACTITIONER

## 2021-08-09 PROCEDURE — 99214 PR OFFICE/OUTPT VISIT, EST, LEVL IV, 30-39 MIN: ICD-10-PCS | Mod: S$GLB,,, | Performed by: NURSE PRACTITIONER

## 2021-08-09 PROCEDURE — 1159F PR MEDICATION LIST DOCUMENTED IN MEDICAL RECORD: ICD-10-PCS | Mod: CPTII,S$GLB,, | Performed by: NURSE PRACTITIONER

## 2021-08-09 PROCEDURE — 99214 OFFICE O/P EST MOD 30 MIN: CPT | Mod: S$GLB,,, | Performed by: NURSE PRACTITIONER

## 2021-08-09 PROCEDURE — 3079F PR MOST RECENT DIASTOLIC BLOOD PRESSURE 80-89 MM HG: ICD-10-PCS | Mod: CPTII,S$GLB,, | Performed by: NURSE PRACTITIONER

## 2021-08-09 PROCEDURE — 3075F SYST BP GE 130 - 139MM HG: CPT | Mod: CPTII,S$GLB,, | Performed by: NURSE PRACTITIONER

## 2021-08-09 PROCEDURE — 1160F RVW MEDS BY RX/DR IN RCRD: CPT | Mod: CPTII,S$GLB,, | Performed by: NURSE PRACTITIONER

## 2021-08-09 PROCEDURE — 1160F PR REVIEW ALL MEDS BY PRESCRIBER/CLIN PHARMACIST DOCUMENTED: ICD-10-PCS | Mod: CPTII,S$GLB,, | Performed by: NURSE PRACTITIONER

## 2021-08-09 PROCEDURE — 3008F PR BODY MASS INDEX (BMI) DOCUMENTED: ICD-10-PCS | Mod: CPTII,S$GLB,, | Performed by: NURSE PRACTITIONER

## 2021-08-09 PROCEDURE — 3079F DIAST BP 80-89 MM HG: CPT | Mod: CPTII,S$GLB,, | Performed by: NURSE PRACTITIONER

## 2021-08-09 RX ORDER — FUROSEMIDE 20 MG/1
20 TABLET ORAL DAILY PRN
Qty: 30 TABLET | Refills: 1 | Status: SHIPPED | OUTPATIENT
Start: 2021-08-09 | End: 2021-09-10

## 2021-08-18 DIAGNOSIS — M25.561 PAIN IN BOTH KNEES, UNSPECIFIED CHRONICITY: Primary | ICD-10-CM

## 2021-08-18 DIAGNOSIS — M25.562 PAIN IN BOTH KNEES, UNSPECIFIED CHRONICITY: Primary | ICD-10-CM

## 2021-08-20 ENCOUNTER — OFFICE VISIT (OUTPATIENT)
Dept: ORTHOPEDICS | Facility: CLINIC | Age: 51
End: 2021-08-20
Payer: MEDICARE

## 2021-08-20 ENCOUNTER — PATIENT MESSAGE (OUTPATIENT)
Dept: ORTHOPEDICS | Facility: CLINIC | Age: 51
End: 2021-08-20

## 2021-08-20 ENCOUNTER — HOSPITAL ENCOUNTER (OUTPATIENT)
Dept: RADIOLOGY | Facility: HOSPITAL | Age: 51
Discharge: HOME | End: 2021-08-20
Attending: ORTHOPAEDIC SURGERY
Payer: MEDICARE

## 2021-08-20 VITALS — HEIGHT: 69 IN | WEIGHT: 216 LBS | BODY MASS INDEX: 31.99 KG/M2

## 2021-08-20 DIAGNOSIS — Z96.651 STATUS POST TOTAL RIGHT KNEE REPLACEMENT: ICD-10-CM

## 2021-08-20 DIAGNOSIS — Z96.652 STATUS POST TOTAL LEFT KNEE REPLACEMENT: ICD-10-CM

## 2021-08-20 DIAGNOSIS — M70.51 PES ANSERINUS BURSITIS OF BOTH KNEES: Primary | ICD-10-CM

## 2021-08-20 DIAGNOSIS — M25.561 PAIN IN BOTH KNEES, UNSPECIFIED CHRONICITY: ICD-10-CM

## 2021-08-20 DIAGNOSIS — Z96.653 PRESENCE OF BOTH ARTIFICIAL KNEE JOINTS: ICD-10-CM

## 2021-08-20 DIAGNOSIS — M25.562 PAIN IN BOTH KNEES, UNSPECIFIED CHRONICITY: ICD-10-CM

## 2021-08-20 DIAGNOSIS — M70.52 PES ANSERINUS BURSITIS OF BOTH KNEES: Primary | ICD-10-CM

## 2021-08-20 DIAGNOSIS — T84.84XA PAINFUL ORTHOPAEDIC HARDWARE: Primary | ICD-10-CM

## 2021-08-20 PROCEDURE — 99999 PR PBB SHADOW E&M-EST. PATIENT-LVL II: ICD-10-PCS | Mod: PBBFAC,,, | Performed by: ORTHOPAEDIC SURGERY

## 2021-08-20 PROCEDURE — 99999 PR PBB SHADOW E&M-EST. PATIENT-LVL II: CPT | Mod: PBBFAC,,, | Performed by: ORTHOPAEDIC SURGERY

## 2021-08-20 PROCEDURE — 3008F PR BODY MASS INDEX (BMI) DOCUMENTED: ICD-10-PCS | Mod: CPTII,S$GLB,, | Performed by: ORTHOPAEDIC SURGERY

## 2021-08-20 PROCEDURE — 20610 LARGE JOINT ASPIRATION/INJECTION: BILATERAL ANSERINE BURSA: ICD-10-PCS | Mod: 50,S$GLB,, | Performed by: ORTHOPAEDIC SURGERY

## 2021-08-20 PROCEDURE — 1125F PR PAIN SEVERITY QUANTIFIED, PAIN PRESENT: ICD-10-PCS | Mod: CPTII,S$GLB,, | Performed by: ORTHOPAEDIC SURGERY

## 2021-08-20 PROCEDURE — 99214 OFFICE O/P EST MOD 30 MIN: CPT | Mod: 25,S$GLB,, | Performed by: ORTHOPAEDIC SURGERY

## 2021-08-20 PROCEDURE — 73562 X-RAY EXAM OF KNEE 3: CPT | Mod: TC,50,FY

## 2021-08-20 PROCEDURE — 1125F AMNT PAIN NOTED PAIN PRSNT: CPT | Mod: CPTII,S$GLB,, | Performed by: ORTHOPAEDIC SURGERY

## 2021-08-20 PROCEDURE — 73562 X-RAY EXAM OF KNEE 3: CPT | Mod: 26,50,, | Performed by: RADIOLOGY

## 2021-08-20 PROCEDURE — 99214 PR OFFICE/OUTPT VISIT, EST, LEVL IV, 30-39 MIN: ICD-10-PCS | Mod: 25,S$GLB,, | Performed by: ORTHOPAEDIC SURGERY

## 2021-08-20 PROCEDURE — 3008F BODY MASS INDEX DOCD: CPT | Mod: CPTII,S$GLB,, | Performed by: ORTHOPAEDIC SURGERY

## 2021-08-20 PROCEDURE — 20610 DRAIN/INJ JOINT/BURSA W/O US: CPT | Mod: 50,S$GLB,, | Performed by: ORTHOPAEDIC SURGERY

## 2021-08-20 PROCEDURE — 73562 XR KNEE 3 VIEW BILATERAL: ICD-10-PCS | Mod: 26,50,, | Performed by: RADIOLOGY

## 2021-08-20 RX ORDER — TRIAMCINOLONE ACETONIDE 40 MG/ML
40 INJECTION, SUSPENSION INTRA-ARTICULAR; INTRAMUSCULAR
Status: DISCONTINUED | OUTPATIENT
Start: 2021-08-20 | End: 2021-08-20 | Stop reason: HOSPADM

## 2021-08-20 RX ADMIN — TRIAMCINOLONE ACETONIDE 40 MG: 40 INJECTION, SUSPENSION INTRA-ARTICULAR; INTRAMUSCULAR at 08:08

## 2021-08-24 ENCOUNTER — LAB VISIT (OUTPATIENT)
Dept: LAB | Facility: HOSPITAL | Age: 51
End: 2021-08-24
Attending: ORTHOPAEDIC SURGERY
Payer: MEDICARE

## 2021-08-24 DIAGNOSIS — T84.84XA PAINFUL ORTHOPAEDIC HARDWARE: ICD-10-CM

## 2021-08-24 LAB
BASOPHILS # BLD AUTO: 0.06 K/UL (ref 0–0.2)
BASOPHILS NFR BLD: 0.6 % (ref 0–1.9)
CRP SERPL-MCNC: 1.6 MG/L (ref 0–8.2)
DIFFERENTIAL METHOD: ABNORMAL
EOSINOPHIL # BLD AUTO: 0 K/UL (ref 0–0.5)
EOSINOPHIL NFR BLD: 0.1 % (ref 0–8)
ERYTHROCYTE [DISTWIDTH] IN BLOOD BY AUTOMATED COUNT: 13.1 % (ref 11.5–14.5)
ERYTHROCYTE [SEDIMENTATION RATE] IN BLOOD BY WESTERGREN METHOD: 10 MM/HR (ref 0–20)
HCT VFR BLD AUTO: 40.9 % (ref 37–48.5)
HGB BLD-MCNC: 13.5 G/DL (ref 12–16)
IMM GRANULOCYTES # BLD AUTO: 0.04 K/UL (ref 0–0.04)
IMM GRANULOCYTES NFR BLD AUTO: 0.4 % (ref 0–0.5)
LYMPHOCYTES # BLD AUTO: 2.3 K/UL (ref 1–4.8)
LYMPHOCYTES NFR BLD: 22.4 % (ref 18–48)
MCH RBC QN AUTO: 29.7 PG (ref 27–31)
MCHC RBC AUTO-ENTMCNC: 33 G/DL (ref 32–36)
MCV RBC AUTO: 90 FL (ref 82–98)
MONOCYTES # BLD AUTO: 1.1 K/UL (ref 0.3–1)
MONOCYTES NFR BLD: 10.5 % (ref 4–15)
NEUTROPHILS # BLD AUTO: 6.8 K/UL (ref 1.8–7.7)
NEUTROPHILS NFR BLD: 66 % (ref 38–73)
NRBC BLD-RTO: 0 /100 WBC
PLATELET # BLD AUTO: 222 K/UL (ref 150–450)
PMV BLD AUTO: 10.6 FL (ref 9.2–12.9)
RBC # BLD AUTO: 4.54 M/UL (ref 4–5.4)
WBC # BLD AUTO: 10.29 K/UL (ref 3.9–12.7)

## 2021-08-24 PROCEDURE — 85025 COMPLETE CBC W/AUTO DIFF WBC: CPT | Performed by: ORTHOPAEDIC SURGERY

## 2021-08-24 PROCEDURE — 86140 C-REACTIVE PROTEIN: CPT | Performed by: ORTHOPAEDIC SURGERY

## 2021-08-24 PROCEDURE — 36415 COLL VENOUS BLD VENIPUNCTURE: CPT | Performed by: ORTHOPAEDIC SURGERY

## 2021-08-24 PROCEDURE — 85651 RBC SED RATE NONAUTOMATED: CPT | Performed by: ORTHOPAEDIC SURGERY

## 2021-09-09 ENCOUNTER — HOSPITAL ENCOUNTER (OUTPATIENT)
Dept: RADIOLOGY | Facility: HOSPITAL | Age: 51
Discharge: HOME | End: 2021-09-09
Attending: ORTHOPAEDIC SURGERY
Payer: MEDICARE

## 2021-09-09 DIAGNOSIS — T84.84XA PAINFUL ORTHOPAEDIC HARDWARE: ICD-10-CM

## 2021-09-09 DIAGNOSIS — Z96.652 STATUS POST TOTAL LEFT KNEE REPLACEMENT: ICD-10-CM

## 2021-09-09 DIAGNOSIS — Z96.651 STATUS POST TOTAL RIGHT KNEE REPLACEMENT: ICD-10-CM

## 2021-09-09 PROCEDURE — 78315 BONE IMAGING 3 PHASE: CPT | Mod: TC

## 2021-09-09 PROCEDURE — 78315 BONE IMAGING 3 PHASE: CPT | Mod: 26,,, | Performed by: RADIOLOGY

## 2021-09-09 PROCEDURE — 78315 NM BONE SCAN 3 PHASE KNEE: ICD-10-PCS | Mod: 26,,, | Performed by: RADIOLOGY

## 2021-09-09 PROCEDURE — A9503 TC99M MEDRONATE: HCPCS

## 2021-09-16 ENCOUNTER — PATIENT OUTREACH (OUTPATIENT)
Dept: ADMINISTRATIVE | Facility: OTHER | Age: 51
End: 2021-09-16

## 2021-09-17 ENCOUNTER — OFFICE VISIT (OUTPATIENT)
Dept: ORTHOPEDICS | Facility: CLINIC | Age: 51
End: 2021-09-17
Payer: MEDICARE

## 2021-09-17 VITALS — RESPIRATION RATE: 17 BRPM | BODY MASS INDEX: 31.99 KG/M2 | HEIGHT: 69 IN | WEIGHT: 216 LBS

## 2021-09-17 DIAGNOSIS — M70.51 PES ANSERINUS BURSITIS OF BOTH KNEES: ICD-10-CM

## 2021-09-17 DIAGNOSIS — Z96.651 STATUS POST TOTAL RIGHT KNEE REPLACEMENT: ICD-10-CM

## 2021-09-17 DIAGNOSIS — M70.52 PES ANSERINUS BURSITIS OF BOTH KNEES: ICD-10-CM

## 2021-09-17 DIAGNOSIS — Z96.652 STATUS POST TOTAL LEFT KNEE REPLACEMENT: Primary | ICD-10-CM

## 2021-09-17 PROCEDURE — 4010F ACE/ARB THERAPY RXD/TAKEN: CPT | Mod: CPTII,S$GLB,, | Performed by: ORTHOPAEDIC SURGERY

## 2021-09-17 PROCEDURE — 1159F PR MEDICATION LIST DOCUMENTED IN MEDICAL RECORD: ICD-10-PCS | Mod: CPTII,S$GLB,, | Performed by: ORTHOPAEDIC SURGERY

## 2021-09-17 PROCEDURE — 4010F PR ACE/ARB THEARPY RXD/TAKEN: ICD-10-PCS | Mod: CPTII,S$GLB,, | Performed by: ORTHOPAEDIC SURGERY

## 2021-09-17 PROCEDURE — 99213 OFFICE O/P EST LOW 20 MIN: CPT | Mod: S$GLB,,, | Performed by: ORTHOPAEDIC SURGERY

## 2021-09-17 PROCEDURE — 99213 PR OFFICE/OUTPT VISIT, EST, LEVL III, 20-29 MIN: ICD-10-PCS | Mod: S$GLB,,, | Performed by: ORTHOPAEDIC SURGERY

## 2021-09-17 PROCEDURE — 3008F PR BODY MASS INDEX (BMI) DOCUMENTED: ICD-10-PCS | Mod: CPTII,S$GLB,, | Performed by: ORTHOPAEDIC SURGERY

## 2021-09-17 PROCEDURE — 99999 PR PBB SHADOW E&M-EST. PATIENT-LVL III: CPT | Mod: PBBFAC,,, | Performed by: ORTHOPAEDIC SURGERY

## 2021-09-17 PROCEDURE — 99999 PR PBB SHADOW E&M-EST. PATIENT-LVL III: ICD-10-PCS | Mod: PBBFAC,,, | Performed by: ORTHOPAEDIC SURGERY

## 2021-09-17 PROCEDURE — 3008F BODY MASS INDEX DOCD: CPT | Mod: CPTII,S$GLB,, | Performed by: ORTHOPAEDIC SURGERY

## 2021-09-17 PROCEDURE — 1159F MED LIST DOCD IN RCRD: CPT | Mod: CPTII,S$GLB,, | Performed by: ORTHOPAEDIC SURGERY

## 2021-10-21 ENCOUNTER — TELEPHONE (OUTPATIENT)
Dept: FAMILY MEDICINE | Facility: CLINIC | Age: 51
End: 2021-10-21

## 2021-10-26 ENCOUNTER — OFFICE VISIT (OUTPATIENT)
Dept: RHEUMATOLOGY | Facility: CLINIC | Age: 51
End: 2021-10-26
Payer: MEDICARE

## 2021-10-26 ENCOUNTER — PATIENT MESSAGE (OUTPATIENT)
Dept: RHEUMATOLOGY | Facility: CLINIC | Age: 51
End: 2021-10-26

## 2021-10-26 VITALS
HEIGHT: 69 IN | WEIGHT: 213 LBS | DIASTOLIC BLOOD PRESSURE: 79 MMHG | HEART RATE: 103 BPM | SYSTOLIC BLOOD PRESSURE: 158 MMHG | BODY MASS INDEX: 31.55 KG/M2

## 2021-10-26 DIAGNOSIS — C67.9 MALIGNANT NEOPLASM OF URINARY BLADDER, UNSPECIFIED SITE: ICD-10-CM

## 2021-10-26 DIAGNOSIS — M17.0 PRIMARY OSTEOARTHRITIS OF BOTH KNEES: ICD-10-CM

## 2021-10-26 DIAGNOSIS — R76.12 POSITIVE QUANTIFERON-TB GOLD TEST: ICD-10-CM

## 2021-10-26 DIAGNOSIS — L40.50 PSA (PSORIATIC ARTHRITIS): Primary | ICD-10-CM

## 2021-10-26 DIAGNOSIS — I73.00 RAYNAUD'S DISEASE WITHOUT GANGRENE: ICD-10-CM

## 2021-10-26 DIAGNOSIS — E03.8 OTHER SPECIFIED HYPOTHYROIDISM: ICD-10-CM

## 2021-10-26 PROCEDURE — 3078F PR MOST RECENT DIASTOLIC BLOOD PRESSURE < 80 MM HG: ICD-10-PCS | Mod: CPTII,S$GLB,, | Performed by: INTERNAL MEDICINE

## 2021-10-26 PROCEDURE — 99204 OFFICE O/P NEW MOD 45 MIN: CPT | Mod: 25,S$GLB,, | Performed by: INTERNAL MEDICINE

## 2021-10-26 PROCEDURE — 3008F BODY MASS INDEX DOCD: CPT | Mod: CPTII,S$GLB,, | Performed by: INTERNAL MEDICINE

## 2021-10-26 PROCEDURE — 99204 PR OFFICE/OUTPT VISIT, NEW, LEVL IV, 45-59 MIN: ICD-10-PCS | Mod: 25,S$GLB,, | Performed by: INTERNAL MEDICINE

## 2021-10-26 PROCEDURE — 3077F SYST BP >= 140 MM HG: CPT | Mod: CPTII,S$GLB,, | Performed by: INTERNAL MEDICINE

## 2021-10-26 PROCEDURE — 96372 THER/PROPH/DIAG INJ SC/IM: CPT | Mod: S$GLB,,, | Performed by: INTERNAL MEDICINE

## 2021-10-26 PROCEDURE — 99999 PR PBB SHADOW E&M-EST. PATIENT-LVL III: ICD-10-PCS | Mod: PBBFAC,,, | Performed by: INTERNAL MEDICINE

## 2021-10-26 PROCEDURE — 3077F PR MOST RECENT SYSTOLIC BLOOD PRESSURE >= 140 MM HG: ICD-10-PCS | Mod: CPTII,S$GLB,, | Performed by: INTERNAL MEDICINE

## 2021-10-26 PROCEDURE — 4010F PR ACE/ARB THEARPY RXD/TAKEN: ICD-10-PCS | Mod: CPTII,S$GLB,, | Performed by: INTERNAL MEDICINE

## 2021-10-26 PROCEDURE — 99999 PR PBB SHADOW E&M-EST. PATIENT-LVL III: CPT | Mod: PBBFAC,,, | Performed by: INTERNAL MEDICINE

## 2021-10-26 PROCEDURE — 3078F DIAST BP <80 MM HG: CPT | Mod: CPTII,S$GLB,, | Performed by: INTERNAL MEDICINE

## 2021-10-26 PROCEDURE — 96372 PR INJECTION,THERAP/PROPH/DIAG2ST, IM OR SUBCUT: ICD-10-PCS | Mod: S$GLB,,, | Performed by: INTERNAL MEDICINE

## 2021-10-26 PROCEDURE — 4010F ACE/ARB THERAPY RXD/TAKEN: CPT | Mod: CPTII,S$GLB,, | Performed by: INTERNAL MEDICINE

## 2021-10-26 PROCEDURE — 3008F PR BODY MASS INDEX (BMI) DOCUMENTED: ICD-10-PCS | Mod: CPTII,S$GLB,, | Performed by: INTERNAL MEDICINE

## 2021-10-26 RX ORDER — CHLORZOXAZONE 500 MG/1
500 TABLET ORAL 3 TIMES DAILY
COMMUNITY
Start: 2021-10-19 | End: 2022-11-15 | Stop reason: SDUPTHER

## 2021-10-26 RX ORDER — KETOROLAC TROMETHAMINE 30 MG/ML
60 INJECTION, SOLUTION INTRAMUSCULAR; INTRAVENOUS
Status: COMPLETED | OUTPATIENT
Start: 2021-10-26 | End: 2021-10-26

## 2021-10-26 RX ORDER — CYANOCOBALAMIN 1000 UG/ML
1000 INJECTION, SOLUTION INTRAMUSCULAR; SUBCUTANEOUS
Status: COMPLETED | OUTPATIENT
Start: 2021-10-26 | End: 2021-10-26

## 2021-10-26 RX ORDER — CYCLOBENZAPRINE HCL 10 MG
TABLET ORAL
COMMUNITY
Start: 2021-09-02 | End: 2021-10-26

## 2021-10-26 RX ORDER — MELOXICAM 7.5 MG/1
TABLET ORAL
COMMUNITY
Start: 2021-10-20 | End: 2021-10-26

## 2021-10-26 RX ORDER — SECUKINUMAB 150 MG/ML
300 INJECTION SUBCUTANEOUS
Qty: 2 ML | Refills: 12 | Status: SHIPPED | OUTPATIENT
Start: 2021-10-26 | End: 2022-10-31 | Stop reason: SDUPTHER

## 2021-10-26 RX ORDER — MELOXICAM 15 MG/1
15 TABLET ORAL DAILY
Qty: 90 TABLET | Refills: 3 | Status: SHIPPED | OUTPATIENT
Start: 2021-10-26 | End: 2021-11-05

## 2021-10-26 RX ORDER — TIZANIDINE 4 MG/1
TABLET ORAL
COMMUNITY
Start: 2021-06-07 | End: 2021-11-05

## 2021-10-26 RX ORDER — METHYLPREDNISOLONE ACETATE 80 MG/ML
160 INJECTION, SUSPENSION INTRA-ARTICULAR; INTRALESIONAL; INTRAMUSCULAR; SOFT TISSUE
Status: COMPLETED | OUTPATIENT
Start: 2021-10-26 | End: 2021-10-26

## 2021-10-26 RX ORDER — SECUKINUMAB 150 MG/ML
300 INJECTION SUBCUTANEOUS WEEKLY
Qty: 8 ML | Refills: 0 | Status: SHIPPED | OUTPATIENT
Start: 2021-10-26 | End: 2022-01-27

## 2021-10-26 RX ADMIN — CYANOCOBALAMIN 1000 MCG: 1000 INJECTION, SOLUTION INTRAMUSCULAR; SUBCUTANEOUS at 01:10

## 2021-10-26 RX ADMIN — KETOROLAC TROMETHAMINE 60 MG: 30 INJECTION, SOLUTION INTRAMUSCULAR; INTRAVENOUS at 01:10

## 2021-10-26 RX ADMIN — METHYLPREDNISOLONE ACETATE 160 MG: 80 INJECTION, SUSPENSION INTRA-ARTICULAR; INTRALESIONAL; INTRAMUSCULAR; SOFT TISSUE at 01:10

## 2021-10-26 ASSESSMENT — ROUTINE ASSESSMENT OF PATIENT INDEX DATA (RAPID3)
PAIN SCORE: 8.5
MDHAQ FUNCTION SCORE: 1.3
TOTAL RAPID3 SCORE: 6.78
PSYCHOLOGICAL DISTRESS SCORE: 4.4
PATIENT GLOBAL ASSESSMENT SCORE: 7.5
FATIGUE SCORE: 2.2

## 2021-10-27 ENCOUNTER — HOSPITAL ENCOUNTER (OUTPATIENT)
Dept: RADIOLOGY | Facility: HOSPITAL | Age: 51
Discharge: HOME | End: 2021-10-27
Attending: INTERNAL MEDICINE
Payer: MEDICARE

## 2021-10-27 DIAGNOSIS — M17.0 PRIMARY OSTEOARTHRITIS OF BOTH KNEES: ICD-10-CM

## 2021-10-27 DIAGNOSIS — E03.8 OTHER SPECIFIED HYPOTHYROIDISM: ICD-10-CM

## 2021-10-27 DIAGNOSIS — L40.50 PSA (PSORIATIC ARTHRITIS): ICD-10-CM

## 2021-10-27 DIAGNOSIS — I73.00 RAYNAUD'S DISEASE WITHOUT GANGRENE: ICD-10-CM

## 2021-10-27 DIAGNOSIS — C67.9 MALIGNANT NEOPLASM OF URINARY BLADDER, UNSPECIFIED SITE: ICD-10-CM

## 2021-10-27 PROCEDURE — 71046 XR CHEST PA AND LATERAL: ICD-10-PCS | Mod: 26,,, | Performed by: RADIOLOGY

## 2021-10-27 PROCEDURE — 71046 X-RAY EXAM CHEST 2 VIEWS: CPT | Mod: TC,FY

## 2021-10-27 PROCEDURE — 71046 X-RAY EXAM CHEST 2 VIEWS: CPT | Mod: 26,,, | Performed by: RADIOLOGY

## 2021-10-29 ENCOUNTER — SPECIALTY PHARMACY (OUTPATIENT)
Dept: PHARMACY | Facility: CLINIC | Age: 51
End: 2021-10-29
Payer: MEDICARE

## 2021-11-01 DIAGNOSIS — L40.50 PSA (PSORIATIC ARTHRITIS): Primary | ICD-10-CM

## 2021-11-01 RX ORDER — IBUPROFEN 800 MG/1
800 TABLET ORAL 2 TIMES DAILY PRN
Qty: 60 TABLET | Refills: 2 | Status: SHIPPED | OUTPATIENT
Start: 2021-11-01 | End: 2021-12-29 | Stop reason: SDUPTHER

## 2021-11-03 ENCOUNTER — TELEPHONE (OUTPATIENT)
Dept: RHEUMATOLOGY | Facility: CLINIC | Age: 51
End: 2021-11-03
Payer: MEDICARE

## 2021-11-04 ENCOUNTER — PATIENT MESSAGE (OUTPATIENT)
Dept: FAMILY MEDICINE | Facility: CLINIC | Age: 51
End: 2021-11-04

## 2021-11-04 ENCOUNTER — OFFICE VISIT (OUTPATIENT)
Dept: FAMILY MEDICINE | Facility: CLINIC | Age: 51
End: 2021-11-04
Payer: MEDICARE

## 2021-11-04 VITALS
DIASTOLIC BLOOD PRESSURE: 71 MMHG | WEIGHT: 213 LBS | SYSTOLIC BLOOD PRESSURE: 137 MMHG | OXYGEN SATURATION: 96 % | BODY MASS INDEX: 31.55 KG/M2 | HEIGHT: 69 IN | HEART RATE: 71 BPM | RESPIRATION RATE: 18 BRPM

## 2021-11-04 DIAGNOSIS — R06.02 SHORTNESS OF BREATH: Primary | ICD-10-CM

## 2021-11-04 DIAGNOSIS — E03.9 HYPOTHYROIDISM (ACQUIRED): ICD-10-CM

## 2021-11-04 DIAGNOSIS — F51.01 PRIMARY INSOMNIA: ICD-10-CM

## 2021-11-04 DIAGNOSIS — M06.9 RHEUMATOID ARTHRITIS INVOLVING LEFT KNEE, UNSPECIFIED WHETHER RHEUMATOID FACTOR PRESENT: ICD-10-CM

## 2021-11-04 DIAGNOSIS — R73.09 ELEVATED GLUCOSE: Primary | ICD-10-CM

## 2021-11-04 DIAGNOSIS — R60.0 LOCALIZED EDEMA: ICD-10-CM

## 2021-11-04 PROCEDURE — 3008F BODY MASS INDEX DOCD: CPT | Mod: CPTII,S$GLB,, | Performed by: NURSE PRACTITIONER

## 2021-11-04 PROCEDURE — 3078F DIAST BP <80 MM HG: CPT | Mod: CPTII,S$GLB,, | Performed by: NURSE PRACTITIONER

## 2021-11-04 PROCEDURE — 4010F ACE/ARB THERAPY RXD/TAKEN: CPT | Mod: CPTII,S$GLB,, | Performed by: NURSE PRACTITIONER

## 2021-11-04 PROCEDURE — 3075F PR MOST RECENT SYSTOLIC BLOOD PRESS GE 130-139MM HG: ICD-10-PCS | Mod: CPTII,S$GLB,, | Performed by: NURSE PRACTITIONER

## 2021-11-04 PROCEDURE — 1159F PR MEDICATION LIST DOCUMENTED IN MEDICAL RECORD: ICD-10-PCS | Mod: CPTII,S$GLB,, | Performed by: NURSE PRACTITIONER

## 2021-11-04 PROCEDURE — 1160F RVW MEDS BY RX/DR IN RCRD: CPT | Mod: CPTII,S$GLB,, | Performed by: NURSE PRACTITIONER

## 2021-11-04 PROCEDURE — 3078F PR MOST RECENT DIASTOLIC BLOOD PRESSURE < 80 MM HG: ICD-10-PCS | Mod: CPTII,S$GLB,, | Performed by: NURSE PRACTITIONER

## 2021-11-04 PROCEDURE — 1159F MED LIST DOCD IN RCRD: CPT | Mod: CPTII,S$GLB,, | Performed by: NURSE PRACTITIONER

## 2021-11-04 PROCEDURE — 4010F PR ACE/ARB THEARPY RXD/TAKEN: ICD-10-PCS | Mod: CPTII,S$GLB,, | Performed by: NURSE PRACTITIONER

## 2021-11-04 PROCEDURE — 99214 PR OFFICE/OUTPT VISIT, EST, LEVL IV, 30-39 MIN: ICD-10-PCS | Mod: S$GLB,,, | Performed by: NURSE PRACTITIONER

## 2021-11-04 PROCEDURE — 99999 PR PBB SHADOW E&M-EST. PATIENT-LVL IV: ICD-10-PCS | Mod: PBBFAC,,, | Performed by: NURSE PRACTITIONER

## 2021-11-04 PROCEDURE — 99999 PR PBB SHADOW E&M-EST. PATIENT-LVL IV: CPT | Mod: PBBFAC,,, | Performed by: NURSE PRACTITIONER

## 2021-11-04 PROCEDURE — 3075F SYST BP GE 130 - 139MM HG: CPT | Mod: CPTII,S$GLB,, | Performed by: NURSE PRACTITIONER

## 2021-11-04 PROCEDURE — 1160F PR REVIEW ALL MEDS BY PRESCRIBER/CLIN PHARMACIST DOCUMENTED: ICD-10-PCS | Mod: CPTII,S$GLB,, | Performed by: NURSE PRACTITIONER

## 2021-11-04 PROCEDURE — 3008F PR BODY MASS INDEX (BMI) DOCUMENTED: ICD-10-PCS | Mod: CPTII,S$GLB,, | Performed by: NURSE PRACTITIONER

## 2021-11-04 PROCEDURE — 99214 OFFICE O/P EST MOD 30 MIN: CPT | Mod: S$GLB,,, | Performed by: NURSE PRACTITIONER

## 2021-11-05 ENCOUNTER — PATIENT MESSAGE (OUTPATIENT)
Dept: FAMILY MEDICINE | Facility: CLINIC | Age: 51
End: 2021-11-05
Payer: MEDICARE

## 2021-11-05 DIAGNOSIS — R60.0 LOCALIZED EDEMA: ICD-10-CM

## 2021-11-05 RX ORDER — FUROSEMIDE 20 MG/1
TABLET ORAL
Qty: 30 TABLET | Refills: 0 | Status: SHIPPED | OUTPATIENT
Start: 2021-11-05 | End: 2021-11-05 | Stop reason: SDUPTHER

## 2021-11-05 RX ORDER — FUROSEMIDE 20 MG/1
TABLET ORAL
Qty: 30 TABLET | Refills: 0 | Status: SHIPPED | OUTPATIENT
Start: 2021-11-05 | End: 2021-11-23

## 2021-11-05 RX ORDER — FUROSEMIDE 20 MG/1
TABLET ORAL
Qty: 30 TABLET | Refills: 1 | OUTPATIENT
Start: 2021-11-05

## 2021-11-09 ENCOUNTER — TELEPHONE (OUTPATIENT)
Dept: FAMILY MEDICINE | Facility: CLINIC | Age: 51
End: 2021-11-09
Payer: MEDICARE

## 2021-11-09 ENCOUNTER — CLINICAL SUPPORT (OUTPATIENT)
Dept: RHEUMATOLOGY | Facility: CLINIC | Age: 51
End: 2021-11-09
Payer: MEDICARE

## 2021-11-09 VITALS — HEART RATE: 81 BPM | DIASTOLIC BLOOD PRESSURE: 80 MMHG | SYSTOLIC BLOOD PRESSURE: 122 MMHG

## 2021-11-09 DIAGNOSIS — I73.00 RAYNAUD'S DISEASE WITHOUT GANGRENE: ICD-10-CM

## 2021-11-09 DIAGNOSIS — L40.50 PSA (PSORIATIC ARTHRITIS): Primary | ICD-10-CM

## 2021-11-09 PROCEDURE — 99999 PR PBB SHADOW E&M-EST. PATIENT-LVL II: ICD-10-PCS | Mod: PBBFAC,,,

## 2021-11-09 PROCEDURE — 99999 PR PBB SHADOW E&M-EST. PATIENT-LVL II: CPT | Mod: PBBFAC,,,

## 2021-11-10 ENCOUNTER — SPECIALTY PHARMACY (OUTPATIENT)
Dept: PHARMACY | Facility: CLINIC | Age: 51
End: 2021-11-10
Payer: MEDICARE

## 2021-11-10 ENCOUNTER — TELEPHONE (OUTPATIENT)
Dept: RHEUMATOLOGY | Facility: CLINIC | Age: 51
End: 2021-11-10
Payer: MEDICARE

## 2021-11-10 DIAGNOSIS — L40.50 PSORIATIC ARTHRITIS: Primary | ICD-10-CM

## 2021-11-17 ENCOUNTER — HOSPITAL ENCOUNTER (OUTPATIENT)
Dept: RADIOLOGY | Facility: HOSPITAL | Age: 51
Discharge: HOME | End: 2021-11-17
Attending: NURSE PRACTITIONER
Payer: MEDICARE

## 2021-11-17 DIAGNOSIS — M06.9 RHEUMATOID ARTHRITIS INVOLVING LEFT KNEE, UNSPECIFIED WHETHER RHEUMATOID FACTOR PRESENT: ICD-10-CM

## 2021-11-17 PROCEDURE — 76770 US EXAM ABDO BACK WALL COMP: CPT | Mod: TC

## 2021-11-17 PROCEDURE — 76770 US EXAM ABDO BACK WALL COMP: CPT | Mod: 26,,, | Performed by: RADIOLOGY

## 2021-11-17 PROCEDURE — 76770 US RETROPERITONEAL COMPLETE: ICD-10-PCS | Mod: 26,,, | Performed by: RADIOLOGY

## 2021-11-19 ENCOUNTER — TELEPHONE (OUTPATIENT)
Dept: RHEUMATOLOGY | Facility: CLINIC | Age: 51
End: 2021-11-19
Payer: MEDICARE

## 2021-11-19 ENCOUNTER — TELEPHONE (OUTPATIENT)
Dept: RHEUMATOLOGY | Facility: CLINIC | Age: 51
End: 2021-11-19

## 2021-11-19 NOTE — TELEPHONE ENCOUNTER
----- Message from Nikole Juan sent at 11/15/2021  1:53 PM CST -----  Contact: Lor garnett/MARGARET Horowitz is requesting a call back to check on the status of the patients enrollment form that was started on the 5th of November online portal.  Call back at [phone] Ref Key [id number] and thank you.

## 2021-12-02 ENCOUNTER — OFFICE VISIT (OUTPATIENT)
Dept: FAMILY MEDICINE | Facility: CLINIC | Age: 51
End: 2021-12-02
Payer: MEDICARE

## 2021-12-02 DIAGNOSIS — N28.1 RENAL CYST, RIGHT: ICD-10-CM

## 2021-12-02 DIAGNOSIS — R31.9 HEMATURIA, UNSPECIFIED TYPE: Primary | ICD-10-CM

## 2021-12-02 PROCEDURE — 4010F ACE/ARB THERAPY RXD/TAKEN: CPT | Mod: CPTII,95,, | Performed by: FAMILY MEDICINE

## 2021-12-02 PROCEDURE — 4010F PR ACE/ARB THEARPY RXD/TAKEN: ICD-10-PCS | Mod: CPTII,95,, | Performed by: FAMILY MEDICINE

## 2021-12-02 PROCEDURE — 99442 PR PHYSICIAN TELEPHONE EVALUATION 11-20 MIN: CPT | Mod: 95,,, | Performed by: FAMILY MEDICINE

## 2021-12-02 PROCEDURE — 99442 PR PHYSICIAN TELEPHONE EVALUATION 11-20 MIN: ICD-10-PCS | Mod: 95,,, | Performed by: FAMILY MEDICINE

## 2021-12-09 ENCOUNTER — PATIENT MESSAGE (OUTPATIENT)
Dept: FAMILY MEDICINE | Facility: CLINIC | Age: 51
End: 2021-12-09
Payer: MEDICARE

## 2021-12-09 ENCOUNTER — HOSPITAL ENCOUNTER (OUTPATIENT)
Dept: RADIOLOGY | Facility: HOSPITAL | Age: 51
Discharge: HOME | End: 2021-12-09
Attending: FAMILY MEDICINE
Payer: MEDICARE

## 2021-12-09 DIAGNOSIS — M35.9 AUTOIMMUNE DISEASE: Primary | ICD-10-CM

## 2021-12-09 DIAGNOSIS — R31.9 HEMATURIA, UNSPECIFIED TYPE: ICD-10-CM

## 2021-12-09 DIAGNOSIS — R06.02 SHORTNESS OF BREATH: ICD-10-CM

## 2021-12-09 DIAGNOSIS — N02.9 RECURRENT AND PERSISTENT HEMATURIA: Primary | ICD-10-CM

## 2021-12-09 DIAGNOSIS — N28.1 ACQUIRED COMPLEX RENAL CYST: ICD-10-CM

## 2021-12-09 DIAGNOSIS — N28.1 RENAL CYST, RIGHT: ICD-10-CM

## 2021-12-09 PROCEDURE — 25500020 PHARM REV CODE 255: Performed by: FAMILY MEDICINE

## 2021-12-09 PROCEDURE — 74178 CT ABD&PLV WO CNTR FLWD CNTR: CPT | Mod: TC

## 2021-12-09 PROCEDURE — 74178 CT ABD&PLV WO CNTR FLWD CNTR: CPT | Mod: 26,,, | Performed by: RADIOLOGY

## 2021-12-09 PROCEDURE — 74178 CT ABDOMEN PELVIS W WO CONTRAST: ICD-10-PCS | Mod: 26,,, | Performed by: RADIOLOGY

## 2021-12-09 RX ADMIN — IOHEXOL 100 ML: 350 INJECTION, SOLUTION INTRAVENOUS at 08:12

## 2021-12-10 ENCOUNTER — PATIENT MESSAGE (OUTPATIENT)
Dept: FAMILY MEDICINE | Facility: CLINIC | Age: 51
End: 2021-12-10
Payer: MEDICARE

## 2021-12-28 ENCOUNTER — SPECIALTY PHARMACY (OUTPATIENT)
Dept: PHARMACY | Facility: CLINIC | Age: 51
End: 2021-12-28
Payer: MEDICARE

## 2021-12-29 DIAGNOSIS — L40.50 PSA (PSORIATIC ARTHRITIS): ICD-10-CM

## 2021-12-29 NOTE — TELEPHONE ENCOUNTER
Pharmacy requesting refill on Ibuprofen 800 MG  Pt's LOV 10/26/21  Pt's NOV 01/26/2022  Medication pending

## 2021-12-30 RX ORDER — IBUPROFEN 800 MG/1
800 TABLET ORAL 2 TIMES DAILY PRN
Qty: 60 TABLET | Refills: 2 | Status: SHIPPED | OUTPATIENT
Start: 2021-12-30 | End: 2022-04-06 | Stop reason: SDUPTHER

## 2022-01-04 ENCOUNTER — OFFICE VISIT (OUTPATIENT)
Dept: UROLOGY | Facility: CLINIC | Age: 52
End: 2022-01-04
Payer: MEDICARE

## 2022-01-04 VITALS
HEART RATE: 90 BPM | HEIGHT: 69 IN | BODY MASS INDEX: 31.54 KG/M2 | DIASTOLIC BLOOD PRESSURE: 73 MMHG | RESPIRATION RATE: 18 BRPM | SYSTOLIC BLOOD PRESSURE: 130 MMHG | WEIGHT: 212.94 LBS

## 2022-01-04 DIAGNOSIS — R31.21 ASYMPTOMATIC MICROSCOPIC HEMATURIA: Primary | ICD-10-CM

## 2022-01-04 DIAGNOSIS — N02.9 RECURRENT AND PERSISTENT HEMATURIA: ICD-10-CM

## 2022-01-04 DIAGNOSIS — N28.1 ACQUIRED COMPLEX RENAL CYST: ICD-10-CM

## 2022-01-04 LAB
BACTERIA #/AREA URNS HPF: ABNORMAL /HPF
BILIRUB SERPL-MCNC: ABNORMAL MG/DL
BLOOD URINE, POC: ABNORMAL
CLARITY, POC UA: CLEAR
COLOR, POC UA: YELLOW
GLUCOSE UR QL STRIP: ABNORMAL
KETONES UR QL STRIP: ABNORMAL
LEUKOCYTE ESTERASE URINE, POC: ABNORMAL
MICROSCOPIC COMMENT: ABNORMAL
NITRITE, POC UA: ABNORMAL
PH, POC UA: 5
PROTEIN, POC: ABNORMAL
RBC #/AREA URNS HPF: 1 /HPF (ref 0–4)
SPECIFIC GRAVITY, POC UA: 1.01
SQUAMOUS #/AREA URNS HPF: ABNORMAL /HPF
UROBILINOGEN, POC UA: 0.2
WBC #/AREA URNS HPF: 1 /HPF (ref 0–5)

## 2022-01-04 PROCEDURE — 99999 PR PBB SHADOW E&M-EST. PATIENT-LVL III: ICD-10-PCS | Mod: PBBFAC,,, | Performed by: STUDENT IN AN ORGANIZED HEALTH CARE EDUCATION/TRAINING PROGRAM

## 2022-01-04 PROCEDURE — 3008F PR BODY MASS INDEX (BMI) DOCUMENTED: ICD-10-PCS | Mod: CPTII,S$GLB,, | Performed by: STUDENT IN AN ORGANIZED HEALTH CARE EDUCATION/TRAINING PROGRAM

## 2022-01-04 PROCEDURE — 3008F BODY MASS INDEX DOCD: CPT | Mod: CPTII,S$GLB,, | Performed by: STUDENT IN AN ORGANIZED HEALTH CARE EDUCATION/TRAINING PROGRAM

## 2022-01-04 PROCEDURE — 1159F PR MEDICATION LIST DOCUMENTED IN MEDICAL RECORD: ICD-10-PCS | Mod: CPTII,S$GLB,, | Performed by: STUDENT IN AN ORGANIZED HEALTH CARE EDUCATION/TRAINING PROGRAM

## 2022-01-04 PROCEDURE — 87086 URINE CULTURE/COLONY COUNT: CPT | Performed by: STUDENT IN AN ORGANIZED HEALTH CARE EDUCATION/TRAINING PROGRAM

## 2022-01-04 PROCEDURE — 99999 PR PBB SHADOW E&M-EST. PATIENT-LVL III: CPT | Mod: PBBFAC,,, | Performed by: STUDENT IN AN ORGANIZED HEALTH CARE EDUCATION/TRAINING PROGRAM

## 2022-01-04 PROCEDURE — 99203 OFFICE O/P NEW LOW 30 MIN: CPT | Mod: S$GLB,,, | Performed by: STUDENT IN AN ORGANIZED HEALTH CARE EDUCATION/TRAINING PROGRAM

## 2022-01-04 PROCEDURE — 3075F PR MOST RECENT SYSTOLIC BLOOD PRESS GE 130-139MM HG: ICD-10-PCS | Mod: CPTII,S$GLB,, | Performed by: STUDENT IN AN ORGANIZED HEALTH CARE EDUCATION/TRAINING PROGRAM

## 2022-01-04 PROCEDURE — 99203 PR OFFICE/OUTPT VISIT, NEW, LEVL III, 30-44 MIN: ICD-10-PCS | Mod: S$GLB,,, | Performed by: STUDENT IN AN ORGANIZED HEALTH CARE EDUCATION/TRAINING PROGRAM

## 2022-01-04 PROCEDURE — 1160F PR REVIEW ALL MEDS BY PRESCRIBER/CLIN PHARMACIST DOCUMENTED: ICD-10-PCS | Mod: CPTII,S$GLB,, | Performed by: STUDENT IN AN ORGANIZED HEALTH CARE EDUCATION/TRAINING PROGRAM

## 2022-01-04 PROCEDURE — 81000 URINALYSIS NONAUTO W/SCOPE: CPT | Performed by: STUDENT IN AN ORGANIZED HEALTH CARE EDUCATION/TRAINING PROGRAM

## 2022-01-04 PROCEDURE — 3078F DIAST BP <80 MM HG: CPT | Mod: CPTII,S$GLB,, | Performed by: STUDENT IN AN ORGANIZED HEALTH CARE EDUCATION/TRAINING PROGRAM

## 2022-01-04 PROCEDURE — 3078F PR MOST RECENT DIASTOLIC BLOOD PRESSURE < 80 MM HG: ICD-10-PCS | Mod: CPTII,S$GLB,, | Performed by: STUDENT IN AN ORGANIZED HEALTH CARE EDUCATION/TRAINING PROGRAM

## 2022-01-04 PROCEDURE — 81002 URINALYSIS NONAUTO W/O SCOPE: CPT | Mod: S$GLB,,, | Performed by: STUDENT IN AN ORGANIZED HEALTH CARE EDUCATION/TRAINING PROGRAM

## 2022-01-04 PROCEDURE — 3075F SYST BP GE 130 - 139MM HG: CPT | Mod: CPTII,S$GLB,, | Performed by: STUDENT IN AN ORGANIZED HEALTH CARE EDUCATION/TRAINING PROGRAM

## 2022-01-04 PROCEDURE — 81002 POCT URINE DIPSTICK WITHOUT MICROSCOPE: ICD-10-PCS | Mod: S$GLB,,, | Performed by: STUDENT IN AN ORGANIZED HEALTH CARE EDUCATION/TRAINING PROGRAM

## 2022-01-04 PROCEDURE — 1160F RVW MEDS BY RX/DR IN RCRD: CPT | Mod: CPTII,S$GLB,, | Performed by: STUDENT IN AN ORGANIZED HEALTH CARE EDUCATION/TRAINING PROGRAM

## 2022-01-04 PROCEDURE — 1159F MED LIST DOCD IN RCRD: CPT | Mod: CPTII,S$GLB,, | Performed by: STUDENT IN AN ORGANIZED HEALTH CARE EDUCATION/TRAINING PROGRAM

## 2022-01-04 NOTE — PROGRESS NOTES
"[facility] Note    PCP: Sofi Gallagher DO    Chief Complaint: microscopic hematuria     SUBJECTIVE:       History of Present Illness:  Boom Blanco is a 51 y.o. female who presents to clinic for hematuria. She is New  to our clinic.     Presents today for microscopic hematuria. Has had UAs dip positive for blood, but no microanalysis performed.   No bothersome urinary issues. No dysuria. No gross hematuria.     She has a hx of ?bladder cancer in 1995. She was undergoing hysterectomy and at that time a "cyst on the bladder" was found and removed. No further treatment for this.   No hx of stones.     She underwent a CT w/wo contrast which showed 2 complex cysts of the right kidney, otherwise no concerning findings. These are stable since 2018.    UA today: +trace blood, leuk and nitrite negative     Last urine culture: no documented UTIs  Last creatinine: 1.1 (12/9/21)    Family  hx: no malignancy   Hx of HTN, HLD  Former smoker, quit 3 years ago.     Past medical, family, and social history reviewed as documented in chart with pertinent positive medical, family, and social history detailed in HPI.    Review of patient's allergies indicates:   Allergen Reactions    Remeron [mirtazapine] Other (See Comments)     Weight gain       Past Medical History:   Diagnosis Date    Anxiety disorder     Arthritis     Bipolar disorder     Chronic pain     Diverticulitis     Hyperlipidemia     Hypertension     Lupus 2006    Psoriatic arthritis     Raynaud's disease      Past Surgical History:   Procedure Laterality Date    ARTHROSCOPIC CHONDROPLASTY OF KNEE JOINT Left 1/22/2019    Procedure: ARTHROSCOPY, KNEE, WITH CHONDROPLASTY;  Surgeon: Sylvain Gorman DO;  Location: [facility] OR;  Service: Orthopedics;  Laterality: Left;    ARTHROSCOPY OF KNEE Left 1/22/2019    Procedure: ARTHROSCOPY, KNEE;  Surgeon: Sylvain Gorman DO;  Location: [facility] OR;  Service: Orthopedics;  Laterality: Left;  " Equipment: Emerson Chondral Drill Set and Mitek Meniscus Repair Set Truespan  Vendor/Rep: [employer]    ARTHROSCOPY OF KNEE Right 2/5/2019    Procedure: ARTHROSCOPY, KNEE;  Surgeon: Sylvain Gorman DO;  Location: [facility] OR;  Service: Orthopedics;  Laterality: Right;  Equipment: Mitek Truspar; Scope  Venor/Rep: Mitek    BACK SURGERY      spinal stimulator implanted and then removed    BREAST BIOPSY      COLON SURGERY      COLONOSCOPY  11/25/2016    repeat in 5-10 years    COLONOSCOPY N/A 9/6/2019    Procedure: COLONOSCOPY;  Surgeon: Dany Hugo MD;  Location: [facility] ENDO;  Service: General;  Laterality: N/A;    ENDOSCOPY  9/6/2019    Procedure: ENDOSCOPY;  Surgeon: Dany Hugo MD;  Location: [facility] ENDO;  Service: General;;  with multiple biopsy's    ESOPHAGOGASTRODUODENOSCOPY Left 8/29/2018    Procedure: ESOPHAGOGASTRODUODENOSCOPY (EGD);  Surgeon: Dany Hugo MD;  Location: [facility];  Service: General;  Laterality: Left;    EXCISION OF MEDIAL MENISCUS OF KNEE Right 2/5/2019    Procedure: MENISCECTOMY, KNEE, MEDIAL;  Surgeon: Sylvain Gorman DO;  Location: [facility] OR;  Service: Orthopedics;  Laterality: Right;    HYSTERECTOMY  1997    KNEE ARTHROSCOPY W/ DEBRIDEMENT Left 1/22/2019    Procedure: ARTHROSCOPY, KNEE, WITH DEBRIDEMENT;  Surgeon: Sylvain Gorman DO;  Location: [facility] OR;  Service: Orthopedics;  Laterality: Left;    KNEE ARTHROSCOPY W/ MENISCECTOMY Left 1/22/2019    Procedure: ARTHROSCOPY, KNEE, WITH MENISCECTOMY;  Surgeon: Sylvain Gorman DO;  Location: [facility] OR;  Service: Orthopedics;  Laterality: Left;    KNEE ARTHROSCOPY W/ PLICA EXCISION Left 1/22/2019    Procedure: EXCISION, PLICA, KNEE, ARTHROSCOPIC;  Surgeon: Sylvain Gorman DO;  Location: [facility] OR;  Service: Orthopedics;  Laterality: Left;    SALPINGOOPHORECTOMY  1997    TOTAL KNEE ARTHROPLASTY Right 4/18/2019    Procedure: ARTHROPLASTY, KNEE, TOTAL;  Surgeon: Sylvain Gorman DO;  Location: [facility] OR;  Service:  "Orthopedics;  Laterality: Right;  Equipment: Depuy Sigma Total Knee System; Guillen Leg Mcclellan  Vendor/Rep: [employer]  Table/Position: Supine  REQUIRES FIRST ASSISTANT EPHRAIM    TOTAL KNEE ARTHROPLASTY Left 10/3/2019    Procedure: ARTHROPLASTY, KNEE, TOTAL;  Surgeon: Sylvain Gorman DO;  Location: [facility] OR;  Service: Orthopedics;  Laterality: Left;  Equipment: Depuy Sigma Total Knee System  Vendor/Rep: Depuy  REQUIRES FIRST ASSISTANT EPHRAIM    TUBAL LIGATION       Family History   Problem Relation Age of Onset    Arthritis Mother     Pacemaker/defibrilator Mother     Lung disease Father     Heart disease Father     Arthritis Sister     Arthritis Brother     Arthritis Sister     Arthritis Sister     Breast cancer Neg Hx      Social History     Tobacco Use    Smoking status: Former Smoker     Packs/day: 1.00     Years: 33.00     Pack years: 33.00     Types: Cigarettes    Smokeless tobacco: Never Used   Substance Use Topics    Alcohol use: No    Drug use: Never        Review of Systems   Genitourinary: Positive for frequency and nocturia. Negative for difficulty urinating, dysuria, hematuria, urgency and vaginal pain.       OBJECTIVE:     Anticoagulation:  no    Estimated body mass index is 31.45 kg/m² as calculated from the following:    Height as of this encounter: 5' 9" (1.753 m).    Weight as of this encounter: 96.6 kg (212 lb 15.4 oz).    Vital Signs (Most Recent)  Pulse: 90 (01/04/22 0906)  Resp: 18 (01/04/22 0906)  BP: 130/73 (01/04/22 0906)    Physical Exam  Vitals reviewed.   Constitutional:       General: She is not in acute distress.     Appearance: Normal appearance. She is not ill-appearing.   HENT:      Head: Normocephalic and atraumatic.   Eyes:      General: No scleral icterus.  Cardiovascular:      Rate and Rhythm: Normal rate and regular rhythm.   Pulmonary:      Effort: Pulmonary effort is normal. No respiratory distress.   Abdominal:      General: There is no distension.      Palpations: " Abdomen is soft.   Skin:     Coloration: Skin is not jaundiced.   Neurological:      General: No focal deficit present.      Mental Status: She is alert and oriented to person, place, and time.   Psychiatric:         Mood and Affect: Mood normal.         Behavior: Behavior normal.         BMP  Lab Results   Component Value Date     12/09/2021    K 4.5 12/09/2021     12/09/2021    CO2 24 12/09/2021    BUN 16 12/09/2021    CREATININE 1.1 12/09/2021    CALCIUM 9.6 12/09/2021    ANIONGAP 14 12/09/2021    ESTGFRAFRICA >60.0 12/09/2021    EGFRNONAA 57.5 (A) 12/09/2021       Lab Results   Component Value Date    WBC 10.29 08/24/2021    HGB 13.5 08/24/2021    HCT 40.9 08/24/2021    MCV 90 08/24/2021     08/24/2021       Imaging:  Per HPI    ASSESSMENT     1. Asymptomatic microscopic hematuria    2. Recurrent and persistent hematuria    3. Acquired complex renal cyst        PLAN:     - Urine sent for microanalysis   - If > 3 RBC we discussed need for completion of workup with cystoscopy. Has already had upper tract imaging.   - Otherwise, can follow up in 1 year with renal US to monitor complex renal cysts    Stephany Perez MD     Letter to Sofi Gallagher DO

## 2022-01-05 LAB
BACTERIA UR CULT: NORMAL
BACTERIA UR CULT: NORMAL

## 2022-01-13 ENCOUNTER — OFFICE VISIT (OUTPATIENT)
Dept: FAMILY MEDICINE | Facility: CLINIC | Age: 52
End: 2022-01-13
Payer: MEDICARE

## 2022-01-13 VITALS
HEIGHT: 69 IN | DIASTOLIC BLOOD PRESSURE: 62 MMHG | WEIGHT: 214.5 LBS | OXYGEN SATURATION: 98 % | HEART RATE: 78 BPM | BODY MASS INDEX: 31.77 KG/M2 | RESPIRATION RATE: 17 BRPM | SYSTOLIC BLOOD PRESSURE: 110 MMHG

## 2022-01-13 DIAGNOSIS — Z76.0 MEDICATION REFILL: Primary | ICD-10-CM

## 2022-01-13 DIAGNOSIS — E03.8 OTHER SPECIFIED HYPOTHYROIDISM: ICD-10-CM

## 2022-01-13 DIAGNOSIS — R60.0 LOCALIZED EDEMA: ICD-10-CM

## 2022-01-13 DIAGNOSIS — Z23 NEEDS FLU SHOT: ICD-10-CM

## 2022-01-13 DIAGNOSIS — J30.2 SEASONAL ALLERGIES: ICD-10-CM

## 2022-01-13 DIAGNOSIS — I10 BENIGN HYPERTENSION: ICD-10-CM

## 2022-01-13 PROCEDURE — 3008F PR BODY MASS INDEX (BMI) DOCUMENTED: ICD-10-PCS | Mod: CPTII,S$GLB,, | Performed by: FAMILY MEDICINE

## 2022-01-13 PROCEDURE — 3074F SYST BP LT 130 MM HG: CPT | Mod: CPTII,S$GLB,, | Performed by: FAMILY MEDICINE

## 2022-01-13 PROCEDURE — G0008 FLU VACCINE (QUAD) GREATER THAN OR EQUAL TO 3YO PRESERVATIVE FREE IM: ICD-10-PCS | Mod: S$GLB,,, | Performed by: FAMILY MEDICINE

## 2022-01-13 PROCEDURE — 3078F DIAST BP <80 MM HG: CPT | Mod: CPTII,S$GLB,, | Performed by: FAMILY MEDICINE

## 2022-01-13 PROCEDURE — 99214 OFFICE O/P EST MOD 30 MIN: CPT | Mod: S$GLB,,, | Performed by: FAMILY MEDICINE

## 2022-01-13 PROCEDURE — 1159F MED LIST DOCD IN RCRD: CPT | Mod: CPTII,S$GLB,, | Performed by: FAMILY MEDICINE

## 2022-01-13 PROCEDURE — 90686 FLU VACCINE (QUAD) GREATER THAN OR EQUAL TO 3YO PRESERVATIVE FREE IM: ICD-10-PCS | Mod: S$GLB,,, | Performed by: FAMILY MEDICINE

## 2022-01-13 PROCEDURE — 99999 PR PBB SHADOW E&M-EST. PATIENT-LVL IV: ICD-10-PCS | Mod: PBBFAC,,, | Performed by: FAMILY MEDICINE

## 2022-01-13 PROCEDURE — 90686 IIV4 VACC NO PRSV 0.5 ML IM: CPT | Mod: S$GLB,,, | Performed by: FAMILY MEDICINE

## 2022-01-13 PROCEDURE — 99214 PR OFFICE/OUTPT VISIT, EST, LEVL IV, 30-39 MIN: ICD-10-PCS | Mod: S$GLB,,, | Performed by: FAMILY MEDICINE

## 2022-01-13 PROCEDURE — 1160F PR REVIEW ALL MEDS BY PRESCRIBER/CLIN PHARMACIST DOCUMENTED: ICD-10-PCS | Mod: CPTII,S$GLB,, | Performed by: FAMILY MEDICINE

## 2022-01-13 PROCEDURE — 1160F RVW MEDS BY RX/DR IN RCRD: CPT | Mod: CPTII,S$GLB,, | Performed by: FAMILY MEDICINE

## 2022-01-13 PROCEDURE — 3078F PR MOST RECENT DIASTOLIC BLOOD PRESSURE < 80 MM HG: ICD-10-PCS | Mod: CPTII,S$GLB,, | Performed by: FAMILY MEDICINE

## 2022-01-13 PROCEDURE — 99999 PR PBB SHADOW E&M-EST. PATIENT-LVL IV: CPT | Mod: PBBFAC,,, | Performed by: FAMILY MEDICINE

## 2022-01-13 PROCEDURE — 3074F PR MOST RECENT SYSTOLIC BLOOD PRESSURE < 130 MM HG: ICD-10-PCS | Mod: CPTII,S$GLB,, | Performed by: FAMILY MEDICINE

## 2022-01-13 PROCEDURE — G0008 ADMIN INFLUENZA VIRUS VAC: HCPCS | Mod: S$GLB,,, | Performed by: FAMILY MEDICINE

## 2022-01-13 PROCEDURE — 1159F PR MEDICATION LIST DOCUMENTED IN MEDICAL RECORD: ICD-10-PCS | Mod: CPTII,S$GLB,, | Performed by: FAMILY MEDICINE

## 2022-01-13 PROCEDURE — 3008F BODY MASS INDEX DOCD: CPT | Mod: CPTII,S$GLB,, | Performed by: FAMILY MEDICINE

## 2022-01-13 RX ORDER — FLUTICASONE PROPIONATE 50 MCG
1 SPRAY, SUSPENSION (ML) NASAL 2 TIMES DAILY PRN
Qty: 15.8 ML | Refills: 11 | Status: SHIPPED | OUTPATIENT
Start: 2022-01-13 | End: 2022-11-14 | Stop reason: SDUPTHER

## 2022-01-13 RX ORDER — FUROSEMIDE 20 MG/1
TABLET ORAL
Qty: 90 TABLET | Refills: 3 | Status: SHIPPED | OUTPATIENT
Start: 2022-01-13 | End: 2022-12-16

## 2022-01-13 RX ORDER — LEVOTHYROXINE SODIUM 50 UG/1
TABLET ORAL
Qty: 90 TABLET | Refills: 3 | Status: SHIPPED | OUTPATIENT
Start: 2022-01-13 | End: 2022-12-16

## 2022-01-13 RX ORDER — ALBUTEROL SULFATE 90 UG/1
2 AEROSOL, METERED RESPIRATORY (INHALATION) EVERY 6 HOURS PRN
Qty: 18 G | Refills: 0 | Status: SHIPPED | OUTPATIENT
Start: 2022-01-13 | End: 2022-02-15 | Stop reason: SDUPTHER

## 2022-01-13 NOTE — PROGRESS NOTES
Subjective:       Patient ID: Boom Blanco is a 51 y.o. female.    Chief Complaint: Follow-up (6 week f/u, CT review. Pt consents to Flu vaccine. Pt would like to discuss her bp meds to see if she needs to still be taking this. Pt concerned about blood spots showing up on her arms and hands.)    HPI   Follow-up. Due for several med refills. Blood pressure has been good at home, no light-headedness. Has seen urology since having her CT abdomen/pelvis, and an ultrasound has been scheduled for next year. GFR now < 60; patient admits to drinking 1 glass of tea per day and not much else.     Consents to flu vaccine.    Review of Systems   Constitutional: Negative for chills and fever.   Respiratory: Positive for shortness of breath.        Past Medical History:   Diagnosis Date    Anxiety disorder     Arthritis     Bipolar disorder     Chronic pain     Diverticulitis     Hyperlipidemia     Hypertension     Lupus 2006    Psoriatic arthritis     Raynaud's disease      Past Surgical History:   Procedure Laterality Date    ARTHROSCOPIC CHONDROPLASTY OF KNEE JOINT Left 1/22/2019    Procedure: ARTHROSCOPY, KNEE, WITH CHONDROPLASTY;  Surgeon: Sylvain Gorman DO;  Location: [facility] OR;  Service: Orthopedics;  Laterality: Left;    ARTHROSCOPY OF KNEE Left 1/22/2019    Procedure: ARTHROSCOPY, KNEE;  Surgeon: Sylvain Gorman DO;  Location: [facility] OR;  Service: Orthopedics;  Laterality: Left;  Equipment: Emerson Chondral Drill Set and Mitek Meniscus Repair Set Truespan  Vendor/Rep: [employer]    ARTHROSCOPY OF KNEE Right 2/5/2019    Procedure: ARTHROSCOPY, KNEE;  Surgeon: Sylvain Gorman DO;  Location: [facility] OR;  Service: Orthopedics;  Laterality: Right;  Equipment: Mitek Truspar; Scope  Venor/Rep: Mitek    BACK SURGERY      spinal stimulator implanted and then removed    BREAST BIOPSY      COLON SURGERY      COLONOSCOPY  11/25/2016    repeat in 5-10 years    COLONOSCOPY N/A 9/6/2019     Procedure: COLONOSCOPY;  Surgeon: Dany Hugo MD;  Location: [facility] ENDO;  Service: General;  Laterality: N/A;    ENDOSCOPY  9/6/2019    Procedure: ENDOSCOPY;  Surgeon: Dany Hugo MD;  Location: [facility] ENDO;  Service: General;;  with multiple biopsy's    ESOPHAGOGASTRODUODENOSCOPY Left 8/29/2018    Procedure: ESOPHAGOGASTRODUODENOSCOPY (EGD);  Surgeon: Dany Hugo MD;  Location: [facility] ENDO;  Service: General;  Laterality: Left;    EXCISION OF MEDIAL MENISCUS OF KNEE Right 2/5/2019    Procedure: MENISCECTOMY, KNEE, MEDIAL;  Surgeon: Sylvain Gorman DO;  Location: [facility] OR;  Service: Orthopedics;  Laterality: Right;    HYSTERECTOMY  1997    KNEE ARTHROSCOPY W/ DEBRIDEMENT Left 1/22/2019    Procedure: ARTHROSCOPY, KNEE, WITH DEBRIDEMENT;  Surgeon: Sylvain Gorman DO;  Location: [facility] OR;  Service: Orthopedics;  Laterality: Left;    KNEE ARTHROSCOPY W/ MENISCECTOMY Left 1/22/2019    Procedure: ARTHROSCOPY, KNEE, WITH MENISCECTOMY;  Surgeon: Sylvain Gorman DO;  Location: [facility] OR;  Service: Orthopedics;  Laterality: Left;    KNEE ARTHROSCOPY W/ PLICA EXCISION Left 1/22/2019    Procedure: EXCISION, PLICA, KNEE, ARTHROSCOPIC;  Surgeon: Sylvain Gorman DO;  Location: [facility] OR;  Service: Orthopedics;  Laterality: Left;    SALPINGOOPHORECTOMY  1997    TOTAL KNEE ARTHROPLASTY Right 4/18/2019    Procedure: ARTHROPLASTY, KNEE, TOTAL;  Surgeon: Sylvain Gorman DO;  Location: [facility] OR;  Service: Orthopedics;  Laterality: Right;  Equipment: Depuy Sigma Total Knee System; Guillen Leg Mcclellan  Vendor/Rep: [employer]uy  Table/Position: Supine  REQUIRES FIRST ASSISTANT EPHRAIM    TOTAL KNEE ARTHROPLASTY Left 10/3/2019    Procedure: ARTHROPLASTY, KNEE, TOTAL;  Surgeon: Sylvain Gorman DO;  Location: [facility] OR;  Service: Orthopedics;  Laterality: Left;  Equipment: Depuy Sigma Total Knee System  Vendor/Rep: Depuy  REQUIRES FIRST ASSISTANT EPHRAIM    TUBAL LIGATION       Social History     Socioeconomic History    Marital  "status:    Occupational History    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   Tobacco Use    Smoking status: Former Smoker     Packs/day: 1.00     Years: 33.00     Pack years: 33.00     Types: Cigarettes    Smokeless tobacco: Never Used   Substance and Sexual Activity    Alcohol use: No    Drug use: Never    Sexual activity: Not Currently     Family History   Problem Relation Age of Onset    Arthritis Mother     Pacemaker/defibrilator Mother     Lung disease Father     Heart disease Father     Arthritis Sister     Arthritis Brother     Arthritis Sister     Arthritis Sister     Breast cancer Neg Hx        Objective:      /62 (BP Location: Left arm, Patient Position: Sitting, BP Method: Large (Automatic))   Pulse 78   Resp 17   Ht 5' 9" (1.753 m)   Wt 97.3 kg (214 lb 8 oz)   LMP  (LMP Unknown)   SpO2 98%   BMI 31.68 kg/m²   Physical Exam  Vitals reviewed.   Constitutional:       General: She is not in acute distress.     Appearance: She is obese. She is not toxic-appearing.   HENT:      Head: Normocephalic and atraumatic.   Eyes:      General: No scleral icterus.        Right eye: No discharge.         Left eye: No discharge.      Conjunctiva/sclera: Conjunctivae normal.   Cardiovascular:      Rate and Rhythm: Normal rate and regular rhythm.   Pulmonary:      Effort: Pulmonary effort is normal. No respiratory distress.      Breath sounds: Normal breath sounds.   Neurological:      Mental Status: She is alert and oriented to person, place, and time.   Psychiatric:         Mood and Affect: Mood normal.         Behavior: Behavior normal.         Assessment:       1. Medication refill    2. Localized edema    3. Other specified hypothyroidism    4. Seasonal allergies    5. Needs flu shot    6. Benign hypertension        Plan:       Flu shot given, meds refilled. Continue lisinopril, increase fluid intake (ideally water), repeat labs in 3 months.    Medication refill  -     albuterol " (PROAIR HFA) 90 mcg/actuation inhaler; Inhale 2 puffs into the lungs every 6 (six) hours as needed for Wheezing. Rescue  Dispense: 18 g; Refill: 0    Localized edema  -     furosemide (LASIX) 20 MG tablet; TAKE ONE (1) TABLET EVERY MORNING AS NEEDED FOR FLUID  Dispense: 90 tablet; Refill: 3    Other specified hypothyroidism  -     levothyroxine (SYNTHROID) 50 MCG tablet; TAKE ONE (1) TABLET EVERY MORNING ON AN EMPTY STOMACH FOR THYROID  Dispense: 90 tablet; Refill: 3  -     Comprehensive Metabolic Panel; Future; Expected date: 01/13/2022  -     TSH; Future; Expected date: 01/13/2022  -     T4, Free; Future; Expected date: 01/13/2022  -     CBC Auto Differential; Future; Expected date: 01/13/2022    Seasonal allergies  -     fluticasone propionate (FLONASE) 50 mcg/actuation nasal spray; 1 spray (50 mcg total) by Each Nostril route 2 (two) times daily as needed for Rhinitis.  Dispense: 15.8 mL; Refill: 11    Needs flu shot  -     Influenza - Quadrivalent *Preferred* (6 months+) (PF)    Benign hypertension  -     Comprehensive Metabolic Panel; Future; Expected date: 01/13/2022  -     TSH; Future; Expected date: 01/13/2022  -     T4, Free; Future; Expected date: 01/13/2022  -     CBC Auto Differential; Future; Expected date: 01/13/2022            Risks, benefits, and side effects were discussed with the patient. All questions were answered to the fullest satisfaction of the patient, and pt verbalized understanding and agreement to treatment plan. Pt was to call with any new or worsening symptoms, or present to the ER.

## 2022-01-13 NOTE — PROGRESS NOTES
Boom Blanco arrive to clinic awake and alert.  Pt verified name and .   Allergies reviewed with patient.  Pt given Fluarix via IM Left arm per provider orders.    Well tolerated by patient.  denies further needs.    Patient instructed to wait 15 mins after injection.   Patient states no vaccines in the last 14 days.  Vaccine information sheet was given to patient. Patient voiced understanding.    Laura Ruelas

## 2022-01-26 ENCOUNTER — TELEPHONE (OUTPATIENT)
Dept: RHEUMATOLOGY | Facility: CLINIC | Age: 52
End: 2022-01-26
Payer: MEDICARE

## 2022-01-26 ENCOUNTER — PATIENT MESSAGE (OUTPATIENT)
Dept: RHEUMATOLOGY | Facility: CLINIC | Age: 52
End: 2022-01-26
Payer: MEDICARE

## 2022-01-26 NOTE — TELEPHONE ENCOUNTER
----- Message from Ihsan Adams sent at 1/26/2022  8:29 AM CST -----  Contact: pt  Type: Needs Medical Advice  Who Called:  pt    Best Call Back Number: [phone]    Additional Information: pt was returning someone's call she thought it was about rescheduling her appointment that was cancelled this morning.  Please call her back.

## 2022-01-27 ENCOUNTER — OFFICE VISIT (OUTPATIENT)
Dept: RHEUMATOLOGY | Facility: CLINIC | Age: 52
End: 2022-01-27
Payer: MEDICARE

## 2022-01-27 ENCOUNTER — SPECIALTY PHARMACY (OUTPATIENT)
Dept: PHARMACY | Facility: CLINIC | Age: 52
End: 2022-01-27
Payer: MEDICARE

## 2022-01-27 VITALS
HEART RATE: 84 BPM | DIASTOLIC BLOOD PRESSURE: 86 MMHG | SYSTOLIC BLOOD PRESSURE: 126 MMHG | HEIGHT: 69 IN | WEIGHT: 214 LBS | BODY MASS INDEX: 31.7 KG/M2

## 2022-01-27 DIAGNOSIS — D84.821 IMMUNOCOMPROMISED STATE DUE TO DRUG THERAPY: ICD-10-CM

## 2022-01-27 DIAGNOSIS — R11.0 NAUSEA: ICD-10-CM

## 2022-01-27 DIAGNOSIS — I73.00 RAYNAUD'S DISEASE WITHOUT GANGRENE: ICD-10-CM

## 2022-01-27 DIAGNOSIS — L40.50 PSA (PSORIATIC ARTHRITIS): Primary | ICD-10-CM

## 2022-01-27 DIAGNOSIS — Z79.899 IMMUNOCOMPROMISED STATE DUE TO DRUG THERAPY: ICD-10-CM

## 2022-01-27 PROCEDURE — 99999 PR PBB SHADOW E&M-EST. PATIENT-LVL III: ICD-10-PCS | Mod: PBBFAC,,, | Performed by: PHYSICIAN ASSISTANT

## 2022-01-27 PROCEDURE — 1159F PR MEDICATION LIST DOCUMENTED IN MEDICAL RECORD: ICD-10-PCS | Mod: CPTII,S$GLB,, | Performed by: PHYSICIAN ASSISTANT

## 2022-01-27 PROCEDURE — 1159F MED LIST DOCD IN RCRD: CPT | Mod: CPTII,S$GLB,, | Performed by: PHYSICIAN ASSISTANT

## 2022-01-27 PROCEDURE — 1160F RVW MEDS BY RX/DR IN RCRD: CPT | Mod: CPTII,S$GLB,, | Performed by: PHYSICIAN ASSISTANT

## 2022-01-27 PROCEDURE — 99214 OFFICE O/P EST MOD 30 MIN: CPT | Mod: 25,S$GLB,, | Performed by: PHYSICIAN ASSISTANT

## 2022-01-27 PROCEDURE — 3008F PR BODY MASS INDEX (BMI) DOCUMENTED: ICD-10-PCS | Mod: CPTII,S$GLB,, | Performed by: PHYSICIAN ASSISTANT

## 2022-01-27 PROCEDURE — 1160F PR REVIEW ALL MEDS BY PRESCRIBER/CLIN PHARMACIST DOCUMENTED: ICD-10-PCS | Mod: CPTII,S$GLB,, | Performed by: PHYSICIAN ASSISTANT

## 2022-01-27 PROCEDURE — 3074F SYST BP LT 130 MM HG: CPT | Mod: CPTII,S$GLB,, | Performed by: PHYSICIAN ASSISTANT

## 2022-01-27 PROCEDURE — 3074F PR MOST RECENT SYSTOLIC BLOOD PRESSURE < 130 MM HG: ICD-10-PCS | Mod: CPTII,S$GLB,, | Performed by: PHYSICIAN ASSISTANT

## 2022-01-27 PROCEDURE — 99214 PR OFFICE/OUTPT VISIT, EST, LEVL IV, 30-39 MIN: ICD-10-PCS | Mod: 25,S$GLB,, | Performed by: PHYSICIAN ASSISTANT

## 2022-01-27 PROCEDURE — 96372 PR INJECTION,THERAP/PROPH/DIAG2ST, IM OR SUBCUT: ICD-10-PCS | Mod: S$GLB,,, | Performed by: PHYSICIAN ASSISTANT

## 2022-01-27 PROCEDURE — 3079F PR MOST RECENT DIASTOLIC BLOOD PRESSURE 80-89 MM HG: ICD-10-PCS | Mod: CPTII,S$GLB,, | Performed by: PHYSICIAN ASSISTANT

## 2022-01-27 PROCEDURE — 3008F BODY MASS INDEX DOCD: CPT | Mod: CPTII,S$GLB,, | Performed by: PHYSICIAN ASSISTANT

## 2022-01-27 PROCEDURE — 96372 THER/PROPH/DIAG INJ SC/IM: CPT | Mod: S$GLB,,, | Performed by: PHYSICIAN ASSISTANT

## 2022-01-27 PROCEDURE — 3079F DIAST BP 80-89 MM HG: CPT | Mod: CPTII,S$GLB,, | Performed by: PHYSICIAN ASSISTANT

## 2022-01-27 PROCEDURE — 99999 PR PBB SHADOW E&M-EST. PATIENT-LVL III: CPT | Mod: PBBFAC,,, | Performed by: PHYSICIAN ASSISTANT

## 2022-01-27 RX ORDER — MULTIVIT-MIN/FA/LYCOPEN/LUTEIN .4-300-25
1 TABLET ORAL DAILY
COMMUNITY
End: 2023-07-31

## 2022-01-27 RX ORDER — KETOROLAC TROMETHAMINE 30 MG/ML
60 INJECTION, SOLUTION INTRAMUSCULAR; INTRAVENOUS
Status: COMPLETED | OUTPATIENT
Start: 2022-01-27 | End: 2022-01-27

## 2022-01-27 RX ORDER — CYANOCOBALAMIN 1000 UG/ML
1000 INJECTION, SOLUTION INTRAMUSCULAR; SUBCUTANEOUS
Status: COMPLETED | OUTPATIENT
Start: 2022-01-27 | End: 2022-01-27

## 2022-01-27 RX ORDER — DEXAMETHASONE SODIUM PHOSPHATE 4 MG/ML
8 INJECTION, SOLUTION INTRA-ARTICULAR; INTRALESIONAL; INTRAMUSCULAR; INTRAVENOUS; SOFT TISSUE
Status: COMPLETED | OUTPATIENT
Start: 2022-01-27 | End: 2022-01-27

## 2022-01-27 RX ORDER — PROMETHAZINE HYDROCHLORIDE 25 MG/1
25 TABLET ORAL EVERY 8 HOURS PRN
Qty: 45 TABLET | Refills: 0 | Status: SHIPPED | OUTPATIENT
Start: 2022-01-27 | End: 2023-03-31 | Stop reason: SDUPTHER

## 2022-01-27 RX ADMIN — DEXAMETHASONE SODIUM PHOSPHATE 8 MG: 4 INJECTION, SOLUTION INTRA-ARTICULAR; INTRALESIONAL; INTRAMUSCULAR; INTRAVENOUS; SOFT TISSUE at 11:01

## 2022-01-27 RX ADMIN — CYANOCOBALAMIN 1000 MCG: 1000 INJECTION, SOLUTION INTRAMUSCULAR; SUBCUTANEOUS at 11:01

## 2022-01-27 RX ADMIN — KETOROLAC TROMETHAMINE 60 MG: 30 INJECTION, SOLUTION INTRAMUSCULAR; INTRAVENOUS at 11:01

## 2022-01-27 ASSESSMENT — ROUTINE ASSESSMENT OF PATIENT INDEX DATA (RAPID3)
MDHAQ FUNCTION SCORE: 1.1
TOTAL RAPID3 SCORE: 5.72
PAIN SCORE: 7
PSYCHOLOGICAL DISTRESS SCORE: 2.2
FATIGUE SCORE: 2.2
PATIENT GLOBAL ASSESSMENT SCORE: 6.5

## 2022-01-27 NOTE — PROGRESS NOTES
Adminstered B12, 1000mcg , 1cc Decadron 8mg, 2cc's in Right Upper Gluteal    Administered Ketorolac 60mg, 2cc's in Left Upper Gluteal

## 2022-01-27 NOTE — TELEPHONE ENCOUNTER
Specialty Pharmacy - Refill Coordination    Specialty Medication Orders Linked to Encounter    Flowsheet Row Most Recent Value   Medication #1 secukinumab (COSENTYX PEN, 2 PENS,) 150 mg/mL PnIj (Order#[id number], Rx#[id number])          Refill Questions - Documented Responses    Flowsheet Row Most Recent Value   Patient Availability and HIPAA Verification    Does patient want to proceed with activity? Yes   HIPAA/medical authority confirmed? Yes   Relationship to patient of person spoken to? Self   Refill Screening Questions    Changes to allergies? No   Changes to medications? No   New conditions since last clinic visit? No   Unplanned office visit, urgent care, ED, or hospital admission in the last 4 weeks? No   How does patient/caregiver feel medication is working? Good   Financial problems or insurance changes? No   How many doses of your specialty medications were missed in the last 4 weeks? 0   Would patient like to speak to a pharmacist? No   When does the patient need to receive the medication? 02/01/22   Refill Delivery Questions    How will the patient receive the medication? Mail   When does the patient need to receive the medication? 02/01/22   Shipping Address Home   Address in [facility] confirmed and updated if neccessary? No   Expected Copay ($) 9.85   Is the patient able to afford the medication copay? Yes   Payment Method CC on file   Days supply of Refill 30   Supplies needed? No supplies needed   Refill activity completed? Yes   Refill activity plan Refill scheduled   Shipment/Pickup Date: 01/27/22          Current Outpatient Medications   Medication Sig    albuterol (PROAIR HFA) 90 mcg/actuation inhaler Inhale 2 puffs into the lungs every 6 (six) hours as needed for Wheezing. Rescue    chlorzoxazone (PARAFON FORTE) 500 mg Tab Take 500 mg by mouth 3 (three) times daily.    ELDERBERRY FRUIT ORAL Take by mouth once daily.    fluticasone propionate (FLONASE) 50 mcg/actuation nasal spray 1  spray (50 mcg total) by Each Nostril route 2 (two) times daily as needed for Rhinitis.    furosemide (LASIX) 20 MG tablet TAKE ONE (1) TABLET EVERY MORNING AS NEEDED FOR FLUID    ibuprofen (ADVIL,MOTRIN) 800 MG tablet Take 1 tablet (800 mg total) by mouth 2 (two) times daily as needed for Pain.    levothyroxine (SYNTHROID) 50 MCG tablet TAKE ONE (1) TABLET EVERY MORNING ON AN EMPTY STOMACH FOR THYROID    lisinopriL (PRINIVIL,ZESTRIL) 20 MG tablet TAKE ONE (1) TABLET ONCE DAILY FOR BLOOD PRESSURE    lovastatin (MEVACOR) 20 MG tablet TAKE 1 TABLET AT BEDTIME FOR CHOLESTEROL (TAKE WITH CO-ENZYME Q-10)    multivit-min-FA-lycopen-lutein (CENTRUM SILVER) 0.4-300-250 mg-mcg-mcg Tab Take 1 tablet by mouth once daily.    oxyCODONE-acetaminophen (PERCOCET)  mg per tablet Take 1 tablet by mouth 3 (three) times daily as needed.    promethazine (PHENERGAN) 25 MG tablet Take 1 tablet (25 mg total) by mouth every 8 (eight) hours as needed for Nausea.    secukinumab (COSENTYX PEN, 2 PENS,) 150 mg/mL PnIj Inject 2 mL (300 mg) into the skin every 30 days.    traZODone (DESYREL) 50 MG tablet TAKE 1 OR 2 TABLETS AT BEDTIME AS NEEDED for sleep and nerve pain  (Patient taking differently: Take 100 mg by mouth every evening. TAKE 1 OR 2 TABLETS AT BEDTIME AS NEEDED for sleep and nerve pain)   Last reviewed on 1/27/2022 10:48 AM by Kisha Leyva PA-C    Review of patient's allergies indicates:   Allergen Reactions    Remeron [mirtazapine] Other (See Comments)     Weight gain    Last reviewed on  1/27/2022 10:48 AM by Kisha Leyva      Tasks added this encounter   2/24/2022 - Refill Call (Auto Added)   Tasks due within next 3 months   No tasks due.     Concetta Harper  [facility]  [address] [place] [id number]  Phone: [phone]  Fax: [phone]

## 2022-01-31 NOTE — PROGRESS NOTES
Subjective:       Patient ID: Boom Blanco is a 51 y.o. female.    Chief Complaint: Disease Management    Mrs. Blanco is a 51 year old female who presents to clinic for follow up on psoriatic arthritis. She is a new patient to me. She was started on cosentyx at her last visit and she is doing well so far. She reports vague nausea in the morning time, but no abdominal pain or vomiting. She has joint pain in her hands, elbows, knees, and ankles. Pain is constant and aching in nature. She is previously had bilateral knee replacements. Pulmonary started her on a inhaler and she is undergoing work up for chronic sob with exertion--PFTs/echo orders. She has chronic neck pain and history of neck surgery.     Review of Systems   Constitutional: Positive for activity change. Negative for appetite change, chills, fatigue and fever.   Eyes: Negative for visual disturbance.   Respiratory: Negative for cough and shortness of breath.    Cardiovascular: Negative for chest pain, palpitations and leg swelling.   Gastrointestinal: Positive for nausea. Negative for abdominal pain, constipation, diarrhea and vomiting.   Musculoskeletal: Positive for arthralgias, back pain, joint swelling, myalgias, neck pain and neck stiffness.   Allergic/Immunologic: Positive for immunocompromised state.   Neurological: Negative for dizziness, weakness, light-headedness and headaches.         Objective:     Vitals:    01/27/22 1021   BP: 126/86   Pulse: 84       Past Medical History:   Diagnosis Date    Anxiety disorder     Arthritis     Bipolar disorder     Chronic pain     Diverticulitis     Hyperlipidemia     Hypertension     Lupus 2006    Psoriatic arthritis     Raynaud's disease      Past Surgical History:   Procedure Laterality Date    ARTHROSCOPIC CHONDROPLASTY OF KNEE JOINT Left 1/22/2019    Procedure: ARTHROSCOPY, KNEE, WITH CHONDROPLASTY;  Surgeon: Sylvain Gorman DO;  Location: [facility] OR;  Service: Orthopedics;   Laterality: Left;    ARTHROSCOPY OF KNEE Left 1/22/2019    Procedure: ARTHROSCOPY, KNEE;  Surgeon: Sylvain Gorman DO;  Location: [facility] OR;  Service: Orthopedics;  Laterality: Left;  Equipment: [employer] Chondral Drill Set and [employer]k Meniscus Repair Set Truespan  Vendor/Rep: [employer]    ARTHROSCOPY OF KNEE Right 2/5/2019    Procedure: ARTHROSCOPY, KNEE;  Surgeon: Sylvain Gorman DO;  Location: [facility] OR;  Service: Orthopedics;  Laterality: Right;  Equipment: Mitek Truspar; Scope  Venor/Rep: [employer]    BACK SURGERY      spinal stimulator implanted and then removed    BREAST BIOPSY      COLON SURGERY      COLONOSCOPY  11/25/2016    repeat in 5-10 years    COLONOSCOPY N/A 9/6/2019    Procedure: COLONOSCOPY;  Surgeon: Dany Hugo MD;  Location: [facility] ENDO;  Service: General;  Laterality: N/A;    ENDOSCOPY  9/6/2019    Procedure: ENDOSCOPY;  Surgeon: Dany Hugo MD;  Location: [facility];  Service: General;;  with multiple biopsy's    ESOPHAGOGASTRODUODENOSCOPY Left 8/29/2018    Procedure: ESOPHAGOGASTRODUODENOSCOPY (EGD);  Surgeon: Dany Hugo MD;  Location: [facility] ENDO;  Service: General;  Laterality: Left;    EXCISION OF MEDIAL MENISCUS OF KNEE Right 2/5/2019    Procedure: MENISCECTOMY, KNEE, MEDIAL;  Surgeon: Sylvain Gorman DO;  Location: [facility] OR;  Service: Orthopedics;  Laterality: Right;    HYSTERECTOMY  1997    KNEE ARTHROSCOPY W/ DEBRIDEMENT Left 1/22/2019    Procedure: ARTHROSCOPY, KNEE, WITH DEBRIDEMENT;  Surgeon: Sylvain Gorman DO;  Location: [facility] OR;  Service: Orthopedics;  Laterality: Left;    KNEE ARTHROSCOPY W/ MENISCECTOMY Left 1/22/2019    Procedure: ARTHROSCOPY, KNEE, WITH MENISCECTOMY;  Surgeon: Sylvain Gorman DO;  Location: [facility] OR;  Service: Orthopedics;  Laterality: Left;    KNEE ARTHROSCOPY W/ PLICA EXCISION Left 1/22/2019    Procedure: EXCISION, PLICA, KNEE, ARTHROSCOPIC;  Surgeon: Sylvain Gorman DO;  Location: [facility] OR;  Service: Orthopedics;  Laterality:  Left;    SALPINGOOPHORECTOMY  1997    TOTAL KNEE ARTHROPLASTY Right 4/18/2019    Procedure: ARTHROPLASTY, KNEE, TOTAL;  Surgeon: Sylvain Gorman DO;  Location: [facility] OR;  Service: Orthopedics;  Laterality: Right;  Equipment: Depuy Sigma Total Knee System; Guillen Leg Mcclellan  Vendor/Rep: [employer]uy  Table/Position: Supine  REQUIRES FIRST ASSISTANT EPHRAIM    TOTAL KNEE ARTHROPLASTY Left 10/3/2019    Procedure: ARTHROPLASTY, KNEE, TOTAL;  Surgeon: Sylvain Gorman DO;  Location: [facility] OR;  Service: Orthopedics;  Laterality: Left;  Equipment: Depuy Sigma Total Knee System  Vendor/Rep: Depuy  REQUIRES FIRST ASSISTANT EPHRAIM    TUBAL LIGATION            Physical Exam   Constitutional: She is well-developed, well-nourished, and in no distress.   Eyes: Right conjunctiva is not injected. Left conjunctiva is not injected. Right eye exhibits normal extraocular motion. Left eye exhibits normal extraocular motion.   Neck: No JVD present. No thyromegaly present.   Cardiovascular: Normal rate and regular rhythm. Exam reveals no decreased pulses.   Pulmonary/Chest: She has no wheezes. She has no rhonchi. She has no rales.   Abdominal: There is no hepatosplenomegaly.   Musculoskeletal:      Right shoulder: Normal.      Left shoulder: Normal.      Right elbow: Tenderness present.      Left elbow: Tenderness present.      Right wrist: Tenderness present.      Left wrist: Tenderness present.      Right knee: Tenderness present.      Left knee: Tenderness present.   Lymphadenopathy:     She has no cervical adenopathy.   Neurological: Gait normal.   Skin: No rash noted. No cyanosis.   Psychiatric: Mood and affect normal.       Right Side Rheumatological Exam     Examination finds the shoulder normal.    The patient is tender to palpation of the elbow, wrist, knee, 1st PIP, 1st MCP, 2nd PIP, 2nd MCP, 3rd PIP, 3rd MCP, 4th PIP, 4th MCP, 5th PIP and 5th MCP    Left Side Rheumatological Exam     Examination finds the shoulder normal.    The  patient is tender to palpation of the elbow, wrist, knee, 1st PIP, 1st MCP, 2nd PIP, 2nd MCP, 3rd PIP, 3rd MCP, 4th PIP, 4th MCP, 5th PIP and 5th MCP.        Labs reviewed:  Component      Latest Ref Rng & Units 12/9/2021 11/17/2021 10/27/2021   Sodium      136 - 145 mmol/L 141  140   Potassium      3.5 - 5.1 mmol/L 4.5  3.8   Chloride      95 - 110 mmol/L 103  105   CO2      23 - 29 mmol/L 24  26   Glucose      70 - 110 mg/dL 107  115 (H)   BUN      6 - 20 mg/dL 16  10   Creatinine      0.5 - 1.4 mg/dL 1.1  1.1   Calcium      8.7 - 10.5 mg/dL 9.6  8.9   PROTEIN TOTAL      6.0 - 8.4 g/dL   7.1   Albumin      3.5 - 5.2 g/dL   3.7   BILIRUBIN TOTAL      0.1 - 1.0 mg/dL   0.4   Alkaline Phosphatase      55 - 135 U/L   41 (L)   AST      10 - 40 U/L   20   ALT      10 - 44 U/L   27   Anion Gap      8 - 16 mmol/L 14  9   eGFR if African American      >60 mL/min/1.73 m:2 >60.0  >60.0   eGFR if non African American      >60 mL/min/1.73 m:2 57.5 (A)  57.5 (A)   Specimen UA         Urine, Clean Catch   Color, UA      Yellow, Straw, Margaret   Yellow   Appearance, UA      Clear   Clear   pH, UA      5.0 - 8.0   5.0   Specific Gravity, UA      1.005 - 1.030   1.010   Protein, UA      Negative   Negative   Glucose, UA      Negative   Negative   Ketones, UA      Negative   2+ (A)   Bilirubin (UA)      Negative   Negative   Occult Blood UA      Negative   Trace (A)   NITRITE UA      Negative   Negative   UROBILINOGEN UA      Negative EU/dL   Negative   Leukocytes, UA      Negative   Negative   Anti Sm Antibody      0.00 - 0.99 Ratio   0.07   Anti-Sm Interpretation      Negative   Negative   Anti-SSA Antibody      0.00 - 0.99 Ratio   0.06   Anti-SSA Interpretation      Negative   Negative   Anti-SSB Antibody      0.00 - 0.99 Ratio   0.05   Anti-SSB Interpretation      Negative   Negative   ds DNA Ab      Negative 1:10   Negative 1:10   Anti Sm/RNP Antibody      0.00 - 0.99 Ratio   0.27   Anti-Sm/RNP Interpretation      Negative    Negative   Hemoglobin A1C External      4.0 - 5.6 %  5.4    Estimated Avg Glucose      68 - 131 mg/dL  108    DORY Screen      Negative <1:80   Positive (A)   CRP      0.0 - 8.2 mg/L   5.4   Sed Rate      0 - 20 mm/Hr   20   Hep B C IgM      Negative   Negative   Hep B S Ab         Negative   Hepatitis B Surface Ag      Negative   Negative   Hepatitis C Ab      Negative   Negative   Vit D, 25-Hydroxy      30 - 96 ng/mL   34   TSH      0.400 - 4.000 uIU/mL   1.529   Free T4      0.71 - 1.51 ng/dL   1.18   T3, Free      2.3 - 4.2 pg/mL   2.2 (L)   DORY PATTERN 1         Nucleolar   DORY Titer 1         >=1:2560     Narrative & Impression  EXAMINATION:  XR CHEST PA AND LATERAL     CLINICAL HISTORY:  Malignant neoplasm of bladder, unspecified     TECHNIQUE:  PA and lateral views of the chest were performed.     COMPARISON:  04/16/2019 and 01/21/2016.     FINDINGS:  Chronic small calcified right lower lobe granuloma.The lungs are otherwise clear, with normal appearance of pulmonary vasculature and no pleural effusion or pneumothorax.     The cardiac silhouette is normal in size. The hilar and mediastinal contours are unremarkable.     Bones are intact. Chronic thoracic stimulator wires in place.     Impression:     No acute chest disease.     Assessment:       1. PSA (psoriatic arthritis)    2. Nausea    3. Raynaud's disease without gangrene    4. Immunocompromised state due to drug therapy            Plan:       PSA (psoriatic arthritis)  -     cyanocobalamin injection 1,000 mcg  -     ketorolac injection 60 mg  -     dexamethasone injection 8 mg  -     C-Reactive Protein; Future; Expected date: 01/30/2022  -     Sedimentation rate; Future; Expected date: 01/30/2022    Nausea  -     promethazine (PHENERGAN) 25 MG tablet; Take 1 tablet (25 mg total) by mouth every 8 (eight) hours as needed for Nausea.  Dispense: 45 tablet; Refill: 0    Raynaud's disease without gangrene    Immunocompromised state due to drug therapy           Assessment:  51 year old female with  Psoriatic arthritis, DORY 1:2560 nucleolar, raynauds  --renal insufficiency  --nausea  --chronic pain syndrome    Plan:  1. toradol 60, dexa 8 mg, b12 given today  2. Add phenergan prn  3. Cont cosentyx 300 mg monthly  4. Complete pulmonary work up  5. Link ESR/CRP to upcoming labs    Follow up:  3-4 mo Dr. Molina

## 2022-02-15 DIAGNOSIS — Z76.0 MEDICATION REFILL: ICD-10-CM

## 2022-02-15 RX ORDER — ALBUTEROL SULFATE 90 UG/1
2 AEROSOL, METERED RESPIRATORY (INHALATION) EVERY 6 HOURS PRN
Qty: 18 G | Refills: 1 | Status: SHIPPED | OUTPATIENT
Start: 2022-02-15 | End: 2022-05-04

## 2022-02-23 ENCOUNTER — TELEPHONE (OUTPATIENT)
Dept: RHEUMATOLOGY | Facility: CLINIC | Age: 52
End: 2022-02-23
Payer: MEDICARE

## 2022-02-23 DIAGNOSIS — D84.9 IMMUNOSUPPRESSED STATUS: ICD-10-CM

## 2022-02-23 NOTE — TELEPHONE ENCOUNTER
----- Message from Jennifer Coulter sent at 2/22/2022  2:35 PM CST -----  Regarding: advice  Contact: pt  Type: Needs Medical Advice  Who Called:  pt  Symptoms (please be specific):  n/a  How long has patient had these symptoms:  n/a  Pharmacy name and phone #:  n/a  Best Call Back Number: [phone]    Additional Information: asking for a call regarding the 3rd covid vaccine requested

## 2022-02-24 ENCOUNTER — SPECIALTY PHARMACY (OUTPATIENT)
Dept: PHARMACY | Facility: CLINIC | Age: 52
End: 2022-02-24
Payer: MEDICARE

## 2022-02-24 NOTE — TELEPHONE ENCOUNTER
Specialty Pharmacy - Refill Coordination    Specialty Medication Orders Linked to Encounter    Flowsheet Row Most Recent Value   Medication #1 secukinumab (COSENTYX PEN, 2 PENS,) 150 mg/mL PnIj (Order#[id number], Rx#[id number])          Refill Questions - Documented Responses    Flowsheet Row Most Recent Value   Patient Availability and HIPAA Verification    Does patient want to proceed with activity? Yes   HIPAA/medical authority confirmed? Yes   Relationship to patient of person spoken to? Self   Refill Screening Questions    Changes to allergies? No   Changes to medications? No   New conditions since last clinic visit? No   Unplanned office visit, urgent care, ED, or hospital admission in the last 4 weeks? No   How does patient/caregiver feel medication is working? Fair   How many doses of your specialty medications were missed in the last 4 weeks? 0   Would patient like to speak to a pharmacist? No   When does the patient need to receive the medication? 03/01/22   Refill Delivery Questions    How will the patient receive the medication? Mail   When does the patient need to receive the medication? 03/01/22   Shipping Address Temporary   Address in [facility] confirmed and updated if neccessary? Yes   Expected Copay ($) 9.85   Is the patient able to afford the medication copay? Yes   Payment Method CC on file   Days supply of Refill 30   Supplies needed? No supplies needed   Refill activity completed? Yes   Refill activity plan Refill scheduled   Shipment/Pickup Date: 02/24/22          Current Outpatient Medications   Medication Sig    albuterol (PROAIR HFA) 90 mcg/actuation inhaler Inhale 2 puffs into the lungs every 6 (six) hours as needed for Wheezing. Rescue    chlorzoxazone (PARAFON FORTE) 500 mg Tab Take 500 mg by mouth 3 (three) times daily.    ELDERBERRY FRUIT ORAL Take by mouth once daily.    fluticasone propionate (FLONASE) 50 mcg/actuation nasal spray 1 spray (50 mcg total) by Each Nostril  route 2 (two) times daily as needed for Rhinitis.    furosemide (LASIX) 20 MG tablet TAKE ONE (1) TABLET EVERY MORNING AS NEEDED FOR FLUID    ibuprofen (ADVIL,MOTRIN) 800 MG tablet Take 1 tablet (800 mg total) by mouth 2 (two) times daily as needed for Pain.    levothyroxine (SYNTHROID) 50 MCG tablet TAKE ONE (1) TABLET EVERY MORNING ON AN EMPTY STOMACH FOR THYROID    lisinopriL (PRINIVIL,ZESTRIL) 20 MG tablet TAKE ONE (1) TABLET ONCE DAILY FOR BLOOD PRESSURE    lovastatin (MEVACOR) 20 MG tablet TAKE 1 TABLET AT BEDTIME FOR CHOLESTEROL (TAKE WITH CO-ENZYME Q-10)    multivit-min-FA-lycopen-lutein (CENTRUM SILVER) 0.4-300-250 mg-mcg-mcg Tab Take 1 tablet by mouth once daily.    oxyCODONE-acetaminophen (PERCOCET)  mg per tablet Take 1 tablet by mouth 3 (three) times daily as needed.    promethazine (PHENERGAN) 25 MG tablet Take 1 tablet (25 mg total) by mouth every 8 (eight) hours as needed for Nausea.    secukinumab (COSENTYX PEN, 2 PENS,) 150 mg/mL PnIj Inject 2 mL (300 mg) into the skin every 30 days.    traZODone (DESYREL) 50 MG tablet TAKE 1 OR 2 TABLETS AT BEDTIME AS NEEDED for sleep and nerve pain  (Patient taking differently: Take 100 mg by mouth every evening. TAKE 1 OR 2 TABLETS AT BEDTIME AS NEEDED for sleep and nerve pain)   Last reviewed on 1/27/2022 10:48 AM by Kisha Leyva PA-C    Review of patient's allergies indicates:   Allergen Reactions    Remeron [mirtazapine] Other (See Comments)     Weight gain    Last reviewed on  2/15/2022 8:49 AM by Sunita Barragan      Tasks added this encounter   3/24/2022 - Refill Call (Auto Added)   Tasks due within next 3 months   No tasks due.     Galina Ramirez, PharmD  [facility]  [address] [place] [id number]  Phone: [phone]  Fax: [phone]

## 2022-03-09 ENCOUNTER — TELEPHONE (OUTPATIENT)
Dept: RHEUMATOLOGY | Facility: CLINIC | Age: 52
End: 2022-03-09
Payer: MEDICARE

## 2022-03-09 ENCOUNTER — PATIENT MESSAGE (OUTPATIENT)
Dept: RHEUMATOLOGY | Facility: CLINIC | Age: 52
End: 2022-03-09
Payer: MEDICARE

## 2022-03-09 NOTE — TELEPHONE ENCOUNTER
Kisha recommended 3rd full dose of Moderna per CDC guidelines for immunocompromised patients- to be given 28 days after 2nd dose of Moderna. Booster can be given 5 months after 3rd dose. Patient understood and was calling [facility] to set up an appointment for the vaccination. Will let us know if she has any issues getting it done.

## 2022-03-10 ENCOUNTER — PATIENT MESSAGE (OUTPATIENT)
Dept: RHEUMATOLOGY | Facility: CLINIC | Age: 52
End: 2022-03-10
Payer: MEDICARE

## 2022-03-22 ENCOUNTER — IMMUNIZATION (OUTPATIENT)
Dept: FAMILY MEDICINE | Facility: CLINIC | Age: 52
End: 2022-03-22
Payer: MEDICARE

## 2022-03-22 DIAGNOSIS — Z23 NEED FOR VACCINATION: Primary | ICD-10-CM

## 2022-03-22 PROCEDURE — 0013A COVID-19, MRNA, LNP-S, PF, 100 MCG/0.5 ML DOSE VACCINE: CPT | Mod: PBBFAC | Performed by: FAMILY MEDICINE

## 2022-03-22 PROCEDURE — 91301 COVID-19, MRNA, LNP-S, PF, 100 MCG/0.5 ML DOSE VACCINE: CPT | Mod: PBBFAC | Performed by: FAMILY MEDICINE

## 2022-03-22 NOTE — PROGRESS NOTES
Boom Blanco arrive to clinic awake and alert.  Pt verified name and .   Allergies reviewed with patient.  Pt given Moderna Booster per provider orders.    Pt tolerated well.  Pt denies any further needs.    Patient instructed to wait 15 mins after injection.   Patient states no vaccines in the last 14 days.  Vaccine information sheet was given to patient. Patient voiced understanding.    Hannah Lu LPN

## 2022-03-24 ENCOUNTER — SPECIALTY PHARMACY (OUTPATIENT)
Dept: PHARMACY | Facility: CLINIC | Age: 52
End: 2022-03-24
Payer: MEDICARE

## 2022-03-24 DIAGNOSIS — L40.50 PSORIATIC ARTHRITIS: Primary | ICD-10-CM

## 2022-03-24 NOTE — TELEPHONE ENCOUNTER
Outgoing call regarding Cosentyx refill. Pt stated that she received her 3rd shot of COVID vaccine and wanted to know if its okay to still inject Cosentyx. Pt also stated that she started Trelgy inhaler recently. Pt is due to inject Cosentyx on April 1st. Pt would like to speak with [facility]. Routing to assigned [facility] Rosangela.

## 2022-03-29 NOTE — TELEPHONE ENCOUNTER
Specialty Pharmacy - Refill Coordination    Specialty Medication Orders Linked to Encounter    Flowsheet Row Most Recent Value   Medication #1 secukinumab (COSENTYX PEN, 2 PENS,) 150 mg/mL PnIj (Order#[id number], Rx#[id number])          Refill Questions - Documented Responses    Flowsheet Row Most Recent Value   Patient Availability and HIPAA Verification    Does patient want to proceed with activity? Yes   HIPAA/medical authority confirmed? Yes   Relationship to patient of person spoken to? Self   Refill Screening Questions    Changes to allergies? No   Changes to medications? Yes   New conditions since last clinic visit? Yes   Unplanned office visit, urgent care, ED, or hospital admission in the last 4 weeks? No   How does patient/caregiver feel medication is working? Good   Financial problems or insurance changes? No   How many doses of your specialty medications were missed in the last 4 weeks? 0   Would patient like to speak to a pharmacist? Yes   When does the patient need to receive the medication? 04/01/22   Refill Delivery Questions    How will the patient receive the medication? Delivery Lisa   When does the patient need to receive the medication? 04/01/22   Shipping Address Home   Expected Copay ($) 0   Is the patient able to afford the medication copay? Yes   Payment Method zero copay   Days supply of Refill 30   Supplies needed? No supplies needed   Refill activity completed? Yes   Refill activity plan Refill scheduled   Shipment/Pickup Date: 03/30/22          Current Outpatient Medications   Medication Sig    albuterol (PROAIR HFA) 90 mcg/actuation inhaler Inhale 2 puffs into the lungs every 6 (six) hours as needed for Wheezing. Rescue    chlorzoxazone (PARAFON FORTE) 500 mg Tab Take 500 mg by mouth 3 (three) times daily.    ELDERBERRY FRUIT ORAL Take by mouth once daily.    fluticasone propionate (FLONASE) 50 mcg/actuation nasal spray 1 spray (50 mcg total) by Each Nostril route 2 (two) times  daily as needed for Rhinitis.    furosemide (LASIX) 20 MG tablet TAKE ONE (1) TABLET EVERY MORNING AS NEEDED FOR FLUID    ibuprofen (ADVIL,MOTRIN) 800 MG tablet Take 1 tablet (800 mg total) by mouth 2 (two) times daily as needed for Pain.    levothyroxine (SYNTHROID) 50 MCG tablet TAKE ONE (1) TABLET EVERY MORNING ON AN EMPTY STOMACH FOR THYROID    lisinopriL (PRINIVIL,ZESTRIL) 20 MG tablet TAKE ONE (1) TABLET ONCE DAILY FOR BLOOD PRESSURE    lovastatin (MEVACOR) 20 MG tablet TAKE 1 TABLET AT BEDTIME FOR CHOLESTEROL (TAKE WITH CO-ENZYME Q-10)    multivit-min-FA-lycopen-lutein (CENTRUM SILVER) 0.4-300-250 mg-mcg-mcg Tab Take 1 tablet by mouth once daily.    oxyCODONE-acetaminophen (PERCOCET)  mg per tablet Take 1 tablet by mouth 3 (three) times daily as needed.    promethazine (PHENERGAN) 25 MG tablet Take 1 tablet (25 mg total) by mouth every 8 (eight) hours as needed for Nausea.    secukinumab (COSENTYX PEN, 2 PENS,) 150 mg/mL PnIj Inject 2 mL (300 mg) into the skin every 30 days.    traZODone (DESYREL) 50 MG tablet TAKE 1 OR 2 TABLETS AT BEDTIME AS NEEDED for sleep and nerve pain  (Patient taking differently: Take 100 mg by mouth every evening. TAKE 1 OR 2 TABLETS AT BEDTIME AS NEEDED for sleep and nerve pain)   Last reviewed on 1/27/2022 10:48 AM by Kisha Leyva PA-C    Review of patient's allergies indicates:   Allergen Reactions    Remeron [mirtazapine] Other (See Comments)     Weight gain    Last reviewed on  2/15/2022 8:49 AM by Sunita Barragan      Tasks added this encounter   No tasks added.   Tasks due within next 3 months   3/24/2022 - Refill Call (Auto Added)     Wendi Knight, PharmD  [facility]  [address] [place] [id number]  Phone: [phone]  Fax: [phone]

## 2022-03-29 NOTE — TELEPHONE ENCOUNTER
"Outgoing call to pt for Cosentyx refill. Regarding below note- pt should be advised "Cosentyx : no need to hold your regular injection. We do ask that you not take on the same day as your COVID vaccination."     As instructed by her rheumatologist sent by my [facility] messaging on 3/10/22 as charted. No answer, LVM.   "

## 2022-04-06 DIAGNOSIS — L40.50 PSA (PSORIATIC ARTHRITIS): ICD-10-CM

## 2022-04-06 RX ORDER — IBUPROFEN 800 MG/1
800 TABLET ORAL 2 TIMES DAILY PRN
Qty: 60 TABLET | Refills: 2 | Status: SHIPPED | OUTPATIENT
Start: 2022-04-06 | End: 2022-06-29 | Stop reason: SDUPTHER

## 2022-04-12 ENCOUNTER — LAB VISIT (OUTPATIENT)
Dept: LAB | Facility: HOSPITAL | Age: 52
End: 2022-04-12
Attending: FAMILY MEDICINE
Payer: MEDICARE

## 2022-04-12 DIAGNOSIS — I10 BENIGN HYPERTENSION: ICD-10-CM

## 2022-04-12 DIAGNOSIS — E03.8 OTHER SPECIFIED HYPOTHYROIDISM: ICD-10-CM

## 2022-04-12 DIAGNOSIS — L40.50 PSA (PSORIATIC ARTHRITIS): ICD-10-CM

## 2022-04-12 LAB
ALBUMIN SERPL BCP-MCNC: 3.8 G/DL (ref 3.5–5.2)
ALP SERPL-CCNC: 38 U/L (ref 55–135)
ALT SERPL W/O P-5'-P-CCNC: 16 U/L (ref 10–44)
ANION GAP SERPL CALC-SCNC: 9 MMOL/L (ref 8–16)
AST SERPL-CCNC: 21 U/L (ref 10–40)
BASOPHILS # BLD AUTO: 0.04 K/UL (ref 0–0.2)
BASOPHILS NFR BLD: 0.6 % (ref 0–1.9)
BILIRUB SERPL-MCNC: 0.3 MG/DL (ref 0.1–1)
BUN SERPL-MCNC: 9 MG/DL (ref 6–20)
CALCIUM SERPL-MCNC: 9 MG/DL (ref 8.7–10.5)
CHLORIDE SERPL-SCNC: 103 MMOL/L (ref 95–110)
CO2 SERPL-SCNC: 26 MMOL/L (ref 23–29)
CREAT SERPL-MCNC: 0.9 MG/DL (ref 0.5–1.4)
CRP SERPL-MCNC: 7.1 MG/L (ref 0–8.2)
DIFFERENTIAL METHOD: NORMAL
EOSINOPHIL # BLD AUTO: 0 K/UL (ref 0–0.5)
EOSINOPHIL NFR BLD: 0.5 % (ref 0–8)
ERYTHROCYTE [DISTWIDTH] IN BLOOD BY AUTOMATED COUNT: 12.8 % (ref 11.5–14.5)
ERYTHROCYTE [SEDIMENTATION RATE] IN BLOOD BY WESTERGREN METHOD: 19 MM/HR (ref 0–20)
EST. GFR  (AFRICAN AMERICAN): >60 ML/MIN/1.73 M^2
EST. GFR  (NON AFRICAN AMERICAN): >60 ML/MIN/1.73 M^2
GLUCOSE SERPL-MCNC: 151 MG/DL (ref 70–110)
HCT VFR BLD AUTO: 37.1 % (ref 37–48.5)
HGB BLD-MCNC: 12.3 G/DL (ref 12–16)
IMM GRANULOCYTES # BLD AUTO: 0.02 K/UL (ref 0–0.04)
IMM GRANULOCYTES NFR BLD AUTO: 0.3 % (ref 0–0.5)
LYMPHOCYTES # BLD AUTO: 1.5 K/UL (ref 1–4.8)
LYMPHOCYTES NFR BLD: 24.4 % (ref 18–48)
MCH RBC QN AUTO: 30.1 PG (ref 27–31)
MCHC RBC AUTO-ENTMCNC: 33.2 G/DL (ref 32–36)
MCV RBC AUTO: 91 FL (ref 82–98)
MONOCYTES # BLD AUTO: 0.4 K/UL (ref 0.3–1)
MONOCYTES NFR BLD: 6.5 % (ref 4–15)
NEUTROPHILS # BLD AUTO: 4.2 K/UL (ref 1.8–7.7)
NEUTROPHILS NFR BLD: 67.7 % (ref 38–73)
NRBC BLD-RTO: 0 /100 WBC
PLATELET # BLD AUTO: 187 K/UL (ref 150–450)
PMV BLD AUTO: 10.4 FL (ref 9.2–12.9)
POTASSIUM SERPL-SCNC: 3.9 MMOL/L (ref 3.5–5.1)
PROT SERPL-MCNC: 7.2 G/DL (ref 6–8.4)
RBC # BLD AUTO: 4.08 M/UL (ref 4–5.4)
SODIUM SERPL-SCNC: 138 MMOL/L (ref 136–145)
T4 FREE SERPL-MCNC: 1.09 NG/DL (ref 0.71–1.51)
TSH SERPL DL<=0.005 MIU/L-ACNC: 3.07 UIU/ML (ref 0.4–4)
WBC # BLD AUTO: 6.26 K/UL (ref 3.9–12.7)

## 2022-04-12 PROCEDURE — 84443 ASSAY THYROID STIM HORMONE: CPT | Performed by: FAMILY MEDICINE

## 2022-04-12 PROCEDURE — 85651 RBC SED RATE NONAUTOMATED: CPT | Performed by: PHYSICIAN ASSISTANT

## 2022-04-12 PROCEDURE — 36415 COLL VENOUS BLD VENIPUNCTURE: CPT | Performed by: FAMILY MEDICINE

## 2022-04-12 PROCEDURE — 84439 ASSAY OF FREE THYROXINE: CPT | Performed by: FAMILY MEDICINE

## 2022-04-12 PROCEDURE — 86140 C-REACTIVE PROTEIN: CPT | Performed by: PHYSICIAN ASSISTANT

## 2022-04-12 PROCEDURE — 85025 COMPLETE CBC W/AUTO DIFF WBC: CPT | Performed by: FAMILY MEDICINE

## 2022-04-12 PROCEDURE — 80053 COMPREHEN METABOLIC PANEL: CPT | Performed by: FAMILY MEDICINE

## 2022-04-18 ENCOUNTER — PATIENT MESSAGE (OUTPATIENT)
Dept: ADMINISTRATIVE | Facility: OTHER | Age: 52
End: 2022-04-18
Payer: MEDICARE

## 2022-04-21 ENCOUNTER — PATIENT MESSAGE (OUTPATIENT)
Dept: RHEUMATOLOGY | Facility: CLINIC | Age: 52
End: 2022-04-21
Payer: MEDICARE

## 2022-04-25 ENCOUNTER — SPECIALTY PHARMACY (OUTPATIENT)
Dept: PHARMACY | Facility: CLINIC | Age: 52
End: 2022-04-25
Payer: MEDICARE

## 2022-04-25 NOTE — TELEPHONE ENCOUNTER
Specialty Pharmacy - Refill Coordination    Specialty Medication Orders Linked to Encounter    Flowsheet Row Most Recent Value   Medication #1 secukinumab (COSENTYX PEN, 2 PENS,) 150 mg/mL PnIj (Order#[id number], Rx#[id number])          Refill Questions - Documented Responses    Flowsheet Row Most Recent Value   Patient Availability and HIPAA Verification    Does patient want to proceed with activity? Yes   HIPAA/medical authority confirmed? Yes   Relationship to patient of person spoken to? Self   Refill Screening Questions    Changes to allergies? No   Changes to medications? No   New conditions since last clinic visit? No   Unplanned office visit, urgent care, ED, or hospital admission in the last 4 weeks? No   How does patient/caregiver feel medication is working? Good   Financial problems or insurance changes? No   How many doses of your specialty medications were missed in the last 4 weeks? 0   Would patient like to speak to a pharmacist? No   When does the patient need to receive the medication? 05/01/22   Refill Delivery Questions    How will the patient receive the medication? Mail   When does the patient need to receive the medication? 05/01/22   Shipping Address Temporary   Address in [facility] confirmed and updated if neccessary? Yes   Expected Copay ($) 0   Is the patient able to afford the medication copay? Yes   Payment Method zero copay   Days supply of Refill 30   Supplies needed? No supplies needed   Refill activity completed? Yes   Refill activity plan Refill scheduled   Shipment/Pickup Date: 04/28/22          Current Outpatient Medications   Medication Sig    albuterol (PROAIR HFA) 90 mcg/actuation inhaler Inhale 2 puffs into the lungs every 6 (six) hours as needed for Wheezing. Rescue    chlorzoxazone (PARAFON FORTE) 500 mg Tab Take 500 mg by mouth 3 (three) times daily.    ELDERBERRY FRUIT ORAL Take by mouth once daily.    fluticasone propionate (FLONASE) 50 mcg/actuation nasal  spray 1 spray (50 mcg total) by Each Nostril route 2 (two) times daily as needed for Rhinitis.    furosemide (LASIX) 20 MG tablet TAKE ONE (1) TABLET EVERY MORNING AS NEEDED FOR FLUID    ibuprofen (ADVIL,MOTRIN) 800 MG tablet Take 1 tablet (800 mg total) by mouth 2 (two) times daily as needed for Pain.    levothyroxine (SYNTHROID) 50 MCG tablet TAKE ONE (1) TABLET EVERY MORNING ON AN EMPTY STOMACH FOR THYROID    lisinopriL (PRINIVIL,ZESTRIL) 20 MG tablet TAKE ONE (1) TABLET ONCE DAILY FOR BLOOD PRESSURE    lovastatin (MEVACOR) 20 MG tablet TAKE 1 TABLET AT BEDTIME FOR CHOLESTEROL (TAKE WITH CO-ENZYME Q-10)    multivit-min-FA-lycopen-lutein (CENTRUM SILVER) 0.4-300-250 mg-mcg-mcg Tab Take 1 tablet by mouth once daily.    oxyCODONE-acetaminophen (PERCOCET)  mg per tablet Take 1 tablet by mouth 3 (three) times daily as needed.    promethazine (PHENERGAN) 25 MG tablet Take 1 tablet (25 mg total) by mouth every 8 (eight) hours as needed for Nausea.    secukinumab (COSENTYX PEN, 2 PENS,) 150 mg/mL PnIj Inject 2 mL (300 mg) into the skin every 30 days.    traZODone (DESYREL) 50 MG tablet TAKE 1 OR 2 TABLETS AT BEDTIME AS NEEDED for sleep and nerve pain  (Patient taking differently: Take 100 mg by mouth every evening. TAKE 1 OR 2 TABLETS AT BEDTIME AS NEEDED for sleep and nerve pain)   Last reviewed on 1/27/2022 10:48 AM by Kisha Leyva PA-C    Review of patient's allergies indicates:   Allergen Reactions    Remeron [mirtazapine] Other (See Comments)     Weight gain    Last reviewed on  4/6/2022 2:46 PM by Lacey Soto      Tasks added this encounter   5/24/2022 - Refill Call (Auto Added)   Tasks due within next 3 months   No tasks due.     Melissa Bach [facility]  [address] [place] [id number]  Phone: [phone]  Fax: [phone]

## 2022-05-24 ENCOUNTER — PATIENT MESSAGE (OUTPATIENT)
Dept: PHARMACY | Facility: CLINIC | Age: 52
End: 2022-05-24
Payer: MEDICARE

## 2022-05-24 ENCOUNTER — OFFICE VISIT (OUTPATIENT)
Dept: FAMILY MEDICINE | Facility: CLINIC | Age: 52
End: 2022-05-24
Payer: MEDICARE

## 2022-05-24 VITALS
HEART RATE: 71 BPM | HEIGHT: 69 IN | SYSTOLIC BLOOD PRESSURE: 122 MMHG | BODY MASS INDEX: 31.42 KG/M2 | WEIGHT: 212.13 LBS | OXYGEN SATURATION: 98 % | DIASTOLIC BLOOD PRESSURE: 72 MMHG

## 2022-05-24 DIAGNOSIS — E78.49 OTHER HYPERLIPIDEMIA: ICD-10-CM

## 2022-05-24 DIAGNOSIS — Z12.31 ENCOUNTER FOR SCREENING MAMMOGRAM FOR MALIGNANT NEOPLASM OF BREAST: Primary | ICD-10-CM

## 2022-05-24 DIAGNOSIS — E03.8 OTHER SPECIFIED HYPOTHYROIDISM: Chronic | ICD-10-CM

## 2022-05-24 PROBLEM — R06.09 DYSPNEA ON EXERTION: Status: ACTIVE | Noted: 2022-05-24

## 2022-05-24 PROCEDURE — 4010F ACE/ARB THERAPY RXD/TAKEN: CPT | Mod: CPTII,S$GLB,, | Performed by: FAMILY MEDICINE

## 2022-05-24 PROCEDURE — 3074F PR MOST RECENT SYSTOLIC BLOOD PRESSURE < 130 MM HG: ICD-10-PCS | Mod: CPTII,S$GLB,, | Performed by: FAMILY MEDICINE

## 2022-05-24 PROCEDURE — 1160F PR REVIEW ALL MEDS BY PRESCRIBER/CLIN PHARMACIST DOCUMENTED: ICD-10-PCS | Mod: CPTII,S$GLB,, | Performed by: FAMILY MEDICINE

## 2022-05-24 PROCEDURE — 3078F DIAST BP <80 MM HG: CPT | Mod: CPTII,S$GLB,, | Performed by: FAMILY MEDICINE

## 2022-05-24 PROCEDURE — 99214 PR OFFICE/OUTPT VISIT, EST, LEVL IV, 30-39 MIN: ICD-10-PCS | Mod: S$GLB,,, | Performed by: FAMILY MEDICINE

## 2022-05-24 PROCEDURE — 1159F PR MEDICATION LIST DOCUMENTED IN MEDICAL RECORD: ICD-10-PCS | Mod: CPTII,S$GLB,, | Performed by: FAMILY MEDICINE

## 2022-05-24 PROCEDURE — 4010F PR ACE/ARB THEARPY RXD/TAKEN: ICD-10-PCS | Mod: CPTII,S$GLB,, | Performed by: FAMILY MEDICINE

## 2022-05-24 PROCEDURE — 99214 OFFICE O/P EST MOD 30 MIN: CPT | Mod: S$GLB,,, | Performed by: FAMILY MEDICINE

## 2022-05-24 PROCEDURE — 3008F PR BODY MASS INDEX (BMI) DOCUMENTED: ICD-10-PCS | Mod: CPTII,S$GLB,, | Performed by: FAMILY MEDICINE

## 2022-05-24 PROCEDURE — 3078F PR MOST RECENT DIASTOLIC BLOOD PRESSURE < 80 MM HG: ICD-10-PCS | Mod: CPTII,S$GLB,, | Performed by: FAMILY MEDICINE

## 2022-05-24 PROCEDURE — 3074F SYST BP LT 130 MM HG: CPT | Mod: CPTII,S$GLB,, | Performed by: FAMILY MEDICINE

## 2022-05-24 PROCEDURE — 1159F MED LIST DOCD IN RCRD: CPT | Mod: CPTII,S$GLB,, | Performed by: FAMILY MEDICINE

## 2022-05-24 PROCEDURE — 3008F BODY MASS INDEX DOCD: CPT | Mod: CPTII,S$GLB,, | Performed by: FAMILY MEDICINE

## 2022-05-24 PROCEDURE — 99999 PR PBB SHADOW E&M-EST. PATIENT-LVL IV: ICD-10-PCS | Mod: PBBFAC,,, | Performed by: FAMILY MEDICINE

## 2022-05-24 PROCEDURE — 99999 PR PBB SHADOW E&M-EST. PATIENT-LVL IV: CPT | Mod: PBBFAC,,, | Performed by: FAMILY MEDICINE

## 2022-05-24 PROCEDURE — 1160F RVW MEDS BY RX/DR IN RCRD: CPT | Mod: CPTII,S$GLB,, | Performed by: FAMILY MEDICINE

## 2022-05-24 RX ORDER — FLUTICASONE FUROATE, UMECLIDINIUM BROMIDE AND VILANTEROL TRIFENATATE 100; 62.5; 25 UG/1; UG/1; UG/1
POWDER RESPIRATORY (INHALATION)
COMMUNITY
Start: 2022-04-06

## 2022-05-24 RX ORDER — HYDROXYZINE HYDROCHLORIDE 50 MG/1
TABLET, FILM COATED ORAL
COMMUNITY
Start: 2022-04-06 | End: 2022-05-24

## 2022-05-24 RX ORDER — TIOTROPIUM BROMIDE AND OLODATEROL 3.124; 2.736 UG/1; UG/1
SPRAY, METERED RESPIRATORY (INHALATION)
COMMUNITY
Start: 2022-02-15 | End: 2022-05-24

## 2022-05-24 NOTE — PROGRESS NOTES
Subjective:       Patient ID: Boom Blanco is a 52 y.o. female.    Chief Complaint: Follow-up (3 mth f/u. Pt was recently dx with COPD and Sleep apnea. Pt seeing Dr Genevieve Simon. Pt consents to mammo and lipid.), Weight Gain (Pt has concerns of weight gain after quiting smoking. ), Neck Pain (Pt states she has been having a good deal of neck pain, feels like a cramp. ), and Headache (Pt states she has had an increase in headaches lately.)    HPI   4 month follow-up. Repeat labs done last month (4-6 weeks ago), and kidney function shows improvement. Admits to drinking more water. Seeing pulmonology through [facility], as she was recently diagnosed with COPD and sleep apnea. Is on trelegy and has noticed improvement in her breathing.    Is increasing her activity level to try and lose weight. Gained about 70 lb in the past 3 years. Is also trying different things in her diet (more vegetables, less sugar).    Right-sided neck pain for the past few weeks, radiates into the back of her head and sometimes into the right temple. The pain is staying the same, not getting worse. Has had more issues with her sinuses lately.    Due for mammo and lipid panel.    Review of Systems   Constitutional: Negative for chills and fever.   Respiratory: Positive for shortness of breath.    Musculoskeletal: Positive for neck pain.   Neurological: Positive for headaches.       Past Medical History:   Diagnosis Date    Anxiety disorder     Arthritis     Bipolar disorder     Chronic pain     Diverticulitis     Hyperlipidemia     Hypertension     Lupus 2006    Psoriatic arthritis     Raynaud's disease      Past Surgical History:   Procedure Laterality Date    ARTHROSCOPIC CHONDROPLASTY OF KNEE JOINT Left 1/22/2019    Procedure: ARTHROSCOPY, KNEE, WITH CHONDROPLASTY;  Surgeon: Sylvain Gorman DO;  Location: [facility] OR;  Service: Orthopedics;  Laterality: Left;    ARTHROSCOPY OF KNEE Left 1/22/2019    Procedure: ARTHROSCOPY,  KNEE;  Surgeon: Sylvain Gorman DO;  Location: [facility] OR;  Service: Orthopedics;  Laterality: Left;  Equipment: [employer] Chondral Drill Set and Mitek Meniscus Repair Set Truespan  Vendor/Rep: [employer]    ARTHROSCOPY OF KNEE Right 2/5/2019    Procedure: ARTHROSCOPY, KNEE;  Surgeon: Sylvain Gorman DO;  Location: [facility] OR;  Service: Orthopedics;  Laterality: Right;  Equipment: Mitek Truspar; Scope  Venor/Rep: Mitek    BACK SURGERY      spinal stimulator implanted and then removed    BREAST BIOPSY      COLON SURGERY      COLONOSCOPY  11/25/2016    repeat in 5-10 years    COLONOSCOPY N/A 9/6/2019    Procedure: COLONOSCOPY;  Surgeon: Dany Hugo MD;  Location: [facility] ENDO;  Service: General;  Laterality: N/A;    ENDOSCOPY  9/6/2019    Procedure: ENDOSCOPY;  Surgeon: Dany Hugo MD;  [facility];  Service: General;;  with multiple biopsy's    ESOPHAGOGASTRODUODENOSCOPY Left 8/29/2018    Procedure: ESOPHAGOGASTRODUODENOSCOPY (EGD);  Surgeon: Dany Hugo MD;  Location: [facility] ENDO;  Service: General;  Laterality: Left;    EXCISION OF MEDIAL MENISCUS OF KNEE Right 2/5/2019    Procedure: MENISCECTOMY, KNEE, MEDIAL;  Surgeon: Sylvain Gorman DO;  Location: [facility] OR;  Service: Orthopedics;  Laterality: Right;    HYSTERECTOMY  1997    KNEE ARTHROSCOPY W/ DEBRIDEMENT Left 1/22/2019    Procedure: ARTHROSCOPY, KNEE, WITH DEBRIDEMENT;  Surgeon: Sylvain Gorman DO;  Location: [facility] OR;  Service: Orthopedics;  Laterality: Left;    KNEE ARTHROSCOPY W/ MENISCECTOMY Left 1/22/2019    Procedure: ARTHROSCOPY, KNEE, WITH MENISCECTOMY;  Surgeon: Sylvain Gorman DO;  Location: [facility] OR;  Service: Orthopedics;  Laterality: Left;    KNEE ARTHROSCOPY W/ PLICA EXCISION Left 1/22/2019    Procedure: EXCISION, PLICA, KNEE, ARTHROSCOPIC;  Surgeon: Sylvain Gorman DO;  Location: [facility] OR;  Service: Orthopedics;  Laterality: Left;    SALPINGOOPHORECTOMY  1997    TOTAL KNEE ARTHROPLASTY Right 4/18/2019     "Procedure: ARTHROPLASTY, KNEE, TOTAL;  Surgeon: Sylvain Gorman DO;  Location: [facility] OR;  Service: Orthopedics;  Laterality: Right;  Equipment: Depuy Sigma Total Knee System; Guillen Leg Mcclellan  Vendor/Rep: Depuy  Table/Position: Supine  REQUIRES FIRST ASSISTANT EPHRAIM    TOTAL KNEE ARTHROPLASTY Left 10/3/2019    Procedure: ARTHROPLASTY, KNEE, TOTAL;  Surgeon: Sylvain Gorman DO;  Location: [facility] OR;  Service: Orthopedics;  Laterality: Left;  Equipment: Depuy Sigma Total Knee System  Vendor/Rep: Depuy  REQUIRES FIRST ASSISTANT EPHRAIM    TUBAL LIGATION       Social History     Socioeconomic History    Marital status:    Occupational History    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   Tobacco Use    Smoking status: Former Smoker     Packs/day: 1.00     Years: 33.00     Pack years: 33.00     Types: Cigarettes     Quit date: 4/19/2019     Years since quitting: 3.0    Smokeless tobacco: Never Used   Substance and Sexual Activity    Alcohol use: No    Drug use: Never    Sexual activity: Not Currently     Family History   Problem Relation Age of Onset    Arthritis Mother     Pacemaker/defibrilator Mother     Lung disease Father     Heart disease Father     Arthritis Sister     Arthritis Brother     Arthritis Sister     Arthritis Sister     Breast cancer Neg Hx        Objective:      /72 (BP Location: Left arm, Patient Position: Sitting, BP Method: Large (Automatic))   Pulse 71   Ht 5' 9" (1.753 m)   Wt 96.2 kg (212 lb 1.6 oz)   LMP  (LMP Unknown)   SpO2 98%   BMI 31.32 kg/m²   Physical Exam  Vitals reviewed.   Constitutional:       General: She is not in acute distress.     Appearance: She is not toxic-appearing.   HENT:      Head: Normocephalic and atraumatic.   Cardiovascular:      Rate and Rhythm: Normal rate.   Pulmonary:      Effort: Pulmonary effort is normal.   Skin:     Coloration: Skin is not jaundiced.   Neurological:      Mental Status: She is alert and oriented to person, " place, and time.   Psychiatric:         Mood and Affect: Mood normal.         Behavior: Behavior normal.         Assessment:       1. Encounter for screening mammogram for malignant neoplasm of breast    2. Other specified hypothyroidism    3. Other hyperlipidemia        Plan:       Schedule mammo when due, fasting labs in early October, follow up a week later.    Encounter for screening mammogram for malignant neoplasm of breast  -     Mammo Digital Screening Bilat w/ Jeremiah; Future; Expected date: 05/24/2022    Other specified hypothyroidism  -     Lipid Panel; Future; Expected date: 05/24/2022  -     CBC Auto Differential; Future; Expected date: 05/24/2022  -     Comprehensive Metabolic Panel; Future; Expected date: 05/24/2022  -     TSH; Future; Expected date: 05/24/2022  -     T4, Free; Future; Expected date: 05/24/2022    Other hyperlipidemia  -     Lipid Panel; Future; Expected date: 05/24/2022  -     CBC Auto Differential; Future; Expected date: 05/24/2022  -     Comprehensive Metabolic Panel; Future; Expected date: 05/24/2022  -     TSH; Future; Expected date: 05/24/2022  -     T4, Free; Future; Expected date: 05/24/2022            Risks, benefits, and side effects were discussed with the patient. All questions were answered to the fullest satisfaction of the patient, and pt verbalized understanding and agreement to treatment plan. Pt was to call with any new or worsening symptoms, or present to the ER.

## 2022-05-26 ENCOUNTER — SPECIALTY PHARMACY (OUTPATIENT)
Dept: PHARMACY | Facility: CLINIC | Age: 52
End: 2022-05-26
Payer: MEDICARE

## 2022-05-26 NOTE — TELEPHONE ENCOUNTER
Specialty Pharmacy - Refill Coordination    Specialty Medication Orders Linked to Encounter    Flowsheet Row Most Recent Value   Medication #1 secukinumab (COSENTYX PEN, 2 PENS,) 150 mg/mL PnIj (Order#[id number], Rx#[id number])          Refill Questions - Documented Responses    Flowsheet Row Most Recent Value   Patient Availability and HIPAA Verification    Does patient want to proceed with activity? Yes   HIPAA/medical authority confirmed? Yes   Relationship to patient of person spoken to? Self   Refill Screening Questions    Changes to allergies? No   Changes to medications? No   New conditions since last clinic visit? No   Unplanned office visit, urgent care, ED, or hospital admission in the last 4 weeks? No   How does patient/caregiver feel medication is working? Good   Financial problems or insurance changes? No   How many doses of your specialty medications were missed in the last 4 weeks? 0   Would patient like to speak to a pharmacist? No   When does the patient need to receive the medication? 06/01/22   Refill Delivery Questions    How will the patient receive the medication? Mail   When does the patient need to receive the medication? 06/01/22   Shipping Address Temporary   Address in [facility] confirmed and updated if neccessary? Yes   Expected Copay ($) 0   Is the patient able to afford the medication copay? Yes   Payment Method zero copay   Days supply of Refill 30   Supplies needed? Alcohol Swabs   Refill activity completed? Yes   Refill activity plan Refill scheduled   Shipment/Pickup Date: 05/31/22          Current Outpatient Medications   Medication Sig    albuterol (PROVENTIL/VENTOLIN HFA) 90 mcg/actuation inhaler INHALE 2 PUFFS BY MOUTH INTO LUNGS EVERY 6 HOURS AS NEEDED FOR WHEEZING.    chlorzoxazone (PARAFON FORTE) 500 mg Tab Take 500 mg by mouth 3 (three) times daily.    ELDERBERRY FRUIT ORAL Take by mouth once daily.    fluticasone propionate (FLONASE) 50 mcg/actuation nasal spray  1 spray (50 mcg total) by Each Nostril route 2 (two) times daily as needed for Rhinitis.    furosemide (LASIX) 20 MG tablet TAKE ONE (1) TABLET EVERY MORNING AS NEEDED FOR FLUID    ibuprofen (ADVIL,MOTRIN) 800 MG tablet Take 1 tablet (800 mg total) by mouth 2 (two) times daily as needed for Pain.    levothyroxine (SYNTHROID) 50 MCG tablet TAKE ONE (1) TABLET EVERY MORNING ON AN EMPTY STOMACH FOR THYROID    lisinopriL (PRINIVIL,ZESTRIL) 20 MG tablet TAKE ONE (1) TABLET ONCE DAILY FOR BLOOD PRESSURE    lovastatin (MEVACOR) 20 MG tablet TAKE 1 TABLET AT BEDTIME FOR CHOLESTEROL (TAKE WITH CO-ENZYME Q-10)    multivit-min-FA-lycopen-lutein (CENTRUM SILVER) 0.4-300-250 mg-mcg-mcg Tab Take 1 tablet by mouth once daily.    oxyCODONE-acetaminophen (PERCOCET)  mg per tablet Take 1 tablet by mouth 3 (three) times daily as needed.    promethazine (PHENERGAN) 25 MG tablet Take 1 tablet (25 mg total) by mouth every 8 (eight) hours as needed for Nausea.    secukinumab (COSENTYX PEN, 2 PENS,) 150 mg/mL PnIj Inject 2 mL (300 mg) into the skin every 30 days.    traZODone (DESYREL) 50 MG tablet TAKE 1 OR 2 TABLETS AT BEDTIME AS NEEDED for sleep and nerve pain  (Patient taking differently: Take 100 mg by mouth every evening. TAKE 1 OR 2 TABLETS AT BEDTIME AS NEEDED for sleep and nerve pain)    TRELEGY ELLIPTA 100-62.5-25 mcg DsDv    Last reviewed on 5/24/2022 10:46 AM by Sofi Gallagher DO    Review of patient's allergies indicates:   Allergen Reactions    Remeron [mirtazapine] Other (See Comments)     Weight gain    Last reviewed on  5/24/2022 10:46 AM by Sofi Gallagher      Tasks added this encounter   6/24/2022 - Refill Call (Auto Added)   Tasks due within next 3 months   No tasks due.     Little Khan, PharmD  [facility]  [address] [facility] [address]  Phone: [phone]  Fax: [phone]

## 2022-05-28 ENCOUNTER — PATIENT MESSAGE (OUTPATIENT)
Dept: RHEUMATOLOGY | Facility: CLINIC | Age: 52
End: 2022-05-28
Payer: MEDICARE

## 2022-05-28 ENCOUNTER — PATIENT MESSAGE (OUTPATIENT)
Dept: FAMILY MEDICINE | Facility: CLINIC | Age: 52
End: 2022-05-28
Payer: MEDICARE

## 2022-05-30 ENCOUNTER — PATIENT MESSAGE (OUTPATIENT)
Dept: RHEUMATOLOGY | Facility: CLINIC | Age: 52
End: 2022-05-30
Payer: MEDICARE

## 2022-05-30 ENCOUNTER — TELEPHONE (OUTPATIENT)
Dept: FAMILY MEDICINE | Facility: CLINIC | Age: 52
End: 2022-05-30
Payer: MEDICARE

## 2022-05-30 DIAGNOSIS — J40 BRONCHITIS: ICD-10-CM

## 2022-05-30 DIAGNOSIS — U07.1 COVID-19: Primary | ICD-10-CM

## 2022-05-30 DIAGNOSIS — B00.1 FEVER BLISTER: ICD-10-CM

## 2022-05-30 RX ORDER — PREDNISONE 20 MG/1
20 TABLET ORAL DAILY
Qty: 7 TABLET | Refills: 0 | Status: SHIPPED | OUTPATIENT
Start: 2022-05-30 | End: 2022-06-06

## 2022-05-30 RX ORDER — AZITHROMYCIN 250 MG/1
TABLET, FILM COATED ORAL
Qty: 6 TABLET | Refills: 0 | Status: SHIPPED | OUTPATIENT
Start: 2022-05-30 | End: 2022-06-04

## 2022-05-30 RX ORDER — ACYCLOVIR 800 MG/1
800 TABLET ORAL 2 TIMES DAILY
Qty: 10 TABLET | Refills: 0 | Status: SHIPPED | OUTPATIENT
Start: 2022-05-30 | End: 2022-11-14

## 2022-05-30 NOTE — TELEPHONE ENCOUNTER
Spoke with pt notified her of medications sent to pharmacy and advised to please let office know if symptoms do not improve or worsen. Pt verbalized understanding.

## 2022-05-30 NOTE — TELEPHONE ENCOUNTER
I'm going to send in some prednisone for her, which will help her lungs, a z abhi, and some acyclovir or valacyclovir in case the covid has caused a herpes flare (which it can definitely do).

## 2022-05-30 NOTE — TELEPHONE ENCOUNTER
----- Message from Nikole Juan sent at 5/30/2022  9:01 AM CDT -----  Contact: self  Patient tested positive for covid on 5/28/22 and she has blisters all over her mouth and lips, and is having a little difficult time breathing, she does have two inhalers Trilog and her albuterol rescue inhaler which is every six hours but feels she needs it more often.  Call back at [phone] (home) to advise if there is anything else she can use and thanks  [facility] [place] - [address]   [address] [place] [address]  Phone: [phone] Fax: [phone]

## 2022-05-31 ENCOUNTER — PATIENT MESSAGE (OUTPATIENT)
Dept: ADMINISTRATIVE | Facility: HOSPITAL | Age: 52
End: 2022-05-31
Payer: MEDICARE

## 2022-06-03 ENCOUNTER — PATIENT MESSAGE (OUTPATIENT)
Dept: RHEUMATOLOGY | Facility: CLINIC | Age: 52
End: 2022-06-03

## 2022-06-03 ENCOUNTER — OFFICE VISIT (OUTPATIENT)
Dept: RHEUMATOLOGY | Facility: CLINIC | Age: 52
End: 2022-06-03
Payer: MEDICARE

## 2022-06-03 DIAGNOSIS — Z79.899 IMMUNOCOMPROMISED STATE DUE TO DRUG THERAPY: ICD-10-CM

## 2022-06-03 DIAGNOSIS — J12.82 PNEUMONIA DUE TO COVID-19 VIRUS: Primary | ICD-10-CM

## 2022-06-03 DIAGNOSIS — U07.1 PNEUMONIA DUE TO COVID-19 VIRUS: Primary | ICD-10-CM

## 2022-06-03 DIAGNOSIS — B02.8 HERPES ZOSTER WITH COMPLICATION: ICD-10-CM

## 2022-06-03 DIAGNOSIS — I73.00 RAYNAUD'S DISEASE WITHOUT GANGRENE: ICD-10-CM

## 2022-06-03 DIAGNOSIS — D84.821 IMMUNOCOMPROMISED STATE DUE TO DRUG THERAPY: ICD-10-CM

## 2022-06-03 DIAGNOSIS — C67.9 MALIGNANT NEOPLASM OF URINARY BLADDER, UNSPECIFIED SITE: ICD-10-CM

## 2022-06-03 DIAGNOSIS — L40.50 PSA (PSORIATIC ARTHRITIS): ICD-10-CM

## 2022-06-03 PROCEDURE — 4010F PR ACE/ARB THEARPY RXD/TAKEN: ICD-10-PCS | Mod: CPTII,95,, | Performed by: INTERNAL MEDICINE

## 2022-06-03 PROCEDURE — 99215 PR OFFICE/OUTPT VISIT, EST, LEVL V, 40-54 MIN: ICD-10-PCS | Mod: 95,,, | Performed by: INTERNAL MEDICINE

## 2022-06-03 PROCEDURE — 99215 OFFICE O/P EST HI 40 MIN: CPT | Mod: 95,,, | Performed by: INTERNAL MEDICINE

## 2022-06-03 PROCEDURE — 4010F ACE/ARB THERAPY RXD/TAKEN: CPT | Mod: CPTII,95,, | Performed by: INTERNAL MEDICINE

## 2022-06-03 RX ORDER — VALACYCLOVIR HYDROCHLORIDE 1 G/1
1000 TABLET, FILM COATED ORAL 3 TIMES DAILY
Qty: 21 TABLET | Refills: 0 | Status: SHIPPED | OUTPATIENT
Start: 2022-06-03 | End: 2022-11-14

## 2022-06-03 RX ORDER — DEXAMETHASONE 1 MG/1
1 TABLET ORAL DAILY
Qty: 10 TABLET | Refills: 0 | Status: SHIPPED | OUTPATIENT
Start: 2022-06-03 | End: 2022-06-13

## 2022-06-03 RX ORDER — PROMETHAZINE HYDROCHLORIDE AND DEXTROMETHORPHAN HYDROBROMIDE 6.25; 15 MG/5ML; MG/5ML
5 SYRUP ORAL EVERY 4 HOURS PRN
Qty: 240 ML | Refills: 0 | Status: SHIPPED | OUTPATIENT
Start: 2022-06-03 | End: 2022-06-13

## 2022-06-03 NOTE — PROGRESS NOTES
Subjective:       Patient ID: Boom Blanco is a 52 y.o. female.    Chief Complaint: No chief complaint on file.    Follow up:52 year old female who presents to clinic for follow up on psoriatic arthritis. And copd,  covid bronchitis, took zpack and prednisone dx was 4 days ago, she also has shingles in her mouth and face. She has a cough at night and in the am and a svere headache.she has SOB but O2 sat okay per pt. She has joint pain in her hands, elbows, knees, and ankles. Pain is constant and aching in nature. She is previously had bilateral knee replacements. Pulmonary started her on a inhaler and she is undergoing work up for chronic sob with exertion--PFTs/echo orders.       Current tx cosentyx, norco followed by pain management.    Review of Systems   Constitutional: Positive for activity change. Negative for appetite change, chills, fatigue and fever.   Eyes: Negative for visual disturbance.   Respiratory: Negative for cough and shortness of breath.    Cardiovascular: Negative for chest pain, palpitations and leg swelling.   Gastrointestinal: Positive for nausea. Negative for abdominal pain, constipation, diarrhea and vomiting.   Musculoskeletal: Positive for arthralgias, back pain, joint swelling, myalgias, neck pain and neck stiffness.   Allergic/Immunologic: Positive for immunocompromised state.   Neurological: Negative for dizziness, weakness, light-headedness and headaches.         Objective:     There were no vitals filed for this visit.    Past Medical History:   Diagnosis Date    Anxiety disorder     Arthritis     Bipolar disorder     Chronic pain     Diverticulitis     Hyperlipidemia     Hypertension     Lupus 2006    Psoriatic arthritis     Raynaud's disease      Past Surgical History:   Procedure Laterality Date    ARTHROSCOPIC CHONDROPLASTY OF KNEE JOINT Left 1/22/2019    Procedure: ARTHROSCOPY, KNEE, WITH CHONDROPLASTY;  Surgeon: Sylvain Gorman DO;  Location: [facility] OR;   Service: Orthopedics;  Laterality: Left;    ARTHROSCOPY OF KNEE Left 1/22/2019    Procedure: ARTHROSCOPY, KNEE;  Surgeon: Sylvain Gorman DO;  Location: [facility] OR;  Service: Orthopedics;  Laterality: Left;  Equipment: Opdyke Chondral Drill Set and Mitek Meniscus Repair Set Truespan  Vendor/Rep: [employer]    ARTHROSCOPY OF KNEE Right 2/5/2019    Procedure: ARTHROSCOPY, KNEE;  Surgeon: Sylvain Gorman DO;  Location: [facility] OR;  Service: Orthopedics;  Laterality: Right;  Equipment: Mitek Truspar; Scope  Venor/Rep: Mitek    BACK SURGERY      spinal stimulator implanted and then removed    BREAST BIOPSY      COLON SURGERY      COLONOSCOPY  11/25/2016    repeat in 5-10 years    COLONOSCOPY N/A 9/6/2019    Procedure: COLONOSCOPY;  Surgeon: Dany Hugo MD;  Location: [facility];  Service: General;  Laterality: N/A;    ENDOSCOPY  9/6/2019    Procedure: ENDOSCOPY;  Surgeon: Dany Hugo MD;  Location: [facility];  Service: General;;  with multiple biopsy's    ESOPHAGOGASTRODUODENOSCOPY Left 8/29/2018    Procedure: ESOPHAGOGASTRODUODENOSCOPY (EGD);  Surgeon: Dany Hugo MD;  Location: [facility];  Service: General;  Laterality: Left;    EXCISION OF MEDIAL MENISCUS OF KNEE Right 2/5/2019    Procedure: MENISCECTOMY, KNEE, MEDIAL;  Surgeon: Sylvain Gorman DO;  Location: [facility] OR;  Service: Orthopedics;  Laterality: Right;    HYSTERECTOMY  1997    KNEE ARTHROSCOPY W/ DEBRIDEMENT Left 1/22/2019    Procedure: ARTHROSCOPY, KNEE, WITH DEBRIDEMENT;  Surgeon: Sylvain Gorman DO;  Location: [facility] OR;  Service: Orthopedics;  Laterality: Left;    KNEE ARTHROSCOPY W/ MENISCECTOMY Left 1/22/2019    Procedure: ARTHROSCOPY, KNEE, WITH MENISCECTOMY;  Surgeon: Sylvain Gorman DO;  Location: [facility] OR;  Service: Orthopedics;  Laterality: Left;    KNEE ARTHROSCOPY W/ PLICA EXCISION Left 1/22/2019    Procedure: EXCISION, PLICA, KNEE, ARTHROSCOPIC;  Surgeon: Sylvain Gorman DO;  Location: [facility] OR;  Service:  Orthopedics;  Laterality: Left;    SALPINGOOPHORECTOMY  1997    TOTAL KNEE ARTHROPLASTY Right 4/18/2019    Procedure: ARTHROPLASTY, KNEE, TOTAL;  Surgeon: Sylvain Gorman DO;  Location: [facility] OR;  Service: Orthopedics;  Laterality: Right;  Equipment: Depuy Sigma Total Knee System; Guillen Leg Mcclellan  Vendor/Rep: [employer]  Table/Position: Supine  REQUIRES FIRST ASSISTANT EPHRAIM    TOTAL KNEE ARTHROPLASTY Left 10/3/2019    Procedure: ARTHROPLASTY, KNEE, TOTAL;  Surgeon: Sylvain Gorman DO;  Location: [facility] OR;  Service: Orthopedics;  Laterality: Left;  Equipment: Depuy Sigma Total Knee System  Vendor/Rep: [employer]uy  REQUIRES FIRST ASSISTANT EPHRAIM    TUBAL LIGATION            Physical Exam   Constitutional: She is oriented to person, place, and time. She appears ill.   Neurological: She is alert and oriented to person, place, and time.   Psychiatric: Mood, affect and judgment normal.         Assessment:       1. Pneumonia due to COVID-19 virus    2. PSA (psoriatic arthritis)    3. Raynaud's disease without gangrene    4. Immunocompromised state due to drug therapy    5. Malignant neoplasm of urinary bladder, unspecified site    6. Herpes zoster with complication            Plan:       Pneumonia due to COVID-19 virus  -     nirmatrelvir-ritonavir 150 mg x 2- 100 mg copackaged tablets (EUA); Take 3 tablets by mouth 2 (two) times daily for 5 days. Each dose contains 2 nirmatrelvir (pink tablets) and 1 ritonavir (white tablet). Take all 3 tablets together  Dispense: 30 tablet; Refill: 0  -     dexAMETHasone (DECADRON) 1 MG Tab; Take 1 tablet (1 mg total) by mouth once daily. for 10 days  Dispense: 10 tablet; Refill: 0  -     valACYclovir (VALTREX) 1000 MG tablet; Take 1 tablet (1,000 mg total) by mouth 3 (three) times daily. for 7 days  Dispense: 21 tablet; Refill: 0  -     promethazine-dextromethorphan (PROMETHAZINE-DM) 6.25-15 mg/5 mL Syrp; Take 5 mLs by mouth every 4 (four) hours as needed (cough).  Dispense: 240 mL;  Refill: 0    PSA (psoriatic arthritis)    Raynaud's disease without gangrene    Immunocompromised state due to drug therapy    Malignant neoplasm of urinary bladder, unspecified site    Herpes zoster with complication          Assessment:  51 year old female with  Psoriatic arthritis, DORY 1:2560 nucleolar, raynauds  --renal insufficiency  --nausea  --chronic pain syndrome    Plan:  1. Hold cosentyx  2. Start paxlovid, decadron promethazine dm and valtrex 1000 mg tid x 7 days she took 800 mg of acyclovir bid with no improvement, Also pt will hold statin med ( mevacor) while taking paxlovid  3. Cont cosentyx 300 mg monthly  4. Complete pulmonary work up  5. Link ESR/CRP to upcoming labs      The patient location is: home  The chief complaint leading to consultation is: psa covid    Visit type: audiovisual    Face to Face time with patient: 41   minutes of total time spent on the encounter, which includes face to face time and non-face to face time preparing to see the patient (eg, review of tests), Obtaining and/or reviewing separately obtained history, Documenting clinical information in the electronic or other health record, Independently interpreting results (not separately reported) and communicating results to the patient/family/caregiver, or Care coordination (not separately reported).         Each patient to whom he or she provides medical services by telemedicine is:  (1) informed of the relationship between the physician and patient and the respective role of any other health care provider with respect to management of the patient; and (2) notified that he or she may decline to receive medical services by telemedicine and may withdraw from such care at any time.    Notes:

## 2022-06-07 ENCOUNTER — PATIENT MESSAGE (OUTPATIENT)
Dept: RHEUMATOLOGY | Facility: CLINIC | Age: 52
End: 2022-06-07
Payer: MEDICARE

## 2022-06-08 ENCOUNTER — DOCUMENTATION ONLY (OUTPATIENT)
Dept: PHARMACY | Facility: CLINIC | Age: 52
End: 2022-06-08
Payer: MEDICARE

## 2022-06-08 NOTE — PROGRESS NOTES
RECEIVED PA REQUEST FROM PROVIDERS OFFICE FOR COVID TEST. SUBMITTED THROUGH [employer] AND RECEIVED A DENIAL FOR THE   FOLLOWING REASON      [facility] [id number]  [place] [facility],[facility]  MED ACCESS

## 2022-06-10 ENCOUNTER — PATIENT MESSAGE (OUTPATIENT)
Dept: RHEUMATOLOGY | Facility: CLINIC | Age: 52
End: 2022-06-10
Payer: MEDICARE

## 2022-06-24 ENCOUNTER — SPECIALTY PHARMACY (OUTPATIENT)
Dept: PHARMACY | Facility: CLINIC | Age: 52
End: 2022-06-24
Payer: MEDICARE

## 2022-06-24 NOTE — TELEPHONE ENCOUNTER
Spoke with patient regarding cosentyx refill --- injection was delayed last month because she had covid and was not cleared to restart until 6/11. Next injection is due 7/11. Patient agreed for us to call her back in a week to schedule.

## 2022-06-29 DIAGNOSIS — L40.50 PSA (PSORIATIC ARTHRITIS): ICD-10-CM

## 2022-06-29 NOTE — TELEPHONE ENCOUNTER
Specialty Pharmacy - Refill Coordination    Specialty Medication Orders Linked to Encounter    Flowsheet Row Most Recent Value   Medication #1 secukinumab (COSENTYX PEN, 2 PENS,) 150 mg/mL PnIj (Order#[id number], Rx#[id number])        Reviewed injection date with patient. Patient will start injecting the 10th of every month. Agreed with plan.     Refill Questions - Documented Responses    Flowsheet Row Most Recent Value   Patient Availability and HIPAA Verification    Does patient want to proceed with activity? Yes   HIPAA/medical authority confirmed? Yes   Relationship to patient of person spoken to? Self   Refill Screening Questions    Changes to allergies? No   Changes to medications? No   New conditions since last clinic visit? No   Unplanned office visit, urgent care, ED, or hospital admission in the last 4 weeks? No   How does patient/caregiver feel medication is working? Very good   Financial problems or insurance changes? No   How many doses of your specialty medications were missed in the last 4 weeks? 0   Would patient like to speak to a pharmacist? No   When does the patient need to receive the medication? 07/10/22   Refill Delivery Questions    How will the patient receive the medication? Mail   When does the patient need to receive the medication? 07/10/22   Shipping Address Home   Address in [facility] confirmed and updated if neccessary? Yes   Expected Copay ($) 0   Is the patient able to afford the medication copay? Yes   Payment Method zero copay   Days supply of Refill 30   Supplies needed? No supplies needed   Refill activity completed? Yes   Refill activity plan Refill scheduled   Shipment/Pickup Date: 07/06/22          Current Outpatient Medications   Medication Sig    acyclovir (ZOVIRAX) 800 MG Tab Take 1 tablet (800 mg total) by mouth 2 (two) times daily. for 5 days    albuterol (PROVENTIL/VENTOLIN HFA) 90 mcg/actuation inhaler INHALE 2 PUFFS BY MOUTH INTO LUNGS EVERY 6 HOURS AS  NEEDED FOR WHEEZING.    chlorzoxazone (PARAFON FORTE) 500 mg Tab Take 500 mg by mouth 3 (three) times daily.    COVID-19 antigen test Kit Use as directed.    ELDERBERRY FRUIT ORAL Take by mouth once daily.    fluticasone propionate (FLONASE) 50 mcg/actuation nasal spray 1 spray (50 mcg total) by Each Nostril route 2 (two) times daily as needed for Rhinitis.    furosemide (LASIX) 20 MG tablet TAKE ONE (1) TABLET EVERY MORNING AS NEEDED FOR FLUID    ibuprofen (ADVIL,MOTRIN) 800 MG tablet Take 1 tablet (800 mg total) by mouth 2 (two) times daily as needed for Pain.    levothyroxine (SYNTHROID) 50 MCG tablet TAKE ONE (1) TABLET EVERY MORNING ON AN EMPTY STOMACH FOR THYROID    lisinopriL (PRINIVIL,ZESTRIL) 20 MG tablet TAKE ONE (1) TABLET ONCE DAILY FOR BLOOD PRESSURE    lovastatin (MEVACOR) 20 MG tablet TAKE 1 TABLET AT BEDTIME FOR CHOLESTEROL (TAKE WITH CO-ENZYME Q-10)    multivit-min-FA-lycopen-lutein (CENTRUM SILVER) 0.4-300-250 mg-mcg-mcg Tab Take 1 tablet by mouth once daily.    oxyCODONE-acetaminophen (PERCOCET)  mg per tablet Take 1 tablet by mouth 3 (three) times daily as needed.    promethazine (PHENERGAN) 25 MG tablet Take 1 tablet (25 mg total) by mouth every 8 (eight) hours as needed for Nausea.    secukinumab (COSENTYX PEN, 2 PENS,) 150 mg/mL PnIj Inject 2 mL (300 mg) into the skin every 30 days.    traZODone (DESYREL) 50 MG tablet TAKE 1 OR 2 TABLETS AT BEDTIME AS NEEDED for sleep and nerve pain  (Patient taking differently: Take 100 mg by mouth every evening. TAKE 1 OR 2 TABLETS AT BEDTIME AS NEEDED for sleep and nerve pain)    TRELEGY ELLIPTA 100-62.5-25 mcg DsDv     valACYclovir (VALTREX) 1000 MG tablet Take 1 tablet (1,000 mg total) by mouth 3 (three) times daily. for 7 days   Last reviewed on 5/24/2022 10:46 AM by Sofi Gallagher DO    Review of patient's allergies indicates:   Allergen Reactions    Remeron [mirtazapine] Other (See Comments)     Weight gain    Last reviewed on   6/3/2022 4:26 PM by Maximino Molina      Tasks added this encounter   8/2/2022 - Refill Call (Auto Added)   Tasks due within next 3 months   No tasks due.     Arnie Cannon, PharmD  [facility]  [address] [place], [place] [address]  [facility] [address]  Phone: [phone]  Fax: [phone]

## 2022-07-01 RX ORDER — IBUPROFEN 800 MG/1
800 TABLET ORAL 2 TIMES DAILY PRN
Qty: 60 TABLET | Refills: 2 | Status: SHIPPED | OUTPATIENT
Start: 2022-07-01 | End: 2022-10-12 | Stop reason: SDUPTHER

## 2022-08-05 ENCOUNTER — PATIENT MESSAGE (OUTPATIENT)
Dept: PHARMACY | Facility: CLINIC | Age: 52
End: 2022-08-05
Payer: MEDICARE

## 2022-08-05 ENCOUNTER — SPECIALTY PHARMACY (OUTPATIENT)
Dept: PHARMACY | Facility: CLINIC | Age: 52
End: 2022-08-05
Payer: MEDICARE

## 2022-08-05 NOTE — TELEPHONE ENCOUNTER
Specialty Pharmacy - Refill Coordination    Specialty Medication Orders Linked to Encounter    Flowsheet Row Most Recent Value   Medication #1 secukinumab (COSENTYX PEN, 2 PENS,) 150 mg/mL PnIj (Order#[id number], Rx#[id number])          Refill Questions - Documented Responses    Flowsheet Row Most Recent Value   Patient Availability and HIPAA Verification    Does patient want to proceed with activity? Yes   HIPAA/medical authority confirmed? Yes   Relationship to patient of person spoken to? Self   Refill Screening Questions    Changes to allergies? No   Changes to medications? No   New conditions since last clinic visit? No   Unplanned office visit, urgent care, ED, or hospital admission in the last 4 weeks? No   How does patient/caregiver feel medication is working? Excellent   Financial problems or insurance changes? No   How many doses of your specialty medications were missed in the last 4 weeks? 0   Would patient like to speak to a pharmacist? No   When does the patient need to receive the medication? 08/10/22   Refill Delivery Questions    How will the patient receive the medication? Delivery Lisa   When does the patient need to receive the medication? 08/10/22   Shipping Address Prescription   Address in [facility] confirmed and updated if neccessary? Yes   Expected Copay ($) 0   Is the patient able to afford the medication copay? Yes   Payment Method zero copay   Days supply of Refill 30   Supplies needed? No supplies needed   Refill activity completed? Yes   Refill activity plan Refill scheduled   Shipment/Pickup Date: 08/08/22          Current Outpatient Medications   Medication Sig    acyclovir (ZOVIRAX) 800 MG Tab Take 1 tablet (800 mg total) by mouth 2 (two) times daily. for 5 days    albuterol (PROVENTIL/VENTOLIN HFA) 90 mcg/actuation inhaler INHALE 2 PUFFS BY MOUTH INTO LUNGS EVERY 6 HOURS AS NEEDED FOR WHEEZING.    chlorzoxazone (PARAFON FORTE) 500 mg Tab Take 500 mg by mouth 3 (three)  times daily.    COVID-19 antigen test Kit Use as directed.    ELDERBERRY FRUIT ORAL Take by mouth once daily.    fluticasone propionate (FLONASE) 50 mcg/actuation nasal spray 1 spray (50 mcg total) by Each Nostril route 2 (two) times daily as needed for Rhinitis.    furosemide (LASIX) 20 MG tablet TAKE ONE (1) TABLET EVERY MORNING AS NEEDED FOR FLUID    ibuprofen (ADVIL,MOTRIN) 800 MG tablet Take 1 tablet (800 mg total) by mouth 2 (two) times daily as needed for Pain.    levothyroxine (SYNTHROID) 50 MCG tablet TAKE ONE (1) TABLET EVERY MORNING ON AN EMPTY STOMACH FOR THYROID    lisinopriL (PRINIVIL,ZESTRIL) 20 MG tablet TAKE ONE (1) TABLET ONCE DAILY FOR BLOOD PRESSURE    lovastatin (MEVACOR) 20 MG tablet TAKE 1 TABLET AT BEDTIME FOR CHOLESTEROL (TAKE WITH CO-ENZYME Q-10)    multivit-min-FA-lycopen-lutein (CENTRUM SILVER) 0.4-300-250 mg-mcg-mcg Tab Take 1 tablet by mouth once daily.    oxyCODONE-acetaminophen (PERCOCET)  mg per tablet Take 1 tablet by mouth 3 (three) times daily as needed.    promethazine (PHENERGAN) 25 MG tablet Take 1 tablet (25 mg total) by mouth every 8 (eight) hours as needed for Nausea.    secukinumab (COSENTYX PEN, 2 PENS,) 150 mg/mL PnIj Inject 2 mL (300 mg) into the skin every 30 days.    traZODone (DESYREL) 50 MG tablet TAKE 1 OR 2 TABLETS AT BEDTIME AS NEEDED for sleep and nerve pain  (Patient taking differently: Take 100 mg by mouth every evening. TAKE 1 OR 2 TABLETS AT BEDTIME AS NEEDED for sleep and nerve pain)    TRELEGY ELLIPTA 100-62.5-25 mcg DsDv     valACYclovir (VALTREX) 1000 MG tablet Take 1 tablet (1,000 mg total) by mouth 3 (three) times daily. for 7 days   Last reviewed on 5/24/2022 10:46 AM by Sofi Gallagher DO    Review of patient's allergies indicates:   Allergen Reactions    Remeron [mirtazapine] Other (See Comments)     Weight gain    Last reviewed on  6/3/2022 4:26 PM by Maximino Molina      Tasks added this encounter   9/2/2022 - Refill Call (Auto  Added)   Tasks due within next 3 months   No tasks due.     Kun Ayala, PharmD  [facility]  [address]  [facility] [address]  Phone: [phone]  Fax: [phone]

## 2022-08-17 ENCOUNTER — PATIENT MESSAGE (OUTPATIENT)
Dept: FAMILY MEDICINE | Facility: CLINIC | Age: 52
End: 2022-08-17
Payer: MEDICARE

## 2022-08-24 ENCOUNTER — PATIENT MESSAGE (OUTPATIENT)
Dept: ADMINISTRATIVE | Facility: HOSPITAL | Age: 52
End: 2022-08-24
Payer: MEDICARE

## 2022-08-24 ENCOUNTER — SPECIALTY PHARMACY (OUTPATIENT)
Dept: PHARMACY | Facility: CLINIC | Age: 52
End: 2022-08-24
Payer: MEDICARE

## 2022-08-24 NOTE — TELEPHONE ENCOUNTER
Specialty Pharmacy - Clinical Reassessment    Specialty Medication Orders Linked to Encounter    Flowsheet Row Most Recent Value   Medication #1 secukinumab (COSENTYX PEN, 2 PENS,) 150 mg/mL PnIj (Order#[id number], Rx#[id number])        Patient Diagnosis   M06.9 - RA (rheumatoid arthritis)    Specialty clinical pharmacist review completed for an annual review of reassessment. Reviewed the following areas: current med list, reports of adverse effects, adherence and progress towards therapeutic goals.    Recommendations: none at this time. Gap in fill history due to Covid. Patient and rph established dosing regimen on the 10th of every month to help with adherence and consistency. No further intervention needed at this time.     Tasks added this encounter   5/24/2023 - Clinical - Follow Up Assesement (Annual)   Tasks due within next 3 months   9/2/2022 - Refill Call (Auto Added)     Wendi Knight, PharmD  [facility]  [address]  [facility] [address]  Phone: [phone]  Fax: [phone]

## 2022-09-02 ENCOUNTER — SPECIALTY PHARMACY (OUTPATIENT)
Dept: PHARMACY | Facility: CLINIC | Age: 52
End: 2022-09-02
Payer: MEDICARE

## 2022-09-02 NOTE — TELEPHONE ENCOUNTER
Specialty Pharmacy - Refill Coordination    Specialty Medication Orders Linked to Encounter      Flowsheet Row Most Recent Value   Medication #1 secukinumab (COSENTYX PEN, 2 PENS,) 150 mg/mL PnIj (Order#[id number], Rx#[id number])            Refill Questions - Documented Responses      Flowsheet Row Most Recent Value   Patient Availability and HIPAA Verification    Does patient want to proceed with activity? Yes   HIPAA/medical authority confirmed? Yes   Relationship to patient of person spoken to? Self   Refill Screening Questions    When does the patient need to receive the medication? 09/10/22   Refill Delivery Questions    How will the patient receive the medication? Mail   When does the patient need to receive the medication? 09/10/22   Shipping Address Home   Address in [facility] confirmed and updated if neccessary? Yes   Expected Copay ($) 0   Is the patient able to afford the medication copay? Yes   Payment Method zero copay   Days supply of Refill 30   Supplies needed? No supplies needed   Refill activity completed? Yes   Refill activity plan Refill scheduled   Shipment/Pickup Date: 09/08/22            Current Outpatient Medications   Medication Sig    acyclovir (ZOVIRAX) 800 MG Tab Take 1 tablet (800 mg total) by mouth 2 (two) times daily. for 5 days    albuterol (PROVENTIL/VENTOLIN HFA) 90 mcg/actuation inhaler INHALE 2 PUFFS BY MOUTH INTO LUNGS EVERY 6 HOURS AS NEEDED FOR WHEEZING.    chlorzoxazone (PARAFON FORTE) 500 mg Tab Take 500 mg by mouth 3 (three) times daily.    COVID-19 antigen test Kit Use as directed.    ELDERBERRY FRUIT ORAL Take by mouth once daily.    fluticasone propionate (FLONASE) 50 mcg/actuation nasal spray 1 spray (50 mcg total) by Each Nostril route 2 (two) times daily as needed for Rhinitis.    furosemide (LASIX) 20 MG tablet TAKE ONE (1) TABLET EVERY MORNING AS NEEDED FOR FLUID    ibuprofen (ADVIL,MOTRIN) 800 MG tablet Take 1 tablet (800 mg total) by mouth 2 (two) times  daily as needed for Pain.    levothyroxine (SYNTHROID) 50 MCG tablet TAKE ONE (1) TABLET EVERY MORNING ON AN EMPTY STOMACH FOR THYROID    lisinopriL (PRINIVIL,ZESTRIL) 20 MG tablet TAKE ONE (1) TABLET ONCE DAILY FOR BLOOD PRESSURE    lovastatin (MEVACOR) 20 MG tablet TAKE 1 TABLET AT BEDTIME FOR CHOLESTEROL (TAKE WITH CO-ENZYME Q-10)    multivit-min-FA-lycopen-lutein (CENTRUM SILVER) 0.4-300-250 mg-mcg-mcg Tab Take 1 tablet by mouth once daily.    oxyCODONE-acetaminophen (PERCOCET)  mg per tablet Take 1 tablet by mouth 3 (three) times daily as needed.    promethazine (PHENERGAN) 25 MG tablet Take 1 tablet (25 mg total) by mouth every 8 (eight) hours as needed for Nausea.    secukinumab (COSENTYX PEN, 2 PENS,) 150 mg/mL PnIj Inject 2 mL (300 mg) into the skin every 30 days.    traZODone (DESYREL) 50 MG tablet TAKE 1 OR 2 TABLETS AT BEDTIME AS NEEDED for sleep and nerve pain  (Patient taking differently: Take 100 mg by mouth every evening. TAKE 1 OR 2 TABLETS AT BEDTIME AS NEEDED for sleep and nerve pain)    TRELEGY ELLIPTA 100-62.5-25 mcg DsDv     valACYclovir (VALTREX) 1000 MG tablet Take 1 tablet (1,000 mg total) by mouth 3 (three) times daily. for 7 days   Last reviewed on 5/24/2022 10:46 AM by Sofi Gallagher DO    Review of patient's allergies indicates:   Allergen Reactions    Remeron [mirtazapine] Other (See Comments)     Weight gain    Last reviewed on  6/3/2022 4:26 PM by Maximino Molina      Tasks added this encounter   10/3/2022 - Refill Call (Auto Added)   Tasks due within next 3 months   No tasks due.     Little Khan, PharmD  [facility]  [address] [place] [id number]  Phone: [phone]  Fax: [phone]

## 2022-09-09 ENCOUNTER — PATIENT MESSAGE (OUTPATIENT)
Dept: FAMILY MEDICINE | Facility: CLINIC | Age: 52
End: 2022-09-09
Payer: MEDICARE

## 2022-09-15 ENCOUNTER — PATIENT MESSAGE (OUTPATIENT)
Dept: FAMILY MEDICINE | Facility: CLINIC | Age: 52
End: 2022-09-15
Payer: MEDICARE

## 2022-09-23 ENCOUNTER — PATIENT MESSAGE (OUTPATIENT)
Dept: RHEUMATOLOGY | Facility: CLINIC | Age: 52
End: 2022-09-23
Payer: MEDICARE

## 2022-09-23 ENCOUNTER — LAB VISIT (OUTPATIENT)
Dept: LAB | Facility: HOSPITAL | Age: 52
End: 2022-09-23
Attending: FAMILY MEDICINE
Payer: MEDICARE

## 2022-09-23 DIAGNOSIS — E03.8 OTHER SPECIFIED HYPOTHYROIDISM: Chronic | ICD-10-CM

## 2022-09-23 DIAGNOSIS — E78.49 OTHER HYPERLIPIDEMIA: ICD-10-CM

## 2022-09-23 LAB
ALBUMIN SERPL BCP-MCNC: 3.7 G/DL (ref 3.5–5.2)
ALP SERPL-CCNC: 39 U/L (ref 55–135)
ALT SERPL W/O P-5'-P-CCNC: 15 U/L (ref 10–44)
ANION GAP SERPL CALC-SCNC: 12 MMOL/L (ref 8–16)
AST SERPL-CCNC: 21 U/L (ref 10–40)
BASOPHILS # BLD AUTO: 0.03 K/UL (ref 0–0.2)
BASOPHILS NFR BLD: 0.5 % (ref 0–1.9)
BILIRUB SERPL-MCNC: 0.3 MG/DL (ref 0.1–1)
BUN SERPL-MCNC: 10 MG/DL (ref 6–20)
CALCIUM SERPL-MCNC: 8.9 MG/DL (ref 8.7–10.5)
CHLORIDE SERPL-SCNC: 102 MMOL/L (ref 95–110)
CHOLEST SERPL-MCNC: 156 MG/DL (ref 120–199)
CHOLEST/HDLC SERPL: 3.2 {RATIO} (ref 2–5)
CO2 SERPL-SCNC: 26 MMOL/L (ref 23–29)
CREAT SERPL-MCNC: 0.9 MG/DL (ref 0.5–1.4)
DIFFERENTIAL METHOD: NORMAL
EOSINOPHIL # BLD AUTO: 0.1 K/UL (ref 0–0.5)
EOSINOPHIL NFR BLD: 0.8 % (ref 0–8)
ERYTHROCYTE [DISTWIDTH] IN BLOOD BY AUTOMATED COUNT: 13.2 % (ref 11.5–14.5)
EST. GFR  (NO RACE VARIABLE): >60 ML/MIN/1.73 M^2
GLUCOSE SERPL-MCNC: 104 MG/DL (ref 70–110)
HCT VFR BLD AUTO: 38.8 % (ref 37–48.5)
HDLC SERPL-MCNC: 49 MG/DL (ref 40–75)
HDLC SERPL: 31.4 % (ref 20–50)
HGB BLD-MCNC: 12.8 G/DL (ref 12–16)
IMM GRANULOCYTES # BLD AUTO: 0.01 K/UL (ref 0–0.04)
IMM GRANULOCYTES NFR BLD AUTO: 0.2 % (ref 0–0.5)
LDLC SERPL CALC-MCNC: 90.6 MG/DL (ref 63–159)
LYMPHOCYTES # BLD AUTO: 1.3 K/UL (ref 1–4.8)
LYMPHOCYTES NFR BLD: 21.8 % (ref 18–48)
MCH RBC QN AUTO: 30.3 PG (ref 27–31)
MCHC RBC AUTO-ENTMCNC: 33 G/DL (ref 32–36)
MCV RBC AUTO: 92 FL (ref 82–98)
MONOCYTES # BLD AUTO: 0.5 K/UL (ref 0.3–1)
MONOCYTES NFR BLD: 7.7 % (ref 4–15)
NEUTROPHILS # BLD AUTO: 4.2 K/UL (ref 1.8–7.7)
NEUTROPHILS NFR BLD: 69 % (ref 38–73)
NONHDLC SERPL-MCNC: 107 MG/DL
NRBC BLD-RTO: 0 /100 WBC
PLATELET # BLD AUTO: 200 K/UL (ref 150–450)
PMV BLD AUTO: 10.5 FL (ref 9.2–12.9)
POTASSIUM SERPL-SCNC: 4 MMOL/L (ref 3.5–5.1)
PROT SERPL-MCNC: 7.2 G/DL (ref 6–8.4)
RBC # BLD AUTO: 4.22 M/UL (ref 4–5.4)
SODIUM SERPL-SCNC: 140 MMOL/L (ref 136–145)
T4 FREE SERPL-MCNC: 1.19 NG/DL (ref 0.71–1.51)
TRIGL SERPL-MCNC: 82 MG/DL (ref 30–150)
TSH SERPL DL<=0.005 MIU/L-ACNC: 3.26 UIU/ML (ref 0.4–4)
WBC # BLD AUTO: 6.14 K/UL (ref 3.9–12.7)

## 2022-09-23 PROCEDURE — 84439 ASSAY OF FREE THYROXINE: CPT | Performed by: FAMILY MEDICINE

## 2022-09-23 PROCEDURE — 85025 COMPLETE CBC W/AUTO DIFF WBC: CPT | Performed by: FAMILY MEDICINE

## 2022-09-23 PROCEDURE — 84443 ASSAY THYROID STIM HORMONE: CPT | Performed by: FAMILY MEDICINE

## 2022-09-23 PROCEDURE — 80053 COMPREHEN METABOLIC PANEL: CPT | Performed by: FAMILY MEDICINE

## 2022-09-23 PROCEDURE — 80061 LIPID PANEL: CPT | Performed by: FAMILY MEDICINE

## 2022-09-23 PROCEDURE — 36415 COLL VENOUS BLD VENIPUNCTURE: CPT | Performed by: FAMILY MEDICINE

## 2022-09-26 ENCOUNTER — TELEPHONE (OUTPATIENT)
Dept: FAMILY MEDICINE | Facility: CLINIC | Age: 52
End: 2022-09-26
Payer: MEDICARE

## 2022-09-26 NOTE — TELEPHONE ENCOUNTER
----- Message from Sofi Gallagher DO sent at 9/26/2022  6:46 AM CDT -----  Please let patient know that her labs look good. She'll need an appt with a new provider to establish care before the end of the year. THanks

## 2022-09-26 NOTE — TELEPHONE ENCOUNTER
Spoke with pt regarding results. Pt verbalized understanding.  Pt wants to see doctor at [facility].

## 2022-10-03 ENCOUNTER — SPECIALTY PHARMACY (OUTPATIENT)
Dept: PHARMACY | Facility: CLINIC | Age: 52
End: 2022-10-03
Payer: MEDICARE

## 2022-10-03 NOTE — TELEPHONE ENCOUNTER
Outgoing call regarding cosentyx refill; pt reported that med isn't working as good as it used to; also mentioned she's having trouble walking; transferred to [facility]

## 2022-10-03 NOTE — TELEPHONE ENCOUNTER
Specialty Pharmacy - Refill Coordination    Specialty Medication Orders Linked to Encounter      Flowsheet Row Most Recent Value   Medication #1 secukinumab (COSENTYX PEN, 2 PENS,) 150 mg/mL PnIj (Order#[id number], Rx#[id number])            Refill Questions - Documented Responses      Flowsheet Row Most Recent Value   Patient Availability and HIPAA Verification    Does patient want to proceed with activity? Yes   HIPAA/medical authority confirmed? Yes   Relationship to patient of person spoken to? Self   Refill Screening Questions    Changes to allergies? No   Changes to medications? No   New conditions since last clinic visit? No   Unplanned office visit, urgent care, ED, or hospital admission in the last 4 weeks? No   How does patient/caregiver feel medication is working? Poor   Financial problems or insurance changes? No   How many doses of your specialty medications were missed in the last 4 weeks? 0   Would patient like to speak to a pharmacist? Yes   When does the patient need to receive the medication? 10/10/22   Refill Delivery Questions    How will the patient receive the medication? Mail   When does the patient need to receive the medication? 10/10/22   Shipping Address Home   Address in [facility] confirmed and updated if neccessary? Yes   Expected Copay ($) 0   Is the patient able to afford the medication copay? Yes   Payment Method zero copay   Days supply of Refill 28   Supplies needed? No supplies needed   Refill activity completed? Yes   Refill activity plan Refill scheduled   Shipment/Pickup Date: 10/04/22            Current Outpatient Medications   Medication Sig    acyclovir (ZOVIRAX) 800 MG Tab Take 1 tablet (800 mg total) by mouth 2 (two) times daily. for 5 days    albuterol (PROVENTIL/VENTOLIN HFA) 90 mcg/actuation inhaler INHALE 2 PUFFS BY MOUTH INTO LUNGS EVERY 6 HOURS AS NEEDED FOR WHEEZING.    chlorzoxazone (PARAFON FORTE) 500 mg Tab Take 500 mg by mouth 3 (three) times daily.     COVID-19 antigen test Kit Use as directed.    ELDERBERRY FRUIT ORAL Take by mouth once daily.    fluticasone propionate (FLONASE) 50 mcg/actuation nasal spray 1 spray (50 mcg total) by Each Nostril route 2 (two) times daily as needed for Rhinitis.    furosemide (LASIX) 20 MG tablet TAKE ONE (1) TABLET EVERY MORNING AS NEEDED FOR FLUID    ibuprofen (ADVIL,MOTRIN) 800 MG tablet Take 1 tablet (800 mg total) by mouth 2 (two) times daily as needed for Pain.    levothyroxine (SYNTHROID) 50 MCG tablet TAKE ONE (1) TABLET EVERY MORNING ON AN EMPTY STOMACH FOR THYROID    lisinopriL (PRINIVIL,ZESTRIL) 20 MG tablet TAKE 1 TABLET BY MOUTH DAILY FOR BLOOD PRESSURE CONTROL.    lovastatin (MEVACOR) 20 MG tablet TAKE 1 TABLET BY MOUTH AT BEDTIME FOR CHOLESTEROL. (TAKE WITH CO-ENZYME Q-10)    multivit-min-FA-lycopen-lutein (CENTRUM SILVER) 0.4-300-250 mg-mcg-mcg Tab Take 1 tablet by mouth once daily.    oxyCODONE-acetaminophen (PERCOCET)  mg per tablet Take 1 tablet by mouth 3 (three) times daily as needed.    promethazine (PHENERGAN) 25 MG tablet Take 1 tablet (25 mg total) by mouth every 8 (eight) hours as needed for Nausea.    secukinumab (COSENTYX PEN, 2 PENS,) 150 mg/mL PnIj Inject 2 mL (300 mg) into the skin every 30 days.    traZODone (DESYREL) 50 MG tablet TAKE 1 OR 2 TABLETS AT BEDTIME AS NEEDED for sleep and nerve pain  (Patient taking differently: Take 100 mg by mouth every evening. TAKE 1 OR 2 TABLETS AT BEDTIME AS NEEDED for sleep and nerve pain)    TRELEGY ELLIPTA 100-62.5-25 mcg DsDv     valACYclovir (VALTREX) 1000 MG tablet Take 1 tablet (1,000 mg total) by mouth 3 (three) times daily. for 7 days   Last reviewed on 5/24/2022 10:46 AM by Sofi Gallagher DO    Review of patient's allergies indicates:   Allergen Reactions    Remeron [mirtazapine] Other (See Comments)     Weight gain    Last reviewed on  6/3/2022 4:26 PM by Maximino Molina      Tasks added this encounter   10/31/2022 - Refill Call (Auto Added)   Tasks  due within next 3 months   No tasks due.     Arnie Cannon, PharmD  [facility]  [address]  [facility] [address]  Phone: [phone]  Fax: [phone]

## 2022-10-06 ENCOUNTER — HOSPITAL ENCOUNTER (OUTPATIENT)
Dept: RADIOLOGY | Facility: HOSPITAL | Age: 52
Discharge: HOME | End: 2022-10-06
Attending: NURSE PRACTITIONER
Payer: MEDICARE

## 2022-10-06 ENCOUNTER — HOSPITAL ENCOUNTER (OUTPATIENT)
Dept: RADIOLOGY | Facility: HOSPITAL | Age: 52
Discharge: HOME | End: 2022-10-06
Attending: FAMILY MEDICINE
Payer: MEDICARE

## 2022-10-06 VITALS — HEIGHT: 69 IN | WEIGHT: 211.63 LBS | BODY MASS INDEX: 31.34 KG/M2

## 2022-10-06 DIAGNOSIS — R92.8 ABNORMAL ULTRASOUND OF BREAST: ICD-10-CM

## 2022-10-06 DIAGNOSIS — Z12.31 ENCOUNTER FOR SCREENING MAMMOGRAM FOR MALIGNANT NEOPLASM OF BREAST: ICD-10-CM

## 2022-10-06 DIAGNOSIS — N63.0 BREAST MASS SEEN ON MAMMOGRAM: ICD-10-CM

## 2022-10-06 DIAGNOSIS — R92.8 ABNORMALITY OF BOTH BREASTS ON SCREENING MAMMOGRAM: ICD-10-CM

## 2022-10-10 ENCOUNTER — TELEPHONE (OUTPATIENT)
Dept: RHEUMATOLOGY | Facility: CLINIC | Age: 52
End: 2022-10-10

## 2022-10-10 NOTE — TELEPHONE ENCOUNTER
----- Message from Arnie Cannon PharmD sent at 10/3/2022 10:59 AM CDT -----  Regarding: Cosentyx Efficacy  Hi Kisha Leyva and staff,     I: Ms. Blanco reports she is currently doing poorly on Cosentyx therapy. Originally she noticed significant relief, but now she is noticing worsening pain and swelling in her hips and feet. Additionally she is having difficulty ambulating. Patient has a pain management appt on 10/5/22 and is possibly receiving an injection for pain. Patient was unsure any details about the potential injection for pain.     I recommended continuing Cosentyx for now and following up with your office on 10/28/22 as planned.     Thank you,   Arnie Cannon, PharmD   Clinical Pharmacist  [facility]   Ph: [phone]

## 2022-10-10 NOTE — TELEPHONE ENCOUNTER
Thanks for the update. I agree with your recommendation and we can discuss alternative tx at her next visit if she is not doing well.

## 2022-10-27 ENCOUNTER — PATIENT MESSAGE (OUTPATIENT)
Dept: FAMILY MEDICINE | Facility: CLINIC | Age: 52
End: 2022-10-27
Payer: MEDICARE

## 2022-10-27 DIAGNOSIS — R92.8 ABNORMAL MAMMOGRAM OF BOTH BREASTS: Primary | ICD-10-CM

## 2022-10-31 ENCOUNTER — SPECIALTY PHARMACY (OUTPATIENT)
Dept: PHARMACY | Facility: CLINIC | Age: 52
End: 2022-10-31
Payer: MEDICARE

## 2022-10-31 DIAGNOSIS — C67.9 MALIGNANT NEOPLASM OF URINARY BLADDER, UNSPECIFIED SITE: ICD-10-CM

## 2022-10-31 DIAGNOSIS — I73.00 RAYNAUD'S DISEASE WITHOUT GANGRENE: ICD-10-CM

## 2022-10-31 DIAGNOSIS — L40.50 PSA (PSORIATIC ARTHRITIS): ICD-10-CM

## 2022-10-31 DIAGNOSIS — M17.0 PRIMARY OSTEOARTHRITIS OF BOTH KNEES: ICD-10-CM

## 2022-10-31 DIAGNOSIS — E03.8 OTHER SPECIFIED HYPOTHYROIDISM: ICD-10-CM

## 2022-10-31 NOTE — TELEPHONE ENCOUNTER
Spoke w pt regarding Cosentyx refill. Rx , new rx request sent to [facility]. Pt stated she is due around . Will follow up once new rx is received.

## 2022-11-03 RX ORDER — SECUKINUMAB 150 MG/ML
300 INJECTION SUBCUTANEOUS
Qty: 2 ML | Refills: 6 | Status: SHIPPED | OUTPATIENT
Start: 2022-11-03 | End: 2023-05-22 | Stop reason: SDUPTHER

## 2022-11-04 NOTE — TELEPHONE ENCOUNTER
Specialty Pharmacy - Refill Coordination    Specialty Medication Orders Linked to Encounter      Flowsheet Row Most Recent Value   Medication #1 secukinumab (COSENTYX PEN, 2 PENS,) 150 mg/mL PnIj (Order#[id number], Rx#[id number])            Refill Questions - Documented Responses      Flowsheet Row Most Recent Value   Patient Availability and HIPAA Verification    Does patient want to proceed with activity? Yes   HIPAA/medical authority confirmed? Yes   Relationship to patient of person spoken to? Self   Refill Screening Questions    Changes to allergies? No   Changes to medications? No   New conditions since last clinic visit? No   Unplanned office visit, urgent care, ED, or hospital admission in the last 4 weeks? No   How does patient/caregiver feel medication is working? Good   Financial problems or insurance changes? No   How many doses of your specialty medications were missed in the last 4 weeks? 0   Would patient like to speak to a pharmacist? No   When does the patient need to receive the medication? 11/08/22   Refill Delivery Questions    How will the patient receive the medication? Mail   When does the patient need to receive the medication? 11/08/22   Shipping Address Temporary   Address in [facility] confirmed and updated if neccessary? Yes   Expected Copay ($) 0   Is the patient able to afford the medication copay? Yes   Payment Method zero copay   Days supply of Refill 30   Supplies needed? No supplies needed   Refill activity completed? Yes   Refill activity plan Refill scheduled   Shipment/Pickup Date: 11/07/22            Current Outpatient Medications   Medication Sig    acyclovir (ZOVIRAX) 800 MG Tab Take 1 tablet (800 mg total) by mouth 2 (two) times daily. for 5 days    albuterol (PROVENTIL/VENTOLIN HFA) 90 mcg/actuation inhaler INHALE 2 PUFFS BY MOUTH INTO LUNGS EVERY 6 HOURS AS NEEDED FOR WHEEZING.    chlorzoxazone (PARAFON FORTE) 500 mg Tab Take 500 mg by mouth 3 (three) times daily.     COVID-19 antigen test Kit Use as directed.    ELDERBERRY FRUIT ORAL Take by mouth once daily.    fluticasone propionate (FLONASE) 50 mcg/actuation nasal spray 1 spray (50 mcg total) by Each Nostril route 2 (two) times daily as needed for Rhinitis.    furosemide (LASIX) 20 MG tablet TAKE ONE (1) TABLET EVERY MORNING AS NEEDED FOR FLUID    ibuprofen (ADVIL,MOTRIN) 800 MG tablet Take 1 tablet (800 mg total) by mouth 2 (two) times daily as needed for Pain.    levothyroxine (SYNTHROID) 50 MCG tablet TAKE ONE (1) TABLET EVERY MORNING ON AN EMPTY STOMACH FOR THYROID    lisinopriL (PRINIVIL,ZESTRIL) 20 MG tablet TAKE 1 TABLET BY MOUTH DAILY FOR BLOOD PRESSURE CONTROL.    lovastatin (MEVACOR) 20 MG tablet TAKE 1 TABLET BY MOUTH AT BEDTIME FOR CHOLESTEROL. (TAKE WITH CO-ENZYME Q-10)    multivit-min-FA-lycopen-lutein (CENTRUM SILVER) 0.4-300-250 mg-mcg-mcg Tab Take 1 tablet by mouth once daily.    oxyCODONE-acetaminophen (PERCOCET)  mg per tablet Take 1 tablet by mouth 3 (three) times daily as needed.    promethazine (PHENERGAN) 25 MG tablet Take 1 tablet (25 mg total) by mouth every 8 (eight) hours as needed for Nausea.    secukinumab (COSENTYX PEN, 2 PENS,) 150 mg/mL PnIj Inject 2 mL (300 mg) into the skin every 30 days.    traZODone (DESYREL) 50 MG tablet TAKE 1 OR 2 TABLETS AT BEDTIME AS NEEDED for sleep and nerve pain  (Patient taking differently: Take 100 mg by mouth every evening. TAKE 1 OR 2 TABLETS AT BEDTIME AS NEEDED for sleep and nerve pain)    TRELEGY ELLIPTA 100-62.5-25 mcg DsDv     valACYclovir (VALTREX) 1000 MG tablet Take 1 tablet (1,000 mg total) by mouth 3 (three) times daily. for 7 days   Last reviewed on 5/24/2022 10:46 AM by Sofi Gallagher DO    Review of patient's allergies indicates:   Allergen Reactions    Remeron [mirtazapine] Other (See Comments)     Weight gain    Last reviewed on  6/3/2022 4:26 PM by Maximino Molina      Tasks added this encounter   12/1/2022 - Refill Call (Auto Added)   Tasks  due within next 3 months   No tasks due.     Isi Guallpa, Patient Care Assistant  [facility]  [address]  [facility] [address]  Phone: [phone]  Fax: [phone]

## 2022-11-14 ENCOUNTER — PATIENT MESSAGE (OUTPATIENT)
Dept: FAMILY MEDICINE | Facility: CLINIC | Age: 52
End: 2022-11-14

## 2022-11-14 ENCOUNTER — OFFICE VISIT (OUTPATIENT)
Dept: FAMILY MEDICINE | Facility: CLINIC | Age: 52
End: 2022-11-14
Payer: MEDICARE

## 2022-11-14 VITALS
HEART RATE: 74 BPM | TEMPERATURE: 98 F | OXYGEN SATURATION: 97 % | BODY MASS INDEX: 30.95 KG/M2 | SYSTOLIC BLOOD PRESSURE: 128 MMHG | WEIGHT: 209.63 LBS | DIASTOLIC BLOOD PRESSURE: 78 MMHG

## 2022-11-14 DIAGNOSIS — E03.8 OTHER SPECIFIED HYPOTHYROIDISM: Chronic | ICD-10-CM

## 2022-11-14 DIAGNOSIS — I10 BENIGN HYPERTENSION: ICD-10-CM

## 2022-11-14 DIAGNOSIS — M06.9 RHEUMATOID ARTHRITIS INVOLVING LEFT KNEE, UNSPECIFIED WHETHER RHEUMATOID FACTOR PRESENT: ICD-10-CM

## 2022-11-14 DIAGNOSIS — J30.2 SEASONAL ALLERGIES: ICD-10-CM

## 2022-11-14 DIAGNOSIS — E78.49 OTHER HYPERLIPIDEMIA: ICD-10-CM

## 2022-11-14 DIAGNOSIS — Z76.0 MEDICATION REFILL: ICD-10-CM

## 2022-11-14 DIAGNOSIS — G89.4 CHRONIC PAIN SYNDROME: Primary | ICD-10-CM

## 2022-11-14 DIAGNOSIS — J41.0 SIMPLE CHRONIC BRONCHITIS: ICD-10-CM

## 2022-11-14 PROBLEM — M25.561 PAIN IN RIGHT KNEE: Status: RESOLVED | Noted: 2019-04-25 | Resolved: 2022-11-14

## 2022-11-14 PROBLEM — M25.661 STIFFNESS OF RIGHT KNEE: Status: RESOLVED | Noted: 2019-04-25 | Resolved: 2022-11-14

## 2022-11-14 PROBLEM — M25.662 JOINT STIFFNESS OF KNEE, LEFT: Status: RESOLVED | Noted: 2019-10-08 | Resolved: 2022-11-14

## 2022-11-14 PROBLEM — R19.5 HEME POSITIVE STOOL: Status: RESOLVED | Noted: 2019-09-06 | Resolved: 2022-11-14

## 2022-11-14 PROBLEM — Z96.659 TOTAL KNEE REPLACEMENT STATUS: Status: RESOLVED | Noted: 2019-10-03 | Resolved: 2022-11-14

## 2022-11-14 PROBLEM — R06.09 DYSPNEA ON EXERTION: Status: RESOLVED | Noted: 2022-05-24 | Resolved: 2022-11-14

## 2022-11-14 PROCEDURE — 3078F PR MOST RECENT DIASTOLIC BLOOD PRESSURE < 80 MM HG: ICD-10-PCS | Mod: CPTII,S$GLB,, | Performed by: FAMILY MEDICINE

## 2022-11-14 PROCEDURE — 1159F MED LIST DOCD IN RCRD: CPT | Mod: CPTII,S$GLB,, | Performed by: FAMILY MEDICINE

## 2022-11-14 PROCEDURE — 1159F PR MEDICATION LIST DOCUMENTED IN MEDICAL RECORD: ICD-10-PCS | Mod: CPTII,S$GLB,, | Performed by: FAMILY MEDICINE

## 2022-11-14 PROCEDURE — 3078F DIAST BP <80 MM HG: CPT | Mod: CPTII,S$GLB,, | Performed by: FAMILY MEDICINE

## 2022-11-14 PROCEDURE — 4010F PR ACE/ARB THEARPY RXD/TAKEN: ICD-10-PCS | Mod: CPTII,S$GLB,, | Performed by: FAMILY MEDICINE

## 2022-11-14 PROCEDURE — 3008F BODY MASS INDEX DOCD: CPT | Mod: CPTII,S$GLB,, | Performed by: FAMILY MEDICINE

## 2022-11-14 PROCEDURE — 99214 PR OFFICE/OUTPT VISIT, EST, LEVL IV, 30-39 MIN: ICD-10-PCS | Mod: S$GLB,,, | Performed by: FAMILY MEDICINE

## 2022-11-14 PROCEDURE — 99214 OFFICE O/P EST MOD 30 MIN: CPT | Mod: S$GLB,,, | Performed by: FAMILY MEDICINE

## 2022-11-14 PROCEDURE — 3074F PR MOST RECENT SYSTOLIC BLOOD PRESSURE < 130 MM HG: ICD-10-PCS | Mod: CPTII,S$GLB,, | Performed by: FAMILY MEDICINE

## 2022-11-14 PROCEDURE — 4010F ACE/ARB THERAPY RXD/TAKEN: CPT | Mod: CPTII,S$GLB,, | Performed by: FAMILY MEDICINE

## 2022-11-14 PROCEDURE — 3008F PR BODY MASS INDEX (BMI) DOCUMENTED: ICD-10-PCS | Mod: CPTII,S$GLB,, | Performed by: FAMILY MEDICINE

## 2022-11-14 PROCEDURE — 3074F SYST BP LT 130 MM HG: CPT | Mod: CPTII,S$GLB,, | Performed by: FAMILY MEDICINE

## 2022-11-14 RX ORDER — ALBUTEROL SULFATE 90 UG/1
2 AEROSOL, METERED RESPIRATORY (INHALATION) EVERY 6 HOURS PRN
Qty: 8.5 G | Refills: 11 | OUTPATIENT
Start: 2022-11-14

## 2022-11-14 RX ORDER — OXYCODONE AND ACETAMINOPHEN 10; 325 MG/1; MG/1
1 TABLET ORAL 3 TIMES DAILY PRN
Qty: 90 TABLET | Refills: 0 | Status: SHIPPED | OUTPATIENT
Start: 2022-11-23 | End: 2022-11-15 | Stop reason: SDUPTHER

## 2022-11-14 RX ORDER — CHLORZOXAZONE 500 MG/1
500 TABLET ORAL 3 TIMES DAILY
OUTPATIENT
Start: 2022-11-14

## 2022-11-14 RX ORDER — OXYCODONE AND ACETAMINOPHEN 10; 325 MG/1; MG/1
1 TABLET ORAL 3 TIMES DAILY PRN
Qty: 90 TABLET | Refills: 0 | OUTPATIENT
Start: 2022-11-23

## 2022-11-14 RX ORDER — TRAZODONE HYDROCHLORIDE 50 MG/1
TABLET ORAL
Qty: 180 TABLET | Refills: 1 | OUTPATIENT
Start: 2022-11-14

## 2022-11-14 RX ORDER — FLUTICASONE PROPIONATE 50 MCG
1 SPRAY, SUSPENSION (ML) NASAL 2 TIMES DAILY PRN
Qty: 16 G | Refills: 11 | Status: SHIPPED | OUTPATIENT
Start: 2022-11-14 | End: 2022-11-15 | Stop reason: SDUPTHER

## 2022-11-14 NOTE — PATIENT INSTRUCTIONS
Pick a show that you like, and ride stationary bike daily while watching that    Bloodwork looks good    Filled medication today    Continue meditation

## 2022-11-14 NOTE — PROGRESS NOTES
Family Medicine Note  [facility] Complaint   Patient presents with    [facility]          HPI:  52 female use to see Dr. Gallagher.  Need to transition over today    RA - sees Dr. Molina in [place], doing quite well  COPD - pretty notable - sees pulmonology at [facility]  BHAVNA - starting to use CPAP nightly    Blood Pressure at goal on medication, with patient reporting prescription compliance  Hyperlipidemia stable on appropriate intensity statin therapy  TSH stable on medication    Does have chronic pain - mixed picture of lumbar radiculopathy and RA    Does have distant history of bipolar/panic -     ROS: as above    Vitals:    11/14/22 0937   BP: 128/78   BP Location: Left arm   Patient Position: Sitting   BP Method: Medium (Manual)   Pulse: 74   Temp: 97.7 °F (36.5 °C)   TempSrc: Temporal   SpO2: 97%   Weight: 95.1 kg (209 lb 9.6 oz)      Body mass index is 30.95 kg/m².      General:  AOx3, well nourished and developed in no acute distress  Eyes:  PERRLA, EOMI, vision intact grossly  ENT:  normal hearing, moist oral mucosa  Neck:  trachea midline with no masses or thyromegaly  Heart:  RRR, no murmurs.  No edema noted, extremities warm and well perfused  Lungs:  clear to auscultation bilaterally with symmetric chest movement  Abdomen:  Soft, nontender, nondistended.  Normal bowel sounds  Musculoskeletal:  Normal gait.  Normal posture.  Normal muscular development with no joint swelling.  Neurological:  CN II-XII grossly intact. Symmetric strength and sensation  Psych:  Normal mood and affect.  Able to demonstrate good judgement and personal insight.      Assessment/Plan:    Chronic pain syndrome    Seasonal allergies    Simple chronic bronchitis    Benign hypertension    Other hyperlipidemia    Rheumatoid arthritis involving left knee, unspecified whether rheumatoid factor present    Other specified hypothyroidism        Stable, continue  Stable  Continue pulm follow up  At  goal  Stable on statin  Continue rheumatology follow up  stable

## 2022-11-15 ENCOUNTER — PATIENT MESSAGE (OUTPATIENT)
Dept: RHEUMATOLOGY | Facility: CLINIC | Age: 52
End: 2022-11-15
Payer: MEDICARE

## 2022-11-15 DIAGNOSIS — J30.2 SEASONAL ALLERGIES: ICD-10-CM

## 2022-11-16 ENCOUNTER — PATIENT MESSAGE (OUTPATIENT)
Dept: RHEUMATOLOGY | Facility: CLINIC | Age: 52
End: 2022-11-16
Payer: MEDICARE

## 2022-11-16 RX ORDER — FLUTICASONE PROPIONATE 50 MCG
1 SPRAY, SUSPENSION (ML) NASAL 2 TIMES DAILY PRN
Qty: 16 G | Refills: 11 | Status: SHIPPED | OUTPATIENT
Start: 2022-11-16 | End: 2023-01-18 | Stop reason: SDUPTHER

## 2022-11-16 RX ORDER — CHLORZOXAZONE 500 MG/1
500 TABLET ORAL 3 TIMES DAILY
Qty: 90 TABLET | Refills: 2 | Status: SHIPPED | OUTPATIENT
Start: 2022-11-16 | End: 2023-01-17 | Stop reason: ALTCHOICE

## 2022-11-16 RX ORDER — OXYCODONE AND ACETAMINOPHEN 10; 325 MG/1; MG/1
1 TABLET ORAL 3 TIMES DAILY PRN
Qty: 90 TABLET | Refills: 0 | Status: SHIPPED | OUTPATIENT
Start: 2022-11-23 | End: 2022-12-19 | Stop reason: SDUPTHER

## 2022-11-17 ENCOUNTER — PATIENT MESSAGE (OUTPATIENT)
Dept: UROLOGY | Facility: CLINIC | Age: 52
End: 2022-11-17
Payer: MEDICARE

## 2022-12-01 ENCOUNTER — SPECIALTY PHARMACY (OUTPATIENT)
Dept: PHARMACY | Facility: CLINIC | Age: 52
End: 2022-12-01
Payer: MEDICARE

## 2022-12-01 NOTE — TELEPHONE ENCOUNTER
Specialty Pharmacy - Refill Coordination    Specialty Medication Orders Linked to Encounter      Flowsheet Row Most Recent Value   Medication #1 secukinumab (COSENTYX PEN, 2 PENS,) 150 mg/mL PnIj (Order#[id number], Rx#[id number])            Refill Questions - Documented Responses      Flowsheet Row Most Recent Value   Patient Availability and HIPAA Verification    Does patient want to proceed with activity? Yes   HIPAA/medical authority confirmed? Yes   Relationship to patient of person spoken to? Self   Refill Screening Questions    Changes to allergies? No   Changes to medications? No   New conditions since last clinic visit? No   Unplanned office visit, urgent care, ED, or hospital admission in the last 4 weeks? No   How does patient/caregiver feel medication is working? Good   Financial problems or insurance changes? No   How many doses of your specialty medications were missed in the last 4 weeks? 0   Would patient like to speak to a pharmacist? No   When does the patient need to receive the medication? 12/07/22   Refill Delivery Questions    How will the patient receive the medication? Mail   When does the patient need to receive the medication? 12/07/22   Shipping Address Home   Address in [facility] confirmed and updated if neccessary? Yes   Expected Copay ($) 0   Is the patient able to afford the medication copay? Yes   Payment Method zero copay   Days supply of Refill 30   Supplies needed? No supplies needed   Refill activity completed? Yes   Refill activity plan Refill scheduled   Shipment/Pickup Date: 12/01/22            Current Outpatient Medications   Medication Sig    albuterol (PROVENTIL/VENTOLIN HFA) 90 mcg/actuation inhaler INHALE 2 PUFFS BY MOUTH INTO LUNGS EVERY 6 HOURS AS NEEDED FOR WHEEZING.    chlorzoxazone (PARAFON FORTE) 500 mg Tab Take 1 tablet (500 mg total) by mouth 3 (three) times daily.    collagen-biotin-ascorbic acid 500 mg-800 mcg- 50 mg Cap Take by mouth.    ELDERBERRY FRUIT  ORAL Take by mouth once daily.    fluticasone propionate (FLONASE) 50 mcg/actuation nasal spray 1 spray (50 mcg total) by Each Nostril route 2 (two) times daily as needed for Rhinitis.    furosemide (LASIX) 20 MG tablet TAKE ONE (1) TABLET EVERY MORNING AS NEEDED FOR FLUID    ibuprofen (ADVIL,MOTRIN) 800 MG tablet Take 1 tablet (800 mg total) by mouth 2 (two) times daily as needed for Pain.    levothyroxine (SYNTHROID) 50 MCG tablet TAKE ONE (1) TABLET EVERY MORNING ON AN EMPTY STOMACH FOR THYROID    lisinopriL (PRINIVIL,ZESTRIL) 20 MG tablet TAKE 1 TABLET BY MOUTH DAILY FOR BLOOD PRESSURE CONTROL.    lovastatin (MEVACOR) 20 MG tablet TAKE 1 TABLET BY MOUTH AT BEDTIME FOR CHOLESTEROL. (TAKE WITH CO-ENZYME Q-10)    multivit-min-FA-lycopen-lutein (CENTRUM SILVER) 0.4-300-250 mg-mcg-mcg Tab Take 1 tablet by mouth once daily.    oxyCODONE-acetaminophen (PERCOCET)  mg per tablet Take 1 tablet by mouth 3 (three) times daily as needed for Pain.    promethazine (PHENERGAN) 25 MG tablet Take 1 tablet (25 mg total) by mouth every 8 (eight) hours as needed for Nausea.    secukinumab (COSENTYX PEN, 2 PENS,) 150 mg/mL PnIj Inject 2 mL (300 mg) into the skin every 30 days.    traZODone (DESYREL) 50 MG tablet TAKE 1 OR 2 TABLETS AT BEDTIME AS NEEDED for sleep and nerve pain  (Patient taking differently: Take 100 mg by mouth every evening. TAKE 1 OR 2 TABLETS AT BEDTIME AS NEEDED for sleep and nerve pain)    TRELEGY ELLIPTA 100-62.5-25 mcg DsDv     vit C-Zn gluc-herbal no.325 90-15 mg Lozg by Transmucosal route.   Last reviewed on 11/14/2022  9:47 AM by Chiqui Echevarria MA    Review of patient's allergies indicates:   Allergen Reactions    Remeron [mirtazapine] Other (See Comments)     Weight gain    Last reviewed on  11/14/2022 9:47 AM by Chiqui Echevarria      Tasks added this encounter   12/30/2022 - Refill Call (Auto Added)   Tasks due within next 3 months   No tasks due.     [facility]  [address] [facility] [address]  Phone: [phone]  Fax: [phone]

## 2022-12-17 ENCOUNTER — PATIENT MESSAGE (OUTPATIENT)
Dept: RHEUMATOLOGY | Facility: CLINIC | Age: 52
End: 2022-12-17
Payer: MEDICARE

## 2022-12-17 ENCOUNTER — PATIENT MESSAGE (OUTPATIENT)
Dept: FAMILY MEDICINE | Facility: CLINIC | Age: 52
End: 2022-12-17
Payer: MEDICARE

## 2022-12-17 DIAGNOSIS — L40.50 PSA (PSORIATIC ARTHRITIS): Primary | ICD-10-CM

## 2022-12-19 DIAGNOSIS — E78.49 OTHER HYPERLIPIDEMIA: ICD-10-CM

## 2022-12-19 DIAGNOSIS — I10 BENIGN HYPERTENSION: ICD-10-CM

## 2022-12-19 RX ORDER — LISINOPRIL 20 MG/1
20 TABLET ORAL DAILY
Qty: 90 TABLET | Refills: 1 | Status: SHIPPED | OUTPATIENT
Start: 2022-12-19 | End: 2023-09-01

## 2022-12-19 RX ORDER — LOVASTATIN 20 MG/1
20 TABLET ORAL NIGHTLY
Qty: 90 TABLET | Refills: 3 | Status: SHIPPED | OUTPATIENT
Start: 2022-12-19 | End: 2024-01-23

## 2022-12-20 ENCOUNTER — PATIENT MESSAGE (OUTPATIENT)
Dept: RHEUMATOLOGY | Facility: CLINIC | Age: 52
End: 2022-12-20
Payer: MEDICARE

## 2022-12-20 RX ORDER — DICLOFENAC SODIUM AND MISOPROSTOL 75; 200 MG/1; UG/1
1 TABLET, DELAYED RELEASE ORAL 2 TIMES DAILY
Qty: 60 TABLET | Refills: 6 | Status: SHIPPED | OUTPATIENT
Start: 2022-12-20 | End: 2023-04-24

## 2022-12-27 ENCOUNTER — PATIENT MESSAGE (OUTPATIENT)
Dept: FAMILY MEDICINE | Facility: CLINIC | Age: 52
End: 2022-12-27
Payer: MEDICARE

## 2022-12-27 ENCOUNTER — PATIENT MESSAGE (OUTPATIENT)
Dept: RHEUMATOLOGY | Facility: CLINIC | Age: 52
End: 2022-12-27
Payer: MEDICARE

## 2022-12-27 DIAGNOSIS — B00.1 COLD SORE: Primary | ICD-10-CM

## 2022-12-28 ENCOUNTER — PATIENT MESSAGE (OUTPATIENT)
Dept: FAMILY MEDICINE | Facility: CLINIC | Age: 52
End: 2022-12-28
Payer: MEDICARE

## 2022-12-28 RX ORDER — BACLOFEN 10 MG/1
10 TABLET ORAL 3 TIMES DAILY
Qty: 90 TABLET | Refills: 11 | Status: SHIPPED | OUTPATIENT
Start: 2022-12-28 | End: 2023-11-28

## 2022-12-28 RX ORDER — BACLOFEN 10 MG/1
10 TABLET ORAL 3 TIMES DAILY
Qty: 90 TABLET | Refills: 11 | OUTPATIENT
Start: 2022-12-28 | End: 2022-12-28

## 2022-12-30 ENCOUNTER — SPECIALTY PHARMACY (OUTPATIENT)
Dept: PHARMACY | Facility: CLINIC | Age: 52
End: 2022-12-30
Payer: MEDICARE

## 2022-12-30 RX ORDER — VALACYCLOVIR HYDROCHLORIDE 1 G/1
2000 TABLET, FILM COATED ORAL EVERY 12 HOURS
Qty: 4 TABLET | Refills: 2 | Status: SHIPPED | OUTPATIENT
Start: 2022-12-30 | End: 2023-01-06 | Stop reason: SDUPTHER

## 2022-12-30 NOTE — TELEPHONE ENCOUNTER
Specialty Pharmacy - Refill Coordination    Specialty Medication Orders Linked to Encounter      Flowsheet Row Most Recent Value   Medication #1 secukinumab (COSENTYX PEN, 2 PENS,) 150 mg/mL PnIj (Order#[id number], Rx#[id number])            Refill Questions - Documented Responses      Flowsheet Row Most Recent Value   Patient Availability and HIPAA Verification    Does patient want to proceed with activity? Yes   HIPAA/medical authority confirmed? Yes   Relationship to patient of person spoken to? Self   Refill Screening Questions    Changes to allergies? No   Changes to medications? No   New conditions since last clinic visit? No   Unplanned office visit, urgent care, ED, or hospital admission in the last 4 weeks? No   How does patient/caregiver feel medication is working? Fair   Financial problems or insurance changes? No   How many doses of your specialty medications were missed in the last 4 weeks? 0   Would patient like to speak to a pharmacist? No   When does the patient need to receive the medication? 01/03/23   Refill Delivery Questions    How will the patient receive the medication? Mail   When does the patient need to receive the medication? 01/03/23   Shipping Address Home   Address in [facility] confirmed and updated if neccessary? Yes   Expected Copay ($) 0   Is the patient able to afford the medication copay? Yes   Payment Method zero copay   Days supply of Refill 30   Supplies needed? No supplies needed   Refill activity completed? Yes   Refill activity plan Refill scheduled   Shipment/Pickup Date: 01/03/23            Current Outpatient Medications   Medication Sig    albuterol (PROVENTIL/VENTOLIN HFA) 90 mcg/actuation inhaler Inhale 2 puffs into the lungs every 6 (six) hours as needed. Rescue    baclofen (LIORESAL) 10 MG tablet Take 1 tablet (10 mg total) by mouth 3 (three) times daily.    chlorzoxazone (PARAFON FORTE) 500 mg Tab Take 1 tablet (500 mg total) by mouth 3 (three) times daily.     collagen-biotin-ascorbic acid 500 mg-800 mcg- 50 mg Cap Take by mouth.    diclofenac-misoprostol  mg-mcg (ARTHROTEC 75)  mg-mcg per tablet Take 1 tablet by mouth 2 (two) times daily.    ELDERBERRY FRUIT ORAL Take by mouth once daily.    fluticasone propionate (FLONASE) 50 mcg/actuation nasal spray 1 spray (50 mcg total) by Each Nostril route 2 (two) times daily as needed for Rhinitis.    furosemide (LASIX) 20 MG tablet TAKE ONE (1) TABLET EVERY MORNING AS NEEDED FOR FLUID    ibuprofen (ADVIL,MOTRIN) 800 MG tablet Take 1 tablet (800 mg total) by mouth 2 (two) times daily as needed for Pain.    levothyroxine (SYNTHROID) 50 MCG tablet TAKE ONE (1) TABLET EVERY MORNING ON AN EMPTY STOMACH FOR THYROID    lisinopriL (PRINIVIL,ZESTRIL) 20 MG tablet Take 1 tablet (20 mg total) by mouth once daily.    lovastatin (MEVACOR) 20 MG tablet Take 1 tablet (20 mg total) by mouth every evening.    multivit-min-FA-lycopen-lutein (CENTRUM SILVER) 0.4-300-250 mg-mcg-mcg Tab Take 1 tablet by mouth once daily.    oxyCODONE-acetaminophen (PERCOCET)  mg per tablet Take 1 tablet by mouth 3 (three) times daily as needed for Pain.    promethazine (PHENERGAN) 25 MG tablet Take 1 tablet (25 mg total) by mouth every 8 (eight) hours as needed for Nausea.    secukinumab (COSENTYX PEN, 2 PENS,) 150 mg/mL PnIj Inject 2 mL (300 mg) into the skin every 30 days.    traZODone (DESYREL) 50 MG tablet Take 2 tablets (100 mg total) by mouth every evening.    TRELEGY ELLIPTA 100-62.5-25 mcg DsDv     vit C-Zn gluc-herbal no.325 90-15 mg Lozg by Transmucosal route.   Last reviewed on 11/14/2022  9:47 AM by Chiqui Echevarria MA    Review of patient's allergies indicates:   Allergen Reactions    Remeron [mirtazapine] Other (See Comments)     Weight gain    Last reviewed on  12/28/2022 12:49 PM by Juan Carlos P Hulin      Tasks added this encounter   1/26/2023 - Refill Call (Auto Added)   Tasks due within next 3 months   No tasks due.     [facility]  [address]  [facility] [address]  Phone: [phone]  Fax: [phone]

## 2023-01-04 ENCOUNTER — HOSPITAL ENCOUNTER (OUTPATIENT)
Dept: RADIOLOGY | Facility: HOSPITAL | Age: 53
Discharge: HOME | End: 2023-01-04
Attending: STUDENT IN AN ORGANIZED HEALTH CARE EDUCATION/TRAINING PROGRAM
Payer: MEDICARE

## 2023-01-04 DIAGNOSIS — N28.1 ACQUIRED COMPLEX RENAL CYST: ICD-10-CM

## 2023-01-04 PROCEDURE — 76770 US EXAM ABDO BACK WALL COMP: CPT | Mod: TC

## 2023-01-04 PROCEDURE — 76770 US EXAM ABDO BACK WALL COMP: CPT | Mod: 26,,, | Performed by: RADIOLOGY

## 2023-01-04 PROCEDURE — 76770 US RETROPERITONEAL COMPLETE: ICD-10-PCS | Mod: 26,,, | Performed by: RADIOLOGY

## 2023-01-06 ENCOUNTER — OFFICE VISIT (OUTPATIENT)
Dept: RHEUMATOLOGY | Facility: CLINIC | Age: 53
End: 2023-01-06
Payer: MEDICARE

## 2023-01-06 VITALS
WEIGHT: 217 LBS | SYSTOLIC BLOOD PRESSURE: 144 MMHG | HEIGHT: 69 IN | BODY MASS INDEX: 32.14 KG/M2 | HEART RATE: 81 BPM | DIASTOLIC BLOOD PRESSURE: 85 MMHG

## 2023-01-06 DIAGNOSIS — R94.6 ABNORMAL RESULTS OF THYROID FUNCTION STUDIES: ICD-10-CM

## 2023-01-06 DIAGNOSIS — J44.9 CHRONIC OBSTRUCTIVE PULMONARY DISEASE, UNSPECIFIED COPD TYPE: Primary | ICD-10-CM

## 2023-01-06 DIAGNOSIS — J45.909 ASTHMA, UNSPECIFIED ASTHMA SEVERITY, UNSPECIFIED WHETHER COMPLICATED, UNSPECIFIED WHETHER PERSISTENT: ICD-10-CM

## 2023-01-06 DIAGNOSIS — L40.50 PSA (PSORIATIC ARTHRITIS): ICD-10-CM

## 2023-01-06 DIAGNOSIS — B00.1 COLD SORE: ICD-10-CM

## 2023-01-06 DIAGNOSIS — M17.0 PRIMARY OSTEOARTHRITIS OF BOTH KNEES: ICD-10-CM

## 2023-01-06 PROCEDURE — 3008F PR BODY MASS INDEX (BMI) DOCUMENTED: ICD-10-PCS | Mod: CPTII,S$GLB,, | Performed by: INTERNAL MEDICINE

## 2023-01-06 PROCEDURE — 96372 THER/PROPH/DIAG INJ SC/IM: CPT | Mod: S$GLB,,, | Performed by: INTERNAL MEDICINE

## 2023-01-06 PROCEDURE — 96372 PR INJECTION,THERAP/PROPH/DIAG2ST, IM OR SUBCUT: ICD-10-PCS | Mod: S$GLB,,, | Performed by: INTERNAL MEDICINE

## 2023-01-06 PROCEDURE — 99215 PR OFFICE/OUTPT VISIT, EST, LEVL V, 40-54 MIN: ICD-10-PCS | Mod: 25,S$GLB,, | Performed by: INTERNAL MEDICINE

## 2023-01-06 PROCEDURE — 3077F PR MOST RECENT SYSTOLIC BLOOD PRESSURE >= 140 MM HG: ICD-10-PCS | Mod: CPTII,S$GLB,, | Performed by: INTERNAL MEDICINE

## 2023-01-06 PROCEDURE — 3077F SYST BP >= 140 MM HG: CPT | Mod: CPTII,S$GLB,, | Performed by: INTERNAL MEDICINE

## 2023-01-06 PROCEDURE — 99215 OFFICE O/P EST HI 40 MIN: CPT | Mod: 25,S$GLB,, | Performed by: INTERNAL MEDICINE

## 2023-01-06 PROCEDURE — 3079F DIAST BP 80-89 MM HG: CPT | Mod: CPTII,S$GLB,, | Performed by: INTERNAL MEDICINE

## 2023-01-06 PROCEDURE — 3008F BODY MASS INDEX DOCD: CPT | Mod: CPTII,S$GLB,, | Performed by: INTERNAL MEDICINE

## 2023-01-06 PROCEDURE — 99999 PR PBB SHADOW E&M-EST. PATIENT-LVL IV: ICD-10-PCS | Mod: PBBFAC,,, | Performed by: INTERNAL MEDICINE

## 2023-01-06 PROCEDURE — 99999 PR PBB SHADOW E&M-EST. PATIENT-LVL IV: CPT | Mod: PBBFAC,,, | Performed by: INTERNAL MEDICINE

## 2023-01-06 PROCEDURE — 3079F PR MOST RECENT DIASTOLIC BLOOD PRESSURE 80-89 MM HG: ICD-10-PCS | Mod: CPTII,S$GLB,, | Performed by: INTERNAL MEDICINE

## 2023-01-06 RX ORDER — LEVALBUTEROL INHALATION SOLUTION 0.31 MG/3ML
1 SOLUTION RESPIRATORY (INHALATION) EVERY 4 HOURS PRN
Qty: 90 ML | Refills: 5 | Status: SHIPPED | OUTPATIENT
Start: 2023-01-06 | End: 2023-08-03 | Stop reason: SDUPTHER

## 2023-01-06 RX ORDER — BUDESONIDE 0.5 MG/2ML
0.5 INHALANT ORAL DAILY
Qty: 60 ML | Refills: 11 | Status: SHIPPED | OUTPATIENT
Start: 2023-01-06 | End: 2024-01-06

## 2023-01-06 RX ORDER — VALACYCLOVIR HYDROCHLORIDE 1 G/1
1000 TABLET, FILM COATED ORAL EVERY 12 HOURS
Qty: 60 TABLET | Refills: 1 | Status: SHIPPED | OUTPATIENT
Start: 2023-01-06 | End: 2024-01-15 | Stop reason: SDUPTHER

## 2023-01-06 RX ORDER — DEXAMETHASONE SODIUM PHOSPHATE 4 MG/ML
8 INJECTION, SOLUTION INTRA-ARTICULAR; INTRALESIONAL; INTRAMUSCULAR; INTRAVENOUS; SOFT TISSUE
Status: COMPLETED | OUTPATIENT
Start: 2023-01-06 | End: 2023-01-06

## 2023-01-06 RX ORDER — PREDNISONE 20 MG/1
TABLET ORAL
COMMUNITY
Start: 2023-01-03 | End: 2023-02-15

## 2023-01-06 RX ORDER — CEFTRIAXONE 1 G/1
1 INJECTION, POWDER, FOR SOLUTION INTRAMUSCULAR; INTRAVENOUS
Status: COMPLETED | OUTPATIENT
Start: 2023-01-06 | End: 2023-01-06

## 2023-01-06 RX ORDER — KETOROLAC TROMETHAMINE 30 MG/ML
60 INJECTION, SOLUTION INTRAMUSCULAR; INTRAVENOUS
Status: COMPLETED | OUTPATIENT
Start: 2023-01-06 | End: 2023-01-06

## 2023-01-06 RX ADMIN — CEFTRIAXONE 1 G: 1 INJECTION, POWDER, FOR SOLUTION INTRAMUSCULAR; INTRAVENOUS at 12:01

## 2023-01-06 RX ADMIN — DEXAMETHASONE SODIUM PHOSPHATE 8 MG: 4 INJECTION, SOLUTION INTRA-ARTICULAR; INTRALESIONAL; INTRAMUSCULAR; INTRAVENOUS; SOFT TISSUE at 12:01

## 2023-01-06 RX ADMIN — KETOROLAC TROMETHAMINE 60 MG: 30 INJECTION, SOLUTION INTRAMUSCULAR; INTRAVENOUS at 12:01

## 2023-01-06 ASSESSMENT — ROUTINE ASSESSMENT OF PATIENT INDEX DATA (RAPID3)
WHEN YOU AWAKENED IN THE MORNING OVER THE LAST WEEK, PLEASE INDICATE THE AMOUNT OF TIME IT TAKES UNTIL YOU ARE AS LIMBER AS YOU WILL BE FOR THE DAY: 3 HRS
PATIENT GLOBAL ASSESSMENT SCORE: 2.2
PAIN SCORE: 8
PSYCHOLOGICAL DISTRESS SCORE: 3
TOTAL RAPID3 SCORE: 5.07
AM STIFFNESS SCORE: 1, YES
MDHAQ FUNCTION SCORE: 1.5

## 2023-01-06 NOTE — PROGRESS NOTES
2 pt identifiers used  Allergies reviewed    Administered 1 gram of rocephin mixed with 2.1 ml of lidocaine. Given in the left upper outer gluteal. Patient tolerated injections well. Informed of s/s to report verbalized understanding. No adverse reactions noted.     Administered 2 cc ( 30 mg/ml ) of toradol to the right upper outer gluteal. Patient tolerated injections well. Informed of s/s to report verbalized understanding. No adverse reactions noted.     Administered 2 cc dexamethasone 4mg/cc  to right ventrogluteal. Pt tolerated well. No acute reaction noted to site. Pt instructed on S/S to report. Pt verbalized understanding.       left facility in stable conditionAnswers submitted by the patient for this visit:  Rheumatology Questionnaire (Submitted on 1/5/2023)  fever: No  eye redness: No  mouth sores: No  headaches: Yes  shortness of breath: Yes  chest pain: No  trouble swallowing: No  diarrhea: Yes  constipation: No  unexpected weight change: Yes  genital sore: No  dysuria: No  During the last 3 days, have you had a skin rash?: Yes  Bruises or bleeds easily: Yes  cough: No

## 2023-01-06 NOTE — PROGRESS NOTES
01/06/23 1157   OTHER   MDHAQ Score 1.5   Psychologic Score 3   Pain Score 8   Morning Stiffness Score Yes   Number of minutes or hours until limber 3 hrs   Global Health Score 2.2   RAPID3 Score 5.07

## 2023-01-06 NOTE — PROGRESS NOTES
Subjective:       Patient ID: Boom Blanco is a 52 y.o. female.    Chief Complaint: Disease Management    Follow up:52 year old female who presents to clinic for follow up on psoriatic arthritis. And copd,  covid bronchitis, bronchitis,  She has a cough at night and in the am and a severe headache. She has joint pain in her hands, elbows, knees, and ankles. Pain is constant and aching in nature. She is previously had bilateral knee replacements. Pulmonary started her on a inhaler   Current tx cosentyx, norco followed by pain management.    Review of Systems   Constitutional:  Positive for activity change and unexpected weight change. Negative for appetite change, chills and fever.   HENT:  Negative for mouth sores and trouble swallowing.    Eyes:  Negative for redness and visual disturbance.   Respiratory:  Negative for cough and shortness of breath.    Cardiovascular:  Negative for chest pain, palpitations and leg swelling.   Gastrointestinal:  Positive for nausea. Negative for abdominal pain, constipation, diarrhea and vomiting.   Genitourinary:  Negative for dysuria and genital sores.   Musculoskeletal:  Positive for arthralgias, back pain, neck pain and neck stiffness.   Skin:  Positive for rash.   Allergic/Immunologic: Positive for immunocompromised state.   Neurological:  Positive for headaches. Negative for dizziness, weakness and light-headedness.   Hematological:  Bruises/bleeds easily.       Objective:     Vitals:    01/06/23 1053   BP: (!) 144/85   Pulse: 81       Past Medical History:   Diagnosis Date    Anxiety disorder     Arthritis     Bipolar disorder     Chronic pain     Diverticulitis     Hyperlipidemia     Hypertension     Lupus 2006    Psoriatic arthritis     Raynaud's disease      Past Surgical History:   Procedure Laterality Date    ARTHROSCOPIC CHONDROPLASTY OF KNEE JOINT Left 1/22/2019    Procedure: ARTHROSCOPY, KNEE, WITH CHONDROPLASTY;  Surgeon: Sylvain Gorman DO;  Location:  [facility] OR;  Service: Orthopedics;  Laterality: Left;    ARTHROSCOPY OF KNEE Left 1/22/2019    Procedure: ARTHROSCOPY, KNEE;  Surgeon: Sylvain Gorman DO;  Location: [facility] OR;  Service: Orthopedics;  Laterality: Left;  Equipment: [employer] Chondral Drill Set and Mitek Meniscus Repair Set Truespan  Vendor/Rep: [employer]    ARTHROSCOPY OF KNEE Right 2/5/2019    Procedure: ARTHROSCOPY, KNEE;  Surgeon: Sylvain Gorman DO;  Location: [facility] OR;  Service: Orthopedics;  Laterality: Right;  Equipment: Mitek Truspar; Scope  Venor/Rep: Mitek    BACK SURGERY      spinal stimulator implanted and then removed    BREAST BIOPSY      COLON SURGERY      COLONOSCOPY  11/25/2016    repeat in 5-10 years    COLONOSCOPY N/A 9/6/2019    Procedure: COLONOSCOPY;  Surgeon: Dany Hugo MD;  Location: [facility];  Service: General;  Laterality: N/A;    ENDOSCOPY  9/6/2019    Procedure: ENDOSCOPY;  Surgeon: Dany Hugo MD;  Location: [facility];  Service: General;;  with multiple biopsy's    ESOPHAGOGASTRODUODENOSCOPY Left 8/29/2018    Procedure: ESOPHAGOGASTRODUODENOSCOPY (EGD);  Surgeon: Dany Hugo MD;  Location: [facility];  Service: General;  Laterality: Left;    EXCISION OF MEDIAL MENISCUS OF KNEE Right 2/5/2019    Procedure: MENISCECTOMY, KNEE, MEDIAL;  Surgeon: Sylvain Gorman DO;  Location: [facility] OR;  Service: Orthopedics;  Laterality: Right;    HYSTERECTOMY  1997    KNEE ARTHROSCOPY W/ DEBRIDEMENT Left 1/22/2019    Procedure: ARTHROSCOPY, KNEE, WITH DEBRIDEMENT;  Surgeon: Sylvain Gorman DO;  Location: [facility] OR;  Service: Orthopedics;  Laterality: Left;    KNEE ARTHROSCOPY W/ MENISCECTOMY Left 1/22/2019    Procedure: ARTHROSCOPY, KNEE, WITH MENISCECTOMY;  Surgeon: Sylvain Gorman DO;  Location: [facility];  Service: Orthopedics;  Laterality: Left;    KNEE ARTHROSCOPY W/ PLICA EXCISION Left 1/22/2019    Procedure: EXCISION, PLICA, KNEE, ARTHROSCOPIC;  Surgeon: Sylvain Gorman DO;  Location: [facility];  Service:  Orthopedics;  Laterality: Left;    SALPINGOOPHORECTOMY  1997    TOTAL KNEE ARTHROPLASTY Right 4/18/2019    Procedure: ARTHROPLASTY, KNEE, TOTAL;  Surgeon: Sylvain Gorman DO;  Location: [facility] OR;  Service: Orthopedics;  Laterality: Right;  Equipment: Depuy Sigma Total Knee System; Guillen Leg Mcclellan  Vendor/Rep: [employer]uy  Table/Position: Supine  REQUIRES FIRST ASSISTANT EPHRAIM    TOTAL KNEE ARTHROPLASTY Left 10/3/2019    Procedure: ARTHROPLASTY, KNEE, TOTAL;  Surgeon: Sylvain Gorman DO;  Location: [facility] OR;  Service: Orthopedics;  Laterality: Left;  Equipment: Depuy Sigma Total Knee System  Vendor/Rep: Depuy  REQUIRES FIRST ASSISTANT EPHRAIM    TUBAL LIGATION            Physical Exam   Constitutional: She is oriented to person, place, and time. She does not appear ill.   HENT:   Head: Normocephalic and atraumatic.   Mouth/Throat: Oropharynx is clear and moist.   Eyes: Pupils are equal, round, and reactive to light.   Neck: No thyromegaly present.   Cardiovascular: Normal rate, regular rhythm and normal heart sounds. Exam reveals no gallop and no friction rub.   No murmur heard.  Pulmonary/Chest: Breath sounds normal. She has no wheezes. She has no rales. She exhibits no tenderness.   Abdominal: There is no abdominal tenderness. There is no rebound and no guarding.   Musculoskeletal:         General: Tenderness present.      Right shoulder: Tenderness present.      Left shoulder: Tenderness present.      Right elbow: Normal.      Left elbow: Normal.      Cervical back: Neck supple.      Right knee: Swelling and effusion present. Tenderness present.      Left knee: Effusion present.   Lymphadenopathy:     She has no cervical adenopathy.   Neurological: She is alert and oriented to person, place, and time. She has normal sensation. Gait normal.   Reflex Scores:       Patellar reflexes are 3+ on the right side and 3+ on the left side.  Skin: No rash noted. No erythema. No pallor.   Psychiatric: Mood, affect and judgment  normal.   Vitals reviewed.      Right Side Rheumatological Exam     Examination finds the elbow, 1st MCP, 2nd MCP, 3rd MCP, 4th MCP and 5th MCP normal.    The patient is tender to palpation of the shoulder and knee    She has swelling of the knee    The patient has an enlarged wrist, 1st PIP, 2nd PIP, 3rd PIP, 4th PIP and 5th PIP    Shoulder Exam   Tenderness Location: acromion    Range of Motion   Active abduction:  abnormal   Adduction: abnormal  Sensation: normal    Knee Exam   Tenderness Location: medial joint line  Patellofemoral Crepitus: positive  Effusion: positive  Sensation: normal    Hip Exam   Tenderness Location: no tenderness  Sensation: normal    Elbow/Wrist Exam   Tenderness Location: no tenderness  Sensation: normal    Left Side Rheumatological Exam     Examination finds the elbow, 1st MCP, 2nd MCP, 3rd MCP, 4th MCP and 5th MCP normal.    The patient is tender to palpation of the shoulder.    The patient has an enlarged wrist, 1st PIP, 2nd PIP, 3rd PIP, 4th PIP and 5th PIP.    Shoulder Exam   Tenderness Location: acromion    Range of Motion   Active abduction:  abnormal   Sensation: normal    Knee Exam   Tenderness Location: lateral joint line and medial joint line    Patellofemoral Crepitus: positive  Effusion: positive  Sensation: normal    Hip Exam   Tenderness Location: no tenderness  Sensation: normal    Elbow/Wrist Exam   Sensation: normal      Back/Neck Exam   General Inspection   Gait: normal       Tenderness Right paramedian tenderness of the Lower C-Spine and Lower L-Spine.Left paramedian tenderness of the Upper C-Spine and Lower L-Spine.        Assessment:       1. Chronic obstructive pulmonary disease, unspecified COPD type    2. Asthma, unspecified asthma severity, unspecified whether complicated, unspecified whether persistent    3. PSA (psoriatic arthritis)    4. Primary osteoarthritis of both knees    5. Cold sore    6. Abnormal results of thyroid function studies              Plan:        Chronic obstructive pulmonary disease, unspecified COPD type  -     levalbuterol (XOPENEX) 0.31 mg/3 mL nebulizer solution; Take 3 mLs (0.31 mg total) by nebulization every 4 (four) hours as needed for Wheezing. Rescue  Dispense: 90 mL; Refill: 5  -     NEBULIZER FOR HOME USE  -     NEBULIZER KIT (SUPPLIES) FOR HOME USE  -     budesonide (PULMICORT) 0.5 mg/2 mL nebulizer solution; Take 2 mLs (0.5 mg total) by nebulization once daily. Controller  Dispense: 60 mL; Refill: 11  -     Varicella Zoster Antibody, IgG; Future; Expected date: 01/06/2023  -     Varicella Zoster Antibody, IgM; Future; Expected date: 01/06/2023  -     HERPES SIMPLEX 1&2 IGG; Future; Expected date: 01/06/2023  -     cefTRIAXone injection 1 g  -     dexAMETHasone injection 8 mg  -     ketorolac injection 60 mg  -     CBC Auto Differential; Future; Expected date: 01/06/2023  -     Comprehensive Metabolic Panel; Future; Expected date: 01/06/2023  -     Sedimentation rate; Future; Expected date: 01/06/2023  -     C-Reactive Protein; Future; Expected date: 01/06/2023  -     TSH; Future; Expected date: 01/06/2023  -     T4, Free; Future; Expected date: 01/06/2023  -     T3; Future; Expected date: 01/06/2023  -     Vitamin D; Future; Expected date: 01/06/2023  -     DORY Screen w/Reflex; Future; Expected date: 01/06/2023    Asthma, unspecified asthma severity, unspecified whether complicated, unspecified whether persistent  -     NEBULIZER FOR HOME USE  -     NEBULIZER KIT (SUPPLIES) FOR HOME USE  -     budesonide (PULMICORT) 0.5 mg/2 mL nebulizer solution; Take 2 mLs (0.5 mg total) by nebulization once daily. Controller  Dispense: 60 mL; Refill: 11  -     Varicella Zoster Antibody, IgG; Future; Expected date: 01/06/2023  -     Varicella Zoster Antibody, IgM; Future; Expected date: 01/06/2023  -     HERPES SIMPLEX 1&2 IGG; Future; Expected date: 01/06/2023  -     cefTRIAXone injection 1 g  -     dexAMETHasone injection 8 mg  -     ketorolac  injection 60 mg  -     CBC Auto Differential; Future; Expected date: 01/06/2023  -     Comprehensive Metabolic Panel; Future; Expected date: 01/06/2023  -     Sedimentation rate; Future; Expected date: 01/06/2023  -     C-Reactive Protein; Future; Expected date: 01/06/2023  -     TSH; Future; Expected date: 01/06/2023  -     T4, Free; Future; Expected date: 01/06/2023  -     T3; Future; Expected date: 01/06/2023  -     Vitamin D; Future; Expected date: 01/06/2023  -     DORY Screen w/Reflex; Future; Expected date: 01/06/2023    PSA (psoriatic arthritis)  -     NEBULIZER FOR HOME USE  -     NEBULIZER KIT (SUPPLIES) FOR HOME USE  -     budesonide (PULMICORT) 0.5 mg/2 mL nebulizer solution; Take 2 mLs (0.5 mg total) by nebulization once daily. Controller  Dispense: 60 mL; Refill: 11  -     Varicella Zoster Antibody, IgG; Future; Expected date: 01/06/2023  -     Varicella Zoster Antibody, IgM; Future; Expected date: 01/06/2023  -     HERPES SIMPLEX 1&2 IGG; Future; Expected date: 01/06/2023  -     cefTRIAXone injection 1 g  -     dexAMETHasone injection 8 mg  -     ketorolac injection 60 mg  -     CBC Auto Differential; Future; Expected date: 01/06/2023  -     Comprehensive Metabolic Panel; Future; Expected date: 01/06/2023  -     Sedimentation rate; Future; Expected date: 01/06/2023  -     C-Reactive Protein; Future; Expected date: 01/06/2023  -     TSH; Future; Expected date: 01/06/2023  -     T4, Free; Future; Expected date: 01/06/2023  -     T3; Future; Expected date: 01/06/2023  -     Vitamin D; Future; Expected date: 01/06/2023  -     DORY Screen w/Reflex; Future; Expected date: 01/06/2023    Primary osteoarthritis of both knees  -     NEBULIZER FOR HOME USE  -     NEBULIZER KIT (SUPPLIES) FOR HOME USE  -     budesonide (PULMICORT) 0.5 mg/2 mL nebulizer solution; Take 2 mLs (0.5 mg total) by nebulization once daily. Controller  Dispense: 60 mL; Refill: 11  -     Varicella Zoster Antibody, IgG; Future; Expected date:  01/06/2023  -     Varicella Zoster Antibody, IgM; Future; Expected date: 01/06/2023  -     HERPES SIMPLEX 1&2 IGG; Future; Expected date: 01/06/2023  -     cefTRIAXone injection 1 g  -     dexAMETHasone injection 8 mg  -     ketorolac injection 60 mg  -     CBC Auto Differential; Future; Expected date: 01/06/2023  -     Comprehensive Metabolic Panel; Future; Expected date: 01/06/2023  -     Sedimentation rate; Future; Expected date: 01/06/2023  -     C-Reactive Protein; Future; Expected date: 01/06/2023  -     TSH; Future; Expected date: 01/06/2023  -     T4, Free; Future; Expected date: 01/06/2023  -     T3; Future; Expected date: 01/06/2023  -     Vitamin D; Future; Expected date: 01/06/2023  -     DORY Screen w/Reflex; Future; Expected date: 01/06/2023    Cold sore  -     valACYclovir (VALTREX) 1000 MG tablet; Take 1 tablet (1,000 mg total) by mouth every 12 (twelve) hours.  Dispense: 60 tablet; Refill: 1  -     Varicella Zoster Antibody, IgG; Future; Expected date: 01/06/2023  -     Varicella Zoster Antibody, IgM; Future; Expected date: 01/06/2023  -     HERPES SIMPLEX 1&2 IGG; Future; Expected date: 01/06/2023  -     cefTRIAXone injection 1 g  -     dexAMETHasone injection 8 mg  -     ketorolac injection 60 mg  -     CBC Auto Differential; Future; Expected date: 01/06/2023  -     Comprehensive Metabolic Panel; Future; Expected date: 01/06/2023  -     Sedimentation rate; Future; Expected date: 01/06/2023  -     C-Reactive Protein; Future; Expected date: 01/06/2023  -     TSH; Future; Expected date: 01/06/2023  -     T4, Free; Future; Expected date: 01/06/2023  -     T3; Future; Expected date: 01/06/2023  -     Vitamin D; Future; Expected date: 01/06/2023  -     DORY Screen w/Reflex; Future; Expected date: 01/06/2023    Abnormal results of thyroid function studies  -     Varicella Zoster Antibody, IgG; Future; Expected date: 01/06/2023  -     Varicella Zoster Antibody, IgM; Future; Expected date: 01/06/2023  -      HERPES SIMPLEX 1&2 IGG; Future; Expected date: 01/06/2023  -     cefTRIAXone injection 1 g  -     dexAMETHasone injection 8 mg  -     ketorolac injection 60 mg  -     CBC Auto Differential; Future; Expected date: 01/06/2023  -     Comprehensive Metabolic Panel; Future; Expected date: 01/06/2023  -     Sedimentation rate; Future; Expected date: 01/06/2023  -     C-Reactive Protein; Future; Expected date: 01/06/2023  -     TSH; Future; Expected date: 01/06/2023  -     T4, Free; Future; Expected date: 01/06/2023  -     T3; Future; Expected date: 01/06/2023  -     Vitamin D; Future; Expected date: 01/06/2023  -     DORY Screen w/Reflex; Future; Expected date: 01/06/2023            Assessment:  52 year old female with  Psoriatic arthritis, DORY 1:2560 nucleolar, raynauds  --renal insufficiency  --nausea  --chronic pain syndrome    Plan:  1. Hold abx  2. Abx and toradol and decadron  3. Cont cosentyx 300 mg monthly  4. Complete pulmonary work up  5. Link ESR/CRP to upcoming labs

## 2023-01-11 NOTE — PROGRESS NOTES
"[facility] Note    PCP: Juan Carlos Saldivar MD    Chief Complaint: microscopic hematuria     SUBJECTIVE:       History of Present Illness:  Boom Blanco is a 52 y.o. female who presents to clinic for hematuria. She is New  to our clinic.     Only 1 RBC seen on micro UA  US did not visualize previously seen 9 mm and 1.5 cm complex cyst in right kidney. These had previously been stable since 2018.    No hematuria or dysuria.   No other bothersome urinary complaints.     She has been having some recent GI issues and is seeing GI later this week. Has had issues with diverticulitis in the past.       1/4/22  Presents today for microscopic hematuria. Has had UAs dip positive for blood, but no microanalysis performed.   No bothersome urinary issues. No dysuria. No gross hematuria.     She has a hx of ?bladder cancer in 1995. She was undergoing hysterectomy and at that time a "cyst on the bladder" was found and removed. No further treatment for this.   No hx of stones.     She underwent a CT w/wo contrast which showed 2 complex cysts of the right kidney, otherwise no concerning findings. These are stable since 2018.    UA today: +trace blood, leuk and nitrite negative     Last urine culture: no documented UTIs  Last creatinine: 1.1 (12/9/21)    Family  hx: no malignancy   Hx of HTN, HLD  Former smoker, quit 3 years ago.     Past medical, family, and social history reviewed as documented in chart with pertinent positive medical, family, and social history detailed in HPI.    Review of patient's allergies indicates:   Allergen Reactions    Remeron [mirtazapine] Other (See Comments)     Weight gain       Past Medical History:   Diagnosis Date    Anxiety disorder     Arthritis     Bipolar disorder     Chronic pain     Diverticulitis     Hyperlipidemia     Hypertension     Lupus 2006    Psoriatic arthritis     Raynaud's disease      Past Surgical History:   Procedure Laterality Date    ARTHROSCOPIC " CHONDROPLASTY OF KNEE JOINT Left 1/22/2019    Procedure: ARTHROSCOPY, KNEE, WITH CHONDROPLASTY;  Surgeon: Sylvain Gorman DO;  Location: [facility] OR;  Service: Orthopedics;  Laterality: Left;    ARTHROSCOPY OF KNEE Left 1/22/2019    Procedure: ARTHROSCOPY, KNEE;  Surgeon: Sylvain Gorman DO;  Location: [facility] OR;  Service: Orthopedics;  Laterality: Left;  Equipment: [place] Chondral Drill Set and [employer] Meniscus Repair Set Truespan  Vendor/Rep: [employer] [place] [employer]    ARTHROSCOPY OF KNEE Right 2/5/2019    Procedure: ARTHROSCOPY, KNEE;  Surgeon: Sylvain Gorman DO;  Location: [facility] OR;  Service: Orthopedics;  Laterality: Right;  Equipment: [employer]k Truspar; Scope  Venor/Rep: [employer]    BACK SURGERY      spinal stimulator implanted and then removed    BREAST BIOPSY      COLON SURGERY      COLONOSCOPY  11/25/2016    repeat in 5-10 years    COLONOSCOPY N/A 9/6/2019    Procedure: COLONOSCOPY;  Surgeon: Dany Hugo MD;  Location: [facility];  Service: General;  Laterality: N/A;    ENDOSCOPY  9/6/2019    Procedure: ENDOSCOPY;  Surgeon: Dany Hugo MD;  Location: [facility];  Service: General;;  with multiple biopsy's    ESOPHAGOGASTRODUODENOSCOPY Left 8/29/2018    Procedure: ESOPHAGOGASTRODUODENOSCOPY (EGD);  Surgeon: Dany Hugo MD;  Location: [facility];  Service: General;  Laterality: Left;    EXCISION OF MEDIAL MENISCUS OF KNEE Right 2/5/2019    Procedure: MENISCECTOMY, KNEE, MEDIAL;  Surgeon: Sylvain Gorman DO;  Location: [facility] OR;  Service: Orthopedics;  Laterality: Right;    HYSTERECTOMY  1997    KNEE ARTHROSCOPY W/ DEBRIDEMENT Left 1/22/2019    Procedure: ARTHROSCOPY, KNEE, WITH DEBRIDEMENT;  Surgeon: Sylvain Gorman DO;  Location: [facility] OR;  Service: Orthopedics;  Laterality: Left;    KNEE ARTHROSCOPY W/ MENISCECTOMY Left 1/22/2019    Procedure: ARTHROSCOPY, KNEE, WITH MENISCECTOMY;  Surgeon: Sylvain Gorman DO;  Location: [facility] OR;  Service: Orthopedics;  Laterality: Left;    KNEE ARTHROSCOPY W/ PLICA  "EXCISION Left 1/22/2019    Procedure: EXCISION, PLICA, KNEE, ARTHROSCOPIC;  Surgeon: Sylvain Gorman DO;  Location: [facility] OR;  Service: Orthopedics;  Laterality: Left;    SALPINGOOPHORECTOMY  1997    TOTAL KNEE ARTHROPLASTY Right 4/18/2019    Procedure: ARTHROPLASTY, KNEE, TOTAL;  Surgeon: Sylvain Gorman DO;  Location: [facility] OR;  Service: Orthopedics;  Laterality: Right;  Equipment: [employer] Sigma Total Knee System; Guillen Leg Mcclellan  Vendor/Rep: [employer]  Table/Position: Supine  REQUIRES FIRST ASSISTANT EPHRAIM    TOTAL KNEE ARTHROPLASTY Left 10/3/2019    Procedure: ARTHROPLASTY, KNEE, TOTAL;  Surgeon: Sylvain Gorman DO;  Location: [facility] OR;  Service: Orthopedics;  Laterality: Left;  Equipment: [employer] Sigma Total Knee System  Vendor/Rep: [employer]  REQUIRES FIRST ASSISTANT EPHRAIM    TUBAL LIGATION       Family History   Problem Relation Age of Onset    Arthritis Mother     Pacemaker/defibrilator Mother     Lung disease Father     Heart disease Father     Arthritis Sister     Arthritis Brother     Arthritis Sister     Arthritis Sister     Breast cancer Neg Hx      Social History     Tobacco Use    Smoking status: Former     Packs/day: 1.00     Years: 33.00     Pack years: 33.00     Types: Cigarettes     Quit date: 4/19/2019     Years since quitting: 3.7    Smokeless tobacco: Never   Substance Use Topics    Alcohol use: No    Drug use: Never        Review of Systems   Genitourinary:  Positive for frequency and nocturia. Negative for difficulty urinating, dysuria, hematuria, urgency and vaginal pain.     OBJECTIVE:     Anticoagulation:  no    Estimated body mass index is 32.03 kg/m² as calculated from the following:    Height as of this encounter: 5' 9.02" (1.753 m).    Weight as of this encounter: 98.4 kg (217 lb).    Vital Signs (Most Recent)  Pulse: 76 (01/17/23 0841)  Resp: 18 (01/17/23 0841)  BP: (!) 147/65 (01/17/23 0841)    Physical Exam  Vitals reviewed.   Constitutional:       General: She is not in acute " distress.     Appearance: Normal appearance. She is not ill-appearing.   HENT:      Head: Normocephalic and atraumatic.   Eyes:      General: No scleral icterus.  Cardiovascular:      Rate and Rhythm: Normal rate and regular rhythm.   Pulmonary:      Effort: Pulmonary effort is normal. No respiratory distress.   Abdominal:      General: There is no distension.      Palpations: Abdomen is soft.   Skin:     Coloration: Skin is not jaundiced.   Neurological:      General: No focal deficit present.      Mental Status: She is alert and oriented to person, place, and time.   Psychiatric:         Mood and Affect: Mood normal.         Behavior: Behavior normal.       BMP  Lab Results   Component Value Date     09/23/2022    K 4.0 09/23/2022     09/23/2022    CO2 26 09/23/2022    BUN 10 09/23/2022    CREATININE 0.9 09/23/2022    CALCIUM 8.9 09/23/2022    ANIONGAP 12 09/23/2022    ESTGFRAFRICA >60.0 04/12/2022    EGFRNONAA >60.0 04/12/2022       Lab Results   Component Value Date    WBC 6.14 09/23/2022    HGB 12.8 09/23/2022    HCT 38.8 09/23/2022    MCV 92 09/23/2022     09/23/2022       Imaging:  Per HPI    ASSESSMENT     1. Acquired complex renal cyst    2. Benign hypertension    3. Other hyperlipidemia    4. Rheumatoid arthritis involving left knee, unspecified whether rheumatoid factor present      PLAN:     - Cyst not visualized on today's US. Discussed that this likely means they remain small and not concerning. Has been stable for 5 years.   - No bothersome urinary complaints.   - Suspect that she may get a CT scan for her GI complaints. This will give us a better look at the cysts. If so, can follow up in 1 year with a repeat US.   - If no CT is performed then discussed that we can get a CT in 6 months to get a more comprehensive look at her kidneys  - She will let me know and can schedule accordingly     Stephany Perez MD

## 2023-01-14 ENCOUNTER — PATIENT MESSAGE (OUTPATIENT)
Dept: RHEUMATOLOGY | Facility: CLINIC | Age: 53
End: 2023-01-14
Payer: MEDICARE

## 2023-01-17 ENCOUNTER — OFFICE VISIT (OUTPATIENT)
Dept: UROLOGY | Facility: CLINIC | Age: 53
End: 2023-01-17
Payer: MEDICARE

## 2023-01-17 VITALS
RESPIRATION RATE: 18 BRPM | BODY MASS INDEX: 32.14 KG/M2 | HEIGHT: 69 IN | DIASTOLIC BLOOD PRESSURE: 65 MMHG | WEIGHT: 217 LBS | HEART RATE: 76 BPM | SYSTOLIC BLOOD PRESSURE: 147 MMHG

## 2023-01-17 DIAGNOSIS — I10 BENIGN HYPERTENSION: ICD-10-CM

## 2023-01-17 DIAGNOSIS — E78.49 OTHER HYPERLIPIDEMIA: ICD-10-CM

## 2023-01-17 DIAGNOSIS — N28.1 ACQUIRED COMPLEX RENAL CYST: Primary | ICD-10-CM

## 2023-01-17 DIAGNOSIS — M06.9 RHEUMATOID ARTHRITIS INVOLVING LEFT KNEE, UNSPECIFIED WHETHER RHEUMATOID FACTOR PRESENT: ICD-10-CM

## 2023-01-17 PROCEDURE — 3008F BODY MASS INDEX DOCD: CPT | Mod: CPTII,S$GLB,, | Performed by: STUDENT IN AN ORGANIZED HEALTH CARE EDUCATION/TRAINING PROGRAM

## 2023-01-17 PROCEDURE — 3077F SYST BP >= 140 MM HG: CPT | Mod: CPTII,S$GLB,, | Performed by: STUDENT IN AN ORGANIZED HEALTH CARE EDUCATION/TRAINING PROGRAM

## 2023-01-17 PROCEDURE — 99999 PR PBB SHADOW E&M-EST. PATIENT-LVL III: CPT | Mod: PBBFAC,,, | Performed by: STUDENT IN AN ORGANIZED HEALTH CARE EDUCATION/TRAINING PROGRAM

## 2023-01-17 PROCEDURE — 3077F PR MOST RECENT SYSTOLIC BLOOD PRESSURE >= 140 MM HG: ICD-10-PCS | Mod: CPTII,S$GLB,, | Performed by: STUDENT IN AN ORGANIZED HEALTH CARE EDUCATION/TRAINING PROGRAM

## 2023-01-17 PROCEDURE — 1159F PR MEDICATION LIST DOCUMENTED IN MEDICAL RECORD: ICD-10-PCS | Mod: CPTII,S$GLB,, | Performed by: STUDENT IN AN ORGANIZED HEALTH CARE EDUCATION/TRAINING PROGRAM

## 2023-01-17 PROCEDURE — 3078F PR MOST RECENT DIASTOLIC BLOOD PRESSURE < 80 MM HG: ICD-10-PCS | Mod: CPTII,S$GLB,, | Performed by: STUDENT IN AN ORGANIZED HEALTH CARE EDUCATION/TRAINING PROGRAM

## 2023-01-17 PROCEDURE — 3008F PR BODY MASS INDEX (BMI) DOCUMENTED: ICD-10-PCS | Mod: CPTII,S$GLB,, | Performed by: STUDENT IN AN ORGANIZED HEALTH CARE EDUCATION/TRAINING PROGRAM

## 2023-01-17 PROCEDURE — 99999 PR PBB SHADOW E&M-EST. PATIENT-LVL III: ICD-10-PCS | Mod: PBBFAC,,, | Performed by: STUDENT IN AN ORGANIZED HEALTH CARE EDUCATION/TRAINING PROGRAM

## 2023-01-17 PROCEDURE — 99213 PR OFFICE/OUTPT VISIT, EST, LEVL III, 20-29 MIN: ICD-10-PCS | Mod: S$GLB,,, | Performed by: STUDENT IN AN ORGANIZED HEALTH CARE EDUCATION/TRAINING PROGRAM

## 2023-01-17 PROCEDURE — 3078F DIAST BP <80 MM HG: CPT | Mod: CPTII,S$GLB,, | Performed by: STUDENT IN AN ORGANIZED HEALTH CARE EDUCATION/TRAINING PROGRAM

## 2023-01-17 PROCEDURE — 99213 OFFICE O/P EST LOW 20 MIN: CPT | Mod: S$GLB,,, | Performed by: STUDENT IN AN ORGANIZED HEALTH CARE EDUCATION/TRAINING PROGRAM

## 2023-01-17 PROCEDURE — 1159F MED LIST DOCD IN RCRD: CPT | Mod: CPTII,S$GLB,, | Performed by: STUDENT IN AN ORGANIZED HEALTH CARE EDUCATION/TRAINING PROGRAM

## 2023-01-26 ENCOUNTER — SPECIALTY PHARMACY (OUTPATIENT)
Dept: PHARMACY | Facility: CLINIC | Age: 53
End: 2023-01-26
Payer: MEDICARE

## 2023-01-26 ENCOUNTER — PATIENT MESSAGE (OUTPATIENT)
Dept: UROLOGY | Facility: CLINIC | Age: 53
End: 2023-01-26
Payer: MEDICARE

## 2023-01-26 NOTE — TELEPHONE ENCOUNTER
Specialty Pharmacy - Refill Coordination    Specialty Medication Orders Linked to Encounter      Flowsheet Row Most Recent Value   Medication #1 secukinumab (COSENTYX PEN, 2 PENS,) 150 mg/mL PnIj (Order#[id number], Rx#[id number])            Refill Questions - Documented Responses      Flowsheet Row Most Recent Value   Patient Availability and HIPAA Verification    Does patient want to proceed with activity? Yes   HIPAA/medical authority confirmed? Yes   Relationship to patient of person spoken to? Self   Refill Screening Questions    Changes to allergies? No   Changes to medications? Yes   New conditions since last clinic visit? No   Unplanned office visit, urgent care, ED, or hospital admission in the last 4 weeks? No   How does patient/caregiver feel medication is working? Good   Financial problems or insurance changes? No   How many doses of your specialty medications were missed in the last 4 weeks? 0   Would patient like to speak to a pharmacist? No   When does the patient need to receive the medication? 02/02/23   Refill Delivery Questions    How will the patient receive the medication? Mail   When does the patient need to receive the medication? 02/02/23   Shipping Address Home   Address in [facility] confirmed and updated if neccessary? Yes   Expected Copay ($) 0   Is the patient able to afford the medication copay? Yes   Payment Method zero copay   Days supply of Refill 28   Supplies needed? No supplies needed   Refill activity completed? Yes   Refill activity plan Refill scheduled   Shipment/Pickup Date: 01/31/23            Current Outpatient Medications   Medication Sig    albuterol (PROVENTIL/VENTOLIN HFA) 90 mcg/actuation inhaler Inhale 2 puffs into the lungs every 6 (six) hours as needed. Rescue    baclofen (LIORESAL) 10 MG tablet Take 1 tablet (10 mg total) by mouth 3 (three) times daily.    budesonide (PULMICORT) 0.5 mg/2 mL nebulizer solution Take 2 mLs (0.5 mg total) by nebulization once  daily. Controller    collagen-biotin-ascorbic acid 500 mg-800 mcg- 50 mg Cap Take by mouth.    diclofenac-misoprostol  mg-mcg (ARTHROTEC 75)  mg-mcg per tablet Take 1 tablet by mouth 2 (two) times daily.    ELDERBERRY FRUIT ORAL Take by mouth once daily.    fluticasone propionate (FLONASE) 50 mcg/actuation nasal spray 1 spray (50 mcg total) by Each Nostril route 2 (two) times daily as needed for Rhinitis.    furosemide (LASIX) 20 MG tablet TAKE ONE (1) TABLET EVERY MORNING AS NEEDED FOR FLUID    ibuprofen (ADVIL,MOTRIN) 800 MG tablet Take 1 tablet (800 mg total) by mouth 2 (two) times daily as needed for Pain.    levalbuterol (XOPENEX) 0.31 mg/3 mL nebulizer solution Take 3 mLs (0.31 mg total) by nebulization every 4 (four) hours as needed for Wheezing. Rescue    levothyroxine (SYNTHROID) 50 MCG tablet TAKE ONE (1) TABLET EVERY MORNING ON AN EMPTY STOMACH FOR THYROID    lisinopriL (PRINIVIL,ZESTRIL) 20 MG tablet Take 1 tablet (20 mg total) by mouth once daily.    lovastatin (MEVACOR) 20 MG tablet Take 1 tablet (20 mg total) by mouth every evening.    multivit-min-FA-lycopen-lutein (CENTRUM SILVER) 0.4-300-250 mg-mcg-mcg Tab Take 1 tablet by mouth once daily.    oxyCODONE-acetaminophen (PERCOCET)  mg per tablet Take 1 tablet by mouth 3 (three) times daily as needed for Pain.    predniSONE (DELTASONE) 20 MG tablet     promethazine (PHENERGAN) 25 MG tablet Take 1 tablet (25 mg total) by mouth every 8 (eight) hours as needed for Nausea.    secukinumab (COSENTYX PEN, 2 PENS,) 150 mg/mL PnIj Inject 2 mL (300 mg) into the skin every 30 days.    traZODone (DESYREL) 50 MG tablet Take 2 tablets (100 mg total) by mouth every evening.    TRELEGY ELLIPTA 100-62.5-25 mcg DsDv     valACYclovir (VALTREX) 1000 MG tablet Take 1 tablet (1,000 mg total) by mouth every 12 (twelve) hours.    vit C-Zn gluc-herbal no.325 90-15 mg Lozg by Transmucosal route.   Last reviewed on 1/17/2023  8:43 AM by Serena Nelson,  MA    Review of patient's allergies indicates:   Allergen Reactions    Remeron [mirtazapine] Other (See Comments)     Weight gain    Last reviewed on  1/17/2023 8:41 AM by Serena Nelson      Tasks added this encounter   2/23/2023 - Refill Call (Auto Added)   Tasks due within next 3 months   No tasks due.     Arnie Cannon, PharmD  [facility]  [address]  [facility] [address]  Phone: [phone]  Fax: [phone]

## 2023-01-26 NOTE — TELEPHONE ENCOUNTER
Outgoing call regarding cosentyx refill; per pt, she's due to inject on 2/1; pt reported new med; transferred to [facility]

## 2023-01-31 ENCOUNTER — PATIENT MESSAGE (OUTPATIENT)
Dept: UROLOGY | Facility: CLINIC | Age: 53
End: 2023-01-31
Payer: MEDICARE

## 2023-01-31 NOTE — TELEPHONE ENCOUNTER
I am unable to see report in her chart from [facility]. Can she send us the results of CT?

## 2023-02-01 ENCOUNTER — PATIENT MESSAGE (OUTPATIENT)
Dept: UROLOGY | Facility: CLINIC | Age: 53
End: 2023-02-01
Payer: MEDICARE

## 2023-02-15 ENCOUNTER — PATIENT MESSAGE (OUTPATIENT)
Dept: FAMILY MEDICINE | Facility: CLINIC | Age: 53
End: 2023-02-15

## 2023-02-15 ENCOUNTER — OFFICE VISIT (OUTPATIENT)
Dept: FAMILY MEDICINE | Facility: CLINIC | Age: 53
End: 2023-02-15
Payer: MEDICARE

## 2023-02-15 VITALS
DIASTOLIC BLOOD PRESSURE: 88 MMHG | OXYGEN SATURATION: 97 % | WEIGHT: 220 LBS | RESPIRATION RATE: 18 BRPM | SYSTOLIC BLOOD PRESSURE: 138 MMHG | TEMPERATURE: 98 F | HEIGHT: 69 IN | HEART RATE: 75 BPM | BODY MASS INDEX: 32.58 KG/M2

## 2023-02-15 DIAGNOSIS — E78.49 OTHER HYPERLIPIDEMIA: ICD-10-CM

## 2023-02-15 DIAGNOSIS — G89.4 CHRONIC PAIN SYNDROME: Primary | ICD-10-CM

## 2023-02-15 DIAGNOSIS — F41.9 ANXIETY DISORDER, UNSPECIFIED TYPE: ICD-10-CM

## 2023-02-15 DIAGNOSIS — I10 BENIGN HYPERTENSION: ICD-10-CM

## 2023-02-15 DIAGNOSIS — Z96.651 PRESENCE OF RIGHT ARTIFICIAL KNEE JOINT: ICD-10-CM

## 2023-02-15 DIAGNOSIS — M06.9 RHEUMATOID ARTHRITIS INVOLVING LEFT KNEE, UNSPECIFIED WHETHER RHEUMATOID FACTOR PRESENT: ICD-10-CM

## 2023-02-15 PROCEDURE — 3079F DIAST BP 80-89 MM HG: CPT | Mod: CPTII,S$GLB,, | Performed by: FAMILY MEDICINE

## 2023-02-15 PROCEDURE — 3075F PR MOST RECENT SYSTOLIC BLOOD PRESS GE 130-139MM HG: ICD-10-PCS | Mod: CPTII,S$GLB,, | Performed by: FAMILY MEDICINE

## 2023-02-15 PROCEDURE — 99214 OFFICE O/P EST MOD 30 MIN: CPT | Mod: S$GLB,,, | Performed by: FAMILY MEDICINE

## 2023-02-15 PROCEDURE — 3079F PR MOST RECENT DIASTOLIC BLOOD PRESSURE 80-89 MM HG: ICD-10-PCS | Mod: CPTII,S$GLB,, | Performed by: FAMILY MEDICINE

## 2023-02-15 PROCEDURE — 3075F SYST BP GE 130 - 139MM HG: CPT | Mod: CPTII,S$GLB,, | Performed by: FAMILY MEDICINE

## 2023-02-15 PROCEDURE — 3008F PR BODY MASS INDEX (BMI) DOCUMENTED: ICD-10-PCS | Mod: CPTII,S$GLB,, | Performed by: FAMILY MEDICINE

## 2023-02-15 PROCEDURE — 99214 PR OFFICE/OUTPT VISIT, EST, LEVL IV, 30-39 MIN: ICD-10-PCS | Mod: S$GLB,,, | Performed by: FAMILY MEDICINE

## 2023-02-15 PROCEDURE — 3008F BODY MASS INDEX DOCD: CPT | Mod: CPTII,S$GLB,, | Performed by: FAMILY MEDICINE

## 2023-02-15 RX ORDER — OXYCODONE AND ACETAMINOPHEN 10; 325 MG/1; MG/1
1 TABLET ORAL 3 TIMES DAILY PRN
Qty: 90 TABLET | Refills: 0 | Status: SHIPPED | OUTPATIENT
Start: 2023-02-17 | End: 2023-03-15 | Stop reason: SDUPTHER

## 2023-02-15 NOTE — PROGRESS NOTES
"  Family Medicine Note  [facility]    Chief Complaint   Patient presents with    Hypertension        HPI:   3 month follow up    Has some degree of chronic pain related to RA - stable with management both from myself and Dr. Molina - some low back issues as well  COPD stable - continue pulm followup at [facility]  BHAVNA - CPAP nightly    Blood Pressure at goal on medication, with patient reporting prescription compliance  Hyperlipidemia stable on appropriate intensity statin therapy  TSH stable    Continues to be frustrated by some components of weight gain - this is major factor to weight gain    ROS: as above    Vitals:    02/15/23 0823   BP: 138/88   BP Location: Right arm   Patient Position: Sitting   Pulse: 75   Resp: 18   Temp: 97.9 °F (36.6 °C)   SpO2: 97%   Weight: 99.8 kg (220 lb)   Height: 5' 9" (1.753 m)      Body mass index is 32.49 kg/m².      General:  AOx3, well nourished and developed in no acute distress  Eyes:  PERRLA, EOMI, vision intact grossly  ENT:  normal hearing, moist oral mucosa  Neck:  trachea midline with no masses or thyromegaly  Heart:  RRR, no murmurs.  No edema noted, extremities warm and well perfused  Lungs:  clear to auscultation bilaterally with symmetric chest movement  Abdomen:  Soft, nontender, nondistended.  Normal bowel sounds  Musculoskeletal:  Normal gait.  Normal posture.  Normal muscular development with no joint swelling.  Neurological:  CN II-XII grossly intact. Symmetric strength and sensation  Psych:  Normal mood and affect.  Able to demonstrate good judgement and personal insight.      Assessment/Plan:    Chronic pain syndrome    Anxiety disorder, unspecified type    Benign hypertension    Other hyperlipidemia    Rheumatoid arthritis involving left knee, unspecified whether rheumatoid factor present    Presence of right artificial knee joint        Stable, filled medication today  Stable, continue medication  At goal  Stable on statin " therapy  Continue efforts at aerobic exercise  Stable as above

## 2023-02-15 NOTE — PATIENT INSTRUCTIONS
Focus on unprocessed diet:    Fruits, vegetables, lean meats, seafood, nuts, beans, eggs, whole grains    Drink plenty of water

## 2023-02-17 ENCOUNTER — LAB VISIT (OUTPATIENT)
Dept: LAB | Facility: CLINIC | Age: 53
End: 2023-02-17
Payer: MEDICARE

## 2023-02-17 DIAGNOSIS — L40.50 PSA (PSORIATIC ARTHRITIS): ICD-10-CM

## 2023-02-17 DIAGNOSIS — M17.0 PRIMARY OSTEOARTHRITIS OF BOTH KNEES: ICD-10-CM

## 2023-02-17 DIAGNOSIS — J44.9 CHRONIC OBSTRUCTIVE PULMONARY DISEASE, UNSPECIFIED COPD TYPE: ICD-10-CM

## 2023-02-17 DIAGNOSIS — R94.6 ABNORMAL RESULTS OF THYROID FUNCTION STUDIES: ICD-10-CM

## 2023-02-17 DIAGNOSIS — B00.1 COLD SORE: ICD-10-CM

## 2023-02-17 DIAGNOSIS — J45.909 ASTHMA, UNSPECIFIED ASTHMA SEVERITY, UNSPECIFIED WHETHER COMPLICATED, UNSPECIFIED WHETHER PERSISTENT: ICD-10-CM

## 2023-02-17 LAB
ALBUMIN SERPL BCP-MCNC: 3.8 G/DL (ref 3.5–5.2)
ALP SERPL-CCNC: 38 U/L (ref 55–135)
ALT SERPL W/O P-5'-P-CCNC: 28 U/L (ref 10–44)
ANION GAP SERPL CALC-SCNC: 8 MMOL/L (ref 8–16)
AST SERPL-CCNC: 25 U/L (ref 10–40)
BASOPHILS # BLD AUTO: 0.06 K/UL (ref 0–0.2)
BASOPHILS NFR BLD: 0.9 % (ref 0–1.9)
BILIRUB SERPL-MCNC: 0.3 MG/DL (ref 0.1–1)
BUN SERPL-MCNC: 15 MG/DL (ref 6–20)
CALCIUM SERPL-MCNC: 9 MG/DL (ref 8.7–10.5)
CHLORIDE SERPL-SCNC: 105 MMOL/L (ref 95–110)
CO2 SERPL-SCNC: 27 MMOL/L (ref 23–29)
CREAT SERPL-MCNC: 1 MG/DL (ref 0.5–1.4)
CRP SERPL-MCNC: 3.3 MG/L (ref 0–8.2)
DIFFERENTIAL METHOD: ABNORMAL
EOSINOPHIL # BLD AUTO: 0.2 K/UL (ref 0–0.5)
EOSINOPHIL NFR BLD: 3.3 % (ref 0–8)
ERYTHROCYTE [DISTWIDTH] IN BLOOD BY AUTOMATED COUNT: 13.3 % (ref 11.5–14.5)
ERYTHROCYTE [SEDIMENTATION RATE] IN BLOOD BY WESTERGREN METHOD: 7 MM/HR (ref 0–20)
EST. GFR  (NO RACE VARIABLE): >60 ML/MIN/1.73 M^2
GLUCOSE SERPL-MCNC: 101 MG/DL (ref 70–110)
HCT VFR BLD AUTO: 36.6 % (ref 37–48.5)
HGB BLD-MCNC: 12 G/DL (ref 12–16)
IMM GRANULOCYTES # BLD AUTO: 0.01 K/UL (ref 0–0.04)
IMM GRANULOCYTES NFR BLD AUTO: 0.1 % (ref 0–0.5)
LYMPHOCYTES # BLD AUTO: 1.6 K/UL (ref 1–4.8)
LYMPHOCYTES NFR BLD: 24.6 % (ref 18–48)
MCH RBC QN AUTO: 30.6 PG (ref 27–31)
MCHC RBC AUTO-ENTMCNC: 32.8 G/DL (ref 32–36)
MCV RBC AUTO: 93 FL (ref 82–98)
MONOCYTES # BLD AUTO: 0.6 K/UL (ref 0.3–1)
MONOCYTES NFR BLD: 8.2 % (ref 4–15)
NEUTROPHILS # BLD AUTO: 4.2 K/UL (ref 1.8–7.7)
NEUTROPHILS NFR BLD: 62.9 % (ref 38–73)
NRBC BLD-RTO: 0 /100 WBC
PLATELET # BLD AUTO: 160 K/UL (ref 150–450)
PMV BLD AUTO: 10.8 FL (ref 9.2–12.9)
POTASSIUM SERPL-SCNC: 4 MMOL/L (ref 3.5–5.1)
PROT SERPL-MCNC: 7 G/DL (ref 6–8.4)
RBC # BLD AUTO: 3.92 M/UL (ref 4–5.4)
SODIUM SERPL-SCNC: 140 MMOL/L (ref 136–145)
T4 FREE SERPL-MCNC: 1.19 NG/DL (ref 0.71–1.51)
TSH SERPL DL<=0.005 MIU/L-ACNC: 3.46 UIU/ML (ref 0.4–4)
WBC # BLD AUTO: 6.67 K/UL (ref 3.9–12.7)

## 2023-02-17 PROCEDURE — 84443 ASSAY THYROID STIM HORMONE: CPT | Performed by: INTERNAL MEDICINE

## 2023-02-17 PROCEDURE — 82306 VITAMIN D 25 HYDROXY: CPT | Performed by: INTERNAL MEDICINE

## 2023-02-17 PROCEDURE — 86787 VARICELLA-ZOSTER ANTIBODY: CPT | Mod: 91 | Performed by: INTERNAL MEDICINE

## 2023-02-17 PROCEDURE — 80053 COMPREHEN METABOLIC PANEL: CPT | Performed by: INTERNAL MEDICINE

## 2023-02-17 PROCEDURE — 85651 RBC SED RATE NONAUTOMATED: CPT | Performed by: INTERNAL MEDICINE

## 2023-02-17 PROCEDURE — 84439 ASSAY OF FREE THYROXINE: CPT | Performed by: INTERNAL MEDICINE

## 2023-02-17 PROCEDURE — 85025 COMPLETE CBC W/AUTO DIFF WBC: CPT | Performed by: INTERNAL MEDICINE

## 2023-02-17 PROCEDURE — 86787 VARICELLA-ZOSTER ANTIBODY: CPT | Performed by: INTERNAL MEDICINE

## 2023-02-17 PROCEDURE — 84480 ASSAY TRIIODOTHYRONINE (T3): CPT | Performed by: INTERNAL MEDICINE

## 2023-02-17 PROCEDURE — 36415 PR COLLECTION VENOUS BLOOD,VENIPUNCTURE: ICD-10-PCS | Mod: ,,, | Performed by: STUDENT IN AN ORGANIZED HEALTH CARE EDUCATION/TRAINING PROGRAM

## 2023-02-17 PROCEDURE — 86038 ANTINUCLEAR ANTIBODIES: CPT | Performed by: INTERNAL MEDICINE

## 2023-02-17 PROCEDURE — 86140 C-REACTIVE PROTEIN: CPT | Performed by: INTERNAL MEDICINE

## 2023-02-17 PROCEDURE — 36415 COLL VENOUS BLD VENIPUNCTURE: CPT | Mod: ,,, | Performed by: STUDENT IN AN ORGANIZED HEALTH CARE EDUCATION/TRAINING PROGRAM

## 2023-02-17 PROCEDURE — 86039 ANTINUCLEAR ANTIBODIES (ANA): CPT | Performed by: INTERNAL MEDICINE

## 2023-02-17 PROCEDURE — 86695 HERPES SIMPLEX TYPE 1 TEST: CPT | Performed by: INTERNAL MEDICINE

## 2023-02-17 PROCEDURE — 86225 DNA ANTIBODY NATIVE: CPT | Performed by: INTERNAL MEDICINE

## 2023-02-17 PROCEDURE — 86235 NUCLEAR ANTIGEN ANTIBODY: CPT | Mod: 59 | Performed by: INTERNAL MEDICINE

## 2023-02-18 LAB
25(OH)D3+25(OH)D2 SERPL-MCNC: 42 NG/ML (ref 30–96)
T3 SERPL-MCNC: 65 NG/DL (ref 60–180)

## 2023-02-20 LAB
ANA PATTERN 1: NORMAL
ANA PATTERN 2: NORMAL
ANA SER QL IF: POSITIVE
ANA TITER 2: NORMAL
ANA TITR SER IF: NORMAL {TITER}
HSV1 IGG SERPL QL IA: POSITIVE
HSV2 IGG SERPL QL IA: NEGATIVE
VARICELLA INTERPRETATION: POSITIVE
VARICELLA ZOSTER IGG: 858.2 AU/ML

## 2023-02-22 LAB
ANTI SM ANTIBODY: 0.08 RATIO (ref 0–0.99)
ANTI SM/RNP ANTIBODY: 0.27 RATIO (ref 0–0.99)
ANTI-SM INTERPRETATION: NEGATIVE
ANTI-SM/RNP INTERPRETATION: NEGATIVE
ANTI-SSA ANTIBODY: 0.08 RATIO (ref 0–0.99)
ANTI-SSA INTERPRETATION: NEGATIVE
ANTI-SSB ANTIBODY: 0.09 RATIO (ref 0–0.99)
ANTI-SSB INTERPRETATION: NEGATIVE
DSDNA AB SER-ACNC: NORMAL [IU]/ML
VZV IGM SER IA-ACNC: 0.43

## 2023-02-23 ENCOUNTER — SPECIALTY PHARMACY (OUTPATIENT)
Dept: PHARMACY | Facility: CLINIC | Age: 53
End: 2023-02-23
Payer: MEDICARE

## 2023-02-23 NOTE — TELEPHONE ENCOUNTER
Specialty Pharmacy - Refill Coordination    Specialty Medication Orders Linked to Encounter      Flowsheet Row Most Recent Value   Medication #1 secukinumab (COSENTYX PEN, 2 PENS,) 150 mg/mL PnIj (Order#[id number], Rx#[id number])            Refill Questions - Documented Responses      Flowsheet Row Most Recent Value   Patient Availability and HIPAA Verification    Does patient want to proceed with activity? Yes   HIPAA/medical authority confirmed? Yes   Relationship to patient of person spoken to? Self   Refill Screening Questions    Changes to allergies? No   Changes to medications? No   New conditions since last clinic visit? No   Unplanned office visit, urgent care, ED, or hospital admission in the last 4 weeks? No   How does patient/caregiver feel medication is working? Good   Financial problems or insurance changes? No   How many doses of your specialty medications were missed in the last 4 weeks? 0   Would patient like to speak to a pharmacist? No   When does the patient need to receive the medication? 03/01/23   Refill Delivery Questions    How will the patient receive the medication? Mail   When does the patient need to receive the medication? 03/01/23   Shipping Address Temporary   Address in [facility] confirmed and updated if neccessary? Yes   Expected Copay ($) 0   Is the patient able to afford the medication copay? Yes   Payment Method zero copay   Days supply of Refill 30   Supplies needed? No supplies needed   Refill activity completed? Yes   Refill activity plan Refill scheduled   Shipment/Pickup Date: 02/27/23            Current Outpatient Medications   Medication Sig    albuterol (PROVENTIL/VENTOLIN HFA) 90 mcg/actuation inhaler Inhale 2 puffs into the lungs every 6 (six) hours as needed. Rescue    baclofen (LIORESAL) 10 MG tablet Take 1 tablet (10 mg total) by mouth 3 (three) times daily.    budesonide (PULMICORT) 0.5 mg/2 mL nebulizer solution Take 2 mLs (0.5 mg total) by nebulization once  daily. Controller    collagen-biotin-ascorbic acid 500 mg-800 mcg- 50 mg Cap Take by mouth.    diclofenac-misoprostol  mg-mcg (ARTHROTEC 75)  mg-mcg per tablet Take 1 tablet by mouth 2 (two) times daily.    ELDERBERRY FRUIT ORAL Take by mouth once daily.    fluticasone propionate (FLONASE) 50 mcg/actuation nasal spray 1 spray (50 mcg total) by Each Nostril route 2 (two) times daily as needed for Rhinitis.    furosemide (LASIX) 20 MG tablet TAKE ONE (1) TABLET EVERY MORNING AS NEEDED FOR FLUID    ibuprofen (ADVIL,MOTRIN) 800 MG tablet Take 1 tablet (800 mg total) by mouth 2 (two) times daily as needed for Pain.    levalbuterol (XOPENEX) 0.31 mg/3 mL nebulizer solution Take 3 mLs (0.31 mg total) by nebulization every 4 (four) hours as needed for Wheezing. Rescue    levothyroxine (SYNTHROID) 50 MCG tablet TAKE ONE (1) TABLET EVERY MORNING ON AN EMPTY STOMACH FOR THYROID    lisinopriL (PRINIVIL,ZESTRIL) 20 MG tablet Take 1 tablet (20 mg total) by mouth once daily.    lovastatin (MEVACOR) 20 MG tablet Take 1 tablet (20 mg total) by mouth every evening.    multivit-min-FA-lycopen-lutein (CENTRUM SILVER) 0.4-300-250 mg-mcg-mcg Tab Take 1 tablet by mouth once daily.    oxyCODONE-acetaminophen (PERCOCET)  mg per tablet Take 1 tablet by mouth 3 (three) times daily as needed for Pain.    promethazine (PHENERGAN) 25 MG tablet Take 1 tablet (25 mg total) by mouth every 8 (eight) hours as needed for Nausea.    secukinumab (COSENTYX PEN, 2 PENS,) 150 mg/mL PnIj Inject 2 mL (300 mg) into the skin every 30 days.    traZODone (DESYREL) 50 MG tablet Take 2 tablets (100 mg total) by mouth every evening.    TRELEGY ELLIPTA 100-62.5-25 mcg DsDv     valACYclovir (VALTREX) 1000 MG tablet Take 1 tablet (1,000 mg total) by mouth every 12 (twelve) hours.    vit C-Zn gluc-herbal no.325 90-15 mg Lozg by Transmucosal route.   Last reviewed on 1/17/2023  8:43 AM by Serena Nelson MA    Review of patient's allergies indicates:    Allergen Reactions    Remeron [mirtazapine] Other (See Comments)     Weight gain    Last reviewed on  2/15/2023 8:40 AM by Juan Carlos Saldivar      Tasks added this encounter   3/24/2023 - Refill Call (Auto Added)   Tasks due within next 3 months   No tasks due.     [facility]  [address] [facility] [address]  Phone: [phone]  Fax: [phone]

## 2023-02-26 ENCOUNTER — PATIENT MESSAGE (OUTPATIENT)
Dept: RHEUMATOLOGY | Facility: CLINIC | Age: 53
End: 2023-02-26
Payer: MEDICARE

## 2023-02-27 ENCOUNTER — PATIENT MESSAGE (OUTPATIENT)
Dept: RHEUMATOLOGY | Facility: CLINIC | Age: 53
End: 2023-02-27
Payer: MEDICARE

## 2023-03-27 ENCOUNTER — SPECIALTY PHARMACY (OUTPATIENT)
Dept: PHARMACY | Facility: CLINIC | Age: 53
End: 2023-03-27
Payer: MEDICARE

## 2023-03-27 NOTE — TELEPHONE ENCOUNTER
Specialty Pharmacy - Refill Coordination    Specialty Medication Orders Linked to Encounter      Flowsheet Row Most Recent Value   Medication #1 secukinumab (COSENTYX PEN, 2 PENS,) 150 mg/mL PnIj (Order#[id number], Rx#[id number])            Refill Questions - Documented Responses      Flowsheet Row Most Recent Value   Patient Availability and HIPAA Verification    Does patient want to proceed with activity? Yes   HIPAA/medical authority confirmed? Yes   Relationship to patient of person spoken to? Self   Refill Screening Questions    Changes to allergies? No   Changes to medications? No   New conditions since last clinic visit? No   Unplanned office visit, urgent care, ED, or hospital admission in the last 4 weeks? No   How does patient/caregiver feel medication is working? Good   Financial problems or insurance changes? No   How many doses of your specialty medications were missed in the last 4 weeks? 0   Would patient like to speak to a pharmacist? No   When does the patient need to receive the medication? 04/01/23   Refill Delivery Questions    How will the patient receive the medication? Mail   When does the patient need to receive the medication? 04/01/23   Shipping Address Home   Address in [facility] confirmed and updated if neccessary? Yes   Expected Copay ($) 0   Is the patient able to afford the medication copay? Yes   Payment Method zero copay   Days supply of Refill 30   Supplies needed? No supplies needed   Refill activity completed? Yes   Refill activity plan Refill scheduled   Shipment/Pickup Date: 03/29/23            Current Outpatient Medications   Medication Sig    albuterol (PROVENTIL/VENTOLIN HFA) 90 mcg/actuation inhaler Inhale 2 puffs into the lungs every 6 (six) hours as needed. Rescue    baclofen (LIORESAL) 10 MG tablet Take 1 tablet (10 mg total) by mouth 3 (three) times daily.    budesonide (PULMICORT) 0.5 mg/2 mL nebulizer solution Take 2 mLs (0.5 mg total) by nebulization once  daily. Controller    collagen-biotin-ascorbic acid 500 mg-800 mcg- 50 mg Cap Take by mouth.    diclofenac-misoprostol  mg-mcg (ARTHROTEC 75)  mg-mcg per tablet Take 1 tablet by mouth 2 (two) times daily.    ELDERBERRY FRUIT ORAL Take by mouth once daily.    fluticasone propionate (FLONASE) 50 mcg/actuation nasal spray 1 spray (50 mcg total) by Each Nostril route 2 (two) times daily as needed for Rhinitis.    furosemide (LASIX) 20 MG tablet TAKE ONE (1) TABLET EVERY MORNING AS NEEDED FOR FLUID    ibuprofen (ADVIL,MOTRIN) 800 MG tablet Take 1 tablet (800 mg total) by mouth 2 (two) times daily as needed for Pain.    levalbuterol (XOPENEX) 0.31 mg/3 mL nebulizer solution Take 3 mLs (0.31 mg total) by nebulization every 4 (four) hours as needed for Wheezing. Rescue    levothyroxine (SYNTHROID) 50 MCG tablet TAKE ONE (1) TABLET EVERY MORNING ON AN EMPTY STOMACH FOR THYROID    lisinopriL (PRINIVIL,ZESTRIL) 20 MG tablet Take 1 tablet (20 mg total) by mouth once daily.    lovastatin (MEVACOR) 20 MG tablet Take 1 tablet (20 mg total) by mouth every evening.    multivit-min-FA-lycopen-lutein (CENTRUM SILVER) 0.4-300-250 mg-mcg-mcg Tab Take 1 tablet by mouth once daily.    oxyCODONE-acetaminophen (PERCOCET)  mg per tablet Take 1 tablet by mouth 3 (three) times daily as needed for Pain.    promethazine (PHENERGAN) 25 MG tablet Take 1 tablet (25 mg total) by mouth every 8 (eight) hours as needed for Nausea.    secukinumab (COSENTYX PEN, 2 PENS,) 150 mg/mL PnIj Inject 2 mL (300 mg) into the skin every 30 days.    traZODone (DESYREL) 50 MG tablet Take 2 tablets (100 mg total) by mouth every evening.    TRELEGY ELLIPTA 100-62.5-25 mcg DsDv     valACYclovir (VALTREX) 1000 MG tablet Take 1 tablet (1,000 mg total) by mouth every 12 (twelve) hours.    vit C-Zn gluc-herbal no.325 90-15 mg Lozg by Transmucosal route.   Last reviewed on 1/17/2023  8:43 AM by Serena Nelson MA    Review of patient's allergies indicates:    Allergen Reactions    Remeron [mirtazapine] Other (See Comments)     Weight gain    Last reviewed on  2/15/2023 8:40 AM by Juan Carlos Saldivar      Tasks added this encounter   4/24/2023 - Refill Call (Auto Added)   Tasks due within next 3 months   5/24/2023 - Clinical - Follow Up Assesement (Annual)     [facility]  [address]  [facility] [address]  Phone: [phone]  Fax: [phone]

## 2023-03-31 ENCOUNTER — OFFICE VISIT (OUTPATIENT)
Dept: RHEUMATOLOGY | Facility: CLINIC | Age: 53
End: 2023-03-31
Payer: MEDICARE

## 2023-03-31 VITALS
WEIGHT: 216.25 LBS | DIASTOLIC BLOOD PRESSURE: 76 MMHG | HEART RATE: 90 BPM | SYSTOLIC BLOOD PRESSURE: 136 MMHG | HEIGHT: 69 IN | BODY MASS INDEX: 32.03 KG/M2

## 2023-03-31 DIAGNOSIS — R10.9 ABDOMINAL PAIN, UNSPECIFIED ABDOMINAL LOCATION: ICD-10-CM

## 2023-03-31 DIAGNOSIS — L40.50 PSA (PSORIATIC ARTHRITIS): Primary | ICD-10-CM

## 2023-03-31 DIAGNOSIS — D84.821 IMMUNOCOMPROMISED STATE DUE TO DRUG THERAPY: ICD-10-CM

## 2023-03-31 DIAGNOSIS — R11.0 NAUSEA: ICD-10-CM

## 2023-03-31 DIAGNOSIS — R74.8 LOW SERUM ALKALINE PHOSPHATASE: ICD-10-CM

## 2023-03-31 DIAGNOSIS — Z79.899 IMMUNOCOMPROMISED STATE DUE TO DRUG THERAPY: ICD-10-CM

## 2023-03-31 PROCEDURE — 1160F PR REVIEW ALL MEDS BY PRESCRIBER/CLIN PHARMACIST DOCUMENTED: ICD-10-PCS | Mod: CPTII,S$GLB,, | Performed by: PHYSICIAN ASSISTANT

## 2023-03-31 PROCEDURE — 1159F PR MEDICATION LIST DOCUMENTED IN MEDICAL RECORD: ICD-10-PCS | Mod: CPTII,S$GLB,, | Performed by: PHYSICIAN ASSISTANT

## 2023-03-31 PROCEDURE — 99214 OFFICE O/P EST MOD 30 MIN: CPT | Mod: 25,S$GLB,, | Performed by: PHYSICIAN ASSISTANT

## 2023-03-31 PROCEDURE — 3008F BODY MASS INDEX DOCD: CPT | Mod: CPTII,S$GLB,, | Performed by: PHYSICIAN ASSISTANT

## 2023-03-31 PROCEDURE — 96372 THER/PROPH/DIAG INJ SC/IM: CPT | Mod: S$GLB,,, | Performed by: PHYSICIAN ASSISTANT

## 2023-03-31 PROCEDURE — 99999 PR PBB SHADOW E&M-EST. PATIENT-LVL IV: CPT | Mod: PBBFAC,,, | Performed by: PHYSICIAN ASSISTANT

## 2023-03-31 PROCEDURE — 99214 PR OFFICE/OUTPT VISIT, EST, LEVL IV, 30-39 MIN: ICD-10-PCS | Mod: 25,S$GLB,, | Performed by: PHYSICIAN ASSISTANT

## 2023-03-31 PROCEDURE — 3075F PR MOST RECENT SYSTOLIC BLOOD PRESS GE 130-139MM HG: ICD-10-PCS | Mod: CPTII,S$GLB,, | Performed by: PHYSICIAN ASSISTANT

## 2023-03-31 PROCEDURE — 4010F PR ACE/ARB THEARPY RXD/TAKEN: ICD-10-PCS | Mod: CPTII,S$GLB,, | Performed by: PHYSICIAN ASSISTANT

## 2023-03-31 PROCEDURE — 4010F ACE/ARB THERAPY RXD/TAKEN: CPT | Mod: CPTII,S$GLB,, | Performed by: PHYSICIAN ASSISTANT

## 2023-03-31 PROCEDURE — 1159F MED LIST DOCD IN RCRD: CPT | Mod: CPTII,S$GLB,, | Performed by: PHYSICIAN ASSISTANT

## 2023-03-31 PROCEDURE — 3078F DIAST BP <80 MM HG: CPT | Mod: CPTII,S$GLB,, | Performed by: PHYSICIAN ASSISTANT

## 2023-03-31 PROCEDURE — 96372 PR INJECTION,THERAP/PROPH/DIAG2ST, IM OR SUBCUT: ICD-10-PCS | Mod: S$GLB,,, | Performed by: PHYSICIAN ASSISTANT

## 2023-03-31 PROCEDURE — 3008F PR BODY MASS INDEX (BMI) DOCUMENTED: ICD-10-PCS | Mod: CPTII,S$GLB,, | Performed by: PHYSICIAN ASSISTANT

## 2023-03-31 PROCEDURE — 3075F SYST BP GE 130 - 139MM HG: CPT | Mod: CPTII,S$GLB,, | Performed by: PHYSICIAN ASSISTANT

## 2023-03-31 PROCEDURE — 3078F PR MOST RECENT DIASTOLIC BLOOD PRESSURE < 80 MM HG: ICD-10-PCS | Mod: CPTII,S$GLB,, | Performed by: PHYSICIAN ASSISTANT

## 2023-03-31 PROCEDURE — 99999 PR PBB SHADOW E&M-EST. PATIENT-LVL IV: ICD-10-PCS | Mod: PBBFAC,,, | Performed by: PHYSICIAN ASSISTANT

## 2023-03-31 PROCEDURE — 1160F RVW MEDS BY RX/DR IN RCRD: CPT | Mod: CPTII,S$GLB,, | Performed by: PHYSICIAN ASSISTANT

## 2023-03-31 RX ORDER — CYANOCOBALAMIN 1000 UG/ML
1000 INJECTION, SOLUTION INTRAMUSCULAR; SUBCUTANEOUS
Status: COMPLETED | OUTPATIENT
Start: 2023-03-31 | End: 2023-03-31

## 2023-03-31 RX ORDER — PROMETHAZINE HYDROCHLORIDE 25 MG/1
25 TABLET ORAL EVERY 8 HOURS PRN
Qty: 45 TABLET | Refills: 3 | Status: SHIPPED | OUTPATIENT
Start: 2023-03-31 | End: 2024-03-05

## 2023-03-31 RX ORDER — DEXAMETHASONE SODIUM PHOSPHATE 4 MG/ML
8 INJECTION, SOLUTION INTRA-ARTICULAR; INTRALESIONAL; INTRAMUSCULAR; INTRAVENOUS; SOFT TISSUE
Status: COMPLETED | OUTPATIENT
Start: 2023-03-31 | End: 2023-03-31

## 2023-03-31 RX ORDER — KETOROLAC TROMETHAMINE 30 MG/ML
60 INJECTION, SOLUTION INTRAMUSCULAR; INTRAVENOUS
Status: COMPLETED | OUTPATIENT
Start: 2023-03-31 | End: 2023-03-31

## 2023-03-31 RX ADMIN — KETOROLAC TROMETHAMINE 60 MG: 30 INJECTION, SOLUTION INTRAMUSCULAR; INTRAVENOUS at 11:03

## 2023-03-31 RX ADMIN — CYANOCOBALAMIN 1000 MCG: 1000 INJECTION, SOLUTION INTRAMUSCULAR; SUBCUTANEOUS at 11:03

## 2023-03-31 RX ADMIN — DEXAMETHASONE SODIUM PHOSPHATE 8 MG: 4 INJECTION, SOLUTION INTRA-ARTICULAR; INTRALESIONAL; INTRAMUSCULAR; INTRAVENOUS; SOFT TISSUE at 11:03

## 2023-03-31 ASSESSMENT — ROUTINE ASSESSMENT OF PATIENT INDEX DATA (RAPID3)
MDHAQ FUNCTION SCORE: 1.2
PAIN SCORE: 8
TOTAL RAPID3 SCORE: 6
PATIENT GLOBAL ASSESSMENT SCORE: 6
PSYCHOLOGICAL DISTRESS SCORE: 3.3
FATIGUE SCORE: 2.2

## 2023-03-31 NOTE — PROGRESS NOTES
Subjective:       Patient ID: Boom Blanco is a 53 y.o. female.    Chief Complaint: Disease Management    Mrs. Blanco is a 53 year old female who presents to clinic for follow up on psoriatic arthritis. She is doing fair on cosentyx 300 mg. She has noticed an improvement in her pain, but arthritis remains active in her hands, elbows, knees, hips, and ankles. Pain is constant and aching in nature. She is previously had bilateral knee replacements.     She has chronic low back pain and is considering surgery with Dr. Cohn.    She is undergoing work up with GI for chronic abdominal pain. She reports EGD unrevealing.       Review of Systems   Constitutional:  Positive for activity change. Negative for appetite change, chills, fatigue and fever.   Eyes:  Negative for visual disturbance.   Respiratory:  Negative for cough and shortness of breath.    Cardiovascular:  Negative for chest pain, palpitations and leg swelling.   Gastrointestinal:  Positive for nausea. Negative for abdominal pain, constipation, diarrhea and vomiting.   Musculoskeletal:  Positive for arthralgias, back pain, joint swelling, myalgias, neck pain and neck stiffness.   Allergic/Immunologic: Positive for immunocompromised state.   Neurological:  Negative for dizziness, weakness, light-headedness and headaches.       Objective:     Vitals:    03/31/23 1007   BP: 136/76   Pulse: 90       Past Medical History:   Diagnosis Date    Anxiety disorder     Arthritis     Bipolar disorder     Chronic pain     Diverticulitis     Hyperlipidemia     Hypertension     Lupus 2006    Psoriatic arthritis     Raynaud's disease      Past Surgical History:   Procedure Laterality Date    ARTHROSCOPIC CHONDROPLASTY OF KNEE JOINT Left 1/22/2019    Procedure: ARTHROSCOPY, KNEE, WITH CHONDROPLASTY;  Surgeon: Sylvain Gorman DO;  Location: [facility] OR;  Service: Orthopedics;  Laterality: Left;    ARTHROSCOPY OF KNEE Left 1/22/2019    Procedure: ARTHROSCOPY, KNEE;   Surgeon: Sylvain Gorman DO;  Location: [facility] OR;  Service: Orthopedics;  Laterality: Left;  Equipment: [place] Chondral Drill Set and [employer]itek Meniscus Repair Set Truespan  Vendor/Rep: [employer]    ARTHROSCOPY OF KNEE Right 2/5/2019    Procedure: ARTHROSCOPY, KNEE;  Surgeon: Sylvain Gorman DO;  Location: [facility] OR;  Service: Orthopedics;  Laterality: Right;  Equipment: Mitek Truspar; Scope  Venor/Rep: [employer]    BACK SURGERY      spinal stimulator implanted and then removed    BREAST BIOPSY      COLON SURGERY      COLONOSCOPY  11/25/2016    repeat in 5-10 years    COLONOSCOPY N/A 9/6/2019    Procedure: COLONOSCOPY;  Surgeon: Dany Hugo MD;  Location: [facility] ENDO;  Service: General;  Laterality: N/A;    ENDOSCOPY  9/6/2019    Procedure: ENDOSCOPY;  Surgeon: Dany Hugo MD;  Location: [facility];  Service: General;;  with multiple biopsy's    ESOPHAGOGASTRODUODENOSCOPY Left 8/29/2018    Procedure: ESOPHAGOGASTRODUODENOSCOPY (EGD);  Surgeon: Dany Hugo MD;  Location: [facility] ENDO;  Service: General;  Laterality: Left;    EXCISION OF MEDIAL MENISCUS OF KNEE Right 2/5/2019    Procedure: MENISCECTOMY, KNEE, MEDIAL;  Surgeon: Sylvain Gorman DO;  Location: [facility] OR;  Service: Orthopedics;  Laterality: Right;    HYSTERECTOMY  1997    KNEE ARTHROSCOPY W/ DEBRIDEMENT Left 1/22/2019    Procedure: ARTHROSCOPY, KNEE, WITH DEBRIDEMENT;  Surgeon: Sylvain Gorman DO;  Location: [facility] OR;  Service: Orthopedics;  Laterality: Left;    KNEE ARTHROSCOPY W/ MENISCECTOMY Left 1/22/2019    Procedure: ARTHROSCOPY, KNEE, WITH MENISCECTOMY;  Surgeon: Sylvain Gorman DO;  Location: [facility] OR;  Service: Orthopedics;  Laterality: Left;    KNEE ARTHROSCOPY W/ PLICA EXCISION Left 1/22/2019    Procedure: EXCISION, PLICA, KNEE, ARTHROSCOPIC;  Surgeon: Sylvain Gorman DO;  Location: [facility] OR;  Service: Orthopedics;  Laterality: Left;    SALPINGOOPHORECTOMY  1997    TOTAL KNEE ARTHROPLASTY Right 4/18/2019    Procedure:  ARTHROPLASTY, KNEE, TOTAL;  Surgeon: Sylvain Gorman DO;  Location: [facility] OR;  Service: Orthopedics;  Laterality: Right;  Equipment: Depuy Sigma Total Knee System; [employer] Leg Mcclellan  Vendor/Rep: [employer]  Table/Position: Supine  REQUIRES FIRST ASSISTANT EPHRAIM    TOTAL KNEE ARTHROPLASTY Left 10/3/2019    Procedure: ARTHROPLASTY, KNEE, TOTAL;  Surgeon: Sylvain Gorman DO;  Location: [facility] OR;  Service: Orthopedics;  Laterality: Left;  Equipment: [employer]uy Sigma Total Knee System  Vendor/Rep: [employer]  REQUIRES FIRST ASSISTANT EPHRAIM    TUBAL LIGATION            Physical Exam   Eyes: Right conjunctiva is not injected. Left conjunctiva is not injected.   Neck: No JVD present. No thyromegaly present.   Cardiovascular: Normal rate and regular rhythm. Exam reveals no decreased pulses.   Pulmonary/Chest: Effort normal.   Musculoskeletal:      Right shoulder: Normal.      Left shoulder: Normal.      Right elbow: Tenderness present.      Left elbow: Tenderness present.      Right wrist: Tenderness present.      Left wrist: Tenderness present.      Right knee: Tenderness present.      Left knee: Tenderness present.   Lymphadenopathy:     She has no cervical adenopathy.   Neurological: Gait normal.   Skin: No rash noted.   Psychiatric: Mood and affect normal.       Right Side Rheumatological Exam     Examination finds the shoulder normal.    The patient is tender to palpation of the elbow, wrist, knee, 1st PIP, 1st MCP, 2nd PIP, 2nd MCP, 3rd PIP, 3rd MCP, 4th PIP, 4th MCP, 5th PIP and 5th MCP    She has swelling of the 1st PIP, 2nd PIP, 3rd PIP, 4th PIP and 5th PIP    Left Side Rheumatological Exam     Examination finds the shoulder normal.    The patient is tender to palpation of the elbow, wrist, knee, 1st PIP, 1st MCP, 2nd PIP, 2nd MCP, 3rd PIP, 3rd MCP, 4th PIP, 4th MCP, 5th PIP and 5th MCP.    She has swelling of the 1st PIP, 2nd PIP, 3rd PIP, 4th PIP and 5th PIP      Labs reviewed:  Component      Latest Ref Rng & Units  2/17/2023   WBC      3.90 - 12.70 K/uL 6.67   RBC      4.00 - 5.40 M/uL 3.92 (L)   Hemoglobin      12.0 - 16.0 g/dL 12.0   Hematocrit      37.0 - 48.5 % 36.6 (L)   MCV      82 - 98 fL 93   MCH      27.0 - 31.0 pg 30.6   MCHC      32.0 - 36.0 g/dL 32.8   RDW      11.5 - 14.5 % 13.3   Platelets      150 - 450 K/uL 160   MPV      9.2 - 12.9 fL 10.8   Immature Granulocytes      0.0 - 0.5 % 0.1   Gran # (ANC)      1.8 - 7.7 K/uL 4.2   Immature Grans (Abs)      0.00 - 0.04 K/uL 0.01   Lymph #      1.0 - 4.8 K/uL 1.6   Mono #      0.3 - 1.0 K/uL 0.6   Eos #      0.0 - 0.5 K/uL 0.2   Baso #      0.00 - 0.20 K/uL 0.06   nRBC      0 /100 WBC 0   Gran %      38.0 - 73.0 % 62.9   Lymph %      18.0 - 48.0 % 24.6   Mono %      4.0 - 15.0 % 8.2   Eosinophil %      0.0 - 8.0 % 3.3   Basophil %      0.0 - 1.9 % 0.9   Differential Method       Automated   Sodium      136 - 145 mmol/L 140   Potassium      3.5 - 5.1 mmol/L 4.0   Chloride      95 - 110 mmol/L 105   CO2      23 - 29 mmol/L 27   Glucose      70 - 110 mg/dL 101   BUN      6 - 20 mg/dL 15   Creatinine      0.5 - 1.4 mg/dL 1.0   Calcium      8.7 - 10.5 mg/dL 9.0   PROTEIN TOTAL      6.0 - 8.4 g/dL 7.0   Albumin      3.5 - 5.2 g/dL 3.8   BILIRUBIN TOTAL      0.1 - 1.0 mg/dL 0.3   Alkaline Phosphatase      55 - 135 U/L 38 (L)   AST      10 - 40 U/L 25   ALT      10 - 44 U/L 28   Anion Gap      8 - 16 mmol/L 8   eGFR      >60 mL/min/1.73 m:2 >60.0   Anti Sm Antibody      0.00 - 0.99 Ratio 0.08   Anti-Sm Interpretation      Negative Negative   Anti-SSA Antibody      0.00 - 0.99 Ratio 0.08   Anti-SSA Interpretation      Negative Negative   Anti-SSB Antibody      0.00 - 0.99 Ratio 0.09   Anti-SSB Interpretation      Negative Negative   ds DNA Ab      Negative 1:10 Negative 1:10   Anti Sm/RNP Antibody      0.00 - 0.99 Ratio 0.27   Anti-Sm/RNP Interpretation      Negative Negative   Varicella IgG      AU/ml 858.20   Varicella Interpretation       Positive   HSV 1 IgG      Negative  Positive (A)   HSV 2 IgG      Negative Negative   Varicella IgM       0.43   Sed Rate      0 - 20 mm/Hr 7   CRP      0.0 - 8.2 mg/L 3.3   TSH      0.400 - 4.000 uIU/mL 3.457   Free T4      0.71 - 1.51 ng/dL 1.19   T3, Total      60 - 180 ng/dL 65   Vit D, 25-Hydroxy      30 - 96 ng/mL 42   DORY Screen      Negative <1:80 Positive (A)   DORY Pattern 2       Nucleolar   DORY Titer 1       1:640   DORY PATTERN 1       Homogeneous   DORY Titer 2       1:640        Assessment:       1. PSA (psoriatic arthritis)    2. Low serum alkaline phosphatase    3. Abdominal pain, unspecified abdominal location    4. Nausea    5. Immunocompromised state due to drug therapy            Plan:       PSA (psoriatic arthritis)  -     ketorolac injection 60 mg  -     dexAMETHasone injection 8 mg  -     cyanocobalamin injection 1,000 mcg    Low serum alkaline phosphatase  -     Celiac Disease HLA Genotyping; Future; Expected date: 03/31/2023    Abdominal pain, unspecified abdominal location  -     Celiac Disease HLA Genotyping; Future; Expected date: 03/31/2023    Nausea  -     promethazine (PHENERGAN) 25 MG tablet; Take 1 tablet (25 mg total) by mouth every 8 (eight) hours as needed for Nausea.  Dispense: 45 tablet; Refill: 3    Immunocompromised state due to drug therapy          Assessment:  53 year old female with  Psoriatic arthritis, DORY 1:2560 nucleolar, raynauds  --renal insufficiency  --nausea  --chronic pain syndrome    Plan:  1. toradol 60, dexa 8 mg, b12 given today  2. Cont phenergan prn  3. Cont cosentyx 300 mg monthly. Hold 1 month prior to spine surgery and after until cleared by [facility].  4. Check celiac testing  5. Cont arthrotec bid prn    Follow up:  4 mo Dr. Molina

## 2023-04-13 RX ORDER — OXYCODONE AND ACETAMINOPHEN 10; 325 MG/1; MG/1
1 TABLET ORAL 3 TIMES DAILY PRN
Qty: 90 TABLET | Refills: 0 | Status: SHIPPED | OUTPATIENT
Start: 2023-04-13 | End: 2023-05-12 | Stop reason: SDUPTHER

## 2023-04-13 NOTE — TELEPHONE ENCOUNTER
----- Message from Fariba Platt sent at 4/13/2023  2:02 PM CDT -----  Contact: pt  Type:  RX Refill Request    Who Called:  pt  Refill or New Rx:  refill-- had appt in May   RX Name and Strength:      1. oxyCODONE-acetaminophen (PERCOCET)  mg per tablet 90 tablet 0 3/15/2023  No  Sig - Route: Take 1 tablet by mouth 3 (three) times daily as needed for Pain. - Oral      How is the patient currently taking it? (ex. 1XDay):  as order  Is this a 30 day or 90 day RX:  as order    Preferred Pharmacy with phone number:    [facility] [place] [facility] [id number]   [address] [place] [address]  Phone: [phone] Fax: [phone]          Local or Mail Order:  local  Ordering Provider:  yasir Jimenez Call Back Number:  [phone]  Additional Information:  pt sent a request through her portal days ago and no response thought you should know

## 2023-04-17 ENCOUNTER — PATIENT MESSAGE (OUTPATIENT)
Dept: RHEUMATOLOGY | Facility: CLINIC | Age: 53
End: 2023-04-17
Payer: MEDICARE

## 2023-04-18 NOTE — TELEPHONE ENCOUNTER
Faby Peralta,  Patient is requesting these medications:   Parafon forte 500 mg TID  Percocet  mg TID  Flonase nasal spray         Second call to patient due to medication refill request and medication sent to the wrong pharmacy. Unavailable message left for return call.    Faby Saldivar,  Patient left a message stating that medication was sent to the wrong pharmacy. Patient is requesting [facility]. [address] [address]  Phone: [phone] Fax: [phone]  Please review and advise.  Thank you  Signed:  Andres George LPN    Call placed to patient due to message left, patient currently not available, message left for return call.  ----- Message from Trish Morales sent at 11/15/2022  8:21 AM CST -----  Contact: Patient  Type:  Needs Medical Advice    Who Called: Patient     Pharmacy name and phone #:      [facility] [place] [address] [address] [address]  Phone: [phone] Fax: [phone]        Would the patient rather a call back or a response via [facility]? Call    Best Call Back Number: [phone] (home)     Additional Information: Patients medication was called in to the [facility] pharmacy. Please call to advise          Detail Level: Simple

## 2023-04-24 ENCOUNTER — TELEPHONE (OUTPATIENT)
Dept: RHEUMATOLOGY | Facility: CLINIC | Age: 53
End: 2023-04-24
Payer: MEDICARE

## 2023-04-24 ENCOUNTER — SPECIALTY PHARMACY (OUTPATIENT)
Dept: PHARMACY | Facility: CLINIC | Age: 53
End: 2023-04-24
Payer: MEDICARE

## 2023-04-24 DIAGNOSIS — L40.50 PSA (PSORIATIC ARTHRITIS): Primary | ICD-10-CM

## 2023-04-24 RX ORDER — CELECOXIB 200 MG/1
200 CAPSULE ORAL DAILY
Qty: 30 CAPSULE | Refills: 3 | Status: SHIPPED | OUTPATIENT
Start: 2023-04-24 | End: 2023-05-15

## 2023-04-24 NOTE — TELEPHONE ENCOUNTER
----- Message from Gogocesar Ji sent at 4/24/2023  9:08 AM CDT -----  Regarding: meds  Type: Needs Medical Advice  Who Called:  pt   Symptoms (please be specific):  sickness cant walk   How long has patient had these symptoms:  since meds   Pharmacy name and phone #:    [facility] [place] [facility] [address]   [address] [place] [address]  Phone: [phone] Fax: [phone]        Best Call Back Number: [phone]    Additional Information: pt is wanting diff med due to this one not working .    diclofenac-misoprostol  mg-mcg (ARTHROTEC 75)  mg-mcg

## 2023-04-24 NOTE — TELEPHONE ENCOUNTER
Pt is requesting a different medication stating Arthrotec isn't working    Yessica Deshpande MA  [facility]  4/24/2023

## 2023-04-24 NOTE — TELEPHONE ENCOUNTER
Specialty Pharmacy - Refill Coordination    Specialty Medication Orders Linked to Encounter      Flowsheet Row Most Recent Value   Medication #1 secukinumab (COSENTYX PEN, 2 PENS,) 150 mg/mL PnIj (Order#[id number], Rx#[id number])            Refill Questions - Documented Responses      Flowsheet Row Most Recent Value   Refill Screening Questions    Changes to allergies? No   Changes to medications? No   New conditions since last clinic visit? No   Unplanned office visit, urgent care, ED, or hospital admission in the last 4 weeks? No   How does patient/caregiver feel medication is working? Good   Financial problems or insurance changes? No   How many doses of your specialty medications were missed in the last 4 weeks? 0   Would patient like to speak to a pharmacist? No   When does the patient need to receive the medication? 05/01/23   Refill Delivery Questions    How will the patient receive the medication? Mail   When does the patient need to receive the medication? 05/01/23   Shipping Address Temporary   Address in [facility] confirmed and updated if neccessary? Yes   Expected Copay ($) 0   Is the patient able to afford the medication copay? Yes   Payment Method zero copay   Days supply of Refill 30   Supplies needed? No supplies needed   Refill activity completed? Yes   Refill activity plan Refill scheduled   Shipment/Pickup Date: 04/26/23            Current Outpatient Medications   Medication Sig    albuterol (PROVENTIL/VENTOLIN HFA) 90 mcg/actuation inhaler Inhale 2 puffs into the lungs every 6 (six) hours as needed. Rescue    baclofen (LIORESAL) 10 MG tablet Take 1 tablet (10 mg total) by mouth 3 (three) times daily.    budesonide (PULMICORT) 0.5 mg/2 mL nebulizer solution Take 2 mLs (0.5 mg total) by nebulization once daily. Controller    collagen-biotin-ascorbic acid 500 mg-800 mcg- 50 mg Cap Take by mouth.    diclofenac-misoprostol  mg-mcg (ARTHROTEC 75)  mg-mcg per tablet Take 1 tablet by  mouth 2 (two) times daily.    ELDERBERRY FRUIT ORAL Take by mouth once daily.    fluticasone propionate (FLONASE) 50 mcg/actuation nasal spray 1 spray (50 mcg total) by Each Nostril route 2 (two) times daily as needed for Rhinitis.    furosemide (LASIX) 20 MG tablet TAKE ONE (1) TABLET EVERY MORNING AS NEEDED FOR FLUID    levalbuterol (XOPENEX) 0.31 mg/3 mL nebulizer solution Take 3 mLs (0.31 mg total) by nebulization every 4 (four) hours as needed for Wheezing. Rescue    levothyroxine (SYNTHROID) 50 MCG tablet TAKE ONE (1) TABLET EVERY MORNING ON AN EMPTY STOMACH FOR THYROID    lisinopriL (PRINIVIL,ZESTRIL) 20 MG tablet Take 1 tablet (20 mg total) by mouth once daily.    lovastatin (MEVACOR) 20 MG tablet Take 1 tablet (20 mg total) by mouth every evening.    multivit-min-FA-lycopen-lutein (CENTRUM SILVER) 0.4-300-250 mg-mcg-mcg Tab Take 1 tablet by mouth once daily.    oxyCODONE-acetaminophen (PERCOCET)  mg per tablet Take 1 tablet by mouth 3 (three) times daily as needed for Pain.    promethazine (PHENERGAN) 25 MG tablet Take 1 tablet (25 mg total) by mouth every 8 (eight) hours as needed for Nausea.    secukinumab (COSENTYX PEN, 2 PENS,) 150 mg/mL PnIj Inject 2 mL (300 mg) into the skin every 30 days.    traZODone (DESYREL) 50 MG tablet Take 2 tablets (100 mg total) by mouth every evening.    TRELEGY ELLIPTA 100-62.5-25 mcg DsDv     valACYclovir (VALTREX) 1000 MG tablet Take 1 tablet (1,000 mg total) by mouth every 12 (twelve) hours.    vit C-Zn gluc-herbal no.325 90-15 mg Lozg by Transmucosal route.   Last reviewed on 3/31/2023 10:39 AM by Kisha Leyva PA-C    Review of patient's allergies indicates:   Allergen Reactions    Fentanyl Shortness Of Breath    Remeron [mirtazapine] Other (See Comments)     Weight gain    Last reviewed on  3/31/2023 10:39 AM by Kisha Leyva      Tasks added this encounter   No tasks added.   Tasks due within next 3 months   5/24/2023 - Clinical Assessment (1 year  recurrence)  4/24/2023 - Refill Coordination Outreach (1 time occurrence)     [facility]  [address]  [facility] [address]  Phone: [phone]  Fax: [phone]

## 2023-04-26 ENCOUNTER — TELEPHONE (OUTPATIENT)
Dept: RHEUMATOLOGY | Facility: CLINIC | Age: 53
End: 2023-04-26
Payer: MEDICARE

## 2023-04-26 ENCOUNTER — HOSPITAL ENCOUNTER (OUTPATIENT)
Dept: RADIOLOGY | Facility: HOSPITAL | Age: 53
Discharge: HOME | End: 2023-04-26
Attending: NURSE PRACTITIONER
Payer: MEDICARE

## 2023-04-26 DIAGNOSIS — C67.9 MALIGNANT NEOPLASM OF URINARY BLADDER, UNSPECIFIED SITE: ICD-10-CM

## 2023-04-26 DIAGNOSIS — M06.9 RHEUMATOID ARTHRITIS INVOLVING LEFT KNEE, UNSPECIFIED WHETHER RHEUMATOID FACTOR PRESENT: ICD-10-CM

## 2023-04-26 DIAGNOSIS — G89.4 CHRONIC PAIN SYNDROME: ICD-10-CM

## 2023-04-26 DIAGNOSIS — J41.0 SIMPLE CHRONIC BRONCHITIS: ICD-10-CM

## 2023-04-26 DIAGNOSIS — D84.821 IMMUNOCOMPROMISED STATE DUE TO DRUG THERAPY: ICD-10-CM

## 2023-04-26 DIAGNOSIS — R19.7 DIARRHEA, UNSPECIFIED TYPE: ICD-10-CM

## 2023-04-26 DIAGNOSIS — E78.49 OTHER HYPERLIPIDEMIA: ICD-10-CM

## 2023-04-26 DIAGNOSIS — E03.8 OTHER SPECIFIED HYPOTHYROIDISM: ICD-10-CM

## 2023-04-26 DIAGNOSIS — K90.0 CELIAC DISEASE: ICD-10-CM

## 2023-04-26 DIAGNOSIS — I73.00 RAYNAUD'S DISEASE WITHOUT GANGRENE: ICD-10-CM

## 2023-04-26 DIAGNOSIS — K57.30 DIVERTICULOSIS OF COLON: ICD-10-CM

## 2023-04-26 DIAGNOSIS — K21.00 GASTROESOPHAGEAL REFLUX DISEASE WITH ESOPHAGITIS, UNSPECIFIED WHETHER HEMORRHAGE: ICD-10-CM

## 2023-04-26 DIAGNOSIS — R92.8 ABNORMAL MAMMOGRAM OF BOTH BREASTS: ICD-10-CM

## 2023-04-26 DIAGNOSIS — J12.82 PNEUMONIA DUE TO COVID-19 VIRUS: ICD-10-CM

## 2023-04-26 DIAGNOSIS — Z91.018 WHEAT ALLERGY: ICD-10-CM

## 2023-04-26 DIAGNOSIS — M17.0 PRIMARY OSTEOARTHRITIS OF BOTH KNEES: ICD-10-CM

## 2023-04-26 DIAGNOSIS — Z79.899 IMMUNOCOMPROMISED STATE DUE TO DRUG THERAPY: ICD-10-CM

## 2023-04-26 DIAGNOSIS — I10 BENIGN HYPERTENSION: ICD-10-CM

## 2023-04-26 DIAGNOSIS — K90.9 INTESTINAL MALABSORPTION, UNSPECIFIED TYPE: ICD-10-CM

## 2023-04-26 DIAGNOSIS — R10.9 ABDOMINAL PAIN, UNSPECIFIED ABDOMINAL LOCATION: ICD-10-CM

## 2023-04-26 DIAGNOSIS — U07.1 PNEUMONIA DUE TO COVID-19 VIRUS: ICD-10-CM

## 2023-04-26 DIAGNOSIS — R74.8 LOW SERUM ALKALINE PHOSPHATASE: ICD-10-CM

## 2023-04-26 DIAGNOSIS — L40.50 PSA (PSORIATIC ARTHRITIS): Primary | ICD-10-CM

## 2023-04-26 DIAGNOSIS — K29.30 CHRONIC SUPERFICIAL GASTRITIS WITHOUT BLEEDING: ICD-10-CM

## 2023-04-26 DIAGNOSIS — D51.1 VIT B12 DEFIC ANEMIA D/T SLCTV VIT B12 MALABSORP W PROTEIN: ICD-10-CM

## 2023-04-26 DIAGNOSIS — R94.6 ABNORMAL RESULTS OF THYROID FUNCTION STUDIES: ICD-10-CM

## 2023-04-26 PROCEDURE — 76642 US BREAST BILATERAL LIMITED: ICD-10-PCS | Mod: 26,50,, | Performed by: RADIOLOGY

## 2023-04-26 PROCEDURE — 76642 ULTRASOUND BREAST LIMITED: CPT | Mod: TC,50

## 2023-04-26 PROCEDURE — 76642 ULTRASOUND BREAST LIMITED: CPT | Mod: 26,50,, | Performed by: RADIOLOGY

## 2023-04-26 PROCEDURE — 77066 DX MAMMO INCL CAD BI: CPT | Mod: 26,,, | Performed by: RADIOLOGY

## 2023-04-26 PROCEDURE — 77062 BREAST TOMOSYNTHESIS BI: CPT | Mod: 26,,, | Performed by: RADIOLOGY

## 2023-04-26 PROCEDURE — 77066 DX MAMMO INCL CAD BI: CPT | Mod: TC

## 2023-04-26 PROCEDURE — 77066 MAMMO DIGITAL DIAGNOSTIC BILAT WITH TOMO: ICD-10-PCS | Mod: 26,,, | Performed by: RADIOLOGY

## 2023-04-26 PROCEDURE — 77062 MAMMO DIGITAL DIAGNOSTIC BILAT WITH TOMO: ICD-10-PCS | Mod: 26,,, | Performed by: RADIOLOGY

## 2023-04-28 ENCOUNTER — TELEPHONE (OUTPATIENT)
Dept: RHEUMATOLOGY | Facility: CLINIC | Age: 53
End: 2023-04-28

## 2023-04-28 NOTE — TELEPHONE ENCOUNTER
----- Message from Joleen Ríos sent at 4/26/2023  3:13 PM CDT -----  Regarding: advice  Contact: PAtient  Type: Needs Medical Advice  Who Called:  Patient   Symptoms (please be specific):    How long has patient had these symptoms:    Pharmacy name and phone #:    Best Call Back Number: [phone]  Additional Information: Patient stated that she has been awaiting for blood test (HLA CELIAC CL2) and patient stated that they need a code. Patient would like to know what is going on. Patient stated that this test may need a different code. Please contact to advise. Thanks!

## 2023-05-07 ENCOUNTER — PATIENT MESSAGE (OUTPATIENT)
Dept: URGENT CARE | Facility: CLINIC | Age: 53
End: 2023-05-07
Payer: MEDICARE

## 2023-05-07 DIAGNOSIS — R92.8 ABNORMAL MAMMOGRAM OF BOTH BREASTS: Primary | ICD-10-CM

## 2023-05-08 ENCOUNTER — PATIENT MESSAGE (OUTPATIENT)
Dept: FAMILY MEDICINE | Facility: CLINIC | Age: 53
End: 2023-05-08
Payer: MEDICARE

## 2023-05-12 RX ORDER — OXYCODONE AND ACETAMINOPHEN 10; 325 MG/1; MG/1
1 TABLET ORAL 3 TIMES DAILY PRN
Qty: 90 TABLET | Refills: 0 | Status: SHIPPED | OUTPATIENT
Start: 2023-05-12 | End: 2023-06-08 | Stop reason: SDUPTHER

## 2023-05-12 NOTE — TELEPHONE ENCOUNTER
----- Message from Kylah Gant sent at 5/12/2023 10:17 AM CDT -----  Contact: patient  Type: Needs Medical Advice  Who Called:  patient  Best Call Back Number: [phone] (home)   Additional Information: patient requesting a call back regarding the refill for her medication-oxyCODONE-acetaminophen (PERCOCET)  mg per tablet

## 2023-05-15 ENCOUNTER — OFFICE VISIT (OUTPATIENT)
Dept: FAMILY MEDICINE | Facility: CLINIC | Age: 53
End: 2023-05-15
Payer: MEDICARE

## 2023-05-15 VITALS
HEIGHT: 69 IN | SYSTOLIC BLOOD PRESSURE: 136 MMHG | OXYGEN SATURATION: 96 % | BODY MASS INDEX: 32.44 KG/M2 | DIASTOLIC BLOOD PRESSURE: 76 MMHG | HEART RATE: 71 BPM | WEIGHT: 219 LBS | TEMPERATURE: 98 F

## 2023-05-15 DIAGNOSIS — Z85.51 HISTORY OF BLADDER CANCER: ICD-10-CM

## 2023-05-15 DIAGNOSIS — F41.9 ANXIETY DISORDER, UNSPECIFIED TYPE: Primary | ICD-10-CM

## 2023-05-15 DIAGNOSIS — G89.4 CHRONIC PAIN SYNDROME: ICD-10-CM

## 2023-05-15 DIAGNOSIS — H61.22 IMPACTED CERUMEN OF LEFT EAR: ICD-10-CM

## 2023-05-15 DIAGNOSIS — I10 BENIGN HYPERTENSION: ICD-10-CM

## 2023-05-15 DIAGNOSIS — C67.9 MALIGNANT NEOPLASM OF URINARY BLADDER, UNSPECIFIED SITE: ICD-10-CM

## 2023-05-15 DIAGNOSIS — M06.9 RHEUMATOID ARTHRITIS INVOLVING LEFT KNEE, UNSPECIFIED WHETHER RHEUMATOID FACTOR PRESENT: ICD-10-CM

## 2023-05-15 DIAGNOSIS — J41.0 SIMPLE CHRONIC BRONCHITIS: ICD-10-CM

## 2023-05-15 DIAGNOSIS — E03.8 OTHER SPECIFIED HYPOTHYROIDISM: Chronic | ICD-10-CM

## 2023-05-15 DIAGNOSIS — E78.49 OTHER HYPERLIPIDEMIA: ICD-10-CM

## 2023-05-15 PROCEDURE — 3075F PR MOST RECENT SYSTOLIC BLOOD PRESS GE 130-139MM HG: ICD-10-PCS | Mod: CPTII,S$GLB,, | Performed by: FAMILY MEDICINE

## 2023-05-15 PROCEDURE — 99214 OFFICE O/P EST MOD 30 MIN: CPT | Mod: S$GLB,,, | Performed by: FAMILY MEDICINE

## 2023-05-15 PROCEDURE — 99214 PR OFFICE/OUTPT VISIT, EST, LEVL IV, 30-39 MIN: ICD-10-PCS | Mod: S$GLB,,, | Performed by: FAMILY MEDICINE

## 2023-05-15 PROCEDURE — 3078F PR MOST RECENT DIASTOLIC BLOOD PRESSURE < 80 MM HG: ICD-10-PCS | Mod: CPTII,S$GLB,, | Performed by: FAMILY MEDICINE

## 2023-05-15 PROCEDURE — 1159F MED LIST DOCD IN RCRD: CPT | Mod: CPTII,S$GLB,, | Performed by: FAMILY MEDICINE

## 2023-05-15 PROCEDURE — 3008F PR BODY MASS INDEX (BMI) DOCUMENTED: ICD-10-PCS | Mod: CPTII,S$GLB,, | Performed by: FAMILY MEDICINE

## 2023-05-15 PROCEDURE — 3008F BODY MASS INDEX DOCD: CPT | Mod: CPTII,S$GLB,, | Performed by: FAMILY MEDICINE

## 2023-05-15 PROCEDURE — 4010F ACE/ARB THERAPY RXD/TAKEN: CPT | Mod: CPTII,S$GLB,, | Performed by: FAMILY MEDICINE

## 2023-05-15 PROCEDURE — 3078F DIAST BP <80 MM HG: CPT | Mod: CPTII,S$GLB,, | Performed by: FAMILY MEDICINE

## 2023-05-15 PROCEDURE — 3075F SYST BP GE 130 - 139MM HG: CPT | Mod: CPTII,S$GLB,, | Performed by: FAMILY MEDICINE

## 2023-05-15 PROCEDURE — 4010F PR ACE/ARB THEARPY RXD/TAKEN: ICD-10-PCS | Mod: CPTII,S$GLB,, | Performed by: FAMILY MEDICINE

## 2023-05-15 PROCEDURE — 1159F PR MEDICATION LIST DOCUMENTED IN MEDICAL RECORD: ICD-10-PCS | Mod: CPTII,S$GLB,, | Performed by: FAMILY MEDICINE

## 2023-05-15 NOTE — PROCEDURES
"Ear Cerumen Removal    Date/Time: 5/15/2023 10:40 AM  Performed by: Juan Carlos Saldivar MD  Authorized by: Juan Carlos Saldivar MD     Time out: Immediately prior to procedure a "time out" was called to verify the correct patient, procedure, equipment, support staff and site/side marked as required.      Local anesthetic:  None  Location details:  Left ear  Procedure type: curette    Cerumen  Removal Results:  Cerumen completely removed  Patient tolerance:  Patient tolerated the procedure well with no immediate complications  "

## 2023-05-15 NOTE — PROGRESS NOTES
"    [facility] [place], [place]    Family Medicine Office Visit    Chief Complaint   Patient presents with    Follow-up     3 months         HPI:  routine followup.  May be having surgical procedure upcoming for somewhat severe lumbar disc disease contributing to chronic pain.    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  Treatment was surgical only    ROS: as above    Vitals:    05/15/23 1025   BP: 136/76   BP Location: Left arm   Patient Position: Sitting   BP Method: Medium (Manual)   Pulse: 71   Temp: 97.9 °F (36.6 °C)   TempSrc: Oral   SpO2: 96%   Weight: 99.3 kg (219 lb)   Height: 5' 9" (1.753 m)      Body mass index is 32.34 kg/m².      General:  AOx3, well nourished and developed in no acute distress  Eyes:  PERRLA, EOMI, vision intact grossly  ENT:  normal hearing, moist oral mucosa, CERUMEN IN L EAR CANAL  Neck:  trachea midline with no masses or thyromegaly  Heart:  RRR, no murmurs.  No edema noted, extremities warm and well perfused  Lungs:  clear to auscultation bilaterally with symmetric chest movement  Abdomen:  Soft, nontender, nondistended.  Normal bowel sounds  Musculoskeletal:  Normal gait.  Normal posture.  Normal muscular development with no joint swelling.  Neurological:  CN II-XII grossly intact. Symmetric strength and sensation  Psych:  Normal mood and affect.  Able to demonstrate good judgement and personal insight.      Assessment/Plan:    1. Anxiety disorder, unspecified type    2. Chronic pain syndrome    3. Benign hypertension    4. Other hyperlipidemia    5. Rheumatoid arthritis involving left knee, unspecified whether rheumatoid factor present    6. " Other specified hypothyroidism    7. Simple chronic bronchitis    8. Malignant neoplasm of urinary bladder, unspecified site    9. History of bladder cancer    10. Impacted cerumen of left ear  -     Ear Cerumen Removal        Stable on medication  Agree with pursuit of surgery if we can reduce medication burden  Advised on whole-unprocessed diet  Stable on statin  Stable on medication   TSH stable  Stable on inhaler  Cerumen removed

## 2023-05-15 NOTE — PATIENT INSTRUCTIONS
DIET DIET DIET    EAT FOODS WITH NO INGREDIENTS - UNPROCESSED FOODS:  FRUITS, VEGETABLES, LEAN MEATS, SEAFOOD, BEANS, NUTS, EGGS, WHOLE GRAINS    FOLLOW UP 3 MONTHS    Use nasal spray every day

## 2023-05-19 ENCOUNTER — SPECIALTY PHARMACY (OUTPATIENT)
Dept: PHARMACY | Facility: CLINIC | Age: 53
End: 2023-05-19
Payer: MEDICARE

## 2023-05-19 NOTE — TELEPHONE ENCOUNTER
Outgoing call: Patient is due to inject on 5/30, I informed her that a refill request was sent to her MD and once approved [facility] will follow up.

## 2023-05-22 DIAGNOSIS — L40.50 PSA (PSORIATIC ARTHRITIS): ICD-10-CM

## 2023-05-22 DIAGNOSIS — I73.00 RAYNAUD'S DISEASE WITHOUT GANGRENE: ICD-10-CM

## 2023-05-22 DIAGNOSIS — M17.0 PRIMARY OSTEOARTHRITIS OF BOTH KNEES: ICD-10-CM

## 2023-05-22 DIAGNOSIS — C67.9 MALIGNANT NEOPLASM OF URINARY BLADDER, UNSPECIFIED SITE: ICD-10-CM

## 2023-05-22 DIAGNOSIS — E03.8 OTHER SPECIFIED HYPOTHYROIDISM: ICD-10-CM

## 2023-05-23 RX ORDER — SECUKINUMAB 150 MG/ML
300 INJECTION SUBCUTANEOUS
Qty: 2 ML | Refills: 6 | Status: ACTIVE | OUTPATIENT
Start: 2023-05-23 | End: 2023-12-14 | Stop reason: SDUPTHER

## 2023-05-25 NOTE — TELEPHONE ENCOUNTER
Specialty Pharmacy - Refill Coordination    Specialty Medication Orders Linked to Encounter      Flowsheet Row Most Recent Value   Medication #1 secukinumab (COSENTYX PEN, 2 PENS,) 150 mg/mL PnIj (Order#[id number], Rx#[id number])            Refill Questions - Documented Responses      Flowsheet Row Most Recent Value   Patient Availability and HIPAA Verification    Does patient want to proceed with activity? Yes   HIPAA/medical authority confirmed? Yes   Relationship to patient of person spoken to? Self   Refill Screening Questions    Changes to allergies? No   Changes to medications? No   New conditions since last clinic visit? No   Unplanned office visit, urgent care, ED, or hospital admission in the last 4 weeks? No   How does patient/caregiver feel medication is working? Good   Financial problems or insurance changes? No   How many doses of your specialty medications were missed in the last 4 weeks? 0   Would patient like to speak to a pharmacist? No   When does the patient need to receive the medication? 05/29/23   Refill Delivery Questions    How will the patient receive the medication? Mail   When does the patient need to receive the medication? 05/29/23   Shipping Address Temporary   Address in [facility] confirmed and updated if neccessary? Yes   Expected Copay ($) 0   Is the patient able to afford the medication copay? Yes   Payment Method zero copay   Days supply of Refill 30   Supplies needed? No supplies needed   Refill activity completed? Yes   Refill activity plan Refill scheduled   Shipment/Pickup Date: 05/25/23            Current Outpatient Medications   Medication Sig    albuterol (PROVENTIL/VENTOLIN HFA) 90 mcg/actuation inhaler Inhale 2 puffs into the lungs every 6 (six) hours as needed. Rescue    baclofen (LIORESAL) 10 MG tablet Take 1 tablet (10 mg total) by mouth 3 (three) times daily.    budesonide (PULMICORT) 0.5 mg/2 mL nebulizer solution Take 2 mLs (0.5 mg total) by nebulization once  daily. Controller    collagen-biotin-ascorbic acid 500 mg-800 mcg- 50 mg Cap Take by mouth.    ELDERBERRY FRUIT ORAL Take by mouth once daily.    fluticasone propionate (FLONASE) 50 mcg/actuation nasal spray 1 spray (50 mcg total) by Each Nostril route 2 (two) times daily as needed for Rhinitis.    furosemide (LASIX) 20 MG tablet TAKE ONE (1) TABLET EVERY MORNING AS NEEDED FOR FLUID    levalbuterol (XOPENEX) 0.31 mg/3 mL nebulizer solution Take 3 mLs (0.31 mg total) by nebulization every 4 (four) hours as needed for Wheezing. Rescue    levothyroxine (SYNTHROID) 50 MCG tablet TAKE ONE (1) TABLET EVERY MORNING ON AN EMPTY STOMACH FOR THYROID    lisinopriL (PRINIVIL,ZESTRIL) 20 MG tablet Take 1 tablet (20 mg total) by mouth once daily.    lovastatin (MEVACOR) 20 MG tablet Take 1 tablet (20 mg total) by mouth every evening.    multivit-min-FA-lycopen-lutein (CENTRUM SILVER) 0.4-300-250 mg-mcg-mcg Tab Take 1 tablet by mouth once daily.    oxyCODONE-acetaminophen (PERCOCET)  mg per tablet Take 1 tablet by mouth 3 (three) times daily as needed for Pain.    promethazine (PHENERGAN) 25 MG tablet Take 1 tablet (25 mg total) by mouth every 8 (eight) hours as needed for Nausea.    secukinumab (COSENTYX PEN, 2 PENS,) 150 mg/mL PnIj Inject 2 mL (300 mg) into the skin every 30 days.    traZODone (DESYREL) 50 MG tablet Take 2 tablets (100 mg total) by mouth every evening.    TRELEGY ELLIPTA 100-62.5-25 mcg DsDv     valACYclovir (VALTREX) 1000 MG tablet Take 1 tablet (1,000 mg total) by mouth every 12 (twelve) hours.    vit C-Zn gluc-herbal no.325 90-15 mg Lozg by Transmucosal route.   Last reviewed on 5/15/2023 10:25 AM by Nathalie Butterfield MA    Review of patient's allergies indicates:   Allergen Reactions    Fentanyl Shortness Of Breath    Remeron [mirtazapine] Other (See Comments)     Weight gain    Last reviewed on  5/22/2023 3:50 PM by Courtney Pierre      Tasks added this encounter   No tasks added.   Tasks due within  next 3 months   5/24/2023 - Clinical Assessment (1 year recurrence)     Galina Ramirez, PharmD  [facility]  [address]  [facility] [address]  Phone: [phone]  Fax: [phone]

## 2023-06-07 RX ORDER — TRAZODONE HYDROCHLORIDE 50 MG/1
TABLET ORAL
Qty: 180 TABLET | Refills: 3 | Status: SHIPPED | OUTPATIENT
Start: 2023-06-07

## 2023-06-08 ENCOUNTER — TELEPHONE (OUTPATIENT)
Dept: FAMILY MEDICINE | Facility: CLINIC | Age: 53
End: 2023-06-08
Payer: MEDICARE

## 2023-06-08 RX ORDER — OXYCODONE AND ACETAMINOPHEN 10; 325 MG/1; MG/1
1 TABLET ORAL 3 TIMES DAILY PRN
Qty: 90 TABLET | Refills: 0 | Status: SHIPPED | OUTPATIENT
Start: 2023-06-08 | End: 2023-07-05 | Stop reason: SDUPTHER

## 2023-06-08 NOTE — TELEPHONE ENCOUNTER
Call placed to patient due to 's orders patient needs an appointment. Patient states she needs to be seen tomorrow, reviewed Dr. Saldivar's schedule totally full. Suggested another provider patient refused.    Faby Saldivar,  Please review message below.  Last Office Visit: 5/15/2023  Call placed to patient due to message left. Patient requesting antibiotics to be sent into the pharmacy due possible sinus infection. States drainage from the nose is yellow-green, no odor, but the complaint of sinus pressure.  Thank you.  Signed:  Andres George LPN    ----- Message from Nessa Zayas sent at 6/8/2023 12:57 PM CDT -----  Regarding: pt call back  Name of Who is Calling: JUAN MEDEL [[id number]]        What is the request in detail: Pt says she has a bad sinus infection and would like to get antibiotics called in for it, please advise.         Can the clinic reply by [facility]: no           What Number to Call Back if not in [facility]: [phone]        [facility] [place] [facility] [id number]   [address] [place] [place] [address]  Phone: [phone] Fax: [phone]

## 2023-06-19 ENCOUNTER — SPECIALTY PHARMACY (OUTPATIENT)
Dept: PHARMACY | Facility: CLINIC | Age: 53
End: 2023-06-19
Payer: MEDICARE

## 2023-06-19 ENCOUNTER — PATIENT MESSAGE (OUTPATIENT)
Dept: PHARMACY | Facility: CLINIC | Age: 53
End: 2023-06-19
Payer: MEDICARE

## 2023-06-19 NOTE — TELEPHONE ENCOUNTER
Specialty Pharmacy - Clinical Reassessment  Specialty Pharmacy - Refill Coordination      Patient Diagnosis   M06.9 - RA (rheumatoid arthritis)    Boom Blanco is a 53 y.o. female, who is followed by the specialty pharmacy service for management and education of her PsA.  She has been on therapy with Cosentyx since 10/26/21.  I have reviewed her electronic medical record and current medication list and determined that specialty medication adjustment Is not needed at this time.    Patient has not experienced adverse events.  She Is adherent reporting 0 missed doses since last review.  Adherence has been encouraged with the following mechanism(s): Proactive refill calls.  She is on target to meet goals of therapy and will continue treatment.        5/25/2023 4/24/2023 3/27/2023 2/23/2023 1/26/2023 12/30/2022 12/1/2022   Follow Up Review   # of missed doses 0 0 0 0 0 0 0   New Medications? No No No No Yes No No   New Conditions? No No No No No No No   New Allergies? No No No No No No No   Med Effective? Good Good Good Good Good Fair Good   Urgent Care? No No No No No No No   Requested Pharmacist? No No No No No No No         Therapy is appropriate to continue.    Therapy is effective: Yes  On scale of 1 to 10, how does patient rank quality of life? (10 - Best): Unable to Assess  Recommendations: none at this time.  Review Method: Chart Review    Tasks added this encounter   No tasks added.   Tasks due within next 3 months   5/24/2023 - Clinical Assessment (1 year recurrence)  6/17/2023 - Refill Coordination Outreach (1 time occurrence)     Galina Ramirez, PharmD  [facility]  [address]  [facility] [address]  Phone: [phone]  Fax: [phone]

## 2023-06-22 NOTE — TELEPHONE ENCOUNTER
Specialty Pharmacy - Clinical Reassessment  Specialty Pharmacy - Refill Coordination    Specialty Medication Orders Linked to Encounter      Flowsheet Row Most Recent Value   Medication #1 secukinumab (COSENTYX PEN, 2 PENS,) 150 mg/mL PnIj (Order#[id number], Rx#[id number])            Refill Questions - Documented Responses      Flowsheet Row Most Recent Value   Patient Availability and HIPAA Verification    Does patient want to proceed with activity? Yes   HIPAA/medical authority confirmed? Yes   Relationship to patient of person spoken to? Self   Refill Screening Questions    Changes to allergies? No   Changes to medications? No   New conditions since last clinic visit? No   Unplanned office visit, urgent care, ED, or hospital admission in the last 4 weeks? No   How does patient/caregiver feel medication is working? Good   Financial problems or insurance changes? No   How many doses of your specialty medications were missed in the last 4 weeks? 0   Would patient like to speak to a pharmacist? No   When does the patient need to receive the medication? 06/30/23   Refill Delivery Questions    How will the patient receive the medication? Mail   When does the patient need to receive the medication? 06/30/23   Shipping Address Home   Address in [facility] confirmed and updated if neccessary? Yes   Expected Copay ($) 0   Is the patient able to afford the medication copay? Yes   Payment Method zero copay   Days supply of Refill 30   Supplies needed? No supplies needed   Refill activity completed? Yes   Refill activity plan Refill scheduled   Shipment/Pickup Date: 06/28/23            Current Outpatient Medications   Medication Sig    albuterol (PROVENTIL/VENTOLIN HFA) 90 mcg/actuation inhaler Inhale 2 puffs into the lungs every 6 (six) hours as needed. Rescue    baclofen (LIORESAL) 10 MG tablet Take 1 tablet (10 mg total) by mouth 3 (three) times daily.    budesonide (PULMICORT) 0.5 mg/2 mL nebulizer solution Take 2  mLs (0.5 mg total) by nebulization once daily. Controller    collagen-biotin-ascorbic acid 500 mg-800 mcg- 50 mg Cap Take by mouth.    ELDERBERRY FRUIT ORAL Take by mouth once daily.    fluticasone propionate (FLONASE) 50 mcg/actuation nasal spray 1 spray (50 mcg total) by Each Nostril route 2 (two) times daily as needed for Rhinitis.    furosemide (LASIX) 20 MG tablet TAKE ONE (1) TABLET EVERY MORNING AS NEEDED FOR FLUID    levalbuterol (XOPENEX) 0.31 mg/3 mL nebulizer solution Take 3 mLs (0.31 mg total) by nebulization every 4 (four) hours as needed for Wheezing. Rescue    levothyroxine (SYNTHROID) 50 MCG tablet TAKE ONE (1) TABLET EVERY MORNING ON AN EMPTY STOMACH FOR THYROID    lisinopriL (PRINIVIL,ZESTRIL) 20 MG tablet Take 1 tablet (20 mg total) by mouth once daily.    lovastatin (MEVACOR) 20 MG tablet Take 1 tablet (20 mg total) by mouth every evening.    multivit-min-FA-lycopen-lutein (CENTRUM SILVER) 0.4-300-250 mg-mcg-mcg Tab Take 1 tablet by mouth once daily.    oxyCODONE-acetaminophen (PERCOCET)  mg per tablet Take 1 tablet by mouth 3 (three) times daily as needed for Pain.    promethazine (PHENERGAN) 25 MG tablet Take 1 tablet (25 mg total) by mouth every 8 (eight) hours as needed for Nausea.    secukinumab (COSENTYX PEN, 2 PENS,) 150 mg/mL PnIj Inject 2 mL (300 mg) into the skin every 30 days.    traZODone (DESYREL) 50 MG tablet TAKE 2 TABLETS BY MOUTH EVERY EVENING.    TRELEGY ELLIPTA 100-62.5-25 mcg DsDv     valACYclovir (VALTREX) 1000 MG tablet Take 1 tablet (1,000 mg total) by mouth every 12 (twelve) hours.    vit C-Zn gluc-herbal no.325 90-15 mg Lozg by Transmucosal route.   Last reviewed on 5/15/2023 10:25 AM by Nathalie Butterfield MA    Review of patient's allergies indicates:   Allergen Reactions    Fentanyl Shortness Of Breath    Remeron [mirtazapine] Other (See Comments)     Weight gain    Last reviewed on  5/22/2023 3:50 PM by Courtney Pierre      Tasks added this encounter   3/19/2024 -  Clinical Assessment (1 year recurrence)   Tasks due within next 3 months   No tasks due.     Galina Ramirez, PharmD  [facility]  [address]  [facility] [address]  Phone: [phone]  Fax: [phone]

## 2023-07-05 RX ORDER — OXYCODONE AND ACETAMINOPHEN 10; 325 MG/1; MG/1
1 TABLET ORAL 3 TIMES DAILY PRN
Qty: 90 TABLET | Refills: 0 | Status: SHIPPED | OUTPATIENT
Start: 2023-07-05 | End: 2023-08-02 | Stop reason: SDUPTHER

## 2023-07-17 RX ORDER — IBUPROFEN 800 MG/1
800 TABLET ORAL 3 TIMES DAILY PRN
Qty: 90 TABLET | Refills: 3 | Status: SHIPPED | OUTPATIENT
Start: 2023-07-17 | End: 2023-12-29

## 2023-07-21 ENCOUNTER — SPECIALTY PHARMACY (OUTPATIENT)
Dept: PHARMACY | Facility: CLINIC | Age: 53
End: 2023-07-21
Payer: MEDICARE

## 2023-07-21 NOTE — TELEPHONE ENCOUNTER
Specialty Pharmacy - Refill Coordination    Specialty Medication Orders Linked to Encounter      Flowsheet Row Most Recent Value   Medication #1 secukinumab (COSENTYX PEN, 2 PENS,) 150 mg/mL PnIj (Order#[id number], Rx#[id number])            Refill Questions - Documented Responses      Flowsheet Row Most Recent Value   Patient Availability and HIPAA Verification    Does patient want to proceed with activity? Yes   HIPAA/medical authority confirmed? Yes   Relationship to patient of person spoken to? Self   Refill Screening Questions    Changes to allergies? No   Changes to medications? No  [started back taking ibu 800mg last week]   New conditions since last clinic visit? No   Unplanned office visit, urgent care, ED, or hospital admission in the last 4 weeks? No   How does patient/caregiver feel medication is working? Good   Financial problems or insurance changes? No   How many doses of your specialty medications were missed in the last 4 weeks? 0   Would patient like to speak to a pharmacist? No   When does the patient need to receive the medication? 07/29/23   Refill Delivery Questions    How will the patient receive the medication? Mail   When does the patient need to receive the medication? 07/29/23   Shipping Address Temporary   Address in [facility] confirmed and updated if neccessary? Yes   Expected Copay ($) 0   Is the patient able to afford the medication copay? Yes   Payment Method zero copay   Days supply of Refill 30   Supplies needed? No supplies needed   Refill activity completed? Yes   Refill activity plan Refill scheduled   Shipment/Pickup Date: 07/26/23            Current Outpatient Medications   Medication Sig    albuterol (PROVENTIL/VENTOLIN HFA) 90 mcg/actuation inhaler Inhale 2 puffs into the lungs every 6 (six) hours as needed. Rescue    baclofen (LIORESAL) 10 MG tablet Take 1 tablet (10 mg total) by mouth 3 (three) times daily.    budesonide (PULMICORT) 0.5 mg/2 mL nebulizer solution  Take 2 mLs (0.5 mg total) by nebulization once daily. Controller    collagen-biotin-ascorbic acid 500 mg-800 mcg- 50 mg Cap Take by mouth.    ELDERBERRY FRUIT ORAL Take by mouth once daily.    fluticasone propionate (FLONASE) 50 mcg/actuation nasal spray 1 spray (50 mcg total) by Each Nostril route 2 (two) times daily as needed for Rhinitis.    furosemide (LASIX) 20 MG tablet TAKE ONE (1) TABLET EVERY MORNING AS NEEDED FOR FLUID    ibuprofen (ADVIL,MOTRIN) 800 MG tablet Take 1 tablet (800 mg total) by mouth 3 (three) times daily as needed for Pain.    levalbuterol (XOPENEX) 0.31 mg/3 mL nebulizer solution Take 3 mLs (0.31 mg total) by nebulization every 4 (four) hours as needed for Wheezing. Rescue    levothyroxine (SYNTHROID) 50 MCG tablet TAKE ONE (1) TABLET EVERY MORNING ON AN EMPTY STOMACH FOR THYROID    lisinopriL (PRINIVIL,ZESTRIL) 20 MG tablet Take 1 tablet (20 mg total) by mouth once daily.    lovastatin (MEVACOR) 20 MG tablet Take 1 tablet (20 mg total) by mouth every evening.    multivit-min-FA-lycopen-lutein (CENTRUM SILVER) 0.4-300-250 mg-mcg-mcg Tab Take 1 tablet by mouth once daily.    oxyCODONE-acetaminophen (PERCOCET)  mg per tablet Take 1 tablet by mouth 3 (three) times daily as needed for Pain.    promethazine (PHENERGAN) 25 MG tablet Take 1 tablet (25 mg total) by mouth every 8 (eight) hours as needed for Nausea.    secukinumab (COSENTYX PEN, 2 PENS,) 150 mg/mL PnIj Inject 2 mL (300 mg) into the skin every 30 days.    traZODone (DESYREL) 50 MG tablet TAKE 2 TABLETS BY MOUTH EVERY EVENING.    TRELEGY ELLIPTA 100-62.5-25 mcg DsDv     valACYclovir (VALTREX) 1000 MG tablet Take 1 tablet (1,000 mg total) by mouth every 12 (twelve) hours.    vit C-Zn gluc-herbal no.325 90-15 mg Lozg by Transmucosal route.   Last reviewed on 5/15/2023 10:25 AM by Nathalie Btuterfield MA    Review of patient's allergies indicates:   Allergen Reactions    Fentanyl Shortness Of Breath    Remeron [mirtazapine] Other (See  Comments)     Weight gain    Last reviewed on  7/17/2023 5:31 PM by Courtney Pierre      Tasks added this encounter   No tasks added.   Tasks due within next 3 months   7/21/2023 - Refill Coordination Outreach (1 time occurrence)     [facility]  [address]  [facility] [address]  Phone: [phone]  Fax: [phone]

## 2023-08-02 RX ORDER — OXYCODONE AND ACETAMINOPHEN 10; 325 MG/1; MG/1
1 TABLET ORAL 3 TIMES DAILY PRN
Qty: 90 TABLET | Refills: 0 | Status: SHIPPED | OUTPATIENT
Start: 2023-08-02 | End: 2023-08-08 | Stop reason: SDUPTHER

## 2023-08-02 NOTE — TELEPHONE ENCOUNTER
Last office visit: 5/15/2023  Medication Renewal Request  Received: Today  Boom Blanco Staff  Phone Number: [phone]     Refills have been requested for the following medications:         oxyCODONE-acetaminophen (PERCOCET)  mg per tablet [Juan Carlos Saldivar]     Preferred pharmacy: [facility] [place] - [address]   Delivery method: Pickup

## 2023-08-03 DIAGNOSIS — J44.9 CHRONIC OBSTRUCTIVE PULMONARY DISEASE, UNSPECIFIED COPD TYPE: ICD-10-CM

## 2023-08-05 RX ORDER — LEVALBUTEROL INHALATION SOLUTION 0.31 MG/3ML
1 SOLUTION RESPIRATORY (INHALATION) EVERY 4 HOURS PRN
Qty: 90 ML | Refills: 5 | Status: SHIPPED | OUTPATIENT
Start: 2023-08-05

## 2023-08-08 ENCOUNTER — OFFICE VISIT (OUTPATIENT)
Dept: FAMILY MEDICINE | Facility: CLINIC | Age: 53
End: 2023-08-08
Payer: MEDICARE

## 2023-08-08 VITALS
BODY MASS INDEX: 32.84 KG/M2 | DIASTOLIC BLOOD PRESSURE: 88 MMHG | HEIGHT: 69 IN | WEIGHT: 221.69 LBS | OXYGEN SATURATION: 96 % | SYSTOLIC BLOOD PRESSURE: 138 MMHG | HEART RATE: 75 BPM

## 2023-08-08 DIAGNOSIS — M06.9 RHEUMATOID ARTHRITIS INVOLVING LEFT KNEE, UNSPECIFIED WHETHER RHEUMATOID FACTOR PRESENT: ICD-10-CM

## 2023-08-08 DIAGNOSIS — G89.4 CHRONIC PAIN SYNDROME: Primary | ICD-10-CM

## 2023-08-08 DIAGNOSIS — E03.8 OTHER SPECIFIED HYPOTHYROIDISM: Chronic | ICD-10-CM

## 2023-08-08 DIAGNOSIS — I10 BENIGN HYPERTENSION: ICD-10-CM

## 2023-08-08 DIAGNOSIS — M51.9 LUMBAR DISC DISEASE: ICD-10-CM

## 2023-08-08 DIAGNOSIS — Z85.51 HISTORY OF BLADDER CANCER: ICD-10-CM

## 2023-08-08 DIAGNOSIS — E78.49 OTHER HYPERLIPIDEMIA: ICD-10-CM

## 2023-08-08 PROCEDURE — 3008F BODY MASS INDEX DOCD: CPT | Mod: CPTII,S$GLB,, | Performed by: FAMILY MEDICINE

## 2023-08-08 PROCEDURE — 3079F PR MOST RECENT DIASTOLIC BLOOD PRESSURE 80-89 MM HG: ICD-10-PCS | Mod: CPTII,S$GLB,, | Performed by: FAMILY MEDICINE

## 2023-08-08 PROCEDURE — 3008F PR BODY MASS INDEX (BMI) DOCUMENTED: ICD-10-PCS | Mod: CPTII,S$GLB,, | Performed by: FAMILY MEDICINE

## 2023-08-08 PROCEDURE — 99214 PR OFFICE/OUTPT VISIT, EST, LEVL IV, 30-39 MIN: ICD-10-PCS | Mod: S$GLB,,, | Performed by: FAMILY MEDICINE

## 2023-08-08 PROCEDURE — 4010F ACE/ARB THERAPY RXD/TAKEN: CPT | Mod: CPTII,S$GLB,, | Performed by: FAMILY MEDICINE

## 2023-08-08 PROCEDURE — 4010F PR ACE/ARB THEARPY RXD/TAKEN: ICD-10-PCS | Mod: CPTII,S$GLB,, | Performed by: FAMILY MEDICINE

## 2023-08-08 PROCEDURE — 1159F PR MEDICATION LIST DOCUMENTED IN MEDICAL RECORD: ICD-10-PCS | Mod: CPTII,S$GLB,, | Performed by: FAMILY MEDICINE

## 2023-08-08 PROCEDURE — 3075F PR MOST RECENT SYSTOLIC BLOOD PRESS GE 130-139MM HG: ICD-10-PCS | Mod: CPTII,S$GLB,, | Performed by: FAMILY MEDICINE

## 2023-08-08 PROCEDURE — 3079F DIAST BP 80-89 MM HG: CPT | Mod: CPTII,S$GLB,, | Performed by: FAMILY MEDICINE

## 2023-08-08 PROCEDURE — 1159F MED LIST DOCD IN RCRD: CPT | Mod: CPTII,S$GLB,, | Performed by: FAMILY MEDICINE

## 2023-08-08 PROCEDURE — 99214 OFFICE O/P EST MOD 30 MIN: CPT | Mod: S$GLB,,, | Performed by: FAMILY MEDICINE

## 2023-08-08 PROCEDURE — 3075F SYST BP GE 130 - 139MM HG: CPT | Mod: CPTII,S$GLB,, | Performed by: FAMILY MEDICINE

## 2023-08-08 RX ORDER — BUPROPION HYDROCHLORIDE 150 MG/1
150 TABLET ORAL DAILY
Qty: 30 TABLET | Refills: 11 | Status: SHIPPED | OUTPATIENT
Start: 2023-08-08 | End: 2024-08-07

## 2023-08-08 RX ORDER — OXYCODONE AND ACETAMINOPHEN 10; 325 MG/1; MG/1
1 TABLET ORAL EVERY 6 HOURS PRN
Qty: 105 TABLET | Refills: 0 | Status: SHIPPED | OUTPATIENT
Start: 2023-09-01 | End: 2023-09-27 | Stop reason: SDUPTHER

## 2023-08-08 NOTE — PROGRESS NOTES
"    [facility] [place]    Family Medicine Office Visit    Chief Complaint   Patient presents with    Headache    Follow-up     Patient have headache         HPI:  follow up chronic conditions - mostly regarding severe degeneration of lumbar discs - is still debating details of surgery with Dr. Lalit FLORES stable -   Hyperlipidemia stable on appropriate intensity statin therapy  Blood Pressure at goal on medication, with patient reporting prescription compliance  Thyroid function stable  COPD stable    Distant history of bladder tumor    ROS: as above    Vitals:    08/08/23 0906   BP: 138/88   Pulse: 75   SpO2: 96%   Weight: 100.6 kg (221 lb 11.2 oz)   Height: 5' 9" (1.753 m)      Body mass index is 32.74 kg/m².      General:  AOx3, well nourished and developed in no acute distress  Eyes:  PERRLA, EOMI, vision intact grossly  ENT:  normal hearing, moist oral mucosa  Neck:  trachea midline with no masses or thyromegaly  Heart:  RRR, no murmurs.  No edema noted, extremities warm and well perfused  Lungs:  clear to auscultation bilaterally with symmetric chest movement  Abdomen:  Soft, nontender, nondistended.  Normal bowel sounds  Musculoskeletal:  Normal gait.  Normal posture.  Normal muscular development with no joint swelling.  Neurological:  CN II-XII grossly intact. Symmetric strength and sensation  Psych:  Normal mood and affect.  Able to demonstrate good judgement and personal insight.      Assessment/Plan:    1. Chronic pain syndrome    2. Other hyperlipidemia    3. Benign hypertension    4. Rheumatoid arthritis involving left knee, unspecified whether rheumatoid factor present    5. History of bladder cancer    6. Other specified hypothyroidism    7. Lumbar disc disease       Start wellbutrin to help both mood and weight loss   Continue pain management  Stable on statin  At goal  Stable, will try and promote weight loss to allow for steroid use  Stable  Stable " function  As above

## 2023-08-08 NOTE — PATIENT INSTRUCTIONS
Doing well today  Start wellbutrin to help with mood and weight loss  Increase pain medication to help with nightime    Talk to Dr. Cohn about specifics of surgery

## 2023-08-11 ENCOUNTER — OFFICE VISIT (OUTPATIENT)
Dept: RHEUMATOLOGY | Facility: CLINIC | Age: 53
End: 2023-08-11
Payer: MEDICARE

## 2023-08-11 VITALS
BODY MASS INDEX: 32.65 KG/M2 | DIASTOLIC BLOOD PRESSURE: 85 MMHG | HEART RATE: 71 BPM | SYSTOLIC BLOOD PRESSURE: 147 MMHG | HEIGHT: 69 IN | WEIGHT: 220.44 LBS

## 2023-08-11 DIAGNOSIS — E09.9 STEROID-INDUCED DIABETES MELLITUS, INITIAL ENCOUNTER: Primary | ICD-10-CM

## 2023-08-11 DIAGNOSIS — T38.0X5A STEROID-INDUCED DIABETES MELLITUS, INITIAL ENCOUNTER: Primary | ICD-10-CM

## 2023-08-11 DIAGNOSIS — E88.819 INSULIN RESISTANCE: ICD-10-CM

## 2023-08-11 DIAGNOSIS — I73.00 RAYNAUD'S DISEASE WITHOUT GANGRENE: ICD-10-CM

## 2023-08-11 DIAGNOSIS — D52.0 DIETARY FOLATE DEFICIENCY ANEMIA: ICD-10-CM

## 2023-08-11 DIAGNOSIS — L40.9 PSORIASIS: ICD-10-CM

## 2023-08-11 DIAGNOSIS — E53.9 VITAMIN B DEFICIENCY: ICD-10-CM

## 2023-08-11 DIAGNOSIS — E53.8 DEFICIENCY OF OTHER SPECIFIED B GROUP VITAMINS: ICD-10-CM

## 2023-08-11 DIAGNOSIS — L40.50 PSA (PSORIATIC ARTHRITIS): ICD-10-CM

## 2023-08-11 DIAGNOSIS — E53.1 PYRIDOXINE DEFICIENCY: ICD-10-CM

## 2023-08-11 PROCEDURE — 1159F MED LIST DOCD IN RCRD: CPT | Mod: CPTII,S$GLB,, | Performed by: INTERNAL MEDICINE

## 2023-08-11 PROCEDURE — 3008F PR BODY MASS INDEX (BMI) DOCUMENTED: ICD-10-PCS | Mod: CPTII,S$GLB,, | Performed by: INTERNAL MEDICINE

## 2023-08-11 PROCEDURE — 99999 PR PBB SHADOW E&M-EST. PATIENT-LVL IV: ICD-10-PCS | Mod: PBBFAC,,, | Performed by: INTERNAL MEDICINE

## 2023-08-11 PROCEDURE — 3008F BODY MASS INDEX DOCD: CPT | Mod: CPTII,S$GLB,, | Performed by: INTERNAL MEDICINE

## 2023-08-11 PROCEDURE — 3077F SYST BP >= 140 MM HG: CPT | Mod: CPTII,S$GLB,, | Performed by: INTERNAL MEDICINE

## 2023-08-11 PROCEDURE — 3079F PR MOST RECENT DIASTOLIC BLOOD PRESSURE 80-89 MM HG: ICD-10-PCS | Mod: CPTII,S$GLB,, | Performed by: INTERNAL MEDICINE

## 2023-08-11 PROCEDURE — 3079F DIAST BP 80-89 MM HG: CPT | Mod: CPTII,S$GLB,, | Performed by: INTERNAL MEDICINE

## 2023-08-11 PROCEDURE — 3077F PR MOST RECENT SYSTOLIC BLOOD PRESSURE >= 140 MM HG: ICD-10-PCS | Mod: CPTII,S$GLB,, | Performed by: INTERNAL MEDICINE

## 2023-08-11 PROCEDURE — 1160F RVW MEDS BY RX/DR IN RCRD: CPT | Mod: CPTII,S$GLB,, | Performed by: INTERNAL MEDICINE

## 2023-08-11 PROCEDURE — 4010F PR ACE/ARB THEARPY RXD/TAKEN: ICD-10-PCS | Mod: CPTII,S$GLB,, | Performed by: INTERNAL MEDICINE

## 2023-08-11 PROCEDURE — 1159F PR MEDICATION LIST DOCUMENTED IN MEDICAL RECORD: ICD-10-PCS | Mod: CPTII,S$GLB,, | Performed by: INTERNAL MEDICINE

## 2023-08-11 PROCEDURE — 99215 OFFICE O/P EST HI 40 MIN: CPT | Mod: 25,S$GLB,, | Performed by: INTERNAL MEDICINE

## 2023-08-11 PROCEDURE — 96372 PR INJECTION,THERAP/PROPH/DIAG2ST, IM OR SUBCUT: ICD-10-PCS | Mod: S$GLB,,, | Performed by: INTERNAL MEDICINE

## 2023-08-11 PROCEDURE — 1160F PR REVIEW ALL MEDS BY PRESCRIBER/CLIN PHARMACIST DOCUMENTED: ICD-10-PCS | Mod: CPTII,S$GLB,, | Performed by: INTERNAL MEDICINE

## 2023-08-11 PROCEDURE — 99999 PR PBB SHADOW E&M-EST. PATIENT-LVL IV: CPT | Mod: PBBFAC,,, | Performed by: INTERNAL MEDICINE

## 2023-08-11 PROCEDURE — 96372 THER/PROPH/DIAG INJ SC/IM: CPT | Mod: S$GLB,,, | Performed by: INTERNAL MEDICINE

## 2023-08-11 PROCEDURE — 99215 PR OFFICE/OUTPT VISIT, EST, LEVL V, 40-54 MIN: ICD-10-PCS | Mod: 25,S$GLB,, | Performed by: INTERNAL MEDICINE

## 2023-08-11 PROCEDURE — 4010F ACE/ARB THERAPY RXD/TAKEN: CPT | Mod: CPTII,S$GLB,, | Performed by: INTERNAL MEDICINE

## 2023-08-11 RX ORDER — SEMAGLUTIDE 0.68 MG/ML
0.5 INJECTION, SOLUTION SUBCUTANEOUS
Qty: 3 ML | Refills: 5 | Status: SHIPPED | OUTPATIENT
Start: 2023-08-11 | End: 2024-01-19

## 2023-08-11 RX ORDER — SEMAGLUTIDE 0.68 MG/ML
0.25 INJECTION, SOLUTION SUBCUTANEOUS
Qty: 1.5 ML | Refills: 2 | Status: SHIPPED | OUTPATIENT
Start: 2023-08-11 | End: 2024-01-19

## 2023-08-11 RX ORDER — KETOROLAC TROMETHAMINE 30 MG/ML
60 INJECTION, SOLUTION INTRAMUSCULAR; INTRAVENOUS
Status: COMPLETED | OUTPATIENT
Start: 2023-08-11 | End: 2023-08-11

## 2023-08-11 RX ORDER — METHYLPREDNISOLONE ACETATE 80 MG/ML
80 INJECTION, SUSPENSION INTRA-ARTICULAR; INTRALESIONAL; INTRAMUSCULAR; SOFT TISSUE
Status: COMPLETED | OUTPATIENT
Start: 2023-08-11 | End: 2023-08-11

## 2023-08-11 RX ORDER — CYANOCOBALAMIN 1000 UG/ML
1000 INJECTION, SOLUTION INTRAMUSCULAR; SUBCUTANEOUS
Status: COMPLETED | OUTPATIENT
Start: 2023-08-11 | End: 2023-08-11

## 2023-08-11 RX ADMIN — KETOROLAC TROMETHAMINE 60 MG: 30 INJECTION, SOLUTION INTRAMUSCULAR; INTRAVENOUS at 12:08

## 2023-08-11 RX ADMIN — CYANOCOBALAMIN 1000 MCG: 1000 INJECTION, SOLUTION INTRAMUSCULAR; SUBCUTANEOUS at 12:08

## 2023-08-11 RX ADMIN — METHYLPREDNISOLONE ACETATE 80 MG: 80 INJECTION, SUSPENSION INTRA-ARTICULAR; INTRALESIONAL; INTRAMUSCULAR; SOFT TISSUE at 12:08

## 2023-08-11 ASSESSMENT — ROUTINE ASSESSMENT OF PATIENT INDEX DATA (RAPID3)
PATIENT GLOBAL ASSESSMENT SCORE: 8
PAIN SCORE: 8
FATIGUE SCORE: 2.2
PSYCHOLOGICAL DISTRESS SCORE: 4.4
MDHAQ FUNCTION SCORE: 1.5
TOTAL RAPID3 SCORE: 7

## 2023-08-11 NOTE — PROGRESS NOTES
Subjective:       Patient ID: Boom Blanco is a 53 y.o. female.    Chief Complaint: Disease Management    Follow up: 53 year old female who presents to clinic for follow up on psoriatic arthritis. She is doing fair on cosentyx 300 mg. She has severe  foot pain.She tried GoLo. she has severe pain, but arthritis remains active in her hands, elbows, knees, hips, and ankles. Pain is constant and aching in nature. She is previously had bilateral knee replacements. She is concerned about her surgery.    She is undergoing work up with GI for chronic abdominal pain. She reports EGD unrevealing.       Review of Systems   Constitutional:  Positive for activity change and fatigue. Negative for appetite change and chills.   Eyes:  Negative for visual disturbance.   Respiratory:  Negative for cough and shortness of breath.    Cardiovascular:  Negative for chest pain, palpitations and leg swelling.   Gastrointestinal:  Positive for nausea. Negative for abdominal pain, constipation, diarrhea and vomiting.   Musculoskeletal:  Positive for arthralgias, back pain, neck pain and neck stiffness.   Allergic/Immunologic: Positive for immunocompromised state.   Neurological:  Negative for dizziness, weakness and light-headedness.         Objective:     Vitals:    08/11/23 1135   BP: (!) 147/85   Pulse: 71       Past Medical History:   Diagnosis Date    Anxiety disorder     Arthritis     Bipolar disorder     Chronic pain     Diverticulitis     Hyperlipidemia     Hypertension     Lupus 2006    Psoriatic arthritis     Raynaud's disease      Past Surgical History:   Procedure Laterality Date    ARTHROSCOPIC CHONDROPLASTY OF KNEE JOINT Left 01/22/2019    Procedure: ARTHROSCOPY, KNEE, WITH CHONDROPLASTY;  Surgeon: Sylvain Gorman DO;  Location: [facility] OR;  Service: Orthopedics;  Laterality: Left;    ARTHROSCOPY OF KNEE Left 01/22/2019    Procedure: ARTHROSCOPY, KNEE;  Surgeon: Sylvain Gorman DO;  Location: [facility] OR;  Service:  Orthopedics;  Laterality: Left;  Equipment: [place] Chondral Drill Set and [employer] Meniscus Repair Set Truespan  Vendor/Rep: [employer] [place] [employer]    ARTHROSCOPY OF KNEE Right 02/05/2019    Procedure: ARTHROSCOPY, KNEE;  Surgeon: Sylvain Gorman DO;  Location: [facility] OR;  Service: Orthopedics;  Laterality: Right;  Equipment: [employer] Truspar; Scope  Venor/Rep: [employer]    BACK SURGERY      spinal stimulator implanted and then removed    BREAST BIOPSY      COLON SURGERY      COLONOSCOPY  11/25/2016    repeat in 5-10 years    COLONOSCOPY N/A 09/06/2019    Procedure: COLONOSCOPY;  Surgeon: Dany Hugo MD;  Location: [facility] ENDO;  Service: General;  Laterality: N/A;    ENDOSCOPY  09/06/2019    Procedure: ENDOSCOPY;  Surgeon: Dany Hugo MD;  Location: [facility];  Service: General;;  with multiple biopsy's    ESOPHAGOGASTRODUODENOSCOPY Left 08/29/2018    Procedure: ESOPHAGOGASTRODUODENOSCOPY (EGD);  Surgeon: Dany Hugo MD;  Location: [facility];  Service: General;  Laterality: Left;    EXCISION OF MEDIAL MENISCUS OF KNEE Right 02/05/2019    Procedure: MENISCECTOMY, KNEE, MEDIAL;  Surgeon: Sylvain Gorman DO;  Location: [facility] OR;  Service: Orthopedics;  Laterality: Right;    HYSTERECTOMY  1997    KNEE ARTHROSCOPY W/ DEBRIDEMENT Left 01/22/2019    Procedure: ARTHROSCOPY, KNEE, WITH DEBRIDEMENT;  Surgeon: Sylvain Gorman DO;  Location: [facility] OR;  Service: Orthopedics;  Laterality: Left;    KNEE ARTHROSCOPY W/ MENISCECTOMY Left 01/22/2019    Procedure: ARTHROSCOPY, KNEE, WITH MENISCECTOMY;  Surgeon: Sylvain Gorman DO;  Location: [facility] OR;  Service: Orthopedics;  Laterality: Left;    KNEE ARTHROSCOPY W/ PLICA EXCISION Left 01/22/2019    Procedure: EXCISION, PLICA, KNEE, ARTHROSCOPIC;  Surgeon: Sylvain Gorman DO;  Location: [facility];  Service: Orthopedics;  Laterality: Left;    SALPINGOOPHORECTOMY  1997    TOTAL KNEE ARTHROPLASTY Right 04/18/2019    Procedure: ARTHROPLASTY, KNEE, TOTAL;  Surgeon: Sylvain Gorman DO;   Location: [facility] OR;  Service: Orthopedics;  Laterality: Right;  Equipment: [employer] Sigma Total Knee System; [employer] Leg Mcclellan  Vendor/Rep: [employer]  Table/Position: Supine  REQUIRES FIRST ASSISTANT EPHRAIM    TOTAL KNEE ARTHROPLASTY Left 10/03/2019    Procedure: ARTHROPLASTY, KNEE, TOTAL;  Surgeon: Sylvain Gorman DO;  Location: [facility] OR;  Service: Orthopedics;  Laterality: Left;  Equipment: [employer] Sigma Total Knee System  Vendor/Rep: [employer]  REQUIRES FIRST ASSISTANT EPHRAIM    TUBAL LIGATION            Physical Exam   Eyes: Right conjunctiva is not injected. Left conjunctiva is not injected.   Neck: No JVD present. No thyromegaly present.   Cardiovascular: Normal rate and regular rhythm. Exam reveals no decreased pulses.   Pulmonary/Chest: Effort normal.   Musculoskeletal:         General: Tenderness and deformity present.      Right shoulder: Normal.      Left shoulder: Normal.      Right elbow: Tenderness present.      Left elbow: Tenderness present.      Right wrist: Tenderness present.      Left wrist: Tenderness present.      Right knee: Tenderness present.      Left knee: Tenderness present.   Lymphadenopathy:     She has no cervical adenopathy.   Neurological: Gait normal.   Skin: No rash noted.   Psychiatric: Mood and affect normal.       Right Side Rheumatological Exam     Examination finds the shoulder normal.    The patient is tender to palpation of the elbow, wrist, knee, 1st PIP, 1st MCP, 2nd PIP, 2nd MCP, 3rd PIP, 3rd MCP, 4th PIP, 4th MCP, 5th PIP and 5th MCP    She has swelling of the 1st PIP, 2nd PIP, 3rd PIP, 4th PIP and 5th PIP    Left Side Rheumatological Exam     Examination finds the shoulder normal.    The patient is tender to palpation of the elbow, wrist, knee, 1st PIP, 1st MCP, 2nd PIP, 2nd MCP, 3rd PIP, 3rd MCP, 4th PIP, 4th MCP, 5th PIP and 5th MCP.    She has swelling of the 1st PIP, 2nd PIP, 3rd PIP, 4th PIP and 5th PIP        Results for orders placed or performed in visit on 02/17/23    Varicella Zoster Antibody, IgG   Result Value Ref Range    Varicella Zoster IgG 858.20 AU/ml    Varicella Interpretation Positive    Varicella Zoster Antibody, IgM   Result Value Ref Range    Varicella IgM 0.43    HERPES SIMPLEX 1&2 IGG   Result Value Ref Range    HSV 1 IgG Positive (A) Negative    HSV 2 IgG Negative Negative   CBC Auto Differential   Result Value Ref Range    WBC 6.67 3.90 - 12.70 K/uL    RBC 3.92 (L) 4.00 - 5.40 M/uL    Hemoglobin 12.0 12.0 - 16.0 g/dL    Hematocrit 36.6 (L) 37.0 - 48.5 %    MCV 93 82 - 98 fL    MCH 30.6 27.0 - 31.0 pg    MCHC 32.8 32.0 - 36.0 g/dL    RDW 13.3 11.5 - 14.5 %    Platelets 160 150 - 450 K/uL    MPV 10.8 9.2 - 12.9 fL    Immature Granulocytes 0.1 0.0 - 0.5 %    Gran # (ANC) 4.2 1.8 - 7.7 K/uL    Immature Grans (Abs) 0.01 0.00 - 0.04 K/uL    Lymph # 1.6 1.0 - 4.8 K/uL    Mono # 0.6 0.3 - 1.0 K/uL    Eos # 0.2 0.0 - 0.5 K/uL    Baso # 0.06 0.00 - 0.20 K/uL    nRBC 0 0 /100 WBC    Gran % 62.9 38.0 - 73.0 %    Lymph % 24.6 18.0 - 48.0 %    Mono % 8.2 4.0 - 15.0 %    Eosinophil % 3.3 0.0 - 8.0 %    Basophil % 0.9 0.0 - 1.9 %    Differential Method Automated    Comprehensive Metabolic Panel   Result Value Ref Range    Sodium 140 136 - 145 mmol/L    Potassium 4.0 3.5 - 5.1 mmol/L    Chloride 105 95 - 110 mmol/L    CO2 27 23 - 29 mmol/L    Glucose 101 70 - 110 mg/dL    BUN 15 6 - 20 mg/dL    Creatinine 1.0 0.5 - 1.4 mg/dL    Calcium 9.0 8.7 - 10.5 mg/dL    Total Protein 7.0 6.0 - 8.4 g/dL    Albumin 3.8 3.5 - 5.2 g/dL    Total Bilirubin 0.3 0.1 - 1.0 mg/dL    Alkaline Phosphatase 38 (L) 55 - 135 U/L    AST 25 10 - 40 U/L    ALT 28 10 - 44 U/L    Anion Gap 8 8 - 16 mmol/L    eGFR >60.0 >60 mL/min/1.73 m^2   Sedimentation rate   Result Value Ref Range    Sed Rate 7 0 - 20 mm/Hr   C-Reactive Protein   Result Value Ref Range    CRP 3.3 0.0 - 8.2 mg/L   TSH   Result Value Ref Range    TSH 3.457 0.400 - 4.000 uIU/mL   T4, Free   Result Value Ref Range    Free T4 1.19 0.71 - 1.51  ng/dL   T3   Result Value Ref Range    T3, Total 65 60 - 180 ng/dL   Vitamin D   Result Value Ref Range    Vit D, 25-Hydroxy 42 30 - 96 ng/mL   DORY Screen w/Reflex   Result Value Ref Range    DORY Screen Positive (A) Negative <1:80   DORY Pattern 2   Result Value Ref Range    DORY Pattern 2 Nucleolar    DORY Profile   Result Value Ref Range    Anti Sm Antibody 0.08 0.00 - 0.99 Ratio    Anti-Sm Interpretation Negative Negative    Anti-SSA Antibody 0.08 0.00 - 0.99 Ratio    Anti-SSA Interpretation Negative Negative    Anti-SSB Antibody 0.09 0.00 - 0.99 Ratio    Anti-SSB Interpretation Negative Negative    ds DNA Ab Negative 1:10 Negative 1:10    Anti Sm/RNP Antibody 0.27 0.00 - 0.99 Ratio    Anti-Sm/RNP Interpretation Negative Negative   DORY Titer 1   Result Value Ref Range    DORY Titer 1 1:640    DORY Pattern 1   Result Value Ref Range    DORY PATTERN 1 Homogeneous    DORY Titer 2   Result Value Ref Range    DORY Titer 2 1:640             Assessment:       1. Steroid-induced diabetes mellitus, initial encounter    2. Insulin resistance    3. Vitamin B deficiency    4. Raynaud's disease without gangrene    5. PSA (psoriatic arthritis)    6. Psoriasis    7. Deficiency of other specified B group vitamins    8. Dietary folate deficiency anemia    9. Pyridoxine deficiency              Plan:       Steroid-induced diabetes mellitus, initial encounter  -     semaglutide (OZEMPIC) 0.25 mg or 0.5 mg (2 mg/3 mL) pen injector; Inject 0.25 mg into the skin every 7 days.  Dispense: 1.5 mL; Refill: 2  -     semaglutide (OZEMPIC) 0.25 mg or 0.5 mg (2 mg/3 mL) pen injector; Inject 0.5 mg into the skin every 7 days.  Dispense: 3 mL; Refill: 5  -     Protime-INR; Future; Expected date: 08/11/2023  -     Ferritin; Future; Expected date: 08/11/2023  -     Iron and TIBC; Future; Expected date: 08/11/2023  -     Vitamin K; Future; Expected date: 08/11/2023  -     Vitamin E; Future; Expected date: 08/11/2023  -     Zinc; Future; Expected date:  08/11/2023  -     PTH, Intact; Future; Expected date: 08/11/2023  -     Magnesium; Future; Expected date: 08/11/2023  -     Phosphorus; Future; Expected date: 08/11/2023  -     Folate; Future; Expected date: 08/11/2023  -     Vitamin B12; Future; Expected date: 08/11/2023  -     Vitamin B6; Future; Expected date: 08/11/2023  -     Vitamin B1; Future; Expected date: 08/11/2023  -     Cardiolipin antibody; Future; Expected date: 08/11/2023  -     Beta-2 Glycoprotein Abs (IgA, IgG, IgM); Future; Expected date: 08/11/2023  -     Antithrombin III; Future; Expected date: 08/11/2023  -     Protein S Activity; Future; Expected date: 08/11/2023  -     Protein C Activity; Future; Expected date: 08/11/2023  -     Factor 5 leiden; Future; Expected date: 08/11/2023  -     DRVVT; Future; Expected date: 08/11/2023  -     Homocysteine, Serum; Future; Expected date: 08/11/2023  -     Estrogens, Total; Future; Expected date: 08/11/2023  -     PROGESTERONE; Future; Expected date: 08/11/2023  -     TESTOSTERONE; Future; Expected date: 08/11/2023  -     DHEA; Future; Expected date: 08/11/2023  -     ketorolac injection 60 mg  -     cyanocobalamin injection 1,000 mcg  -     methylPREDNISolone acetate injection 80 mg    Insulin resistance  -     semaglutide (OZEMPIC) 0.25 mg or 0.5 mg (2 mg/3 mL) pen injector; Inject 0.25 mg into the skin every 7 days.  Dispense: 1.5 mL; Refill: 2  -     semaglutide (OZEMPIC) 0.25 mg or 0.5 mg (2 mg/3 mL) pen injector; Inject 0.5 mg into the skin every 7 days.  Dispense: 3 mL; Refill: 5  -     Protime-INR; Future; Expected date: 08/11/2023  -     Ferritin; Future; Expected date: 08/11/2023  -     Iron and TIBC; Future; Expected date: 08/11/2023  -     Vitamin K; Future; Expected date: 08/11/2023  -     Vitamin E; Future; Expected date: 08/11/2023  -     Zinc; Future; Expected date: 08/11/2023  -     PTH, Intact; Future; Expected date: 08/11/2023  -     Magnesium; Future; Expected date: 08/11/2023  -      Phosphorus; Future; Expected date: 08/11/2023  -     Folate; Future; Expected date: 08/11/2023  -     Vitamin B12; Future; Expected date: 08/11/2023  -     Vitamin B6; Future; Expected date: 08/11/2023  -     Vitamin B1; Future; Expected date: 08/11/2023  -     Cardiolipin antibody; Future; Expected date: 08/11/2023  -     Beta-2 Glycoprotein Abs (IgA, IgG, IgM); Future; Expected date: 08/11/2023  -     Antithrombin III; Future; Expected date: 08/11/2023  -     Protein S Activity; Future; Expected date: 08/11/2023  -     Protein C Activity; Future; Expected date: 08/11/2023  -     Factor 5 leiden; Future; Expected date: 08/11/2023  -     DRVVT; Future; Expected date: 08/11/2023  -     Homocysteine, Serum; Future; Expected date: 08/11/2023  -     Estrogens, Total; Future; Expected date: 08/11/2023  -     PROGESTERONE; Future; Expected date: 08/11/2023  -     TESTOSTERONE; Future; Expected date: 08/11/2023  -     DHEA; Future; Expected date: 08/11/2023  -     ketorolac injection 60 mg  -     cyanocobalamin injection 1,000 mcg  -     methylPREDNISolone acetate injection 80 mg    Vitamin B deficiency  -     Protime-INR; Future; Expected date: 08/11/2023  -     Ferritin; Future; Expected date: 08/11/2023  -     Iron and TIBC; Future; Expected date: 08/11/2023  -     Vitamin K; Future; Expected date: 08/11/2023  -     Vitamin E; Future; Expected date: 08/11/2023  -     Zinc; Future; Expected date: 08/11/2023  -     PTH, Intact; Future; Expected date: 08/11/2023  -     Magnesium; Future; Expected date: 08/11/2023  -     Phosphorus; Future; Expected date: 08/11/2023  -     Folate; Future; Expected date: 08/11/2023  -     Vitamin B12; Future; Expected date: 08/11/2023  -     Vitamin B6; Future; Expected date: 08/11/2023  -     Vitamin B1; Future; Expected date: 08/11/2023  -     Cardiolipin antibody; Future; Expected date: 08/11/2023  -     Beta-2 Glycoprotein Abs (IgA, IgG, IgM); Future; Expected date: 08/11/2023  -      Antithrombin III; Future; Expected date: 08/11/2023  -     Protein S Activity; Future; Expected date: 08/11/2023  -     Protein C Activity; Future; Expected date: 08/11/2023  -     Factor 5 leiden; Future; Expected date: 08/11/2023  -     DRVVT; Future; Expected date: 08/11/2023  -     Homocysteine, Serum; Future; Expected date: 08/11/2023  -     Estrogens, Total; Future; Expected date: 08/11/2023  -     PROGESTERONE; Future; Expected date: 08/11/2023  -     TESTOSTERONE; Future; Expected date: 08/11/2023  -     DHEA; Future; Expected date: 08/11/2023  -     ketorolac injection 60 mg  -     cyanocobalamin injection 1,000 mcg  -     methylPREDNISolone acetate injection 80 mg    Raynaud's disease without gangrene  -     Protime-INR; Future; Expected date: 08/11/2023  -     Ferritin; Future; Expected date: 08/11/2023  -     Iron and TIBC; Future; Expected date: 08/11/2023  -     Vitamin K; Future; Expected date: 08/11/2023  -     Vitamin E; Future; Expected date: 08/11/2023  -     Zinc; Future; Expected date: 08/11/2023  -     PTH, Intact; Future; Expected date: 08/11/2023  -     Magnesium; Future; Expected date: 08/11/2023  -     Phosphorus; Future; Expected date: 08/11/2023  -     Folate; Future; Expected date: 08/11/2023  -     Vitamin B12; Future; Expected date: 08/11/2023  -     Vitamin B6; Future; Expected date: 08/11/2023  -     Vitamin B1; Future; Expected date: 08/11/2023  -     Cardiolipin antibody; Future; Expected date: 08/11/2023  -     Beta-2 Glycoprotein Abs (IgA, IgG, IgM); Future; Expected date: 08/11/2023  -     Antithrombin III; Future; Expected date: 08/11/2023  -     Protein S Activity; Future; Expected date: 08/11/2023  -     Protein C Activity; Future; Expected date: 08/11/2023  -     Factor 5 leiden; Future; Expected date: 08/11/2023  -     DRVVT; Future; Expected date: 08/11/2023  -     Homocysteine, Serum; Future; Expected date: 08/11/2023  -     Estrogens, Total; Future; Expected date:  08/11/2023  -     PROGESTERONE; Future; Expected date: 08/11/2023  -     TESTOSTERONE; Future; Expected date: 08/11/2023  -     DHEA; Future; Expected date: 08/11/2023  -     ketorolac injection 60 mg  -     cyanocobalamin injection 1,000 mcg  -     methylPREDNISolone acetate injection 80 mg    PSA (psoriatic arthritis)  -     Protime-INR; Future; Expected date: 08/11/2023  -     Ferritin; Future; Expected date: 08/11/2023  -     Iron and TIBC; Future; Expected date: 08/11/2023  -     Vitamin K; Future; Expected date: 08/11/2023  -     Vitamin E; Future; Expected date: 08/11/2023  -     Zinc; Future; Expected date: 08/11/2023  -     PTH, Intact; Future; Expected date: 08/11/2023  -     Magnesium; Future; Expected date: 08/11/2023  -     Phosphorus; Future; Expected date: 08/11/2023  -     Folate; Future; Expected date: 08/11/2023  -     Vitamin B12; Future; Expected date: 08/11/2023  -     Vitamin B6; Future; Expected date: 08/11/2023  -     Vitamin B1; Future; Expected date: 08/11/2023  -     Cardiolipin antibody; Future; Expected date: 08/11/2023  -     Beta-2 Glycoprotein Abs (IgA, IgG, IgM); Future; Expected date: 08/11/2023  -     Antithrombin III; Future; Expected date: 08/11/2023  -     Protein S Activity; Future; Expected date: 08/11/2023  -     Protein C Activity; Future; Expected date: 08/11/2023  -     Factor 5 leiden; Future; Expected date: 08/11/2023  -     DRVVT; Future; Expected date: 08/11/2023  -     Homocysteine, Serum; Future; Expected date: 08/11/2023  -     Estrogens, Total; Future; Expected date: 08/11/2023  -     PROGESTERONE; Future; Expected date: 08/11/2023  -     TESTOSTERONE; Future; Expected date: 08/11/2023  -     DHEA; Future; Expected date: 08/11/2023  -     ketorolac injection 60 mg  -     cyanocobalamin injection 1,000 mcg  -     methylPREDNISolone acetate injection 80 mg    Psoriasis  -     Protime-INR; Future; Expected date: 08/11/2023  -     Ferritin; Future; Expected date:  08/11/2023  -     Iron and TIBC; Future; Expected date: 08/11/2023  -     Vitamin K; Future; Expected date: 08/11/2023  -     Vitamin E; Future; Expected date: 08/11/2023  -     Zinc; Future; Expected date: 08/11/2023  -     PTH, Intact; Future; Expected date: 08/11/2023  -     Magnesium; Future; Expected date: 08/11/2023  -     Phosphorus; Future; Expected date: 08/11/2023  -     Folate; Future; Expected date: 08/11/2023  -     Vitamin B12; Future; Expected date: 08/11/2023  -     Vitamin B6; Future; Expected date: 08/11/2023  -     Vitamin B1; Future; Expected date: 08/11/2023  -     Cardiolipin antibody; Future; Expected date: 08/11/2023  -     Beta-2 Glycoprotein Abs (IgA, IgG, IgM); Future; Expected date: 08/11/2023  -     Antithrombin III; Future; Expected date: 08/11/2023  -     Protein S Activity; Future; Expected date: 08/11/2023  -     Protein C Activity; Future; Expected date: 08/11/2023  -     Factor 5 leiden; Future; Expected date: 08/11/2023  -     DRVVT; Future; Expected date: 08/11/2023  -     Homocysteine, Serum; Future; Expected date: 08/11/2023  -     Estrogens, Total; Future; Expected date: 08/11/2023  -     PROGESTERONE; Future; Expected date: 08/11/2023  -     TESTOSTERONE; Future; Expected date: 08/11/2023  -     DHEA; Future; Expected date: 08/11/2023  -     ketorolac injection 60 mg  -     cyanocobalamin injection 1,000 mcg  -     methylPREDNISolone acetate injection 80 mg    Deficiency of other specified B group vitamins  -     Folate; Future; Expected date: 08/11/2023  -     ketorolac injection 60 mg  -     cyanocobalamin injection 1,000 mcg  -     methylPREDNISolone acetate injection 80 mg    Dietary folate deficiency anemia  -     Vitamin B12; Future; Expected date: 08/11/2023  -     ketorolac injection 60 mg  -     cyanocobalamin injection 1,000 mcg  -     methylPREDNISolone acetate injection 80 mg    Pyridoxine deficiency  -     Vitamin B6; Future; Expected date: 08/11/2023  -      ketorolac injection 60 mg  -     cyanocobalamin injection 1,000 mcg  -     methylPREDNISolone acetate injection 80 mg            Assessment:  53 year old female with  Psoriatic arthritis, DORY 1:2560 nucleolar, raynauds  --renal insufficiency  --nausea  --chronic pain syndrome    Plan:  1. toradol 60,depomedrol 80 mg, b12 given today  2. Cont phenergan prn  3. Cont cosentyx 300 mg monthly. Hold 1 month prior to spine surgery and after until cleared by [facility].  4. Check labs to figure out why she is bruising  5.f/u 4 months    More than 50% of the  40 minute encounter was spent face to face counseling the patient regarding current status and future plan of care as well as side effects  of the medications. All questions were answered to patient's satisfaction also includes  non-face to face time preparing to see the patient (eg, review of tests), Obtaining and/or reviewing separately obtained history, Documenting clinical information in the electronic or other health record, Independently interpreting results

## 2023-08-15 ENCOUNTER — LAB VISIT (OUTPATIENT)
Dept: LAB | Facility: HOSPITAL | Age: 53
End: 2023-08-15
Attending: INTERNAL MEDICINE
Payer: MEDICARE

## 2023-08-15 DIAGNOSIS — I73.00 RAYNAUD'S DISEASE WITHOUT GANGRENE: ICD-10-CM

## 2023-08-15 DIAGNOSIS — E53.1 PYRIDOXINE DEFICIENCY: ICD-10-CM

## 2023-08-15 DIAGNOSIS — E09.9 STEROID-INDUCED DIABETES MELLITUS, INITIAL ENCOUNTER: ICD-10-CM

## 2023-08-15 DIAGNOSIS — E53.8 DEFICIENCY OF OTHER SPECIFIED B GROUP VITAMINS: ICD-10-CM

## 2023-08-15 DIAGNOSIS — E88.819 INSULIN RESISTANCE: ICD-10-CM

## 2023-08-15 DIAGNOSIS — T38.0X5A STEROID-INDUCED DIABETES MELLITUS, INITIAL ENCOUNTER: ICD-10-CM

## 2023-08-15 DIAGNOSIS — L40.50 PSA (PSORIATIC ARTHRITIS): ICD-10-CM

## 2023-08-15 DIAGNOSIS — D52.0 DIETARY FOLATE DEFICIENCY ANEMIA: ICD-10-CM

## 2023-08-15 DIAGNOSIS — E53.9 VITAMIN B DEFICIENCY: ICD-10-CM

## 2023-08-15 DIAGNOSIS — L40.9 PSORIASIS: ICD-10-CM

## 2023-08-15 LAB
FERRITIN SERPL-MCNC: 49 NG/ML (ref 20–300)
FOLATE SERPL-MCNC: 12.9 NG/ML (ref 4–24)
HCYS SERPL-SCNC: 7.4 UMOL/L (ref 4–15.5)
INR PPP: 0.9 (ref 0.8–1.2)
IRON SERPL-MCNC: 62 UG/DL (ref 30–160)
MAGNESIUM SERPL-MCNC: 1.9 MG/DL (ref 1.6–2.6)
PHOSPHATE SERPL-MCNC: 3.8 MG/DL (ref 2.7–4.5)
PROGEST SERPL-MCNC: 0.1 NG/ML
PROTHROMBIN TIME: 10 SEC (ref 9–12.5)
PTH-INTACT SERPL-MCNC: 63.4 PG/ML (ref 9–77)
SATURATED IRON: 18 % (ref 20–50)
TESTOST SERPL-MCNC: 19 NG/DL (ref 5–73)
TOTAL IRON BINDING CAPACITY: 351 UG/DL (ref 250–450)
TRANSFERRIN SERPL-MCNC: 237 MG/DL (ref 200–375)
VIT B12 SERPL-MCNC: 1162 PG/ML (ref 210–950)

## 2023-08-15 PROCEDURE — 82672 ASSAY OF ESTROGEN: CPT | Performed by: INTERNAL MEDICINE

## 2023-08-15 PROCEDURE — 84466 ASSAY OF TRANSFERRIN: CPT | Performed by: INTERNAL MEDICINE

## 2023-08-15 PROCEDURE — 36415 COLL VENOUS BLD VENIPUNCTURE: CPT | Performed by: INTERNAL MEDICINE

## 2023-08-15 PROCEDURE — 85610 PROTHROMBIN TIME: CPT | Mod: 91 | Performed by: INTERNAL MEDICINE

## 2023-08-15 PROCEDURE — 82746 ASSAY OF FOLIC ACID SERUM: CPT | Performed by: INTERNAL MEDICINE

## 2023-08-15 PROCEDURE — 83090 ASSAY OF HOMOCYSTEINE: CPT | Performed by: INTERNAL MEDICINE

## 2023-08-15 PROCEDURE — 85303 CLOT INHIBIT PROT C ACTIVITY: CPT | Performed by: INTERNAL MEDICINE

## 2023-08-15 PROCEDURE — 84630 ASSAY OF ZINC: CPT | Performed by: INTERNAL MEDICINE

## 2023-08-15 PROCEDURE — 86146 BETA-2 GLYCOPROTEIN ANTIBODY: CPT | Performed by: INTERNAL MEDICINE

## 2023-08-15 PROCEDURE — 85613 RUSSELL VIPER VENOM DILUTED: CPT | Performed by: INTERNAL MEDICINE

## 2023-08-15 PROCEDURE — 84597 ASSAY OF VITAMIN K: CPT | Performed by: INTERNAL MEDICINE

## 2023-08-15 PROCEDURE — 83735 ASSAY OF MAGNESIUM: CPT | Performed by: INTERNAL MEDICINE

## 2023-08-15 PROCEDURE — 84425 ASSAY OF VITAMIN B-1: CPT | Performed by: INTERNAL MEDICINE

## 2023-08-15 PROCEDURE — 83540 ASSAY OF IRON: CPT | Performed by: INTERNAL MEDICINE

## 2023-08-15 PROCEDURE — 82626 DEHYDROEPIANDROSTERONE: CPT | Performed by: INTERNAL MEDICINE

## 2023-08-15 PROCEDURE — 84403 ASSAY OF TOTAL TESTOSTERONE: CPT | Performed by: INTERNAL MEDICINE

## 2023-08-15 PROCEDURE — 84144 ASSAY OF PROGESTERONE: CPT | Performed by: INTERNAL MEDICINE

## 2023-08-15 PROCEDURE — 85730 THROMBOPLASTIN TIME PARTIAL: CPT | Performed by: INTERNAL MEDICINE

## 2023-08-15 PROCEDURE — 81241 F5 GENE: CPT | Performed by: INTERNAL MEDICINE

## 2023-08-15 PROCEDURE — 85300 ANTITHROMBIN III ACTIVITY: CPT | Performed by: INTERNAL MEDICINE

## 2023-08-15 PROCEDURE — 86147 CARDIOLIPIN ANTIBODY EA IG: CPT | Mod: 59 | Performed by: INTERNAL MEDICINE

## 2023-08-15 PROCEDURE — 84100 ASSAY OF PHOSPHORUS: CPT | Performed by: INTERNAL MEDICINE

## 2023-08-15 PROCEDURE — 85306 CLOT INHIBIT PROT S FREE: CPT | Performed by: INTERNAL MEDICINE

## 2023-08-15 PROCEDURE — 82607 VITAMIN B-12: CPT | Performed by: INTERNAL MEDICINE

## 2023-08-15 PROCEDURE — 83970 ASSAY OF PARATHORMONE: CPT | Performed by: INTERNAL MEDICINE

## 2023-08-15 PROCEDURE — 84207 ASSAY OF VITAMIN B-6: CPT | Performed by: INTERNAL MEDICINE

## 2023-08-15 PROCEDURE — 84446 ASSAY OF VITAMIN E: CPT | Performed by: INTERNAL MEDICINE

## 2023-08-15 PROCEDURE — 82728 ASSAY OF FERRITIN: CPT | Performed by: INTERNAL MEDICINE

## 2023-08-17 ENCOUNTER — PATIENT MESSAGE (OUTPATIENT)
Dept: RHEUMATOLOGY | Facility: CLINIC | Age: 53
End: 2023-08-17
Payer: MEDICARE

## 2023-08-17 LAB
AT III ACT/NOR PPP CHRO: 114 % (ref 83–118)
PROT C ACT/NOR PPP CHRO: 95 % (ref 70–150)
PROT S ACT/NOR PPP: 123 % (ref 65–150)

## 2023-08-18 ENCOUNTER — SPECIALTY PHARMACY (OUTPATIENT)
Dept: PHARMACY | Facility: CLINIC | Age: 53
End: 2023-08-18
Payer: MEDICARE

## 2023-08-18 LAB
APTT IMM NP PPP: NORMAL SEC (ref 32–48)
APTT P HEP NEUT PPP: NORMAL SEC (ref 32–48)
B2 GLYCOPROT1 IGA SER QL: 2.4 U/ML
B2 GLYCOPROT1 IGG SER QL: 1.2 U/ML
B2 GLYCOPROT1 IGM SER QL: <2.4 U/ML
CARDIOLIPIN IGG SER IA-ACNC: <9.4 GPL (ref 0–14.99)
CARDIOLIPIN IGM SER IA-ACNC: <9.4 MPL (ref 0–12.49)
CONFIRM APTT STACLOT: NORMAL
DRVVT SCREEN TO CONFIRM RATIO: NORMAL RATIO
ESTROGEN SERPL-MCNC: 78 PG/ML
F5 P.R506Q BLD/T QL: NEGATIVE
LA 3 SCREEN W REFLEX-IMP: NORMAL
LA NT DPL PPP QL: NORMAL
MIXING DRVVT: NORMAL SEC (ref 33–44)
PROTHROMBIN TIME: 12.2 SEC (ref 12–15.5)
REPTILASE TIME: NORMAL SEC
SCREEN APTT: 41 SEC (ref 32–48)
SCREEN DRVVT: 33 SEC (ref 33–44)
THROMBIN TIME: NORMAL SEC (ref 14.7–19.5)
ZINC SERPL-MCNC: 70 UG/DL (ref 60–130)

## 2023-08-19 LAB — DHEA SERPL-MCNC: 0.69 NG/ML (ref 0.63–4.7)

## 2023-08-21 LAB
A-TOCOPHEROL VIT E SERPL-MCNC: 1038 UG/DL (ref 500–1800)
PHYTONADIONE SERPL-MCNC: 0.74 NMOL/L (ref 0.22–4.88)
PYRIDOXAL SERPL-MCNC: 9 UG/L (ref 5–50)
VIT B1 BLD-MCNC: 65 UG/L (ref 38–122)

## 2023-08-21 NOTE — TELEPHONE ENCOUNTER
Specialty Pharmacy - Refill Coordination    Specialty Medication Orders Linked to Encounter      Flowsheet Row Most Recent Value   Medication #1 secukinumab (COSENTYX PEN, 2 PENS,) 150 mg/mL PnIj (Order#[id number], Rx#[id number])            Refill Questions - Documented Responses      Flowsheet Row Most Recent Value   Patient Availability and HIPAA Verification    Does patient want to proceed with activity? Yes   HIPAA/medical authority confirmed? Yes   Relationship to patient of person spoken to? Self   Refill Screening Questions    Changes to allergies? No   Changes to medications? Yes  [Wellbutrin, Ozempic, collagen]   New conditions since last clinic visit? No   Unplanned office visit, urgent care, ED, or hospital admission in the last 4 weeks? No   How does patient/caregiver feel medication is working? Good   Financial problems or insurance changes? No   How many doses of your specialty medications were missed in the last 4 weeks? 0   Would patient like to speak to a pharmacist? No   When does the patient need to receive the medication? 08/28/23   Refill Delivery Questions    How will the patient receive the medication? Mail   When does the patient need to receive the medication? 08/28/23   Shipping Address Home   Address in [facility] confirmed and updated if neccessary? Yes   Expected Copay ($) 0   Is the patient able to afford the medication copay? Yes   Payment Method zero copay   Days supply of Refill 30   Supplies needed? No supplies needed   Refill activity completed? Yes   Refill activity plan Refill scheduled   Shipment/Pickup Date: 08/24/23            Current Outpatient Medications   Medication Sig    albuterol (PROVENTIL/VENTOLIN HFA) 90 mcg/actuation inhaler Inhale 2 puffs into the lungs every 6 (six) hours as needed. Rescue    baclofen (LIORESAL) 10 MG tablet Take 1 tablet (10 mg total) by mouth 3 (three) times daily.    budesonide (PULMICORT) 0.5 mg/2 mL nebulizer solution Take 2 mLs (0.5  mg total) by nebulization once daily. Controller    buPROPion (WELLBUTRIN XL) 150 MG TB24 tablet Take 1 tablet (150 mg total) by mouth once daily.    ELDERBERRY FRUIT ORAL Take by mouth once daily.    estradioL (ESTRACE) 0.01 % (0.1 mg/gram) vaginal cream Place 1 g vaginally twice a week. Apply 1 gram vaginally daily for two weeks and then twice weekly    fluticasone propionate (FLONASE) 50 mcg/actuation nasal spray 1 spray (50 mcg total) by Each Nostril route 2 (two) times daily as needed for Rhinitis.    furosemide (LASIX) 20 MG tablet TAKE ONE (1) TABLET EVERY MORNING AS NEEDED FOR FLUID    ibuprofen (ADVIL,MOTRIN) 800 MG tablet Take 1 tablet (800 mg total) by mouth 3 (three) times daily as needed for Pain.    levalbuterol (XOPENEX) 0.31 mg/3 mL nebulizer solution Take 3 mLs (0.31 mg total) by nebulization every 4 (four) hours as needed for Wheezing. Rescue    levothyroxine (SYNTHROID) 50 MCG tablet TAKE ONE (1) TABLET EVERY MORNING ON AN EMPTY STOMACH FOR THYROID    lisinopriL (PRINIVIL,ZESTRIL) 20 MG tablet Take 1 tablet (20 mg total) by mouth once daily.    lovastatin (MEVACOR) 20 MG tablet Take 1 tablet (20 mg total) by mouth every evening.    montelukast (SINGULAIR) 10 mg tablet Take 10 mg by mouth.    [START ON 9/1/2023] oxyCODONE-acetaminophen (PERCOCET)  mg per tablet Take 1 tablet by mouth every 6 (six) hours as needed for Pain.    promethazine (PHENERGAN) 25 MG tablet Take 1 tablet (25 mg total) by mouth every 8 (eight) hours as needed for Nausea.    secukinumab (COSENTYX PEN, 2 PENS,) 150 mg/mL PnIj Inject 2 mL (300 mg) into the skin every 30 days.    semaglutide (OZEMPIC) 0.25 mg or 0.5 mg (2 mg/3 mL) pen injector Inject 0.25 mg into the skin every 7 days.    semaglutide (OZEMPIC) 0.25 mg or 0.5 mg (2 mg/3 mL) pen injector Inject 0.5 mg into the skin every 7 days.    traZODone (DESYREL) 50 MG tablet TAKE 2 TABLETS BY MOUTH EVERY EVENING.    TRELEGY ELLIPTA 100-62.5-25 mcg DsDv     valACYclovir  (VALTREX) 1000 MG tablet Take 1 tablet (1,000 mg total) by mouth every 12 (twelve) hours.   Last reviewed on 8/11/2023 12:16 PM by Maximino Molina MD    Review of patient's allergies indicates:   Allergen Reactions    Fentanyl Shortness Of Breath    Remeron [mirtazapine] Other (See Comments)     Weight gain    Last reviewed on  8/11/2023 12:16 PM by Maximino Molina      Tasks added this encounter   No tasks added.   Tasks due within next 3 months   No tasks due.     Karthikeyan, PharmD  [facility]  [address] [place] [id number]  Phone: [phone]  Fax: [phone]

## 2023-09-01 DIAGNOSIS — I10 BENIGN HYPERTENSION: ICD-10-CM

## 2023-09-01 RX ORDER — LISINOPRIL 20 MG/1
TABLET ORAL
Qty: 90 TABLET | Refills: 3 | Status: SHIPPED | OUTPATIENT
Start: 2023-09-01

## 2023-09-05 ENCOUNTER — PATIENT MESSAGE (OUTPATIENT)
Dept: FAMILY MEDICINE | Facility: CLINIC | Age: 53
End: 2023-09-05
Payer: MEDICARE

## 2023-09-18 ENCOUNTER — SPECIALTY PHARMACY (OUTPATIENT)
Dept: PHARMACY | Facility: CLINIC | Age: 53
End: 2023-09-18
Payer: MEDICARE

## 2023-09-18 NOTE — TELEPHONE ENCOUNTER
Specialty Pharmacy - Refill Coordination    Specialty Medication Orders Linked to Encounter      Flowsheet Row Most Recent Value   Medication #1 secukinumab (COSENTYX PEN, 2 PENS,) 150 mg/mL PnIj (Order#[id number], Rx#[id number])            Refill Questions - Documented Responses      Flowsheet Row Most Recent Value   Patient Availability and HIPAA Verification    Does patient want to proceed with activity? Yes   HIPAA/medical authority confirmed? Yes   Relationship to patient of person spoken to? Self   Refill Screening Questions    Changes to allergies? No   Changes to medications? No   New conditions since last clinic visit? No   Unplanned office visit, urgent care, ED, or hospital admission in the last 4 weeks? No   How does patient/caregiver feel medication is working? Good   Financial problems or insurance changes? No   How many doses of your specialty medications were missed in the last 4 weeks? 0   Would patient like to speak to a pharmacist? No   When does the patient need to receive the medication? 09/26/23   Refill Delivery Questions    How will the patient receive the medication? Mail   When does the patient need to receive the medication? 09/26/23   Shipping Address Temporary   Address in [facility] confirmed and updated if neccessary? Yes   Expected Copay ($) 0   Is the patient able to afford the medication copay? Yes   Payment Method zero copay   Days supply of Refill 30   Supplies needed? No supplies needed   Refill activity completed? Yes   Refill activity plan Refill scheduled   Shipment/Pickup Date: 09/21/23            Current Outpatient Medications   Medication Sig    albuterol (PROVENTIL/VENTOLIN HFA) 90 mcg/actuation inhaler Inhale 2 puffs into the lungs every 6 (six) hours as needed. Rescue    baclofen (LIORESAL) 10 MG tablet Take 1 tablet (10 mg total) by mouth 3 (three) times daily.    budesonide (PULMICORT) 0.5 mg/2 mL nebulizer solution Take 2 mLs (0.5 mg total) by nebulization once  daily. Controller    buPROPion (WELLBUTRIN XL) 150 MG TB24 tablet Take 1 tablet (150 mg total) by mouth once daily.    ELDERBERRY FRUIT ORAL Take by mouth once daily.    estradioL (ESTRACE) 0.01 % (0.1 mg/gram) vaginal cream Place 1 g vaginally twice a week. Apply 1 gram vaginally daily for two weeks and then twice weekly    fluticasone propionate (FLONASE) 50 mcg/actuation nasal spray 1 spray (50 mcg total) by Each Nostril route 2 (two) times daily as needed for Rhinitis.    furosemide (LASIX) 20 MG tablet TAKE ONE (1) TABLET EVERY MORNING AS NEEDED FOR FLUID    ibuprofen (ADVIL,MOTRIN) 800 MG tablet Take 1 tablet (800 mg total) by mouth 3 (three) times daily as needed for Pain.    levalbuterol (XOPENEX) 0.31 mg/3 mL nebulizer solution Take 3 mLs (0.31 mg total) by nebulization every 4 (four) hours as needed for Wheezing. Rescue    levothyroxine (SYNTHROID) 50 MCG tablet TAKE ONE (1) TABLET EVERY MORNING ON AN EMPTY STOMACH FOR THYROID    lisinopriL (PRINIVIL,ZESTRIL) 20 MG tablet TAKE 1 TABLET BY MOUTH DAILY FOR BLOOD PRESSURE CONTROL.    lovastatin (MEVACOR) 20 MG tablet Take 1 tablet (20 mg total) by mouth every evening.    montelukast (SINGULAIR) 10 mg tablet Take 10 mg by mouth.    oxyCODONE-acetaminophen (PERCOCET)  mg per tablet Take 1 tablet by mouth every 6 (six) hours as needed for Pain.    promethazine (PHENERGAN) 25 MG tablet Take 1 tablet (25 mg total) by mouth every 8 (eight) hours as needed for Nausea.    secukinumab (COSENTYX PEN, 2 PENS,) 150 mg/mL PnIj Inject 2 mL (300 mg) into the skin every 30 days.    semaglutide (OZEMPIC) 0.25 mg or 0.5 mg (2 mg/3 mL) pen injector Inject 0.25 mg into the skin every 7 days.    semaglutide (OZEMPIC) 0.25 mg or 0.5 mg (2 mg/3 mL) pen injector Inject 0.5 mg into the skin every 7 days.    traZODone (DESYREL) 50 MG tablet TAKE 2 TABLETS BY MOUTH EVERY EVENING.    TRELEGY ELLIPTA 100-62.5-25 mcg DsDv     valACYclovir (VALTREX) 1000 MG tablet Take 1 tablet  (1,000 mg total) by mouth every 12 (twelve) hours.   Last reviewed on 8/11/2023 12:16 PM by Maximino Molina MD    Review of patient's allergies indicates:   Allergen Reactions    Fentanyl Shortness Of Breath    Remeron [mirtazapine] Other (See Comments)     Weight gain    Last reviewed on  8/11/2023 12:16 PM by Maximino Molina      Tasks added this encounter   No tasks added.   Tasks due within next 3 months   9/16/2023 - Refill Coordination Outreach (1 time occurrence)     [facility]  [address] [facility] [address]  Phone: [phone]  Fax: [phone]

## 2023-09-28 RX ORDER — OXYCODONE AND ACETAMINOPHEN 10; 325 MG/1; MG/1
1 TABLET ORAL EVERY 6 HOURS PRN
Qty: 105 TABLET | Refills: 0 | Status: SHIPPED | OUTPATIENT
Start: 2023-09-28 | End: 2023-10-26 | Stop reason: SDUPTHER

## 2023-09-28 NOTE — TELEPHONE ENCOUNTER
Last office visit: 8/8/2023  Medication Renewal Request  Received: Yesterday  Boom Blanco Staff  Phone Number: [phone]     Refills have been requested for the following medications:         oxyCODONE-acetaminophen (PERCOCET)  mg per tablet [Juan Carlos Saldivar]     Preferred pharmacy: [facility] [place] - [address] [place]   Delivery method: Pickup

## 2023-10-26 ENCOUNTER — PATIENT MESSAGE (OUTPATIENT)
Dept: FAMILY MEDICINE | Facility: CLINIC | Age: 53
End: 2023-10-26
Payer: MEDICARE

## 2023-11-06 ENCOUNTER — OFFICE VISIT (OUTPATIENT)
Dept: FAMILY MEDICINE | Facility: CLINIC | Age: 53
End: 2023-11-06
Payer: MEDICARE

## 2023-11-06 VITALS
OXYGEN SATURATION: 100 % | DIASTOLIC BLOOD PRESSURE: 65 MMHG | BODY MASS INDEX: 29.83 KG/M2 | SYSTOLIC BLOOD PRESSURE: 118 MMHG | TEMPERATURE: 98 F | WEIGHT: 201.38 LBS | HEART RATE: 76 BPM | HEIGHT: 69 IN

## 2023-11-06 DIAGNOSIS — M51.9 LUMBAR DISC DISEASE: Primary | ICD-10-CM

## 2023-11-06 DIAGNOSIS — M06.9 RHEUMATOID ARTHRITIS INVOLVING LEFT KNEE, UNSPECIFIED WHETHER RHEUMATOID FACTOR PRESENT: ICD-10-CM

## 2023-11-06 DIAGNOSIS — I10 BENIGN HYPERTENSION: ICD-10-CM

## 2023-11-06 DIAGNOSIS — E78.49 OTHER HYPERLIPIDEMIA: ICD-10-CM

## 2023-11-06 DIAGNOSIS — G89.4 CHRONIC PAIN SYNDROME: ICD-10-CM

## 2023-11-06 DIAGNOSIS — F41.9 ANXIETY DISORDER, UNSPECIFIED TYPE: ICD-10-CM

## 2023-11-06 DIAGNOSIS — E03.8 OTHER SPECIFIED HYPOTHYROIDISM: Chronic | ICD-10-CM

## 2023-11-06 PROCEDURE — G0008 FLU VACCINE (QUAD) GREATER THAN OR EQUAL TO 3YO PRESERVATIVE FREE IM: ICD-10-PCS | Mod: S$GLB,,, | Performed by: FAMILY MEDICINE

## 2023-11-06 PROCEDURE — 90686 IIV4 VACC NO PRSV 0.5 ML IM: CPT | Mod: S$GLB,,, | Performed by: FAMILY MEDICINE

## 2023-11-06 PROCEDURE — 99214 OFFICE O/P EST MOD 30 MIN: CPT | Mod: S$GLB,,, | Performed by: FAMILY MEDICINE

## 2023-11-06 PROCEDURE — 90686 FLU VACCINE (QUAD) GREATER THAN OR EQUAL TO 3YO PRESERVATIVE FREE IM: ICD-10-PCS | Mod: S$GLB,,, | Performed by: FAMILY MEDICINE

## 2023-11-06 PROCEDURE — 99214 PR OFFICE/OUTPT VISIT, EST, LEVL IV, 30-39 MIN: ICD-10-PCS | Mod: S$GLB,,, | Performed by: FAMILY MEDICINE

## 2023-11-06 PROCEDURE — G0008 ADMIN INFLUENZA VIRUS VAC: HCPCS | Mod: S$GLB,,, | Performed by: FAMILY MEDICINE

## 2023-11-06 RX ORDER — OXYCODONE AND ACETAMINOPHEN 10; 325 MG/1; MG/1
1 TABLET ORAL EVERY 6 HOURS PRN
Qty: 105 TABLET | Refills: 0 | Status: SHIPPED | OUTPATIENT
Start: 2023-11-26 | End: 2023-12-20 | Stop reason: SDUPTHER

## 2023-11-06 NOTE — PATIENT INSTRUCTIONS
Doing great!  Stop flonase for a few weeks, and just use saline nasal spray  Filled medication for end of the month    Follow up 3 months

## 2023-11-06 NOTE — PROGRESS NOTES
"  [facility] - [place], [place]    Family Medicine Office Visit    Chief Complaint   Patient presents with    Follow-up        HPI:  has severe/chronic lumbar disc disease    TSH stable on medication  Hyperlipidemia stable on appropriate intensity statin therapy  Blood Pressure at goal on medication, with patient reporting prescription compliance    RA stable    Distant history of bladder tumor    Is worried that prolonged flonase use is causing headache with cpap    Down 20lbs!    Mood stable on medication    ROS: as above    Vitals:    11/06/23 0911   BP: 118/65   Pulse: 76   Temp: 98 °F (36.7 °C)   SpO2: 100%   Weight: 91.4 kg (201 lb 6.4 oz)   Height: 5' 9" (1.753 m)      Body mass index is 29.74 kg/m².      General:  AOx3, well nourished and developed in no acute distress  Eyes:  PERRLA, EOMI, vision intact grossly  ENT:  normal hearing, moist oral mucosa  Neck:  trachea midline with no masses or thyromegaly  Heart:  RRR, no murmurs.  No edema noted, extremities warm and well perfused  Lungs:  clear to auscultation bilaterally with symmetric chest movement  Abdomen:  Soft, nontender, nondistended.  Normal bowel sounds  Musculoskeletal:  Normal gait.  Normal posture.  Normal muscular development with no joint swelling.  Neurological:  CN II-XII grossly intact. Symmetric strength and sensation  Psych:  Normal mood and affect.  Able to demonstrate good judgement and personal insight.      Assessment/Plan:    1. Lumbar disc disease    2. Chronic pain syndrome    3. Other hyperlipidemia    4. Benign hypertension    5. Rheumatoid arthritis involving left knee, unspecified whether rheumatoid factor present    6. Other specified hypothyroidism    7. Anxiety disorder, unspecified type        Stable, continue therapy  Stable as above  Stable on statin  At goal  Stable with rheumatology  Stable thyroid  Stable, doing well on wellbutrin          "

## 2023-11-17 ENCOUNTER — TELEPHONE (OUTPATIENT)
Dept: FAMILY MEDICINE | Facility: CLINIC | Age: 53
End: 2023-11-17
Payer: MEDICARE

## 2023-11-17 NOTE — TELEPHONE ENCOUNTER
----- Message from Crystal Barron sent at 11/17/2023  2:38 PM CST -----  Contact: PT  Type: Needs Medical Advice    Who Called: PT  Best Call Back Number: [phone]  Additional  Information: PT asking if provider would contact [facility] to authorize refill for RX   oxyCODONE-acetaminophen (PERCOCET)  mg per tablet To be filled on 11/25/23 being 11/26/23 is on a Sunday, and pharm is closed.          Please Advise- Thank you

## 2023-11-27 DIAGNOSIS — E03.8 OTHER SPECIFIED HYPOTHYROIDISM: ICD-10-CM

## 2023-11-28 RX ORDER — LEVOTHYROXINE SODIUM 50 UG/1
TABLET ORAL
Qty: 90 TABLET | Refills: 3 | Status: SHIPPED | OUTPATIENT
Start: 2023-11-28

## 2023-11-28 RX ORDER — BACLOFEN 10 MG/1
TABLET ORAL
Qty: 90 TABLET | Refills: 3 | Status: SHIPPED | OUTPATIENT
Start: 2023-11-28 | End: 2024-03-21

## 2023-11-30 DIAGNOSIS — R60.0 LOCALIZED EDEMA: ICD-10-CM

## 2023-12-01 RX ORDER — FUROSEMIDE 20 MG/1
TABLET ORAL
Qty: 90 TABLET | Refills: 3 | Status: SHIPPED | OUTPATIENT
Start: 2023-12-01

## 2023-12-01 NOTE — TELEPHONE ENCOUNTER
LOV: 11/06/2023  Rx Auth Request  Received: Yesterday  Scott Rubio Staff  Caller: Unspecified (Yesterday, 10:25 AM)

## 2023-12-14 DIAGNOSIS — L40.50 PSA (PSORIATIC ARTHRITIS): ICD-10-CM

## 2023-12-15 RX ORDER — SECUKINUMAB 150 MG/ML
300 INJECTION SUBCUTANEOUS
Qty: 2 ML | Refills: 11 | Status: ACTIVE | OUTPATIENT
Start: 2023-12-15

## 2023-12-20 RX ORDER — OXYCODONE AND ACETAMINOPHEN 10; 325 MG/1; MG/1
1 TABLET ORAL EVERY 6 HOURS PRN
Qty: 105 TABLET | Refills: 0 | Status: SHIPPED | OUTPATIENT
Start: 2023-12-20 | End: 2024-01-23 | Stop reason: SDUPTHER

## 2023-12-29 RX ORDER — IBUPROFEN 800 MG/1
TABLET ORAL
Qty: 90 TABLET | Refills: 3 | Status: SHIPPED | OUTPATIENT
Start: 2023-12-29

## 2024-01-12 ENCOUNTER — OFFICE VISIT (OUTPATIENT)
Dept: FAMILY MEDICINE | Facility: CLINIC | Age: 54
End: 2024-01-12
Payer: MEDICARE

## 2024-01-12 ENCOUNTER — TELEPHONE (OUTPATIENT)
Dept: FAMILY MEDICINE | Facility: CLINIC | Age: 54
End: 2024-01-12

## 2024-01-12 DIAGNOSIS — J44.1 COPD EXACERBATION: Primary | ICD-10-CM

## 2024-01-12 PROCEDURE — 1160F RVW MEDS BY RX/DR IN RCRD: CPT | Mod: CPTII,95,, | Performed by: STUDENT IN AN ORGANIZED HEALTH CARE EDUCATION/TRAINING PROGRAM

## 2024-01-12 PROCEDURE — 99214 OFFICE O/P EST MOD 30 MIN: CPT | Mod: 95,,, | Performed by: STUDENT IN AN ORGANIZED HEALTH CARE EDUCATION/TRAINING PROGRAM

## 2024-01-12 PROCEDURE — 1159F MED LIST DOCD IN RCRD: CPT | Mod: CPTII,95,, | Performed by: STUDENT IN AN ORGANIZED HEALTH CARE EDUCATION/TRAINING PROGRAM

## 2024-01-12 RX ORDER — LEVOFLOXACIN 750 MG/1
750 TABLET ORAL DAILY
Qty: 7 TABLET | Refills: 0 | Status: SHIPPED | OUTPATIENT
Start: 2024-01-12

## 2024-01-12 RX ORDER — PREDNISONE 20 MG/1
40 TABLET ORAL DAILY
Qty: 14 TABLET | Refills: 0 | Status: SHIPPED | OUTPATIENT
Start: 2024-01-12 | End: 2024-01-19

## 2024-01-12 NOTE — PROGRESS NOTES
[facility]  [address], [address]  [place] [address]    Subjective     Patient ID: Boom Blanco is a 53 y.o. female who is accessing care through a virtual telemed visit.    Chief Complaint: sinusitis    Patient has been having a cold for the past day. Patient had a fever of 103.8 this morning. It improved w/ ibuprofen. She has pleurisy, having green phlegm w/ her cough. She has COPD and is wheezing currently    ROS negative unless stated above       Objective     No vitals obtained d/t telemed visit    Limited physical exam d/t telemed visit but what could be obtained is listed below:    General: NAD, appears stated age, not ill appearing  Respiratory: Not in respiratory distress, no tachypnea  Psych: AOx3, normal affect, thought pattern, and judgement    Wt Readings from Last 3 Encounters:   11/06/23 0911 91.4 kg (201 lb 6.4 oz)   08/11/23 1135 100 kg (220 lb 7.4 oz)   08/08/23 0906 100.6 kg (221 lb 11.2 oz)        Current Outpatient Medications   Medication Instructions    albuterol (PROVENTIL/VENTOLIN HFA) 90 mcg/actuation inhaler 2 puffs, Inhalation, Every 6 hours PRN, Rescue    baclofen (LIORESAL) 10 MG tablet TAKE 1 TABLET BY MOUTH 3-TIMES DAILY AS DIRECTED.    budesonide (PULMICORT) 0.5 mg, Nebulization, Daily, Controller    buPROPion (WELLBUTRIN XL) 150 mg, Oral, Daily    ELDERBERRY FRUIT ORAL Oral, Daily    estradioL (ESTRACE) 1 g, Vaginal, Twice weekly, Apply 1 gram vaginally daily for two weeks and then twice weekly    fluticasone propionate (FLONASE) 50 mcg, Each Nostril, 2 times daily PRN    furosemide (LASIX) 20 MG tablet TAKE 1 TABLET BY MOUTH IN THE MORNING AS NEEDED FOR FLUID RETENTION.    ibuprofen (ADVIL,MOTRIN) 800 MG tablet TAKE 1 TABLET BY MOUTH 3-TIMES DAILY AS NEEDED FOR PAIN.    levalbuterol (XOPENEX) 0.31 mg, Nebulization, Every 4 hours PRN, Rescue    levoFLOXacin (LEVAQUIN) 750 mg, Oral, Daily     levothyroxine (SYNTHROID) 50 MCG tablet TAKE 1 TABLET BY MOUTH EVERY MORNING ON EMPTY STOMACH. DO NOT EAT FOR 30 MINUTES AFTERWARD.    lisinopriL (PRINIVIL,ZESTRIL) 20 MG tablet TAKE 1 TABLET BY MOUTH DAILY FOR BLOOD PRESSURE CONTROL.    lovastatin (MEVACOR) 20 mg, Oral, Nightly    montelukast (SINGULAIR) 10 mg, Oral    oxyCODONE-acetaminophen (PERCOCET)  mg per tablet 1 tablet, Oral, Every 6 hours PRN    OZEMPIC 0.25 mg, Subcutaneous, Every 7 days    OZEMPIC 0.5 mg, Subcutaneous, Every 7 days    predniSONE (DELTASONE) 40 mg, Oral, Daily    promethazine (PHENERGAN) 25 mg, Oral, Every 8 hours PRN    secukinumab (COSENTYX PEN, 2 PENS,) 150 mg/mL PnIj Inject 2 mL (300 mg) into the skin every 30 days.    traZODone (DESYREL) 50 MG tablet TAKE 2 TABLETS BY MOUTH EVERY EVENING.    TRELEGY ELLIPTA 100-62.5-25 mcg DsDv No dose, route, or frequency recorded.    valACYclovir (VALTREX) 1,000 mg, Oral, Every 12 hours           Assessment and Plan     1. COPD exacerbation  -     levoFLOXacin (LEVAQUIN) 750 MG tablet; Take 1 tablet (750 mg total) by mouth once daily.  Dispense: 7 tablet; Refill: 0  -     predniSONE (DELTASONE) 20 MG tablet; Take 2 tablets (40 mg total) by mouth once daily. for 7 days  Dispense: 14 tablet; Refill: 0        Treating for COPD exacerbation w/ ABX, steroids, and frequent albuterol. Return to ED w/ really bad SOB. She will monitor her O2 sat w/ pulse ox.     ED precautions given, if O2 sat goes below 89, proceed to the ED    Continue other chronic medications as prescribed           I encouraged the patient to take all medications as prescribed and to keep follow up appointments with their providers. Patient stated they had no other concerns. Questions were invited and answered. Follow up sooner if needed. ED precautions given.    Juan Carlos Ramon MD  01/12/2024 3:17 PM        The patient location is: home in [place]  The chief complaint leading to consultation is: Sinusitis    Visit type:  audiovisual    Face to Face time with patient: 8mins  12 minutes of total time spent on the encounter, which includes face to face time and non-face to face time preparing to see the patient (eg, review of tests), Obtaining and/or reviewing separately obtained history, Documenting clinical information in the electronic or other health record, Independently interpreting results (not separately reported) and communicating results to the patient/family/caregiver, or Care coordination (not separately reported).     Each patient to whom he or she provides medical services by telemedicine is: (1) informed of the relationship between the physician and patient and the respective role of any other health care provider with respect to management of the patient; and (2) notified that he or she may decline to receive medical services by telemedicine and may withdraw from such care at any time.

## 2024-01-13 ENCOUNTER — TELEPHONE (OUTPATIENT)
Dept: RHEUMATOLOGY | Facility: CLINIC | Age: 54
End: 2024-01-13
Payer: MEDICARE

## 2024-01-13 DIAGNOSIS — Z79.899 IMMUNOCOMPROMISED STATE DUE TO DRUG THERAPY: Primary | ICD-10-CM

## 2024-01-13 DIAGNOSIS — D84.821 IMMUNOCOMPROMISED STATE DUE TO DRUG THERAPY: Primary | ICD-10-CM

## 2024-01-13 NOTE — TELEPHONE ENCOUNTER
----- Message from Galina Ramirez PharmD sent at 12/18/2023  2:18 PM CST -----  Regarding: TB  Good afternoon,     I was reviewing the pt's chart and saw that she tested positive for TB in 2016 and did undergo treatment. Her last chest x-ray was in 2016 also. Not sure if the office wanted repeat chest x-ray or labs since it had been 7 years.     Thanks,     Galina Ramirez, PharmD   [facility] [place] [address]  [phone] (phone)  [phone] (fax)

## 2024-01-15 DIAGNOSIS — B00.1 COLD SORE: ICD-10-CM

## 2024-01-17 ENCOUNTER — PATIENT MESSAGE (OUTPATIENT)
Dept: RHEUMATOLOGY | Facility: CLINIC | Age: 54
End: 2024-01-17
Payer: MEDICARE

## 2024-01-17 DIAGNOSIS — E09.9 STEROID-INDUCED DIABETES MELLITUS, INITIAL ENCOUNTER: Primary | ICD-10-CM

## 2024-01-17 DIAGNOSIS — T38.0X5A STEROID-INDUCED DIABETES MELLITUS, INITIAL ENCOUNTER: Primary | ICD-10-CM

## 2024-01-17 RX ORDER — VALACYCLOVIR HYDROCHLORIDE 1 G/1
1000 TABLET, FILM COATED ORAL EVERY 12 HOURS
Qty: 60 TABLET | Refills: 1 | Status: SHIPPED | OUTPATIENT
Start: 2024-01-17 | End: 2024-03-27 | Stop reason: SDUPTHER

## 2024-01-19 ENCOUNTER — HOSPITAL ENCOUNTER (OUTPATIENT)
Dept: RADIOLOGY | Facility: HOSPITAL | Age: 54
Discharge: HOME | End: 2024-01-19
Attending: PHYSICIAN ASSISTANT
Payer: MEDICARE

## 2024-01-19 ENCOUNTER — PATIENT MESSAGE (OUTPATIENT)
Dept: RHEUMATOLOGY | Facility: CLINIC | Age: 54
End: 2024-01-19
Payer: MEDICARE

## 2024-01-19 DIAGNOSIS — D84.821 IMMUNOCOMPROMISED STATE DUE TO DRUG THERAPY: ICD-10-CM

## 2024-01-19 DIAGNOSIS — Z79.899 IMMUNOCOMPROMISED STATE DUE TO DRUG THERAPY: ICD-10-CM

## 2024-01-19 PROCEDURE — 71046 X-RAY EXAM CHEST 2 VIEWS: CPT | Mod: 26,,, | Performed by: RADIOLOGY

## 2024-01-19 PROCEDURE — 71046 X-RAY EXAM CHEST 2 VIEWS: CPT | Mod: TC

## 2024-01-19 RX ORDER — SEMAGLUTIDE 0.68 MG/ML
INJECTION, SOLUTION SUBCUTANEOUS
Qty: 3 ML | Refills: 5 | Status: SHIPPED | OUTPATIENT
Start: 2024-01-19

## 2024-01-22 ENCOUNTER — PATIENT MESSAGE (OUTPATIENT)
Dept: UROLOGY | Facility: CLINIC | Age: 54
End: 2024-01-22
Payer: MEDICARE

## 2024-01-23 DIAGNOSIS — E78.49 OTHER HYPERLIPIDEMIA: ICD-10-CM

## 2024-01-23 DIAGNOSIS — Z76.0 MEDICATION REFILL: ICD-10-CM

## 2024-01-23 DIAGNOSIS — J30.2 SEASONAL ALLERGIES: ICD-10-CM

## 2024-01-23 RX ORDER — ALBUTEROL SULFATE 90 UG/1
2 AEROSOL, METERED RESPIRATORY (INHALATION) EVERY 6 HOURS PRN
Qty: 8.5 G | Refills: 11 | Status: SHIPPED | OUTPATIENT
Start: 2024-01-23

## 2024-01-23 RX ORDER — FLUTICASONE PROPIONATE 50 MCG
1 SPRAY, SUSPENSION (ML) NASAL 2 TIMES DAILY PRN
Qty: 16 G | Refills: 11 | Status: SHIPPED | OUTPATIENT
Start: 2024-01-23

## 2024-01-23 RX ORDER — LOVASTATIN 20 MG/1
20 TABLET ORAL NIGHTLY
Qty: 90 TABLET | Refills: 3 | Status: SHIPPED | OUTPATIENT
Start: 2024-01-23

## 2024-01-23 RX ORDER — OXYCODONE AND ACETAMINOPHEN 10; 325 MG/1; MG/1
1 TABLET ORAL EVERY 6 HOURS PRN
Qty: 100 TABLET | Refills: 0 | Status: SHIPPED | OUTPATIENT
Start: 2024-01-23 | End: 2024-02-06

## 2024-01-24 ENCOUNTER — TELEPHONE (OUTPATIENT)
Dept: UROLOGY | Facility: CLINIC | Age: 54
End: 2024-01-24
Payer: MEDICARE

## 2024-01-24 ENCOUNTER — HOSPITAL ENCOUNTER (OUTPATIENT)
Dept: RADIOLOGY | Facility: HOSPITAL | Age: 54
Discharge: HOME | End: 2024-01-24
Attending: STUDENT IN AN ORGANIZED HEALTH CARE EDUCATION/TRAINING PROGRAM
Payer: MEDICARE

## 2024-01-24 DIAGNOSIS — N28.1 ACQUIRED COMPLEX RENAL CYST: ICD-10-CM

## 2024-01-24 PROCEDURE — 76770 US EXAM ABDO BACK WALL COMP: CPT | Mod: TC

## 2024-01-24 PROCEDURE — 76770 US EXAM ABDO BACK WALL COMP: CPT | Mod: 26,,, | Performed by: RADIOLOGY

## 2024-01-24 NOTE — TELEPHONE ENCOUNTER
----- Message from Stephany Perez MD sent at 1/24/2024 11:28 AM CST -----  Small cysts noted, no changes. Needs non urgent follow up.

## 2024-01-25 DIAGNOSIS — G89.29 BILATERAL CHRONIC KNEE PAIN: Primary | ICD-10-CM

## 2024-01-25 DIAGNOSIS — M25.562 BILATERAL CHRONIC KNEE PAIN: Primary | ICD-10-CM

## 2024-01-25 DIAGNOSIS — M25.561 BILATERAL CHRONIC KNEE PAIN: Primary | ICD-10-CM

## 2024-02-06 ENCOUNTER — OFFICE VISIT (OUTPATIENT)
Dept: FAMILY MEDICINE | Facility: CLINIC | Age: 54
End: 2024-02-06
Payer: MEDICARE

## 2024-02-06 VITALS
BODY MASS INDEX: 26.86 KG/M2 | HEIGHT: 69 IN | WEIGHT: 181.38 LBS | OXYGEN SATURATION: 99 % | HEART RATE: 74 BPM | DIASTOLIC BLOOD PRESSURE: 78 MMHG | SYSTOLIC BLOOD PRESSURE: 120 MMHG

## 2024-02-06 DIAGNOSIS — G89.4 CHRONIC PAIN SYNDROME: ICD-10-CM

## 2024-02-06 DIAGNOSIS — D84.821 IMMUNOCOMPROMISED STATE DUE TO DRUG THERAPY: ICD-10-CM

## 2024-02-06 DIAGNOSIS — M06.9 RHEUMATOID ARTHRITIS INVOLVING LEFT KNEE, UNSPECIFIED WHETHER RHEUMATOID FACTOR PRESENT: ICD-10-CM

## 2024-02-06 DIAGNOSIS — E78.49 OTHER HYPERLIPIDEMIA: Primary | ICD-10-CM

## 2024-02-06 DIAGNOSIS — Z79.899 IMMUNOCOMPROMISED STATE DUE TO DRUG THERAPY: ICD-10-CM

## 2024-02-06 DIAGNOSIS — Z85.51 HISTORY OF BLADDER CANCER: ICD-10-CM

## 2024-02-06 DIAGNOSIS — E03.8 OTHER SPECIFIED HYPOTHYROIDISM: Chronic | ICD-10-CM

## 2024-02-06 DIAGNOSIS — F41.9 ANXIETY DISORDER, UNSPECIFIED TYPE: ICD-10-CM

## 2024-02-06 DIAGNOSIS — I10 BENIGN HYPERTENSION: ICD-10-CM

## 2024-02-06 PROCEDURE — 1159F MED LIST DOCD IN RCRD: CPT | Mod: CPTII,S$GLB,, | Performed by: FAMILY MEDICINE

## 2024-02-06 PROCEDURE — 3074F SYST BP LT 130 MM HG: CPT | Mod: CPTII,S$GLB,, | Performed by: FAMILY MEDICINE

## 2024-02-06 PROCEDURE — 4010F ACE/ARB THERAPY RXD/TAKEN: CPT | Mod: CPTII,S$GLB,, | Performed by: FAMILY MEDICINE

## 2024-02-06 PROCEDURE — 80355 GABAPENTIN NON-BLOOD: CPT | Performed by: FAMILY MEDICINE

## 2024-02-06 PROCEDURE — 80347 BENZODIAZEPINES 13 OR MORE: CPT | Performed by: FAMILY MEDICINE

## 2024-02-06 PROCEDURE — 3078F DIAST BP <80 MM HG: CPT | Mod: CPTII,S$GLB,, | Performed by: FAMILY MEDICINE

## 2024-02-06 PROCEDURE — 99214 OFFICE O/P EST MOD 30 MIN: CPT | Mod: S$GLB,,, | Performed by: FAMILY MEDICINE

## 2024-02-06 PROCEDURE — 3008F BODY MASS INDEX DOCD: CPT | Mod: CPTII,S$GLB,, | Performed by: FAMILY MEDICINE

## 2024-02-06 PROCEDURE — 80326 AMPHETAMINES 5 OR MORE: CPT | Performed by: FAMILY MEDICINE

## 2024-02-06 RX ORDER — OXYCODONE AND ACETAMINOPHEN 10; 325 MG/1; MG/1
1 TABLET ORAL EVERY 6 HOURS PRN
Qty: 90 TABLET | Refills: 0
Start: 2024-02-06 | End: 2024-02-23 | Stop reason: SDUPTHER

## 2024-02-06 NOTE — PROGRESS NOTES
"    [facility]  [facility] - [place], [place]    Family Medicine Office Visit    Chief Complaint   Patient presents with    Follow-up     3month f/u         HPI:  Routine follow up.    Needs urine drug screen for management of chronic lumbar and rheumatoid pain  Anxiety stable  Blood Pressure at goal on medication, with patient reporting prescription compliance  Hyperlipidemia stable on appropriate intensity statin therapy  TSH historically stable    Distant history of bladder cancer    Seeing ortho for rather severe knee issues    Is down another 20lbs!  40lbs total    Pain contact - goal on 90 per month    ROS: as above    Vitals:    02/06/24 1201   BP: 120/78   BP Location: Right arm   Patient Position: Sitting   BP Method: Medium (Manual)   Pulse: 74   SpO2: 99%   Weight: 82.3 kg (181 lb 6.4 oz)   Height: 5' 9" (1.753 m)      Body mass index is 26.79 kg/m².      General:  AOx3, well nourished and developed in no acute distress  Eyes:  PERRLA, EOMI, vision intact grossly  ENT:  normal hearing, moist oral mucosa  Neck:  trachea midline with no masses or thyromegaly  Heart:  RRR, no murmurs.  No edema noted, extremities warm and well perfused  Lungs:  clear to auscultation bilaterally with symmetric chest movement  Abdomen:  Soft, nontender, nondistended.  Normal bowel sounds  Musculoskeletal:  Normal gait.  Normal posture.  Normal muscular development with no joint swelling.  Neurological:  CN II-XII grossly intact. Symmetric strength and sensation  Psych:  Normal mood and affect.  Able to demonstrate good judgement and personal insight.      Assessment/Plan:    1. Other hyperlipidemia    2. Benign hypertension    3. Rheumatoid arthritis involving left knee, unspecified whether rheumatoid factor present    4. Other specified hypothyroidism    5. History of bladder cancer    6. Anxiety disorder, unspecified type    7. Chronic pain syndrome    8. Immunocompromised state due to drug therapy     "   Stable on statin  At goal  Stable with Rheum  Stable  Stable  Stable  UDS and pain contract today  stable

## 2024-02-06 NOTE — PATIENT INSTRUCTIONS
Sign Pain contract today - we will agree on 90 tabs per month  Get urine test today    Follow up 3 months, will get annual exam next visit

## 2024-02-11 ENCOUNTER — PATIENT MESSAGE (OUTPATIENT)
Dept: FAMILY MEDICINE | Facility: CLINIC | Age: 54
End: 2024-02-11
Payer: MEDICARE

## 2024-02-11 DIAGNOSIS — R07.89 CHEST WALL PAIN: Primary | ICD-10-CM

## 2024-02-11 LAB
6MAM UR QL: NOT DETECTED
7AMINOCLONAZEPAM UR QL: NOT DETECTED
A-OH ALPRAZ UR QL: NOT DETECTED
ALPHA-OH-MIDAZOLAM: NOT DETECTED
ALPRAZ UR QL: NOT DETECTED
AMPHET UR QL SCN: NOT DETECTED
ANNOTATION COMMENT IMP: NORMAL
BARBITURATES UR QL: NEGATIVE
BUPRENORPHINE UR QL: NOT DETECTED
BZE UR QL: NEGATIVE
CARBOXYTHC UR QL: NEGATIVE
CARISOPRODOL UR QL: NEGATIVE
CLONAZEPAM UR QL: NOT DETECTED
CODEINE UR QL: NOT DETECTED
CREAT UR-MCNC: 80.7 MG/DL (ref 20–400)
DIAZEPAM UR QL: NOT DETECTED
ETHYL GLUCURONIDE UR QL: NEGATIVE
FENTANYL UR QL: NOT DETECTED
GABAPENTIN: NOT DETECTED
HYDROCODONE UR QL: NOT DETECTED
HYDROMORPHONE UR QL: NOT DETECTED
LORAZEPAM UR QL: NOT DETECTED
MDA UR QL: NOT DETECTED
MDEA UR QL: NOT DETECTED
MDMA UR QL: NOT DETECTED
ME-PHENIDATE UR QL: NOT DETECTED
METHADONE UR QL: NEGATIVE
METHAMPHET UR QL: NOT DETECTED
MIDAZOLAM UR QL SCN: NOT DETECTED
MORPHINE UR QL: NOT DETECTED
NALOXONE: NOT DETECTED
NORBUPRENORPHINE UR QL CFM: NOT DETECTED
NORDIAZEPAM UR QL: NOT DETECTED
NORFENTANYL UR QL: NOT DETECTED
NORHYDROCODONE UR QL CFM: NOT DETECTED
NORMEPERIDINE UR QL CFM: NOT DETECTED
NOROXYCODONE UR QL CFM: PRESENT
NOROXYMORPHONE UR QL SCN: PRESENT
OXAZEPAM UR QL: NOT DETECTED
OXYCODONE UR QL: PRESENT
OXYMORPHONE UR QL: PRESENT
PATHOLOGY STUDY: NORMAL
PCP UR QL: NEGATIVE
PHENTERMINE UR QL: NOT DETECTED
PREGABALIN: NOT DETECTED
SERVICE CMNT-IMP: NORMAL
TAPENTADOL UR QL SCN: NOT DETECTED
TAPENTADOL UR QL SCN: NOT DETECTED
TEMAZEPAM UR QL: NOT DETECTED
TRAMADOL UR QL: NEGATIVE
ZOLPIDEM METABOLITE: NOT DETECTED
ZOLPIDEM UR QL: NOT DETECTED

## 2024-02-16 ENCOUNTER — PATIENT MESSAGE (OUTPATIENT)
Dept: ADMINISTRATIVE | Facility: OTHER | Age: 54
End: 2024-02-16
Payer: MEDICARE

## 2024-02-16 ENCOUNTER — TELEPHONE (OUTPATIENT)
Dept: FAMILY MEDICINE | Facility: CLINIC | Age: 54
End: 2024-02-16

## 2024-02-16 ENCOUNTER — HOSPITAL ENCOUNTER (OUTPATIENT)
Dept: RADIOLOGY | Facility: HOSPITAL | Age: 54
Discharge: HOME | End: 2024-02-16
Attending: FAMILY MEDICINE
Payer: MEDICARE

## 2024-02-16 DIAGNOSIS — R07.89 CHEST WALL PAIN: ICD-10-CM

## 2024-02-16 PROCEDURE — 71046 X-RAY EXAM CHEST 2 VIEWS: CPT | Mod: TC

## 2024-02-16 PROCEDURE — 71046 X-RAY EXAM CHEST 2 VIEWS: CPT | Mod: 26,,, | Performed by: RADIOLOGY

## 2024-02-16 NOTE — TELEPHONE ENCOUNTER
ROM restricted. Requesting a X-ray. Patient willing to go to the [facility]. She fell on Tuesday and she c/o pain in Left shoulder and under left breast.

## 2024-02-16 NOTE — TELEPHONE ENCOUNTER
----- Message from Ivan Smith sent at 2/16/2024 10:34 AM CST -----  Regarding: Needs xray  Type:  Needs Medical Advice    Who Called: Pt    Symptoms (please be specific): hurt shoulder     How long has patient had these symptoms:  since Tuesday    Pharmacy name and phone #:      Would the patient rather a call back or a response via [facility]? Call back    Best Call Back Number: [phone]      Additional Information: Sts she thinks she needs an xray of her shoulder after she fell on Tuesday thinks she ripped something.   Please advise -- Thank you

## 2024-02-21 ENCOUNTER — OFFICE VISIT (OUTPATIENT)
Dept: ORTHOPEDICS | Facility: CLINIC | Age: 54
End: 2024-02-21
Payer: MEDICARE

## 2024-02-21 ENCOUNTER — HOSPITAL ENCOUNTER (OUTPATIENT)
Dept: RADIOLOGY | Facility: HOSPITAL | Age: 54
Discharge: HOME | End: 2024-02-21
Attending: ORTHOPAEDIC SURGERY
Payer: MEDICARE

## 2024-02-21 VITALS — RESPIRATION RATE: 16 BRPM | BODY MASS INDEX: 26.87 KG/M2 | WEIGHT: 181.44 LBS | HEIGHT: 69 IN

## 2024-02-21 DIAGNOSIS — M25.561 BILATERAL CHRONIC KNEE PAIN: ICD-10-CM

## 2024-02-21 DIAGNOSIS — M25.561 ACUTE PAIN OF BOTH KNEES: ICD-10-CM

## 2024-02-21 DIAGNOSIS — M25.562 ACUTE PAIN OF BOTH KNEES: ICD-10-CM

## 2024-02-21 DIAGNOSIS — M25.562 BILATERAL CHRONIC KNEE PAIN: ICD-10-CM

## 2024-02-21 DIAGNOSIS — G89.29 BILATERAL CHRONIC KNEE PAIN: ICD-10-CM

## 2024-02-21 DIAGNOSIS — S80.00XA CONTUSION OF KNEE, UNSPECIFIED LATERALITY, INITIAL ENCOUNTER: Primary | ICD-10-CM

## 2024-02-21 PROCEDURE — 99999 PR PBB SHADOW E&M-EST. PATIENT-LVL III: CPT | Mod: PBBFAC,,, | Performed by: ORTHOPAEDIC SURGERY

## 2024-02-21 PROCEDURE — 4010F ACE/ARB THERAPY RXD/TAKEN: CPT | Mod: CPTII,S$GLB,, | Performed by: ORTHOPAEDIC SURGERY

## 2024-02-21 PROCEDURE — 73562 X-RAY EXAM OF KNEE 3: CPT | Mod: TC,50

## 2024-02-21 PROCEDURE — 99213 OFFICE O/P EST LOW 20 MIN: CPT | Mod: S$GLB,,, | Performed by: ORTHOPAEDIC SURGERY

## 2024-02-21 PROCEDURE — 73562 X-RAY EXAM OF KNEE 3: CPT | Mod: 26,50,, | Performed by: RADIOLOGY

## 2024-02-21 PROCEDURE — 3008F BODY MASS INDEX DOCD: CPT | Mod: CPTII,S$GLB,, | Performed by: ORTHOPAEDIC SURGERY

## 2024-02-21 PROCEDURE — 1159F MED LIST DOCD IN RCRD: CPT | Mod: CPTII,S$GLB,, | Performed by: ORTHOPAEDIC SURGERY

## 2024-02-21 NOTE — PROGRESS NOTES
Subjective:      Patient ID: Boom Blanco is a 54 y.o. female.    Chief Complaint: Pain and Injury of the Right Knee and Pain and Injury of the Left Knee      HPI:   Ms. Blanco returned today with complaints of 1 week of bilateral knee pain which began after she tripped over a pillow in her bedroom and fell on her knees onto a cement floor.  She initially had ecchymosis at the anterior aspect of her knees which is resolving.  Flexion and extension of her knees increases her symptoms while rest and elevation decreases them.  She self-treated with ice also which helped.  Her pain has improved since her injury.  Her right knee is worse than her left.    ROS: No new diagnosis /surgery/prescriptions since last office visit on 09/17/2021.  Constitution: Negative for chills and fever.   Eyes: Negative for blurred vision.   Cardiovascular: Negative for chest pain.   Respiratory: Negative for cough.   Endocrine: Negative for polydipsia.   Hematologic/Lymphatic: Does not bruise/bleed easily.   Skin: Negative for itching.   Musculoskeletal: Positive for back pain.   Gastrointestinal: Negative for heartburn.   Genitourinary: Negative for nocturia.   Neurological: Negative for headaches and seizures.   Psychiatric/Behavioral: Negative for substance abuse.   Allergic/Immunologic: Positive for environmental allergies.      Objective:      Physical Exam:   General: AAOx3.  No acute distress  Vascular:  Pulses intact and equal bilaterally.  Capillary refill less than 3 seconds and equal bilaterally  Neurologic:  Pinprick and soft touch intact and equal bilaterally  Integment:   Prepatellar ecchymosis bilateral knees right greater than left.  Extremity:   Knee:  Extension/flexion equal bilaterally 0/ 118.  Nontender with knee motion.  No effusion.  Mild tender with palpation anterior patella bilaterally.  Varus/valgus stressing with good endpoint bilaterally.  Nontender over the medial or lateral collateral ligaments.   Nontender at the anserine insertion bilaterally.  No swelling at the anserine insertion bilaterally.  Radiography:  Personally reviewed  X-rays of both knees completed on 02/21/2024 showed well-seated well-aligned total knee arthroplasties without signs of loosening no fracture or dislocation.      Assessment:       Impression:     1. Anterior contusion, both knees   2. Acute pain of both knees        Plan:       1.  Discussed physical examination and radiographic findings with the patient. Boom understands that she has contused both of her knees but appears to be improving.  2. Continue with ambulation with weight-bearing at tolerance.    3.  Reassurance given that she does not appear to have fractured her knee and she is doing well.    4.  Continue to elevate and ice both knees.    5.  Take NSAIDs as tolerated allowed by PCM.    6.  Follow up p.r.n..

## 2024-02-23 ENCOUNTER — TELEPHONE (OUTPATIENT)
Dept: FAMILY MEDICINE | Facility: CLINIC | Age: 54
End: 2024-02-23
Payer: MEDICARE

## 2024-02-23 RX ORDER — OXYCODONE AND ACETAMINOPHEN 10; 325 MG/1; MG/1
1 TABLET ORAL EVERY 6 HOURS PRN
Qty: 90 TABLET | Refills: 0 | Status: SHIPPED | OUTPATIENT
Start: 2024-02-23 | End: 2024-03-20 | Stop reason: SDUPTHER

## 2024-02-23 NOTE — TELEPHONE ENCOUNTER
----- Message from Trish Seaman MA sent at 2/23/2024 11:58 AM CST -----  Regarding: Medication request.  Contact: self  Medication request   ----- Message -----  From: Justin Caba  Sent: 2/23/2024   9:42 AM CST  To: Yariel KHANNA Staff    Type: RX Refill Request        Who Called: Patient   Refill or New Rx: refill  RX Name and Strength: oxyCODONE-acetaminophen (PERCOCET)  mg per tablet  How is the patient currently taking it? (ex. 1XDay): as directed   Is this a 30 day or 90 day RX: n/a  Preferred Pharmacy with phone number:   [facility] [place] - [address] [place]  [address] [place] [address]  Phone: [phone] Fax: [phone]  Local or Mail Order: local   Ordering Provider: Dr. Saldivar   Best Call Back Number: [phone]  Additional Information: Pt states she needs a refill was suppose to be sent on 2/6/24 pt states she never got the medication. Plz refill pt having pain scared to go into the weekend with out her medicine. Thanks

## 2024-02-26 ENCOUNTER — TELEPHONE (OUTPATIENT)
Dept: FAMILY MEDICINE | Facility: CLINIC | Age: 54
End: 2024-02-26
Payer: MEDICARE

## 2024-02-26 NOTE — TELEPHONE ENCOUNTER
----- Message from Ilana Aly sent at 2/23/2024  2:03 PM CST -----  Regarding: Out of Meds  Pt requesting percocet 10 be called into [facility] [place] pt is out please call pt when complete [phone]

## 2024-03-05 ENCOUNTER — OFFICE VISIT (OUTPATIENT)
Dept: RHEUMATOLOGY | Facility: CLINIC | Age: 54
End: 2024-03-05
Payer: MEDICARE

## 2024-03-05 ENCOUNTER — PATIENT MESSAGE (OUTPATIENT)
Dept: RHEUMATOLOGY | Facility: CLINIC | Age: 54
End: 2024-03-05

## 2024-03-05 VITALS
DIASTOLIC BLOOD PRESSURE: 66 MMHG | SYSTOLIC BLOOD PRESSURE: 102 MMHG | WEIGHT: 185 LBS | BODY MASS INDEX: 27.4 KG/M2 | HEART RATE: 79 BPM | HEIGHT: 69 IN

## 2024-03-05 DIAGNOSIS — K21.9 GASTROESOPHAGEAL REFLUX DISEASE, UNSPECIFIED WHETHER ESOPHAGITIS PRESENT: Primary | ICD-10-CM

## 2024-03-05 DIAGNOSIS — R10.32 LLQ ABDOMINAL PAIN: ICD-10-CM

## 2024-03-05 DIAGNOSIS — R10.12 LUQ ABDOMINAL PAIN: ICD-10-CM

## 2024-03-05 DIAGNOSIS — L40.9 PSORIASIS: ICD-10-CM

## 2024-03-05 DIAGNOSIS — T38.0X5S STEROID-INDUCED DIABETES MELLITUS, SEQUELA: ICD-10-CM

## 2024-03-05 DIAGNOSIS — E09.9 STEROID-INDUCED DIABETES MELLITUS, SEQUELA: ICD-10-CM

## 2024-03-05 DIAGNOSIS — R05.9 COUGH, UNSPECIFIED TYPE: ICD-10-CM

## 2024-03-05 DIAGNOSIS — L40.50 PSA (PSORIATIC ARTHRITIS): ICD-10-CM

## 2024-03-05 DIAGNOSIS — L70.0 ACNE VULGARIS: ICD-10-CM

## 2024-03-05 DIAGNOSIS — M17.0 PRIMARY OSTEOARTHRITIS OF BOTH KNEES: ICD-10-CM

## 2024-03-05 DIAGNOSIS — K57.92 DIVERTICULITIS: ICD-10-CM

## 2024-03-05 DIAGNOSIS — K59.00 CONSTIPATION, UNSPECIFIED CONSTIPATION TYPE: ICD-10-CM

## 2024-03-05 DIAGNOSIS — M54.50 LOW BACK PAIN, UNSPECIFIED BACK PAIN LATERALITY, UNSPECIFIED CHRONICITY, UNSPECIFIED WHETHER SCIATICA PRESENT: ICD-10-CM

## 2024-03-05 PROCEDURE — 99215 OFFICE O/P EST HI 40 MIN: CPT | Mod: 25,S$GLB,, | Performed by: INTERNAL MEDICINE

## 2024-03-05 PROCEDURE — 1160F RVW MEDS BY RX/DR IN RCRD: CPT | Mod: CPTII,S$GLB,, | Performed by: INTERNAL MEDICINE

## 2024-03-05 PROCEDURE — 4010F ACE/ARB THERAPY RXD/TAKEN: CPT | Mod: CPTII,S$GLB,, | Performed by: INTERNAL MEDICINE

## 2024-03-05 PROCEDURE — 96372 THER/PROPH/DIAG INJ SC/IM: CPT | Mod: S$GLB,,, | Performed by: INTERNAL MEDICINE

## 2024-03-05 PROCEDURE — 3078F DIAST BP <80 MM HG: CPT | Mod: CPTII,S$GLB,, | Performed by: INTERNAL MEDICINE

## 2024-03-05 PROCEDURE — 3074F SYST BP LT 130 MM HG: CPT | Mod: CPTII,S$GLB,, | Performed by: INTERNAL MEDICINE

## 2024-03-05 PROCEDURE — 3008F BODY MASS INDEX DOCD: CPT | Mod: CPTII,S$GLB,, | Performed by: INTERNAL MEDICINE

## 2024-03-05 PROCEDURE — 99999 PR PBB SHADOW E&M-EST. PATIENT-LVL IV: CPT | Mod: PBBFAC,,, | Performed by: INTERNAL MEDICINE

## 2024-03-05 PROCEDURE — 1159F MED LIST DOCD IN RCRD: CPT | Mod: CPTII,S$GLB,, | Performed by: INTERNAL MEDICINE

## 2024-03-05 RX ORDER — OMEPRAZOLE 40 MG/1
40 CAPSULE, DELAYED RELEASE ORAL DAILY
Qty: 90 CAPSULE | Refills: 3 | Status: SHIPPED | OUTPATIENT
Start: 2024-03-05 | End: 2025-03-05

## 2024-03-05 RX ORDER — CIPROFLOXACIN 500 MG/1
500 TABLET ORAL EVERY 12 HOURS
Qty: 20 TABLET | Refills: 0 | Status: SHIPPED | OUTPATIENT
Start: 2024-03-05 | End: 2024-03-15

## 2024-03-05 RX ORDER — OMEPRAZOLE 40 MG/1
40 CAPSULE, DELAYED RELEASE ORAL
COMMUNITY
Start: 2024-02-23

## 2024-03-05 RX ORDER — PROMETHAZINE HYDROCHLORIDE AND DEXTROMETHORPHAN HYDROBROMIDE 6.25; 15 MG/5ML; MG/5ML
5 SYRUP ORAL EVERY 4 HOURS PRN
Qty: 240 ML | Refills: 0 | Status: SHIPPED | OUTPATIENT
Start: 2024-03-05 | End: 2024-03-15

## 2024-03-05 RX ORDER — SEMAGLUTIDE 1.34 MG/ML
1 INJECTION, SOLUTION SUBCUTANEOUS
Qty: 3 ML | Refills: 11 | Status: SHIPPED | OUTPATIENT
Start: 2024-03-05 | End: 2025-03-05

## 2024-03-05 RX ORDER — KETOROLAC TROMETHAMINE 30 MG/ML
60 INJECTION, SOLUTION INTRAMUSCULAR; INTRAVENOUS
Status: COMPLETED | OUTPATIENT
Start: 2024-03-05 | End: 2024-03-05

## 2024-03-05 RX ORDER — METRONIDAZOLE 500 MG/1
500 TABLET ORAL EVERY 12 HOURS
Qty: 20 TABLET | Refills: 0 | Status: SHIPPED | OUTPATIENT
Start: 2024-03-05 | End: 2024-03-15

## 2024-03-05 RX ORDER — PROMETHAZINE HYDROCHLORIDE AND DEXTROMETHORPHAN HYDROBROMIDE 6.25; 15 MG/5ML; MG/5ML
5 SYRUP ORAL EVERY 4 HOURS PRN
COMMUNITY
End: 2024-03-05 | Stop reason: SDUPTHER

## 2024-03-05 RX ORDER — IBUPROFEN AND FAMOTIDINE 26.6; 8 MG/1; MG/1
1 TABLET ORAL 3 TIMES DAILY
Qty: 90 TABLET | Refills: 12 | Status: SHIPPED | OUTPATIENT
Start: 2024-03-05 | End: 2025-03-05

## 2024-03-05 RX ORDER — TRETINOIN 0.25 MG/G
CREAM TOPICAL NIGHTLY
Qty: 45 G | Refills: 1 | Status: SHIPPED | OUTPATIENT
Start: 2024-03-05 | End: 2024-04-19 | Stop reason: SDUPTHER

## 2024-03-05 RX ORDER — METHYLPREDNISOLONE ACETATE 80 MG/ML
160 INJECTION, SUSPENSION INTRA-ARTICULAR; INTRALESIONAL; INTRAMUSCULAR; SOFT TISSUE
Status: COMPLETED | OUTPATIENT
Start: 2024-03-05 | End: 2024-03-05

## 2024-03-05 RX ORDER — SUCRALFATE 1 G/10ML
1 SUSPENSION ORAL
COMMUNITY
Start: 2024-02-23 | End: 2024-03-05

## 2024-03-05 RX ORDER — FLUCONAZOLE 150 MG/1
150 TABLET ORAL DAILY
Qty: 7 TABLET | Refills: 0 | Status: SHIPPED | OUTPATIENT
Start: 2024-03-05 | End: 2024-03-12

## 2024-03-05 RX ADMIN — METHYLPREDNISOLONE ACETATE 160 MG: 80 INJECTION, SUSPENSION INTRA-ARTICULAR; INTRALESIONAL; INTRAMUSCULAR; SOFT TISSUE at 12:03

## 2024-03-05 RX ADMIN — KETOROLAC TROMETHAMINE 60 MG: 30 INJECTION, SOLUTION INTRAMUSCULAR; INTRAVENOUS at 12:03

## 2024-03-05 ASSESSMENT — ROUTINE ASSESSMENT OF PATIENT INDEX DATA (RAPID3)
PSYCHOLOGICAL DISTRESS SCORE: 3.3
TOTAL RAPID3 SCORE: 5.39
MDHAQ FUNCTION SCORE: 1.1
PAIN SCORE: 7.5
FATIGUE SCORE: 1.1
PATIENT GLOBAL ASSESSMENT SCORE: 5

## 2024-03-05 NOTE — PROGRESS NOTES
Subjective:     Patient ID:  Boom Blanco    Chief Complaint:  Disease Management     History of Present Illness:  Pt is a 54 y.o. female  psoriatic arthritis. She is doing fair on cosentyx 300 mg. She fell Feb 13 and has pain with sneezing and cough in the ribs L side and knee  pain. She has severe  foot pain.She tried GoLo. she has severe pain, but arthritis remains active in her hands, elbows, knees, hips, and ankles. Pain is constant and aching in nature. She has Luq and LLq pain prior to starting ozempic. She has reflux an d is tx w/ prilosec.  Current tx: cosentyx    Rheumatologic History:   - Diagnosis/es:  - Positive serologies:  - Infectious screening labs:  - Previous Treatments:  - Current Treatments:     Interval History:   Hospitalization since last office visit: No    Patient Active Problem List    Diagnosis Date Noted    Immunocompromised state due to drug therapy 02/06/2024    Lumbar disc disease 08/08/2023    History of bladder cancer 05/15/2023    Simple chronic bronchitis 11/14/2022    Abnormality of gait and mobility 10/08/2019    Gastroesophageal reflux disease with esophagitis 09/19/2019    Diverticulosis of colon 09/06/2019    Tricompartment osteoarthritis of right knee 04/18/2019    Presence of right artificial knee joint 04/18/2019    Medial meniscus tear 01/22/2019    Degenerative tear of medial meniscus 12/03/2018    Baker cyst, unspecified laterality 12/03/2018    Juvenile psoriatic arthritis of lower leg 12/03/2018    Chronic superficial gastritis without bleeding 09/13/2018    Other hyperlipidemia 07/26/2018    Other specified hypothyroidism 07/26/2018    Benign hypertension 07/16/2014    RA (rheumatoid arthritis)     Raynaud's disease     Chronic pain     Anxiety disorder     Vitamin B deficiency 03/17/2006     Past Surgical History:   Procedure Laterality Date    ARTHROSCOPIC CHONDROPLASTY OF KNEE JOINT Left 01/22/2019    Procedure: ARTHROSCOPY, KNEE, WITH CHONDROPLASTY;   Surgeon: Sylvain Gorman DO;  Location: [facility] OR;  Service: Orthopedics;  Laterality: Left;    ARTHROSCOPY OF KNEE Left 01/22/2019    Procedure: ARTHROSCOPY, KNEE;  Surgeon: Sylvain Gorman DO;  Location: [facility] OR;  Service: Orthopedics;  Laterality: Left;  Equipment: Emerson Chondral Drill Set and Mitek Meniscus Repair Set Truespan  Vendor/Rep: [employer]    ARTHROSCOPY OF KNEE Right 02/05/2019    Procedure: ARTHROSCOPY, KNEE;  Surgeon: Sylvain Gorman DO;  Location: [facility] OR;  Service: Orthopedics;  Laterality: Right;  Equipment: M[employer]k Truspar; Scope  Venor/Rep: Mitek    BACK SURGERY      spinal stimulator implanted and then removed    BREAST BIOPSY      COLON SURGERY      COLONOSCOPY  11/25/2016    repeat in 5-10 years    COLONOSCOPY N/A 09/06/2019    Procedure: COLONOSCOPY;  Surgeon: Dany Hugo MD;  Location: [facility];  Service: General;  Laterality: N/A;    ENDOSCOPY  09/06/2019    Procedure: ENDOSCOPY;  Surgeon: Dany Hugo MD;  Location: [facility];  Service: General;;  with multiple biopsy's    ESOPHAGOGASTRODUODENOSCOPY Left 08/29/2018    Procedure: ESOPHAGOGASTRODUODENOSCOPY (EGD);  Surgeon: Dany Hugo MD;  Location: [facility];  Service: General;  Laterality: Left;    EXCISION OF MEDIAL MENISCUS OF KNEE Right 02/05/2019    Procedure: MENISCECTOMY, KNEE, MEDIAL;  Surgeon: Sylvain Gorman DO;  Location: [facility] OR;  Service: Orthopedics;  Laterality: Right;    HYSTERECTOMY  1997    KNEE ARTHROSCOPY W/ DEBRIDEMENT Left 01/22/2019    Procedure: ARTHROSCOPY, KNEE, WITH DEBRIDEMENT;  Surgeon: Sylvain Gorman DO;  Location: [facility] OR;  Service: Orthopedics;  Laterality: Left;    KNEE ARTHROSCOPY W/ MENISCECTOMY Left 01/22/2019    Procedure: ARTHROSCOPY, KNEE, WITH MENISCECTOMY;  Surgeon: Sylvain Gorman DO;  Location: [facility] OR;  Service: Orthopedics;  Laterality: Left;    KNEE ARTHROSCOPY W/ PLICA EXCISION Left 01/22/2019    Procedure: EXCISION, PLICA, KNEE, ARTHROSCOPIC;  Surgeon: Sylvain  RUPERT Gorman DO;  Location: [facility] OR;  Service: Orthopedics;  Laterality: Left;    SALPINGOOPHORECTOMY  1997    TOTAL KNEE ARTHROPLASTY Right 2019    Procedure: ARTHROPLASTY, KNEE, TOTAL;  Surgeon: Sylvain Gorman DO;  Location: [facility] OR;  Service: Orthopedics;  Laterality: Right;  Equipment: Depuy Sigma Total Knee System; Guillen Leg Mcclellan  Vendor/Rep: [employer]  Table/Position: Supine  REQUIRES FIRST ASSISTANT EPHRAIM    TOTAL KNEE ARTHROPLASTY Left 10/03/2019    Procedure: ARTHROPLASTY, KNEE, TOTAL;  Surgeon: Sylvain Gorman DO;  Location: [facility] OR;  Service: Orthopedics;  Laterality: Left;  Equipment: Depuy Sigma Total Knee System  Vendor/Rep: [employer]uy  REQUIRES FIRST ASSISTANT EPHRAIM    TUBAL LIGATION       Social History     Tobacco Use    Smoking status: Former     Current packs/day: 0.00     Average packs/day: 1.1 packs/day for 44.3 years (49.5 ttl pk-yrs)     Types: Cigarettes     Start date: 1985     Quit date: 2019     Years since quittin.8    Smokeless tobacco: Former   Substance Use Topics    Alcohol use: No    Drug use: Never     Family History   Problem Relation Age of Onset    Arthritis Mother     Pacemaker/defibrilator Mother     Lung disease Father     Heart disease Father     Arthritis Sister     Arthritis Brother     Arthritis Sister     Arthritis Sister     Breast cancer Neg Hx      Review of patient's allergies indicates:   Allergen Reactions    Fentanyl Shortness Of Breath    Remeron [mirtazapine] Other (See Comments)     Weight gain       Review of Systems   Review of Systems     Current Medications:  Current Outpatient Medications   Medication Instructions    albuterol (PROVENTIL/VENTOLIN HFA) 90 mcg/actuation inhaler 2 puffs, Inhalation, Every 6 hours PRN, Rescue    baclofen (LIORESAL) 10 MG tablet TAKE 1 TABLET BY MOUTH 3-TIMES DAILY AS DIRECTED.    budesonide (PULMICORT) 0.5 mg, Nebulization, Daily, Controller    buPROPion (WELLBUTRIN XL) 150 mg, Oral, Daily    ciprofloxacin  "HCl (CIPRO) 500 mg, Oral, Every 12 hours    ELDERBERRY FRUIT ORAL Oral, Daily    fluconazole (DIFLUCAN) 150 mg, Oral, Daily    fluticasone propionate (FLONASE) 50 mcg, Each Nostril, 2 times daily PRN    furosemide (LASIX) 20 MG tablet TAKE 1 TABLET BY MOUTH IN THE MORNING AS NEEDED FOR FLUID RETENTION.    ibuprofen (ADVIL,MOTRIN) 800 MG tablet TAKE 1 TABLET BY MOUTH 3-TIMES DAILY AS NEEDED FOR PAIN.    ibuprofen-famotidine (DUEXIS) 800-26.6 mg Tab 1 tablet, Oral, 3 times daily    levalbuterol (XOPENEX) 0.31 mg, Nebulization, Every 4 hours PRN, Rescue    levothyroxine (SYNTHROID) 50 MCG tablet TAKE 1 TABLET BY MOUTH EVERY MORNING ON EMPTY STOMACH. DO NOT EAT FOR 30 MINUTES AFTERWARD.    linaCLOtide (LINZESS) 145 mcg, Oral, Before breakfast    lisinopriL (PRINIVIL,ZESTRIL) 20 MG tablet TAKE 1 TABLET BY MOUTH DAILY FOR BLOOD PRESSURE CONTROL.    lovastatin (MEVACOR) 20 mg, Oral, Nightly    metroNIDAZOLE (FLAGYL) 500 mg, Oral, Every 12 hours    montelukast (SINGULAIR) 10 mg, Oral    omeprazole (PRILOSEC) 40 mg, Oral    omeprazole (PRILOSEC) 40 mg, Oral, Daily    oxyCODONE-acetaminophen (PERCOCET)  mg per tablet 1 tablet, Oral, Every 6 hours PRN    OZEMPIC 1 mg, Subcutaneous, Every 7 days    promethazine-dextromethorphan (PROMETHAZINE-DM) 6.25-15 mg/5 mL Syrp 5 mLs, Oral, Every 4 hours PRN    secukinumab (COSENTYX PEN, 2 PENS,) 150 mg/mL PnIj Inject 2 mL (300 mg) into the skin every 30 days.    semaglutide (OZEMPIC) 0.25 mg or 0.5 mg (2 mg/3 mL) pen injector INJECT 0.5 MG SUB-Q (UNDER SKIN) ONCE WEEKLY. (EVERY 7 DAYS)    traZODone (DESYREL) 50 MG tablet TAKE 2 TABLETS BY MOUTH EVERY EVENING.    TRELEGY ELLIPTA 100-62.5-25 mcg DsDv No dose, route, or frequency recorded.    tretinoin (RETIN-A) 0.025 % cream Topical (Top), Nightly    valACYclovir (VALTREX) 1,000 mg, Oral, Every 12 hours         Objective:     Vitals:    03/05/24 1017   BP: 102/66   Pulse: 79   Weight: 83.9 kg (185 lb)   Height: 5' 9.02" (1.753 m) "   PainSc:   6      Body mass index is 27.31 kg/m².     Physical Examinations:  Physical Exam     Disease Assessment Scores:  Patient's Global Assessment of arthritis (0-10): 4  Physician's Global Assessment of arthritis (0-10): 4  Number of Tender Joints (0-28): 7  Number of Swollen Joints (0-28): 7    There is currently no information documented on the homunculus. Go to the Rheumatology activity and complete the homunculus joint exam.         1/5/2023    10:00 AM   Rapid3 Question Responses and Scores   MDHAQ Score 1.3   Psychologic Score 4.4   Pain Score 9   When you awakened in the morning OVER THE LAST WEEK, did you feel stiff? Yes   If Yes, please indicate the number of hours until you are as limber as you will be for the day 3   Fatigue Score 7   Global Health Score 8   RAPID3 Score 7.11       Monitoring Lab Results:  Lab Results   Component Value Date    WBC 6.67 02/17/2023    RBC 3.92 (L) 02/17/2023    HGB 12.0 02/17/2023    HCT 36.6 (L) 02/17/2023    MCV 93 02/17/2023    MCH 30.6 02/17/2023    MCHC 32.8 02/17/2023    RDW 13.3 02/17/2023     02/17/2023        Lab Results   Component Value Date     02/17/2023    K 4.0 02/17/2023     02/17/2023    CO2 27 02/17/2023     02/17/2023    BUN 15 02/17/2023    CREATININE 1.0 02/17/2023    CALCIUM 9.0 02/17/2023    PROT 7.0 02/17/2023    ALBUMIN 3.8 02/17/2023    BILITOT 0.3 02/17/2023    ALKPHOS 38 (L) 02/17/2023    AST 25 02/17/2023    ALT 28 02/17/2023    ANIONGAP 8 02/17/2023    EGFRNORACEVR >60.0 02/17/2023       Lab Results   Component Value Date    SEDRATE 7 02/17/2023    CRP 3.3 02/17/2023        Lab Results   Component Value Date    UVRSVXMI45II 42 02/17/2023    JKZZIJBM31 1162 (H) 08/15/2023        Lab Results   Component Value Date    CHOL 156 09/23/2022    HDL 49 09/23/2022    LDLCALC 90.6 09/23/2022    TRIG 82 09/23/2022       Lab Results   Component Value Date    RF <10.0 10/15/2015     Lab Results   Component Value Date     "ANASCREEN Positive (A) 02/17/2023    ANATITER 1:640 02/17/2023    DSDNA Negative 1:10 02/17/2023     Lab Results   Component Value Date    HLABB27 Negative 10/15/2015       Infectious Disease Screening:  Lab Results   Component Value Date    HEPBSAG Negative 10/27/2021    HEPBSAB Negative 10/27/2021    HEPBIGM Negative 10/27/2021     Lab Results   Component Value Date    HEPCAB Negative 10/27/2021     No results found for: "TBGOLDPLUS", "QUANTTBGDPL"  Lab Results   Component Value Date    QUANTIFERON Positive (A) 08/15/2016    QUANTNILVALU 0.09 08/15/2016    QUANTMITOGEN >10.00 08/15/2016    QFTTBAG 6.65 (H) 08/15/2016        Imaging:  DEXA, Xrays, MRIs, CTs, etc  CT Abdomen Pelvis With IV Contrast  Order: [id number]  Impression    IMPRESSION:  1. Negative CT scan of the abdomen and the pelvis with contrast.        DICOM format image data are available to non-affiliated external  healthcare facilities or entities on a secure, media-free, reciprocally  searchable basis with patient authorization for at least a 12-month  period after the study. To request images, please call [phone] or  [phone].  Narrative    EXAMINATION:  CT ABDOMEN AND PELVIS WITH CONTRAST, 2/23/2024    CLINICAL HISTORY:  55 y/o  Female  Left upper and left lower quadrant abdominal pain, history  diverticulitis    COMPARISON:  No comparison available    TECHNIQUE:  Multi channel helical CT imaging of the abdomen and the pelvis was  performed during the intravenous administration of 80 mL of Omnipaque  350.   All images were reformatted in multiple planes.    All CT scans at this institution use dose modulation, iterative  reconstruction, and weight based dosing when clinically appropriate to  reduce radiation dose to as low as reasonably achievable.    FINDINGS:    Lung Bases:  The lungs are clear.  Liver: No focal liver lesion is demonstrated.  Spleen: No splenomegaly is evident.  Gallbladder and bile ducts: No gallbladder stone, mass " or  pericholecystic fluid is noted.  The common duct is not dilated.  Pancreas: No mass, fluid collection or ductal dilatation is visualized.  Stomach: The stomach is not dilated.  A mucosal abnormality is not  visualized.  Adrenals: Normal  Kidneys: No abnormal calcification is evident.  There is no  hydronephrosis or ureteral stone.  A significant renal mass is not  identified.  Vasculature: Aortoiliac atherosclerosis is seen.  No aneurysm is  visualized.  Lymph nodes:  No significant lymphadenopathy is noted.  Bowel:  No bowel dilatation or mural mass is evident.    The appendix  is normal.  No mesenteric inflammation is evident.  Pelvis:  Uterus is surgically absent.  General: An abdominal wall hernia is not identified.  No free air or  free fluid is present.  Bones: No acute osseous abnormality is evident.  Exam End: 02/23/24 13:21    Specimen Collected: 02/23/24 13:21 Last Resulted: 02/23/24 13:33   Received From: [facility]      CT Abdomen Pelvis W Wo Contrast  Order: [id number]  Status: Final result       Visible to patient: Yes (seen)       Next appt: 04/29/2024 at 08:20 AM in Radiology ([facility] [id number])       Dx: Renal cyst, right; Hematuria, unspeci...    0 Result Notes  Details    Reading Physician Reading Date Result Priority   Katheryn Mccormick MD  [phone] 12/9/2021 Routine     Narrative & Impression  EXAMINATION:  CT ABDOMEN PELVIS W WO CONTRAST     CLINICAL HISTORY:  hematuria, renal cyst;Hematuria, unspecified     TECHNIQUE:  Low dose axial images, sagittal and coronal reformations were obtained from the lung bases to the pubic symphysis before and following the IV administration of .  There is a 15 mm mL of Omnipaque 350.  No oral contrast was administered.     COMPARISON:  Ultrasound 11/17/2021, contrast enhanced CT of the lumbar spine 03/30/2018     FINDINGS:  There are no renal or ureteral calculi.  No hydronephrosis is present.     There is a 5 mm cyst at the upper pole of the  left kidney.     There is a 9 mm focus of decreased attenuation at the upper pole of the right kidney series 5, image 37.  This was not visualized on the recent ultrasound.  There is overlying cortical scarring.  There are faint, thin enhancing internal septations best demonstrated on coronal image 60 of series 16.  This finding was present and larger on the 03/30/2018 CT at which time it measured 11 mm.     There is a 1.5 cm circumscribed low-attenuation structure at the posterior upper pole of the right kidney as seen on ultrasound.  Although this appeared cystic on ultrasound faint enhancement is questioned on CT with a density of 17 Hounsfield units on the noncontrast images and 38 Hounsfield units on the arterial phase postcontrast, 28 Hounsfield units on the delayed post-contrast images.  This finding is unchanged since 2018.     The upper collecting systems and the ureters are normal.  The urinary bladder is unremarkable.     There are calcified right hilar lymph nodes.  There is a calcified granuloma in the right lower lobe.     The liver, spleen, adrenal glands, gallbladder, pancreas are normal.  The aorta is normal in caliber with moderate calcific plaque.  Uterus surgically absent.  Scattered distal colonic diverticula are present.  There is no ascites or adenopathy.     Impression:     Two findings at the upper pole of the right kidney likely corresponding to complex cysts, 1 smaller and the other stable compared to previous CT from 2018.  Continued surveillance with contrast enhanced CT or MRI is recommended until long-term stability has been demonstrated.  Otherwise, normal kidneys, ureters, urinary bladder.        Electronically signed by: Katheryn Mccormick  Date:                                            12/09/2021  Time:                                           09:17           Exam Ended: 12/09/21 08:11 CST Last Resulted: 12/09/21              Old & Outside Medical Records:  Reviewed old and  all outside medical records available in Care Everywhere    Current Medication Changes:  Medication List with Changes/Refills   New Medications    CIPROFLOXACIN HCL (CIPRO) 500 MG TABLET    Take 1 tablet (500 mg total) by mouth every 12 (twelve) hours. for 10 days    FLUCONAZOLE (DIFLUCAN) 150 MG TAB    Take 1 tablet (150 mg total) by mouth once daily. for 7 days    IBUPROFEN-FAMOTIDINE (DUEXIS) 800-26.6 MG TAB    Take 1 tablet by mouth 3 (three) times daily.    LINACLOTIDE (LINZESS) 145 MCG CAP CAPSULE    Take 1 capsule (145 mcg total) by mouth before breakfast.    METRONIDAZOLE (FLAGYL) 500 MG TABLET    Take 1 tablet (500 mg total) by mouth every 12 (twelve) hours. for 10 days    OMEPRAZOLE (PRILOSEC) 40 MG CAPSULE    Take 1 capsule (40 mg total) by mouth once daily.    SEMAGLUTIDE (OZEMPIC) 1 MG/DOSE (4 MG/3 ML)    Inject 1 mg into the skin every 7 days.    TRETINOIN (RETIN-A) 0.025 % CREAM    Apply topically every evening.   Current Medications    ALBUTEROL (PROVENTIL/VENTOLIN HFA) 90 MCG/ACTUATION INHALER    Inhale 2 puffs into the lungs every 6 (six) hours as needed. Rescue    BACLOFEN (LIORESAL) 10 MG TABLET    TAKE 1 TABLET BY MOUTH 3-TIMES DAILY AS DIRECTED.    BUDESONIDE (PULMICORT) 0.5 MG/2 ML NEBULIZER SOLUTION    Take 2 mLs (0.5 mg total) by nebulization once daily. Controller    BUPROPION (WELLBUTRIN XL) 150 MG TB24 TABLET    Take 1 tablet (150 mg total) by mouth once daily.    ELDERBERRY FRUIT ORAL    Take by mouth once daily.    FLUTICASONE PROPIONATE (FLONASE) 50 MCG/ACTUATION NASAL SPRAY    1 spray (50 mcg total) by Each Nostril route 2 (two) times daily as needed for Rhinitis.    FUROSEMIDE (LASIX) 20 MG TABLET    TAKE 1 TABLET BY MOUTH IN THE MORNING AS NEEDED FOR FLUID RETENTION.    IBUPROFEN (ADVIL,MOTRIN) 800 MG TABLET    TAKE 1 TABLET BY MOUTH 3-TIMES DAILY AS NEEDED FOR PAIN.    LEVALBUTEROL (XOPENEX) 0.31 MG/3 ML NEBULIZER SOLUTION    Take 3 mLs (0.31 mg total) by nebulization every 4  (four) hours as needed for Wheezing. Rescue    LEVOTHYROXINE (SYNTHROID) 50 MCG TABLET    TAKE 1 TABLET BY MOUTH EVERY MORNING ON EMPTY STOMACH. DO NOT EAT FOR 30 MINUTES AFTERWARD.    LISINOPRIL (PRINIVIL,ZESTRIL) 20 MG TABLET    TAKE 1 TABLET BY MOUTH DAILY FOR BLOOD PRESSURE CONTROL.    LOVASTATIN (MEVACOR) 20 MG TABLET    TAKE 1 TABLET BY MOUTH EVERY EVENING FOR CHOLESTEROL.    MONTELUKAST (SINGULAIR) 10 MG TABLET    Take 10 mg by mouth.    OMEPRAZOLE (PRILOSEC) 40 MG CAPSULE    Take 40 mg by mouth.    OXYCODONE-ACETAMINOPHEN (PERCOCET)  MG PER TABLET    Take 1 tablet by mouth every 6 (six) hours as needed for Pain.    SECUKINUMAB (COSENTYX PEN, 2 PENS,) 150 MG/ML PNIJ    Inject 2 mL (300 mg) into the skin every 30 days.    SEMAGLUTIDE (OZEMPIC) 0.25 MG OR 0.5 MG (2 MG/3 ML) PEN INJECTOR    INJECT 0.5 MG SUB-Q (UNDER SKIN) ONCE WEEKLY. (EVERY 7 DAYS)    TRAZODONE (DESYREL) 50 MG TABLET    TAKE 2 TABLETS BY MOUTH EVERY EVENING.    TRELEGY ELLIPTA 100-62.5-25 MCG DSDV        VALACYCLOVIR (VALTREX) 1000 MG TABLET    Take 1 tablet (1,000 mg total) by mouth every 12 (twelve) hours.   Changed and/or Refilled Medications    Modified Medication Previous Medication    PROMETHAZINE-DEXTROMETHORPHAN (PROMETHAZINE-DM) 6.25-15 MG/5 ML SYRP promethazine-dextromethorphan (PROMETHAZINE-DM) 6.25-15 mg/5 mL Syrp       Take 5 mLs by mouth every 4 (four) hours as needed (cough).    Take 5 mLs by mouth every 4 (four) hours as needed.   Discontinued Medications    LEVOFLOXACIN (LEVAQUIN) 750 MG TABLET    Take 1 tablet (750 mg total) by mouth once daily.    PROMETHAZINE (PHENERGAN) 25 MG TABLET    Take 1 tablet (25 mg total) by mouth every 8 (eight) hours as needed for Nausea.    SUCRALFATE (CARAFATE) 100 MG/ML SUSPENSION    Take 1 g by mouth.        Assessment:     Pt is a 54 y.o. female with psoriatic arthritis and ankylosing spondylitis. The patient has not achieved clinical remission. Plan is to continue secukinumab  (COSENTYX)    Plan:      Encounter Diagnoses   Name Primary?    Gastroesophageal reflux disease, unspecified whether esophagitis present Yes    LUQ abdominal pain     LLQ abdominal pain     Constipation, unspecified constipation type     Low back pain, unspecified back pain laterality, unspecified chronicity, unspecified whether sciatica present     Cough, unspecified type     Diverticulitis     Primary osteoarthritis of both knees     Steroid-induced diabetes mellitus, sequela     PSA (psoriatic arthritis)     Psoriasis     Acne vulgaris      Boom was seen today for disease management.    Diagnoses and all orders for this visit:    Gastroesophageal reflux disease, unspecified whether esophagitis present  -     linaCLOtide (LINZESS) 145 mcg Cap capsule; Take 1 capsule (145 mcg total) by mouth before breakfast.  -     omeprazole (PRILOSEC) 40 MG capsule; Take 1 capsule (40 mg total) by mouth once daily.  -     ketorolac injection 60 mg  -     methylPREDNISolone acetate injection 160 mg    LUQ abdominal pain  -     linaCLOtide (LINZESS) 145 mcg Cap capsule; Take 1 capsule (145 mcg total) by mouth before breakfast.  -     omeprazole (PRILOSEC) 40 MG capsule; Take 1 capsule (40 mg total) by mouth once daily.  -     LUTEINIZING HORMONE; Future  -     FOLLICLE STIMULATING HORMONE; Future    LLQ abdominal pain  -     linaCLOtide (LINZESS) 145 mcg Cap capsule; Take 1 capsule (145 mcg total) by mouth before breakfast.  -     omeprazole (PRILOSEC) 40 MG capsule; Take 1 capsule (40 mg total) by mouth once daily.  -     LUTEINIZING HORMONE; Future  -     FOLLICLE STIMULATING HORMONE; Future    Constipation, unspecified constipation type  -     linaCLOtide (LINZESS) 145 mcg Cap capsule; Take 1 capsule (145 mcg total) by mouth before breakfast.  -     LUTEINIZING HORMONE; Future  -     FOLLICLE STIMULATING HORMONE; Future    Low back pain, unspecified back pain laterality, unspecified chronicity, unspecified whether sciatica  present  -     Ambulatory referral/consult to Pain Clinic; Future  -     LUTEINIZING HORMONE; Future  -     FOLLICLE STIMULATING HORMONE; Future  -     ketorolac injection 60 mg  -     methylPREDNISolone acetate injection 160 mg    Cough, unspecified type  -     promethazine-dextromethorphan (PROMETHAZINE-DM) 6.25-15 mg/5 mL Syrp; Take 5 mLs by mouth every 4 (four) hours as needed (cough).  -     LUTEINIZING HORMONE; Future  -     FOLLICLE STIMULATING HORMONE; Future    Diverticulitis  -     metroNIDAZOLE (FLAGYL) 500 MG tablet; Take 1 tablet (500 mg total) by mouth every 12 (twelve) hours. for 10 days  -     ciprofloxacin HCl (CIPRO) 500 MG tablet; Take 1 tablet (500 mg total) by mouth every 12 (twelve) hours. for 10 days  -     fluconazole (DIFLUCAN) 150 MG Tab; Take 1 tablet (150 mg total) by mouth once daily. for 7 days  -     LUTEINIZING HORMONE; Future  -     FOLLICLE STIMULATING HORMONE; Future    Primary osteoarthritis of both knees  -     ibuprofen-famotidine (DUEXIS) 800-26.6 mg Tab; Take 1 tablet by mouth 3 (three) times daily.  -     LUTEINIZING HORMONE; Future  -     FOLLICLE STIMULATING HORMONE; Future  -     ketorolac injection 60 mg  -     methylPREDNISolone acetate injection 160 mg    Steroid-induced diabetes mellitus, sequela  -     semaglutide (OZEMPIC) 1 mg/dose (4 mg/3 mL); Inject 1 mg into the skin every 7 days.  -     LUTEINIZING HORMONE; Future  -     FOLLICLE STIMULATING HORMONE; Future    PSA (psoriatic arthritis)  -     LUTEINIZING HORMONE; Future  -     FOLLICLE STIMULATING HORMONE; Future  -     ketorolac injection 60 mg  -     methylPREDNISolone acetate injection 160 mg    Psoriasis  -     LUTEINIZING HORMONE; Future  -     FOLLICLE STIMULATING HORMONE; Future  -     ketorolac injection 60 mg  -     methylPREDNISolone acetate injection 160 mg    Acne vulgaris  -     tretinoin (RETIN-A) 0.025 % cream; Apply topically every evening.      More than 50% of the  40 minute encounter was spent  face to face counseling the patient regarding current status and future plan of care as well as side effects  of the medications. All questions were answered to patient's satisfaction also includes  non-face to face time preparing to see the patient (eg, review of tests), Obtaining and/or reviewing separately obtained history, Documenting clinical information in the electronic or other health record, Independently interpreting results

## 2024-03-12 ENCOUNTER — LAB VISIT (OUTPATIENT)
Dept: LAB | Facility: HOSPITAL | Age: 54
End: 2024-03-12
Attending: INTERNAL MEDICINE
Payer: MEDICARE

## 2024-03-12 DIAGNOSIS — M54.50 LOW BACK PAIN, UNSPECIFIED BACK PAIN LATERALITY, UNSPECIFIED CHRONICITY, UNSPECIFIED WHETHER SCIATICA PRESENT: ICD-10-CM

## 2024-03-12 DIAGNOSIS — R05.9 COUGH, UNSPECIFIED TYPE: ICD-10-CM

## 2024-03-12 DIAGNOSIS — E09.9 STEROID-INDUCED DIABETES MELLITUS, SEQUELA: ICD-10-CM

## 2024-03-12 DIAGNOSIS — L40.9 PSORIASIS: ICD-10-CM

## 2024-03-12 DIAGNOSIS — R10.12 LUQ ABDOMINAL PAIN: ICD-10-CM

## 2024-03-12 DIAGNOSIS — L40.50 PSA (PSORIATIC ARTHRITIS): ICD-10-CM

## 2024-03-12 DIAGNOSIS — K59.00 CONSTIPATION, UNSPECIFIED CONSTIPATION TYPE: ICD-10-CM

## 2024-03-12 DIAGNOSIS — M17.0 PRIMARY OSTEOARTHRITIS OF BOTH KNEES: ICD-10-CM

## 2024-03-12 DIAGNOSIS — T38.0X5S STEROID-INDUCED DIABETES MELLITUS, SEQUELA: ICD-10-CM

## 2024-03-12 DIAGNOSIS — K57.92 DIVERTICULITIS: ICD-10-CM

## 2024-03-12 DIAGNOSIS — R10.32 LLQ ABDOMINAL PAIN: ICD-10-CM

## 2024-03-12 LAB
FSH SERPL-ACNC: 96.73 MIU/ML
LH SERPL-ACNC: 23.1 MIU/ML

## 2024-03-12 PROCEDURE — 83002 ASSAY OF GONADOTROPIN (LH): CPT | Performed by: INTERNAL MEDICINE

## 2024-03-12 PROCEDURE — 36415 COLL VENOUS BLD VENIPUNCTURE: CPT | Performed by: INTERNAL MEDICINE

## 2024-03-12 PROCEDURE — 83001 ASSAY OF GONADOTROPIN (FSH): CPT | Performed by: INTERNAL MEDICINE

## 2024-03-16 ENCOUNTER — PATIENT MESSAGE (OUTPATIENT)
Dept: ADMINISTRATIVE | Facility: OTHER | Age: 54
End: 2024-03-16
Payer: MEDICARE

## 2024-03-20 RX ORDER — OXYCODONE AND ACETAMINOPHEN 10; 325 MG/1; MG/1
1 TABLET ORAL EVERY 6 HOURS PRN
Qty: 90 TABLET | Refills: 0 | Status: SHIPPED | OUTPATIENT
Start: 2024-03-20 | End: 2024-04-17 | Stop reason: SDUPTHER

## 2024-03-20 NOTE — TELEPHONE ENCOUNTER
Care Due:                  Date            Visit Type   Department     Provider  --------------------------------------------------------------------------------                                EP -                              PRIMARY      [facility]  Last Visit: 02-      CARE ([facility])   STEPHANIE CORTEZ                              EP -                              PRIMARY      [facility]  Next Visit: 05-      CARE ([facility])   MEDICINE       PIOTR CORTEZ                                                            Last  Test          Frequency    Reason                     Performed    Due Date  --------------------------------------------------------------------------------    CMP.........  12 months..  lisinopriL, lovastatin...  02- 02-    Lipid Panel.  12 months..  lovastatin...............  09- 09-    [facility] Embedded Care Due Messages. Reference number: [id number].   3/20/2024 8:34:40 AM CDT

## 2024-03-21 ENCOUNTER — PATIENT MESSAGE (OUTPATIENT)
Dept: OTHER | Facility: OTHER | Age: 54
End: 2024-03-21
Payer: MEDICARE

## 2024-03-21 RX ORDER — BACLOFEN 10 MG/1
10 TABLET ORAL 3 TIMES DAILY
Qty: 90 TABLET | Refills: 3 | Status: SHIPPED | OUTPATIENT
Start: 2024-03-21

## 2024-03-27 DIAGNOSIS — B00.1 COLD SORE: ICD-10-CM

## 2024-03-27 RX ORDER — VALACYCLOVIR HYDROCHLORIDE 1 G/1
1000 TABLET, FILM COATED ORAL EVERY 12 HOURS
Qty: 60 TABLET | Refills: 1 | Status: SHIPPED | OUTPATIENT
Start: 2024-03-27 | End: 2024-05-15 | Stop reason: SDUPTHER

## 2024-03-27 NOTE — TELEPHONE ENCOUNTER
Pharmacy requesting refill on Valacyclovir 1gm  Pt's LOV 03/05/2024  Pt's NOV 07/08/2024  Medication pending

## 2024-04-17 NOTE — TELEPHONE ENCOUNTER
Date of Service: 06/19/2017    ONCOLOGY PROGRESS NOTE    Pat returns today for ongoing management of her left breast cancer.  She underwent a left-sided lumpectomy and sentinel node sampling back in 09/2000 for a stage I left breast cancer.  She was on Tamoxifen for 5 years through 2006 and has been without evidence of recurrence since then.  Her last mammogram was done last month, which required additional films of the right breast to demonstrate a cyst.  Ultimately, it was decided it was a BI-RADS 2 and the followup mammogram would be in 05/2018.  She really has no other complaints except she had a left hip fracture in March and had to have a total hip replacement.  She is slowly recovering from that.  She denies any fevers or chills.  She denies any new breast problems.  She does complain that the left breast continues to get smaller.    PHYSICAL EXAMINATION:  GENERAL:  Today shows a pleasant white female in no acute distress.  VITAL SIGNS:  Her blood pressure today is 131/81, pulse is 91 and regular, respirations 18 and unlabored, temperature 98.2.  HEENT:  No scleral icterus.  Oropharynx is benign.  NECK:  Supple, without adenopathy or thyromegaly.  LYMPHATICS:  There is no supraclavicular or axillary adenopathy.  BACK:  Nontender.  LUNGS:  Clear to percussion and auscultation.  BREASTS:  Exam today was done in the sitting and lying position.  There is breast reduction incisions on the right breast and lumpectomy and axillary node incisions on the left side.  No new tumors, dimpling or asymmetry noted in both the sitting and lying position.  HEART:  Tones are regular rhythm without murmurs, gallops or rubs.  ABDOMEN:  Soft, without hepatosplenomegaly or masses.  RECTOVAGINAL:  Deferred.  EXTREMITIES:  No calf tenderness or pedal edema.  NEUROLOGICAL:  The patient is alert and oriented.  There is no focal weakness.  Gait, speech and strength are intact.    LABORATORY DATA:  From today includes a CBC which is  No care due was identified.  [facility] Embedded Care Due Messages. Reference number: [id number].   4/17/2024 5:15:57 PM CDT   within normal limits with a hematocrit of 37.  A comprehensive metabolic panel is also obtained and is normal except blood sugars up slightly at 175.  Creatinine is 1.05.  Liver function tests are normal.    ASSESSMENT:  A 65-year-old white female with a history of stage I left breast cancer, now without evidence of recurrence at almost 17 years.  Breast exam today is again negative.    PLAN:  The plan, therefore, is to see her back in a year.  She is overdue for a bone density.  We will set that up in the next couple weeks.  She will call us for that report.  Otherwise, I will see her next year.  We will order a mammogram at [facility] to be done again later in May.  She will call if she has any other questions or concerns.      Dictated By: Abebe Hathaway MD  Signing Provider: MD TITA Hood/so ([id number])  DD: 06/19/2017 15:32:30 TD: 06/20/2017 08:48:39    Copy Sent To:

## 2024-04-18 RX ORDER — OXYCODONE AND ACETAMINOPHEN 10; 325 MG/1; MG/1
1 TABLET ORAL EVERY 6 HOURS PRN
Qty: 90 TABLET | Refills: 0 | Status: SHIPPED | OUTPATIENT
Start: 2024-04-18 | End: 2024-05-07 | Stop reason: SDUPTHER

## 2024-04-19 DIAGNOSIS — L70.0 ACNE VULGARIS: ICD-10-CM

## 2024-04-19 RX ORDER — TRETINOIN 0.25 MG/G
CREAM TOPICAL NIGHTLY
Qty: 45 G | Refills: 1 | Status: SHIPPED | OUTPATIENT
Start: 2024-04-19

## 2024-04-22 NOTE — TELEPHONE ENCOUNTER
Pharmacy requesting refill on Ibuprofen 800mg  Pt's LOV 03/05/2024  Pt's NOV 07/08/2024  Medication pending

## 2024-04-23 RX ORDER — IBUPROFEN 800 MG/1
TABLET ORAL
Qty: 90 TABLET | Refills: 3 | Status: SHIPPED | OUTPATIENT
Start: 2024-04-23 | End: 2024-04-24

## 2024-04-24 ENCOUNTER — PATIENT MESSAGE (OUTPATIENT)
Dept: RHEUMATOLOGY | Facility: CLINIC | Age: 54
End: 2024-04-24
Payer: MEDICARE

## 2024-04-29 ENCOUNTER — HOSPITAL ENCOUNTER (OUTPATIENT)
Dept: RADIOLOGY | Facility: HOSPITAL | Age: 54
Discharge: HOME | End: 2024-04-29
Attending: FAMILY MEDICINE
Payer: MEDICARE

## 2024-04-29 DIAGNOSIS — Z12.31 ENCOUNTER FOR SCREENING MAMMOGRAM FOR BREAST CANCER: ICD-10-CM

## 2024-04-29 PROCEDURE — 77063 BREAST TOMOSYNTHESIS BI: CPT | Mod: 26,,, | Performed by: RADIOLOGY

## 2024-04-29 PROCEDURE — 77067 SCR MAMMO BI INCL CAD: CPT | Mod: TC

## 2024-04-29 PROCEDURE — 77067 SCR MAMMO BI INCL CAD: CPT | Mod: 26,,, | Performed by: RADIOLOGY

## 2024-05-07 ENCOUNTER — LAB VISIT (OUTPATIENT)
Dept: LAB | Facility: CLINIC | Age: 54
End: 2024-05-07
Payer: MEDICARE

## 2024-05-07 ENCOUNTER — OFFICE VISIT (OUTPATIENT)
Dept: FAMILY MEDICINE | Facility: CLINIC | Age: 54
End: 2024-05-07
Payer: MEDICARE

## 2024-05-07 ENCOUNTER — PATIENT OUTREACH (OUTPATIENT)
Dept: ADMINISTRATIVE | Facility: HOSPITAL | Age: 54
End: 2024-05-07
Payer: MEDICARE

## 2024-05-07 VITALS
WEIGHT: 178.5 LBS | BODY MASS INDEX: 26.44 KG/M2 | SYSTOLIC BLOOD PRESSURE: 120 MMHG | OXYGEN SATURATION: 97 % | HEART RATE: 76 BPM | DIASTOLIC BLOOD PRESSURE: 80 MMHG | HEIGHT: 69 IN

## 2024-05-07 DIAGNOSIS — I10 BENIGN HYPERTENSION: ICD-10-CM

## 2024-05-07 DIAGNOSIS — M06.9 RHEUMATOID ARTHRITIS INVOLVING LEFT KNEE, UNSPECIFIED WHETHER RHEUMATOID FACTOR PRESENT: ICD-10-CM

## 2024-05-07 DIAGNOSIS — E03.8 OTHER SPECIFIED HYPOTHYROIDISM: Chronic | ICD-10-CM

## 2024-05-07 DIAGNOSIS — E78.49 OTHER HYPERLIPIDEMIA: ICD-10-CM

## 2024-05-07 DIAGNOSIS — M51.9 LUMBAR DISC DISEASE: ICD-10-CM

## 2024-05-07 DIAGNOSIS — G89.4 CHRONIC PAIN SYNDROME: Primary | ICD-10-CM

## 2024-05-07 LAB
CHOLEST SERPL-MCNC: 173 MG/DL (ref 120–199)
CHOLEST/HDLC SERPL: 3.1 {RATIO} (ref 2–5)
HDLC SERPL-MCNC: 56 MG/DL (ref 40–75)
HDLC SERPL: 32.4 % (ref 20–50)
LDLC SERPL CALC-MCNC: 103.4 MG/DL (ref 63–159)
NONHDLC SERPL-MCNC: 117 MG/DL
TRIGL SERPL-MCNC: 68 MG/DL (ref 30–150)

## 2024-05-07 PROCEDURE — 80061 LIPID PANEL: CPT | Performed by: FAMILY MEDICINE

## 2024-05-07 PROCEDURE — 3008F BODY MASS INDEX DOCD: CPT | Mod: CPTII,S$GLB,, | Performed by: FAMILY MEDICINE

## 2024-05-07 PROCEDURE — 99214 OFFICE O/P EST MOD 30 MIN: CPT | Mod: S$GLB,,, | Performed by: FAMILY MEDICINE

## 2024-05-07 PROCEDURE — 1159F MED LIST DOCD IN RCRD: CPT | Mod: CPTII,S$GLB,, | Performed by: FAMILY MEDICINE

## 2024-05-07 PROCEDURE — 3074F SYST BP LT 130 MM HG: CPT | Mod: CPTII,S$GLB,, | Performed by: FAMILY MEDICINE

## 2024-05-07 PROCEDURE — G2211 COMPLEX E/M VISIT ADD ON: HCPCS | Mod: S$GLB,,, | Performed by: FAMILY MEDICINE

## 2024-05-07 PROCEDURE — 36415 COLL VENOUS BLD VENIPUNCTURE: CPT | Mod: ,,, | Performed by: FAMILY MEDICINE

## 2024-05-07 PROCEDURE — 3079F DIAST BP 80-89 MM HG: CPT | Mod: CPTII,S$GLB,, | Performed by: FAMILY MEDICINE

## 2024-05-07 PROCEDURE — 4010F ACE/ARB THERAPY RXD/TAKEN: CPT | Mod: CPTII,S$GLB,, | Performed by: FAMILY MEDICINE

## 2024-05-07 RX ORDER — OXYCODONE AND ACETAMINOPHEN 10; 325 MG/1; MG/1
1 TABLET ORAL EVERY 6 HOURS PRN
Qty: 90 TABLET | Refills: 0 | Status: SHIPPED | OUTPATIENT
Start: 2024-05-17 | End: 2024-06-14 | Stop reason: SDUPTHER

## 2024-05-07 NOTE — PROGRESS NOTES
Population Health Chart Review & Patient Outreach Details      Additional Pop Health Notes:               Updates Requested / Reviewed:      Updated Care Coordination Note and Care Everywhere         Health Maintenance Topics Overdue:      [facility] Score: 1     LDCT Lung Screen                       Health Maintenance Topic(s) Outreach Outcomes & Actions Taken:    Low Dose CT Screening - Outreach Outcomes & Actions Taken  : External Records Requested & Care Team Updated if Applicable

## 2024-05-07 NOTE — LETTER
AUTHORIZATION FOR RELEASE OF   CONFIDENTIAL INFORMATION    Dear [facility] Medical Records,    We are seeing Boom Blanco, date of birth 1970, in the clinic at [facility]. Juan Carlos Saldivar MD is the patient's PCP. Boom Blanco has an outstanding lab/procedure at the time we reviewed her chart. In order to help keep her health information updated, she has authorized us to request the following medical record(s):        (  )  MAMMOGRAM                                      (  )  COLONOSCOPY      (  )  PAP SMEAR                                          (  )  OUTSIDE LAB RESULTS     (  )  DEXA SCAN                                          (  )  EYE EXAM            (  )  FOOT EXAM                                          (  )  ENTIRE RECORD     (  )  OUTSIDE IMMUNIZATIONS                 ( X )  _CT OF CHEST         Please fax records to [facility], Hulin, Michael P., MD at [phone]    Thanks so much and have a great day!    Lyly Chavez LPN [facility]  [address] [place] [address]  P- [phone]  F- [phone]           Patient Name: Boom Blanco  : 1970  Patient Phone #: [phone]      OB-GYN Problem-Oriented Visit or Consultation      Ines Gaitan is a 37 year old year old P 0 who presents with a chief complaint of infertility and left hydrosalpinx.  Referred by Dr. Didi Neff at [facility] and self.  Patient's last menstrual period was 10/20/2019 (exact date).    HPI:     See prior notes. Met with Dr. Neff at [facility] and had egg retrieval and preimplantation genetic testing and has two satisfactory frozen embryos ready. Has left hydrosalpinx (TB related?) that Dr. Neff advised removal along with possibly blocked right tube that if substantially abnormal should be removed as well. Patient prefers to retain the right tube if able. Currently on OC. Has a chronic anemia of unclear cause.     Past medical, obstetrical, surgical, family and social history reviewed and as noted or updated in chart.     Allergies, meds and supplements are as noted or updated in chart.      ROS:   Systems reviewed include:  constitutional, genitourinary, psychological, hematologic/lymphatic and endocrine.    These systems were negative for significant symptoms except for the following additional: none; see HPI.    EXAM:  VS as noted. /84 (BP Location: Left arm, Patient Position: Chair, Cuff Size: Adult Regular)   Pulse 109   Wt 80.7 kg (178 lb)   LMP 10/20/2019 (Exact Date)   Breastfeeding? No   BMI 32.56 kg/m    Constitutionally normal.     Exam not repeated at this time.    Assessment:   Encounter Diagnoses   Name Primary?     Female infertility Yes     Hydrosalpinx        Plan and Recommendations:   Total encounter time (physician together with patient) = 30min. Direct counseling, education and care coordination time (physician together with patient) = 20min. This included the following:   I reviewed the condition, causes, differential diagnosis, prognosis, evaluation and management considerations and options.  Questions answered and information given. See orders.   Advise laparoscopy with left  salpingectomy, possible tubal dye study, possible right salpingectomy, proceed as indicated. I discussed the operation, indications, goals, general and specific risks, complications, alternatives and anticipated post-op course including success/failure rates, limitations and consequences.  Patient understands and desires to proceed.  Instructions reviewed. Second opinion or subspecialty evaluation and management also offered.  Pre-anesthetic medical evaluation is to be arranged.  Continue low dose aspirin. Continue continuous OC.      A/P:  Ines was seen today for consult.    Diagnoses and all orders for this visit:    Female infertility  -     Jaqueline-Operative Worksheet    Hydrosalpinx  -     Jaqueline-Operative Worksheet        Quinten Belle MD

## 2024-05-07 NOTE — PROGRESS NOTES
"    [facility] [place], [place]    Family Medicine Office Visit    Chief Complaint   Patient presents with    Follow-up     3 month f/u         HPI:  routine follow up:    Blood Pressure at goal on medication, with patient reporting prescription compliance  Hyperlipidemia stable on appropriate intensity statin therapy  Anxiety stable  Distant history of bladder cancer  Thyroid function stable    Severe chronic pain related to RA - sees rheumatology    Has lost substantial weight, doing extremely well    Former smoker - due for repeat lung screen    ROS: as above    Vitals:    05/07/24 0901   BP: 120/80   BP Location: Right arm   Patient Position: Sitting   BP Method: Large (Manual)   Pulse: 76   SpO2: 97%   Weight: 81 kg (178 lb 8 oz)   Height: 5' 9" (1.753 m)      Body mass index is 26.36 kg/m².      General:  AOx3, well nourished and developed in no acute distress  Eyes:  PERRLA, EOMI, vision intact grossly  ENT:  normal hearing, moist oral mucosa  Neck:  trachea midline with no masses or thyromegaly  Heart:  RRR, no murmurs.  No edema noted, extremities warm and well perfused  Lungs:  clear to auscultation bilaterally with symmetric chest movement  Abdomen:  Soft, nontender, nondistended.  Normal bowel sounds  Musculoskeletal:  Normal gait.  Normal posture.  Normal muscular development with no joint swelling.  Neurological:  CN II-XII grossly intact. Symmetric strength and sensation  Psych:  Normal mood and affect.  Able to demonstrate good judgement and personal insight.      Assessment/Plan:    1. Chronic pain syndrome    2. Benign hypertension    3. Other hyperlipidemia  -     Lipid Panel; Future; Expected date: 05/07/2024    4. Lumbar disc disease    5. Rheumatoid arthritis involving left knee, unspecified whether rheumatoid factor present    6. Other specified hypothyroidism    Other orders  -     oxyCODONE-acetaminophen (PERCOCET)  mg per tablet; Take 1 tablet by mouth " every 6 (six) hours as needed for Pain.  Dispense: 90 tablet; Refill: 0         Stable with judicious use of medication  At goal  Stable, check lipids  Stable  Stable with RA  Stable thyroid function  Visit today included increased complexity associated with the care of the episodic problem htn, hld, RA addressed and managing the longitudinal care of the patient due to the serious and/or complex managed problem(s) htn, hld, RA.

## 2024-05-09 ENCOUNTER — PATIENT OUTREACH (OUTPATIENT)
Dept: ADMINISTRATIVE | Facility: HOSPITAL | Age: 54
End: 2024-05-09
Payer: MEDICARE

## 2024-05-09 NOTE — PROGRESS NOTES
Population Health Chart Review & Patient Outreach Details      Additional [facility] Health Notes:               Updates Requested / Reviewed:      Updated Care Coordination Note, , and Immunizations Reconciliation Completed or Queried: [place]         Health Maintenance Topics Overdue:      VBHM Score: 0     Patient is not due for any topics at this time.                       Health Maintenance Topic(s) Outreach Outcomes & Actions Taken:    Low Dose CT Screening - Outreach Outcomes & Actions Taken  : External Records Uploaded, History & Care Team Updated if Applicable

## 2024-05-15 DIAGNOSIS — B00.1 COLD SORE: ICD-10-CM

## 2024-05-15 RX ORDER — TRAZODONE HYDROCHLORIDE 50 MG/1
100 TABLET ORAL NIGHTLY
Qty: 60 TABLET | Refills: 3 | Status: SHIPPED | OUTPATIENT
Start: 2024-05-15

## 2024-05-15 RX ORDER — VALACYCLOVIR HYDROCHLORIDE 1 G/1
1000 TABLET, FILM COATED ORAL EVERY 12 HOURS
Qty: 60 TABLET | Refills: 1 | Status: SHIPPED | OUTPATIENT
Start: 2024-05-15 | End: 2024-07-14

## 2024-05-15 NOTE — TELEPHONE ENCOUNTER
Refill Routing Note   Medication(s) are not appropriate for processing by [facility] for the following reason(s):        Clarification of medication (Rx) details    [facility] action(s):  Defer      Medication Therapy Plan: PT. REPORTED TAKING 50 MG NIGHTLY      Appointments  past 12m or future 3m with PCP    Date Provider   Last Visit   5/7/2024 Juan Carlos Saldivar MD   Next Visit   9/3/2024 Juan Carlos Saldivar MD   ED visits in past 90 days: 0        Note composed:12:00 PM 05/15/2024

## 2024-05-15 NOTE — TELEPHONE ENCOUNTER
No care due was identified.  Health [facility] Embedded Care Due Messages. Reference number: [id number].   5/15/2024 11:33:02 AM CDT

## 2024-05-20 NOTE — PROGRESS NOTES
"[facility] Note    PCP: Juan Carlos Saldivar MD    Chief Complaint: microscopic hematuria     SUBJECTIVE:       History of Present Illness:  Boom Blanco is a 54 y.o. female who presents to clinic for hematuria. She is New  to our clinic.     CT 2/23/24:   Adrenals: Normal   Kidneys: No abnormal calcification is evident.  There is no   hydronephrosis or ureteral stone.  A significant renal mass is not   identified.     Doing well today. No gross hematuria.   She does have significant constipation issues, has a BM every month.   She has urinary frequency and sometimes feels as though she doesn't empty completely.   Has BRYAN with coughing and wears a panty liner.       1/17/23  Only 1 RBC seen on micro UA  US did not visualize previously seen 9 mm and 1.5 cm complex cyst in right kidney. These had previously been stable since 2018.    No hematuria or dysuria.   No other bothersome urinary complaints.     She has been having some recent GI issues and is seeing GI later this week. Has had issues with diverticulitis in the past.       1/4/22  Presents today for microscopic hematuria. Has had UAs dip positive for blood, but no microanalysis performed.   No bothersome urinary issues. No dysuria. No gross hematuria.     She has a hx of ?bladder cancer in 1995. She was undergoing hysterectomy and at that time a "cyst on the bladder" was found and removed. No further treatment for this.   No hx of stones.     She underwent a CT w/wo contrast which showed 2 complex cysts of the right kidney, otherwise no concerning findings. These are stable since 2018.    UA today: +trace blood, leuk and nitrite negative     Last urine culture: no documented UTIs  Last creatinine: 1.1 (12/9/21)    Family  hx: no malignancy   Hx of HTN, HLD  Former smoker, quit 3 years ago.     Past medical, family, and social history reviewed as documented in chart with pertinent positive medical, family, and social history " detailed in HPI.    Review of patient's allergies indicates:   Allergen Reactions    Fentanyl Shortness Of Breath    Remeron [mirtazapine] Other (See Comments)     Weight gain       Past Medical History:   Diagnosis Date    Anxiety disorder     Arthritis     Bipolar disorder     Chronic pain     Diverticulitis     Hyperlipidemia     Hypertension     Lupus 2006    Psoriatic arthritis     Raynaud's disease      Past Surgical History:   Procedure Laterality Date    ARTHROSCOPIC CHONDROPLASTY OF KNEE JOINT Left 01/22/2019    Procedure: ARTHROSCOPY, KNEE, WITH CHONDROPLASTY;  Surgeon: Sylvain Gorman DO;  Location: [facility] OR;  Service: Orthopedics;  Laterality: Left;    ARTHROSCOPY OF KNEE Left 01/22/2019    Procedure: ARTHROSCOPY, KNEE;  Surgeon: Sylvain Gorman DO;  Location: [facility] OR;  Service: Orthopedics;  Laterality: Left;  Equipment: Emerson Chondral Drill Set and Mitek Meniscus Repair Set Truespan  Vendor/Rep: [employer]    ARTHROSCOPY OF KNEE Right 02/05/2019    Procedure: ARTHROSCOPY, KNEE;  Surgeon: Sylvain Gorman DO;  Location: [facility];  Service: Orthopedics;  Laterality: Right;  Equipment: Mitek Truspar; Scope  Venor/Rep: Mitek    BACK SURGERY      spinal stimulator implanted and then removed    BREAST BIOPSY      Benign    COLON SURGERY      COLONOSCOPY  11/25/2016    repeat in 5-10 years    COLONOSCOPY N/A 09/06/2019    Procedure: COLONOSCOPY;  Surgeon: Dany Hugo MD;  Location: [facility];  Service: General;  Laterality: N/A;    ENDOSCOPY  09/06/2019    Procedure: ENDOSCOPY;  Surgeon: Dany Hugo MD;  Location: [facility];  Service: General;;  with multiple biopsy's    ESOPHAGOGASTRODUODENOSCOPY Left 08/29/2018    Procedure: ESOPHAGOGASTRODUODENOSCOPY (EGD);  Surgeon: Dany Hugo MD;  Location: [facility] ENDO;  Service: General;  Laterality: Left;    EXCISION OF MEDIAL MENISCUS OF KNEE Right 02/05/2019    Procedure: MENISCECTOMY, KNEE, MEDIAL;  Surgeon: Sylvain Gorman DO;  Location:  [facility] OR;  Service: Orthopedics;  Laterality: Right;    HYSTERECTOMY      KNEE ARTHROSCOPY W/ DEBRIDEMENT Left 2019    Procedure: ARTHROSCOPY, KNEE, WITH DEBRIDEMENT;  Surgeon: Sylvain Gorman DO;  Location: [facility] OR;  Service: Orthopedics;  Laterality: Left;    KNEE ARTHROSCOPY W/ MENISCECTOMY Left 2019    Procedure: ARTHROSCOPY, KNEE, WITH MENISCECTOMY;  Surgeon: Sylvain Gorman DO;  Location: [facility] OR;  Service: Orthopedics;  Laterality: Left;    KNEE ARTHROSCOPY W/ PLICA EXCISION Left 2019    Procedure: EXCISION, PLICA, KNEE, ARTHROSCOPIC;  Surgeon: Sylvain Gorman DO;  Location: [facility] OR;  Service: Orthopedics;  Laterality: Left;    SALPINGOOPHORECTOMY      TOTAL KNEE ARTHROPLASTY Right 2019    Procedure: ARTHROPLASTY, KNEE, TOTAL;  Surgeon: Sylvain Gorman DO;  Location: [facility] OR;  Service: Orthopedics;  Laterality: Right;  Equipment: Depuy Sigma Total Knee System; Guillen Leg Mcclellan  Vendor/Rep: Depuy  Table/Position: Supine  REQUIRES FIRST ASSISTANT EPHRAIM    TOTAL KNEE ARTHROPLASTY Left 10/03/2019    Procedure: ARTHROPLASTY, KNEE, TOTAL;  Surgeon: Sylvain Gorman DO;  Location: [facility] OR;  Service: Orthopedics;  Laterality: Left;  Equipment: Depuy Sigma Total Knee System  Vendor/Rep: Depuy  REQUIRES FIRST ASSISTANT EPHRAIM    TUBAL LIGATION       Family History   Problem Relation Name Age of Onset    Arthritis Mother Yobani alcazar     Pacemaker/defibrilator Mother Yobani alcazar     Lung disease Father Franco     Heart disease Father Franco     Arthritis Sister      Arthritis Brother      Arthritis Sister      Arthritis Sister      Breast cancer Neg Hx       Social History     Tobacco Use    Smoking status: Former     Current packs/day: 0.00     Average packs/day: 1.1 packs/day for 44.3 years (49.5 ttl pk-yrs)     Types: Cigarettes     Start date: 1985     Quit date: 2019     Years since quittin.0    Smokeless tobacco: Former   Substance Use Topics    Alcohol use:  "No    Drug use: Never        Review of Systems    OBJECTIVE:     Anticoagulation:  no    Estimated body mass index is 26.37 kg/m² as calculated from the following:    Height as of this encounter: 5' 9" (1.753 m).    Weight as of this encounter: 81 kg (178 lb 9.2 oz).    Vital Signs (Most Recent)       Physical Exam  Vitals reviewed.   Constitutional:       General: She is not in acute distress.     Appearance: Normal appearance. She is not ill-appearing.   HENT:      Head: Normocephalic and atraumatic.   Eyes:      General: No scleral icterus.  Pulmonary:      Effort: Pulmonary effort is normal. No respiratory distress.   Genitourinary:     Comments: Vaginal mucosa healthy.   Minor BRYAN seen in supine position.   Skin:     Coloration: Skin is not jaundiced.   Neurological:      General: No focal deficit present.      Mental Status: She is alert and oriented to person, place, and time.   Psychiatric:         Mood and Affect: Mood normal.         Behavior: Behavior normal.         BMP  Lab Results   Component Value Date     02/17/2023    K 4.0 02/17/2023     02/17/2023    CO2 27 02/17/2023    BUN 15 02/17/2023    CREATININE 1.0 02/17/2023    CALCIUM 9.0 02/17/2023    ANIONGAP 8 02/17/2023    ESTGFRAFRICA >60.0 04/12/2022    EGFRNONAA >60.0 04/12/2022       Lab Results   Component Value Date    WBC 6.0 02/23/2024    HGB 12.3 02/23/2024    HCT 38.6 02/23/2024    MCV 95.1 02/23/2024     02/23/2024       Imaging:  Per HPI    ASSESSMENT     1. Acquired complex renal cyst    2. BRYAN (stress urinary incontinence, female)      PLAN:     - Repeat renal US in 1 year   - For her BRYAN I recommended conservative management with Kegel exercises, 10 reps x 10 a day  - If this is not effective can consider Bulkamid  - Needs better control of her constipation and this will likely help with her other urinary complaints     Stephany Perez MD     Visit today included increased complexity associated with the care of " the episodic problem renal cyst/BRYAN addressed and managing the longitudinal care of the patient due to the serious and/or complex managed problem(s).

## 2024-05-21 ENCOUNTER — OFFICE VISIT (OUTPATIENT)
Dept: UROLOGY | Facility: CLINIC | Age: 54
End: 2024-05-21
Payer: MEDICARE

## 2024-05-21 VITALS — WEIGHT: 178.56 LBS | HEIGHT: 69 IN | BODY MASS INDEX: 26.45 KG/M2

## 2024-05-21 DIAGNOSIS — N39.3 SUI (STRESS URINARY INCONTINENCE, FEMALE): ICD-10-CM

## 2024-05-21 DIAGNOSIS — N28.1 ACQUIRED COMPLEX RENAL CYST: Primary | ICD-10-CM

## 2024-05-21 PROCEDURE — 99214 OFFICE O/P EST MOD 30 MIN: CPT | Mod: S$GLB,,, | Performed by: STUDENT IN AN ORGANIZED HEALTH CARE EDUCATION/TRAINING PROGRAM

## 2024-05-21 PROCEDURE — 3008F BODY MASS INDEX DOCD: CPT | Mod: CPTII,S$GLB,, | Performed by: STUDENT IN AN ORGANIZED HEALTH CARE EDUCATION/TRAINING PROGRAM

## 2024-05-21 PROCEDURE — G2211 COMPLEX E/M VISIT ADD ON: HCPCS | Mod: S$GLB,,, | Performed by: STUDENT IN AN ORGANIZED HEALTH CARE EDUCATION/TRAINING PROGRAM

## 2024-05-21 PROCEDURE — 99999 PR PBB SHADOW E&M-EST. PATIENT-LVL III: CPT | Mod: PBBFAC,,, | Performed by: STUDENT IN AN ORGANIZED HEALTH CARE EDUCATION/TRAINING PROGRAM

## 2024-05-21 PROCEDURE — 1159F MED LIST DOCD IN RCRD: CPT | Mod: CPTII,S$GLB,, | Performed by: STUDENT IN AN ORGANIZED HEALTH CARE EDUCATION/TRAINING PROGRAM

## 2024-05-21 PROCEDURE — 4010F ACE/ARB THERAPY RXD/TAKEN: CPT | Mod: CPTII,S$GLB,, | Performed by: STUDENT IN AN ORGANIZED HEALTH CARE EDUCATION/TRAINING PROGRAM

## 2024-06-09 ENCOUNTER — PATIENT MESSAGE (OUTPATIENT)
Dept: UROLOGY | Facility: CLINIC | Age: 54
End: 2024-06-09
Payer: MEDICARE

## 2024-06-09 ENCOUNTER — PATIENT MESSAGE (OUTPATIENT)
Dept: ADMINISTRATIVE | Facility: OTHER | Age: 54
End: 2024-06-09
Payer: MEDICARE

## 2024-06-11 ENCOUNTER — E-VISIT (OUTPATIENT)
Dept: UROLOGY | Facility: CLINIC | Age: 54
End: 2024-06-11
Payer: MEDICARE

## 2024-06-11 DIAGNOSIS — N30.00 ACUTE CYSTITIS WITHOUT HEMATURIA: Primary | ICD-10-CM

## 2024-06-12 NOTE — PROGRESS NOTES
Dear Boom,     Thank you for reaching out to me for an e-visit so we can address your concern regarding: Urinary Tract Infection (Entered automatically based on patient selection in Patient Portal.)     I have reviewed your chart and read the answers to the questionnaire that you completed.  Based on the information provided, my diagnosis and recommendations are as follows:  Possible UTI. Will place orders for urine sample and treat with antibiotics based on results.     Visit Diagnosis    1. Acute cystitis without hematuria         Please let me know if there is something else that you need.  If you send additional messages concerning this illness, please use this message thread to communicate.      Stephany Perez      I spent 18 minutes on this e visit.

## 2024-06-14 ENCOUNTER — TELEPHONE (OUTPATIENT)
Dept: FAMILY MEDICINE | Facility: CLINIC | Age: 54
End: 2024-06-14
Payer: MEDICARE

## 2024-06-14 RX ORDER — OXYCODONE AND ACETAMINOPHEN 10; 325 MG/1; MG/1
1 TABLET ORAL EVERY 6 HOURS PRN
Qty: 90 TABLET | Refills: 0 | Status: SHIPPED | OUTPATIENT
Start: 2024-06-14

## 2024-06-14 NOTE — TELEPHONE ENCOUNTER
----- Message from Justin Caba sent at 6/14/2024  8:39 AM CDT -----  Contact: self  Type: RX Refill Request        Who Called: Patient   Refill or New Rx: refill  RX Name and Strength: oxyCODONE-acetaminophen (PERCOCET)  mg per tablet  How is the patient currently taking it? (ex. 1XDay): as directed   Is this a 30 day or 90 day RX: n/a  Preferred Pharmacy with phone number:   [facility] [place] - [address] [place]  [address] [place] [address]  Phone: [phone] Fax: [phone]  Local or Mail Order: local   Ordering Provider: Dr. Saldivar   [facility] Call Back Number: [phone]  Additional Information: Pt is completely  out plz refill. Thanks

## 2024-06-14 NOTE — TELEPHONE ENCOUNTER
----- Message from Thanh Akers sent at 6/14/2024  3:31 PM CDT -----  Regarding: refill  Contact: patient  Type:  RX Refill Request    Who Called: patient  Refill or New Rx:Refill  RX Name and Strength:oxyCODONE-acetaminophen (PERCOCET)  mg per tablet  How is the patient currently taking it? (ex. 1XDay):  Is this a 30 day or 90 day RX:  Preferred Pharmacy with phone number:[phone]  Local or Mail Order:local  Ordering Provider:Yariel  Would the patient rather a call back or a response via [facility]? refill  Best Call Back Number:[phone]  Additional Information:

## 2024-06-17 ENCOUNTER — LAB VISIT (OUTPATIENT)
Dept: LAB | Facility: HOSPITAL | Age: 54
End: 2024-06-17
Attending: STUDENT IN AN ORGANIZED HEALTH CARE EDUCATION/TRAINING PROGRAM
Payer: MEDICARE

## 2024-06-17 DIAGNOSIS — N30.00 ACUTE CYSTITIS WITHOUT HEMATURIA: ICD-10-CM

## 2024-06-17 LAB
BACTERIA #/AREA URNS HPF: ABNORMAL /HPF
BILIRUB UR QL STRIP: NEGATIVE
CLARITY UR: CLEAR
COLOR UR: YELLOW
GLUCOSE UR QL STRIP: NEGATIVE
HGB UR QL STRIP: ABNORMAL
KETONES UR QL STRIP: NEGATIVE
LEUKOCYTE ESTERASE UR QL STRIP: ABNORMAL
MICROSCOPIC COMMENT: ABNORMAL
NITRITE UR QL STRIP: NEGATIVE
PH UR STRIP: 6 [PH] (ref 5–8)
PROT UR QL STRIP: NEGATIVE
RBC #/AREA URNS HPF: 0 /HPF (ref 0–4)
SP GR UR STRIP: 1.01 (ref 1–1.03)
URN SPEC COLLECT METH UR: ABNORMAL
UROBILINOGEN UR STRIP-ACNC: NEGATIVE EU/DL
WBC #/AREA URNS HPF: 9 /HPF (ref 0–5)

## 2024-06-17 PROCEDURE — 81000 URINALYSIS NONAUTO W/SCOPE: CPT | Performed by: STUDENT IN AN ORGANIZED HEALTH CARE EDUCATION/TRAINING PROGRAM

## 2024-06-17 PROCEDURE — 87186 SC STD MICRODIL/AGAR DIL: CPT | Performed by: STUDENT IN AN ORGANIZED HEALTH CARE EDUCATION/TRAINING PROGRAM

## 2024-06-17 PROCEDURE — 87077 CULTURE AEROBIC IDENTIFY: CPT | Performed by: STUDENT IN AN ORGANIZED HEALTH CARE EDUCATION/TRAINING PROGRAM

## 2024-06-17 PROCEDURE — 87086 URINE CULTURE/COLONY COUNT: CPT | Performed by: STUDENT IN AN ORGANIZED HEALTH CARE EDUCATION/TRAINING PROGRAM

## 2024-06-17 PROCEDURE — 87088 URINE BACTERIA CULTURE: CPT | Performed by: STUDENT IN AN ORGANIZED HEALTH CARE EDUCATION/TRAINING PROGRAM

## 2024-06-19 LAB — BACTERIA UR CULT: ABNORMAL

## 2024-06-19 RX ORDER — SULFAMETHOXAZOLE AND TRIMETHOPRIM 800; 160 MG/1; MG/1
1 TABLET ORAL 2 TIMES DAILY
Qty: 10 TABLET | Refills: 0 | Status: SHIPPED | OUTPATIENT
Start: 2024-06-19 | End: 2024-06-24

## 2024-07-10 DIAGNOSIS — I10 BENIGN HYPERTENSION: ICD-10-CM

## 2024-07-10 RX ORDER — BUPROPION HYDROCHLORIDE 150 MG/1
150 TABLET ORAL
Qty: 90 TABLET | Refills: 3 | Status: SHIPPED | OUTPATIENT
Start: 2024-07-10

## 2024-07-10 RX ORDER — LISINOPRIL 20 MG/1
TABLET ORAL
Qty: 90 TABLET | Refills: 0 | Status: SHIPPED | OUTPATIENT
Start: 2024-07-10

## 2024-07-10 RX ORDER — OXYCODONE AND ACETAMINOPHEN 10; 325 MG/1; MG/1
1 TABLET ORAL EVERY 6 HOURS PRN
Qty: 90 TABLET | Refills: 0 | Status: SHIPPED | OUTPATIENT
Start: 2024-07-13

## 2024-07-10 NOTE — TELEPHONE ENCOUNTER
Refill Routing Note   Medication(s) are not appropriate for processing by [facility] for the following reason(s):        Required labs outdated    [facility] action(s):  Approve  Defer               Appointments  past 12m or future 3m with PCP    Date Provider   Last Visit   5/7/2024 Juan Carlos Saldivar MD   Next Visit   7/10/2024 Juan Carlos Saldivar MD   ED visits in past 90 days: 0        Note composed:9:56 AM 07/10/2024

## 2024-07-10 NOTE — TELEPHONE ENCOUNTER
No care due was identified.  Health [facility] Embedded Care Due Messages. Reference number: [id number].   7/10/2024 8:42:52 AM CDT

## 2024-07-10 NOTE — TELEPHONE ENCOUNTER
No care due was identified.  [facility] Embedded Care Due Messages. Reference number: [id number].   7/10/2024 8:04:16 AM CDT

## 2024-07-13 DIAGNOSIS — E03.8 OTHER SPECIFIED HYPOTHYROIDISM: ICD-10-CM

## 2024-07-13 RX ORDER — LEVOTHYROXINE SODIUM 50 UG/1
TABLET ORAL
Qty: 90 TABLET | Refills: 0 | Status: SHIPPED | OUTPATIENT
Start: 2024-07-13

## 2024-07-13 NOTE — TELEPHONE ENCOUNTER
No care due was identified.  Health [facility] Embedded Care Due Messages. Reference number: [id number].   7/13/2024 8:06:09 AM CDT

## 2024-07-13 NOTE — TELEPHONE ENCOUNTER
Refill Decision Note   Boom Bella  is requesting a refill authorization.  Brief Assessment and Rationale for Refill:  Approve     Medication Therapy Plan:  MRM resolved      Comments:     Note composed:3:17 PM 07/13/2024

## 2024-08-08 RX ORDER — OXYCODONE AND ACETAMINOPHEN 10; 325 MG/1; MG/1
1 TABLET ORAL EVERY 6 HOURS PRN
Qty: 90 TABLET | Refills: 0 | Status: SHIPPED | OUTPATIENT
Start: 2024-08-08

## 2024-08-10 DIAGNOSIS — L70.0 ACNE VULGARIS: ICD-10-CM

## 2024-08-12 DIAGNOSIS — Z76.0 MEDICATION REFILL: ICD-10-CM

## 2024-08-12 DIAGNOSIS — R60.0 LOCALIZED EDEMA: ICD-10-CM

## 2024-08-12 RX ORDER — ALBUTEROL SULFATE 90 UG/1
INHALANT RESPIRATORY (INHALATION)
Qty: 25.5 G | Refills: 2 | Status: SHIPPED | OUTPATIENT
Start: 2024-08-12

## 2024-08-12 RX ORDER — FUROSEMIDE 20 MG/1
TABLET ORAL
Qty: 90 TABLET | Refills: 3 | Status: SHIPPED | OUTPATIENT
Start: 2024-08-12

## 2024-08-12 NOTE — TELEPHONE ENCOUNTER
Refill Routing Note   Medication(s) are not appropriate for processing by [facility] for the following reason(s):        Outside of protocol    [facility] action(s):  Route  Approve   Requires labs : Yes      Medication Therapy Plan: PRN USAGE(OP)    Alert overridden per protocol: Yes     Appointments  past 12m or future 3m with PCP    Date Provider   Last Visit   5/7/2024 Juan Carlos Saldivar MD   Next Visit   9/3/2024 Juan Carlos Saldivar MD   ED visits in past 90 days: 0        Note composed:8:34 AM 08/12/2024

## 2024-08-12 NOTE — TELEPHONE ENCOUNTER
Care Due:                  Date            Visit Type   Department     Provider  --------------------------------------------------------------------------------                                EP -                              PRIMARY      [facility]  Last Visit: 05-      CARE ([facility])   STEPHANIE CORTEZ                              EP -                              PRIMARY      [facility]  Next Visit: 09-      CARE ([facility])   MEDICINE       PIOTR CORTEZ                                                            Last  Test          Frequency    Reason                     Performed    Due Date  --------------------------------------------------------------------------------    CMP.........  12 months..  lisinopriL, lovastatin...  02- 02-    [facility] Embedded Care Due Messages. Reference number: [id number].   8/12/2024 8:01:58 AM CDT

## 2024-08-13 ENCOUNTER — TELEPHONE (OUTPATIENT)
Dept: RHEUMATOLOGY | Facility: CLINIC | Age: 54
End: 2024-08-13
Payer: MEDICARE

## 2024-08-13 RX ORDER — TRETINOIN 0.25 MG/G
CREAM TOPICAL NIGHTLY
Qty: 45 G | Refills: 1 | Status: SHIPPED | OUTPATIENT
Start: 2024-08-13

## 2024-08-13 NOTE — TELEPHONE ENCOUNTER
----- Message from Kirsten Roa MA sent at 8/13/2024 10:14 AM CDT -----  Type: Patient Call Back    Who called: Self    What is the request in detail: following up on a refill request placed..     Can the clinic reply by [facility]?NO    Would the patient rather a call back or a response via [facility]? Yes, call     Best call back number: [phone] (home)

## 2024-08-14 NOTE — TELEPHONE ENCOUNTER
Refill was for Retin A cream which was sent to pharmacy    Yessica Deshpande MA  [facility]  8/14/2024

## 2024-09-02 ENCOUNTER — PATIENT MESSAGE (OUTPATIENT)
Dept: UROLOGY | Facility: CLINIC | Age: 54
End: 2024-09-02
Payer: MEDICARE

## 2024-09-03 ENCOUNTER — PATIENT MESSAGE (OUTPATIENT)
Dept: ADMINISTRATIVE | Facility: OTHER | Age: 54
End: 2024-09-03
Payer: MEDICARE

## 2024-09-03 ENCOUNTER — LAB VISIT (OUTPATIENT)
Dept: LAB | Facility: CLINIC | Age: 54
End: 2024-09-03
Payer: MEDICARE

## 2024-09-03 ENCOUNTER — OFFICE VISIT (OUTPATIENT)
Dept: FAMILY MEDICINE | Facility: CLINIC | Age: 54
End: 2024-09-03
Payer: MEDICARE

## 2024-09-03 VITALS
SYSTOLIC BLOOD PRESSURE: 138 MMHG | HEART RATE: 73 BPM | DIASTOLIC BLOOD PRESSURE: 74 MMHG | HEIGHT: 69 IN | OXYGEN SATURATION: 100 % | WEIGHT: 178.31 LBS | BODY MASS INDEX: 26.41 KG/M2

## 2024-09-03 DIAGNOSIS — R94.6 ABNORMAL RESULTS OF THYROID FUNCTION STUDIES: ICD-10-CM

## 2024-09-03 DIAGNOSIS — E55.9 VITAMIN D DEFICIENCY: ICD-10-CM

## 2024-09-03 DIAGNOSIS — R79.9 ABNORMAL FINDING OF BLOOD CHEMISTRY, UNSPECIFIED: ICD-10-CM

## 2024-09-03 DIAGNOSIS — M71.20 BAKER CYST, UNSPECIFIED LATERALITY: ICD-10-CM

## 2024-09-03 DIAGNOSIS — M06.9 RHEUMATOID ARTHRITIS INVOLVING LEFT KNEE, UNSPECIFIED WHETHER RHEUMATOID FACTOR PRESENT: ICD-10-CM

## 2024-09-03 DIAGNOSIS — Z00.00 ANNUAL PHYSICAL EXAM: ICD-10-CM

## 2024-09-03 DIAGNOSIS — Z85.51 HISTORY OF BLADDER CANCER: ICD-10-CM

## 2024-09-03 DIAGNOSIS — I10 BENIGN HYPERTENSION: ICD-10-CM

## 2024-09-03 DIAGNOSIS — E78.49 OTHER HYPERLIPIDEMIA: ICD-10-CM

## 2024-09-03 DIAGNOSIS — M51.9 LUMBAR DISC DISEASE: ICD-10-CM

## 2024-09-03 DIAGNOSIS — G89.4 CHRONIC PAIN SYNDROME: Primary | ICD-10-CM

## 2024-09-03 PROBLEM — K29.30 CHRONIC SUPERFICIAL GASTRITIS WITHOUT BLEEDING: Status: RESOLVED | Noted: 2018-09-13 | Resolved: 2024-09-03

## 2024-09-03 PROBLEM — L40.54: Status: RESOLVED | Noted: 2018-12-03 | Resolved: 2024-09-03

## 2024-09-03 PROBLEM — K57.30 DIVERTICULOSIS OF COLON: Status: RESOLVED | Noted: 2019-09-06 | Resolved: 2024-09-03

## 2024-09-03 LAB
25(OH)D3+25(OH)D2 SERPL-MCNC: 37 NG/ML (ref 30–96)
ALBUMIN SERPL BCP-MCNC: 4 G/DL (ref 3.5–5.2)
ALP SERPL-CCNC: 45 U/L (ref 55–135)
ALT SERPL W/O P-5'-P-CCNC: 11 U/L (ref 10–44)
ANION GAP SERPL CALC-SCNC: 8 MMOL/L (ref 8–16)
AST SERPL-CCNC: 18 U/L (ref 10–40)
BASOPHILS # BLD AUTO: 0.07 K/UL (ref 0–0.2)
BASOPHILS NFR BLD: 1.2 % (ref 0–1.9)
BILIRUB SERPL-MCNC: 0.4 MG/DL (ref 0.1–1)
BUN SERPL-MCNC: 10 MG/DL (ref 6–20)
CALCIUM SERPL-MCNC: 9.2 MG/DL (ref 8.7–10.5)
CHLORIDE SERPL-SCNC: 102 MMOL/L (ref 95–110)
CHOLEST SERPL-MCNC: 169 MG/DL (ref 120–199)
CHOLEST/HDLC SERPL: 3.3 {RATIO} (ref 2–5)
CO2 SERPL-SCNC: 29 MMOL/L (ref 23–29)
CREAT SERPL-MCNC: 0.9 MG/DL (ref 0.5–1.4)
DIFFERENTIAL METHOD BLD: NORMAL
EOSINOPHIL # BLD AUTO: 0.1 K/UL (ref 0–0.5)
EOSINOPHIL NFR BLD: 1.2 % (ref 0–8)
ERYTHROCYTE [DISTWIDTH] IN BLOOD BY AUTOMATED COUNT: 12.7 % (ref 11.5–14.5)
EST. GFR  (NO RACE VARIABLE): >60 ML/MIN/1.73 M^2
ESTIMATED AVG GLUCOSE: 91 MG/DL (ref 68–131)
GLUCOSE SERPL-MCNC: 76 MG/DL (ref 70–110)
HBA1C MFR BLD: 4.8 % (ref 4–5.6)
HCT VFR BLD AUTO: 37.2 % (ref 37–48.5)
HDLC SERPL-MCNC: 52 MG/DL (ref 40–75)
HDLC SERPL: 30.8 % (ref 20–50)
HGB BLD-MCNC: 12.3 G/DL (ref 12–16)
IMM GRANULOCYTES # BLD AUTO: 0.01 K/UL (ref 0–0.04)
IMM GRANULOCYTES NFR BLD AUTO: 0.2 % (ref 0–0.5)
LDLC SERPL CALC-MCNC: 103.8 MG/DL (ref 63–159)
LYMPHOCYTES # BLD AUTO: 1.5 K/UL (ref 1–4.8)
LYMPHOCYTES NFR BLD: 25.2 % (ref 18–48)
MCH RBC QN AUTO: 29.9 PG (ref 27–31)
MCHC RBC AUTO-ENTMCNC: 33.1 G/DL (ref 32–36)
MCV RBC AUTO: 90 FL (ref 82–98)
MONOCYTES # BLD AUTO: 0.4 K/UL (ref 0.3–1)
MONOCYTES NFR BLD: 7.1 % (ref 4–15)
NEUTROPHILS # BLD AUTO: 4 K/UL (ref 1.8–7.7)
NEUTROPHILS NFR BLD: 65.1 % (ref 38–73)
NONHDLC SERPL-MCNC: 117 MG/DL
NRBC BLD-RTO: 0 /100 WBC
PLATELET # BLD AUTO: 204 K/UL (ref 150–450)
PMV BLD AUTO: 10 FL (ref 9.2–12.9)
POTASSIUM SERPL-SCNC: 3.7 MMOL/L (ref 3.5–5.1)
PROT SERPL-MCNC: 7.5 G/DL (ref 6–8.4)
RBC # BLD AUTO: 4.12 M/UL (ref 4–5.4)
SODIUM SERPL-SCNC: 139 MMOL/L (ref 136–145)
TRIGL SERPL-MCNC: 66 MG/DL (ref 30–150)
TSH SERPL DL<=0.005 MIU/L-ACNC: 2.02 UIU/ML (ref 0.4–4)
WBC # BLD AUTO: 6.08 K/UL (ref 3.9–12.7)

## 2024-09-03 PROCEDURE — 3008F BODY MASS INDEX DOCD: CPT | Mod: CPTII,S$GLB,, | Performed by: FAMILY MEDICINE

## 2024-09-03 PROCEDURE — 3075F SYST BP GE 130 - 139MM HG: CPT | Mod: CPTII,S$GLB,, | Performed by: FAMILY MEDICINE

## 2024-09-03 PROCEDURE — 99396 PREV VISIT EST AGE 40-64: CPT | Mod: S$GLB,,, | Performed by: FAMILY MEDICINE

## 2024-09-03 PROCEDURE — 80061 LIPID PANEL: CPT | Performed by: FAMILY MEDICINE

## 2024-09-03 PROCEDURE — 1159F MED LIST DOCD IN RCRD: CPT | Mod: CPTII,S$GLB,, | Performed by: FAMILY MEDICINE

## 2024-09-03 PROCEDURE — 84443 ASSAY THYROID STIM HORMONE: CPT | Performed by: FAMILY MEDICINE

## 2024-09-03 PROCEDURE — 80053 COMPREHEN METABOLIC PANEL: CPT | Performed by: FAMILY MEDICINE

## 2024-09-03 PROCEDURE — 3078F DIAST BP <80 MM HG: CPT | Mod: CPTII,S$GLB,, | Performed by: FAMILY MEDICINE

## 2024-09-03 PROCEDURE — 85025 COMPLETE CBC W/AUTO DIFF WBC: CPT | Performed by: FAMILY MEDICINE

## 2024-09-03 PROCEDURE — 82306 VITAMIN D 25 HYDROXY: CPT | Performed by: FAMILY MEDICINE

## 2024-09-03 PROCEDURE — 4010F ACE/ARB THERAPY RXD/TAKEN: CPT | Mod: CPTII,S$GLB,, | Performed by: FAMILY MEDICINE

## 2024-09-03 PROCEDURE — 83036 HEMOGLOBIN GLYCOSYLATED A1C: CPT | Performed by: FAMILY MEDICINE

## 2024-09-03 RX ORDER — OXYCODONE AND ACETAMINOPHEN 10; 325 MG/1; MG/1
1 TABLET ORAL EVERY 6 HOURS PRN
Qty: 90 TABLET | Refills: 0 | Status: SHIPPED | OUTPATIENT
Start: 2024-09-07

## 2024-09-03 RX ORDER — TRAZODONE HYDROCHLORIDE 50 MG/1
50 TABLET ORAL NIGHTLY PRN
Qty: 10 TABLET | Refills: 3 | Status: SHIPPED | OUTPATIENT
Start: 2024-09-03

## 2024-09-03 NOTE — PATIENT INSTRUCTIONS
Get comprehensive labs today to check top to bottom health  UTD on other screening tests  Will refill medications

## 2024-09-03 NOTE — PROGRESS NOTES
"    [facility]  Primary Care Clin[facility] - [place], [place]    Family Medicine Office Visit    Chief Complaint   Patient presents with    Annual Exam        HPI:  Routine Exam today    Seeing Dr. Perez  - renal cyst and overactive bladder (history of bladder cancer)  Blood Pressure at goal on medication, with patient reporting prescription compliance  Hyperlipidemia stable on appropriate intensity statin therapy    Chronic pain related to RA  Weight stable with lifestyle  Thyroid stable    UTD with lung scan (former smoker)    ROS: as above    Vitals:    09/03/24 1152 09/03/24 1206   BP: (!) 142/74 138/74   Pulse: 73    SpO2: 100%    Weight: 80.9 kg (178 lb 4.8 oz)    Height: 5' 9" (1.753 m)       Body mass index is 26.33 kg/m².      General:  AOx3, well nourished and developed in no acute distress  Eyes:  PERRLA, EOMI, vision intact grossly  ENT:  normal hearing, moist oral mucosa  Neck:  trachea midline with no masses or thyromegaly  Heart:  RRR, no murmurs.  No edema noted, extremities warm and well perfused  Lungs:  clear to auscultation bilaterally with symmetric chest movement  Abdomen:  Soft, nontender, nondistended.  Normal bowel sounds  Musculoskeletal:  Normal gait.  Normal posture.  Normal muscular development with no joint swelling.  Neurological:  CN II-XII grossly intact. Symmetric strength and sensation  Psych:  Normal mood and affect.  Able to demonstrate good judgement and personal insight.      Assessment/Plan:    1. Chronic pain syndrome    2. Lumbar disc disease    3. Benign hypertension    4. Other hyperlipidemia    5. Rheumatoid arthritis involving left knee, unspecified whether rheumatoid factor present    6. History of bladder cancer    7. Annual physical exam    8. Baker cyst, unspecified laterality       Stable on medication  As above  At goal  Stable on statin  Stable as above  Stable with urology  Get general baseline labs    Visit today included increased complexity associated with " the care of the episodic problem annual addressed and managing the longitudinal care of the patient due to the serious and/or complex managed problem(s) annual.

## 2024-09-05 ENCOUNTER — OFFICE VISIT (OUTPATIENT)
Dept: UROLOGY | Facility: CLINIC | Age: 54
End: 2024-09-05
Payer: MEDICARE

## 2024-09-05 DIAGNOSIS — N39.0 URINARY TRACT INFECTION WITHOUT HEMATURIA, SITE UNSPECIFIED: Primary | ICD-10-CM

## 2024-09-05 DIAGNOSIS — R82.90 ABNORMAL URINE ODOR: ICD-10-CM

## 2024-09-05 LAB
BILIRUBIN, UA POC OHS: NEGATIVE
BLOOD, UA POC OHS: NEGATIVE
CLARITY, UA POC OHS: CLEAR
COLOR, UA POC OHS: YELLOW
GLUCOSE, UA POC OHS: NEGATIVE
KETONES, UA POC OHS: NEGATIVE
LEUKOCYTES, UA POC OHS: NEGATIVE
NITRITE, UA POC OHS: NEGATIVE
PH, UA POC OHS: 5
PROTEIN, UA POC OHS: NEGATIVE
SPECIFIC GRAVITY, UA POC OHS: 1.01
UROBILINOGEN, UA POC OHS: 0.2

## 2024-09-05 PROCEDURE — 87186 SC STD MICRODIL/AGAR DIL: CPT | Performed by: NURSE PRACTITIONER

## 2024-09-05 PROCEDURE — G2211 COMPLEX E/M VISIT ADD ON: HCPCS | Mod: S$GLB,,, | Performed by: NURSE PRACTITIONER

## 2024-09-05 PROCEDURE — 87086 URINE CULTURE/COLONY COUNT: CPT | Performed by: NURSE PRACTITIONER

## 2024-09-05 PROCEDURE — 4010F ACE/ARB THERAPY RXD/TAKEN: CPT | Mod: CPTII,S$GLB,, | Performed by: NURSE PRACTITIONER

## 2024-09-05 PROCEDURE — 1159F MED LIST DOCD IN RCRD: CPT | Mod: CPTII,S$GLB,, | Performed by: NURSE PRACTITIONER

## 2024-09-05 PROCEDURE — 87088 URINE BACTERIA CULTURE: CPT | Performed by: NURSE PRACTITIONER

## 2024-09-05 PROCEDURE — 1160F RVW MEDS BY RX/DR IN RCRD: CPT | Mod: CPTII,S$GLB,, | Performed by: NURSE PRACTITIONER

## 2024-09-05 PROCEDURE — 3044F HG A1C LEVEL LT 7.0%: CPT | Mod: CPTII,S$GLB,, | Performed by: NURSE PRACTITIONER

## 2024-09-05 PROCEDURE — 99214 OFFICE O/P EST MOD 30 MIN: CPT | Mod: S$GLB,,, | Performed by: NURSE PRACTITIONER

## 2024-09-05 PROCEDURE — 81003 URINALYSIS AUTO W/O SCOPE: CPT | Mod: QW,S$GLB,, | Performed by: NURSE PRACTITIONER

## 2024-09-05 PROCEDURE — 99999 PR PBB SHADOW E&M-EST. PATIENT-LVL III: CPT | Mod: PBBFAC,,, | Performed by: NURSE PRACTITIONER

## 2024-09-05 NOTE — PROGRESS NOTES
"[facility] Note  Staff: AKIL Ramos    PCP: KATIE Saldivar    Chief Complaint: Odor in urine    Subjective:        HPI: Boom Blanco is a 54 y.o. female presents today for c/o UTI symptoms at this time.  Pt c/o distinct odor within her urine for the past week and some bladder pains.  UA performed in office today showed normal findings with ph of 5.0, 1.010.     HX:  The pt was last evaluated by Dr. Perez on 5/21/24 at [facility] for microhematuria.  CT 2/23/24:   Adrenals: Normal   Kidneys: No abnormal calcification is evident.  There is no   hydronephrosis or ureteral stone.  A significant renal mass is not   identified.      Doing well today. No gross hematuria.   She does have significant constipation issues, has a BM every month.   She has urinary frequency and sometimes feels as though she doesn't empty completely.   Has BRYAN with coughing and wears a panty liner.         1/17/23  Only 1 RBC seen on micro UA  US did not visualize previously seen 9 mm and 1.5 cm complex cyst in right kidney. These had previously been stable since 2018.     No hematuria or dysuria.   No other bothersome urinary complaints.      She has been having some recent GI issues and is seeing GI later this week. Has had issues with diverticulitis in the past.         1/4/22  Presents today for microscopic hematuria. Has had UAs dip positive for blood, but no microanalysis performed.   No bothersome urinary issues. No dysuria. No gross hematuria.      She has a hx of ?bladder cancer in 1995. She was undergoing hysterectomy and at that time a "cyst on the bladder" was found and removed. No further treatment for this.   No hx of stones.      She underwent a CT w/wo contrast which showed 2 complex cysts of the right kidney, otherwise no concerning findings. These are stable since 2018.     UA today: +trace blood, leuk and nitrite negative      Last urine culture: no documented UTIs  Last " creatinine: 1.1 (12/9/21)     Family  hx: no malignancy   Hx of HTN, HLD  Former smoker, quit 3 years ago.      Past medical, family, and social history reviewed as documented in chart with pertinent positive medical, family, and social history detailed in HPI.    REVIEW OF SYSTEMS:  A comprehensive 10 system review was performed and is negative except as noted above in HPI    PMHx:  Past Medical History:   Diagnosis Date    Anxiety disorder     Arthritis     Bipolar disorder     Chronic pain     Diverticulitis     Hyperlipidemia     Hypertension     Lupus 2006    Psoriatic arthritis     Raynaud's disease      PSHx:  Past Surgical History:   Procedure Laterality Date    ARTHROSCOPIC CHONDROPLASTY OF KNEE JOINT Left 01/22/2019    Procedure: ARTHROSCOPY, KNEE, WITH CHONDROPLASTY;  Surgeon: Sylvain Gorman DO;  Location: [facility] OR;  Service: Orthopedics;  Laterality: Left;    ARTHROSCOPY OF KNEE Left 01/22/2019    Procedure: ARTHROSCOPY, KNEE;  Surgeon: Sylvain Gorman DO;  Location: [facility] OR;  Service: Orthopedics;  Laterality: Left;  Equipment: Avalon Chondral Drill Set and Mitek Meniscus Repair Set Truespan  Vendor/Rep: [employer] and [employer]    ARTHROSCOPY OF KNEE Right 02/05/2019    Procedure: ARTHROSCOPY, KNEE;  Surgeon: Sylvain Gorman DO;  Location: [facility] OR;  Service: Orthopedics;  Laterality: Right;  Equipment: Mitek Truspar; Scope  Venor/Rep: Mitek    BACK SURGERY      spinal stimulator implanted and then removed    BREAST BIOPSY      Benign    COLON SURGERY      COLONOSCOPY  11/25/2016    repeat in 5-10 years    COLONOSCOPY N/A 09/06/2019    Procedure: COLONOSCOPY;  Surgeon: Dany Hugo MD;  Location: [facility] ENDO;  Service: General;  Laterality: N/A;    ENDOSCOPY  09/06/2019    Procedure: ENDOSCOPY;  Surgeon: Dany Hugo MD;  Location: [facility] ENDO;  Service: General;;  with multiple biopsy's    ESOPHAGOGASTRODUODENOSCOPY Left 08/29/2018    Procedure: ESOPHAGOGASTRODUODENOSCOPY (EGD);  Surgeon: Dany GARAY  MD [place];  Location: [facility] ENDO;  Service: General;  Laterality: Left;    EXCISION OF MEDIAL MENISCUS OF KNEE Right 02/05/2019    Procedure: MENISCECTOMY, KNEE, MEDIAL;  Surgeon: Sylvain Gorman DO;  Location: [facility] OR;  Service: Orthopedics;  Laterality: Right;    HYSTERECTOMY  1997    KNEE ARTHROSCOPY W/ DEBRIDEMENT Left 01/22/2019    Procedure: ARTHROSCOPY, KNEE, WITH DEBRIDEMENT;  Surgeon: Sylvain Gorman DO;  Location: [facility] OR;  Service: Orthopedics;  Laterality: Left;    KNEE ARTHROSCOPY W/ MENISCECTOMY Left 01/22/2019    Procedure: ARTHROSCOPY, KNEE, WITH MENISCECTOMY;  Surgeon: Sylvain Gorman DO;  Location: [facility] OR;  Service: Orthopedics;  Laterality: Left;    KNEE ARTHROSCOPY W/ PLICA EXCISION Left 01/22/2019    Procedure: EXCISION, PLICA, KNEE, ARTHROSCOPIC;  Surgeon: Sylvain Gorman DO;  Location: [facility] OR;  Service: Orthopedics;  Laterality: Left;    SALPINGOOPHORECTOMY  1997    TOTAL KNEE ARTHROPLASTY Right 04/18/2019    Procedure: ARTHROPLASTY, KNEE, TOTAL;  Surgeon: Sylvain Gorman DO;  Location: [facility] OR;  Service: Orthopedics;  Laterality: Right;  Equipment: Depuy Sigma Total Knee System; Guillen Leg Mcclellan  Vendor/Rep: Depuy  Table/Position: Supine  REQUIRES FIRST ASSISTANT EPHRAIM    TOTAL KNEE ARTHROPLASTY Left 10/03/2019    Procedure: ARTHROPLASTY, KNEE, TOTAL;  Surgeon: Sylvain Gorman DO;  Location: [facility] OR;  Service: Orthopedics;  Laterality: Left;  Equipment: Depuy Sigma Total Knee System  Vendor/Rep: Depuy  REQUIRES FIRST ASSISTANT EPHRAIM    TUBAL LIGATION         Allergies:  Fentanyl and Remeron [mirtazapine]    Medications: reviewed   Objective:   There were no vitals filed for this visit.    Physical Exam  Constitutional:       Appearance: She is well-developed.   HENT:      Head: Normocephalic and atraumatic.   Eyes:      Conjunctiva/sclera: Conjunctivae normal.      Pupils: Pupils are equal, round, and reactive to light.   Cardiovascular:      Rate and Rhythm: Normal rate and  regular rhythm.      Heart sounds: Normal heart sounds.   Pulmonary:      Effort: Pulmonary effort is normal.      Breath sounds: Normal breath sounds.   Abdominal:      General: Bowel sounds are normal.      Palpations: Abdomen is soft.   Musculoskeletal:         General: Normal range of motion.      Cervical back: Normal range of motion and neck supple.   Skin:     General: Skin is warm and dry.   Neurological:      Mental Status: She is alert and oriented to person, place, and time.      Deep Tendon Reflexes: Reflexes are normal and symmetric.   Psychiatric:         Behavior: Behavior normal.         Thought Content: Thought content normal.         Judgment: Judgment normal.           Assessment:       1. Urinary tract infection without hematuria, site unspecified    2. Abnormal urine odor          Plan:      Even though UA on arrival showed normal findings, I will go ahead and send pt urine off for culture testing to rule out infection at this time due to her ongoing LUTS.    I have spent over 30 min with this patient regarding discussion of the following:    -Discussed conservative measures to control urgency and frequency including   1. Avoiding/minimizing bladder irritants (see below), especially in afternoon and evening hours     Discussed bladder irritants include coffee (even decaf), tea, alcohol, soda, spicy foods, acidic juices (orange, tomato), vinegar, and artificial sweeteners/sugary beverages.     2. Do Timed Voiding - empty on a schedule (approx ~2-3 hours) in spite of need to urinate, to get ahead of urge     3. Do not postponing voiding - dont hold it on purpose      4. Bowel regimen as distended bowel has extrinsic compressive effect on bladder.   - any or all of the following in any combination, titrate to soft daily bowel movement without pushing or straining  - colace/stool softener capsule - once to twice daily  - miralax - 1 capful daily to start, can increase to 2x daily (or decrease to 1/2  cap daily)  - increase dietary fibery  - fiber supplements, such as metamucil  - prunes, prune juice     5. INCREASE water intake     6. Stop fluids 2 hours before bed, and urinate just before bed    F/u We will contact this pt after we receive pending urine culture results.  Pt verbalized understanding at this time.    [facility]: Active    Laura Meng, HILARIO-ALONDRA  Visit today is associated with current or anticipated ongoing medical care related to this patient's single serious condition/complex condition.

## 2024-09-07 LAB — BACTERIA UR CULT: ABNORMAL

## 2024-09-09 RX ORDER — SULFAMETHOXAZOLE AND TRIMETHOPRIM 800; 160 MG/1; MG/1
1 TABLET ORAL 2 TIMES DAILY
Qty: 28 TABLET | Refills: 0 | Status: SHIPPED | OUTPATIENT
Start: 2024-09-09 | End: 2024-09-23

## 2024-09-12 DIAGNOSIS — B00.1 COLD SORE: ICD-10-CM

## 2024-09-12 NOTE — TELEPHONE ENCOUNTER
Pharmacy requesting refill on Valacyclovir 1gm  Pt's LOV 03/05/2024  Pt's NOV 10/14/2024  Medication pending

## 2024-09-13 RX ORDER — VALACYCLOVIR HYDROCHLORIDE 1 G/1
1000 TABLET, FILM COATED ORAL EVERY 12 HOURS
Qty: 60 TABLET | Refills: 1 | Status: SHIPPED | OUTPATIENT
Start: 2024-09-13 | End: 2024-11-12

## 2024-09-29 ENCOUNTER — PATIENT MESSAGE (OUTPATIENT)
Dept: ADMINISTRATIVE | Facility: OTHER | Age: 54
End: 2024-09-29
Payer: MEDICARE

## 2024-09-30 RX ORDER — OXYCODONE AND ACETAMINOPHEN 10; 325 MG/1; MG/1
1 TABLET ORAL EVERY 6 HOURS PRN
Qty: 90 TABLET | Refills: 0 | Status: SHIPPED | OUTPATIENT
Start: 2024-10-02

## 2024-09-30 NOTE — TELEPHONE ENCOUNTER
No care due was identified.  [facility] Embedded Care Due Messages. Reference number: [id number].   9/29/2024 8:26:03 PM CDT

## 2024-10-04 DIAGNOSIS — I10 BENIGN HYPERTENSION: ICD-10-CM

## 2024-10-04 DIAGNOSIS — J30.2 SEASONAL ALLERGIES: ICD-10-CM

## 2024-10-04 DIAGNOSIS — E78.49 OTHER HYPERLIPIDEMIA: ICD-10-CM

## 2024-10-04 RX ORDER — LOVASTATIN 20 MG/1
20 TABLET ORAL NIGHTLY
Qty: 90 TABLET | Refills: 3 | Status: SHIPPED | OUTPATIENT
Start: 2024-10-04

## 2024-10-04 RX ORDER — FLUTICASONE PROPIONATE 50 MCG
1 SPRAY, SUSPENSION (ML) NASAL 2 TIMES DAILY PRN
Qty: 48 G | Refills: 3 | Status: SHIPPED | OUTPATIENT
Start: 2024-10-04

## 2024-10-04 RX ORDER — LISINOPRIL 20 MG/1
20 TABLET ORAL DAILY
Qty: 90 TABLET | Refills: 3 | Status: SHIPPED | OUTPATIENT
Start: 2024-10-04

## 2024-10-04 NOTE — TELEPHONE ENCOUNTER
Refill Decision Note   Boom Bella  is requesting a refill authorization.  Brief Assessment and Rationale for Refill:  Approve     Medication Therapy Plan:         Comments:     Note composed:4:46 PM 10/04/2024

## 2024-10-04 NOTE — TELEPHONE ENCOUNTER
No care due was identified.  Health [facility] Embedded Care Due Messages. Reference number: [id number].   10/04/2024 8:02:22 AM CDT

## 2024-10-14 ENCOUNTER — OFFICE VISIT (OUTPATIENT)
Dept: RHEUMATOLOGY | Facility: CLINIC | Age: 54
End: 2024-10-14
Payer: MEDICARE

## 2024-10-14 VITALS
DIASTOLIC BLOOD PRESSURE: 77 MMHG | WEIGHT: 178.38 LBS | HEIGHT: 69 IN | HEART RATE: 84 BPM | SYSTOLIC BLOOD PRESSURE: 166 MMHG | BODY MASS INDEX: 26.42 KG/M2

## 2024-10-14 DIAGNOSIS — E53.9 VITAMIN B DEFICIENCY: ICD-10-CM

## 2024-10-14 DIAGNOSIS — L40.50 PSA (PSORIATIC ARTHRITIS): ICD-10-CM

## 2024-10-14 DIAGNOSIS — L40.9 PSORIASIS: Primary | ICD-10-CM

## 2024-10-14 DIAGNOSIS — L70.0 ACNE VULGARIS: ICD-10-CM

## 2024-10-14 DIAGNOSIS — E09.9 STEROID-INDUCED DIABETES MELLITUS, INITIAL ENCOUNTER: ICD-10-CM

## 2024-10-14 DIAGNOSIS — M06.9 RHEUMATOID ARTHRITIS INVOLVING LEFT KNEE, UNSPECIFIED WHETHER RHEUMATOID FACTOR PRESENT: ICD-10-CM

## 2024-10-14 DIAGNOSIS — T38.0X5A STEROID-INDUCED DIABETES MELLITUS, INITIAL ENCOUNTER: ICD-10-CM

## 2024-10-14 PROCEDURE — 4010F ACE/ARB THERAPY RXD/TAKEN: CPT | Mod: CPTII,S$GLB,, | Performed by: INTERNAL MEDICINE

## 2024-10-14 PROCEDURE — 3078F DIAST BP <80 MM HG: CPT | Mod: CPTII,S$GLB,, | Performed by: INTERNAL MEDICINE

## 2024-10-14 PROCEDURE — 99215 OFFICE O/P EST HI 40 MIN: CPT | Mod: 25,S$GLB,, | Performed by: INTERNAL MEDICINE

## 2024-10-14 PROCEDURE — 96372 THER/PROPH/DIAG INJ SC/IM: CPT | Mod: S$GLB,,, | Performed by: INTERNAL MEDICINE

## 2024-10-14 PROCEDURE — 1159F MED LIST DOCD IN RCRD: CPT | Mod: CPTII,S$GLB,, | Performed by: INTERNAL MEDICINE

## 2024-10-14 PROCEDURE — 99999 PR PBB SHADOW E&M-EST. PATIENT-LVL IV: CPT | Mod: PBBFAC,,, | Performed by: INTERNAL MEDICINE

## 2024-10-14 PROCEDURE — 3077F SYST BP >= 140 MM HG: CPT | Mod: CPTII,S$GLB,, | Performed by: INTERNAL MEDICINE

## 2024-10-14 PROCEDURE — 3008F BODY MASS INDEX DOCD: CPT | Mod: CPTII,S$GLB,, | Performed by: INTERNAL MEDICINE

## 2024-10-14 PROCEDURE — 1160F RVW MEDS BY RX/DR IN RCRD: CPT | Mod: CPTII,S$GLB,, | Performed by: INTERNAL MEDICINE

## 2024-10-14 PROCEDURE — 3044F HG A1C LEVEL LT 7.0%: CPT | Mod: CPTII,S$GLB,, | Performed by: INTERNAL MEDICINE

## 2024-10-14 RX ORDER — CYANOCOBALAMIN 1000 UG/ML
1000 INJECTION, SOLUTION INTRAMUSCULAR; SUBCUTANEOUS
Status: COMPLETED | OUTPATIENT
Start: 2024-10-14 | End: 2024-10-14

## 2024-10-14 RX ORDER — FLUTICASONE FUROATE, UMECLIDINIUM BROMIDE AND VILANTEROL TRIFENATATE 200; 62.5; 25 UG/1; UG/1; UG/1
1 POWDER RESPIRATORY (INHALATION) DAILY
COMMUNITY
Start: 2024-02-21

## 2024-10-14 RX ORDER — SEMAGLUTIDE 2.68 MG/ML
2 INJECTION, SOLUTION SUBCUTANEOUS
Qty: 3 ML | Refills: 11 | Status: SHIPPED | OUTPATIENT
Start: 2024-10-14 | End: 2025-10-14

## 2024-10-14 RX ORDER — LINACLOTIDE 290 UG/1
290 CAPSULE, GELATIN COATED ORAL
COMMUNITY
Start: 2024-07-13

## 2024-10-14 RX ORDER — METHYLPREDNISOLONE ACETATE 80 MG/ML
160 INJECTION, SUSPENSION INTRA-ARTICULAR; INTRALESIONAL; INTRAMUSCULAR; SOFT TISSUE
Status: COMPLETED | OUTPATIENT
Start: 2024-10-14 | End: 2024-10-14

## 2024-10-14 RX ORDER — KETOROLAC TROMETHAMINE 30 MG/ML
60 INJECTION, SOLUTION INTRAMUSCULAR; INTRAVENOUS
Status: COMPLETED | OUTPATIENT
Start: 2024-10-14 | End: 2024-10-14

## 2024-10-14 RX ORDER — TRIAMCINOLONE ACETONIDE 1 MG/G
OINTMENT TOPICAL 2 TIMES DAILY
Qty: 80 G | Refills: 0 | Status: SHIPPED | OUTPATIENT
Start: 2024-10-14

## 2024-10-14 RX ADMIN — KETOROLAC TROMETHAMINE 60 MG: 30 INJECTION, SOLUTION INTRAMUSCULAR; INTRAVENOUS at 10:10

## 2024-10-14 RX ADMIN — METHYLPREDNISOLONE ACETATE 160 MG: 80 INJECTION, SUSPENSION INTRA-ARTICULAR; INTRALESIONAL; INTRAMUSCULAR; SOFT TISSUE at 10:10

## 2024-10-14 RX ADMIN — CYANOCOBALAMIN 1000 MCG: 1000 INJECTION, SOLUTION INTRAMUSCULAR; SUBCUTANEOUS at 10:10

## 2024-10-14 ASSESSMENT — ROUTINE ASSESSMENT OF PATIENT INDEX DATA (RAPID3)
MDHAQ FUNCTION SCORE: 1.1
TOTAL RAPID3 SCORE: 6.05
PAIN SCORE: 8
PATIENT GLOBAL ASSESSMENT SCORE: 6.5
PSYCHOLOGICAL DISTRESS SCORE: 7.7

## 2024-10-20 DIAGNOSIS — L70.0 ACNE VULGARIS: ICD-10-CM

## 2024-10-21 RX ORDER — TRETINOIN 0.25 MG/G
CREAM TOPICAL NIGHTLY
Qty: 45 G | Refills: 1 | Status: SHIPPED | OUTPATIENT
Start: 2024-10-21

## 2024-10-26 ENCOUNTER — PATIENT MESSAGE (OUTPATIENT)
Dept: ADMINISTRATIVE | Facility: OTHER | Age: 54
End: 2024-10-26
Payer: MEDICARE

## 2024-10-28 DIAGNOSIS — G89.4 CHRONIC PAIN SYNDROME: Primary | ICD-10-CM

## 2024-10-29 RX ORDER — OXYCODONE AND ACETAMINOPHEN 10; 325 MG/1; MG/1
1 TABLET ORAL EVERY 6 HOURS PRN
Qty: 90 TABLET | Refills: 0 | Status: SHIPPED | OUTPATIENT
Start: 2024-10-29

## 2024-11-18 DIAGNOSIS — B00.1 COLD SORE: ICD-10-CM

## 2024-11-18 NOTE — TELEPHONE ENCOUNTER
Pharmacy requesting refill on Valacyclovir 1gm  Pt's LOV 10/14/2024  Pt's NOV 02/25/2025  Medication pending    
Patient/Caregiver provided printed discharge information.

## 2024-11-23 DIAGNOSIS — L70.0 ACNE VULGARIS: ICD-10-CM

## 2024-11-23 DIAGNOSIS — G89.4 CHRONIC PAIN SYNDROME: ICD-10-CM

## 2024-11-23 NOTE — TELEPHONE ENCOUNTER
No care due was identified.  Health [facility] Embedded Care Due Messages. Reference number: [id number].   11/23/2024 5:05:39 PM CST

## 2024-11-25 RX ORDER — OXYCODONE AND ACETAMINOPHEN 10; 325 MG/1; MG/1
1 TABLET ORAL EVERY 6 HOURS PRN
Qty: 90 TABLET | Refills: 0 | Status: SHIPPED | OUTPATIENT
Start: 2024-11-25

## 2024-11-25 RX ORDER — VALACYCLOVIR HYDROCHLORIDE 1 G/1
1000 TABLET, FILM COATED ORAL EVERY 12 HOURS
Qty: 60 TABLET | Refills: 1 | Status: SHIPPED | OUTPATIENT
Start: 2024-11-25 | End: 2025-01-24

## 2024-11-26 RX ORDER — TRETINOIN 0.25 MG/G
CREAM TOPICAL NIGHTLY
Qty: 45 G | Refills: 1 | Status: SHIPPED | OUTPATIENT
Start: 2024-11-26

## 2024-11-29 DIAGNOSIS — L40.50 PSA (PSORIATIC ARTHRITIS): ICD-10-CM

## 2024-11-29 RX ORDER — SECUKINUMAB 150 MG/ML
300 INJECTION SUBCUTANEOUS
Qty: 2 ML | Refills: 11 | Status: CANCELLED | OUTPATIENT
Start: 2024-11-29

## 2024-12-03 DIAGNOSIS — L40.50 PSA (PSORIATIC ARTHRITIS): ICD-10-CM

## 2024-12-05 ENCOUNTER — OFFICE VISIT (OUTPATIENT)
Dept: FAMILY MEDICINE | Facility: CLINIC | Age: 54
End: 2024-12-05
Payer: MEDICARE

## 2024-12-05 VITALS
WEIGHT: 173.88 LBS | OXYGEN SATURATION: 96 % | HEART RATE: 73 BPM | HEIGHT: 69 IN | BODY MASS INDEX: 25.75 KG/M2 | DIASTOLIC BLOOD PRESSURE: 78 MMHG | SYSTOLIC BLOOD PRESSURE: 138 MMHG

## 2024-12-05 DIAGNOSIS — E03.8 OTHER SPECIFIED HYPOTHYROIDISM: Chronic | ICD-10-CM

## 2024-12-05 DIAGNOSIS — E78.49 OTHER HYPERLIPIDEMIA: ICD-10-CM

## 2024-12-05 DIAGNOSIS — Z85.51 HISTORY OF BLADDER CANCER: ICD-10-CM

## 2024-12-05 DIAGNOSIS — I10 BENIGN HYPERTENSION: ICD-10-CM

## 2024-12-05 DIAGNOSIS — M06.9 RHEUMATOID ARTHRITIS INVOLVING LEFT KNEE, UNSPECIFIED WHETHER RHEUMATOID FACTOR PRESENT: ICD-10-CM

## 2024-12-05 DIAGNOSIS — G89.4 CHRONIC PAIN SYNDROME: Primary | ICD-10-CM

## 2024-12-05 PROCEDURE — 1159F MED LIST DOCD IN RCRD: CPT | Mod: CPTII,S$GLB,, | Performed by: FAMILY MEDICINE

## 2024-12-05 PROCEDURE — 4010F ACE/ARB THERAPY RXD/TAKEN: CPT | Mod: CPTII,S$GLB,, | Performed by: FAMILY MEDICINE

## 2024-12-05 PROCEDURE — 3075F SYST BP GE 130 - 139MM HG: CPT | Mod: CPTII,S$GLB,, | Performed by: FAMILY MEDICINE

## 2024-12-05 PROCEDURE — G2211 COMPLEX E/M VISIT ADD ON: HCPCS | Mod: S$GLB,,, | Performed by: FAMILY MEDICINE

## 2024-12-05 PROCEDURE — 99214 OFFICE O/P EST MOD 30 MIN: CPT | Mod: S$GLB,,, | Performed by: FAMILY MEDICINE

## 2024-12-05 PROCEDURE — 3078F DIAST BP <80 MM HG: CPT | Mod: CPTII,S$GLB,, | Performed by: FAMILY MEDICINE

## 2024-12-05 PROCEDURE — 3008F BODY MASS INDEX DOCD: CPT | Mod: CPTII,S$GLB,, | Performed by: FAMILY MEDICINE

## 2024-12-05 PROCEDURE — 3044F HG A1C LEVEL LT 7.0%: CPT | Mod: CPTII,S$GLB,, | Performed by: FAMILY MEDICINE

## 2024-12-05 RX ORDER — PREDNISONE 20 MG/1
20 TABLET ORAL 2 TIMES DAILY
Qty: 10 TABLET | Refills: 6 | Status: SHIPPED | OUTPATIENT
Start: 2024-12-05

## 2024-12-05 RX ORDER — SECUKINUMAB 150 MG/ML
300 INJECTION SUBCUTANEOUS
Qty: 2 ML | Refills: 11 | Status: CANCELLED | OUTPATIENT
Start: 2024-12-05

## 2024-12-05 RX ORDER — AZELASTINE 1 MG/ML
1 SPRAY, METERED NASAL 2 TIMES DAILY
COMMUNITY
Start: 2024-11-22

## 2024-12-05 NOTE — PROGRESS NOTES
"    [facility]  Primary Care Clinics - [place], [place]    Family Medicine Office Visit    Chief Complaint   Patient presents with    Follow-up     3 month f/u         HPI:  followup chronic conditions:    Chronic pain stable but notable  - RA  Having sinus procedure upcoming - sinuplasty    BP elevated today  Hyperlipidemia stable on appropriate intensity statin therapy  Bladder function stable - history of bladder cancer    ROS: as above    Vitals:    12/05/24 0830 12/05/24 0906   BP: (!) 154/82 138/78   BP Location: Left arm    Patient Position: Sitting    Pulse: 73    SpO2: 96%    Weight: 78.9 kg (173 lb 14.4 oz)    Height: 5' 9" (1.753 m)       Body mass index is 25.68 kg/m².      General:  AOx3, well nourished and developed in no acute distress  Eyes:  PERRLA, EOMI, vision intact grossly  ENT:  normal hearing, moist oral mucosa  Neck:  trachea midline with no masses or thyromegaly  Heart:  RRR, no murmurs.  No edema noted, extremities warm and well perfused  Lungs:  clear to auscultation bilaterally with symmetric chest movement  Abdomen:  Soft, nontender, nondistended.  Normal bowel sounds  Musculoskeletal:  Normal gait.  Normal posture.  Normal muscular development with no joint swelling.  Neurological:  CN II-XII grossly intact. Symmetric strength and sensation  Psych:  Normal mood and affect.  Able to demonstrate good judgement and personal insight.      Assessment/Plan:    1. Chronic pain syndrome    2. Benign hypertension    3. Other hyperlipidemia    4. Rheumatoid arthritis involving left knee, unspecified whether rheumatoid factor present    5. History of bladder cancer    6. Other specified hypothyroidism         Stable, use pulse of steroids on occasion  At goal  Stable on statin  Stable as above, continued rheumatology management  Stable  Stable thyroid function      Visit today included increased complexity associated with the care of the episodic problem RA, htn, hld addressed and managing " the longitudinal care of the patient due to the serious and/or complex managed problem(s) RA, htn, hld.

## 2024-12-06 ENCOUNTER — PATIENT MESSAGE (OUTPATIENT)
Dept: RHEUMATOLOGY | Facility: CLINIC | Age: 54
End: 2024-12-06
Payer: MEDICARE

## 2024-12-09 DIAGNOSIS — L40.50 PSA (PSORIATIC ARTHRITIS): ICD-10-CM

## 2024-12-09 RX ORDER — SECUKINUMAB 150 MG/ML
300 INJECTION SUBCUTANEOUS
Qty: 2 ML | Refills: 11 | Status: CANCELLED | OUTPATIENT
Start: 2024-12-09

## 2024-12-10 RX ORDER — SECUKINUMAB 150 MG/ML
300 INJECTION SUBCUTANEOUS
Qty: 2 ML | Refills: 11 | Status: ACTIVE | OUTPATIENT
Start: 2024-12-10

## 2024-12-10 NOTE — TELEPHONE ENCOUNTER
----- Message from Yue Melendez sent at 12/23/2019  2:32 PM CST -----  Contact: patent  Type: Needs Medical Advice    Who Called:  patient  Symptoms (please be specific):    How long has patient had these symptoms:    Pharmacy name and phone #:    Best Call Back Number: [phone]  Additional Information: requesting a call back regarding tomorrow's appt wanted to know if she should go to the ER   no

## 2024-12-19 DIAGNOSIS — G89.4 CHRONIC PAIN SYNDROME: ICD-10-CM

## 2024-12-19 RX ORDER — OXYCODONE AND ACETAMINOPHEN 10; 325 MG/1; MG/1
1 TABLET ORAL EVERY 6 HOURS PRN
Qty: 90 TABLET | Refills: 0 | Status: SHIPPED | OUTPATIENT
Start: 2024-12-23

## 2024-12-19 NOTE — TELEPHONE ENCOUNTER
No care due was identified.  Health [facility] Embedded Care Due Messages. Reference number: [id number].   12/19/2024 9:53:44 AM CST

## 2025-01-02 DIAGNOSIS — E03.8 OTHER SPECIFIED HYPOTHYROIDISM: ICD-10-CM

## 2025-01-02 RX ORDER — LEVOTHYROXINE SODIUM 50 UG/1
TABLET ORAL
Qty: 90 TABLET | Refills: 2 | Status: SHIPPED | OUTPATIENT
Start: 2025-01-02

## 2025-01-02 NOTE — TELEPHONE ENCOUNTER
No care due was identified.  Health [facility] Embedded Care Due Messages. Reference number: [id number].   1/02/2025 10:16:31 AM CST

## 2025-01-03 NOTE — TELEPHONE ENCOUNTER
Refill Decision Note   [facility]oom [facility]  is requesting a refill authorization.  Brief Assessment and Rationale for Refill:  Approve     Medication Therapy Plan:         Comments:     Note composed:6:46 PM 01/02/2025

## 2025-01-17 DIAGNOSIS — G89.4 CHRONIC PAIN SYNDROME: ICD-10-CM

## 2025-01-17 RX ORDER — OXYCODONE AND ACETAMINOPHEN 10; 325 MG/1; MG/1
1 TABLET ORAL EVERY 6 HOURS PRN
Qty: 90 TABLET | Refills: 0 | Status: SHIPPED | OUTPATIENT
Start: 2025-01-17

## 2025-01-17 NOTE — TELEPHONE ENCOUNTER
LOV:12/5/24. NOV:3/5/25  
No care due was identified.  [facility] Embedded Care Due Messages. Reference number: [id number].   1/17/2025 12:54:20 PM CST  
negative...

## 2025-01-29 ENCOUNTER — PATIENT MESSAGE (OUTPATIENT)
Dept: RHEUMATOLOGY | Facility: CLINIC | Age: 55
End: 2025-01-29
Payer: MEDICARE

## 2025-01-30 DIAGNOSIS — B00.1 COLD SORE: ICD-10-CM

## 2025-01-30 RX ORDER — VALACYCLOVIR HYDROCHLORIDE 1 G/1
1000 TABLET, FILM COATED ORAL EVERY 12 HOURS
Qty: 60 TABLET | Refills: 1 | Status: SHIPPED | OUTPATIENT
Start: 2025-01-30 | End: 2025-03-31

## 2025-01-30 NOTE — TELEPHONE ENCOUNTER
Pharmacy requesting refill on Valacyclovir 1gm  Pt's LOV 10/14/2024  Pt's NOV 05/02/2025  Medication pending

## 2025-02-13 DIAGNOSIS — G89.4 CHRONIC PAIN SYNDROME: ICD-10-CM

## 2025-02-13 NOTE — TELEPHONE ENCOUNTER
No care due was identified.  [facility] Embedded Care Due Messages. Reference number: [id number].   2/13/2025 9:23:53 AM CST

## 2025-02-14 RX ORDER — OXYCODONE AND ACETAMINOPHEN 10; 325 MG/1; MG/1
1 TABLET ORAL EVERY 6 HOURS PRN
Qty: 90 TABLET | Refills: 0 | Status: SHIPPED | OUTPATIENT
Start: 2025-02-14

## 2025-02-18 DIAGNOSIS — M17.0 PRIMARY OSTEOARTHRITIS OF BOTH KNEES: ICD-10-CM

## 2025-02-20 RX ORDER — IBUPROFEN AND FAMOTIDINE 26.6; 8 MG/1; MG/1
1 TABLET ORAL 3 TIMES DAILY
Qty: 90 TABLET | Refills: 12 | Status: SHIPPED | OUTPATIENT
Start: 2025-02-20 | End: 2026-02-20

## 2025-02-21 DIAGNOSIS — K21.9 GASTROESOPHAGEAL REFLUX DISEASE, UNSPECIFIED WHETHER ESOPHAGITIS PRESENT: ICD-10-CM

## 2025-02-21 DIAGNOSIS — R10.12 LUQ ABDOMINAL PAIN: ICD-10-CM

## 2025-02-21 DIAGNOSIS — R10.32 LLQ ABDOMINAL PAIN: ICD-10-CM

## 2025-02-21 NOTE — TELEPHONE ENCOUNTER
----- Message from RishiMarro.wsia sent at 2/21/2025  9:51 AM CST -----  Type:  RX Refill RequestWho Called:  ptRefill or New Rx:  refilRX Name and Strength:  baclofen (LIORESAL) 10 MG tabletomeprazole (PRILOSEC) 40 MG capsuleHow is the patient currently taking it? (ex. 1XDay):  as rx'dIs this a 30 day or 90 day RX:  90Preferred Pharmacy with phone number:  [facility]  [place] - [address] [place] [id number] [place] [address]Rxldm: [phone] Fax: [phone] or Mail Order:  localOrdering Provider:  Clarence Call Back Number:  [phone] Additional Information:  thanks

## 2025-02-23 RX ORDER — OMEPRAZOLE 40 MG/1
40 CAPSULE, DELAYED RELEASE ORAL DAILY
Qty: 90 CAPSULE | Refills: 3 | Status: SHIPPED | OUTPATIENT
Start: 2025-02-23 | End: 2026-02-23

## 2025-02-23 RX ORDER — BACLOFEN 10 MG/1
10 TABLET ORAL 3 TIMES DAILY
Qty: 90 TABLET | Refills: 3 | Status: SHIPPED | OUTPATIENT
Start: 2025-02-23

## 2025-03-10 ENCOUNTER — PATIENT MESSAGE (OUTPATIENT)
Dept: UROLOGY | Facility: CLINIC | Age: 55
End: 2025-03-10
Payer: MEDICARE

## 2025-03-12 DIAGNOSIS — G89.4 CHRONIC PAIN SYNDROME: ICD-10-CM

## 2025-03-12 RX ORDER — OXYCODONE AND ACETAMINOPHEN 10; 325 MG/1; MG/1
1 TABLET ORAL EVERY 6 HOURS PRN
Qty: 90 TABLET | Refills: 0 | Status: CANCELLED | OUTPATIENT
Start: 2025-03-12

## 2025-03-12 NOTE — TELEPHONE ENCOUNTER
Last office visit: 12/05/2024  Upcoming Appointment: 4/2/2025  ----- Message from Anastasiya sent at 3/12/2025  9:07 AM CDT -----  Type:  RX Refill RequestWho Called: pt Refill or New Rx:refill RX Name and Strength:oxyCODONE-acetaminophen (PERCOCET)  mg per tablet  How is the patient currently taking it? (ex. 1XDay):as directed Is this a 30 day or 90 day RX:90Preferred Pharmacy with phone number:[facility] [place] - [address] [place] [id number] [place] [address]Phfet: [phone] Fax: [phone] Local or Mail Order:local Ordering Provider:yasir Would the patient rather a call back or a response via [facility]? Call back Best Call Back Number:[phone] Additional Information:    Please call back to advise. Thank you.

## 2025-03-12 NOTE — TELEPHONE ENCOUNTER
----- Message from Hermelinda sent at 3/12/2025  4:11 PM CDT -----  Contact: pt [phone]  Type: Needs Medical AdviceWho Called:  Pt Best Call Back Number: [phone] Information: Pt stated she is very nervous about her pain med refill. Pt asking if this will be sent in today. She stated her moms meds where sent in today but not hers. Pls call back and advise

## 2025-03-12 NOTE — TELEPHONE ENCOUNTER
No care due was identified.  [facility] Embedded Care Due Messages. Reference number: [id number].   3/12/2025 9:13:41 AM CDT

## 2025-03-13 ENCOUNTER — CLINICAL SUPPORT (OUTPATIENT)
Dept: FAMILY MEDICINE | Facility: CLINIC | Age: 55
End: 2025-03-13
Payer: MEDICARE

## 2025-03-13 DIAGNOSIS — M23.205 DEGENERATIVE TEAR OF MEDIAL MENISCUS, UNSPECIFIED LATERALITY: Primary | ICD-10-CM

## 2025-03-13 DIAGNOSIS — F11.20 OPIOID DEPENDENCE, UNCOMPLICATED: ICD-10-CM

## 2025-03-13 LAB
AMP AMPHETAMINE 1000 NM/ML POC: NEGATIVE
BAR BARBITURATES 300 NG/ML POC: NEGATIVE
BUP BUPRENORPHINE 10 NG/ML POC: NEGATIVE
BZO BENZODIAZEPINES 300 NG/ML POC: NEGATIVE
COC COCAINE 300 NG/ML POC: NEGATIVE
CREATININE (CR) POC: 200
CTP QC/QA: YES
MET METHAMPHETAMINE 1000 NG/ML POC: NEGATIVE
MOP/OPI300 MORPHINE 300 NG/ML POC: NEGATIVE
MTD METHADONE 300 NG/ML POC: NEGATIVE
OXIDANT (OX) POC: NEGATIVE
OXY OXYCODONE 100 NG/ML POC: ABNORMAL
SPECIFIC GRAVITY (SG) POC: 1.01
TEMPERATURE (°F) POC: 94
THC MARIJUANA 50 NG/ML POC: NEGATIVE

## 2025-03-13 NOTE — TELEPHONE ENCOUNTER
----- Message from Anastasiya sent at 3/13/2025  8:40 AM CDT -----  Type:  Needs Medical AdviceWho Called: pt Would the patient rather a call back or a response via [facility]? Call back Best Call Back Number: [phone] Additional Information: pt calling for status of refill, only have 1 pill left,     Please call back to advise. Thank you.

## 2025-03-13 NOTE — TELEPHONE ENCOUNTER
----- Message from Anastasiya sent at 3/13/2025  3:25 PM CDT -----  JUAN MEDEL calling regarding Patient Advice (message) for #Type: Needs Medical Advice Who Called: pt Would the patient rather a call back or a response via [facility]? Call back Best Call Back Number: [phone] Additional Information  Please call back to advise. Thank you

## 2025-03-14 ENCOUNTER — OFFICE VISIT (OUTPATIENT)
Dept: FAMILY MEDICINE | Facility: CLINIC | Age: 55
End: 2025-03-14
Payer: MEDICARE

## 2025-03-14 VITALS
HEIGHT: 69 IN | SYSTOLIC BLOOD PRESSURE: 130 MMHG | BODY MASS INDEX: 26.31 KG/M2 | WEIGHT: 177.63 LBS | OXYGEN SATURATION: 99 % | DIASTOLIC BLOOD PRESSURE: 80 MMHG | HEART RATE: 98 BPM

## 2025-03-14 DIAGNOSIS — M51.9 LUMBAR DISC DISEASE: ICD-10-CM

## 2025-03-14 DIAGNOSIS — I10 BENIGN HYPERTENSION: ICD-10-CM

## 2025-03-14 DIAGNOSIS — Z96.651 PRESENCE OF RIGHT ARTIFICIAL KNEE JOINT: ICD-10-CM

## 2025-03-14 DIAGNOSIS — M06.9 RHEUMATOID ARTHRITIS INVOLVING LEFT KNEE, UNSPECIFIED WHETHER RHEUMATOID FACTOR PRESENT: ICD-10-CM

## 2025-03-14 DIAGNOSIS — G89.4 CHRONIC PAIN SYNDROME: ICD-10-CM

## 2025-03-14 DIAGNOSIS — E78.49 OTHER HYPERLIPIDEMIA: Primary | ICD-10-CM

## 2025-03-14 PROBLEM — J41.0 SIMPLE CHRONIC BRONCHITIS: Status: RESOLVED | Noted: 2022-11-14 | Resolved: 2025-03-14

## 2025-03-14 RX ORDER — OXYCODONE AND ACETAMINOPHEN 10; 325 MG/1; MG/1
1 TABLET ORAL EVERY 6 HOURS PRN
Qty: 90 TABLET | Refills: 0 | Status: SHIPPED | OUTPATIENT
Start: 2025-04-14

## 2025-03-14 RX ORDER — OXYCODONE AND ACETAMINOPHEN 10; 325 MG/1; MG/1
1 TABLET ORAL EVERY 6 HOURS PRN
Qty: 90 TABLET | Refills: 0 | Status: SHIPPED | OUTPATIENT
Start: 2025-03-14

## 2025-03-14 RX ORDER — OXYCODONE AND ACETAMINOPHEN 10; 325 MG/1; MG/1
1 TABLET ORAL EVERY 6 HOURS PRN
Qty: 90 TABLET | Refills: 0 | Status: SHIPPED | OUTPATIENT
Start: 2025-05-14

## 2025-03-14 NOTE — PATIENT INSTRUCTIONS
Doing well today  Will send scripts for march, April, and May to pharmacy    Keep scheduled follow up.    Will be due later in June for repeat urine testing

## 2025-03-14 NOTE — PROGRESS NOTES
"    [facility]  Primary Care Clinics - [place], [place]    Family Medicine Office Visit    Chief Complaint   Patient presents with    Follow-up        HPI:  55 female here for pain management/follow up chronic conditions    UDS completed  Has severe/chronic degeneration to knee alongside RA  S/p sinus procedure    Hyperlipidemia stable on appropriate intensity statin therapy  History of bladder cancer    ROS: as above    Vitals:    03/14/25 1235   BP: 130/80   BP Location: Left arm   Patient Position: Sitting   Pulse: 98   SpO2: 99%   Weight: 80.6 kg (177 lb 9.6 oz)   Height: 5' 9" (1.753 m)      Body mass index is 26.23 kg/m².      General:  AOx3, well nourished and developed in no acute distress  Eyes:  PERRLA, EOMI, vision intact grossly  ENT:  normal hearing, moist oral mucosa  Neck:  trachea midline with no masses or thyromegaly  Heart:  RRR, no murmurs.  No edema noted, extremities warm and well perfused  Lungs:  clear to auscultation bilaterally with symmetric chest movement  Abdomen:  Soft, nontender, nondistended.  Normal bowel sounds  Musculoskeletal:  Normal gait.  Normal posture.  Normal muscular development with no joint swelling.  Neurological:  CN II-XII grossly intact. Symmetric strength and sensation  Psych:  Normal mood and affect.  Able to demonstrate good judgement and personal insight.      Assessment/Plan:    1. Other hyperlipidemia    2. Benign hypertension    3. Lumbar disc disease    4. Rheumatoid arthritis involving left knee, unspecified whether rheumatoid factor present    5. Presence of right artificial knee joint         Stable  At goal  Stable with more routine and formulaic management.  UDS present now.  Follow up 3 months  Stable  Stable as above    Visit today included increased complexity associated with the care of the episodic problem pain, ra, htn, hld addressed and managing the longitudinal care of the patient due to the serious and/or complex managed problem(s) pain, " ra, htn, hld.

## 2025-03-19 DIAGNOSIS — L70.0 ACNE VULGARIS: ICD-10-CM

## 2025-03-19 NOTE — TELEPHONE ENCOUNTER
Pharmacy requesting refill on Tretinoin 0.025% cream  Pt's LOV 10/14/2024  Pt's NOV 05/02/2025  Medication pending

## 2025-03-20 DIAGNOSIS — G89.4 CHRONIC PAIN SYNDROME: ICD-10-CM

## 2025-03-20 RX ORDER — OXYCODONE AND ACETAMINOPHEN 10; 325 MG/1; MG/1
1 TABLET ORAL EVERY 6 HOURS PRN
Qty: 90 TABLET | Refills: 0 | Status: CANCELLED | OUTPATIENT
Start: 2025-04-14

## 2025-03-24 RX ORDER — TRETINOIN 0.25 MG/G
CREAM TOPICAL NIGHTLY
Qty: 45 G | Refills: 1 | Status: SHIPPED | OUTPATIENT
Start: 2025-03-24

## 2025-04-02 ENCOUNTER — OFFICE VISIT (OUTPATIENT)
Dept: FAMILY MEDICINE | Facility: CLINIC | Age: 55
End: 2025-04-02
Payer: MEDICARE

## 2025-04-02 VITALS
WEIGHT: 182.81 LBS | HEIGHT: 69 IN | SYSTOLIC BLOOD PRESSURE: 118 MMHG | HEART RATE: 81 BPM | BODY MASS INDEX: 27.08 KG/M2 | OXYGEN SATURATION: 99 % | DIASTOLIC BLOOD PRESSURE: 62 MMHG

## 2025-04-02 DIAGNOSIS — M51.9 LUMBAR DISC DISEASE: Primary | ICD-10-CM

## 2025-04-02 DIAGNOSIS — F41.9 ANXIETY DISORDER, UNSPECIFIED TYPE: ICD-10-CM

## 2025-04-02 DIAGNOSIS — Z00.00 ANNUAL PHYSICAL EXAM: ICD-10-CM

## 2025-04-02 DIAGNOSIS — R79.9 ABNORMAL FINDING OF BLOOD CHEMISTRY, UNSPECIFIED: ICD-10-CM

## 2025-04-02 DIAGNOSIS — Z12.31 SCREENING MAMMOGRAM FOR BREAST CANCER: ICD-10-CM

## 2025-04-02 DIAGNOSIS — E55.9 VITAMIN D DEFICIENCY: ICD-10-CM

## 2025-04-02 DIAGNOSIS — M06.9 RHEUMATOID ARTHRITIS INVOLVING LEFT KNEE, UNSPECIFIED WHETHER RHEUMATOID FACTOR PRESENT: ICD-10-CM

## 2025-04-02 DIAGNOSIS — E78.49 OTHER HYPERLIPIDEMIA: ICD-10-CM

## 2025-04-02 DIAGNOSIS — I10 BENIGN HYPERTENSION: ICD-10-CM

## 2025-04-02 NOTE — PROGRESS NOTES
"    [facility] - [place], [place]    Family Medicine Office Visit    Chief Complaint   Patient presents with    Annual Exam        HPI:  routine exam today  Active treatment for RA and chronic pain    Blood Pressure at goal on medication, with patient reporting prescription compliance  Hyperlipidemia stable on appropriate intensity statin therapy  Thyroid function stable    Distant history of bladder cancer  Mammogram due in May    Non-smoker x 6 years    ROS: as above    Vitals:    04/02/25 1141   BP: 118/62   BP Location: Left arm   Patient Position: Sitting   Pulse: 81   SpO2: 99%   Weight: 82.9 kg (182 lb 12.8 oz)   Height: 5' 9" (1.753 m)      Body mass index is 26.99 kg/m².      General:  AOx3, well nourished and developed in no acute distress  Eyes:  PERRLA, EOMI, vision intact grossly  ENT:  normal hearing, moist oral mucosa  Neck:  trachea midline with no masses or thyromegaly  Heart:  RRR, no murmurs.  No edema noted, extremities warm and well perfused  Lungs:  clear to auscultation bilaterally with symmetric chest movement  Abdomen:  Soft, nontender, nondistended.  Normal bowel sounds  Musculoskeletal:  Normal gait.  Normal posture.  Normal muscular development with no joint swelling.  Neurological:  CN II-XII grossly intact. Symmetric strength and sensation  Psych:  Normal mood and affect.  Able to demonstrate good judgement and personal insight.      Assessment/Plan:    1. Lumbar disc disease    2. Anxiety disorder, unspecified type    3. Benign hypertension  -     CBC Auto Differential; Future; Expected date: 04/02/2025  -     Comprehensive Metabolic Panel; Future; Expected date: 04/02/2025  -     Hemoglobin A1C; Future; Expected date: 04/02/2025  -     Lipid Panel; Future; Expected date: 04/02/2025  -     TSH; Future; Expected date: 04/02/2025  -     Vitamin D; Future; Expected date: 04/02/2025    4. Other hyperlipidemia  -     CBC Auto Differential; Future; Expected date: " 04/02/2025  -     Comprehensive Metabolic Panel; Future; Expected date: 04/02/2025  -     Hemoglobin A1C; Future; Expected date: 04/02/2025  -     Lipid Panel; Future; Expected date: 04/02/2025  -     TSH; Future; Expected date: 04/02/2025  -     Vitamin D; Future; Expected date: 04/02/2025    5. Rheumatoid arthritis involving left knee, unspecified whether rheumatoid factor present    6. Annual physical exam    7. Screening mammogram for breast cancer  -     Mammo Digital Screening Bilat w/ Jeremiah (XPD); Future; Expected date: 05/01/2025    8. Abnormal finding of blood chemistry, unspecified  -     Hemoglobin A1C; Future; Expected date: 04/02/2025    9. Vitamin D deficiency  -     Vitamin D; Future; Expected date: 04/02/2025         Stable  Stable historically  At goal  Stable on statin, get labs  Stable with rheumatology  Get general labs and mammogram    Visit today included increased complexity associated with the care of the episodic problem annual addressed and managing the longitudinal care of the patient due to the serious and/or complex managed problem(s) annual.

## 2025-04-02 NOTE — PATIENT INSTRUCTIONS
Get general labs today  Get mammogram in May    Keep followup    Start flonase and gargle with listerine.  If symptoms get worse by Friday, let me know.

## 2025-04-03 ENCOUNTER — LAB VISIT (OUTPATIENT)
Dept: LAB | Facility: HOSPITAL | Age: 55
End: 2025-04-03
Attending: FAMILY MEDICINE
Payer: MEDICARE

## 2025-04-03 ENCOUNTER — PATIENT OUTREACH (OUTPATIENT)
Dept: ADMINISTRATIVE | Facility: HOSPITAL | Age: 55
End: 2025-04-03
Payer: MEDICARE

## 2025-04-03 ENCOUNTER — RESULTS FOLLOW-UP (OUTPATIENT)
Dept: FAMILY MEDICINE | Facility: CLINIC | Age: 55
End: 2025-04-03

## 2025-04-03 DIAGNOSIS — R79.9 ABNORMAL FINDING OF BLOOD CHEMISTRY, UNSPECIFIED: ICD-10-CM

## 2025-04-03 DIAGNOSIS — E78.49 OTHER HYPERLIPIDEMIA: ICD-10-CM

## 2025-04-03 DIAGNOSIS — I10 BENIGN HYPERTENSION: ICD-10-CM

## 2025-04-03 DIAGNOSIS — E55.9 VITAMIN D DEFICIENCY: ICD-10-CM

## 2025-04-03 LAB
25(OH)D3+25(OH)D2 SERPL-MCNC: 34 NG/ML (ref 30–96)
ABSOLUTE EOSINOPHIL (OHS): 0.08 K/UL
ABSOLUTE MONOCYTE (OHS): 0.44 K/UL (ref 0.3–1)
ABSOLUTE NEUTROPHIL COUNT (OHS): 3.48 K/UL (ref 1.8–7.7)
ALBUMIN SERPL BCP-MCNC: 3.7 G/DL (ref 3.5–5.2)
ALP SERPL-CCNC: 46 UNIT/L (ref 40–150)
ALT SERPL W/O P-5'-P-CCNC: 16 UNIT/L (ref 10–44)
ANION GAP (OHS): 9 MMOL/L (ref 8–16)
AST SERPL-CCNC: 16 UNIT/L (ref 11–45)
BASOPHILS # BLD AUTO: 0.06 K/UL
BASOPHILS NFR BLD AUTO: 1.1 %
BILIRUB SERPL-MCNC: 0.3 MG/DL (ref 0.1–1)
BUN SERPL-MCNC: 14 MG/DL (ref 6–20)
CALCIUM SERPL-MCNC: 9.1 MG/DL (ref 8.7–10.5)
CHLORIDE SERPL-SCNC: 104 MMOL/L (ref 95–110)
CHOLEST SERPL-MCNC: 157 MG/DL (ref 120–199)
CHOLEST/HDLC SERPL: 2.9 {RATIO} (ref 2–5)
CO2 SERPL-SCNC: 25 MMOL/L (ref 23–29)
CREAT SERPL-MCNC: 1 MG/DL (ref 0.5–1.4)
EAG (OHS): 97 MG/DL (ref 68–131)
ERYTHROCYTE [DISTWIDTH] IN BLOOD BY AUTOMATED COUNT: 12.9 % (ref 11.5–14.5)
GFR SERPLBLD CREATININE-BSD FMLA CKD-EPI: >60 ML/MIN/1.73/M2
GLUCOSE SERPL-MCNC: 85 MG/DL (ref 70–110)
HBA1C MFR BLD: 5 % (ref 4–5.6)
HCT VFR BLD AUTO: 38.4 % (ref 37–48.5)
HDLC SERPL-MCNC: 55 MG/DL (ref 40–75)
HDLC SERPL: 35 % (ref 20–50)
HGB BLD-MCNC: 12.5 GM/DL (ref 12–16)
IMM GRANULOCYTES # BLD AUTO: 0.01 K/UL (ref 0–0.04)
IMM GRANULOCYTES NFR BLD AUTO: 0.2 % (ref 0–0.5)
LDLC SERPL CALC-MCNC: 89 MG/DL (ref 63–159)
LYMPHOCYTES # BLD AUTO: 1.38 K/UL (ref 1–4.8)
MCH RBC QN AUTO: 29.6 PG (ref 27–31)
MCHC RBC AUTO-ENTMCNC: 32.6 G/DL (ref 32–36)
MCV RBC AUTO: 91 FL (ref 82–98)
NONHDLC SERPL-MCNC: 102 MG/DL
NUCLEATED RBC (/100WBC) (OHS): 0 /100 WBC
PLATELET # BLD AUTO: 198 K/UL (ref 150–450)
PMV BLD AUTO: 9.9 FL (ref 9.2–12.9)
POTASSIUM SERPL-SCNC: 4.3 MMOL/L (ref 3.5–5.1)
PROT SERPL-MCNC: 7.1 GM/DL (ref 6–8.4)
RBC # BLD AUTO: 4.22 M/UL (ref 4–5.4)
RELATIVE EOSINOPHIL (OHS): 1.5 %
RELATIVE LYMPHOCYTE (OHS): 25.3 % (ref 18–48)
RELATIVE MONOCYTE (OHS): 8.1 % (ref 4–15)
RELATIVE NEUTROPHIL (OHS): 63.8 % (ref 38–73)
SODIUM SERPL-SCNC: 138 MMOL/L (ref 136–145)
TRIGL SERPL-MCNC: 65 MG/DL (ref 30–150)
TSH SERPL-ACNC: 2.21 UIU/ML (ref 0.4–4)
WBC # BLD AUTO: 5.45 K/UL (ref 3.9–12.7)

## 2025-04-03 PROCEDURE — 84075 ASSAY ALKALINE PHOSPHATASE: CPT

## 2025-04-03 PROCEDURE — 83036 HEMOGLOBIN GLYCOSYLATED A1C: CPT

## 2025-04-03 PROCEDURE — 84443 ASSAY THYROID STIM HORMONE: CPT

## 2025-04-03 PROCEDURE — 82306 VITAMIN D 25 HYDROXY: CPT

## 2025-04-03 PROCEDURE — 85025 COMPLETE CBC W/AUTO DIFF WBC: CPT

## 2025-04-03 PROCEDURE — 36415 COLL VENOUS BLD VENIPUNCTURE: CPT

## 2025-04-03 PROCEDURE — 80061 LIPID PANEL: CPT

## 2025-04-03 NOTE — LETTER
"       AUTHORIZATION FOR RELEASE OF   CONFIDENTIAL INFORMATION    Dear Dr Simon,    We are seeing Boom Blanco, date of birth 1970, in the clinic at [facility]. Juan Carlos Saldivar MD is the patient's PCP. Boom Blanco has an outstanding lab/procedure at the time we reviewed her chart. In order to help keep her health information updated, she has authorized us to request the following medical record(s):        (  )  MAMMOGRAM                                      (  )  COLONOSCOPY      (  )  PAP SMEAR                                          (  )  OUTSIDE LAB RESULTS     (  )  DEXA SCAN                                          (  )  EYE EXAM            (  )  FOOT EXAM                                          (  )  ENTIRE RECORD     (  )  OUTSIDE IMMUNIZATIONS                 ( X )  LUNG CT         Please fax records to [facility], Hulin, Michael P., MD [phone]      Thanks so much and have a great day!    Lyly Chavez LPN [facility]  [address] [place] [address]  P- [phone]  F- [phone]           Patient Name: Boom Blanco  : 1970  Patient Phone #: [phone]          A. Consent for Examination and Treatment: I hereby authorize the providers and employees of [facility] ("[facility] to provide medical treatment/services which includes, but is not limited to, performing and administering tests and diagnostic procedures that are deemed necessary, Including, but not limited to, imaging examinations, blood tests and other laboratory procedures as may be required by the hospital, clinic, or may be ordered by my physician(s) or persons working under the general and/or special instructions of my physician(s).                   1.      I understand and agree that this consent covers all authorized persons, including but not limited to physicians, residents, nurse practitioners, physicians' assistants, specialists, " consultants, student nurses, and independently contracted physicians, who are called upon by the physician in charge, to carry out the diagnostic procedures and medical or surgical treatment.  2.      I hereby authorize [facility] to retain or dispose of any specimens or tissue, should there be such remaining from any test or procedure.  3.      I hereby authorize and give consent for [facility] providers and employees to take photographs, images or videotapes of such diagnostic, surgical or treatment procedures of Patient as may be required by [facility] or as may be ordered by a physician.  I further acknowledge and agree that [facility] may use cameras or other devices for patient monitoring.  4.      I am aware that the practice of medicine is not an exact science, and I acknowledge that no guarantees have been made to me as to the outcome of any tests, procedures or treatment.                   B. Authorization for Release of Information:  I understand that my insurance company and/or their agents may need information necessary to make determinations about payment/reimbursement.  I hereby provide authorization to release to all insurance companies, their successors, assignees, other parties with whom they may have contracted, or others acting on their behalf, that are involved with payment for any hospital and/or clinic charges incurred by the patient, any information that they request and deem necessary for payment/reimbursement, and/or quality review.  I further authorize the release of my health information to physicians or other health care practitioners on staff who are involved in my health care now and in the future, and to other health care providers, entities, or institutions for the purpose of my continued care and treatment, including referrals.     C. Medicare Patient's Certification and Authorization to Release Information and Payment Request:  I certify that the information given by me in applying for payment  under Title XVIII of the Social Security Act is correct.  I authorize any lala of medical or other information about me to release to the Social Security Administration, or its intermediaries or carriers, any information needed for this or a related Medicare claim.  I request that payment of authorized benefits be made on my behalf.     D. Assignment of Insurance Benefits:  I hereby authorize any and all insurance companies, health plans, defined benefit plans, health insurers or any entity that is or may be responsible for payment of my medical expenses to pay all hospital and medical benefits now due, and to become due and payable to me under any hospital benefits, sick benefits, injury benefits or any other benefit for services rendered to me, including Major Medical Benefits, direct to [facility] and all independently contracted physicians.  I assign any and all rights that I may have against any and all insurance companies, health plans, defined benefit plans, health insurers or any entity that is or may be responsible for payment of my medical expenses, including, but not limited to any right to appeal a denial of a claim, any right to bring any action, lawsuit, administrative proceeding, or other cause of action on my behalf.  I specifically assign my right to pursue litigation against any and all insurance companies, health plans, defined benefit plans, health insurers or any entity that is or may be responsible for payment of medical expenses based upon a refusal to pay charges.              REGISTRATION  AUTHORIZATION                    E. Valuables:  It is understood and agreed that [facility] is not liable for the damage to or loss of any money, jewelry, documents, dentures, eye glasses, hearing aids, prosthetics, or other property of value.     F. Computer Equipment:  I understand and agree that should I choose to use computer equipment owned by [facility] or if I choose to access the Internet via [facility]'s  network, I do so at my own risk.  [facility] is not responsible for any damage to my computer equipment or to any damages of any type that might arise from my loss of equipment or data.                   G. Acceptance of Financial Responsibility:  I agree that in consideration of the services and supplies that have been or will be furnished to the patient,  I am hereby obligated to pay all charges made for or on the account of the patient according to the standard rates (in effect at the time the services and supplies are delivered) established by [facility], including its Patient Financial Assistance Policy to the extent it is applicable.  I understand that I am responsible for all charges, or portions thereof, not covered by insurance or other sources.  Patient refunds will be distributed only after balances at all [facility] facilities are paid.                   H. Communication Authorization:  I hereby authorize [facility] and its representatives, along with any billing service or  who may work on their behalf, to contact me on my cell phone and/or home phone using prerecorded messages, artificial voice messages, automatic telephone dialing devices or other computer assisted technology, or by electronic mail, text messaging, or by any other form of electronic communication.  This includes, but is not limited to appointment reminders, yearly physical exam reminders, preventive care reminders, patient campaigns, welcome calls, and calls about account balances on my account or any account on which I am listed as a guarantor.  I understand I have the right to opt out of these communications at any time.     I.  Relationship Between Facility and Physician:  I understand that some, but not all, providers furnishing services to the patient are not employees or agents of [facility].  The patient is under the care and supervision of his/her attending physician, and it is the responsibility of the facility and its  nursing staff to carry out the instructions of such physicians.  It is the responsibility of the patient's physician/designee to obtain the patient's informed consent, when required, for medical or surgical treatment, special diagnostic or therapeutic procedures, or hospital services rendered for the patient under the special instructions of the physician/designee.     J. Notice of Private Practices:  I acknowledge I have received a copy of [facility]'s Notice of Privacy Practices.     K. Facility Directory:  I have discussed with the organization my desire to be either included or excluded in the facility directory.  I understand that if my choice is to opt-out of being identified in the facility directory that the facility will not provide any information about me such as my condition (e.g. Fair, stable, etc.) or my location in the facility (e.g. Room number, department).     L. LINKS:  For [place] Residents:  [facility] is a LINKS ([place] Immunization Network for Kids Statewide) participating facility.  LINKS is a [facility]-sponsored confidential computer system that helps you and your doctor keep track of you and your child's immunization history.  I acknowledge that I am allowing [facility] to share this information with [facility].  For [place] Residents:  [facility] is a MIIX ([place] Immunization Information eXchange) participant.  MIIX is a [facility]-sponsored confidential computer system that helps you and your doctor keep track of you and your child's immunization history.  I acknowledge that I am allowing [facility] to share information with [employer].     M. Term:  This authorization is valid for this and subsequent care/treatment I receive at [facility] and will remain valid unless/until revoked in writing by me.              REGISTRATION  AUTHORIZATION                 N. [facility]:  As used in this document, [facility] means all [facility] affiliated entities including all health  centers, surgery centers, clinics, and hospitals.  It includes more specifically, the following entities: [facility], a not for profit [facility], and its subsidiaries and affiliates, including [facility], [facility], [facility], [facility], [facility], [facility], [facility], [facility], [facility]., [facility], [facility].d/b/a [facility], [facility], L.LOmidC.d/b/a [facility], [place] [employer] [facility], L.LOmidCOmid As manager of [facility], [facility], L.LOmidC, [facility] [employer], L.L.C.d/b/a [facility], [facility], [facility], [facility], L.L.C., and [facility] as manager of [facility].                                                                Patient/Legal Guardian Signature                                                    This signature was collected at 12/23/2019      Bomo Blanco/Self                                                                            Printed Name/Relationship to Patient                                                [facility] complies with applicable Federal civil rights laws and does not discriminate on the basis of race, color, national origin, age, disability, or sex.          ATENCIÓN: si habla español, tiene a maya disposición servicios gratuitos de asistencia lingüística. Daniel matta [phone].         CHÚ Ý: N?u b?n nói Ti?ng Vi?t, có các d?ch v? h? tr? ngôn ng? mi?n phí dành cho b?n. G?i s? [phone].                                                           REGISTRATION  AUTHORIZATION

## 2025-04-17 ENCOUNTER — PATIENT MESSAGE (OUTPATIENT)
Dept: RHEUMATOLOGY | Facility: CLINIC | Age: 55
End: 2025-04-17
Payer: MEDICARE

## 2025-04-17 DIAGNOSIS — T38.0X5A STEROID-INDUCED DIABETES MELLITUS, INITIAL ENCOUNTER: ICD-10-CM

## 2025-04-17 DIAGNOSIS — L40.50 PSA (PSORIATIC ARTHRITIS): ICD-10-CM

## 2025-04-17 DIAGNOSIS — E09.9 STEROID-INDUCED DIABETES MELLITUS, INITIAL ENCOUNTER: ICD-10-CM

## 2025-04-17 DIAGNOSIS — L40.9 PSORIASIS: ICD-10-CM

## 2025-04-17 DIAGNOSIS — L70.0 ACNE VULGARIS: ICD-10-CM

## 2025-04-17 DIAGNOSIS — E53.9 VITAMIN B DEFICIENCY: ICD-10-CM

## 2025-04-20 RX ORDER — TRIAMCINOLONE ACETONIDE 1 MG/G
OINTMENT TOPICAL 2 TIMES DAILY
Qty: 80 G | Refills: 0 | Status: SHIPPED | OUTPATIENT
Start: 2025-04-20

## 2025-04-23 ENCOUNTER — TELEPHONE (OUTPATIENT)
Dept: RHEUMATOLOGY | Facility: CLINIC | Age: 55
End: 2025-04-23
Payer: MEDICARE

## 2025-04-23 DIAGNOSIS — Z79.899 HIGH RISK MEDICATION USE: ICD-10-CM

## 2025-04-23 DIAGNOSIS — L40.9 PSORIASIS: ICD-10-CM

## 2025-04-23 DIAGNOSIS — L40.50 PSA (PSORIATIC ARTHRITIS): Primary | ICD-10-CM

## 2025-04-23 DIAGNOSIS — Z79.899 IMMUNOCOMPROMISED STATE DUE TO DRUG THERAPY: ICD-10-CM

## 2025-04-23 DIAGNOSIS — D84.821 IMMUNOCOMPROMISED STATE DUE TO DRUG THERAPY: ICD-10-CM

## 2025-04-23 NOTE — TELEPHONE ENCOUNTER
Patient doesn't seem to be symptomatic so chest x-ray not needed. Also seems that she was treated for latent TB back in 2016. Did have a recent CT of chest under media and was normal    Annual Hep B, Hep C labs are not up to date. Ordered.      Staff,   Please call patient to schedule patient for needed labs. Thanks!

## 2025-04-23 NOTE — TELEPHONE ENCOUNTER
----- Message from Pharmacist Molly sent at 4/17/2025  9:12 AM CDT -----  Regarding: Safety Chest X-ray  Good morning,[facility] completed clinical reassessment for Ms. Blanco. Upon chart review, she tested positive for TB in 2016 and did undergo treatment. Her last chest x-ray was in February 2024. If you deem appropriate, could you please order an updated chest x-ray?Thank you,Molly Mitchell, PharmDClinical PharmacistOchsner Specialty [facility]- [address] [place] [address] Phone: [phone]

## 2025-04-24 ENCOUNTER — PATIENT OUTREACH (OUTPATIENT)
Dept: ADMINISTRATIVE | Facility: HOSPITAL | Age: 55
End: 2025-04-24
Payer: MEDICARE

## 2025-05-01 ENCOUNTER — PATIENT MESSAGE (OUTPATIENT)
Dept: FAMILY MEDICINE | Facility: CLINIC | Age: 55
End: 2025-05-01
Payer: MEDICARE

## 2025-05-01 ENCOUNTER — HOSPITAL ENCOUNTER (OUTPATIENT)
Dept: RADIOLOGY | Facility: HOSPITAL | Age: 55
Discharge: HOME | End: 2025-05-01
Attending: FAMILY MEDICINE
Payer: MEDICARE

## 2025-05-01 DIAGNOSIS — R92.8 ABNORMAL MAMMOGRAM: Primary | ICD-10-CM

## 2025-05-01 DIAGNOSIS — Z12.31 SCREENING MAMMOGRAM FOR BREAST CANCER: ICD-10-CM

## 2025-05-01 PROCEDURE — 77067 SCR MAMMO BI INCL CAD: CPT | Mod: 26,,, | Performed by: RADIOLOGY

## 2025-05-01 PROCEDURE — 77063 BREAST TOMOSYNTHESIS BI: CPT | Mod: 26,,, | Performed by: RADIOLOGY

## 2025-05-01 PROCEDURE — 77063 BREAST TOMOSYNTHESIS BI: CPT | Mod: TC

## 2025-05-02 ENCOUNTER — OFFICE VISIT (OUTPATIENT)
Dept: RHEUMATOLOGY | Facility: CLINIC | Age: 55
End: 2025-05-02
Payer: MEDICARE

## 2025-05-02 VITALS
HEART RATE: 67 BPM | WEIGHT: 182.56 LBS | BODY MASS INDEX: 27.04 KG/M2 | HEIGHT: 69 IN | DIASTOLIC BLOOD PRESSURE: 85 MMHG | SYSTOLIC BLOOD PRESSURE: 156 MMHG

## 2025-05-02 DIAGNOSIS — Z79.899 HIGH RISK MEDICATION USE: ICD-10-CM

## 2025-05-02 DIAGNOSIS — M17.0 PRIMARY OSTEOARTHRITIS OF BOTH KNEES: ICD-10-CM

## 2025-05-02 DIAGNOSIS — L40.9 PSORIASIS: ICD-10-CM

## 2025-05-02 DIAGNOSIS — T38.0X5A STEROID-INDUCED DIABETES MELLITUS, INITIAL ENCOUNTER: ICD-10-CM

## 2025-05-02 DIAGNOSIS — I73.00 RAYNAUD'S DISEASE WITHOUT GANGRENE: ICD-10-CM

## 2025-05-02 DIAGNOSIS — E09.9 STEROID-INDUCED DIABETES MELLITUS, INITIAL ENCOUNTER: ICD-10-CM

## 2025-05-02 DIAGNOSIS — L40.50 PSA (PSORIATIC ARTHRITIS): Primary | ICD-10-CM

## 2025-05-02 PROCEDURE — 3079F DIAST BP 80-89 MM HG: CPT | Mod: CPTII,S$GLB,, | Performed by: INTERNAL MEDICINE

## 2025-05-02 PROCEDURE — 3061F NEG MICROALBUMINURIA REV: CPT | Mod: CPTII,S$GLB,, | Performed by: INTERNAL MEDICINE

## 2025-05-02 PROCEDURE — 3008F BODY MASS INDEX DOCD: CPT | Mod: CPTII,S$GLB,, | Performed by: INTERNAL MEDICINE

## 2025-05-02 PROCEDURE — 99215 OFFICE O/P EST HI 40 MIN: CPT | Mod: 25,S$GLB,, | Performed by: INTERNAL MEDICINE

## 2025-05-02 PROCEDURE — 3066F NEPHROPATHY DOC TX: CPT | Mod: CPTII,S$GLB,, | Performed by: INTERNAL MEDICINE

## 2025-05-02 PROCEDURE — 1159F MED LIST DOCD IN RCRD: CPT | Mod: CPTII,S$GLB,, | Performed by: INTERNAL MEDICINE

## 2025-05-02 PROCEDURE — 99999 PR PBB SHADOW E&M-EST. PATIENT-LVL IV: CPT | Mod: PBBFAC,,, | Performed by: INTERNAL MEDICINE

## 2025-05-02 PROCEDURE — 3044F HG A1C LEVEL LT 7.0%: CPT | Mod: CPTII,S$GLB,, | Performed by: INTERNAL MEDICINE

## 2025-05-02 PROCEDURE — 96372 THER/PROPH/DIAG INJ SC/IM: CPT | Mod: S$GLB,,, | Performed by: INTERNAL MEDICINE

## 2025-05-02 PROCEDURE — 3077F SYST BP >= 140 MM HG: CPT | Mod: CPTII,S$GLB,, | Performed by: INTERNAL MEDICINE

## 2025-05-02 PROCEDURE — 1160F RVW MEDS BY RX/DR IN RCRD: CPT | Mod: CPTII,S$GLB,, | Performed by: INTERNAL MEDICINE

## 2025-05-02 PROCEDURE — 4010F ACE/ARB THERAPY RXD/TAKEN: CPT | Mod: CPTII,S$GLB,, | Performed by: INTERNAL MEDICINE

## 2025-05-02 RX ORDER — ESTRADIOL 0.1 MG/G
0.1 CREAM VAGINAL
COMMUNITY
Start: 2025-04-21

## 2025-05-02 RX ORDER — METHYLPREDNISOLONE ACETATE 80 MG/ML
160 INJECTION, SUSPENSION INTRA-ARTICULAR; INTRALESIONAL; INTRAMUSCULAR; SOFT TISSUE
Status: COMPLETED | OUTPATIENT
Start: 2025-05-02 | End: 2025-05-02

## 2025-05-02 RX ORDER — KETOROLAC TROMETHAMINE 30 MG/ML
60 INJECTION, SOLUTION INTRAMUSCULAR; INTRAVENOUS
Status: COMPLETED | OUTPATIENT
Start: 2025-05-02 | End: 2025-05-02

## 2025-05-02 RX ORDER — SEMAGLUTIDE 2.68 MG/ML
2 INJECTION, SOLUTION SUBCUTANEOUS
Qty: 3 ML | Refills: 11 | Status: SHIPPED | OUTPATIENT
Start: 2025-05-02 | End: 2026-05-02

## 2025-05-02 RX ORDER — CYANOCOBALAMIN 1000 UG/ML
1000 INJECTION, SOLUTION INTRAMUSCULAR; SUBCUTANEOUS
Status: COMPLETED | OUTPATIENT
Start: 2025-05-02 | End: 2025-05-02

## 2025-05-02 RX ADMIN — CYANOCOBALAMIN 1000 MCG: 1000 INJECTION, SOLUTION INTRAMUSCULAR; SUBCUTANEOUS at 01:05

## 2025-05-02 RX ADMIN — KETOROLAC TROMETHAMINE 60 MG: 30 INJECTION, SOLUTION INTRAMUSCULAR; INTRAVENOUS at 01:05

## 2025-05-02 RX ADMIN — METHYLPREDNISOLONE ACETATE 160 MG: 80 INJECTION, SUSPENSION INTRA-ARTICULAR; INTRALESIONAL; INTRAMUSCULAR; SOFT TISSUE at 01:05

## 2025-05-02 ASSESSMENT — ROUTINE ASSESSMENT OF PATIENT INDEX DATA (RAPID3)
FATIGUE SCORE: 2.2
PATIENT GLOBAL ASSESSMENT SCORE: 7
MDHAQ FUNCTION SCORE: 1
TOTAL RAPID3 SCORE: 5.78
PAIN SCORE: 7
PSYCHOLOGICAL DISTRESS SCORE: 4.4

## 2025-05-02 NOTE — PROGRESS NOTES
Subjective:     Patient ID:  Boom Blanco    Chief Complaint:  Disease Management     History of Present Illness:  Pt is a 55 y.o. female   psoriatic arthritis. She is doing fair on cosentyx 300 mg  she started with linzess and now has BM weekly. She had GI issues. L side and knee  pain. She has GI issues but is well controlled. she has severe pain, but arthritis remains active in her hands, elbows, knees, hips, and ankles. Pain is constant and aching in nature.  She has reflux and is tx w/ prilosec.     Current tx: cosentyx 300 mg             Rheumatologic History:   - Diagnosis/es:  - Positive serologies:  - Infectious screening labs:  - Previous Treatments:  - Current Treatments:     Interval History:   Hospitalization since last office visit: No    Patient Active Problem List    Diagnosis Date Noted    Annual physical exam 09/03/2024    Immunocompromised state due to drug therapy 02/06/2024    Lumbar disc disease 08/08/2023    History of bladder cancer 05/15/2023    Abnormality of gait and mobility 10/08/2019    Gastroesophageal reflux disease with esophagitis 09/19/2019    Tricompartment osteoarthritis of right knee 04/18/2019    Presence of right artificial knee joint 04/18/2019    Medial meniscus tear 01/22/2019    Degenerative tear of medial meniscus 12/03/2018    Other hyperlipidemia 07/26/2018    Other specified hypothyroidism 07/26/2018    Benign hypertension 07/16/2014    RA (rheumatoid arthritis)     Raynaud's disease     Chronic pain     Anxiety disorder     Vitamin B deficiency 03/17/2006     Past Surgical History:   Procedure Laterality Date    ARTHROSCOPIC CHONDROPLASTY OF KNEE JOINT Left 01/22/2019    Procedure: ARTHROSCOPY, KNEE, WITH CHONDROPLASTY;  Surgeon: Sylvain Gorman DO;  Location: [facility] OR;  Service: Orthopedics;  Laterality: Left;    ARTHROSCOPY OF KNEE Left 01/22/2019    Procedure: ARTHROSCOPY, KNEE;  Surgeon: Sylvain Gorman DO;  Location: [facility] OR;  Service: Orthopedics;   Laterality: Left;  Equipment: [place] Chondral Drill Set and Mitek Meniscus Repair Set Truespan  Vendor/Rep: [employer]    ARTHROSCOPY OF KNEE Right 02/05/2019    Procedure: ARTHROSCOPY, KNEE;  Surgeon: Sylvain Gorman DO;  Location: [facility] OR;  Service: Orthopedics;  Laterality: Right;  Equipment: Mitek Truspar; Scope  Venor/Rep: [employer]itek    BACK SURGERY      spinal stimulator implanted and then removed    BREAST BIOPSY      Benign    COLON SURGERY      COLONOSCOPY  11/25/2016    repeat in 5-10 years    COLONOSCOPY N/A 09/06/2019    Procedure: COLONOSCOPY;  Surgeon: Dany Hugo MD;  Location: [facility] ENDO;  Service: General;  Laterality: N/A;    ENDOSCOPY  09/06/2019    Procedure: ENDOSCOPY;  Surgeon: Dany Hugo MD;  Location: [facility];  Service: General;;  with multiple biopsy's    ESOPHAGOGASTRODUODENOSCOPY Left 08/29/2018    Procedure: ESOPHAGOGASTRODUODENOSCOPY (EGD);  Surgeon: Dany Hugo MD;  Location: [facility];  Service: General;  Laterality: Left;    EXCISION OF MEDIAL MENISCUS OF KNEE Right 02/05/2019    Procedure: MENISCECTOMY, KNEE, MEDIAL;  Surgeon: Sylvain Gorman DO;  Location: [facility] OR;  Service: Orthopedics;  Laterality: Right;    HYSTERECTOMY  1997    KNEE ARTHROSCOPY W/ DEBRIDEMENT Left 01/22/2019    Procedure: ARTHROSCOPY, KNEE, WITH DEBRIDEMENT;  Surgeon: Sylvain Gorman DO;  Location: [facility] OR;  Service: Orthopedics;  Laterality: Left;    KNEE ARTHROSCOPY W/ MENISCECTOMY Left 01/22/2019    Procedure: ARTHROSCOPY, KNEE, WITH MENISCECTOMY;  Surgeon: Sylvain Gorman DO;  Location: [facility] OR;  Service: Orthopedics;  Laterality: Left;    KNEE ARTHROSCOPY W/ PLICA EXCISION Left 01/22/2019    Procedure: EXCISION, PLICA, KNEE, ARTHROSCOPIC;  Surgeon: Sylvain Gorman DO;  Location: [facility] OR;  Service: Orthopedics;  Laterality: Left;    TOTAL KNEE ARTHROPLASTY Right 04/18/2019    Procedure: ARTHROPLASTY, KNEE, TOTAL;  Surgeon: Sylvain Gorman DO;  Location: [facility] OR;  Service:  Orthopedics;  Laterality: Right;  Equipment: Depuy Sigma Total Knee System; [employer]en Leg Mcclellan  Vendor/Rep: [employer]  Table/Position: Supine  REQUIRES FIRST ASSISTANT EPHRAIM    TOTAL KNEE ARTHROPLASTY Left 10/03/2019    Procedure: ARTHROPLASTY, KNEE, TOTAL;  Surgeon: Sylvain Gorman DO;  Location: [facility] OR;  Service: Orthopedics;  Laterality: Left;  Equipment: Depuy Sigma Total Knee System  Vendor/Rep: [employer]  REQUIRES FIRST ASSISTANT EPHRAIM    TUBAL LIGATION       Social History[1]  Family History   Problem Relation Name Age of Onset    Breast cancer Mother Yobani alcazar     Arthritis Mother Yobani alcazar     Pacemaker/defibrilator Mother Yobani alcazar     Lung disease Father Franco     Heart disease Father Franco     Arthritis Sister      Arthritis Sister      Arthritis Sister      Arthritis Brother       Review of patient's allergies indicates:   Allergen Reactions    Fentanyl Shortness Of Breath    Remeron [mirtazapine] Other (See Comments)     Weight gain       Review of Systems   Review of Systems   Constitutional:  Positive for chills and fatigue. Negative for activity change, appetite change, diaphoresis, fever and unexpected weight change.   HENT:  Negative for congestion, dental problem, ear discharge, ear pain, facial swelling, mouth sores, nosebleeds, postnasal drip, rhinorrhea, sinus pressure, sneezing, sore throat, tinnitus, trouble swallowing and voice change.    Eyes:  Negative for photophobia, pain, discharge, redness and itching.   Respiratory:  Positive for cough. Negative for apnea, chest tightness, shortness of breath and wheezing.    Cardiovascular:  Positive for leg swelling. Negative for chest pain and palpitations.   Gastrointestinal:  Positive for abdominal pain. Negative for abdominal distention, constipation, diarrhea, nausea and vomiting.   Endocrine: Negative for cold intolerance, heat intolerance, polydipsia and polyuria.   Genitourinary:  Negative for decreased urine volume, difficulty  urinating, dysuria, flank pain, frequency, hematuria and urgency.   Musculoskeletal:  Positive for arthralgias, back pain, joint swelling, neck pain and neck stiffness. Negative for gait problem and myalgias.   Skin:  Negative for pallor, rash and wound.   Allergic/Immunologic: Negative for immunocompromised state.   Neurological:  Negative for dizziness, tremors, numbness and headaches.   Hematological:  Negative for adenopathy. Does not bruise/bleed easily.   Psychiatric/Behavioral:  Negative for sleep disturbance. The patient is not nervous/anxious.         Current Medications:  Current Outpatient Medications   Medication Instructions    albuterol (PROVENTIL/VENTOLIN HFA) 90 mcg/actuation inhaler INHALE 2 PUFFS BY MOUTH EVERY 6 HOURS AS NEEDED FOR WHEEZING/ SHORTNESS OF BREATH.    azelastine (ASTELIN) 137 mcg (0.1 %) nasal spray 1 spray, 2 times daily    baclofen (LIORESAL) 10 mg, Oral, 3 times daily    budesonide (PULMICORT) 0.5 mg, Nebulization, Daily, Controller    buPROPion (WELLBUTRIN XL) 150 mg, Oral    ELDERBERRY FRUIT ORAL Daily    estradioL (ESTRACE) 0.1 mg, Twice weekly    fluticasone propionate (FLONASE) 50 mcg, Each Nostril, 2 times daily PRN    fluticasone-umeclidin-vilanter (TRELEGY ELLIPTA) 200-62.5-25 mcg inhaler 1 puff, Daily    furosemide (LASIX) 20 MG tablet TAKE 1 TABLET BY MOUTH IN THE MORNING AS NEEDED FOR FLUID RETENTION.    ibuprofen-famotidine (DUEXIS) 800-26.6 mg Tab 1 tablet, Oral, 3 times daily    levalbuterol (XOPENEX) 0.31 mg, Nebulization, Every 4 hours PRN, Rescue    levothyroxine (SYNTHROID) 50 MCG tablet TAKE 1 TABLET BY MOUTH EVERY MORNING 30 MINUTES BEFORE OTHER MEDICATIONS OR FOOD.    LINZESS 290 mcg, Before breakfast    lisinopriL (PRINIVIL,ZESTRIL) 20 mg, Oral, Daily, For blood pressure    lovastatin (MEVACOR) 20 mg, Oral, Nightly    montelukast (SINGULAIR) 10 mg    omeprazole (PRILOSEC) 40 mg, Oral, Daily    oxyCODONE-acetaminophen (PERCOCET)  mg per tablet 1 tablet,  "Oral, Every 6 hours PRN    oxyCODONE-acetaminophen (PERCOCET)  mg per tablet 1 tablet, Oral, Every 6 hours PRN    [START ON 5/14/2025] oxyCODONE-acetaminophen (PERCOCET)  mg per tablet 1 tablet, Oral, Every 6 hours PRN    OZEMPIC 2 mg, Subcutaneous, Every 7 days    secukinumab (COSENTYX PEN, 2 PENS,) 150 mg/mL PnIj Inject 2 mL (300 mg) into the skin every 30 days.    TRELEGY ELLIPTA 100-62.5-25 mcg DsDv No dose, route, or frequency recorded.    tretinoin (RETIN-A) 0.025 % cream Topical (Top), Nightly    triamcinolone acetonide 0.1% (KENALOG) 0.1 % ointment Topical (Top), 2 times daily    valACYclovir (VALTREX) 1,000 mg, Oral, Every 12 hours         Objective:     Vitals:    05/02/25 1106   BP: (!) 156/85   Pulse: 67   Weight: 82.8 kg (182 lb 8.7 oz)   Height: 5' 9.02" (1.753 m)   PainSc:   7   PainLoc: Generalized      Body mass index is 26.94 kg/m².     Physical Examinations:  Physical Exam   Constitutional: She is oriented to person, place, and time.   HENT:   Head: Normocephalic and atraumatic.   Mouth/Throat: Oropharynx is clear and moist.   Eyes: Pupils are equal, round, and reactive to light.   Neck: No thyromegaly present.   Cardiovascular: Normal rate, regular rhythm and normal heart sounds. Exam reveals no gallop and no friction rub.   No murmur heard.  Pulmonary/Chest: Breath sounds normal. She has no wheezes. She has no rales. She exhibits no tenderness.   Abdominal: There is no abdominal tenderness. There is no rebound and no guarding.   Musculoskeletal:         General: Tenderness and deformity present.      Right shoulder: Tenderness present.      Left shoulder: Tenderness present.      Right elbow: Normal.      Left elbow: Normal.      Cervical back: Neck supple.      Right knee: Swelling and effusion present. Tenderness present.      Left knee: Effusion present.   Lymphadenopathy:     She has no cervical adenopathy.   Neurological: She is alert and oriented to person, place, and time. She " has normal sensation. Gait normal.   Reflex Scores:       Patellar reflexes are 3+ on the right side and 3+ on the left side.  Skin: No rash noted. No erythema. No pallor.   Psychiatric: Mood and affect normal.   Vitals reviewed.      Right Side Rheumatological Exam     Examination finds the elbow, 1st MCP, 2nd MCP, 3rd MCP, 4th MCP and 5th MCP normal.    The patient is tender to palpation of the shoulder and knee    She has swelling of the knee    The patient has an enlarged wrist, 1st PIP, 2nd PIP, 3rd PIP, 4th PIP and 5th PIP    Shoulder Exam   Tenderness Location: acromion    Range of Motion   Active abduction:  abnormal   Adduction: abnormal  Sensation: normal    Knee Exam   Tenderness Location: medial joint line  Patellofemoral Crepitus: positive  Effusion: positive  Sensation: normal    Hip Exam   Tenderness Location: no tenderness  Sensation: normal    Elbow/Wrist Exam   Tenderness Location: no tenderness  Sensation: normal    Left Side Rheumatological Exam     Examination finds the elbow, 1st MCP, 2nd MCP, 3rd MCP, 4th MCP and 5th MCP normal.    The patient is tender to palpation of the shoulder.    The patient has an enlarged wrist, 1st PIP, 2nd PIP, 3rd PIP, 4th PIP and 5th PIP.    Shoulder Exam   Tenderness Location: acromion    Range of Motion   Active abduction:  abnormal   Sensation: normal    Knee Exam   Tenderness Location: lateral joint line and medial joint line    Patellofemoral Crepitus: positive  Effusion: positive  Sensation: normal    Hip Exam   Tenderness Location: no tenderness  Sensation: normal    Elbow/Wrist Exam   Sensation: normal      Back/Neck Exam   General Inspection   Gait: normal       Tenderness Right paramedian tenderness of the Lower C-Spine and Lower L-Spine.Left paramedian tenderness of the Upper C-Spine and Lower L-Spine.         Disease Assessment Scores:  Patient's Global Assessment of arthritis (0-10): 1  Physician's Global Assessment of arthritis (0-10): 2  Number of  Tender Joints (0-28): 2  Number of Swollen Joints (0-28): 2        1/5/2023    10:00 AM   Rapid3 Question Responses and Scores   MDHAQ Score 1.3   Psychologic Score 4.4   Pain Score 9   When you awakened in the morning OVER THE LAST WEEK, did you feel stiff? Yes   If Yes, please indicate the number of hours until you are as limber as you will be for the day 3   Fatigue Score 7   Global Health Score 8   RAPID3 Score 7.11       Monitoring Lab Results:  Lab Results   Component Value Date    WBC 5.32 05/01/2025    RBC 3.92 (L) 05/01/2025    HGB 11.9 (L) 05/01/2025    HCT 35.2 (L) 05/01/2025    MCV 90 05/01/2025    MCH 30.4 05/01/2025    MCHC 33.8 05/01/2025    RDW 13.0 05/01/2025     05/01/2025        Lab Results   Component Value Date     05/01/2025    K 3.7 05/01/2025     05/01/2025    CO2 24 05/01/2025    GLU 94 05/01/2025    BUN 12 05/01/2025    CREATININE 0.9 05/01/2025    CALCIUM 8.7 05/01/2025    PROT 6.7 05/01/2025    ALBUMIN 3.6 05/01/2025    BILITOT 0.5 05/01/2025    ALKPHOS 42 05/01/2025    AST 18 05/01/2025    ALT 9 (L) 05/01/2025    ANIONGAP 7 (L) 05/01/2025    EGFRNORACEVR >60 05/01/2025       Lab Results   Component Value Date    SEDRATE 9 05/01/2025    CRP 0.8 05/01/2025        Lab Results   Component Value Date    VNFTCYRI47WB 34 04/03/2025    KELRMATU32 1162 (H) 08/15/2023        Lab Results   Component Value Date    CHOL 157 04/03/2025    HDL 55 04/03/2025    LDLCALC 89.0 04/03/2025    TRIG 65 04/03/2025       Lab Results   Component Value Date    RF <10.0 10/15/2015     Lab Results   Component Value Date    ANASCREEN Positive (A) 02/17/2023    ANATITER 1:640 02/17/2023    DSDNA Negative 1:10 02/17/2023     Lab Results   Component Value Date    HLABB27 Negative 10/15/2015       Infectious Disease Screening:  Lab Results   Component Value Date    HEPBSAG Non-Reactive 05/01/2025    HEPBCAB Non-Reactive 05/01/2025    HEPBSAB Negative 10/27/2021    HEPBIGM Negative 10/27/2021     Lab  "Results   Component Value Date    HEPCAB Non-Reactive 05/01/2025     No results found for: "TBGOLDPLUS", "QUANTTBGDPL"  Lab Results   Component Value Date    QUANTIFERON Positive (A) 08/15/2016    QUANTNILVALU 0.09 08/15/2016    QUANTMITOGEN >10.00 08/15/2016    QFTTBAG 6.65 (H) 08/15/2016    CollectedUpdatedProcedure  04/03/2025 093304/03/2025 1001  Hemoglobin A1C [[id number]]   Blood   Component Value Units   Hemoglobin A1c 5.0  %   Estimated Average Glucose 97 mg/dL   05/01/2025 7954414705/01/2025 2033  Hepatitis C Antibody [[id number]]   Blood   Component Value   Hep C Ab Interp Non-Reactive   05/01/2025 2568724705/01/2025 2033  Hepatitis B Surface Antigen [[id number]]   Blood   Component Value   Hep BsAg Interp Non-Reactive   05/01/2025 1368534705/01/2025 2033  Hepatitis B Core Antibody, Total [[id number]]   Blood   Component Value   Hep BcAb Interp Non-Reactive   05/01/2025 9353654705/01/2025 0755  CBC Auto Differential [[id number]]    (Abnormal)   Blood   Component Value   No component results   05/01/2025 8922034705/01/2025 0819  Comprehensive Metabolic Panel [[id number]]   (Abnormal)   Blood   Component Value Units   Sodium 139 mmol/L   Potassium 3.7 mmol/L   Chloride 108 mmol/L   CO2 24 mmol/L   Glucose 94 mg/dL   BUN 12 mg/dL   Creatinine 0.9 mg/dL   Calcium 8.7 mg/dL   Protein Total 6.7 gm/dL   Albumin 3.6 g/dL   Bilirubin Total 0.5  mg/dL   ALP 42 unit/L   AST 18 unit/L   ALT 9 Low  unit/L   Anion Gap 7 Low  mmol/L   eGFR >60  mL/min/1.73/m2   05/01/2025 2796974705/01/2025 0819  C-Reactive Protein [[id number]]   Blood   Component Value Units   CRP 0.8 mg/L   05/01/2025 8912024705/01/2025 1045  Sedimentation rate [[id number]]   Blood   Component Value Units   Sed Rate 9 mm/hr   04/03/202503/2025 093304/03/2025 2146  Vitamin D [[id number]]   Blood   Component Value Units   Vitamin D 34  ng/mL   04/03/2025 2633883304/03/2025 1148  TSH [[id number]]   Blood   Component Value Units   TSH 2.207 uIU/mL   04/03/2025 0524763304/03/2025 1018  Lipid Panel " [[id number]]   Blood   Component Value Units   Cholesterol Total 157  mg/dL   Triglyceride 65  mg/dL   HDL Cholesterol 55  mg/dL   LDL Cholesterol 89.0  mg/dL   HDL/Cholesterol Ratio 35.0 %   Cholesterol/HDL Ratio 2.9    Non HDL Cholesterol 102  mg/dL     Facility:  [facility] [address] [place] [address]  [phone]     Name: Boom Blanco     MRN: [id number]     Result:   Mammo Digital Screening Bilat w/ Jeremiah (XPD)     History:  Patient is 55 y.o. and is seen for screening mammogram for breast cancer.     Films Compared:  Compared to: 04/29/2024 Mammo Digital Screening Bilat w/ Jeremiah, 04/26/2023 US Breast Bilateral Limited, and 04/26/2023 Mammo Digital Diagnostic Bilat with Jeremiah     Findings:  This procedure was performed using tomosynthesis. Computer-aided detection was utilized in the interpretation of this examination.        The breasts are heterogeneously dense, which may obscure small masses.      Right  There is a focal asymmetry seen in the upper outer quadrant of the right breast. Compared to the previous study, the asymmetry is more prominent.      Left  There is no evidence of suspicious masses, calcifications, or other abnormal findings in the left breast. Biopsy marker noted.     Impression:  Right  Focal Asymmetry: Right breast focal asymmetry in the upper outer quadrant. Assessment: 0 - Incomplete. Diagnostic Mammogram and/or Ultrasound is recommended.      Left  There is no mammographic evidence of malignancy in the left breast.     BI-RADS Category:   Overall: 0 - Incomplete: Needs Additional Imaging Evaluation        Recommendation:  Diagnostic right mammogram with possible ultrasound (if indicated) is recommended.     Imaging:  DEXA, Xrays, MRIs, CTs, etc    Old & Outside Medical Records:  Reviewed old and all outside medical records available in Care Everywhere     Assessment:     Encounter Diagnoses   Name Primary?    PSA (psoriatic arthritis)  Yes    Steroid-induced diabetes mellitus, initial encounter     Psoriasis     High risk medication use     Primary osteoarthritis of both knees     Raynaud's disease without gangrene          Plan:      Encounter Diagnoses   Name Primary?    Steroid-induced diabetes mellitus, initial encounter     PSA (psoriatic arthritis) Yes    Psoriasis     High risk medication use     Primary osteoarthritis of both knees     Raynaud's disease without gangrene      Boom was seen today for disease management.    Diagnoses and all orders for this visit:    PSA (psoriatic arthritis)  -     Microalbumin/Creatinine Ratio, Urine  -     methylPREDNISolone acetate injection 160 mg  -     ketorolac injection 60 mg  -     cyanocobalamin injection 1,000 mcg  -     Sedimentation rate; Future  -     C-Reactive Protein; Future  -     Comprehensive Metabolic Panel; Future  -     CBC Auto Differential; Future    Steroid-induced diabetes mellitus, initial encounter  -     semaglutide (OZEMPIC) 2 mg/dose (8 mg/3 mL) PnIj; Inject 2 mg into the skin every 7 days.  -     Microalbumin/Creatinine Ratio, Urine  -     methylPREDNISolone acetate injection 160 mg  -     ketorolac injection 60 mg  -     cyanocobalamin injection 1,000 mcg    Psoriasis  -     Microalbumin/Creatinine Ratio, Urine  -     methylPREDNISolone acetate injection 160 mg  -     ketorolac injection 60 mg  -     cyanocobalamin injection 1,000 mcg  -     Sedimentation rate; Future  -     C-Reactive Protein; Future  -     Comprehensive Metabolic Panel; Future  -     CBC Auto Differential; Future    High risk medication use  -     Microalbumin/Creatinine Ratio, Urine  -     methylPREDNISolone acetate injection 160 mg  -     ketorolac injection 60 mg  -     cyanocobalamin injection 1,000 mcg  -     Sedimentation rate; Future  -     C-Reactive Protein; Future  -     Comprehensive Metabolic Panel; Future  -     CBC Auto Differential; Future    Primary osteoarthritis of both knees  -      Microalbumin/Creatinine Ratio, Urine  -     methylPREDNISolone acetate injection 160 mg  -     ketorolac injection 60 mg  -     cyanocobalamin injection 1,000 mcg  -     Sedimentation rate; Future  -     C-Reactive Protein; Future  -     Comprehensive Metabolic Panel; Future  -     CBC Auto Differential; Future    Raynaud's disease without gangrene  -     Microalbumin/Creatinine Ratio, Urine  -     methylPREDNISolone acetate injection 160 mg  -     ketorolac injection 60 mg  -     cyanocobalamin injection 1,000 mcg  -     Sedimentation rate; Future  -     C-Reactive Protein; Future  -     Comprehensive Metabolic Panel; Future  -     CBC Auto Differential; Future        1. Nurse injections  2. Labs ordered  3. F/u 6 months     Follow-up 6 months  More than 50% of the  40 minute encounter was spent face to face counseling the patient regarding current status and future plan of care as well as side effects  of the medications. All questions were answered to patient's satisfaction also includes  non-face to face time preparing to see the patient (eg, review of tests), Obtaining and/or reviewing separately obtained history, Documenting clinical information in the electronic or other health record, Independently interpreting results            [1]   Social History  Tobacco Use    Smoking status: Former     Current packs/day: 0.00     Average packs/day: 1.1 packs/day for 44.3 years (49.5 ttl pk-yrs)     Types: Cigarettes     Start date: 1985     Quit date: 2019     Years since quittin.0    Smokeless tobacco: Former   Substance Use Topics    Alcohol use: No    Drug use: Never

## 2025-05-02 NOTE — PROGRESS NOTES
2-patient identifiers confirmed, allergies reviewed, injection education discussed with patient.  Injections tolerated well with no C/O of pain or complications, see MAR for injection information.  Patient exited room ambulatory in stable condition.  ROMA Knowles LPN

## 2025-05-13 ENCOUNTER — HOSPITAL ENCOUNTER (OUTPATIENT)
Dept: RADIOLOGY | Facility: HOSPITAL | Age: 55
Discharge: HOME | End: 2025-05-13
Attending: FAMILY MEDICINE
Payer: MEDICARE

## 2025-05-13 DIAGNOSIS — R92.8 ABNORMAL MAMMOGRAM: ICD-10-CM

## 2025-05-13 LAB
ALBUMIN/CREAT UR: NORMAL
CREAT UR-MCNC: 81 MG/DL (ref 15–325)
MICROALBUMIN UR-MCNC: <5 UG/ML (ref ?–5000)

## 2025-05-13 PROCEDURE — 77061 BREAST TOMOSYNTHESIS UNI: CPT | Mod: TC,RT

## 2025-05-13 PROCEDURE — 76642 ULTRASOUND BREAST LIMITED: CPT | Mod: 26,RT,, | Performed by: RADIOLOGY

## 2025-05-13 PROCEDURE — 76642 ULTRASOUND BREAST LIMITED: CPT | Mod: TC,RT

## 2025-05-13 PROCEDURE — 77061 BREAST TOMOSYNTHESIS UNI: CPT | Mod: 26,RT,, | Performed by: RADIOLOGY

## 2025-05-13 PROCEDURE — 77065 DX MAMMO INCL CAD UNI: CPT | Mod: 26,RT,, | Performed by: RADIOLOGY

## 2025-05-14 ENCOUNTER — RESULTS FOLLOW-UP (OUTPATIENT)
Dept: RHEUMATOLOGY | Facility: CLINIC | Age: 55
End: 2025-05-14

## 2025-05-14 DIAGNOSIS — L70.0 ACNE VULGARIS: ICD-10-CM

## 2025-05-14 RX ORDER — BACLOFEN 10 MG/1
10 TABLET ORAL 3 TIMES DAILY
Qty: 90 TABLET | Refills: 3 | Status: SHIPPED | OUTPATIENT
Start: 2025-05-14

## 2025-05-14 NOTE — TELEPHONE ENCOUNTER
Refill Routing Note   Medication(s) are not appropriate for processing by [facility] for the following reason(s):        Outside of protocol    [facility] action(s):  Route             Appointments  past 12m or future 3m with PCP    Date Provider   Last Visit   4/2/2025 Juan Carlos Saldivar MD   Next Visit   6/11/2025 Juan Carlos Saldivar MD   ED visits in past 90 days: 0        Note composed:8:01 AM 05/14/2025

## 2025-05-17 RX ORDER — TRETINOIN 0.25 MG/G
CREAM TOPICAL
Qty: 45 G | Refills: 1 | Status: SHIPPED | OUTPATIENT
Start: 2025-05-17

## 2025-05-18 DIAGNOSIS — Z76.0 MEDICATION REFILL: ICD-10-CM

## 2025-05-18 NOTE — TELEPHONE ENCOUNTER
No care due was identified.  [facility] Embedded Care Due Messages. Reference number: [id number].   5/18/2025 8:03:48 AM CDT

## 2025-05-18 NOTE — TELEPHONE ENCOUNTER
Refill Routing Note   Medication(s) are not appropriate for processing by [facility] for the following reason(s):        Drug-drug interaction    [facility] action(s):  Defer      Medication Therapy Plan: XOPENEX      Appointments  past 12m or future 3m with PCP    Date Provider   Last Visit   4/2/2025 Juan Carlos Saldivar MD   Next Visit   6/11/2025 Juan Carlos Saldivar MD   ED visits in past 90 days: 0        Note composed:10:55 AM 05/18/2025

## 2025-05-19 RX ORDER — ALBUTEROL SULFATE 90 UG/1
INHALANT RESPIRATORY (INHALATION)
Qty: 25.5 G | Refills: 3 | Status: SHIPPED | OUTPATIENT
Start: 2025-05-19

## 2025-05-21 ENCOUNTER — HOSPITAL ENCOUNTER (OUTPATIENT)
Dept: RADIOLOGY | Facility: HOSPITAL | Age: 55
Discharge: HOME | End: 2025-05-21
Attending: STUDENT IN AN ORGANIZED HEALTH CARE EDUCATION/TRAINING PROGRAM
Payer: MEDICARE

## 2025-05-21 DIAGNOSIS — N28.1 ACQUIRED COMPLEX RENAL CYST: ICD-10-CM

## 2025-05-21 PROCEDURE — 76770 US EXAM ABDO BACK WALL COMP: CPT | Mod: TC

## 2025-05-21 PROCEDURE — 76770 US EXAM ABDO BACK WALL COMP: CPT | Mod: 26,,, | Performed by: RADIOLOGY

## 2025-06-06 RX ORDER — BUPROPION HYDROCHLORIDE 150 MG/1
150 TABLET ORAL
Qty: 90 TABLET | Refills: 3 | Status: SHIPPED | OUTPATIENT
Start: 2025-06-06

## 2025-06-10 ENCOUNTER — TELEPHONE (OUTPATIENT)
Dept: UROLOGY | Facility: CLINIC | Age: 55
End: 2025-06-10
Payer: MEDICARE

## 2025-06-10 NOTE — TELEPHONE ENCOUNTER
Returned call and spoke with patient, informed that is the soonest appt, offered virtual, stated she rather come in, appt already placed on waitlist, patient verbally understood.

## 2025-06-11 ENCOUNTER — OFFICE VISIT (OUTPATIENT)
Dept: FAMILY MEDICINE | Facility: CLINIC | Age: 55
End: 2025-06-11
Payer: MEDICARE

## 2025-06-11 VITALS
WEIGHT: 175.13 LBS | BODY MASS INDEX: 25.94 KG/M2 | DIASTOLIC BLOOD PRESSURE: 78 MMHG | HEIGHT: 69 IN | SYSTOLIC BLOOD PRESSURE: 130 MMHG | OXYGEN SATURATION: 99 % | HEART RATE: 89 BPM

## 2025-06-11 DIAGNOSIS — E78.49 OTHER HYPERLIPIDEMIA: ICD-10-CM

## 2025-06-11 DIAGNOSIS — M06.9 RHEUMATOID ARTHRITIS INVOLVING LEFT KNEE, UNSPECIFIED WHETHER RHEUMATOID FACTOR PRESENT: Primary | ICD-10-CM

## 2025-06-11 DIAGNOSIS — G89.4 CHRONIC PAIN SYNDROME: ICD-10-CM

## 2025-06-11 DIAGNOSIS — I10 BENIGN HYPERTENSION: ICD-10-CM

## 2025-06-11 DIAGNOSIS — F11.20 OPIOID DEPENDENCE, UNCOMPLICATED: ICD-10-CM

## 2025-06-11 LAB
AMP AMPHETAMINE 1000 NM/ML POC: NEGATIVE
BAR BARBITURATES 300 NG/ML POC: NEGATIVE
BUP BUPRENORPHINE 10 NG/ML POC: NEGATIVE
BZO BENZODIAZEPINES 300 NG/ML POC: NEGATIVE
COC COCAINE 300 NG/ML POC: NEGATIVE
CREATININE (CR) POC: NEGATIVE
CTP QC/QA: YES
MET METHAMPHETAMINE 1000 NG/ML POC: NEGATIVE
MOP/OPI300 MORPHINE 300 NG/ML POC: NEGATIVE
MTD METHADONE 300 NG/ML POC: NEGATIVE
OXIDANT (OX) POC: NEGATIVE
OXY OXYCODONE 100 NG/ML POC: ABNORMAL
SPECIFIC GRAVITY (SG) POC: 1.02
TEMPERATURE (°F) POC: 90
THC MARIJUANA 50 NG/ML POC: NEGATIVE

## 2025-06-11 RX ORDER — OXYCODONE AND ACETAMINOPHEN 10; 325 MG/1; MG/1
1 TABLET ORAL EVERY 6 HOURS PRN
Qty: 90 TABLET | Refills: 0 | Status: SHIPPED | OUTPATIENT
Start: 2025-06-14

## 2025-06-11 RX ORDER — OXYCODONE AND ACETAMINOPHEN 10; 325 MG/1; MG/1
1 TABLET ORAL EVERY 6 HOURS PRN
Qty: 90 TABLET | Refills: 0 | Status: SHIPPED | OUTPATIENT
Start: 2025-08-14

## 2025-06-11 RX ORDER — OXYCODONE AND ACETAMINOPHEN 10; 325 MG/1; MG/1
1 TABLET ORAL EVERY 6 HOURS PRN
Qty: 90 TABLET | Refills: 0 | Status: SHIPPED | OUTPATIENT
Start: 2025-07-14

## 2025-06-11 NOTE — PROGRESS NOTES
"    [facility] - [place], [place]    Family Medicine Office Visit    Chief Complaint   Patient presents with    Follow-up        HPI:  follow up chronic conditions  Psoriatic arthritis stable with Dr. Molina - treatment improving symptoms  Blood Pressure at goal on medication, with patient reporting prescription compliance    Hyperlipidemia stable on appropriate intensity statin therapy  Thyroid stable    ROS: as above    Vitals:    06/11/25 1202   BP: (!) 142/88   BP Location: Left arm   Patient Position: Sitting   Pulse: 89   SpO2: 99%   Weight: 79.5 kg (175 lb 2.5 oz)   Height: 5' 9" (1.753 m)      Body mass index is 25.87 kg/m².      General:  AOx3, well nourished and developed in no acute distress  Eyes:  PERRLA, EOMI, vision intact grossly  ENT:  normal hearing, moist oral mucosa  Neck:  trachea midline with no masses or thyromegaly  Heart:  RRR, no murmurs.  No edema noted, extremities warm and well perfused  Lungs:  clear to auscultation bilaterally with symmetric chest movement  Abdomen:  Soft, nontender, nondistended.  Normal bowel sounds  Musculoskeletal:  Normal gait.  Normal posture.  Normal muscular development with no joint swelling.  Neurological:  CN II-XII grossly intact. Symmetric strength and sensation  Psych:  Normal mood and affect.  Able to demonstrate good judgement and personal insight.      Assessment/Plan:    1. Rheumatoid arthritis involving left knee, unspecified whether rheumatoid factor present    2. Benign hypertension    3. Other hyperlipidemia    4. Chronic pain syndrome       Stable with rheumatology  At goal  Stable on statin  Stable, refilled medication and get UDS    Visit today included increased complexity associated with the care of the episodic problem chronci pain, htn, hld, RA addressed and managing the longitudinal care of the patient due to the serious and/or complex managed problem(s) chronic pain, htn, hld, RA.        "

## 2025-06-14 DIAGNOSIS — I10 BENIGN HYPERTENSION: ICD-10-CM

## 2025-06-14 NOTE — TELEPHONE ENCOUNTER
No care due was identified.  [facility] Embedded Care Due Messages. Reference number: [id number].   6/14/2025 8:06:59 AM CDT   A-T Advancement Flap Text: The defect edges were debeveled with a #15 scalpel blade.  Given the location of the defect, shape of the defect and the proximity to free margins an A-T advancement flap was deemed most appropriate.  Using a sterile surgical marker, an appropriate advancement flap was drawn incorporating the defect and placing the expected incisions within the relaxed skin tension lines where possible.    The area thus outlined was incised deep to adipose tissue with a #15 scalpel blade.  The skin margins were undermined to an appropriate distance in all directions utilizing iris scissors.

## 2025-06-15 NOTE — TELEPHONE ENCOUNTER
Refill Routing Note   Medication(s) are not appropriate for processing by [facility] for the following reason(s):        Outside of protocol  Required labs abnormal:      CREATININE 1.0 05/13/2025       [facility] action(s):  Defer  Route      Medication Therapy Plan: BACLOFEN-OOP      Appointments  past 12m or future 3m with PCP    Date Provider   Last Visit   6/11/2025 Juan Carlos Saldivar MD   Next Visit   9/11/2025 Juan Carlos Saldivar MD   ED visits in past 90 days: 0        Note composed:8:50 PM 06/14/2025

## 2025-06-16 RX ORDER — LISINOPRIL 20 MG/1
TABLET ORAL
Qty: 90 TABLET | Refills: 3 | Status: SHIPPED | OUTPATIENT
Start: 2025-06-16

## 2025-06-16 RX ORDER — BACLOFEN 10 MG/1
10 TABLET ORAL 3 TIMES DAILY
Qty: 90 TABLET | Refills: 3 | Status: SHIPPED | OUTPATIENT
Start: 2025-06-16

## 2025-06-18 ENCOUNTER — PATIENT MESSAGE (OUTPATIENT)
Dept: ADMINISTRATIVE | Facility: OTHER | Age: 55
End: 2025-06-18
Payer: MEDICARE

## 2025-07-07 DIAGNOSIS — L70.0 ACNE VULGARIS: ICD-10-CM

## 2025-07-08 RX ORDER — TRETINOIN 0.25 MG/G
CREAM TOPICAL
Qty: 45 G | Refills: 1 | Status: SHIPPED | OUTPATIENT
Start: 2025-07-08

## 2025-07-11 DIAGNOSIS — E03.8 OTHER SPECIFIED HYPOTHYROIDISM: ICD-10-CM

## 2025-07-11 NOTE — PROGRESS NOTES
"[facility] Note    PCP: JuanC arlos Saldivar MD    Chief Complaint: BRYAN/microscopic hematuria    SUBJECTIVE:       History of Present Illness:  Boom Blanco is a 55 y.o. female who presents to clinic for BRYAN. She is Established  to our clinic.     Renal US 5/21/25: normal     No gross hematuria.   Had a UTI back in September.     Has nocturia. Has dx of BHAVNA but does not use a CPAP machine.     Still has issues with constipation and is seeing GI.   Drinks a lot of soda.     UA: sent to lab  PVR: 106 cc      5/21/24  CT 2/23/24:   Adrenals: Normal   Kidneys: No abnormal calcification is evident.  There is no   hydronephrosis or ureteral stone.  A significant renal mass is not   identified.     Doing well today. No gross hematuria.   She does have significant constipation issues, has a BM every month.   She has urinary frequency and sometimes feels as though she doesn't empty completely.   Has BRYAN with coughing and wears a panty liner.       1/17/23  Only 1 RBC seen on micro UA  US did not visualize previously seen 9 mm and 1.5 cm complex cyst in right kidney. These had previously been stable since 2018.    No hematuria or dysuria.   No other bothersome urinary complaints.     She has been having some recent GI issues and is seeing GI later this week. Has had issues with diverticulitis in the past.       1/4/22  Presents today for microscopic hematuria. Has had UAs dip positive for blood, but no microanalysis performed.   No bothersome urinary issues. No dysuria. No gross hematuria.     She has a hx of ?bladder cancer in 1995. She was undergoing hysterectomy and at that time a "cyst on the bladder" was found and removed. No further treatment for this.   No hx of stones.     She underwent a CT w/wo contrast which showed 2 complex cysts of the right kidney, otherwise no concerning findings. These are stable since 2018.    UA today: +trace blood, leuk and nitrite negative     Last urine " culture: no documented UTIs  Last creatinine: 1.1 (12/9/21)    Family  hx: no malignancy   Hx of HTN, HLD  Former smoker, quit 3 years ago.     Past medical, family, and social history reviewed as documented in chart with pertinent positive medical, family, and social history detailed in HPI.    Review of patient's allergies indicates:   Allergen Reactions    Fentanyl Shortness Of Breath    Remeron [mirtazapine] Other (See Comments)     Weight gain       Past Medical History:   Diagnosis Date    Anxiety disorder     Arthritis     Chronic pain     Diverticulitis     Hyperlipidemia     Hypertension     Lupus 2006    Panic attack     Psoriatic arthritis     Raynaud's disease      Past Surgical History:   Procedure Laterality Date    ARTHROSCOPIC CHONDROPLASTY OF KNEE JOINT Left 01/22/2019    Procedure: ARTHROSCOPY, KNEE, WITH CHONDROPLASTY;  Surgeon: Sylvain Gorman DO;  Location: [facility] OR;  Service: Orthopedics;  Laterality: Left;    ARTHROSCOPY OF KNEE Left 01/22/2019    Procedure: ARTHROSCOPY, KNEE;  Surgeon: Sylvain Gorman DO;  Location: [facility] OR;  Service: Orthopedics;  Laterality: Left;  Equipment: Emerson Chondral Drill Set and Mitek Meniscus Repair Set Truespan  Vendor/Rep: [employer]    ARTHROSCOPY OF KNEE Right 02/05/2019    Procedure: ARTHROSCOPY, KNEE;  Surgeon: Sylvain Gorman DO;  Location: [facility] OR;  Service: Orthopedics;  Laterality: Right;  Equipment: Mitek Truspar; Scope  Venor/Rep: [employer]itek    BACK SURGERY      spinal stimulator implanted and then removed    BREAST BIOPSY      Benign    COLON SURGERY      COLONOSCOPY  11/25/2016    repeat in 5-10 years    COLONOSCOPY N/A 09/06/2019    Procedure: COLONOSCOPY;  Surgeon: Dany Hugo MD;  Location: [facility] ENDO;  Service: General;  Laterality: N/A;    ENDOSCOPY  09/06/2019    Procedure: ENDOSCOPY;  Surgeon: Dany Hugo MD;  Location: [facility] ENDO;  Service: General;;  with multiple biopsy's    ESOPHAGOGASTRODUODENOSCOPY Left 08/29/2018     Procedure: ESOPHAGOGASTRODUODENOSCOPY (EGD);  Surgeon: Dany Hugo MD;  Location: [facility] ENDO;  Service: General;  Laterality: Left;    EXCISION OF MEDIAL MENISCUS OF KNEE Right 02/05/2019    Procedure: MENISCECTOMY, KNEE, MEDIAL;  Surgeon: Sylvain Gorman DO;  Location: [facility] OR;  Service: Orthopedics;  Laterality: Right;    HYSTERECTOMY  1997    KNEE ARTHROSCOPY W/ DEBRIDEMENT Left 01/22/2019    Procedure: ARTHROSCOPY, KNEE, WITH DEBRIDEMENT;  Surgeon: Sylvain Gorman DO;  Location: [facility] OR;  Service: Orthopedics;  Laterality: Left;    KNEE ARTHROSCOPY W/ MENISCECTOMY Left 01/22/2019    Procedure: ARTHROSCOPY, KNEE, WITH MENISCECTOMY;  Surgeon: Sylvain Gorman DO;  Location: [facility] OR;  Service: Orthopedics;  Laterality: Left;    KNEE ARTHROSCOPY W/ PLICA EXCISION Left 01/22/2019    Procedure: EXCISION, PLICA, KNEE, ARTHROSCOPIC;  Surgeon: Sylvain Gorman DO;  Location: [facility] OR;  Service: Orthopedics;  Laterality: Left;    TOTAL KNEE ARTHROPLASTY Right 04/18/2019    Procedure: ARTHROPLASTY, KNEE, TOTAL;  Surgeon: Sylvain Gorman DO;  Location: [facility] OR;  Service: Orthopedics;  Laterality: Right;  Equipment: Depuy Sigma Total Knee System; Guillen Leg Mcclellan  Vendor/Rep: [employer]  Table/Position: Supine  REQUIRES FIRST ASSISTANT EPHRAIM    TOTAL KNEE ARTHROPLASTY Left 10/03/2019    Procedure: ARTHROPLASTY, KNEE, TOTAL;  Surgeon: Sylvain Gorman DO;  Location: [facility] OR;  Service: Orthopedics;  Laterality: Left;  Equipment: Depuy Sigma Total Knee System  Vendor/Rep: [employer]  REQUIRES FIRST ASSISTANT EPHRAIM    TUBAL LIGATION       Family History   Problem Relation Name Age of Onset    Breast cancer Mother Yobani alcazar     Arthritis Mother Yobani alcazar     Pacemaker/defibrilator Mother Yobani alcazar     Lung disease Father Franco     Heart disease Father Franco     Arthritis Sister      Arthritis Sister      Arthritis Sister      Arthritis Brother       Social History     Tobacco Use    Smoking status: Former      "Current packs/day: 0.00     Average packs/day: 1.1 packs/day for 44.3 years (49.5 ttl pk-yrs)     Types: Cigarettes     Start date: 1985     Quit date: 2019     Years since quittin.2    Smokeless tobacco: Former   Substance Use Topics    Alcohol use: No    Drug use: Never        Review of Systems    OBJECTIVE:     Anticoagulation:  no    Estimated body mass index is 25.88 kg/m² as calculated from the following:    Height as of this encounter: 5' 9" (1.753 m).    Weight as of this encounter: 79.5 kg (175 lb 4.3 oz).    Vital Signs (Most Recent)  Resp: 17 (07/15/25 0923)    Physical Exam  Vitals reviewed.   Constitutional:       General: She is not in acute distress.     Appearance: Normal appearance. She is not ill-appearing.   HENT:      Head: Normocephalic and atraumatic.   Eyes:      General: No scleral icterus.  Pulmonary:      Effort: Pulmonary effort is normal. No respiratory distress.   Skin:     Coloration: Skin is not jaundiced.   Neurological:      General: No focal deficit present.      Mental Status: She is alert and oriented to person, place, and time.   Psychiatric:         Mood and Affect: Mood normal.         Behavior: Behavior normal.         BMP  Lab Results   Component Value Date     2025    K 3.7 2025     2025    CO2 26 2025    BUN 12 2025    CREATININE 1.0 2025    CALCIUM 9.0 2025    ANIONGAP 11 2025    ESTGFRAFRICA >60.0 2022    EGFRNONAA >60.0 2022       Lab Results   Component Value Date    WBC 6.91 2025    HGB 12.8 2025    HCT 39.9 2025    MCV 93 2025     2025       Imaging:  Per HPI    ASSESSMENT     1. Asymptomatic microscopic hematuria    2. BRYAN (stress urinary incontinence, female)      PLAN:     - Urine sent to lab for analysis  - Urinary symptoms are not bothersome to her  - Renal US normal   - Mildly elevated PVR likely due to severe constipation issues, will " continue to monitor   - Follow up 1 year unless urine abnormalities seen      Stephany Perez MD     Visit today included increased complexity associated with the care of the episodic problem renal cyst/BRYAN addressed and managing the longitudinal care of the patient due to the serious and/or complex managed problem(s).

## 2025-07-11 NOTE — TELEPHONE ENCOUNTER
No care due was identified.  [facility] Embedded Care Due Messages. Reference number: [id number].   7/11/2025 8:04:28 AM CDT

## 2025-07-12 RX ORDER — LEVOTHYROXINE SODIUM 50 UG/1
TABLET ORAL
Qty: 90 TABLET | Refills: 2 | Status: SHIPPED | OUTPATIENT
Start: 2025-07-12

## 2025-07-15 ENCOUNTER — OFFICE VISIT (OUTPATIENT)
Dept: UROLOGY | Facility: CLINIC | Age: 55
End: 2025-07-15
Payer: MEDICARE

## 2025-07-15 VITALS — HEIGHT: 69 IN | WEIGHT: 175.25 LBS | BODY MASS INDEX: 25.96 KG/M2 | RESPIRATION RATE: 17 BRPM

## 2025-07-15 DIAGNOSIS — N39.3 SUI (STRESS URINARY INCONTINENCE, FEMALE): ICD-10-CM

## 2025-07-15 DIAGNOSIS — R31.21 ASYMPTOMATIC MICROSCOPIC HEMATURIA: Primary | ICD-10-CM

## 2025-07-15 LAB
BACTERIA #/AREA URNS HPF: ABNORMAL /HPF
MICROSCOPIC COMMENT: ABNORMAL
POC RESIDUAL URINE VOLUME: 106 ML (ref 0–100)
RBC #/AREA URNS HPF: 1 /HPF (ref 0–4)
SQUAMOUS #/AREA URNS HPF: 4 /HPF
WBC #/AREA URNS HPF: 0 /HPF (ref 0–5)

## 2025-07-15 PROCEDURE — 99214 OFFICE O/P EST MOD 30 MIN: CPT | Mod: S$GLB,,, | Performed by: STUDENT IN AN ORGANIZED HEALTH CARE EDUCATION/TRAINING PROGRAM

## 2025-07-15 PROCEDURE — 3061F NEG MICROALBUMINURIA REV: CPT | Mod: CPTII,S$GLB,, | Performed by: STUDENT IN AN ORGANIZED HEALTH CARE EDUCATION/TRAINING PROGRAM

## 2025-07-15 PROCEDURE — G2211 COMPLEX E/M VISIT ADD ON: HCPCS | Mod: S$GLB,,, | Performed by: STUDENT IN AN ORGANIZED HEALTH CARE EDUCATION/TRAINING PROGRAM

## 2025-07-15 PROCEDURE — 4010F ACE/ARB THERAPY RXD/TAKEN: CPT | Mod: CPTII,S$GLB,, | Performed by: STUDENT IN AN ORGANIZED HEALTH CARE EDUCATION/TRAINING PROGRAM

## 2025-07-15 PROCEDURE — 51798 US URINE CAPACITY MEASURE: CPT | Mod: S$GLB,,, | Performed by: STUDENT IN AN ORGANIZED HEALTH CARE EDUCATION/TRAINING PROGRAM

## 2025-07-15 PROCEDURE — 3008F BODY MASS INDEX DOCD: CPT | Mod: CPTII,S$GLB,, | Performed by: STUDENT IN AN ORGANIZED HEALTH CARE EDUCATION/TRAINING PROGRAM

## 2025-07-15 PROCEDURE — 99999 PR PBB SHADOW E&M-EST. PATIENT-LVL IV: CPT | Mod: PBBFAC,,, | Performed by: STUDENT IN AN ORGANIZED HEALTH CARE EDUCATION/TRAINING PROGRAM

## 2025-07-15 PROCEDURE — 3066F NEPHROPATHY DOC TX: CPT | Mod: CPTII,S$GLB,, | Performed by: STUDENT IN AN ORGANIZED HEALTH CARE EDUCATION/TRAINING PROGRAM

## 2025-07-15 PROCEDURE — 3044F HG A1C LEVEL LT 7.0%: CPT | Mod: CPTII,S$GLB,, | Performed by: STUDENT IN AN ORGANIZED HEALTH CARE EDUCATION/TRAINING PROGRAM

## 2025-07-15 PROCEDURE — 81000 URINALYSIS NONAUTO W/SCOPE: CPT | Performed by: STUDENT IN AN ORGANIZED HEALTH CARE EDUCATION/TRAINING PROGRAM

## 2025-07-15 PROCEDURE — 87086 URINE CULTURE/COLONY COUNT: CPT | Performed by: STUDENT IN AN ORGANIZED HEALTH CARE EDUCATION/TRAINING PROGRAM

## 2025-07-15 PROCEDURE — 1159F MED LIST DOCD IN RCRD: CPT | Mod: CPTII,S$GLB,, | Performed by: STUDENT IN AN ORGANIZED HEALTH CARE EDUCATION/TRAINING PROGRAM

## 2025-07-16 LAB — BACTERIA UR CULT: NORMAL

## 2025-08-12 ENCOUNTER — CLINICAL SUPPORT (OUTPATIENT)
Dept: RHEUMATOLOGY | Facility: CLINIC | Age: 55
End: 2025-08-12
Payer: MEDICARE

## 2025-08-12 VITALS — DIASTOLIC BLOOD PRESSURE: 74 MMHG | HEART RATE: 71 BPM | SYSTOLIC BLOOD PRESSURE: 132 MMHG

## 2025-08-12 DIAGNOSIS — L40.50 PSA (PSORIATIC ARTHRITIS): ICD-10-CM

## 2025-08-12 DIAGNOSIS — M54.50 LOW BACK PAIN, UNSPECIFIED BACK PAIN LATERALITY, UNSPECIFIED CHRONICITY, UNSPECIFIED WHETHER SCIATICA PRESENT: ICD-10-CM

## 2025-08-12 DIAGNOSIS — E53.9 VITAMIN B DEFICIENCY: ICD-10-CM

## 2025-08-12 DIAGNOSIS — L70.0 ACNE VULGARIS: Primary | ICD-10-CM

## 2025-08-12 DIAGNOSIS — M06.9 RHEUMATOID ARTHRITIS INVOLVING LEFT KNEE, UNSPECIFIED WHETHER RHEUMATOID FACTOR PRESENT: ICD-10-CM

## 2025-08-12 PROCEDURE — 99999 PR PBB SHADOW E&M-EST. PATIENT-LVL III: CPT | Mod: PBBFAC,,,

## 2025-08-12 PROCEDURE — 96372 THER/PROPH/DIAG INJ SC/IM: CPT | Mod: S$GLB,,, | Performed by: INTERNAL MEDICINE

## 2025-08-12 RX ORDER — METHYLPREDNISOLONE ACETATE 80 MG/ML
80 INJECTION, SUSPENSION INTRA-ARTICULAR; INTRALESIONAL; INTRAMUSCULAR; SOFT TISSUE
Status: COMPLETED | OUTPATIENT
Start: 2025-08-12 | End: 2025-08-12

## 2025-08-12 RX ORDER — METHYLPREDNISOLONE ACETATE 80 MG/ML
160 INJECTION, SUSPENSION INTRA-ARTICULAR; INTRALESIONAL; INTRAMUSCULAR; SOFT TISSUE
Status: COMPLETED | OUTPATIENT
Start: 2025-08-12 | End: 2025-08-12

## 2025-08-12 RX ORDER — KETOROLAC TROMETHAMINE 30 MG/ML
60 INJECTION, SOLUTION INTRAMUSCULAR; INTRAVENOUS
Status: COMPLETED | OUTPATIENT
Start: 2025-08-12 | End: 2025-08-12

## 2025-08-12 RX ORDER — CYANOCOBALAMIN 1000 UG/ML
1000 INJECTION, SOLUTION INTRAMUSCULAR; SUBCUTANEOUS
Status: COMPLETED | OUTPATIENT
Start: 2025-08-12 | End: 2025-08-12

## 2025-08-12 RX ADMIN — METHYLPREDNISOLONE ACETATE 160 MG: 80 INJECTION, SUSPENSION INTRA-ARTICULAR; INTRALESIONAL; INTRAMUSCULAR; SOFT TISSUE at 10:08

## 2025-08-12 RX ADMIN — KETOROLAC TROMETHAMINE 60 MG: 30 INJECTION, SOLUTION INTRAMUSCULAR; INTRAVENOUS at 10:08

## 2025-08-12 RX ADMIN — CYANOCOBALAMIN 1000 MCG: 1000 INJECTION, SOLUTION INTRAMUSCULAR; SUBCUTANEOUS at 10:08

## 2025-08-20 ENCOUNTER — PATIENT MESSAGE (OUTPATIENT)
Dept: ADMINISTRATIVE | Facility: OTHER | Age: 55
End: 2025-08-20
Payer: MEDICARE

## 2025-09-02 DIAGNOSIS — L70.0 ACNE VULGARIS: ICD-10-CM

## 2025-09-03 RX ORDER — TRETINOIN 0.25 MG/G
CREAM TOPICAL
Qty: 45 G | Refills: 1 | Status: SHIPPED | OUTPATIENT
Start: 2025-09-03

## (undated) DEVICE — GLOVE SURGEONS ULTRA TOUCH 6.5

## (undated) DEVICE — GAUZE SPONGE 4X4 12PLY

## (undated) DEVICE — SEE MEDLINE ITEM 146298

## (undated) DEVICE — SEE MEDLINE ITEM 152622

## (undated) DEVICE — JUG OMNI (LG VOLUME FLUID)

## (undated) DEVICE — KIT ENDO CARRY ON PROCEDURE

## (undated) DEVICE — ADHESIVE MASTISOL VIAL 48/BX

## (undated) DEVICE — DRAPE PLASTIC U 60X72

## (undated) DEVICE — TOURNIQUET SB QC SP 34X4IN

## (undated) DEVICE — SEE MEDLINE ITEM 157169

## (undated) DEVICE — Device

## (undated) DEVICE — ELECTRODE REM PLYHSV RETURN 9

## (undated) DEVICE — SIGMA LCS HIGH PERFORMANCE STERILE THREADED HEADED PINS
Type: IMPLANTABLE DEVICE | Site: KNEE | Status: NON-FUNCTIONAL
Brand: SIGMA LCS HIGH PERFORMANCE
Removed: 2019-10-03

## (undated) DEVICE — GLOVE BIOGEL PI ORTHO PRO SZ7

## (undated) DEVICE — BOWL MIXING BONE CEMENT

## (undated) DEVICE — DRAPE STERI U-SHAPED 47X51IN

## (undated) DEVICE — GLOVE PROTEXIS PI SYN SURG 6.5

## (undated) DEVICE — GLOVE BIOGEL ORTHOPEDIC 6.5

## (undated) DEVICE — SOL NACL IRR 3000ML

## (undated) DEVICE — SIGMA LCS HIGH PERFORMANCE INSTRUMENTS STERILE QUICK DRILL PINS
Type: IMPLANTABLE DEVICE | Site: KNEE | Status: NON-FUNCTIONAL
Brand: SIGMA LCS HIGH PERFORMANCE
Removed: 2019-10-03

## (undated) DEVICE — DRESSING N ADH OIL EMUL 3X3

## (undated) DEVICE — SUT BLU BR 2 TAPERD NDL 1/2

## (undated) DEVICE — DRESSING WND ADH PRIMAPORE 10

## (undated) DEVICE — GLOVE SURG ULTRA TOUCH 6

## (undated) DEVICE — SEE MEDLINE ITEM 152530

## (undated) DEVICE — GOWN B1 X-LG X-LONG

## (undated) DEVICE — SOL NACL IRR 1000ML BTL

## (undated) DEVICE — SPONGE/BRUSH SCRUB SURG PCMX

## (undated) DEVICE — SEE MEDLINE ITEM 157116

## (undated) DEVICE — SYR ONLY LUER LOCK 20CC

## (undated) DEVICE — TUBING SUCTION 3/16X10 2 CONN

## (undated) DEVICE — SPONGE NOVAPLUS LAP 18X18IN

## (undated) DEVICE — SOL LAC RINGERS IRR 3000ML

## (undated) DEVICE — SHEET DRAPE FAN-FOLDED 3/4

## (undated) DEVICE — SUT CTD VICRYL 2-0 VIL BR C

## (undated) DEVICE — CLOSURE SKIN STERI STRIP 1/2X4

## (undated) DEVICE — CLOSURE SKIN STERI STRIP 1/4X4

## (undated) DEVICE — SUT 0 36IN COATED VICRYL VI

## (undated) DEVICE — SYR BLOOD COLLECT 3ML 21G 1IN

## (undated) DEVICE — SYR SLIP TIP 5CC

## (undated) DEVICE — SUT MONO 3-0 PS-2 18 PLST

## (undated) DEVICE — GLOVE PI ULTRA TOUCH G SURGEON

## (undated) DEVICE — SEE MEDLINE ITEM 156964

## (undated) DEVICE — SEE MEDLINE ITEM 157216

## (undated) DEVICE — GLOVE SURG ULTRA TOUCH 7

## (undated) DEVICE — SEE MEDLINE ITEM 146292

## (undated) DEVICE — PIN TEMPORARY
Type: IMPLANTABLE DEVICE | Site: KNEE | Status: NON-FUNCTIONAL
Removed: 2019-04-18

## (undated) DEVICE — DILATOR CRE 10-12MM

## (undated) DEVICE — TUBING ARTHROSCOPY

## (undated) DEVICE — NDL ANES SPINAL 18X3.5ST 18G

## (undated) DEVICE — BLADE SURG STAINLESS STEEL #10

## (undated) DEVICE — DRAPE STERI INSTRUMENT 1018

## (undated) DEVICE — CANISTER SUCTION 3000CC

## (undated) DEVICE — ELECTRODE COOLPULSE 90 W/HAND

## (undated) DEVICE — COVER TABLE BACK 44IN X 90IN

## (undated) DEVICE — BANDAGE ESMARK 6X12

## (undated) DEVICE — SLEEVE SCD EXPRESS CALF MEDIUM

## (undated) DEVICE — GLOVE ULTRATOUCH PLYSPHRN SZ9

## (undated) DEVICE — GLOVE SURG ULTRA TOUCH 9

## (undated) DEVICE — GLOVE SURG ULTRA TOUCH 7.5

## (undated) DEVICE — SUT ETHILON 4-0 PS2 18 BLK

## (undated) DEVICE — GLOVE SURG ULTRA TOUCH 8.5

## (undated) DEVICE — NDL ECLIPSE SAFETY 18GX1-1/2IN

## (undated) DEVICE — CATH ESOPH 7.5X15-18X5.5X240

## (undated) DEVICE — SOL 9P NACL IRR PIC IL

## (undated) DEVICE — PIN THREADED STERILE
Type: IMPLANTABLE DEVICE | Site: KNEE | Status: NON-FUNCTIONAL
Removed: 2019-04-18

## (undated) DEVICE — CUTTER MENISCUS AGGRESSIVE 4.0

## (undated) DEVICE — DRAPE INCISE IOBAN 2 23X33IN

## (undated) DEVICE — SOL IRRI STRL WATER 1000ML

## (undated) DEVICE — SYR 30CC LUER LOCK

## (undated) DEVICE — SUT ETHILON 3-0 PS2 18 BLK

## (undated) DEVICE — BLADE SAW SGTL DL STR 25X1.27X

## (undated) DEVICE — CHLORAPREP W TINT 26ML APPL

## (undated) DEVICE — DRAPE INCISE IOBAN 2 13X13IN

## (undated) DEVICE — SUT CTD VICRYL 4-0 BR PS-2

## (undated) DEVICE — INTERPULSE SET

## (undated) DEVICE — TOURNIQUET SB QC SP 30X4IN

## (undated) DEVICE — SEE MEDLINE ITEM 146231

## (undated) DEVICE — NDL 10CC 20GAX1 COMBO

## (undated) DEVICE — SEE MEDLINE ITEM 146417

## (undated) DEVICE — P.F.C. DRILL BIT AND STEINMAN PIN PACKET (1 UNIT) .125IN DIA 5IN LGTH
Type: IMPLANTABLE DEVICE | Site: KNEE | Status: NON-FUNCTIONAL
Brand: P.F.C.
Removed: 2019-10-03

## (undated) DEVICE — PAD ABD 8X10 STERILE

## (undated) DEVICE — DRESSING AQUACEL AG 3.5X10IN